# Patient Record
Sex: FEMALE | Race: WHITE | NOT HISPANIC OR LATINO | Employment: OTHER | URBAN - METROPOLITAN AREA
[De-identification: names, ages, dates, MRNs, and addresses within clinical notes are randomized per-mention and may not be internally consistent; named-entity substitution may affect disease eponyms.]

---

## 2019-03-13 ENCOUNTER — OFFICE VISIT (OUTPATIENT)
Dept: GASTROENTEROLOGY | Facility: CLINIC | Age: 69
End: 2019-03-13
Payer: MEDICARE

## 2019-03-13 VITALS
DIASTOLIC BLOOD PRESSURE: 88 MMHG | WEIGHT: 186 LBS | HEART RATE: 74 BPM | HEIGHT: 64 IN | SYSTOLIC BLOOD PRESSURE: 124 MMHG | BODY MASS INDEX: 31.76 KG/M2

## 2019-03-13 DIAGNOSIS — K64.9 HEMORRHOIDS, UNSPECIFIED HEMORRHOID TYPE: ICD-10-CM

## 2019-03-13 DIAGNOSIS — K59.00 CONSTIPATION, UNSPECIFIED CONSTIPATION TYPE: ICD-10-CM

## 2019-03-13 DIAGNOSIS — Z83.71 FAMILY HISTORY OF COLONIC POLYPS: ICD-10-CM

## 2019-03-13 DIAGNOSIS — K21.9 GASTROESOPHAGEAL REFLUX DISEASE, ESOPHAGITIS PRESENCE NOT SPECIFIED: Primary | ICD-10-CM

## 2019-03-13 PROBLEM — Z83.719 FAMILY HISTORY OF COLONIC POLYPS: Status: ACTIVE | Noted: 2019-03-13

## 2019-03-13 PROCEDURE — 99214 OFFICE O/P EST MOD 30 MIN: CPT | Performed by: INTERNAL MEDICINE

## 2019-03-13 RX ORDER — PRAVASTATIN SODIUM 20 MG
TABLET ORAL
COMMUNITY
Start: 2019-01-31 | End: 2021-11-19

## 2019-03-13 RX ORDER — FEXOFENADINE HCL 180 MG/1
180 TABLET ORAL DAILY
COMMUNITY

## 2019-03-13 RX ORDER — LISINOPRIL 5 MG/1
5 TABLET ORAL DAILY
COMMUNITY
Start: 2019-02-12

## 2019-03-13 RX ORDER — DIPHENOXYLATE HYDROCHLORIDE AND ATROPINE SULFATE 2.5; .025 MG/1; MG/1
1 TABLET ORAL DAILY
COMMUNITY

## 2019-03-13 RX ORDER — MELATONIN
1000 DAILY
COMMUNITY

## 2019-03-13 RX ORDER — OMEPRAZOLE 20 MG/1
20 CAPSULE, DELAYED RELEASE ORAL DAILY
Qty: 30 CAPSULE | Refills: 3 | Status: SHIPPED | OUTPATIENT
Start: 2019-03-13 | End: 2019-06-03 | Stop reason: SDUPTHER

## 2019-03-13 RX ORDER — BUDESONIDE AND FORMOTEROL FUMARATE DIHYDRATE 160; 4.5 UG/1; UG/1
2 AEROSOL RESPIRATORY (INHALATION) DAILY
COMMUNITY
Start: 2019-01-31

## 2019-03-13 RX ORDER — MONTELUKAST SODIUM 10 MG/1
10 TABLET ORAL DAILY
COMMUNITY
Start: 2019-02-06

## 2019-03-13 RX ORDER — BETAMETHASONE DIPROPIONATE 0.5 MG/ML
LOTION, AUGMENTED TOPICAL AS NEEDED
COMMUNITY

## 2019-03-13 RX ORDER — RANITIDINE 300 MG/1
TABLET ORAL
COMMUNITY
Start: 2019-02-10 | End: 2019-05-06 | Stop reason: SDUPTHER

## 2019-03-13 RX ORDER — CETIRIZINE HYDROCHLORIDE 10 MG/1
10 TABLET, CHEWABLE ORAL DAILY
COMMUNITY
End: 2021-11-19

## 2019-03-13 RX ORDER — ALBUTEROL SULFATE 90 UG/1
2 AEROSOL, METERED RESPIRATORY (INHALATION) EVERY 4 HOURS PRN
COMMUNITY

## 2019-03-13 NOTE — LETTER
March 13, 2019     Nilo Nicholson MD  [address]    Patient: Mendel Grew   YOB: 1950   Date of Visit: 3/13/2019       Dear Dr Nakia Veras:    Thank you for referring Anam Neil to me for evaluation  Below are my notes for this consultation  If you have questions, please do not hesitate to call me  I look forward to following your patient along with you  Sincerely,        Karen Martinez MD        CC: No Recipients  Karen Martinez MD  3/13/2019  3:35 PM  Sign at close encounter  [email or web address] - Outpatient Follow-up Note  Mendel Grew 76 y o  female MRN: [id number]  Encounter: [id number]    ASSESSMENT AND PLAN:      1  Gastroesophageal reflux disease, esophagitis presence not specified  Patient with chronic reflux that was controlled with PPI and ranitidine  Able to take her off of the PPI however with her move dietary changes and some allergy symptoms reflux symptoms do seem to be worsening  No alarm symptoms of dysphagia weight loss or bleeding  Advise restart low-dose omeprazole  Continue with reflux diet  - omeprazole (PriLOSEC) 20 mg delayed release capsule; Take 1 capsule (20 mg total) by mouth daily  Dispense: 30 capsule; Refill: 3    2  Constipation, unspecified constipation type  Clinically stable using Benefiber  Would consider increasing dose this patient states sometimes her bowel movements are very small and she has occasional sensation of incomplete evacuation  3  Hemorrhoids, unspecified hemorrhoid type  Clinically stable continue with fiber  Hydrocortisone cream as needed  - hydrocortisone (PROCTOSOL HC) 2 5 % rectal cream; Insert into the rectum 2 (two) times a day  Dispense: 30 g; Refill: 5    4  Family history of colonic polyps  Up-to-date with colorectal cancer screening  Next examination due October 2021       Followup Appointment[de-identified]  About 6 weeks  ______________________________________________________________________    Chief Complaint   Patient presents with    yearly check up     gerd/hernia     HPI:  Moved to [facility] community  Active in choral and water exercises  Foods there with a lot of fried and spicy stuff, having difficulty adjusting  Watching weight, stable  Often feels like lump in lower throat, congestion Has lots of allergies, air filters have helped  Still with persistent productive cough  Rare heartburn and water brash  Taking ranitidine at bedtime, rarely uses OTC Omeprazole  Has head of bed elevated  Historical Information   Past Medical History:   Diagnosis Date    Allergic sinusitis     Asthma     Degenerative joint disease     Hyperlipidemia     Hypertension      History reviewed  No pertinent surgical history    Social History     Substance and Sexual Activity   Alcohol Use Yes    Frequency: 2-4 times a month     Social History     Substance and Sexual Activity   Drug Use Never     Social History     Tobacco Use   Smoking Status Never Smoker   Smokeless Tobacco Never Used     Family History   Problem Relation Age of Onset    Colon polyps Sister     Colon cancer Neg Hx          Current Outpatient Medications:     Acetaminophen (TYLENOL ARTHRITIS PAIN PO)    albuterol (PROVENTIL HFA,VENTOLIN HFA) 90 mcg/act inhaler    B Complex-C (B COMPLEX-VITAMIN C PO)    betamethasone, augmented, (DIPROLENE) 0 05 % lotion    Calcium Carb-Cholecalciferol (CALCIUM 500+D3 PO)    cetirizine (ZyrTEC) 10 MG chewable tablet    cholecalciferol (VITAMIN D3) 1,000 units tablet    Docusate Calcium (STOOL SOFTENER PO)    Ferrous Sulfate (IRON) 325 (65 Fe) MG TABS    fexofenadine (ALLEGRA) 180 MG tablet    GLUCOSAMINE-CHONDROITIN PO    hydrocortisone (PROCTOSOL HC) 2 5 % rectal cream    lisinopril (ZESTRIL) 5 mg tablet    montelukast (SINGULAIR) 10 mg tablet    multivitamin (THERAGRAN) TABS    Polyethylene Glycol 3350 (MIRALAX PO)    pravastatin (PRAVACHOL) 20 mg tablet    Probiotic Product (PROBIOTIC DAILY PO)    ranitidine (ZANTAC) 300 MG tablet    SYMBICORT 160-4 5 MCG/ACT inhaler    Wheat Dextrin (BENEFIBER PO)    omeprazole (PriLOSEC) 20 mg delayed release capsule  No Known Allergies    Positive for hoarseness, weight gain productive cough wheezing and dyspnea on exertion, GI as per HPI  Leg cramps and muscle stiffness  Otherwise  10 Point REVIEW OF SYSTEMS IS OTHERWISE NEGATIVE  PHYSICAL EXAM:    Blood pressure 124/88, pulse 74, height 5' 4" (1 626 m), weight 84 4 kg (186 lb)  Body mass index is 31 93 kg/m²  General Appearance:  Alert, cooperative, no distress  HEENT:  Normocephalic, atraumatic, anicteric  Neck: Supple, symmetrical, trachea midline  Lungs: Clear to auscultation bilaterally; no rales, rhonchi or wheezing; respirations unlabored   Heart: Regular rate and rhythm; no murmur, rub, or gallop  Abdomen:   Soft, non-tender, non-distended; normal bowel sounds; no masses, no organomegaly   Rectal:  Deferred   Extremities:  No cyanosis, clubbing or edema   Skin:  No jaundice, rashes, or lesions   Lymph nodes: No palpable cervical lymphadenopathy     Lab Results:   No results found for: WBC, HGB, HCT, MCV, PLT  No results found for: NA, K, CL, CO2, ANIONGAP, BUN, CREATININE, GLUCOSE, GLUF, CALCIUM, CORRECTEDCA, AST, ALT, ALKPHOS, PROT, BILITOT, EGFR  No results found for: IRON, TIBC, FERRITIN  No results found for: LIPASE    Radiology Results:   No results found

## 2019-03-13 NOTE — PROGRESS NOTES
[facility] - Outpatient Follow-up Note  Ren Canales 76 y o  female MRN: [id number]  Encounter: [id number]    ASSESSMENT AND PLAN:      1  Gastroesophageal reflux disease, esophagitis presence not specified  Patient with chronic reflux that was controlled with PPI and ranitidine  Able to take her off of the PPI however with her move dietary changes and some allergy symptoms reflux symptoms do seem to be worsening  No alarm symptoms of dysphagia weight loss or bleeding  Advise restart low-dose omeprazole  Continue with reflux diet  - omeprazole (PriLOSEC) 20 mg delayed release capsule; Take 1 capsule (20 mg total) by mouth daily  Dispense: 30 capsule; Refill: 3    2  Constipation, unspecified constipation type  Clinically stable using Benefiber  Would consider increasing dose this patient states sometimes her bowel movements are very small and she has occasional sensation of incomplete evacuation  3  Hemorrhoids, unspecified hemorrhoid type  Clinically stable continue with fiber  Hydrocortisone cream as needed  - hydrocortisone (PROCTOSOL HC) 2 5 % rectal cream; Insert into the rectum 2 (two) times a day  Dispense: 30 g; Refill: 5    4  Family history of colonic polyps  Up-to-date with colorectal cancer screening  Next examination due October 2021  Followup Appointment[de-identified]  About 6 weeks  ______________________________________________________________________    Chief Complaint   Patient presents with    yearly check up     gerd/hernia     HPI:  Moved to group home community  Active in choral and water exercises  Foods there with a lot of fried and spicy stuff, having difficulty adjusting  Watching weight, stable  Often feels like lump in lower throat, congestion Has lots of allergies, air filters have helped  Still with persistent productive cough  Rare heartburn and water brash  Taking ranitidine at bedtime, rarely uses OTC Omeprazole    Has head of bed elevated  Historical Information   Past Medical History:   Diagnosis Date    Allergic sinusitis     Asthma     Degenerative joint disease     Hyperlipidemia     Hypertension      History reviewed  No pertinent surgical history  Social History     Substance and Sexual Activity   Alcohol Use Yes    Frequency: 2-4 times a month     Social History     Substance and Sexual Activity   Drug Use Never     Social History     Tobacco Use   Smoking Status Never Smoker   Smokeless Tobacco Never Used     Family History   Problem Relation Age of Onset    Colon polyps Sister     Colon cancer Neg Hx          Current Outpatient Medications:     Acetaminophen (TYLENOL ARTHRITIS PAIN PO)    albuterol (PROVENTIL HFA,VENTOLIN HFA) 90 mcg/act inhaler    B Complex-C (B COMPLEX-VITAMIN C PO)    betamethasone, augmented, (DIPROLENE) 0 05 % lotion    Calcium Carb-Cholecalciferol (CALCIUM 500+D3 PO)    cetirizine (ZyrTEC) 10 MG chewable tablet    cholecalciferol (VITAMIN D3) 1,000 units tablet    Docusate Calcium (STOOL SOFTENER PO)    Ferrous Sulfate (IRON) 325 (65 Fe) MG TABS    fexofenadine (ALLEGRA) 180 MG tablet    GLUCOSAMINE-CHONDROITIN PO    hydrocortisone (PROCTOSOL HC) 2 5 % rectal cream    lisinopril (ZESTRIL) 5 mg tablet    montelukast (SINGULAIR) 10 mg tablet    multivitamin (THERAGRAN) TABS    Polyethylene Glycol 3350 (MIRALAX PO)    pravastatin (PRAVACHOL) 20 mg tablet    Probiotic Product (PROBIOTIC DAILY PO)    ranitidine (ZANTAC) 300 MG tablet    SYMBICORT 160-4 5 MCG/ACT inhaler    Wheat Dextrin (BENEFIBER PO)    omeprazole (PriLOSEC) 20 mg delayed release capsule  No Known Allergies    Positive for hoarseness, weight gain productive cough wheezing and dyspnea on exertion, GI as per HPI  Leg cramps and muscle stiffness  Otherwise  10 Point REVIEW OF SYSTEMS IS OTHERWISE NEGATIVE  PHYSICAL EXAM:    Blood pressure 124/88, pulse 74, height 5' 4" (1 626 m), weight 84 4 kg (186 lb)  Body mass index is 31 93 kg/m²  General Appearance:  Alert, cooperative, no distress  HEENT:  Normocephalic, atraumatic, anicteric  Neck: Supple, symmetrical, trachea midline  Lungs: Clear to auscultation bilaterally; no rales, rhonchi or wheezing; respirations unlabored   Heart: Regular rate and rhythm; no murmur, rub, or gallop  Abdomen:   Soft, non-tender, non-distended; normal bowel sounds; no masses, no organomegaly   Rectal:  Deferred   Extremities:  No cyanosis, clubbing or edema   Skin:  No jaundice, rashes, or lesions   Lymph nodes: No palpable cervical lymphadenopathy     Lab Results:   No results found for: WBC, HGB, HCT, MCV, PLT  No results found for: NA, K, CL, CO2, ANIONGAP, BUN, CREATININE, GLUCOSE, GLUF, CALCIUM, CORRECTEDCA, AST, ALT, ALKPHOS, PROT, BILITOT, EGFR  No results found for: IRON, TIBC, FERRITIN  No results found for: LIPASE    Radiology Results:   No results found

## 2019-04-24 ENCOUNTER — OFFICE VISIT (OUTPATIENT)
Dept: GASTROENTEROLOGY | Facility: CLINIC | Age: 69
End: 2019-04-24
Payer: MEDICARE

## 2019-04-24 ENCOUNTER — TELEPHONE (OUTPATIENT)
Dept: GASTROENTEROLOGY | Facility: CLINIC | Age: 69
End: 2019-04-24

## 2019-04-24 VITALS
HEART RATE: 66 BPM | WEIGHT: 184 LBS | HEIGHT: 64 IN | BODY MASS INDEX: 31.41 KG/M2 | DIASTOLIC BLOOD PRESSURE: 88 MMHG | SYSTOLIC BLOOD PRESSURE: 124 MMHG

## 2019-04-24 DIAGNOSIS — Z83.71 FAMILY HISTORY OF COLONIC POLYPS: ICD-10-CM

## 2019-04-24 DIAGNOSIS — K59.00 CONSTIPATION, UNSPECIFIED CONSTIPATION TYPE: ICD-10-CM

## 2019-04-24 DIAGNOSIS — K21.9 GASTROESOPHAGEAL REFLUX DISEASE, ESOPHAGITIS PRESENCE NOT SPECIFIED: Primary | ICD-10-CM

## 2019-04-24 PROCEDURE — 99213 OFFICE O/P EST LOW 20 MIN: CPT | Performed by: INTERNAL MEDICINE

## 2019-04-24 RX ORDER — SIMVASTATIN 10 MG
10 TABLET ORAL
COMMUNITY
Start: 2019-05-25

## 2019-05-06 DIAGNOSIS — K21.9 GASTROESOPHAGEAL REFLUX DISEASE, ESOPHAGITIS PRESENCE NOT SPECIFIED: Primary | ICD-10-CM

## 2019-05-06 RX ORDER — RANITIDINE 300 MG/1
300 TABLET ORAL
Qty: 90 TABLET | Refills: 3 | Status: SHIPPED | OUTPATIENT
Start: 2019-05-06 | End: 2021-11-19

## 2019-05-28 LAB — HBA1C MFR BLD HPLC: 5.5 %

## 2019-06-03 DIAGNOSIS — K21.9 GASTROESOPHAGEAL REFLUX DISEASE, ESOPHAGITIS PRESENCE NOT SPECIFIED: ICD-10-CM

## 2019-06-04 RX ORDER — OMEPRAZOLE 20 MG/1
CAPSULE, DELAYED RELEASE ORAL
Qty: 30 CAPSULE | Refills: 2 | Status: SHIPPED | OUTPATIENT
Start: 2019-06-04 | End: 2019-08-30 | Stop reason: SDUPTHER

## 2019-08-30 DIAGNOSIS — K21.9 GASTROESOPHAGEAL REFLUX DISEASE, ESOPHAGITIS PRESENCE NOT SPECIFIED: ICD-10-CM

## 2019-08-30 RX ORDER — OMEPRAZOLE 20 MG/1
CAPSULE, DELAYED RELEASE ORAL
Qty: 90 CAPSULE | Refills: 0 | Status: SHIPPED | OUTPATIENT
Start: 2019-08-30 | End: 2019-12-02 | Stop reason: SDUPTHER

## 2019-10-28 ENCOUNTER — OFFICE VISIT (OUTPATIENT)
Dept: GASTROENTEROLOGY | Facility: CLINIC | Age: 69
End: 2019-10-28
Payer: MEDICARE

## 2019-10-28 VITALS
SYSTOLIC BLOOD PRESSURE: 140 MMHG | BODY MASS INDEX: 31.58 KG/M2 | HEIGHT: 64 IN | DIASTOLIC BLOOD PRESSURE: 98 MMHG | HEART RATE: 70 BPM | WEIGHT: 185 LBS

## 2019-10-28 DIAGNOSIS — K59.00 CONSTIPATION, UNSPECIFIED CONSTIPATION TYPE: ICD-10-CM

## 2019-10-28 DIAGNOSIS — Z83.71 FAMILY HISTORY OF COLONIC POLYPS: ICD-10-CM

## 2019-10-28 DIAGNOSIS — K21.9 GASTROESOPHAGEAL REFLUX DISEASE, ESOPHAGITIS PRESENCE NOT SPECIFIED: Primary | ICD-10-CM

## 2019-10-28 DIAGNOSIS — K64.9 HEMORRHOIDS, UNSPECIFIED HEMORRHOID TYPE: ICD-10-CM

## 2019-10-28 PROCEDURE — 99213 OFFICE O/P EST LOW 20 MIN: CPT | Performed by: INTERNAL MEDICINE

## 2019-10-28 RX ORDER — FAMOTIDINE 40 MG/1
40 TABLET, FILM COATED ORAL
Refills: 1 | COMMUNITY
Start: 2019-08-26 | End: 2020-02-24 | Stop reason: SDUPTHER

## 2019-10-28 NOTE — LETTER
October 28, 2019     Irwin Moeller MD  [address] [facility]  [address]  [facility] [address]    Patient: Shamika Rice   YOB: 1950   Date of Visit: 10/28/2019       Dear Dr SORIA [facility] [place]:    Thank you for referring Vidal Valdez to me for evaluation  Below are my notes for this consultation  If you have questions, please do not hesitate to call me  I look forward to following your patient along with you  Sincerely,        Cody Iraheta MD        CC: No Recipients  Cody Iraheta MD  10/28/2019  5:33 PM  Sign at close encounter  [facility] - Outpatient Follow-up Note  Shamika Rice 71 y o  female MRN: [id number]  Encounter: [id number]    ASSESSMENT AND PLAN:      1  Gastroesophageal reflux disease, esophagitis presence not specified  Reflux symptoms reasonably well controlled with PPI every other day, Pepcid in the evening, and dietary management  No dysphagia  Appetite good  Discussed roll of hiatal hernia  She did have a small hiatal hernia on previous endoscopic evaluation  Reasonable to reassess and will order an upper GI series  If the hiatal hernia has markedly increased in size orifice significant amount of the stomach is in the chest cavity then repair would definitely be indicated  If it is remaining small it is not clear that the benefit of repair would be adequate  Continue with same medical regimen and dietary control   - [place] upper GI UGI; Future    2  Constipation, unspecified constipation type  Continue with daily fiber therapy  Titrate dose as needed  Reinforced that it is okay to use MiraLax as needed  Hold on adding additional medications such as Amitiza or Linzess  3  Hemorrhoids, unspecified hemorrhoid type  Encourage fiber and fluids  Would transition from benefiber to the psyllium gummies  Proctocream as needed  4  Family history of colonic polyps  Recommend screening colonoscopy every 5 years    Next examination due October 2021  Followup Appointment:  4 months  ______________________________________________________________________    Chief Complaint   Patient presents with    Follow-up     gerd/constipation     HPI:  Still with intermittent constipation  Stool every day to every three days  Still taking fiber, discussed using more fiber daily  Likes gummies better than Benefiber  Changed from Pepcid to ranitidine  Working on reflux diet  Still with sinus issues and post nasal drip  No dysphagia  Taking PPI qod  Dong better with diet  Feels bloating  Hemorrhoids back  Proctocream helping  Encourage fib    Historical Information   Past Medical History:   Diagnosis Date    Allergic sinusitis     Asthma     Degenerative joint disease     Hyperlipidemia     Hypertension      Past Surgical History:   Procedure Laterality Date    COLONOSCOPY      October 2016 diverticulosis and internal hemorrhoids, September 2011 diverticulosis and internal hemorrhoids   UPPER GASTROINTESTINAL ENDOSCOPY  11/10/2014    November 2014 irregular Z-line and Schatzki ring, hiatal hernia, gastritis  Biopsies negative for celiac, H pylori, or Phillip's or eosinophilic esophagitis       Social History     Substance and Sexual Activity   Alcohol Use Yes    Frequency: 2-4 times a month    Drinks per session: 1 or 2     Social History     Substance and Sexual Activity   Drug Use Never     Social History     Tobacco Use   Smoking Status Never Smoker   Smokeless Tobacco Never Used     Family History   Problem Relation Age of Onset    Colon polyps Sister     Heart disease Sister     Diabetes Sister     MARLEY disease Sister     Colon polyps Mother     Colon cancer Neg Hx          Current Outpatient Medications:     Acetaminophen (TYLENOL ARTHRITIS PAIN PO)    albuterol (PROVENTIL HFA,VENTOLIN HFA) 90 mcg/act inhaler    B Complex-C (B COMPLEX-VITAMIN C PO)    betamethasone, augmented, (DIPROLENE) 0 05 % lotion    Calcium Carb-Cholecalciferol (CALCIUM 500+D3 PO)    cholecalciferol (VITAMIN D3) 1,000 units tablet    Docusate Calcium (STOOL SOFTENER PO)    famotidine (PEPCID) 40 MG tablet    Ferrous Sulfate (IRON) 142 (45 Fe) MG TBCR    fexofenadine (ALLEGRA) 180 MG tablet    GLUCOSAMINE-CHONDROITIN PO    hydrocortisone (PROCTOSOL HC) 2 5 % rectal cream    lisinopril (ZESTRIL) 5 mg tablet    montelukast (SINGULAIR) 10 mg tablet    multivitamin (THERAGRAN) TABS    omeprazole (PriLOSEC) 20 mg delayed release capsule    Polyethylene Glycol 3350 (MIRALAX PO)    Probiotic Product (PROBIOTIC DAILY PO)    simvastatin (ZOCOR) 10 mg tablet    SYMBICORT 160-4 5 MCG/ACT inhaler    Wheat Dextrin (BENEFIBER PO)    cetirizine (ZyrTEC) 10 MG chewable tablet    pravastatin (PRAVACHOL) 20 mg tablet    ranitidine (ZANTAC) 300 MG tablet  No Known Allergies  Reviewed medications and allergies and updated as indicated    PHYSICAL EXAM:    Blood pressure 140/98, pulse 70, height 5' 4" (1 626 m), weight 83 9 kg (185 lb)  Body mass index is 31 76 kg/m²  General Appearance: NAD, cooperative, alert  Eyes: Anicteric, PERRLA, EOMI  ENT:  Normocephalic, atraumatic, normal mucosa  Neck:  Supple, symmetrical, trachea midline  Resp:  Clear to auscultation bilaterally; no rales, rhonchi or wheezing; respirations unlabored   CV:  S1 S2, Regular rate and rhythm; no murmur, rub, or gallop  GI:  Soft, non-tender, non-distended; normal bowel sounds; no masses, no organomegaly   Rectal: Deferred  Musculoskeletal: No cyanosis, clubbing or edema  Normal ROM    Skin:  No jaundice, rashes, or lesions   Heme/Lymph: No palpable cervical lymphadenopathy  Psych: Normal affect, good eye contact  Neuro: No gross deficits, AAOx3    Lab Results:   No results found for: WBC, HGB, HCT, MCV, PLT  No results found for: NA, K, CL, CO2, ANIONGAP, BUN, CREATININE, GLUCOSE, GLUF, CALCIUM, CORRECTEDCA, AST, ALT, ALKPHOS, PROT, BILITOT, EGFR  No results found for: IRON, TIBC, FERRITIN  No results found for: LIPASE    Radiology Results:   No results found

## 2019-10-28 NOTE — PROGRESS NOTES
[facility] - Outpatient Follow-up Note  Chase Valdez 71 y o  female MRN: [id number]  Encounter: [id number]    ASSESSMENT AND PLAN:      1  Gastroesophageal reflux disease, esophagitis presence not specified  Reflux symptoms reasonably well controlled with PPI every other day, Pepcid in the evening, and dietary management  No dysphagia  Appetite good  Discussed roll of hiatal hernia  She did have a small hiatal hernia on previous endoscopic evaluation  Reasonable to reassess and will order an upper GI series  If the hiatal hernia has markedly increased in size orifice significant amount of the stomach is in the chest cavity then repair would definitely be indicated  If it is remaining small it is not clear that the benefit of repair would be adequate  Continue with same medical regimen and dietary control   - [place] upper GI UGI; Future    2  Constipation, unspecified constipation type  Continue with daily fiber therapy  Titrate dose as needed  Reinforced that it is okay to use MiraLax as needed  Hold on adding additional medications such as Amitiza or Linzess  3  Hemorrhoids, unspecified hemorrhoid type  Encourage fiber and fluids  Would transition from benefiber to the psyllium gummies  Proctocream as needed  4  Family history of colonic polyps  Recommend screening colonoscopy every 5 years  Next examination due October 2021  Followup Appointment:  4 months  ______________________________________________________________________    Chief Complaint   Patient presents with    Follow-up     gerd/constipation     HPI:  Still with intermittent constipation  Stool every day to every three days  Still taking fiber, discussed using more fiber daily  Likes gummies better than Benefiber  Changed from Pepcid to ranitidine  Working on reflux diet  Still with sinus issues and post nasal drip  No dysphagia  Taking PPI qod  Dong better with diet    Feels bloating  Hemorrhoids back  Proctocream helping  Encourage fib    Historical Information   Past Medical History:   Diagnosis Date    Allergic sinusitis     Asthma     Degenerative joint disease     Hyperlipidemia     Hypertension      Past Surgical History:   Procedure Laterality Date    COLONOSCOPY      October 2016 diverticulosis and internal hemorrhoids, September 2011 diverticulosis and internal hemorrhoids   UPPER GASTROINTESTINAL ENDOSCOPY  11/10/2014    November 2014 irregular Z-line and Schatzki ring, hiatal hernia, gastritis  Biopsies negative for celiac, H pylori, or Phillip's or eosinophilic esophagitis       Social History     Substance and Sexual Activity   Alcohol Use Yes    Frequency: 2-4 times a month    Drinks per session: 1 or 2     Social History     Substance and Sexual Activity   Drug Use Never     Social History     Tobacco Use   Smoking Status Never Smoker   Smokeless Tobacco Never Used     Family History   Problem Relation Age of Onset    Colon polyps Sister     Heart disease Sister     Diabetes Sister     MARLEY disease Sister     Colon polyps Mother     Colon cancer Neg Hx          Current Outpatient Medications:     Acetaminophen (TYLENOL ARTHRITIS PAIN PO)    albuterol (PROVENTIL HFA,VENTOLIN HFA) 90 mcg/act inhaler    B Complex-C (B COMPLEX-VITAMIN C PO)    betamethasone, augmented, (DIPROLENE) 0 05 % lotion    Calcium Carb-Cholecalciferol (CALCIUM 500+D3 PO)    cholecalciferol (VITAMIN D3) 1,000 units tablet    Docusate Calcium (STOOL SOFTENER PO)    famotidine (PEPCID) 40 MG tablet    Ferrous Sulfate (IRON) 142 (45 Fe) MG TBCR    fexofenadine (ALLEGRA) 180 MG tablet    GLUCOSAMINE-CHONDROITIN PO    hydrocortisone (PROCTOSOL HC) 2 5 % rectal cream    lisinopril (ZESTRIL) 5 mg tablet    montelukast (SINGULAIR) 10 mg tablet    multivitamin (THERAGRAN) TABS    omeprazole (PriLOSEC) 20 mg delayed release capsule    Polyethylene Glycol 3350 (MIRALAX PO)   Probiotic Product (PROBIOTIC DAILY PO)    simvastatin (ZOCOR) 10 mg tablet    SYMBICORT 160-4 5 MCG/ACT inhaler    Wheat Dextrin (BENEFIBER PO)    cetirizine (ZyrTEC) 10 MG chewable tablet    pravastatin (PRAVACHOL) 20 mg tablet    ranitidine (ZANTAC) 300 MG tablet  No Known Allergies  Reviewed medications and allergies and updated as indicated    PHYSICAL EXAM:    Blood pressure 140/98, pulse 70, height 5' 4" (1 626 m), weight 83 9 kg (185 lb)  Body mass index is 31 76 kg/m²  General Appearance: NAD, cooperative, alert  Eyes: Anicteric, PERRLA, EOMI  ENT:  Normocephalic, atraumatic, normal mucosa  Neck:  Supple, symmetrical, trachea midline  Resp:  Clear to auscultation bilaterally; no rales, rhonchi or wheezing; respirations unlabored   CV:  S1 S2, Regular rate and rhythm; no murmur, rub, or gallop  GI:  Soft, non-tender, non-distended; normal bowel sounds; no masses, no organomegaly   Rectal: Deferred  Musculoskeletal: No cyanosis, clubbing or edema  Normal ROM  Skin:  No jaundice, rashes, or lesions   Heme/Lymph: No palpable cervical lymphadenopathy  Psych: Normal affect, good eye contact  Neuro: No gross deficits, AAOx3    Lab Results:   No results found for: WBC, HGB, HCT, MCV, PLT  No results found for: NA, K, CL, CO2, ANIONGAP, BUN, CREATININE, GLUCOSE, GLUF, CALCIUM, CORRECTEDCA, AST, ALT, ALKPHOS, PROT, BILITOT, EGFR  No results found for: IRON, TIBC, FERRITIN  No results found for: LIPASE    Radiology Results:   No results found

## 2019-10-28 NOTE — PATIENT INSTRUCTIONS
Continue with reflux diet and precautions  Observe for trigger foods  Continue with Pepcid daily and omeprazole every other day  Fiber supplement daily, experiment with different doses and fiber supplements  Upper GI series

## 2019-12-02 DIAGNOSIS — K21.9 GASTROESOPHAGEAL REFLUX DISEASE, ESOPHAGITIS PRESENCE NOT SPECIFIED: ICD-10-CM

## 2019-12-02 RX ORDER — OMEPRAZOLE 20 MG/1
CAPSULE, DELAYED RELEASE ORAL
Qty: 90 CAPSULE | Refills: 0 | Status: SHIPPED | OUTPATIENT
Start: 2019-12-02 | End: 2021-11-19

## 2020-02-24 ENCOUNTER — OFFICE VISIT (OUTPATIENT)
Dept: GASTROENTEROLOGY | Facility: CLINIC | Age: 70
End: 2020-02-24
Payer: MEDICARE

## 2020-02-24 VITALS
BODY MASS INDEX: 30.73 KG/M2 | DIASTOLIC BLOOD PRESSURE: 82 MMHG | HEIGHT: 64 IN | SYSTOLIC BLOOD PRESSURE: 120 MMHG | WEIGHT: 180 LBS | HEART RATE: 69 BPM

## 2020-02-24 DIAGNOSIS — K59.00 CONSTIPATION, UNSPECIFIED CONSTIPATION TYPE: ICD-10-CM

## 2020-02-24 DIAGNOSIS — K64.9 HEMORRHOIDS, UNSPECIFIED HEMORRHOID TYPE: ICD-10-CM

## 2020-02-24 DIAGNOSIS — K21.9 GASTROESOPHAGEAL REFLUX DISEASE, ESOPHAGITIS PRESENCE NOT SPECIFIED: Primary | ICD-10-CM

## 2020-02-24 DIAGNOSIS — Z83.71 FAMILY HISTORY OF COLONIC POLYPS: ICD-10-CM

## 2020-02-24 PROCEDURE — 99213 OFFICE O/P EST LOW 20 MIN: CPT | Performed by: INTERNAL MEDICINE

## 2020-02-24 RX ORDER — FAMOTIDINE 40 MG/1
40 TABLET, FILM COATED ORAL
Qty: 90 TABLET | Refills: 3 | Status: SHIPPED | OUTPATIENT
Start: 2020-02-24 | End: 2021-02-04 | Stop reason: SDUPTHER

## 2020-02-24 NOTE — PATIENT INSTRUCTIONS
Reflux diet and precautions  No change in acid suppression medications  Check ultrasound to rule out gallstones  Continue with fiber supplementation  Continue with plenty of dietary fiber and fluids

## 2020-02-24 NOTE — LETTER
February 24, 2020     Mohini Cruz MD  [address] [address]    Patient: Sabrina Anthony   YOB: 1950   Date of Visit: 2/24/2020       Dear Dr Keane Crigler:    Thank you for referring Ashley Morrow to me for evaluation  Below are my notes for this consultation  If you have questions, please do not hesitate to call me  I look forward to following your patient along with you  Sincerely,        Adrien Blanco MD        CC: No Recipients  Adrien Blanco MD  2/24/2020  3:11 PM  Sign at close encounter  [facility] - Outpatient Follow-up Note  Sabrina Anthony 71 y o  female MRN: [id number]  Encounter: [id number]    ASSESSMENT AND PLAN:      1  Gastroesophageal reflux disease, esophagitis presence not specified  GERD symptoms stable without alarm symptoms of dysphagia weight loss or bleeding  Still does get intermittent symptoms  Discussed reflux diet and precautions  Discussed use of acid suppressing medications  Discussed possibility of symptomatic cholelithiasis, doubtful without right upper quadrant pain, but reasonable to obtain ultrasound to exclude  · Reflux diet and precautions  · Continue famotidine and omeprazole every other day  · Right upper quadrant ultrasound  - famotidine (PEPCID) 40 MG tablet; Take 1 tablet (40 mg total) by mouth daily at bedtime  Dispense: 90 tablet; Refill: 3  -  abdomen limited; Future    2  Hemorrhoids, unspecified hemorrhoid type  Asymptomatic without bleeding or pain  · Continue fiber therapy    3  Constipation, unspecified constipation type  Symptoms controlled with fiber therapy and plenty of fluids  Would not taking as much dietary fiber will increase the number of gummies  Only uses MiraLax occasionally when she feels backed up  · Continue high-fiber diet and fiber supplementation  · MiraLax as needed    4  Family history of colonic polyps  Recommend screening colonoscopy every 5 years   Next examination due October 2021  Followup Appointment:  1 year or sooner if needed  ______________________________________________________________________    Chief Complaint   Patient presents with    Follow-up     GERD    Medication Refill     Famotidine 40mg daily HS     HPI:  Reflux stable  Avoids tomatoes and sauces  Tolerates moderate amounts  No dysphagia  Appetite and weight stable  Trying to loose weight  Stools still irregular  Taking fiber gummies, will take more if doesn't eat much fiber  If feels backed up, takes Miralax  No bleeding  Uses hemorrhoid cream as needed  Historical Information   Past Medical History:   Diagnosis Date    Allergic sinusitis     Asthma     Degenerative joint disease     Hyperlipidemia     Hypertension      Past Surgical History:   Procedure Laterality Date    COLONOSCOPY      October 2016 diverticulosis and internal hemorrhoids, September 2011 diverticulosis and internal hemorrhoids   UPPER GASTROINTESTINAL ENDOSCOPY  11/10/2014    November 2014 irregular Z-line and Schatzki ring, hiatal hernia, gastritis  Biopsies negative for celiac, H pylori, or Phillip's or eosinophilic esophagitis       Social History     Substance and Sexual Activity   Alcohol Use Yes    Frequency: 2-4 times a month    Drinks per session: 1 or 2     Social History     Substance and Sexual Activity   Drug Use Never     Social History     Tobacco Use   Smoking Status Never Smoker   Smokeless Tobacco Never Used     Family History   Problem Relation Age of Onset    Colon polyps Sister     Heart disease Sister     Diabetes Sister     MARLEY disease Sister     Colon polyps Mother     Colon cancer Neg Hx          Current Outpatient Medications:     Acetaminophen (TYLENOL ARTHRITIS PAIN PO)    albuterol (PROVENTIL HFA,VENTOLIN HFA) 90 mcg/act inhaler    B Complex-C (B COMPLEX-VITAMIN C PO)    betamethasone, augmented, (DIPROLENE) 0 05 % lotion    Calcium Carb-Cholecalciferol (CALCIUM 500+D3 PO)    cholecalciferol (VITAMIN D3) 1,000 units tablet    Docusate Calcium (STOOL SOFTENER PO)    famotidine (PEPCID) 40 MG tablet    Ferrous Sulfate (IRON) 142 (45 Fe) MG TBCR    fexofenadine (ALLEGRA) 180 MG tablet    FIBER ADULT GUMMIES PO    GLUCOSAMINE-CHONDROITIN PO    hydrocortisone (PROCTOSOL HC) 2 5 % rectal cream    lisinopril (ZESTRIL) 5 mg tablet    multivitamin (THERAGRAN) TABS    omeprazole (PriLOSEC) 20 mg delayed release capsule    Polyethylene Glycol 3350 (MIRALAX PO)    Probiotic Product (PROBIOTIC DAILY PO)    simvastatin (ZOCOR) 10 mg tablet    SYMBICORT 160-4 5 MCG/ACT inhaler    cetirizine (ZyrTEC) 10 MG chewable tablet    montelukast (SINGULAIR) 10 mg tablet    pravastatin (PRAVACHOL) 20 mg tablet    ranitidine (ZANTAC) 300 MG tablet    Wheat Dextrin (BENEFIBER PO)  No Known Allergies  Reviewed medications and allergies and updated as indicated    PHYSICAL EXAM:    Blood pressure 120/82, pulse 69, height 5' 4" (1 626 m), weight 81 6 kg (180 lb)  Body mass index is 30 9 kg/m²  General Appearance: NAD, cooperative, alert  Eyes: Anicteric, PERRLA, EOMI  ENT:  Normocephalic, atraumatic, normal mucosa  Neck:  Supple, symmetrical, trachea midline  Resp:  Clear to auscultation bilaterally; no rales, rhonchi or wheezing; respirations unlabored   CV:  S1 S2, Regular rate and rhythm; no murmur, rub, or gallop  GI:  Soft, non-tender, non-distended; normal bowel sounds; no masses, no organomegaly   Rectal: Deferred  Musculoskeletal: No cyanosis, clubbing or edema  Normal ROM    Skin:  No jaundice, rashes, or lesions   Heme/Lymph: No palpable cervical lymphadenopathy  Psych: Normal affect, good eye contact  Neuro: No gross deficits, AAOx3    Lab Results:   No results found for: WBC, HGB, HCT, MCV, PLT  No results found for: NA, K, CL, CO2, ANIONGAP, BUN, CREATININE, GLUCOSE, GLUF, CALCIUM, CORRECTEDCA, AST, ALT, ALKPHOS, PROT, BILITOT, EGFR  No results found for: IRON, TIBC, FERRITIN  No results found for: LIPASE    Radiology Results:   No results found

## 2020-02-24 NOTE — PROGRESS NOTES
[id number] - Outpatient Follow-up Note  Vi Maloney 71 y o  female MRN: [id number]  Encounter: [id number]    ASSESSMENT AND PLAN:      1  Gastroesophageal reflux disease, esophagitis presence not specified  GERD symptoms stable without alarm symptoms of dysphagia weight loss or bleeding  Still does get intermittent symptoms  Discussed reflux diet and precautions  Discussed use of acid suppressing medications  Discussed possibility of symptomatic cholelithiasis, doubtful without right upper quadrant pain, but reasonable to obtain ultrasound to exclude  · Reflux diet and precautions  · Continue famotidine and omeprazole every other day  · Right upper quadrant ultrasound  - famotidine (PEPCID) 40 MG tablet; Take 1 tablet (40 mg total) by mouth daily at bedtime  Dispense: 90 tablet; Refill: 3  - US abdomen limited; Future    2  Hemorrhoids, unspecified hemorrhoid type  Asymptomatic without bleeding or pain  · Continue fiber therapy    3  Constipation, unspecified constipation type  Symptoms controlled with fiber therapy and plenty of fluids  Would not taking as much dietary fiber will increase the number of gummies  Only uses MiraLax occasionally when she feels backed up  · Continue high-fiber diet and fiber supplementation  · MiraLax as needed    4  Family history of colonic polyps  Recommend screening colonoscopy every 5 years   Next examination due October 2021  Followup Appointment:  1 year or sooner if needed  ______________________________________________________________________    Chief Complaint   Patient presents with    Follow-up     GERD    Medication Refill     Famotidine 40mg daily HS     HPI:  Reflux stable  Avoids tomatoes and sauces  Tolerates moderate amounts  No dysphagia  Appetite and weight stable  Trying to loose weight  Stools still irregular  Taking fiber gummies, will take more if doesn't eat much fiber    If feels backed up, takes Miralax  No bleeding  Uses hemorrhoid cream as needed  Historical Information   Past Medical History:   Diagnosis Date    Allergic sinusitis     Asthma     Degenerative joint disease     Hyperlipidemia     Hypertension      Past Surgical History:   Procedure Laterality Date    COLONOSCOPY      October 2016 diverticulosis and internal hemorrhoids, September 2011 diverticulosis and internal hemorrhoids   UPPER GASTROINTESTINAL ENDOSCOPY  11/10/2014    November 2014 irregular Z-line and Schatzki ring, hiatal hernia, gastritis  Biopsies negative for celiac, H pylori, or Phillip's or eosinophilic esophagitis       Social History     Substance and Sexual Activity   Alcohol Use Yes    Frequency: 2-4 times a month    Drinks per session: 1 or 2     Social History     Substance and Sexual Activity   Drug Use Never     Social History     Tobacco Use   Smoking Status Never Smoker   Smokeless Tobacco Never Used     Family History   Problem Relation Age of Onset    Colon polyps Sister     Heart disease Sister     Diabetes Sister     MARLEY disease Sister     Colon polyps Mother     Colon cancer Neg Hx          Current Outpatient Medications:     Acetaminophen (TYLENOL ARTHRITIS PAIN PO)    albuterol (PROVENTIL HFA,VENTOLIN HFA) 90 mcg/act inhaler    B Complex-C (B COMPLEX-VITAMIN C PO)    betamethasone, augmented, (DIPROLENE) 0 05 % lotion    Calcium Carb-Cholecalciferol (CALCIUM 500+D3 PO)    cholecalciferol (VITAMIN D3) 1,000 units tablet    Docusate Calcium (STOOL SOFTENER PO)    famotidine (PEPCID) 40 MG tablet    Ferrous Sulfate (IRON) 142 (45 Fe) MG TBCR    fexofenadine (ALLEGRA) 180 MG tablet    FIBER ADULT GUMMIES PO    GLUCOSAMINE-CHONDROITIN PO    hydrocortisone (PROCTOSOL HC) 2 5 % rectal cream    lisinopril (ZESTRIL) 5 mg tablet    multivitamin (THERAGRAN) TABS    omeprazole (PriLOSEC) 20 mg delayed release capsule    Polyethylene Glycol 3350 (MIRALAX PO)    Probiotic Product (PROBIOTIC DAILY PO)    simvastatin (ZOCOR) 10 mg tablet    SYMBICORT 160-4 5 MCG/ACT inhaler    cetirizine (ZyrTEC) 10 MG chewable tablet    montelukast (SINGULAIR) 10 mg tablet    pravastatin (PRAVACHOL) 20 mg tablet    ranitidine (ZANTAC) 300 MG tablet    Wheat Dextrin (BENEFIBER PO)  No Known Allergies  Reviewed medications and allergies and updated as indicated    PHYSICAL EXAM:    Blood pressure 120/82, pulse 69, height 5' 4" (1 626 m), weight 81 6 kg (180 lb)  Body mass index is 30 9 kg/m²  General Appearance: NAD, cooperative, alert  Eyes: Anicteric, PERRLA, EOMI  ENT:  Normocephalic, atraumatic, normal mucosa  Neck:  Supple, symmetrical, trachea midline  Resp:  Clear to auscultation bilaterally; no rales, rhonchi or wheezing; respirations unlabored   CV:  S1 S2, Regular rate and rhythm; no murmur, rub, or gallop  GI:  Soft, non-tender, non-distended; normal bowel sounds; no masses, no organomegaly   Rectal: Deferred  Musculoskeletal: No cyanosis, clubbing or edema  Normal ROM  Skin:  No jaundice, rashes, or lesions   Heme/Lymph: No palpable cervical lymphadenopathy  Psych: Normal affect, good eye contact  Neuro: No gross deficits, AAOx3    Lab Results:   No results found for: WBC, HGB, HCT, MCV, PLT  No results found for: NA, K, CL, CO2, ANIONGAP, BUN, CREATININE, GLUCOSE, GLUF, CALCIUM, CORRECTEDCA, AST, ALT, ALKPHOS, PROT, BILITOT, EGFR  No results found for: IRON, TIBC, FERRITIN  No results found for: LIPASE    Radiology Results:   No results found

## 2020-09-28 DIAGNOSIS — K64.9 HEMORRHOIDS, UNSPECIFIED HEMORRHOID TYPE: ICD-10-CM

## 2020-09-29 RX ORDER — HYDROCORTISONE 25 MG/G
CREAM TOPICAL
Qty: 28.35 G | Refills: 6 | Status: SHIPPED | OUTPATIENT
Start: 2020-09-29 | End: 2021-11-26 | Stop reason: SDUPTHER

## 2021-01-31 DIAGNOSIS — K21.9 GASTROESOPHAGEAL REFLUX DISEASE: ICD-10-CM

## 2021-02-04 DIAGNOSIS — K21.9 GASTROESOPHAGEAL REFLUX DISEASE: ICD-10-CM

## 2021-02-05 RX ORDER — FAMOTIDINE 40 MG/1
40 TABLET, FILM COATED ORAL
Qty: 90 TABLET | Refills: 1 | Status: SHIPPED | OUTPATIENT
Start: 2021-02-05 | End: 2021-08-03 | Stop reason: SDUPTHER

## 2021-02-08 RX ORDER — FAMOTIDINE 40 MG/1
TABLET, FILM COATED ORAL
Qty: 90 TABLET | Refills: 3 | OUTPATIENT
Start: 2021-02-08

## 2021-02-23 ENCOUNTER — OFFICE VISIT (OUTPATIENT)
Dept: GASTROENTEROLOGY | Facility: CLINIC | Age: 71
End: 2021-02-23
Payer: MEDICARE

## 2021-02-23 VITALS
DIASTOLIC BLOOD PRESSURE: 88 MMHG | SYSTOLIC BLOOD PRESSURE: 128 MMHG | HEIGHT: 64 IN | HEART RATE: 74 BPM | WEIGHT: 178 LBS | BODY MASS INDEX: 30.39 KG/M2

## 2021-02-23 DIAGNOSIS — K59.00 CONSTIPATION, UNSPECIFIED CONSTIPATION TYPE: ICD-10-CM

## 2021-02-23 DIAGNOSIS — Z83.71 FAMILY HISTORY OF COLONIC POLYPS: ICD-10-CM

## 2021-02-23 DIAGNOSIS — K21.9 GASTROESOPHAGEAL REFLUX DISEASE, UNSPECIFIED WHETHER ESOPHAGITIS PRESENT: Primary | ICD-10-CM

## 2021-02-23 PROCEDURE — 99213 OFFICE O/P EST LOW 20 MIN: CPT | Performed by: INTERNAL MEDICINE

## 2021-02-23 NOTE — LETTER
February 23, 2021     Lauren Lewis, [address] [address]    Patient: Danny Sutherland   YOB: 1950   Date of Visit: 2/23/2021       Dear Dr Stone Limb:    Thank you for referring Yaima Eugene to me for evaluation  Below are my notes for this consultation  If you have questions, please do not hesitate to call me  I look forward to following your patient along with you  Sincerely,        Erika Butler MD        CC: No Recipients  Erika Butler MD  2/23/2021  5:51 PM  Sign when Signing Visit  [facility] - Outpatient Follow-up Note  Danny Sutherland 79 y o  female MRN: [id number]  Encounter: [id number]    ASSESSMENT AND PLAN:      1  Gastroesophageal reflux disease, unspecified whether esophagitis present   GERD clinically stable without alarm symptoms of dysphagia weight loss or bleeding  Generally managing with famotidine nightly  Still some breakthrough symptoms but discussed the importance of dietary compliance  ·  reflux diet and precautions  ·  continue famotidine at night    2  Constipation, unspecified constipation type  Clinically improved with dietary management  Only needing to use laxative intermittently  ·   Continue plenty of dietary fiber and fluids  ·  Okay to use MiraLax as needed    3  Family history of colonic polyps  Up-to-date with colonoscopic surveillance  Recommend exam every 5 years  ·   Next colonoscopy due later this year, October 2021      Followup Appointment:   One year or sooner if needed  ______________________________________________________________________    Chief Complaint   Patient presents with    Annual Exam     GERD     HPI:  GERD stable  Some days with breakthrough symptoms usually due to dietary indiscretions  Taking famotidine nightly  No dysphagia  Stools OK  Some days more constipated  If that happens, takes Miralax  Takes Miralax just a few times a month        Historical Information   Past Medical History:   Diagnosis Date    Allergic sinusitis     Asthma     Cholelithiasis     Degenerative joint disease     Hyperlipidemia     Hypertension      Past Surgical History:   Procedure Laterality Date    COLONOSCOPY      October 2016 diverticulosis and internal hemorrhoids, September 2011 diverticulosis and internal hemorrhoids   UPPER GASTROINTESTINAL ENDOSCOPY  11/10/2014    November 2014 irregular Z-line and Schatzki ring, hiatal hernia, gastritis  Biopsies negative for celiac, H pylori, or Phillip's or eosinophilic esophagitis       Social History     Substance and Sexual Activity   Alcohol Use Yes    Frequency: 2-4 times a month    Drinks per session: 1 or 2     Social History     Substance and Sexual Activity   Drug Use Never     Social History     Tobacco Use   Smoking Status Never Smoker   Smokeless Tobacco Never Used     Family History   Problem Relation Age of Onset    Colon polyps Sister     Heart disease Sister     Diabetes Sister     MARLEY disease Sister     Colon polyps Mother     Colon cancer Neg Hx          Current Outpatient Medications:     Acetaminophen (TYLENOL ARTHRITIS PAIN PO)    albuterol (PROVENTIL HFA,VENTOLIN HFA) 90 mcg/act inhaler    B Complex-C (B COMPLEX-VITAMIN C PO)    betamethasone, augmented, (DIPROLENE) 0 05 % lotion    Calcium Carb-Cholecalciferol (CALCIUM 500+D3 PO)    cholecalciferol (VITAMIN D3) 1,000 units tablet    Docusate Calcium (STOOL SOFTENER PO)    famotidine (PEPCID) 40 MG tablet    fexofenadine (ALLEGRA) 180 MG tablet    FIBER ADULT GUMMIES PO    GLUCOSAMINE-CHONDROITIN PO    lisinopril (ZESTRIL) 5 mg tablet    montelukast (SINGULAIR) 10 mg tablet    multivitamin (THERAGRAN) TABS    Polyethylene Glycol 3350 (MIRALAX PO)    Probiotic Product (PROBIOTIC DAILY PO)    Proctosol HC 2 5 % rectal cream    simvastatin (ZOCOR) 10 mg tablet    SYMBICORT 160-4 5 MCG/ACT inhaler    cetirizine (ZyrTEC) 10 MG chewable tablet    Ferrous Sulfate (IRON) 142 (45 Fe) MG TBCR    omeprazole (PriLOSEC) 20 mg delayed release capsule    pravastatin (PRAVACHOL) 20 mg tablet    ranitidine (ZANTAC) 300 MG tablet    Wheat Dextrin (BENEFIBER PO)  No Known Allergies  Reviewed medications and allergies and updated as indicated    PHYSICAL EXAM:    Blood pressure 128/88, pulse 74, height 5' 4" (1 626 m), weight 80 7 kg (178 lb)  Body mass index is 30 55 kg/m²  General Appearance: NAD, cooperative, alert  Eyes: Anicteric, PERRLA, EOMI  ENT:  Normocephalic, atraumatic, normal mucosa  Neck:  Supple, symmetrical, trachea midline  Resp:  Clear to auscultation bilaterally; no rales, rhonchi or wheezing; respirations unlabored   CV:  S1 S2, Regular rate and rhythm; no murmur, rub, or gallop  GI:  Soft, non-tender, non-distended; normal bowel sounds; no masses, no organomegaly   Rectal: Deferred  Musculoskeletal: No cyanosis, clubbing or edema  Normal ROM  Skin:  No jaundice, rashes, or lesions   Heme/Lymph: No palpable cervical lymphadenopathy  Psych: Normal affect, good eye contact  Neuro: No gross deficits, AAOx3    Lab Results:   No results found for: WBC, HGB, HCT, MCV, PLT  No results found for: NA, K, CL, CO2, ANIONGAP, BUN, CREATININE, GLUCOSE, GLUF, CALCIUM, CORRECTEDCA, AST, ALT, ALKPHOS, PROT, BILITOT, EGFR  No results found for: IRON, TIBC, FERRITIN  No results found for: LIPASE    Radiology Results:   No results found

## 2021-02-23 NOTE — PATIENT INSTRUCTIONS
Continue with reflux diet and precautions  Continue famotidine at nighttime  Continue with fiber and fluids  Okay to use MiraLax as needed  If symptoms of heartburn, bloating, upper abdominal pain worsened call for further evaluation will readdress gallbladder  Plan colonoscopy in October

## 2021-02-23 NOTE — PROGRESS NOTES
[facility] - Outpatient Follow-up Note  Tammi Mccurdy 79 y o  female MRN: [id number]  Encounter: [id number]    ASSESSMENT AND PLAN:      1  Gastroesophageal reflux disease, unspecified whether esophagitis present   GERD clinically stable without alarm symptoms of dysphagia weight loss or bleeding  Generally managing with famotidine nightly  Still some breakthrough symptoms but discussed the importance of dietary compliance  ·  reflux diet and precautions  ·  continue famotidine at night    2  Constipation, unspecified constipation type  Clinically improved with dietary management  Only needing to use laxative intermittently  ·   Continue plenty of dietary fiber and fluids  ·  Okay to use MiraLax as needed    3  Family history of colonic polyps  Up-to-date with colonoscopic surveillance  Recommend exam every 5 years  ·   Next colonoscopy due later this year, October 2021      Followup Appointment:   One year or sooner if needed  ______________________________________________________________________    Chief Complaint   Patient presents with    Annual Exam     GERD     HPI:  GERD stable  Some days with breakthrough symptoms usually due to dietary indiscretions  Taking famotidine nightly  No dysphagia  Stools OK  Some days more constipated  If that happens, takes Miralax  Takes Miralax just a few times a month  Historical Information   Past Medical History:   Diagnosis Date    Allergic sinusitis     Asthma     Cholelithiasis     Degenerative joint disease     Hyperlipidemia     Hypertension      Past Surgical History:   Procedure Laterality Date    COLONOSCOPY      October 2016 diverticulosis and internal hemorrhoids, September 2011 diverticulosis and internal hemorrhoids   UPPER GASTROINTESTINAL ENDOSCOPY  11/10/2014    November 2014 irregular Z-line and Schatzki ring, hiatal hernia, gastritis    Biopsies negative for celiac, H pylori, or Phillip's or eosinophilic esophagitis  Social History     Substance and Sexual Activity   Alcohol Use Yes    Frequency: 2-4 times a month    Drinks per session: 1 or 2     Social History     Substance and Sexual Activity   Drug Use Never     Social History     Tobacco Use   Smoking Status Never Smoker   Smokeless Tobacco Never Used     Family History   Problem Relation Age of Onset    Colon polyps Sister     Heart disease Sister     Diabetes Sister     MARLEY disease Sister     Colon polyps Mother     Colon cancer Neg Hx          Current Outpatient Medications:     Acetaminophen (TYLENOL ARTHRITIS PAIN PO)    albuterol (PROVENTIL HFA,VENTOLIN HFA) 90 mcg/act inhaler    B Complex-C (B COMPLEX-VITAMIN C PO)    betamethasone, augmented, (DIPROLENE) 0 05 % lotion    Calcium Carb-Cholecalciferol (CALCIUM 500+D3 PO)    cholecalciferol (VITAMIN D3) 1,000 units tablet    Docusate Calcium (STOOL SOFTENER PO)    famotidine (PEPCID) 40 MG tablet    fexofenadine (ALLEGRA) 180 MG tablet    FIBER ADULT GUMMIES PO    GLUCOSAMINE-CHONDROITIN PO    lisinopril (ZESTRIL) 5 mg tablet    montelukast (SINGULAIR) 10 mg tablet    multivitamin (THERAGRAN) TABS    Polyethylene Glycol 3350 (MIRALAX PO)    Probiotic Product (PROBIOTIC DAILY PO)    Proctosol HC 2 5 % rectal cream    simvastatin (ZOCOR) 10 mg tablet    SYMBICORT 160-4 5 MCG/ACT inhaler    cetirizine (ZyrTEC) 10 MG chewable tablet    Ferrous Sulfate (IRON) 142 (45 Fe) MG TBCR    omeprazole (PriLOSEC) 20 mg delayed release capsule    pravastatin (PRAVACHOL) 20 mg tablet    ranitidine (ZANTAC) 300 MG tablet    Wheat Dextrin (BENEFIBER PO)  No Known Allergies  Reviewed medications and allergies and updated as indicated    PHYSICAL EXAM:    Blood pressure 128/88, pulse 74, height 5' 4" (1 626 m), weight 80 7 kg (178 lb)  Body mass index is 30 55 kg/m²    General Appearance: NAD, cooperative, alert  Eyes: Anicteric, PERRLA, EOMI  ENT:  Normocephalic, atraumatic, normal mucosa  Neck:  Supple, symmetrical, trachea midline  Resp:  Clear to auscultation bilaterally; no rales, rhonchi or wheezing; respirations unlabored   CV:  S1 S2, Regular rate and rhythm; no murmur, rub, or gallop  GI:  Soft, non-tender, non-distended; normal bowel sounds; no masses, no organomegaly   Rectal: Deferred  Musculoskeletal: No cyanosis, clubbing or edema  Normal ROM  Skin:  No jaundice, rashes, or lesions   Heme/Lymph: No palpable cervical lymphadenopathy  Psych: Normal affect, good eye contact  Neuro: No gross deficits, AAOx3    Lab Results:   No results found for: WBC, HGB, HCT, MCV, PLT  No results found for: NA, K, CL, CO2, ANIONGAP, BUN, CREATININE, GLUCOSE, GLUF, CALCIUM, CORRECTEDCA, AST, ALT, ALKPHOS, PROT, BILITOT, EGFR  No results found for: IRON, TIBC, FERRITIN  No results found for: LIPASE    Radiology Results:   No results found

## 2021-08-03 DIAGNOSIS — K21.9 GASTROESOPHAGEAL REFLUX DISEASE: ICD-10-CM

## 2021-08-03 RX ORDER — FAMOTIDINE 40 MG/1
40 TABLET, FILM COATED ORAL
Qty: 90 TABLET | Refills: 1 | Status: SHIPPED | OUTPATIENT
Start: 2021-08-03 | End: 2022-01-26 | Stop reason: SDUPTHER

## 2021-08-03 NOTE — TELEPHONE ENCOUNTER
----- Message from [place]  Shari De Leon sent at 8/3/2021  3:40 PM EDT -----  Regarding: Prescription Question  Contact: [phone]  Dear Na Smiley,    I am leaving on vacation on Wednesday, August 18th,   My Famotidine (pecid) needs a new prescription in order to be refilled  This prescription cannot be refilled until the 15th of August     Is it possible for you to send in the new prescription now so when the 15th arrives [facility] can fill it so I will have enough for my trip? ??       Thank you very much  I appreciate your prompt attention to this matter    [email or web address] or [phone]

## 2021-10-18 ENCOUNTER — TELEPHONE (OUTPATIENT)
Dept: GASTROENTEROLOGY | Facility: CLINIC | Age: 71
End: 2021-10-18

## 2021-11-11 ENCOUNTER — TELEPHONE (OUTPATIENT)
Dept: GASTROENTEROLOGY | Facility: CLINIC | Age: 71
End: 2021-11-11

## 2021-11-19 ENCOUNTER — ANESTHESIA EVENT (OUTPATIENT)
Dept: GASTROENTEROLOGY | Facility: AMBULATORY SURGERY CENTER | Age: 71
End: 2021-11-19

## 2021-11-19 ENCOUNTER — HOSPITAL ENCOUNTER (OUTPATIENT)
Dept: GASTROENTEROLOGY | Facility: AMBULATORY SURGERY CENTER | Age: 71
Discharge: HOME | End: 2021-11-19
Payer: MEDICARE

## 2021-11-19 ENCOUNTER — ANESTHESIA (OUTPATIENT)
Dept: GASTROENTEROLOGY | Facility: AMBULATORY SURGERY CENTER | Age: 71
End: 2021-11-19

## 2021-11-19 VITALS
SYSTOLIC BLOOD PRESSURE: 127 MMHG | OXYGEN SATURATION: 100 % | WEIGHT: 188 LBS | TEMPERATURE: 97.7 F | BODY MASS INDEX: 32.27 KG/M2 | DIASTOLIC BLOOD PRESSURE: 65 MMHG | RESPIRATION RATE: 23 BRPM | HEART RATE: 79 BPM

## 2021-11-19 DIAGNOSIS — Z83.71 FAMILY HISTORY OF COLONIC POLYPS: ICD-10-CM

## 2021-11-19 DIAGNOSIS — Z80.0 FAMILY HISTORY OF COLON CANCER: ICD-10-CM

## 2021-11-19 DIAGNOSIS — Z12.11 SCREENING FOR COLON CANCER: ICD-10-CM

## 2021-11-19 DIAGNOSIS — K64.9 HEMORRHOIDS, UNSPECIFIED HEMORRHOID TYPE: ICD-10-CM

## 2021-11-19 PROCEDURE — 45380 COLONOSCOPY AND BIOPSY: CPT | Performed by: INTERNAL MEDICINE

## 2021-11-19 PROCEDURE — 88305 TISSUE EXAM BY PATHOLOGIST: CPT | Performed by: PATHOLOGY

## 2021-11-19 PROCEDURE — 45385 COLONOSCOPY W/LESION REMOVAL: CPT | Performed by: INTERNAL MEDICINE

## 2021-11-19 RX ORDER — HYDROCORTISONE 25 MG/G
CREAM TOPICAL 2 TIMES DAILY PRN
Status: DISCONTINUED | OUTPATIENT
Start: 2021-11-19 | End: 2021-11-23 | Stop reason: HOSPADM

## 2021-11-19 RX ORDER — SODIUM CHLORIDE, SODIUM LACTATE, POTASSIUM CHLORIDE, CALCIUM CHLORIDE 600; 310; 30; 20 MG/100ML; MG/100ML; MG/100ML; MG/100ML
50 INJECTION, SOLUTION INTRAVENOUS CONTINUOUS
Status: DISCONTINUED | OUTPATIENT
Start: 2021-11-19 | End: 2021-11-23 | Stop reason: HOSPADM

## 2021-11-19 RX ORDER — ASPIRIN 81 MG/1
81 TABLET, CHEWABLE ORAL DAILY
COMMUNITY

## 2021-11-19 RX ORDER — PROPOFOL 10 MG/ML
INJECTION, EMULSION INTRAVENOUS AS NEEDED
Status: DISCONTINUED | OUTPATIENT
Start: 2021-11-19 | End: 2021-11-19

## 2021-11-19 RX ADMIN — SODIUM CHLORIDE, SODIUM LACTATE, POTASSIUM CHLORIDE, CALCIUM CHLORIDE 50 ML/HR: 600; 310; 30; 20 INJECTION, SOLUTION INTRAVENOUS at 09:08

## 2021-11-19 RX ADMIN — PROPOFOL 50 MG: 10 INJECTION, EMULSION INTRAVENOUS at 10:27

## 2021-11-19 RX ADMIN — PROPOFOL 50 MG: 10 INJECTION, EMULSION INTRAVENOUS at 10:12

## 2021-11-19 RX ADMIN — PROPOFOL 50 MG: 10 INJECTION, EMULSION INTRAVENOUS at 10:18

## 2021-11-19 RX ADMIN — PROPOFOL 50 MG: 10 INJECTION, EMULSION INTRAVENOUS at 10:24

## 2021-11-26 DIAGNOSIS — K64.9 HEMORRHOIDS, UNSPECIFIED HEMORRHOID TYPE: ICD-10-CM

## 2021-11-26 RX ORDER — HYDROCORTISONE 25 MG/G
CREAM TOPICAL
Qty: 28.35 G | Refills: 3 | Status: SHIPPED | OUTPATIENT
Start: 2021-11-26

## 2022-01-26 DIAGNOSIS — K21.9 GASTROESOPHAGEAL REFLUX DISEASE: ICD-10-CM

## 2022-01-26 RX ORDER — FAMOTIDINE 40 MG/1
40 TABLET, FILM COATED ORAL
Qty: 90 TABLET | Refills: 0 | Status: SHIPPED | OUTPATIENT
Start: 2022-01-26 | End: 2022-02-03 | Stop reason: SDUPTHER

## 2022-02-03 ENCOUNTER — CONSULT (OUTPATIENT)
Dept: GASTROENTEROLOGY | Facility: CLINIC | Age: 72
End: 2022-02-03
Payer: MEDICARE

## 2022-02-03 VITALS
DIASTOLIC BLOOD PRESSURE: 76 MMHG | HEIGHT: 64 IN | SYSTOLIC BLOOD PRESSURE: 122 MMHG | BODY MASS INDEX: 32.85 KG/M2 | WEIGHT: 192.4 LBS

## 2022-02-03 DIAGNOSIS — K59.00 CONSTIPATION, UNSPECIFIED CONSTIPATION TYPE: ICD-10-CM

## 2022-02-03 DIAGNOSIS — Z86.010 HISTORY OF COLON POLYPS: ICD-10-CM

## 2022-02-03 DIAGNOSIS — K21.9 GASTROESOPHAGEAL REFLUX DISEASE, UNSPECIFIED WHETHER ESOPHAGITIS PRESENT: Primary | ICD-10-CM

## 2022-02-03 DIAGNOSIS — K21.9 GASTROESOPHAGEAL REFLUX DISEASE: ICD-10-CM

## 2022-02-03 DIAGNOSIS — Z83.71 FAMILY HISTORY OF COLONIC POLYPS: ICD-10-CM

## 2022-02-03 PROCEDURE — 99214 OFFICE O/P EST MOD 30 MIN: CPT | Performed by: INTERNAL MEDICINE

## 2022-02-03 PROCEDURE — 1123F ACP DISCUSS/DSCN MKR DOCD: CPT | Performed by: INTERNAL MEDICINE

## 2022-02-03 RX ORDER — FAMOTIDINE 40 MG/1
40 TABLET, FILM COATED ORAL
Qty: 90 TABLET | Refills: 3 | Status: SHIPPED | OUTPATIENT
Start: 2022-02-03

## 2022-02-03 NOTE — LETTER
February 3, 2022     Natali Henry, [address] [address]    Patient: Lazarus Lindsey   YOB: 1950   Date of Visit: 2/3/2022       Dear Dr Andrew Cade:    Thank you for referring Davina Tirado to me for evaluation  Below are my notes for this consultation  If you have questions, please do not hesitate to call me  I look forward to following your patient along with you  Sincerely,        Anastacio Harding MD        CC: No Recipients  Anastacio Harding MD  2/3/2022 12:11 PM  Sign when Signing Visit  [id number] - Outpatient Follow-up Note  Lazarus Lindsey 70 y o  female MRN: [id number]  Encounter: [id number]    ASSESSMENT AND PLAN:      1  Gastroesophageal reflux disease, unspecified whether esophagitis present  GERD and dyspepsia along with episodes of abdominal pain flaring over the past months with initiation of therapy for her giant cell arteritis which has included aspirin, prednisone and all tender day  All of these certainly could be causing increased reflux, esophagitis, peptic ulcer or gastritis  Agree with initiation of PPI therapy which appears to have helped  No alarm symptoms of dysphagia weight loss or bleeding  · Continue with reflux diet and precautions, avoid any trigger foods  · Continue famotidine at night  · Continue omeprazole daily at least for as long as prednisone therapy is needed and then consider weaning off  · If symptoms worsen consider EGD    2  Constipation, unspecified constipation type  Irregular bowel movements clinically stable  Mostly controlled by diet  Colonoscopy unremarkable for any pathology  3  History of colon polyps  4  Family history of colonic polyps  Up-to-date with colonoscopic surveillance, colonoscopy October 2021 did reveal adenomatous polyps for the first time  Recommend surveillance exam every 5 years  · Next colonoscopy due October 2026    5   Gastroesophageal reflux disease  - famotidine (PEPCID) 40 MG tablet; Take 1 tablet (40 mg total) by mouth daily at bedtime  Dispense: 90 tablet; Refill: 3      Followup Appointment:  1 year or sooner if needed  ______________________________________________________________________    No chief complaint on file  HPI:  Had colonoscopy, polyps removed  Diagnosed with giant cell arteritis and polymyalgia rheumatica  ON prednisone  Also on fosamax and ASA  Two months ago started having abdominal pain  Started on omeprazole 20 daily  Continued Pepcid in the evening  Few episodes of awakening in the night with severe epigastric pain  Possibly triggered by some fried foods and veggies with garlic butter  Pain lasted for a few hours each time  Otherwise has been careful about spicy and fatty foods  Better on omeprazole  OK if careful with diet  No dysphagia  Stools irregular, sometimes more solid than other times  No melena or heme  Appetite good  Avoids eating in the evening  Historical Information   Past Medical History:   Diagnosis Date    Allergic sinusitis     Asthma     Cholelithiasis     Colon polyp     Degenerative joint disease     GERD (gastroesophageal reflux disease)     Hyperlipidemia     Hypertension     PMR (polymyalgia rheumatica) (HCC)      Past Surgical History:   Procedure Laterality Date    COLONOSCOPY      October 2021: Adenomatous polyps and rectum in transverse colon, sigmoid diverticulosis, internal hemorrhoids  October 2016 diverticulosis and internal hemorrhoids, September 2011 diverticulosis and internal hemorrhoids   TEMPORAL ARTERY BIOPSY / LIGATION      UPPER GASTROINTESTINAL ENDOSCOPY  11/10/2014    November 2014 irregular Z-line and Schatzki ring, hiatal hernia, gastritis  Biopsies negative for celiac, H pylori, or Phillip's or eosinophilic esophagitis       Social History     Substance and Sexual Activity   Alcohol Use Not Currently     Social History     Substance and Sexual Activity Drug Use Never     Social History     Tobacco Use   Smoking Status Never Smoker   Smokeless Tobacco Never Used     Family History   Problem Relation Age of Onset    Colon polyps Sister     Heart disease Sister     Diabetes Sister     MARLEY disease Sister     Colon polyps Mother     Colon cancer Neg Hx          Current Outpatient Medications:     Acetaminophen (TYLENOL ARTHRITIS PAIN PO)    albuterol (PROVENTIL HFA,VENTOLIN HFA) 90 mcg/act inhaler    aspirin 81 mg chewable tablet    B Complex-C (B COMPLEX-VITAMIN C PO)    betamethasone, augmented, (DIPROLENE) 0 05 % lotion    Calcium Carb-Cholecalciferol (CALCIUM 500+D3 PO)    cholecalciferol (VITAMIN D3) 1,000 units tablet    Docusate Calcium (STOOL SOFTENER PO)    famotidine (PEPCID) 40 MG tablet    fexofenadine (ALLEGRA) 180 MG tablet    FIBER ADULT GUMMIES PO    GLUCOSAMINE-CHONDROITIN PO    hydrocortisone (Proctosol HC) 2 5 % rectal cream    lisinopril (ZESTRIL) 5 mg tablet    metFORMIN (GLUCOPHAGE) 500 mg tablet    montelukast (SINGULAIR) 10 mg tablet    multivitamin (THERAGRAN) TABS    Polyethylene Glycol 3350 (MIRALAX PO)    PREDNISONE PO    Probiotic Product (PROBIOTIC DAILY PO)    simvastatin (ZOCOR) 10 mg tablet    SYMBICORT 160-4 5 MCG/ACT inhaler  No Known Allergies  Reviewed medications and allergies and updated as indicated    PHYSICAL EXAM:    Blood pressure 122/76, height 5' 4" (1 626 m), weight 87 3 kg (192 lb 6 4 oz)  Body mass index is 33 03 kg/m²  General Appearance: NAD, cooperative, alert  Eyes: Anicteric, PERRLA, EOMI  ENT:  Normocephalic, atraumatic, normal mucosa  Neck:  Supple, symmetrical, trachea midline  Resp:  Clear to auscultation bilaterally; no rales, rhonchi or wheezing; respirations unlabored   CV:  S1 S2, Regular rate and rhythm; no murmur, rub, or gallop    GI:  Soft, non-tender, non-distended; normal bowel sounds; no masses, no organomegaly   Rectal: Deferred  Musculoskeletal: No cyanosis, clubbing or edema  Normal ROM  Skin:  No jaundice, rashes, or lesions   Heme/Lymph: No palpable cervical lymphadenopathy  Psych: Normal affect, good eye contact  Neuro: No gross deficits, AAOx3    Lab Results:   No results found for: WBC, HGB, HCT, MCV, PLT  No results found for: NA, K, CL, CO2, ANIONGAP, BUN, CREATININE, GLUCOSE, GLUF, CALCIUM, CORRECTEDCA, AST, ALT, ALKPHOS, PROT, BILITOT, EGFR  No results found for: IRON, TIBC, FERRITIN  No results found for: LIPASE    Radiology Results:   No results found

## 2022-02-03 NOTE — PATIENT INSTRUCTIONS
Continue with reflux diet and precautions  Avoid any trigger foods  Continue famotidine at night  Would continue omeprazole daily at least for as long as prednisone is needed  If symptoms return or worsen consider EGD

## 2022-02-03 NOTE — PROGRESS NOTES
[email or web address] - Outpatient Follow-up Note  Alissa Snyder 70 y o  female MRN: [id number]  Encounter: [id number]    ASSESSMENT AND PLAN:      1  Gastroesophageal reflux disease, unspecified whether esophagitis present  GERD and dyspepsia along with episodes of abdominal pain flaring over the past months with initiation of therapy for her giant cell arteritis which has included aspirin, prednisone and all tender day  All of these certainly could be causing increased reflux, esophagitis, peptic ulcer or gastritis  Agree with initiation of PPI therapy which appears to have helped  No alarm symptoms of dysphagia weight loss or bleeding  · Continue with reflux diet and precautions, avoid any trigger foods  · Continue famotidine at night  · Continue omeprazole daily at least for as long as prednisone therapy is needed and then consider weaning off  · If symptoms worsen consider EGD    2  Constipation, unspecified constipation type  Irregular bowel movements clinically stable  Mostly controlled by diet  Colonoscopy unremarkable for any pathology  3  History of colon polyps  4  Family history of colonic polyps  Up-to-date with colonoscopic surveillance, colonoscopy October 2021 did reveal adenomatous polyps for the first time  Recommend surveillance exam every 5 years  · Next colonoscopy due October 2026    5  Gastroesophageal reflux disease  - famotidine (PEPCID) 40 MG tablet; Take 1 tablet (40 mg total) by mouth daily at bedtime  Dispense: 90 tablet; Refill: 3      Followup Appointment:  1 year or sooner if needed  ______________________________________________________________________    No chief complaint on file  HPI:  Had colonoscopy, polyps removed  Diagnosed with giant cell arteritis and polymyalgia rheumatica  ON prednisone  Also on fosamax and ASA  Two months ago started having abdominal pain  Started on omeprazole 20 daily  Continued Pepcid in the evening  Few episodes of awakening in the night with severe epigastric pain  Possibly triggered by some fried foods and veggies with garlic butter  Pain lasted for a few hours each time  Otherwise has been careful about spicy and fatty foods  Better on omeprazole  OK if careful with diet  No dysphagia  Stools irregular, sometimes more solid than other times  No melena or heme  Appetite good  Avoids eating in the evening  Historical Information   Past Medical History:   Diagnosis Date    Allergic sinusitis     Asthma     Cholelithiasis     Colon polyp     Degenerative joint disease     GERD (gastroesophageal reflux disease)     Hyperlipidemia     Hypertension     PMR (polymyalgia rheumatica) (HCC)      Past Surgical History:   Procedure Laterality Date    COLONOSCOPY      October 2021: Adenomatous polyps and rectum in transverse colon, sigmoid diverticulosis, internal hemorrhoids  October 2016 diverticulosis and internal hemorrhoids, September 2011 diverticulosis and internal hemorrhoids   TEMPORAL ARTERY BIOPSY / LIGATION      UPPER GASTROINTESTINAL ENDOSCOPY  11/10/2014    November 2014 irregular Z-line and Schatzki ring, hiatal hernia, gastritis  Biopsies negative for celiac, H pylori, or Phillip's or eosinophilic esophagitis       Social History     Substance and Sexual Activity   Alcohol Use Not Currently     Social History     Substance and Sexual Activity   Drug Use Never     Social History     Tobacco Use   Smoking Status Never Smoker   Smokeless Tobacco Never Used     Family History   Problem Relation Age of Onset    Colon polyps Sister     Heart disease Sister     Diabetes Sister     MARLEY disease Sister     Colon polyps Mother     Colon cancer Neg Hx          Current Outpatient Medications:     Acetaminophen (TYLENOL ARTHRITIS PAIN PO)    albuterol (PROVENTIL HFA,VENTOLIN HFA) 90 mcg/act inhaler    aspirin 81 mg chewable tablet    B Complex-C (B COMPLEX-VITAMIN C PO)   betamethasone, augmented, (DIPROLENE) 0 05 % lotion    Calcium Carb-Cholecalciferol (CALCIUM 500+D3 PO)    cholecalciferol (VITAMIN D3) 1,000 units tablet    Docusate Calcium (STOOL SOFTENER PO)    famotidine (PEPCID) 40 MG tablet    fexofenadine (ALLEGRA) 180 MG tablet    FIBER ADULT GUMMIES PO    GLUCOSAMINE-CHONDROITIN PO    hydrocortisone (Proctosol HC) 2 5 % rectal cream    lisinopril (ZESTRIL) 5 mg tablet    metFORMIN (GLUCOPHAGE) 500 mg tablet    montelukast (SINGULAIR) 10 mg tablet    multivitamin (THERAGRAN) TABS    Polyethylene Glycol 3350 (MIRALAX PO)    PREDNISONE PO    Probiotic Product (PROBIOTIC DAILY PO)    simvastatin (ZOCOR) 10 mg tablet    SYMBICORT 160-4 5 MCG/ACT inhaler  No Known Allergies  Reviewed medications and allergies and updated as indicated    PHYSICAL EXAM:    Blood pressure 122/76, height 5' 4" (1 626 m), weight 87 3 kg (192 lb 6 4 oz)  Body mass index is 33 03 kg/m²  General Appearance: NAD, cooperative, alert  Eyes: Anicteric, PERRLA, EOMI  ENT:  Normocephalic, atraumatic, normal mucosa  Neck:  Supple, symmetrical, trachea midline  Resp:  Clear to auscultation bilaterally; no rales, rhonchi or wheezing; respirations unlabored   CV:  S1 S2, Regular rate and rhythm; no murmur, rub, or gallop  GI:  Soft, non-tender, non-distended; normal bowel sounds; no masses, no organomegaly   Rectal: Deferred  Musculoskeletal: No cyanosis, clubbing or edema  Normal ROM  Skin:  No jaundice, rashes, or lesions   Heme/Lymph: No palpable cervical lymphadenopathy  Psych: Normal affect, good eye contact  Neuro: No gross deficits, AAOx3    Lab Results:   No results found for: WBC, HGB, HCT, MCV, PLT  No results found for: NA, K, CL, CO2, ANIONGAP, BUN, CREATININE, GLUCOSE, GLUF, CALCIUM, CORRECTEDCA, AST, ALT, ALKPHOS, PROT, BILITOT, EGFR  No results found for: IRON, TIBC, FERRITIN  No results found for: LIPASE    Radiology Results:   No results found

## 2022-06-13 ENCOUNTER — TELEPHONE (OUTPATIENT)
Dept: GASTROENTEROLOGY | Facility: CLINIC | Age: 72
End: 2022-06-13

## 2022-06-13 NOTE — TELEPHONE ENCOUNTER
Spoke to patient  Having stomach pain and pressure in pelvic region  Is willing to see any provider    Will add to wait list   Does not want to see NP

## 2022-06-13 NOTE — TELEPHONE ENCOUNTER
Pt left  mssg stating she needs an appt w/ [facility] sooner than 8/18; is experiencing severe stomach pain & pressure in the pelvic area; pain is not constant/worse in morning; questions if it might be her gallbladder  # [phone]

## 2022-06-15 ENCOUNTER — OFFICE VISIT (OUTPATIENT)
Dept: GASTROENTEROLOGY | Facility: CLINIC | Age: 72
End: 2022-06-15
Payer: MEDICARE

## 2022-06-15 VITALS
BODY MASS INDEX: 33.67 KG/M2 | SYSTOLIC BLOOD PRESSURE: 148 MMHG | HEIGHT: 64 IN | DIASTOLIC BLOOD PRESSURE: 88 MMHG | WEIGHT: 197.2 LBS | HEART RATE: 80 BPM

## 2022-06-15 DIAGNOSIS — R19.4 CHANGE IN BOWEL HABIT: ICD-10-CM

## 2022-06-15 DIAGNOSIS — Z86.010 PERSONAL HISTORY OF COLONIC POLYPS: ICD-10-CM

## 2022-06-15 DIAGNOSIS — R10.30 LOWER ABDOMINAL PAIN: Primary | ICD-10-CM

## 2022-06-15 DIAGNOSIS — Z79.52 LONG TERM (CURRENT) USE OF SYSTEMIC STEROIDS: ICD-10-CM

## 2022-06-15 DIAGNOSIS — K80.20 GALLSTONES: ICD-10-CM

## 2022-06-15 PROCEDURE — 99214 OFFICE O/P EST MOD 30 MIN: CPT | Performed by: INTERNAL MEDICINE

## 2022-06-15 RX ORDER — DICYCLOMINE HYDROCHLORIDE 10 MG/1
10 CAPSULE ORAL 3 TIMES DAILY PRN
Qty: 90 CAPSULE | Refills: 2 | Status: SHIPPED | OUTPATIENT
Start: 2022-06-15 | End: 2022-07-07

## 2022-06-15 NOTE — PROGRESS NOTES
[id number] - Outpatient Follow-up Note  Rogelio Salas 67 y o  female MRN: [id number]  Encounter: [id number]    ASSESSMENT AND PLAN:      1  Lower abdominal pain  -- patient with lower abdominal pain over the last 6-8 weeks  Seems to to be present most days  Does not last all day but is intermittent- patient does have sweats but is on long-term steroids and has a history of temporal arteritis  Had colonoscopy back in the fall and had mild diverticulosis along with colon polyps  Differential diagnosis could include diverticular disease although patient does not seem that ill  May be an effect of long-term steroids dulling an inflammatory response  Consider gyn pathology  Patient does have some urinary difficulties as well so this should be reviewed  - patient does have some symptoms that are compatible with irritable bowel  She has a sense of incomplete evacuation  Would be unusual however to begin having problems with irritable bowel in early 70s    - Urinalysis with microscopic  - CBC and differential; Future  - Comprehensive metabolic panel; Future  - CT abdomen pelvis wo contrast; Future  - dicyclomine (BENTYL) 10 mg capsule; Take 1 capsule (10 mg total) by mouth 3 (three) times a day as needed (abdominal pain)  Dispense: 90 capsule; Refill: 2    2  Change in bowel habit  -- patient with recent sense of incomplete evacuation-- can continue MiraLax as needed   can continue MiraLax as needed    3  Personal history of colonic polyps  -- up-to-date with colonoscopy  Last examination fall 2021  She did have a 14 mm polyp removed at that time    4  Long term (current) use of systemic steroids  -- patient on prednisone 7 5 mg daily-- has a history of  Giant cell arteritis  Was diagnosed about a year ago    11  Gallstones  -- no pain in the upper abdomen  Does not seem to have postprandial discomfort    Gallstones at this time likely incidental      Followup Appointment: 2 mo   ______________________________________________________________________    Chief Complaint   Patient presents with    Abdominal Pain     Gallstones & hiatial hernia; abdominal/pelvic pain and pressure      HPI:    Patient comes to the office with concerns about recent onset of abdominal pain  Patient reports primarily lower abdominal pain nearly on a daily basis over the last 6-8 weeks  This does not wake her up at night  Does not really interfere with her activities  She does feels a little bit pressure in gaseousness in the lower abdomen  She has not had any previous pelvic surgeries  There is no weight loss  She does have somewhat irregular bowel movements and a sense of incomplete evacuation  She has never been diagnosed with irritable bowel syndrome  Patient is up-to-date with colorectal cancer screening  She did have a colonoscopy performed last fall  Findings were remarkable for mild diverticulosis and colorectal polyps  She had a fairly large rectal polyp 1 4 cm which was removed  Patient denies any blood per rectum  Patient has some alteration in her bowel habit with perhaps mild contact patient but bowels are soft  She might not go for couple days and then twice in a day  She does take supplemental fiber and a stool softener daily and MiraLax as needed   She does have a little bit a urinary hesitancy and perhaps dysuria  She denies any nausea vomiting or upper abdominal pain  Patient has a history of gallstones and was concerned that she may be having trouble with the gallstones  Pain isn't really related to eating  Should be noted the patient has a history of temporal arteritis  She has been on high-dose steroids for about a year  She has been able to taper down is now down to 7 5 mg daily but still in the care of rheumatology      Historical Information   Past Medical History:   Diagnosis Date    Allergic sinusitis     Asthma     Cholelithiasis     Colon polyp     Degenerative joint disease     GERD (gastroesophageal reflux disease)     Giant cell aortic arteritis (HCC)     Hyperlipidemia     Hypertension     PMR (polymyalgia rheumatica) (HCC)      Past Surgical History:   Procedure Laterality Date    COLONOSCOPY      October 2021: Adenomatous polyps and rectum in transverse colon, sigmoid diverticulosis, internal hemorrhoids  October 2016 diverticulosis and internal hemorrhoids, September 2011 diverticulosis and internal hemorrhoids   TEMPORAL ARTERY BIOPSY / LIGATION      UPPER GASTROINTESTINAL ENDOSCOPY  11/10/2014    November 2014 irregular Z-line and Schatzki ring, hiatal hernia, gastritis  Biopsies negative for celiac, H pylori, or Phillip's or eosinophilic esophagitis       Social History     Substance and Sexual Activity   Alcohol Use Yes    Comment: rarely (special occassions)     Social History     Substance and Sexual Activity   Drug Use Never     Social History     Tobacco Use   Smoking Status Never Smoker   Smokeless Tobacco Never Used     Family History   Problem Relation Age of Onset    Colon polyps Mother     Alzheimer's disease Mother     Heart disease Father     Brain cancer Father     Colon polyps Sister     Heart disease Sister     Diabetes Sister     MARLEY disease Sister     Colon cancer Neg Hx          Current Outpatient Medications:     Acetaminophen (TYLENOL ARTHRITIS PAIN PO)    albuterol (PROVENTIL HFA,VENTOLIN HFA) 90 mcg/act inhaler    aspirin 81 mg chewable tablet    B Complex-C (B COMPLEX-VITAMIN C PO)    betamethasone, augmented, (DIPROLENE) 0 05 % lotion    Calcium Carb-Cholecalciferol (CALCIUM 500+D3 PO)    cholecalciferol (VITAMIN D3) 1,000 units tablet    dicyclomine (BENTYL) 10 mg capsule    Docusate Calcium (STOOL SOFTENER PO)    famotidine (PEPCID) 40 MG tablet    fexofenadine (ALLEGRA) 180 MG tablet    FIBER ADULT GUMMIES PO    GLUCOSAMINE-CHONDROITIN PO    hydrocortisone (Proctosol HC) 2 5 % rectal cream    lisinopril (ZESTRIL) 5 mg tablet    metFORMIN (GLUCOPHAGE) 500 mg tablet    montelukast (SINGULAIR) 10 mg tablet    multivitamin (THERAGRAN) TABS    Polyethylene Glycol 3350 (MIRALAX PO)    PREDNISONE PO    Probiotic Product (PROBIOTIC DAILY PO)    simvastatin (ZOCOR) 10 mg tablet    SYMBICORT 160-4 5 MCG/ACT inhaler  No Known Allergies  Reviewed medications and allergies and updated as indicated    PHYSICAL EXAM:    Blood pressure 148/88, pulse 80, height 5' 4" (1 626 m), weight 89 4 kg (197 lb 3 2 oz)  Body mass index is 33 85 kg/m²  General Appearance: NAD, cooperative, alert  Eyes: Anicteric, conjunctiva pink  ENT:  Normocephalic, atraumatic, normal mucosa  Neck:  Supple, symmetrical, trachea midline  Resp:  Clear to auscultation bilaterally; no rales, rhonchi or wheezing; respirations unlabored   CV:  S1 S2, Regular rate and rhythm; no murmur, rub, or gallop  GI:  Soft, non-tender, non-distended; normal bowel sounds; no masses, no organomegaly   Rectal: Deferred  Musculoskeletal: No cyanosis, clubbing or edema  Normal ROM    Skin:  No jaundice, rashes, or lesions   Heme/Lymph: No palpable cervical lymphadenopathy  Psych: Normal affect, good eye contact  Neuro: No gross deficits, AAOx3

## 2022-06-15 NOTE — PATIENT INSTRUCTIONS
[id number] - Outpatient Follow-up Note  Iliana Peacock 67 y o  female MRN: [id number]  Encounter: [id number]    ASSESSMENT AND PLAN:      1  Lower abdominal pain  -- patient with lower abdominal pain over the last 6-8 weeks  Seems to to be present most days  Does not last all day but is intermittent- patient does have sweats but is on long-term steroids and has a history of temporal arteritis  Had colonoscopy back in the fall and had mild diverticulosis along with colon polyps  Differential diagnosis could include diverticular disease although patient does not seem that ill  May be an effect of long-term steroids dulling an inflammatory response  Consider gyn pathology  Patient does have some urinary difficulties as well so this should be reviewed  - patient does have some symptoms that are compatible with irritable bowel  She has a sense of incomplete evacuation  Would be unusual however to begin having problems with irritable bowel in early 70s    - Urinalysis with microscopic  - CBC and differential; Future  - Comprehensive metabolic panel; Future  - CT abdomen pelvis wo contrast; Future  - dicyclomine (BENTYL) 10 mg capsule; Take 1 capsule (10 mg total) by mouth 3 (three) times a day as needed (abdominal pain)  Dispense: 90 capsule; Refill: 2    2  Change in bowel habit  -- patient with recent sense of incomplete evacuation-- can continue MiraLax as needed   can continue MiraLax as needed    3  Personal history of colonic polyps  -- up-to-date with colonoscopy  Last examination fall 2021  She did have a 14 mm polyp removed at that time    4  Long term (current) use of systemic steroids  -- patient on prednisone 7 5 mg daily-- has a history of  Giant cell arteritis  Was diagnosed about a year ago    11  Gallstones  -- no pain in the upper abdomen  Does not seem to have postprandial discomfort    Gallstones at this time likely incidental      Followup Appointment: 2 mo

## 2022-06-16 ENCOUNTER — TELEPHONE (OUTPATIENT)
Dept: GASTROENTEROLOGY | Facility: CLINIC | Age: 72
End: 2022-06-16

## 2022-06-24 DIAGNOSIS — K57.92 DIVERTICULITIS: Primary | ICD-10-CM

## 2022-06-24 RX ORDER — CEFUROXIME AXETIL 500 MG/1
500 TABLET ORAL EVERY 12 HOURS SCHEDULED
Qty: 14 TABLET | Refills: 0 | Status: SHIPPED | OUTPATIENT
Start: 2022-06-24 | End: 2022-07-01

## 2022-06-24 RX ORDER — METRONIDAZOLE 500 MG/1
500 TABLET ORAL 3 TIMES DAILY
Qty: 21 TABLET | Refills: 0 | Status: SHIPPED | OUTPATIENT
Start: 2022-06-24 | End: 2022-07-01

## 2022-06-24 NOTE — TELEPHONE ENCOUNTER
Left message on answering machine  Unclear what is being seen on the CT scan  The patient had a recent colonoscopy which showed polyps but no sigmoid colon mass  She did have diverticulosis  Reasonable to treat for diverticulitis  Will prescribe Ceftin/Flagyl  Patient will call next week to give us an update  Perhaps repeat CT scan once diverticulitis is treated  Repeat colonoscopy? Will defer to Dr Emily Moody    If the patient gets worse she was told to go to the emergency room

## 2022-06-24 NOTE — TELEPHONE ENCOUNTER
Ne Weston called from [facility] radiology with significant finding on CT (sent to [facility] for scanning) Ignacia Wilkins ordered (he is not available) please review  Impression:  1  Asymmetric bowel wall thickening of the sigmoid colon with surrounding fat stranding and trace pelvic free fluid  Findings are concerning for underlying malignancy versus diverticulitis  Direct visualization is recommended for further evaluation  2  Intrs-abdominal fat stranding in the left mid abdomen, concerning for findings of peritoneal carcinomatosis    3  Cholelithiasis

## 2022-06-26 ENCOUNTER — TELEPHONE (OUTPATIENT)
Dept: GASTROENTEROLOGY | Facility: CLINIC | Age: 72
End: 2022-06-26

## 2022-06-28 ENCOUNTER — OFFICE VISIT (OUTPATIENT)
Dept: GASTROENTEROLOGY | Facility: CLINIC | Age: 72
End: 2022-06-28
Payer: MEDICARE

## 2022-06-28 ENCOUNTER — TELEPHONE (OUTPATIENT)
Dept: GASTROENTEROLOGY | Facility: CLINIC | Age: 72
End: 2022-06-28

## 2022-06-28 VITALS
HEIGHT: 64 IN | BODY MASS INDEX: 32.95 KG/M2 | SYSTOLIC BLOOD PRESSURE: 138 MMHG | HEART RATE: 80 BPM | WEIGHT: 193 LBS | DIASTOLIC BLOOD PRESSURE: 92 MMHG

## 2022-06-28 DIAGNOSIS — R93.5 ABNORMAL ABDOMINAL CT SCAN: Primary | ICD-10-CM

## 2022-06-28 DIAGNOSIS — Z86.010 PERSONAL HISTORY OF COLONIC POLYPS: ICD-10-CM

## 2022-06-28 DIAGNOSIS — Z86.010 PERSONAL HISTORY OF COLONIC POLYPS: Primary | ICD-10-CM

## 2022-06-28 DIAGNOSIS — R93.5 ABNORMAL ABDOMINAL CT SCAN: ICD-10-CM

## 2022-06-28 DIAGNOSIS — R07.1 CHEST PAIN ON BREATHING: ICD-10-CM

## 2022-06-28 DIAGNOSIS — M54.9 RIGHT-SIDED BACK PAIN, UNSPECIFIED BACK LOCATION, UNSPECIFIED CHRONICITY: ICD-10-CM

## 2022-06-28 PROCEDURE — 99214 OFFICE O/P EST MOD 30 MIN: CPT | Performed by: INTERNAL MEDICINE

## 2022-06-28 NOTE — TELEPHONE ENCOUNTER
Pt asking to review results of testing/is having problems w/ the portal and knows Tanya Fonseca sent mss - IT is aware of problem  Pt requesting CB to review results [phone]

## 2022-06-28 NOTE — TELEPHONE ENCOUNTER
Scheduled date of colonoscopy (as of today):8/30/22  Physician performing colonoscopy:Dr Sanju Mendoza  Location of colonoscopy:[facility]  Bowel prep reviewed with patient:[place]  Instructions reviewed with patient by: Garrett Valles  Clearances: No

## 2022-06-28 NOTE — LETTER
June 28, 2022     Trista Duarte, [address] [facility] [address]    Patient: Iliana Peacock   YOB: 1950   Date of Visit: 6/28/2022       Dear Dr Chou Miss:    Thank you for referring Doni Fung to me for evaluation  Below are my notes for this consultation  If you have questions, please do not hesitate to call me  I look forward to following your patient along with you  Sincerely,        Adrian Pappas MD        CC: MD Adrian Martinez MD  6/28/2022  5:41 PM  Incomplete  [address] [facility] - Outpatient Follow-up Note  Iliana Peacock 67 y o  female MRN: [id number]  Encounter: [id number]    ASSESSMENT AND PLAN:      1  Abnormal abdominal CT scan  Patient with abnormality on CT scan with stranding and bowel wall thickening sigmoid colon  We have treated for diverticulitis over the last couple days and she seems to be improving no longer has pelvic pain  Did confer with GYN over the phone  They were concerned about omental caking possibly from an ovarian source of the ultrasound of the ovaries were negative  They advised the patient be referred to gyn Oncology  Will ask patient to see Dr Hector Reyna    - Ambulatory Referral to Gynecologic Oncology; Future  -Have patient see Dr Hector Reyna ASAP    2  Personal history of colonic polyps  --- patient did have a fairly large rectal polyp on examination last year  Given findings on CAT scan will proceed with colonoscopy in about 1 month or so  - Colonoscopy; Future    3  Right-sided back pain, unspecified back location, unspecified chronicity  -- please see under 4  Patient complained of back pain not so much with movement  Has been there for 24 hours  4  Chest pain on breathing  --  Patient with new onset of pleuritic chest pain  She does report that she gets a little short of breath with   Exertion  She has never really had this before  No history of DVT or PE    There were no recent history of prolonged immobilization -  No long  car rides or plane ride  - really somewhat concerned about this new onset of symptoms  She did coli office today thinking this may be connected with her other problems  Discussed the situation  We did have some concerns that there might be a remote possibility this could be a pulmonary emboli  Will refer patient to the [facility] Emergency room and will contact  Patient may benefit from a CT scan of the chest with contrast or a V/Q scan  She should have a chest x-ray and check pulse ox at least any weight  Patient is in agreement  -  Discussed with Dr Darcy Hu at [facility]  ER who will arrange for assessment of the patient      Followup Appointment: keep  September  Appointment   ______________________________________________________________________    Chief Complaint   Patient presents with    Follow up to ct scan     HPI:   Patient returns to the office for follow-up visit with respect to her problems with lower abdominal pain and recent CT scan examinations  Patient had complained of several weeks of lower abdominal pain and somewhat of a change in her bowel habits  This prompted us to follow-up with pelvic ultrasonography an abdominal CT  CT scan examination revealed some thickening of the abdominal wall and some stranding in the sigmoid colon  Differential include diverticulitis versus neoplastic disease  As it turns out the patient had a colonoscopy in 2021 and a large rectal polyp was removed at that time  She also has some diverticulosis  Over the weekend she was called with the results and placed on broad-spectrum antibiotics  She reports that her pain now is better in the lower abdomen    I did confer with GYN about the findings on ultrasound and CT scan which were obtained at [facility]  My gyn colleague   suggested that the stranding finding could be early presentation of ovarian neoplasm even without the presence of an ovarian mass  A GYN oncology consultation was suggested  patient happens to call the office today and then complained about some pleuritic-type chest and back pain  This occurred yesterday while she was watching television  Does report that she injured herself for strained herself  She is not really short of breath but if she takes a deep breath she reports that is very uncomfortable  She did tell [employer] but had some discomfort with belching  She has never had a DVT  There is no history of prolonged immobilization  She has never had a history of pulmonary emboli  Historical Information   Past Medical History:   Diagnosis Date    Allergic sinusitis     Asthma     Cholelithiasis     Colon polyp     Degenerative joint disease     GERD (gastroesophageal reflux disease)     Giant cell aortic arteritis (HCC)     Hyperlipidemia     Hypertension     PMR (polymyalgia rheumatica) (HCC)      Past Surgical History:   Procedure Laterality Date    COLONOSCOPY      October 2021: Adenomatous polyps and rectum in transverse colon, sigmoid diverticulosis, internal hemorrhoids  October 2016 diverticulosis and internal hemorrhoids, September 2011 diverticulosis and internal hemorrhoids   TEMPORAL ARTERY BIOPSY / LIGATION      UPPER GASTROINTESTINAL ENDOSCOPY  11/10/2014    November 2014 irregular Z-line and Schatzki ring, hiatal hernia, gastritis  Biopsies negative for celiac, H pylori, or Phillip's or eosinophilic esophagitis       Social History     Substance and Sexual Activity   Alcohol Use Yes    Comment: a glass of wine maybe 2 times a month     Social History     Substance and Sexual Activity   Drug Use Never     Social History     Tobacco Use   Smoking Status Never Smoker   Smokeless Tobacco Never Used     Family History   Problem Relation Age of Onset    Colon polyps Mother     Alzheimer's disease Mother     Heart disease Father     Brain cancer Father     Colon polyps Sister     Heart disease Sister     Diabetes Sister     MARLEY disease Sister     Colon cancer Neg Hx          Current Outpatient Medications:     Acetaminophen (TYLENOL ARTHRITIS PAIN PO)    albuterol (PROVENTIL HFA,VENTOLIN HFA) 90 mcg/act inhaler    aspirin 81 mg chewable tablet    B Complex-C (B COMPLEX-VITAMIN C PO)    betamethasone, augmented, (DIPROLENE) 0 05 % lotion    Calcium Carb-Cholecalciferol (CALCIUM 500+D3 PO)    cefuroxime (CEFTIN) 500 mg tablet    cholecalciferol (VITAMIN D3) 1,000 units tablet    dicyclomine (BENTYL) 10 mg capsule    Docusate Calcium (STOOL SOFTENER PO)    famotidine (PEPCID) 40 MG tablet    fexofenadine (ALLEGRA) 180 MG tablet    FIBER ADULT GUMMIES PO    GLUCOSAMINE-CHONDROITIN PO    hydrocortisone (Proctosol HC) 2 5 % rectal cream    lisinopril (ZESTRIL) 5 mg tablet    metFORMIN (GLUCOPHAGE) 500 mg tablet    metroNIDAZOLE (FLAGYL) 500 mg tablet    montelukast (SINGULAIR) 10 mg tablet    multivitamin (THERAGRAN) TABS    Polyethylene Glycol 3350 (MIRALAX PO)    PREDNISONE PO    Probiotic Product (PROBIOTIC DAILY PO)    simvastatin (ZOCOR) 10 mg tablet    SYMBICORT 160-4 5 MCG/ACT inhaler  No Known Allergies  Reviewed medications and allergies and updated as indicated    PHYSICAL EXAM:    Blood pressure 138/92, pulse 80, height 5' 4" (1 626 m), weight 87 5 kg (193 lb)  Body mass index is 33 13 kg/m²  General Appearance: NAD, cooperative, alert  Eyes: Anicteric,   Conjunctiva pale  ENT:  Normocephalic, atraumatic, normal mucosa  Neck:  Supple, symmetrical, trachea midline  Resp:  Clear to auscultation bilaterally; no rales, rhonchi or wheezing; respirations unlabored- some splinting on deep inspiration  CV:  S1 S2, Regular rate and rhythm; no murmur, rub, or gallop  GI:  Soft, non-tender, non-distended; normal bowel sounds; no masses, no organomegaly   Rectal: Deferred  Musculoskeletal: No cyanosis, clubbing or edema  Normal ROM    Skin:  No jaundice, rashes, or lesions Heme/Lymph: No palpable cervical lymphadenopathy  Psych: Normal affect, good eye contact  Neuro: No gross deficits, AAOx3    Lab Results:    CBC and CMP June 22, 2022 [facility] labs w    Radiology Results:    CT scan bowel wall thickening of sigmoid colon consistent with diverticulitis versus occult malignancy  Some peritoneal thickening left mid abdomen concerning for carcinomatosis    Juan C Hernandez MD  6/28/2022  5:39 PM  Sign when Signing Visit  [address] [facility] - Outpatient Follow-up Note  Maya Means 67 y o  female MRN: [id number]  Encounter: [id number]    ASSESSMENT AND PLAN:      1  Abnormal abdominal CT scan  Patient with abnormality on CT scan with stranding and bowel wall thickening sigmoid colon  We have treated for diverticulitis over the last couple days and she seems to be improving no longer has pelvic pain  Did confer with GYN over the phone  They were concerned about omental caking possibly from an ovarian source of the ultrasound of the ovaries were negative  They advised the patient be referred to gyn Oncology  Will ask patient to see Dr Cosme Davis    - Ambulatory Referral to Gynecologic Oncology; Future    2  Personal history of colonic polyps  --- patient did have a fairly large rectal polyp on examination last year  Given findings on CAT scan will proceed with colonoscopy in about 1 month or so  - Colonoscopy; Future    3  Right-sided back pain, unspecified back location, unspecified chronicity  -- please see under 4  Patient complained of back pain not so much with movement  Has been there for 24 hours  4  Chest pain on breathing  --  Patient with new onset of pleuritic chest pain  She does report that she gets a little short of breath with   Exertion  She has never really had this before  No history of DVT or PE    There were no recent history of prolonged immobilization -  No long  car rides or plane ride  - really somewhat concerned about this new onset of symptoms  She did coli office today thinking this may be connected with her other problems  Discussed the situation  We did have some concerns that there might be a remote possibility this could be a pulmonary emboli  Will refer patient to the [facility] Emergency room and will contact  Patient may benefit from a CT scan of the chest with contrast or a V/Q scan  She should have a chest x-ray and check pulse ox at least any weight  Patient is in agreement  -  Discussed with Dr Pérez July at [facility]  ER who will arrange for assessment of the patient      Followup Appointment: keep  September  Appointment   ______________________________________________________________________    Chief Complaint   Patient presents with    Follow up to ct scan     HPI:   Patient returns to the office for follow-up visit with respect to her problems with lower abdominal pain and recent CT scan examinations  Patient had complained of several weeks of lower abdominal pain and somewhat of a change in her bowel habits  This prompted us to follow-up with pelvic ultrasonography an abdominal CT  CT scan examination revealed some thickening of the abdominal wall and some stranding in the sigmoid colon  Differential include diverticulitis versus neoplastic disease  As it turns out the patient had a colonoscopy in 2021 and a large rectal polyp was removed at that time  She also has some diverticulosis  Over the weekend she was called with the results and placed on broad-spectrum antibiotics  She reports that her pain now is better in the lower abdomen  I did confer with GYN about the findings on ultrasound and CT scan which were obtained at [facility]  My gyn colleague   suggested that the stranding finding could be early presentation of ovarian neoplasm even without the presence of an ovarian mass  A GYN oncology consultation was suggested     patient happens to call the office today and then complained about some pleuritic-type chest and back pain  This occurred yesterday while she was watching television  Does report that she injured herself for strained herself  She is not really short of breath but if she takes a deep breath she reports that is very uncomfortable  She did tell [employer] but had some discomfort with belching  She has never had a DVT  There is no history of prolonged immobilization  She has never had a history of pulmonary emboli  Historical Information   Past Medical History:   Diagnosis Date    Allergic sinusitis     Asthma     Cholelithiasis     Colon polyp     Degenerative joint disease     GERD (gastroesophageal reflux disease)     Giant cell aortic arteritis (HCC)     Hyperlipidemia     Hypertension     PMR (polymyalgia rheumatica) (HCC)      Past Surgical History:   Procedure Laterality Date    COLONOSCOPY      October 2021: Adenomatous polyps and rectum in transverse colon, sigmoid diverticulosis, internal hemorrhoids  October 2016 diverticulosis and internal hemorrhoids, September 2011 diverticulosis and internal hemorrhoids   TEMPORAL ARTERY BIOPSY / LIGATION      UPPER GASTROINTESTINAL ENDOSCOPY  11/10/2014    November 2014 irregular Z-line and Schatzki ring, hiatal hernia, gastritis  Biopsies negative for celiac, H pylori, or Phillip's or eosinophilic esophagitis       Social History     Substance and Sexual Activity   Alcohol Use Yes    Comment: a glass of wine maybe 2 times a month     Social History     Substance and Sexual Activity   Drug Use Never     Social History     Tobacco Use   Smoking Status Never Smoker   Smokeless Tobacco Never Used     Family History   Problem Relation Age of Onset    Colon polyps Mother    Santiago Judson Alzheimer's disease Mother     Heart disease Father     Brain cancer Father     Colon polyps Sister     Heart disease Sister     Diabetes Sister     MARLEY disease Sister     Colon cancer Neg Hx          Current Outpatient Medications:     Acetaminophen (TYLENOL ARTHRITIS PAIN PO)    albuterol (PROVENTIL HFA,VENTOLIN HFA) 90 mcg/act inhaler    aspirin 81 mg chewable tablet    B Complex-C (B COMPLEX-VITAMIN C PO)    betamethasone, augmented, (DIPROLENE) 0 05 % lotion    Calcium Carb-Cholecalciferol (CALCIUM 500+D3 PO)    cefuroxime (CEFTIN) 500 mg tablet    cholecalciferol (VITAMIN D3) 1,000 units tablet    dicyclomine (BENTYL) 10 mg capsule    Docusate Calcium (STOOL SOFTENER PO)    famotidine (PEPCID) 40 MG tablet    fexofenadine (ALLEGRA) 180 MG tablet    FIBER ADULT GUMMIES PO    GLUCOSAMINE-CHONDROITIN PO    hydrocortisone (Proctosol HC) 2 5 % rectal cream    lisinopril (ZESTRIL) 5 mg tablet    metFORMIN (GLUCOPHAGE) 500 mg tablet    metroNIDAZOLE (FLAGYL) 500 mg tablet    montelukast (SINGULAIR) 10 mg tablet    multivitamin (THERAGRAN) TABS    Polyethylene Glycol 3350 (MIRALAX PO)    PREDNISONE PO    Probiotic Product (PROBIOTIC DAILY PO)    simvastatin (ZOCOR) 10 mg tablet    SYMBICORT 160-4 5 MCG/ACT inhaler  No Known Allergies  Reviewed medications and allergies and updated as indicated    PHYSICAL EXAM:    Blood pressure 138/92, pulse 80, height 5' 4" (1 626 m), weight 87 5 kg (193 lb)  Body mass index is 33 13 kg/m²  General Appearance: NAD, cooperative, alert  Eyes: Anicteric,   Conjunctiva pale  ENT:  Normocephalic, atraumatic, normal mucosa  Neck:  Supple, symmetrical, trachea midline  Resp:  Clear to auscultation bilaterally; no rales, rhonchi or wheezing; respirations unlabored- some splinting on deep inspiration  CV:  S1 S2, Regular rate and rhythm; no murmur, rub, or gallop  GI:  Soft, non-tender, non-distended; normal bowel sounds; no masses, no organomegaly   Rectal: Deferred  Musculoskeletal: No cyanosis, clubbing or edema  Normal ROM  Skin:  No jaundice, rashes, or lesions   Heme/Lymph: No palpable cervical lymphadenopathy  Psych: Normal affect, good eye contact  Neuro:  No gross deficits, AAOx3    Lab Results:    CBC and CMP June 22, 2022 [facility] labs w    Radiology Results:   No results found

## 2022-06-28 NOTE — TELEPHONE ENCOUNTER
I called the patient  No need to have the staff call  Patient reports some pain along her back and side  Had discomfort with burping  Did have some discomfort down her arm  Not really short of breath  No urinary symptoms  Abdominal pain was on the left lower side previously  Not exactly sure what to make of this discomfort  If things get worse she should go to the ED or call her PCP Dr James Phipps  Will try to get her in for 15 minute visit with me in the next few weeks  -no need to have the staff call back

## 2022-06-28 NOTE — PATIENT INSTRUCTIONS
[address] [employer] [facility] - Outpatient Follow-up Note  Rosamaria Mckeon 67 y o  female MRN: [id number]  Encounter: [id number]    ASSESSMENT AND PLAN:      1  Abnormal abdominal CT scan  Patient with abnormality on CT scan with stranding and bowel wall thickening sigmoid colon  We have treated for diverticulitis over the last couple days and she seems to be improving no longer has pelvic pain  Did confer with GYN over the phone  They were concerned about omental caking possibly from an ovarian source of the ultrasound of the ovaries were negative  They advised the patient be referred to gyn Oncology  Will ask patient to see Dr David Crenshaw    - Ambulatory Referral to Gynecologic Oncology; Future  -Have patient see Dr David Crenshaw ASAP    2  Personal history of colonic polyps  --- patient did have a fairly large rectal polyp on examination last year  Given findings on CAT scan will proceed with colonoscopy in about 1 month or so  - Colonoscopy; Future    3  Right-sided back pain, unspecified back location, unspecified chronicity  -- please see under 4  Patient complained of back pain not so much with movement  Has been there for 24 hours  4  Chest pain on breathing  --  Patient with new onset of pleuritic chest pain  She does report that she gets a little short of breath with   Exertion  She has never really had this before  No history of DVT or PE  There were no recent history of prolonged immobilization -  No long  car rides or plane ride  - really somewhat concerned about this new onset of symptoms  She did coli office today thinking this may be connected with her other problems  Discussed the situation  We did have some concerns that there might be a remote possibility this could be a pulmonary emboli  Will refer patient to the [facility] Emergency room and will contact  Patient may benefit from a CT scan of the chest with contrast or a V/Q scan    She should have a chest x-ray and check pulse ox at least any weight  Patient is in agreement        Followup Appointment: keep  September  Appointment

## 2022-06-28 NOTE — TELEPHONE ENCOUNTER
Spoke to patient  Reviewed recommendations as per Nessa Mota  Patient wanted you to know there are no appts in 3 weeks and she is scheduled in September  Started yesterday with a new symptom - when she takes a deep breath she gets a pain on her right side into her right arm - not really in abdomen or lungs  Eventually goes away  (Pt uses Symbicort)

## 2022-06-29 ENCOUNTER — TELEPHONE (OUTPATIENT)
Dept: GYNECOLOGIC ONCOLOGY | Facility: CLINIC | Age: 72
End: 2022-06-29

## 2022-06-29 NOTE — TELEPHONE ENCOUNTER
Intake Form   Patient Details   Li Aguirre     1950     Reason For Appointment   Who is Calling? If not patient, Name? Who is the Referring Doctor? Alexander Logan MD   What is the diagnosis? R93 5 (ICD-10-CM) - Abnormal abdominal CT scan   Has this diagnosis been confirmed by a biopsy/surgery? If yes, what is the date it was done? no   Biopsy done at [place] [address]? If not, where was it done? no   Was imaging done, and was it done at [address] [place] [address]? If not, where was it done? Yes, [facility] (records in [place])   For 2nd Opinions Only: Are you currently undergoing treatment, or are you scheduled to start treatment? If yes, name of facility, city and state     For "History Of" only: Have you completed treatment? Have you had Genetic Testing done in the past?  If so, advise to bring test results to their visit no   Record Gathering Information   Did you advise to have records faxed to [phone]? no   Did you advise to bring discs to office visit? no   Scheduling Information   What is the best call back number?   [phone]   What Insurance does patient have & is an insurance referral required Medicare & [address]   If patient is NEW to [facility] a new patient packet (Titled Breast/Gyn) Not new   Miscellaneous Information

## 2022-06-30 ENCOUNTER — TELEPHONE (OUTPATIENT)
Dept: GASTROENTEROLOGY | Facility: CLINIC | Age: 72
End: 2022-06-30

## 2022-06-30 DIAGNOSIS — K66.9 PERITONEAL LESION: Primary | ICD-10-CM

## 2022-06-30 DIAGNOSIS — D49.59 OVARY NEOPLASM: ICD-10-CM

## 2022-06-30 DIAGNOSIS — D39.9 NEOPLASM OF UNCERTAIN BEHAVIOR OF FEMALE GENITAL ORGANS: ICD-10-CM

## 2022-06-30 NOTE — TELEPHONE ENCOUNTER
Biopsy order placed and faxed to [facility] IR dept along with IR reservation sheet and lab order  Patient will be contacted by IR dept to schedule

## 2022-06-30 NOTE — TELEPHONE ENCOUNTER
Call placed to patient to schedule appt  Offered 7/7/22 at 8am  Patient declined  Scheduled for 7/21/22

## 2022-06-30 NOTE — TELEPHONE ENCOUNTER
I spoke with patient about studies from - 6/28- no PE - normal VQ scan -was in touch with GYN ONC offered patient an appointment next week --  Schedule 7/21 -   Advised biopsy of Omental lesion - - texted Dr Sandra Reynolds IR - he states lesion is amenable to biopsy - will arrange in near future before ONC visit - check CA-125 - try place proper code -  Told patient that Jihan Post at my office will help navigate    Copy Dr Hilary Oconnell

## 2022-07-01 LAB — HBA1C MFR BLD HPLC: 6.2 %

## 2022-07-07 DIAGNOSIS — R10.30 LOWER ABDOMINAL PAIN: ICD-10-CM

## 2022-07-07 RX ORDER — DICYCLOMINE HYDROCHLORIDE 10 MG/1
10 CAPSULE ORAL 3 TIMES DAILY PRN
Qty: 270 CAPSULE | Refills: 1 | Status: SHIPPED | OUTPATIENT
Start: 2022-07-07 | End: 2022-09-29

## 2022-07-19 ENCOUNTER — TELEPHONE (OUTPATIENT)
Dept: GASTROENTEROLOGY | Facility: AMBULARY SURGERY CENTER | Age: 72
End: 2022-07-19

## 2022-07-19 ENCOUNTER — TELEPHONE (OUTPATIENT)
Dept: GYNECOLOGIC ONCOLOGY | Facility: CLINIC | Age: 72
End: 2022-07-19

## 2022-07-19 NOTE — TELEPHONE ENCOUNTER
Please call patient, path results are available  I placed a copy on desk at Endo and result is scanned into system

## 2022-07-19 NOTE — TELEPHONE ENCOUNTER
Dr Georgette Matta received copy of report from [facility] (listed on report)  I did sent Yoandy Pritchard note via routing in [facility] [address]

## 2022-07-19 NOTE — TELEPHONE ENCOUNTER
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

## 2022-07-19 NOTE — TELEPHONE ENCOUNTER
Patients GI provider:  Dr Guillermo Baugh     Number to return call: [phone]     Reason for call: Pt calling requesting to speak with someone for biopsy results     Scheduled procedure/appointment date if applicable: Apt/procedure 8/30/22

## 2022-07-19 NOTE — TELEPHONE ENCOUNTER
Spoke with patient regarding her consultation appointment with Dr Lockwood on Thursday, Appt is confimed

## 2022-07-21 ENCOUNTER — CONSULT (OUTPATIENT)
Dept: GYNECOLOGIC ONCOLOGY | Facility: HOSPITAL | Age: 72
End: 2022-07-21
Payer: MEDICARE

## 2022-07-21 VITALS
DIASTOLIC BLOOD PRESSURE: 74 MMHG | HEIGHT: 64 IN | SYSTOLIC BLOOD PRESSURE: 116 MMHG | WEIGHT: 190.6 LBS | TEMPERATURE: 98 F | BODY MASS INDEX: 32.54 KG/M2

## 2022-07-21 DIAGNOSIS — C48.2 PERITONEAL CARCINOMA (HCC): Primary | ICD-10-CM

## 2022-07-21 DIAGNOSIS — R93.5 ABNORMAL ABDOMINAL CT SCAN: ICD-10-CM

## 2022-07-21 PROCEDURE — 99205 OFFICE O/P NEW HI 60 MIN: CPT | Performed by: OBSTETRICS & GYNECOLOGY

## 2022-07-21 RX ORDER — HEPARIN SODIUM 5000 [USP'U]/ML
5000 INJECTION, SOLUTION INTRAVENOUS; SUBCUTANEOUS
Status: CANCELLED | OUTPATIENT
Start: 2022-07-22 | End: 2022-07-23

## 2022-07-21 RX ORDER — OMEPRAZOLE 20 MG/1
CAPSULE, DELAYED RELEASE ORAL
Status: ON HOLD | COMMUNITY
Start: 2022-06-10

## 2022-07-21 RX ORDER — NEOMYCIN SULFATE 500 MG/1
1000 TABLET ORAL 3 TIMES DAILY
Qty: 6 TABLET | Refills: 0 | Status: SHIPPED | OUTPATIENT
Start: 2022-07-21 | End: 2022-07-22

## 2022-07-21 RX ORDER — CEFAZOLIN SODIUM 2 G/50ML
2000 SOLUTION INTRAVENOUS ONCE
Status: CANCELLED | OUTPATIENT
Start: 2022-07-21 | End: 2022-07-21

## 2022-07-21 RX ORDER — POLYETHYLENE GLYCOL 3350 17 G/17G
POWDER, FOR SOLUTION ORAL
Qty: 238 G | Refills: 0 | Status: SHIPPED | OUTPATIENT
Start: 2022-07-21 | End: 2022-08-04

## 2022-07-21 RX ORDER — METRONIDAZOLE 500 MG/1
500 TABLET ORAL ONCE
Qty: 1 TABLET | Refills: 0 | Status: SHIPPED | OUTPATIENT
Start: 2022-07-21 | End: 2022-07-21

## 2022-07-21 RX ORDER — METRONIDAZOLE 500 MG/100ML
500 INJECTION, SOLUTION INTRAVENOUS EVERY 8 HOURS
Status: CANCELLED | OUTPATIENT
Start: 2022-07-21

## 2022-07-21 RX ORDER — ALENDRONATE SODIUM 70 MG/1
TABLET ORAL
COMMUNITY
Start: 2022-06-07 | End: 2022-09-29

## 2022-07-21 RX ORDER — ACETAMINOPHEN 325 MG/1
975 TABLET ORAL ONCE
Status: CANCELLED | OUTPATIENT
Start: 2022-07-21 | End: 2022-07-21

## 2022-07-21 RX ORDER — GABAPENTIN 100 MG/1
200 CAPSULE ORAL ONCE
Status: CANCELLED | OUTPATIENT
Start: 2022-07-21 | End: 2022-07-21

## 2022-07-21 RX ORDER — SODIUM CHLORIDE, SODIUM LACTATE, POTASSIUM CHLORIDE, CALCIUM CHLORIDE 600; 310; 30; 20 MG/100ML; MG/100ML; MG/100ML; MG/100ML
125 INJECTION, SOLUTION INTRAVENOUS CONTINUOUS
Status: CANCELLED | OUTPATIENT
Start: 2022-07-21

## 2022-07-21 NOTE — ASSESSMENT & PLAN NOTE
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, CT imaging with omental caking and peritoneal thickening in the pelvis   is 54 7 units/ml  She is on chronic prednisone therapy for temporal arteritis  I reviewed her CBC CMP, CT reports, ultrasound report, pathology reports  Her performance status is 0   1  I reviewed the CT report as well as the biopsy report with her and her   2   We discussed standard of care therapy for biopsy-proven ovarian/peritoneal/tubal carcinoma which includes at least 6 cycles of chemotherapy and debulking surgery  We discussed the difference between neoadjuvant chemotherapy and initial cytoreductive surgery followed by adjuvant chemotherapy  She understands that based on randomized trials, there was no difference in overall survival between neoadjuvant chemotherapy and initial cytoreductive surgery  3  CT of chest to complete staging  4  Given that she does not have large volume ascites, extensive retroperitoneal lymphadenopathy, parenchymal liver metastases, portal lymphadenopathy, she is a candidate for upfront cytoreductive surgery  5  I discussed the risks and benefits of possible robotic assisted total laparoscopic hysterectomy, bilateral salpingo-oophorectomy with possible conversion to exploratory laparotomy, cytoreductive surgery, possible bowel resection, possible colostomy, all other indicated procedures  She understands that if on laparoscopy, she does not have disease that can be managed initially with surgery, the procedure will be stopped and she will start with neoadjuvant chemotherapy  She understands the risks and benefits of surgery including the risks of ICU stay, blood transfusion, infection, thrombosis and she agrees to proceed as outlined  Consent for surgery was obtained by me in the office  6  Provided instruction for antibiotic and mechanical bowel prep    7  We reviewed perioperative medication management  8  Will plan for stress dose steroids   9  She understands that it is preferable to start chemotherapy if indicated within 6 weeks of surgery  Thank you for the courtesy of this consultation  All questions were answered by the end of the visit

## 2022-07-21 NOTE — PATIENT INSTRUCTIONS
1  Nothing to eat or drink after midnight prior to the operation  You are allowed clear liquids until 3 hours prior to the scheduled start time of surgery  No milk products or solid food for 8 hours prior to surgery  2   Please avoid ibuprofen, aspirin, fish oil for 7 days prior to surgery  3  Please take your Symbicort, omeprazole the morning of surgery  Do not take any of your other medications the morning of surgery

## 2022-07-21 NOTE — PROGRESS NOTES
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, CT imaging with omental caking and peritoneal thickening in the pelvis   is 54 7 units/ml  She is on chronic prednisone therapy for temporal arteritis  I reviewed her CBC CMP, CT reports, ultrasound report, pathology reports  Her performance status is 0   1  I reviewed the CT report as well as the biopsy report with her and her   2   We discussed standard of care therapy for biopsy-proven ovarian/peritoneal/tubal carcinoma which includes at least 6 cycles of chemotherapy and debulking surgery  We discussed the difference between neoadjuvant chemotherapy and initial cytoreductive surgery followed by adjuvant chemotherapy  She understands that based on randomized trials, there was no difference in overall survival between neoadjuvant chemotherapy and initial cytoreductive surgery  3  CT of chest to complete staging  4  Given that she does not have large volume ascites, extensive retroperitoneal lymphadenopathy, parenchymal liver metastases, portal lymphadenopathy, she is a candidate for upfront cytoreductive surgery  5  I discussed the risks and benefits of possible robotic assisted total laparoscopic hysterectomy, bilateral salpingo-oophorectomy with possible conversion to exploratory laparotomy, cytoreductive surgery, possible bowel resection, possible colostomy, all other indicated procedures  She understands that if on laparoscopy, she does not have disease that can be managed initially with surgery, the procedure will be stopped and she will start with neoadjuvant chemotherapy  She understands the risks and benefits of surgery including the risks of ICU stay, blood transfusion, infection, thrombosis and she agrees to proceed as outlined    Consent for surgery was obtained by me in the office  6  Provided instruction for antibiotic and mechanical bowel prep  7  We reviewed perioperative medication management  8  Will plan for stress dose steroids   9  She understands that it is preferable to start chemotherapy if indicated within 6 weeks of surgery  Thank you for the courtesy of this consultation  All questions were answered by the end of the visit  Relevant Medications    neomycin (MYCIFRADIN) 500 mg tablet    metroNIDAZOLE (FLAGYL) 500 mg tablet    polyethylene glycol (MiraLax) 17 GM/SCOOP powder    Other Relevant Orders    Case request operating room: HYSTERECTOMY LAPAROSCOPIC TOTAL ([address]) W/ ROBOTICS (Completed)    Type and screen    CBC and Platelet    Comprehensive metabolic panel    EKG 12 lead    Protime-INR    CT chest wo contrast      Other Visit Diagnoses     Abnormal abdominal CT scan                  CHIEF COMPLAINT:  Biopsy-proven adenocarcinoma, gynecologic origin  Patient ID: Ann Stephens is a 67 y o  female  66-year-old referred by Dr Ene Hale as a consultation for abnormal imaging of the abdomen and pelvis, elevated , biopsy-proven metastatic adenocarcinoma consistent with ovarian primary  She initially had abdominal symptoms that were consistent with possible diverticular disease  She had a CT of the abdomen pelvis on 6/24/2022 that revealed asymmetric bowel thickening of the sigmoid with some fat stranding, omental caking, trace free fluid, no evidence of retroperitoneal lymphadenopathy, parenchymal liver disease or splenic disease  She had a chest x-ray which showed trace bilateral pleural effusions, V/Q scan which was negative for pulmonary embolus  Pelvic ultrasound on 6/22/2022 revealed the uterus to measure 7 6 x 4 3 cm with a 5 mm endometrial thickness and 2 small fibroids  She is had colonoscopy in October of 2021 for benign polyps  She is up-to-date with mammogram   She is able to tolerate a regular diet    She is having bowel movements  She has no postmenopausal bleeding  She is able to perform her activities of daily living without difficulty  Review of labs from 7/1/2022 reveal  of 54 7 units/ml, a normal CMP with a serum creatinine of 0 62, albumin 4 1 grams/deciliter, hemoglobin 13 1 grams/deciliter, platelet count 188 K  Hemoglobin A1c 6 2%  Of note, she takes prednisone daily for temporal arteritis  Review of Systems   Constitutional: Negative for activity change and unexpected weight change  HENT: Negative  Eyes: Negative  Respiratory: Negative  Cardiovascular: Negative  Gastrointestinal: Positive for abdominal pain  Negative for abdominal distention, nausea and vomiting  Endocrine: Negative  Genitourinary: Negative for pelvic pain and vaginal bleeding  Musculoskeletal: Negative  Skin: Negative  Allergic/Immunologic: Negative  Neurological: Negative  Hematological: Negative  Psychiatric/Behavioral: Negative  Current Outpatient Medications   Medication Sig Dispense Refill    albuterol (PROVENTIL HFA,VENTOLIN HFA) 90 mcg/act inhaler Inhale 2 puffs every 4 (four) hours as needed       metroNIDAZOLE (FLAGYL) 500 mg tablet Take 1 tablet (500 mg total) by mouth once for 1 dose Take at 9 PM the night before the procedure 1 tablet 0    neomycin (MYCIFRADIN) 500 mg tablet Take 2 tablets (1,000 mg total) by mouth 3 (three) times a day for 3 doses Take at 1 PM, 4 PM, and 9 PM the day before procedure  6 tablet 0    polyethylene glycol (MiraLax) 17 GM/SCOOP powder Mix with 64 oz Gatorade, begin 4 PM day before surgery per bowel prep instructions   238 g 0    PREDNISONE PO Take 15 mg by mouth Tapering off      Acetaminophen (TYLENOL ARTHRITIS PAIN PO) Take by mouth as needed      alendronate (FOSAMAX) 70 mg tablet PLEASE SEE ATTACHED FOR DETAILED DIRECTIONS      aspirin 81 mg chewable tablet Chew 81 mg daily      B Complex-C (B COMPLEX-VITAMIN C PO) Take by mouth daily      betamethasone, augmented, (DIPROLENE) 0 05 % lotion Apply topically as needed      Calcium Carb-Cholecalciferol (CALCIUM 500+D3 PO) Take 1 tablet by mouth 2 (two) times a day       cholecalciferol (VITAMIN D3) 1,000 units tablet Take 1,000 Units by mouth daily      dicyclomine (BENTYL) 10 mg capsule TAKE 1 CAPSULE (10 MG TOTAL) BY MOUTH 3 (THREE) TIMES A DAY AS NEEDED (ABDOMINAL PAIN) 270 capsule 1    Docusate Calcium (STOOL SOFTENER PO) Take 1 tablet by mouth daily       famotidine (PEPCID) 40 MG tablet Take 1 tablet (40 mg total) by mouth daily at bedtime 90 tablet 3    fexofenadine (ALLEGRA) 180 MG tablet Take 180 mg by mouth daily      FIBER ADULT GUMMIES PO Take by mouth      GLUCOSAMINE-CHONDROITIN PO Take 1 tablet by mouth 2 (two) times a day       hydrocortisone (Proctosol HC) 2 5 % rectal cream Insert into rectum two (2) times a day 28 35 g 3    lisinopril (ZESTRIL) 5 mg tablet Take 5 mg by mouth daily       metFORMIN (GLUCOPHAGE) 500 mg tablet Take 500 mg by mouth daily      montelukast (SINGULAIR) 10 mg tablet Take 10 mg by mouth daily       multivitamin (THERAGRAN) TABS Take 1 tablet by mouth daily      omeprazole (PriLOSEC) 20 mg delayed release capsule TAKE 1 CAPSULE BY MOUTH EVERY DAY 30 MINUTES BEFORE MORNING MEAL FOR 90 DAYS      polyethylene glycol (GOLYTELY) 4000 mL solution Take 4,000 mL by mouth once for 1 dose 4000 mL 0    Polyethylene Glycol 3350 (MIRALAX PO) Take by mouth as needed      Probiotic Product (PROBIOTIC DAILY PO) Take by mouth daily      simvastatin (ZOCOR) 10 mg tablet Take 10 mg by mouth daily at bedtime      SYMBICORT 160-4 5 MCG/ACT inhaler 2 puffs daily        No current facility-administered medications for this visit         No Known Allergies    Past Medical History:   Diagnosis Date    Allergic sinusitis     Asthma     Cholelithiasis     Colon polyp     Degenerative joint disease     GERD (gastroesophageal reflux disease)     Giant cell aortic arteritis (HCC)     Hyperlipidemia     Hypertension     PMR (polymyalgia rheumatica) [facility])        Past Surgical History:   Procedure Laterality Date    COLONOSCOPY      October 2021: Adenomatous polyps and rectum in transverse colon, sigmoid diverticulosis, internal hemorrhoids  October 2016 diverticulosis and internal hemorrhoids, September 2011 diverticulosis and internal hemorrhoids   TEMPORAL ARTERY BIOPSY / LIGATION      UPPER GASTROINTESTINAL ENDOSCOPY  11/10/2014    November 2014 irregular Z-line and Schatzki ring, hiatal hernia, gastritis  Biopsies negative for celiac, H pylori, or Phillip's or eosinophilic esophagitis  OB History    No obstetric history on file  Family History   Problem Relation Age of Onset    Colon polyps Mother    Kristofer Fraser Alzheimer's disease Mother     Heart disease Father     Brain cancer Father     Colon polyps Sister     Heart disease Sister     Diabetes Sister     MARLEY disease Sister     Colon cancer Neg Hx        The following portions of the patient's history were reviewed and updated as appropriate: allergies, current medications, past family history, past medical history, past social history, past surgical history and problem list       Objective:    Blood pressure 116/74, temperature 98 °F (36 7 °C), temperature source Tympanic, height 5' 4" (1 626 m), weight 86 5 kg (190 lb 9 6 oz)  Body mass index is 32 72 kg/m²  Physical Exam  Vitals reviewed  Exam conducted with a chaperone present  Constitutional:       General: She is not in acute distress  Appearance: Normal appearance  She is well-developed  She is obese  She is not ill-appearing, toxic-appearing or diaphoretic  HENT:      Head: Normocephalic and atraumatic  Mouth/Throat:      Mouth: Mucous membranes are moist    Eyes:      General: No scleral icterus  Right eye: No discharge  Left eye: No discharge        Extraocular Movements: Extraocular movements intact  Conjunctiva/sclera: Conjunctivae normal    Neck:      Thyroid: No thyromegaly  Cardiovascular:      Rate and Rhythm: Normal rate and regular rhythm  Heart sounds: Normal heart sounds  No murmur heard  No friction rub  No gallop  Pulmonary:      Effort: Pulmonary effort is normal  No respiratory distress  Breath sounds: Normal breath sounds  No stridor  No wheezing or rhonchi  Abdominal:      General: There is no distension  Palpations: Abdomen is soft  There is no mass  Tenderness: There is no abdominal tenderness  There is no guarding or rebound  Hernia: No hernia is present  Genitourinary:     Comments: The external female genitalia is normal  The bartholin's, uretheral and skenes glands are normal  The urethral meatus is normal (midline with no lesions)  Anus without fissure or lesion  Speculum exam reveals vagina without lesion or discharge  Cervix is normal appearing without visible lesions  No bleeding  No significant cystocele or rectocele noted  Bimanual exam notes a relatively fixed pelvis with measurable disease present in the cul-de-sac  Bladder is without fullness or tenderness  Musculoskeletal:         General: No swelling, tenderness, deformity or signs of injury  Cervical back: Normal range of motion and neck supple  Right lower leg: No edema  Left lower leg: No edema  Lymphadenopathy:      Cervical: No cervical adenopathy  Skin:     General: Skin is warm and dry  Coloration: Skin is not jaundiced or pale  Findings: No bruising or erythema  Neurological:      General: No focal deficit present  Mental Status: She is alert and oriented to person, place, and time  Cranial Nerves: No cranial nerve deficit  Motor: No weakness  Gait: Gait normal    Psychiatric:         Mood and Affect: Mood normal          Behavior: Behavior normal          Thought Content:  Thought content normal          Judgment: Judgment normal

## 2022-07-21 NOTE — H&P (VIEW-ONLY)
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, CT imaging with omental caking and peritoneal thickening in the pelvis   is 54 7 units/ml  She is on chronic prednisone therapy for temporal arteritis  I reviewed her CBC CMP, CT reports, ultrasound report, pathology reports  Her performance status is 0   1  I reviewed the CT report as well as the biopsy report with her and her   2   We discussed standard of care therapy for biopsy-proven ovarian/peritoneal/tubal carcinoma which includes at least 6 cycles of chemotherapy and debulking surgery  We discussed the difference between neoadjuvant chemotherapy and initial cytoreductive surgery followed by adjuvant chemotherapy  She understands that based on randomized trials, there was no difference in overall survival between neoadjuvant chemotherapy and initial cytoreductive surgery  3  CT of chest to complete staging  4  Given that she does not have large volume ascites, extensive retroperitoneal lymphadenopathy, parenchymal liver metastases, portal lymphadenopathy, she is a candidate for upfront cytoreductive surgery  5  I discussed the risks and benefits of possible robotic assisted total laparoscopic hysterectomy, bilateral salpingo-oophorectomy with possible conversion to exploratory laparotomy, cytoreductive surgery, possible bowel resection, possible colostomy, all other indicated procedures  She understands that if on laparoscopy, she does not have disease that can be managed initially with surgery, the procedure will be stopped and she will start with neoadjuvant chemotherapy  She understands the risks and benefits of surgery including the risks of ICU stay, blood transfusion, infection, thrombosis and she agrees to proceed as outlined    Consent for surgery was obtained by me in the office  6  Provided instruction for antibiotic and mechanical bowel prep  7  We reviewed perioperative medication management  8  Will plan for stress dose steroids   9  She understands that it is preferable to start chemotherapy if indicated within 6 weeks of surgery  Thank you for the courtesy of this consultation  All questions were answered by the end of the visit  Relevant Medications    neomycin (MYCIFRADIN) 500 mg tablet    metroNIDAZOLE (FLAGYL) 500 mg tablet    polyethylene glycol (MiraLax) 17 GM/SCOOP powder    Other Relevant Orders    Case request operating room: HYSTERECTOMY LAPAROSCOPIC TOTAL ([address]) W/ ROBOTICS (Completed)    Type and screen    CBC and Platelet    Comprehensive metabolic panel    EKG 12 lead    Protime-INR    CT chest wo contrast      Other Visit Diagnoses     Abnormal abdominal CT scan                  CHIEF COMPLAINT:  Biopsy-proven adenocarcinoma, gynecologic origin  Patient ID: Alissa Snyder is a 67 y o  female  72-year-old referred by Dr Kristofer Burgos as a consultation for abnormal imaging of the abdomen and pelvis, elevated , biopsy-proven metastatic adenocarcinoma consistent with ovarian primary  She initially had abdominal symptoms that were consistent with possible diverticular disease  She had a CT of the abdomen pelvis on 6/24/2022 that revealed asymmetric bowel thickening of the sigmoid with some fat stranding, omental caking, trace free fluid, no evidence of retroperitoneal lymphadenopathy, parenchymal liver disease or splenic disease  She had a chest x-ray which showed trace bilateral pleural effusions, V/Q scan which was negative for pulmonary embolus  Pelvic ultrasound on 6/22/2022 revealed the uterus to measure 7 6 x 4 3 cm with a 5 mm endometrial thickness and 2 small fibroids  She is had colonoscopy in October of 2021 for benign polyps  She is up-to-date with mammogram   She is able to tolerate a regular diet    She is having bowel movements  She has no postmenopausal bleeding  She is able to perform her activities of daily living without difficulty  Review of labs from 7/1/2022 reveal  of 54 7 units/ml, a normal CMP with a serum creatinine of 0 62, albumin 4 1 grams/deciliter, hemoglobin 13 1 grams/deciliter, platelet count 399 K  Hemoglobin A1c 6 2%  Of note, she takes prednisone daily for temporal arteritis  Review of Systems   Constitutional: Negative for activity change and unexpected weight change  HENT: Negative  Eyes: Negative  Respiratory: Negative  Cardiovascular: Negative  Gastrointestinal: Positive for abdominal pain  Negative for abdominal distention, nausea and vomiting  Endocrine: Negative  Genitourinary: Negative for pelvic pain and vaginal bleeding  Musculoskeletal: Negative  Skin: Negative  Allergic/Immunologic: Negative  Neurological: Negative  Hematological: Negative  Psychiatric/Behavioral: Negative  Current Outpatient Medications   Medication Sig Dispense Refill    albuterol (PROVENTIL HFA,VENTOLIN HFA) 90 mcg/act inhaler Inhale 2 puffs every 4 (four) hours as needed       metroNIDAZOLE (FLAGYL) 500 mg tablet Take 1 tablet (500 mg total) by mouth once for 1 dose Take at 9 PM the night before the procedure 1 tablet 0    neomycin (MYCIFRADIN) 500 mg tablet Take 2 tablets (1,000 mg total) by mouth 3 (three) times a day for 3 doses Take at 1 PM, 4 PM, and 9 PM the day before procedure  6 tablet 0    polyethylene glycol (MiraLax) 17 GM/SCOOP powder Mix with 64 oz Gatorade, begin 4 PM day before surgery per bowel prep instructions   238 g 0    PREDNISONE PO Take 15 mg by mouth Tapering off      Acetaminophen (TYLENOL ARTHRITIS PAIN PO) Take by mouth as needed      alendronate (FOSAMAX) 70 mg tablet PLEASE SEE ATTACHED FOR DETAILED DIRECTIONS      aspirin 81 mg chewable tablet Chew 81 mg daily      B Complex-C (B COMPLEX-VITAMIN C PO) Take by mouth daily      betamethasone, augmented, (DIPROLENE) 0 05 % lotion Apply topically as needed      Calcium Carb-Cholecalciferol (CALCIUM 500+D3 PO) Take 1 tablet by mouth 2 (two) times a day       cholecalciferol (VITAMIN D3) 1,000 units tablet Take 1,000 Units by mouth daily      dicyclomine (BENTYL) 10 mg capsule TAKE 1 CAPSULE (10 MG TOTAL) BY MOUTH 3 (THREE) TIMES A DAY AS NEEDED (ABDOMINAL PAIN) 270 capsule 1    Docusate Calcium (STOOL SOFTENER PO) Take 1 tablet by mouth daily       famotidine (PEPCID) 40 MG tablet Take 1 tablet (40 mg total) by mouth daily at bedtime 90 tablet 3    fexofenadine (ALLEGRA) 180 MG tablet Take 180 mg by mouth daily      FIBER ADULT GUMMIES PO Take by mouth      GLUCOSAMINE-CHONDROITIN PO Take 1 tablet by mouth 2 (two) times a day       hydrocortisone (Proctosol HC) 2 5 % rectal cream Insert into rectum two (2) times a day 28 35 g 3    lisinopril (ZESTRIL) 5 mg tablet Take 5 mg by mouth daily       metFORMIN (GLUCOPHAGE) 500 mg tablet Take 500 mg by mouth daily      montelukast (SINGULAIR) 10 mg tablet Take 10 mg by mouth daily       multivitamin (THERAGRAN) TABS Take 1 tablet by mouth daily      omeprazole (PriLOSEC) 20 mg delayed release capsule TAKE 1 CAPSULE BY MOUTH EVERY DAY 30 MINUTES BEFORE MORNING MEAL FOR 90 DAYS      polyethylene glycol (GOLYTELY) 4000 mL solution Take 4,000 mL by mouth once for 1 dose 4000 mL 0    Polyethylene Glycol 3350 (MIRALAX PO) Take by mouth as needed      Probiotic Product (PROBIOTIC DAILY PO) Take by mouth daily      simvastatin (ZOCOR) 10 mg tablet Take 10 mg by mouth daily at bedtime      SYMBICORT 160-4 5 MCG/ACT inhaler 2 puffs daily        No current facility-administered medications for this visit         No Known Allergies    Past Medical History:   Diagnosis Date    Allergic sinusitis     Asthma     Cholelithiasis     Colon polyp     Degenerative joint disease     GERD (gastroesophageal reflux disease)     Giant cell aortic arteritis (HCC)     Hyperlipidemia     Hypertension     PMR [facility]polym[facility]algia rheumatica) [facility])        Past Surgical History:   Procedure Laterality Date    COLONOSCOPY      October 2021: Adenomatous polyps and rectum in transverse colon, sigmoid diverticulosis, internal hemorrhoids  October 2016 diverticulosis and internal hemorrhoids, September 2011 diverticulosis and internal hemorrhoids   TEMPORAL ARTERY BIOPSY / LIGATION      UPPER GASTROINTESTINAL ENDOSCOPY  11/10/2014    November 2014 irregular Z-line and Schatzki ring, hiatal hernia, gastritis  Biopsies negative for celiac, H pylori, or Phillip's or eosinophilic esophagitis  OB History    No obstetric history on file  Family History   Problem Relation Age of Onset    Colon polyps Mother    Artem Kumar Alzheimer's disease Mother     Heart disease Father     Brain cancer Father     Colon polyps Sister     Heart disease Sister     Diabetes Sister     MARLEY disease Sister     Colon cancer Neg Hx        The following portions of the patient's history were reviewed and updated as appropriate: allergies, current medications, past family history, past medical history, past social history, past surgical history and problem list       Objective:    Blood pressure 116/74, temperature 98 °F (36 7 °C), temperature source Tympanic, height 5' 4" (1 626 m), weight 86 5 kg (190 lb 9 6 oz)  Body mass index is 32 72 kg/m²  Physical Exam  Vitals reviewed  Exam conducted with a chaperone present  Constitutional:       General: She is not in acute distress  Appearance: Normal appearance  She is well-developed  She is obese  She is not ill-appearing, toxic-appearing or diaphoretic  HENT:      Head: Normocephalic and atraumatic  Mouth/Throat:      Mouth: Mucous membranes are moist    Eyes:      General: No scleral icterus  Right eye: No discharge  Left eye: No discharge        Extraocular Movements: Extraocular movements intact  Conjunctiva/sclera: Conjunctivae normal    Neck:      Thyroid: No thyromegaly  Cardiovascular:      Rate and Rhythm: Normal rate and regular rhythm  Heart sounds: Normal heart sounds  No murmur heard  No friction rub  No gallop  Pulmonary:      Effort: Pulmonary effort is normal  No respiratory distress  Breath sounds: Normal breath sounds  No stridor  No wheezing or rhonchi  Abdominal:      General: There is no distension  Palpations: Abdomen is soft  There is no mass  Tenderness: There is no abdominal tenderness  There is no guarding or rebound  Hernia: No hernia is present  Genitourinary:     Comments: The external female genitalia is normal  The bartholin's, uretheral and skenes glands are normal  The urethral meatus is normal (midline with no lesions)  Anus without fissure or lesion  Speculum exam reveals vagina without lesion or discharge  Cervix is normal appearing without visible lesions  No bleeding  No significant cystocele or rectocele noted  Bimanual exam notes a relatively fixed pelvis with measurable disease present in the cul-de-sac  Bladder is without fullness or tenderness  Musculoskeletal:         General: No swelling, tenderness, deformity or signs of injury  Cervical back: Normal range of motion and neck supple  Right lower leg: No edema  Left lower leg: No edema  Lymphadenopathy:      Cervical: No cervical adenopathy  Skin:     General: Skin is warm and dry  Coloration: Skin is not jaundiced or pale  Findings: No bruising or erythema  Neurological:      General: No focal deficit present  Mental Status: She is alert and oriented to person, place, and time  Cranial Nerves: No cranial nerve deficit  Motor: No weakness  Gait: Gait normal    Psychiatric:         Mood and Affect: Mood normal          Behavior: Behavior normal          Thought Content:  Thought content normal          Judgment: Judgment normal

## 2022-07-21 NOTE — H&P
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, CT imaging with omental caking and peritoneal thickening in the pelvis   is 54 7 units/ml  She is on chronic prednisone therapy for temporal arteritis  I reviewed her CBC CMP, CT reports, ultrasound report, pathology reports  Her performance status is 0   1  I reviewed the CT report as well as the biopsy report with her and her   2   We discussed standard of care therapy for biopsy-proven ovarian/peritoneal/tubal carcinoma which includes at least 6 cycles of chemotherapy and debulking surgery  We discussed the difference between neoadjuvant chemotherapy and initial cytoreductive surgery followed by adjuvant chemotherapy  She understands that based on randomized trials, there was no difference in overall survival between neoadjuvant chemotherapy and initial cytoreductive surgery  3  CT of chest to complete staging  4  Given that she does not have large volume ascites, extensive retroperitoneal lymphadenopathy, parenchymal liver metastases, portal lymphadenopathy, she is a candidate for upfront cytoreductive surgery  5  I discussed the risks and benefits of possible robotic assisted total laparoscopic hysterectomy, bilateral salpingo-oophorectomy with possible conversion to exploratory laparotomy, cytoreductive surgery, possible bowel resection, possible colostomy, all other indicated procedures  She understands that if on laparoscopy, she does not have disease that can be managed initially with surgery, the procedure will be stopped and she will start with neoadjuvant chemotherapy  She understands the risks and benefits of surgery including the risks of ICU stay, blood transfusion, infection, thrombosis and she agrees to proceed as outlined    Consent for surgery was obtained by me in the office  6  Provided instruction for antibiotic and mechanical bowel prep  7  We reviewed perioperative medication management  8  Will plan for stress dose steroids   9  She understands that it is preferable to start chemotherapy if indicated within 6 weeks of surgery  Thank you for the courtesy of this consultation  All questions were answered by the end of the visit  Relevant Medications    neomycin (MYCIFRADIN) 500 mg tablet    metroNIDAZOLE (FLAGYL) 500 mg tablet    polyethylene glycol (MiraLax) 17 GM/SCOOP powder    Other Relevant Orders    Case request operating room: HYSTERECTOMY LAPAROSCOPIC TOTAL ([address]) W/ ROBOTICS (Completed)    Type and screen    CBC and Platelet    Comprehensive metabolic panel    EKG 12 lead    Protime-INR    CT chest wo contrast      Other Visit Diagnoses     Abnormal abdominal CT scan                  CHIEF COMPLAINT:  Biopsy-proven adenocarcinoma, gynecologic origin  Patient ID: Yolanda Kearney is a 67 y o  female  40-year-old referred by Dr Aicha Trevino as a consultation for abnormal imaging of the abdomen and pelvis, elevated , biopsy-proven metastatic adenocarcinoma consistent with ovarian primary  She initially had abdominal symptoms that were consistent with possible diverticular disease  She had a CT of the abdomen pelvis on 6/24/2022 that revealed asymmetric bowel thickening of the sigmoid with some fat stranding, omental caking, trace free fluid, no evidence of retroperitoneal lymphadenopathy, parenchymal liver disease or splenic disease  She had a chest x-ray which showed trace bilateral pleural effusions, V/Q scan which was negative for pulmonary embolus  Pelvic ultrasound on 6/22/2022 revealed the uterus to measure 7 6 x 4 3 cm with a 5 mm endometrial thickness and 2 small fibroids  She is had colonoscopy in October of 2021 for benign polyps  She is up-to-date with mammogram   She is able to tolerate a regular diet    She is having bowel movements  She has no postmenopausal bleeding  She is able to perform her activities of daily living without difficulty  Review of labs from 7/1/2022 reveal  of 54 7 units/ml, a normal CMP with a serum creatinine of 0 62, albumin 4 1 grams/deciliter, hemoglobin 13 1 grams/deciliter, platelet count 721 K  Hemoglobin A1c 6 2%  Of note, she takes prednisone daily for temporal arteritis  Review of Systems   Constitutional: Negative for activity change and unexpected weight change  HENT: Negative  Eyes: Negative  Respiratory: Negative  Cardiovascular: Negative  Gastrointestinal: Positive for abdominal pain  Negative for abdominal distention, nausea and vomiting  Endocrine: Negative  Genitourinary: Negative for pelvic pain and vaginal bleeding  Musculoskeletal: Negative  Skin: Negative  Allergic/Immunologic: Negative  Neurological: Negative  Hematological: Negative  Psychiatric/Behavioral: Negative  Current Outpatient Medications   Medication Sig Dispense Refill    albuterol (PROVENTIL HFA,VENTOLIN HFA) 90 mcg/act inhaler Inhale 2 puffs every 4 (four) hours as needed       metroNIDAZOLE (FLAGYL) 500 mg tablet Take 1 tablet (500 mg total) by mouth once for 1 dose Take at 9 PM the night before the procedure 1 tablet 0    neomycin (MYCIFRADIN) 500 mg tablet Take 2 tablets (1,000 mg total) by mouth 3 (three) times a day for 3 doses Take at 1 PM, 4 PM, and 9 PM the day before procedure  6 tablet 0    polyethylene glycol (MiraLax) 17 GM/SCOOP powder Mix with 64 oz Gatorade, begin 4 PM day before surgery per bowel prep instructions   238 g 0    PREDNISONE PO Take 15 mg by mouth Tapering off      Acetaminophen (TYLENOL ARTHRITIS PAIN PO) Take by mouth as needed      alendronate (FOSAMAX) 70 mg tablet PLEASE SEE ATTACHED FOR DETAILED DIRECTIONS      aspirin 81 mg chewable tablet Chew 81 mg daily      B Complex-C (B COMPLEX-VITAMIN C PO) Take by mouth daily      betamethasone, augmented, (DIPROLENE) 0 05 % lotion Apply topically as needed      Calcium Carb-Cholecalciferol (CALCIUM 500+D3 PO) Take 1 tablet by mouth 2 (two) times a day       cholecalciferol (VITAMIN D3) 1,000 units tablet Take 1,000 Units by mouth daily      dicyclomine (BENTYL) 10 mg capsule TAKE 1 CAPSULE (10 MG TOTAL) BY MOUTH 3 (THREE) TIMES A DAY AS NEEDED (ABDOMINAL PAIN) 270 capsule 1    Docusate Calcium (STOOL SOFTENER PO) Take 1 tablet by mouth daily       famotidine (PEPCID) 40 MG tablet Take 1 tablet (40 mg total) by mouth daily at bedtime 90 tablet 3    fexofenadine (ALLEGRA) 180 MG tablet Take 180 mg by mouth daily      FIBER ADULT GUMMIES PO Take by mouth      GLUCOSAMINE-CHONDROITIN PO Take 1 tablet by mouth 2 (two) times a day       hydrocortisone (Proctosol HC) 2 5 % rectal cream Insert into rectum two (2) times a day 28 35 g 3    lisinopril (ZESTRIL) 5 mg tablet Take 5 mg by mouth daily       metFORMIN (GLUCOPHAGE) 500 mg tablet Take 500 mg by mouth daily      montelukast (SINGULAIR) 10 mg tablet Take 10 mg by mouth daily       multivitamin (THERAGRAN) TABS Take 1 tablet by mouth daily      omeprazole (PriLOSEC) 20 mg delayed release capsule TAKE 1 CAPSULE BY MOUTH EVERY DAY 30 MINUTES BEFORE MORNING MEAL FOR 90 DAYS      polyethylene glycol (GOLYTELY) 4000 mL solution Take 4,000 mL by mouth once for 1 dose 4000 mL 0    Polyethylene Glycol 3350 (MIRALAX PO) Take by mouth as needed      Probiotic Product (PROBIOTIC DAILY PO) Take by mouth daily      simvastatin (ZOCOR) 10 mg tablet Take 10 mg by mouth daily at bedtime      SYMBICORT 160-4 5 MCG/ACT inhaler 2 puffs daily        No current facility-administered medications for this visit         No Known Allergies    Past Medical History:   Diagnosis Date    Allergic sinusitis     Asthma     Cholelithiasis     Colon polyp     Degenerative joint disease     GERD (gastroesophageal reflux disease)     Giant cell aortic arteritis (HCC)     Hyperlipidemia     Hypertension     PMR [facility] rheumatica[facility])        Past Surgical History:   Procedure Laterality Date    COLONOSCOPY      October 2021: Adenomatous polyps and rectum in transverse colon, sigmoid diverticulosis, internal hemorrhoids  October 2016 diverticulosis and internal hemorrhoids, September 2011 diverticulosis and internal hemorrhoids   TEMPORAL ARTERY BIOPSY / LIGATION      UPPER GASTROINTESTINAL ENDOSCOPY  11/10/2014    November 2014 irregular Z-line and Schatzki ring, hiatal hernia, gastritis  Biopsies negative for celiac, H pylori, or Phillip's or eosinophilic esophagitis  OB History    No obstetric history on file  Family History   Problem Relation Age of Onset    Colon polyps Mother    Artem Kumar Alzheimer's disease Mother     Heart disease Father     Brain cancer Father     Colon polyps Sister     Heart disease Sister     Diabetes Sister     MARLEY disease Sister     Colon cancer Neg Hx        The following portions of the patient's history were reviewed and updated as appropriate: allergies, current medications, past family history, past medical history, past social history, past surgical history and problem list       Objective:    Blood pressure 116/74, temperature 98 °F (36 7 °C), temperature source Tympanic, height 5' 4" (1 626 m), weight 86 5 kg (190 lb 9 6 oz)  Body mass index is 32 72 kg/m²  Physical Exam  Vitals reviewed  Exam conducted with a chaperone present  Constitutional:       General: She is not in acute distress  Appearance: Normal appearance  She is well-developed  She is obese  She is not ill-appearing, toxic-appearing or diaphoretic  HENT:      Head: Normocephalic and atraumatic  Mouth/Throat:      Mouth: Mucous membranes are moist    Eyes:      General: No scleral icterus  Right eye: No discharge  Left eye: No discharge        Extraocular Movements: Extraocular movements intact  Conjunctiva/sclera: Conjunctivae normal    Neck:      Thyroid: No thyromegaly  Cardiovascular:      Rate and Rhythm: Normal rate and regular rhythm  Heart sounds: Normal heart sounds  No murmur heard  No friction rub  No gallop  Pulmonary:      Effort: Pulmonary effort is normal  No respiratory distress  Breath sounds: Normal breath sounds  No stridor  No wheezing or rhonchi  Abdominal:      General: There is no distension  Palpations: Abdomen is soft  There is no mass  Tenderness: There is no abdominal tenderness  There is no guarding or rebound  Hernia: No hernia is present  Genitourinary:     Comments: The external female genitalia is normal  The bartholin's, uretheral and skenes glands are normal  The urethral meatus is normal (midline with no lesions)  Anus without fissure or lesion  Speculum exam reveals vagina without lesion or discharge  Cervix is normal appearing without visible lesions  No bleeding  No significant cystocele or rectocele noted  Bimanual exam notes a relatively fixed pelvis with measurable disease present in the cul-de-sac  Bladder is without fullness or tenderness  Musculoskeletal:         General: No swelling, tenderness, deformity or signs of injury  Cervical back: Normal range of motion and neck supple  Right lower leg: No edema  Left lower leg: No edema  Lymphadenopathy:      Cervical: No cervical adenopathy  Skin:     General: Skin is warm and dry  Coloration: Skin is not jaundiced or pale  Findings: No bruising or erythema  Neurological:      General: No focal deficit present  Mental Status: She is alert and oriented to person, place, and time  Cranial Nerves: No cranial nerve deficit  Motor: No weakness  Gait: Gait normal    Psychiatric:         Mood and Affect: Mood normal          Behavior: Behavior normal          Thought Content:  Thought content normal          Judgment: Judgment normal

## 2022-07-22 ENCOUNTER — TELEPHONE (OUTPATIENT)
Dept: SURGICAL ONCOLOGY | Facility: CLINIC | Age: 72
End: 2022-07-22

## 2022-07-22 ENCOUNTER — TELEPHONE (OUTPATIENT)
Dept: GYNECOLOGIC ONCOLOGY | Facility: CLINIC | Age: 72
End: 2022-07-22

## 2022-07-22 DIAGNOSIS — C48.2 PERITONEAL CARCINOMA (HCC): Primary | ICD-10-CM

## 2022-07-22 NOTE — TELEPHONE ENCOUNTER
Patient was made aware that postoperative Eliquis was sent to her pharmacy  She is aware to not start the medication until she is discharged home after surgery  She also reports that she is on a steroid taper for temporal arteritis   She is currently taking 6mg, and will decrease to 5mg in August

## 2022-07-22 NOTE — TELEPHONE ENCOUNTER
Patient calling in about her eliquis prescription  She says that she would have to pay $200 out of pocket and would like to know if there is something else she could take

## 2022-07-23 ENCOUNTER — APPOINTMENT (OUTPATIENT)
Dept: LAB | Facility: CLINIC | Age: 72
End: 2022-07-23
Payer: MEDICARE

## 2022-07-23 ENCOUNTER — LAB REQUISITION (OUTPATIENT)
Dept: LAB | Facility: HOSPITAL | Age: 72
End: 2022-07-23
Payer: MEDICARE

## 2022-07-23 DIAGNOSIS — C48.2 MALIGNANT NEOPLASM OF PERITONEUM, UNSPECIFIED (HCC): ICD-10-CM

## 2022-07-23 DIAGNOSIS — C48.2 PERITONEAL CARCINOMA (HCC): Primary | ICD-10-CM

## 2022-07-23 LAB
ABO GROUP BLD: NORMAL
ALBUMIN SERPL BCP-MCNC: 3.6 G/DL (ref 3.5–5)
ALP SERPL-CCNC: 73 U/L (ref 46–116)
ALT SERPL W P-5'-P-CCNC: 45 U/L (ref 12–78)
ANION GAP SERPL CALCULATED.3IONS-SCNC: 4 MMOL/L (ref 4–13)
AST SERPL W P-5'-P-CCNC: 30 U/L (ref 5–45)
BILIRUB SERPL-MCNC: 0.33 MG/DL (ref 0.2–1)
BLD GP AB SCN SERPL QL: NEGATIVE
BUN SERPL-MCNC: 17 MG/DL (ref 5–25)
CALCIUM SERPL-MCNC: 9.4 MG/DL (ref 8.3–10.1)
CHLORIDE SERPL-SCNC: 104 MMOL/L (ref 96–108)
CO2 SERPL-SCNC: 28 MMOL/L (ref 21–32)
CREAT SERPL-MCNC: 0.69 MG/DL (ref 0.6–1.3)
ERYTHROCYTE [DISTWIDTH] IN BLOOD BY AUTOMATED COUNT: 13.2 % (ref 11.6–15.1)
GFR SERPL CREATININE-BSD FRML MDRD: 87 ML/MIN/1.73SQ M
GLUCOSE SERPL-MCNC: 137 MG/DL (ref 65–140)
HCT VFR BLD AUTO: 40.3 % (ref 34.8–46.1)
HGB BLD-MCNC: 12.7 G/DL (ref 11.5–15.4)
INR PPP: 0.97 (ref 0.84–1.19)
MCH RBC QN AUTO: 30.3 PG (ref 26.8–34.3)
MCHC RBC AUTO-ENTMCNC: 31.5 G/DL (ref 31.4–37.4)
MCV RBC AUTO: 96 FL (ref 82–98)
PLATELET # BLD AUTO: 327 THOUSANDS/UL (ref 149–390)
PMV BLD AUTO: 9.2 FL (ref 8.9–12.7)
POTASSIUM SERPL-SCNC: 4.5 MMOL/L (ref 3.5–5.3)
PROT SERPL-MCNC: 7.2 G/DL (ref 6.4–8.4)
PROTHROMBIN TIME: 13.1 SECONDS (ref 11.6–14.5)
RBC # BLD AUTO: 4.19 MILLION/UL (ref 3.81–5.12)
RH BLD: POSITIVE
SODIUM SERPL-SCNC: 136 MMOL/L (ref 135–147)
SPECIMEN EXPIRATION DATE: NORMAL
WBC # BLD AUTO: 12.23 THOUSAND/UL (ref 4.31–10.16)

## 2022-07-23 PROCEDURE — 86850 RBC ANTIBODY SCREEN: CPT | Performed by: OBSTETRICS & GYNECOLOGY

## 2022-07-23 PROCEDURE — 86900 BLOOD TYPING SEROLOGIC ABO: CPT | Performed by: OBSTETRICS & GYNECOLOGY

## 2022-07-23 PROCEDURE — 86901 BLOOD TYPING SEROLOGIC RH(D): CPT | Performed by: OBSTETRICS & GYNECOLOGY

## 2022-07-23 PROCEDURE — 80053 COMPREHEN METABOLIC PANEL: CPT

## 2022-07-23 PROCEDURE — 85610 PROTHROMBIN TIME: CPT

## 2022-07-23 PROCEDURE — 36415 COLL VENOUS BLD VENIPUNCTURE: CPT

## 2022-07-23 PROCEDURE — 85027 COMPLETE CBC AUTOMATED: CPT

## 2022-07-24 ENCOUNTER — HOSPITAL ENCOUNTER (OUTPATIENT)
Dept: CT IMAGING | Facility: HOSPITAL | Age: 72
Discharge: HOME | End: 2022-07-24
Attending: OBSTETRICS & GYNECOLOGY
Payer: MEDICARE

## 2022-07-24 DIAGNOSIS — C48.2 PERITONEAL CARCINOMA (HCC): ICD-10-CM

## 2022-07-24 PROCEDURE — G1004 CDSM NDSC: HCPCS

## 2022-07-24 PROCEDURE — 71250 CT THORAX DX C-: CPT

## 2022-07-25 ENCOUNTER — TELEPHONE (OUTPATIENT)
Dept: GASTROENTEROLOGY | Facility: CLINIC | Age: 72
End: 2022-07-25

## 2022-07-25 NOTE — TELEPHONE ENCOUNTER
Please arrange for colonoscopy asap - patient having ovarian surgery in 2 weeks -   With Dr Jaime Fitzgerald - had polyps and was scheduled for 8/30 need to move up

## 2022-07-26 ENCOUNTER — TELEPHONE (OUTPATIENT)
Dept: GASTROENTEROLOGY | Facility: AMBULATORY SURGERY CENTER | Age: 72
End: 2022-07-26

## 2022-07-26 ENCOUNTER — DOCUMENTATION (OUTPATIENT)
Dept: HEMATOLOGY ONCOLOGY | Facility: CLINIC | Age: 72
End: 2022-07-26

## 2022-07-26 NOTE — PROGRESS NOTES
7-22-22 [facility] request to contact patient regarding assistance for post op eliquis due to surgery on 8-4 7-26-22  Call placed to patient  Per conversation patient states since she would only be on this medication for 28 days they decided to pay just pay for it  Should anything change regarding this she will contact me for assistance  She has already picked up the medication however she has additional questions that I can not answer   Email sent to providers office to contact patient regarding additional questions

## 2022-07-28 ENCOUNTER — ANESTHESIA EVENT (OUTPATIENT)
Dept: GASTROENTEROLOGY | Facility: AMBULATORY SURGERY CENTER | Age: 72
End: 2022-07-28

## 2022-07-28 ENCOUNTER — HOSPITAL ENCOUNTER (OUTPATIENT)
Dept: GASTROENTEROLOGY | Facility: AMBULATORY SURGERY CENTER | Age: 72
Discharge: HOME | End: 2022-07-28
Payer: MEDICARE

## 2022-07-28 ENCOUNTER — ANESTHESIA (OUTPATIENT)
Dept: GASTROENTEROLOGY | Facility: AMBULATORY SURGERY CENTER | Age: 72
End: 2022-07-28

## 2022-07-28 VITALS
OXYGEN SATURATION: 100 % | HEART RATE: 78 BPM | HEIGHT: 64 IN | BODY MASS INDEX: 32.44 KG/M2 | WEIGHT: 190 LBS | TEMPERATURE: 97.8 F | RESPIRATION RATE: 16 BRPM | DIASTOLIC BLOOD PRESSURE: 86 MMHG | SYSTOLIC BLOOD PRESSURE: 156 MMHG

## 2022-07-28 DIAGNOSIS — Z86.010 PERSONAL HISTORY OF COLONIC POLYPS: ICD-10-CM

## 2022-07-28 PROCEDURE — 88305 TISSUE EXAM BY PATHOLOGIST: CPT | Performed by: PATHOLOGY

## 2022-07-28 PROCEDURE — 45380 COLONOSCOPY AND BIOPSY: CPT | Performed by: INTERNAL MEDICINE

## 2022-07-28 RX ORDER — PROPOFOL 10 MG/ML
INJECTION, EMULSION INTRAVENOUS AS NEEDED
Status: DISCONTINUED | OUTPATIENT
Start: 2022-07-28 | End: 2022-07-28

## 2022-07-28 RX ORDER — SODIUM CHLORIDE, SODIUM LACTATE, POTASSIUM CHLORIDE, CALCIUM CHLORIDE 600; 310; 30; 20 MG/100ML; MG/100ML; MG/100ML; MG/100ML
50 INJECTION, SOLUTION INTRAVENOUS CONTINUOUS
Status: DISCONTINUED | OUTPATIENT
Start: 2022-07-28 | End: 2022-08-01 | Stop reason: HOSPADM

## 2022-07-28 RX ADMIN — PROPOFOL 50 MG: 10 INJECTION, EMULSION INTRAVENOUS at 09:25

## 2022-07-28 RX ADMIN — PROPOFOL 50 MG: 10 INJECTION, EMULSION INTRAVENOUS at 09:34

## 2022-07-28 RX ADMIN — PROPOFOL 50 MG: 10 INJECTION, EMULSION INTRAVENOUS at 09:40

## 2022-07-28 RX ADMIN — SODIUM CHLORIDE, SODIUM LACTATE, POTASSIUM CHLORIDE, CALCIUM CHLORIDE 50 ML/HR: 600; 310; 30; 20 INJECTION, SOLUTION INTRAVENOUS at 08:55

## 2022-07-28 RX ADMIN — PROPOFOL 50 MG: 10 INJECTION, EMULSION INTRAVENOUS at 09:20

## 2022-07-28 RX ADMIN — PROPOFOL 50 MG: 10 INJECTION, EMULSION INTRAVENOUS at 09:29

## 2022-07-28 NOTE — ANESTHESIA PREPROCEDURE EVALUATION
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, for upcoming cytoreductive surgery)   (+) Prediabetes    BMI 32    Physical Exam    Airway    Mallampati score: II  TM Distance: <3 FB  Neck ROM: full     Dental   No notable dental hx     Cardiovascular      Pulmonary      Other Findings       Lab Results   Component Value Date    WBC 12 23 (H) 07/23/2022    HGB 12 7 07/23/2022     07/23/2022     Lab Results   Component Value Date    SODIUM 136 07/23/2022    K 4 5 07/23/2022    BUN 17 07/23/2022    CREATININE 0 69 07/23/2022    EGFR 87 07/23/2022     Lab Results   Component Value Date    HGBA1C 6 2 07/01/2022     Anesthesia Plan  ASA Score- 3     Anesthesia Type- IV sedation with anesthesia with ASA Monitors  Additional Monitors:   Airway Plan:           Plan Factors-Exercise tolerance (METS): >4 METS  Chart reviewed  Existing labs reviewed  Patient summary reviewed  Patient is not a current smoker  Induction- intravenous  Postoperative Plan-     Informed Consent- Anesthetic plan and risks discussed with patient  I personally reviewed this patient with the CRNA  Discussed and agreed on the Anesthesia Plan with the CRNA  Mónica Craig

## 2022-07-28 NOTE — ANESTHESIA POSTPROCEDURE EVALUATION
Post-Op Assessment Note    CV Status:  Stable       Mental Status:  Lethargic and sleepy   Hydration Status:  Stable   PONV Controlled:  None   Airway Patency:  Patent      Post Op Vitals Reviewed: Yes      Staff: Anesthesiologist, CRNA         No complications documented      BP   128/71   Temp      Pulse  81   Resp   20   SpO2   98

## 2022-07-29 NOTE — PRE-PROCEDURE INSTRUCTIONS
Pre-Surgery Instructions:   Medication Instructions    Acetaminophen (TYLENOL ARTHRITIS PAIN PO) Uses PRN- OK to take day of surgery    albuterol (PROVENTIL HFA,VENTOLIN HFA) 90 mcg/act inhaler Uses PRN- OK to take day of surgery    alendronate (FOSAMAX) 70 mg tablet Takes every Sunday-no hold    aspirin 81 mg chewable tablet Instructions provided by MD  Last dose 07/23/22 as instructed by MD Artem Kumar B Complex-C (B COMPLEX-VITAMIN C PO) Stop taking 7 days prior to surgery   betamethasone, augmented, (DIPROLENE) 0 05 % lotion Stop taking 1 day prior to surgery   Calcium Carb-Cholecalciferol (CALCIUM 500+D3 PO) Stop taking 7 days prior to surgery   cholecalciferol (VITAMIN D3) 1,000 units tablet Stop taking 7 days prior to surgery   Docusate Calcium (STOOL SOFTENER PO) Uses PRN- DO NOT take day of surgery    famotidine (PEPCID) 40 MG tablet Take night before surgery    fexofenadine (ALLEGRA) 180 MG tablet Take night before surgery    FIBER ADULT GUMMIES PO Stop taking 7 days prior to surgery   GLUCOSAMINE-CHONDROITIN PO Stop taking 7 days prior to surgery   hydrocortisone (Proctosol HC) 2 5 % rectal cream Stop taking 1 day prior to surgery   lisinopril (ZESTRIL) 5 mg tablet Hold day of surgery   metFORMIN (GLUCOPHAGE) 500 mg tablet Hold day of surgery   montelukast (SINGULAIR) 10 mg tablet Take night before surgery    multivitamin (THERAGRAN) TABS Stop taking 7 days prior to surgery   omeprazole (PriLOSEC) 20 mg delayed release capsule Take day of surgery   polyethylene glycol (MiraLax) 17 GM/SCOOP powder Uses PRN- DO NOT take day of surgery    Polyethylene Glycol 3350 (MIRALAX PO) Instructions provided by MD Artem Kumar PREDNISONE PO Instructions provided by MD Xander Ac to hold DOS   Probiotic Product (PROBIOTIC DAILY PO) Stop taking 7 days prior to surgery   simvastatin (ZOCOR) 10 mg tablet Take night before surgery    SYMBICORT 160-4 5 MCG/ACT inhaler Take day of surgery  Covid screening negative as per patient  Reviewed with patient via phone all medication instructions  Advised not to take any NSAID's, Vitamins or Herbal products prior to the DOS  Acetaminophen products are ok to take  Reviewed showering, Bowel prep, Antibiotics and all pre-op instructions as given by surgical office  Instructed to call office with any questions or concerns  Instructed about NPO after midnight the night before DOS, except sips of water with allowed medications in AM on DOS  Informed about call from Juan Davis with the time to arrive for the scheduled surgery  Patient verbalized understanding

## 2022-07-30 NOTE — RESULT ENCOUNTER NOTE
Discussed with patient, 5 year colonoscopy recall if clinically indicated  Proceed with gyn surgery next week as planned

## 2022-08-03 ENCOUNTER — ANESTHESIA EVENT (OUTPATIENT)
Dept: PERIOP | Facility: HOSPITAL | Age: 72
End: 2022-08-03
Payer: MEDICARE

## 2022-08-04 ENCOUNTER — HOSPITAL ENCOUNTER (OUTPATIENT)
Facility: HOSPITAL | Age: 72
Setting detail: OUTPATIENT SURGERY
Discharge: HOME | End: 2022-08-04
Attending: OBSTETRICS & GYNECOLOGY | Admitting: OBSTETRICS & GYNECOLOGY
Payer: MEDICARE

## 2022-08-04 ENCOUNTER — ANESTHESIA (OUTPATIENT)
Dept: PERIOP | Facility: HOSPITAL | Age: 72
End: 2022-08-04
Payer: MEDICARE

## 2022-08-04 VITALS
DIASTOLIC BLOOD PRESSURE: 89 MMHG | TEMPERATURE: 99.3 F | HEIGHT: 64 IN | HEART RATE: 89 BPM | WEIGHT: 184 LBS | OXYGEN SATURATION: 96 % | RESPIRATION RATE: 16 BRPM | BODY MASS INDEX: 31.41 KG/M2 | SYSTOLIC BLOOD PRESSURE: 148 MMHG

## 2022-08-04 DIAGNOSIS — C48.2 PERITONEAL CARCINOMA (HCC): Primary | ICD-10-CM

## 2022-08-04 LAB
ABO GROUP BLD: NORMAL
GLUCOSE SERPL-MCNC: 117 MG/DL (ref 65–140)
RH BLD: POSITIVE

## 2022-08-04 PROCEDURE — 88341 IMHCHEM/IMCYTCHM EA ADD ANTB: CPT | Performed by: PATHOLOGY

## 2022-08-04 PROCEDURE — 88305 TISSUE EXAM BY PATHOLOGIST: CPT | Performed by: PATHOLOGY

## 2022-08-04 PROCEDURE — 88342 IMHCHEM/IMCYTCHM 1ST ANTB: CPT | Performed by: PATHOLOGY

## 2022-08-04 PROCEDURE — 82948 REAGENT STRIP/BLOOD GLUCOSE: CPT

## 2022-08-04 PROCEDURE — 88363 XM ARCHIVE TISSUE MOLEC ANAL: CPT | Performed by: PATHOLOGY

## 2022-08-04 PROCEDURE — 88344 IMHCHEM/IMCYTCHM EA MLT ANTB: CPT | Performed by: PATHOLOGY

## 2022-08-04 PROCEDURE — 88313 SPECIAL STAINS GROUP 2: CPT | Performed by: PATHOLOGY

## 2022-08-04 PROCEDURE — S2900 ROBOTIC SURGICAL SYSTEM: HCPCS | Performed by: OBSTETRICS & GYNECOLOGY

## 2022-08-04 PROCEDURE — 88112 CYTOPATH CELL ENHANCE TECH: CPT | Performed by: PATHOLOGY

## 2022-08-04 PROCEDURE — 49321 LAPAROSCOPY BIOPSY: CPT | Performed by: OBSTETRICS & GYNECOLOGY

## 2022-08-04 RX ORDER — ONDANSETRON 2 MG/ML
INJECTION INTRAMUSCULAR; INTRAVENOUS AS NEEDED
Status: DISCONTINUED | OUTPATIENT
Start: 2022-08-04 | End: 2022-08-04

## 2022-08-04 RX ORDER — ONDANSETRON 2 MG/ML
4 INJECTION INTRAMUSCULAR; INTRAVENOUS ONCE AS NEEDED
Status: DISCONTINUED | OUTPATIENT
Start: 2022-08-04 | End: 2022-08-04 | Stop reason: HOSPADM

## 2022-08-04 RX ORDER — SODIUM CHLORIDE, SODIUM LACTATE, POTASSIUM CHLORIDE, CALCIUM CHLORIDE 600; 310; 30; 20 MG/100ML; MG/100ML; MG/100ML; MG/100ML
125 INJECTION, SOLUTION INTRAVENOUS CONTINUOUS
Status: DISCONTINUED | OUTPATIENT
Start: 2022-08-04 | End: 2022-08-04 | Stop reason: HOSPADM

## 2022-08-04 RX ORDER — METRONIDAZOLE 500 MG/100ML
500 INJECTION, SOLUTION INTRAVENOUS EVERY 8 HOURS
Status: DISCONTINUED | OUTPATIENT
Start: 2022-08-04 | End: 2022-08-04 | Stop reason: HOSPADM

## 2022-08-04 RX ORDER — LABETALOL HYDROCHLORIDE 5 MG/ML
INJECTION, SOLUTION INTRAVENOUS AS NEEDED
Status: DISCONTINUED | OUTPATIENT
Start: 2022-08-04 | End: 2022-08-04

## 2022-08-04 RX ORDER — BUPIVACAINE HYDROCHLORIDE 2.5 MG/ML
INJECTION, SOLUTION EPIDURAL; INFILTRATION; INTRACAUDAL AS NEEDED
Status: DISCONTINUED | OUTPATIENT
Start: 2022-08-04 | End: 2022-08-04 | Stop reason: HOSPADM

## 2022-08-04 RX ORDER — DEXAMETHASONE SODIUM PHOSPHATE 10 MG/ML
INJECTION, SOLUTION INTRAMUSCULAR; INTRAVENOUS AS NEEDED
Status: DISCONTINUED | OUTPATIENT
Start: 2022-08-04 | End: 2022-08-04

## 2022-08-04 RX ORDER — HEPARIN SODIUM 5000 [USP'U]/ML
5000 INJECTION, SOLUTION INTRAVENOUS; SUBCUTANEOUS
Status: DISCONTINUED | OUTPATIENT
Start: 2022-08-04 | End: 2022-08-04 | Stop reason: HOSPADM

## 2022-08-04 RX ORDER — MIDAZOLAM HYDROCHLORIDE 2 MG/2ML
INJECTION, SOLUTION INTRAMUSCULAR; INTRAVENOUS
Status: COMPLETED | OUTPATIENT
Start: 2022-08-04 | End: 2022-08-04

## 2022-08-04 RX ORDER — HYDROMORPHONE HCL/PF 1 MG/ML
0.5 SYRINGE (ML) INJECTION EVERY 2 HOUR PRN
Status: DISCONTINUED | OUTPATIENT
Start: 2022-08-04 | End: 2022-08-04 | Stop reason: HOSPADM

## 2022-08-04 RX ORDER — ACETAMINOPHEN 325 MG/1
975 TABLET ORAL ONCE
Status: COMPLETED | OUTPATIENT
Start: 2022-08-04 | End: 2022-08-04

## 2022-08-04 RX ORDER — NALOXONE HYDROCHLORIDE 0.4 MG/ML
0.1 INJECTION, SOLUTION INTRAMUSCULAR; INTRAVENOUS; SUBCUTANEOUS
Status: DISCONTINUED | OUTPATIENT
Start: 2022-08-04 | End: 2022-08-04 | Stop reason: HOSPADM

## 2022-08-04 RX ORDER — FENTANYL CITRATE 50 UG/ML
INJECTION, SOLUTION INTRAMUSCULAR; INTRAVENOUS
Status: COMPLETED | OUTPATIENT
Start: 2022-08-04 | End: 2022-08-04

## 2022-08-04 RX ORDER — MAGNESIUM HYDROXIDE 1200 MG/15ML
LIQUID ORAL AS NEEDED
Status: DISCONTINUED | OUTPATIENT
Start: 2022-08-04 | End: 2022-08-04 | Stop reason: HOSPADM

## 2022-08-04 RX ORDER — LIDOCAINE HYDROCHLORIDE 10 MG/ML
INJECTION, SOLUTION EPIDURAL; INFILTRATION; INTRACAUDAL; PERINEURAL AS NEEDED
Status: DISCONTINUED | OUTPATIENT
Start: 2022-08-04 | End: 2022-08-04

## 2022-08-04 RX ORDER — LIDOCAINE HYDROCHLORIDE 10 MG/ML
0.5 INJECTION, SOLUTION EPIDURAL; INFILTRATION; INTRACAUDAL; PERINEURAL ONCE AS NEEDED
Status: DISCONTINUED | OUTPATIENT
Start: 2022-08-04 | End: 2022-08-04 | Stop reason: HOSPADM

## 2022-08-04 RX ORDER — SODIUM CHLORIDE, SODIUM LACTATE, POTASSIUM CHLORIDE, CALCIUM CHLORIDE 600; 310; 30; 20 MG/100ML; MG/100ML; MG/100ML; MG/100ML
INJECTION, SOLUTION INTRAVENOUS CONTINUOUS PRN
Status: DISCONTINUED | OUTPATIENT
Start: 2022-08-04 | End: 2022-08-04

## 2022-08-04 RX ORDER — ROCURONIUM BROMIDE 10 MG/ML
INJECTION, SOLUTION INTRAVENOUS AS NEEDED
Status: DISCONTINUED | OUTPATIENT
Start: 2022-08-04 | End: 2022-08-04

## 2022-08-04 RX ORDER — LIDOCAINE HYDROCHLORIDE AND EPINEPHRINE 15; 5 MG/ML; UG/ML
INJECTION, SOLUTION EPIDURAL AS NEEDED
Status: DISCONTINUED | OUTPATIENT
Start: 2022-08-04 | End: 2022-08-04

## 2022-08-04 RX ORDER — GABAPENTIN 100 MG/1
200 CAPSULE ORAL ONCE
Status: COMPLETED | OUTPATIENT
Start: 2022-08-04 | End: 2022-08-04

## 2022-08-04 RX ORDER — FENTANYL CITRATE/PF 50 MCG/ML
25 SYRINGE (ML) INJECTION
Status: DISCONTINUED | OUTPATIENT
Start: 2022-08-04 | End: 2022-08-04 | Stop reason: HOSPADM

## 2022-08-04 RX ORDER — PROPOFOL 10 MG/ML
INJECTION, EMULSION INTRAVENOUS AS NEEDED
Status: DISCONTINUED | OUTPATIENT
Start: 2022-08-04 | End: 2022-08-04

## 2022-08-04 RX ORDER — GLYCOPYRROLATE 0.2 MG/ML
INJECTION INTRAMUSCULAR; INTRAVENOUS AS NEEDED
Status: DISCONTINUED | OUTPATIENT
Start: 2022-08-04 | End: 2022-08-04

## 2022-08-04 RX ORDER — CEFAZOLIN SODIUM 2 G/50ML
2000 SOLUTION INTRAVENOUS ONCE
Status: COMPLETED | OUTPATIENT
Start: 2022-08-04 | End: 2022-08-04

## 2022-08-04 RX ADMIN — SUGAMMADEX 200 MG: 100 INJECTION, SOLUTION INTRAVENOUS at 16:24

## 2022-08-04 RX ADMIN — GLYCOPYRROLATE 0.1 MG: 0.2 INJECTION, SOLUTION INTRAMUSCULAR; INTRAVENOUS at 15:33

## 2022-08-04 RX ADMIN — MIDAZOLAM 1 MG: 1 INJECTION INTRAMUSCULAR; INTRAVENOUS at 15:33

## 2022-08-04 RX ADMIN — CEFAZOLIN SODIUM 2000 MG: 2 SOLUTION INTRAVENOUS at 15:53

## 2022-08-04 RX ADMIN — Medication 25 MCG: at 17:11

## 2022-08-04 RX ADMIN — LIDOCAINE HYDROCHLORIDE AND EPINEPHRINE 3 ML: 15; 5 INJECTION, SOLUTION EPIDURAL at 14:22

## 2022-08-04 RX ADMIN — PROPOFOL 150 MG: 10 INJECTION, EMULSION INTRAVENOUS at 15:44

## 2022-08-04 RX ADMIN — METRONIDAZOLE: 500 SOLUTION INTRAVENOUS at 15:52

## 2022-08-04 RX ADMIN — SODIUM CHLORIDE, SODIUM LACTATE, POTASSIUM CHLORIDE, AND CALCIUM CHLORIDE: .6; .31; .03; .02 INJECTION, SOLUTION INTRAVENOUS at 14:11

## 2022-08-04 RX ADMIN — FENTANYL CITRATE 50 MCG: 50 INJECTION INTRAMUSCULAR; INTRAVENOUS at 15:33

## 2022-08-04 RX ADMIN — MIDAZOLAM 1 MG: 1 INJECTION INTRAMUSCULAR; INTRAVENOUS at 14:20

## 2022-08-04 RX ADMIN — FENTANYL CITRATE 50 MCG: 50 INJECTION INTRAMUSCULAR; INTRAVENOUS at 14:20

## 2022-08-04 RX ADMIN — ACETAMINOPHEN 975 MG: 325 TABLET ORAL at 11:58

## 2022-08-04 RX ADMIN — Medication 25 MCG: at 17:19

## 2022-08-04 RX ADMIN — DEXAMETHASONE SODIUM PHOSPHATE 10 MG: 10 INJECTION, SOLUTION INTRAMUSCULAR; INTRAVENOUS at 16:11

## 2022-08-04 RX ADMIN — HYDROMORPHONE HYDROCHLORIDE 0.5 MG: 1 INJECTION, SOLUTION INTRAMUSCULAR; INTRAVENOUS; SUBCUTANEOUS at 17:59

## 2022-08-04 RX ADMIN — ROCURONIUM BROMIDE 40 MG: 50 INJECTION INTRAVENOUS at 15:44

## 2022-08-04 RX ADMIN — GABAPENTIN 200 MG: 100 CAPSULE ORAL at 11:59

## 2022-08-04 RX ADMIN — LIDOCAINE HYDROCHLORIDE 50 MG: 10 INJECTION, SOLUTION EPIDURAL; INFILTRATION; INTRACAUDAL; PERINEURAL at 15:44

## 2022-08-04 RX ADMIN — Medication 25 MCG: at 16:56

## 2022-08-04 RX ADMIN — LABETALOL HYDROCHLORIDE 7.5 MG: 5 INJECTION, SOLUTION INTRAVENOUS at 16:22

## 2022-08-04 RX ADMIN — SODIUM CHLORIDE, SODIUM LACTATE, POTASSIUM CHLORIDE, AND CALCIUM CHLORIDE 125 ML/HR: .6; .31; .03; .02 INJECTION, SOLUTION INTRAVENOUS at 12:10

## 2022-08-04 RX ADMIN — ONDANSETRON 4 MG: 2 INJECTION INTRAMUSCULAR; INTRAVENOUS at 15:33

## 2022-08-04 RX ADMIN — Medication 25 MCG: at 17:05

## 2022-08-04 NOTE — INTERVAL H&P NOTE
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 with omental thickening consistent with at least stage III disease  Recent colonoscopy results noted  No intraluminal lesions but evidence of extrinsic compression of the sigmoid colon  Patient has consented and agrees to proceed with diagnostic laparoscopy with possible exploratory laparotomy, total hysterectomy, bilateral salpingo-oophorectomy, staging, tumor debulking with possible colonic/bowel resection, colostomy and all other indicated procedures  I discussed with patient indication, risks, benefits and alternatives of surgical exploration  We discussed possibility of bleeding requiring blood transfusion, life-threatening infections requiring additional procedures, injuries to surrounding organs such as bladder, ureters, gastrointestinal tract and or neurovascular structures  Additionally, we discussed general risks associated to stress of surgery such as venous thromboembolism, acute myocardial events and stroke  All questions answered to patient's satisfaction  She agrees and wants to proceed  New informed consent form signed  We have reviewed preoperative test results  All questions answered  Patient agrees to proceed to the operating room as planned      Amy Curry MD, Nathal[address] Mcfar[address]  8/4/2022  2:56 PM

## 2022-08-04 NOTE — ANESTHESIA POSTPROCEDURE EVALUATION
Post-Op Assessment Note    CV Status:  Stable  Pain Score: 0    Pain management: adequate     Mental Status:  Awake   Hydration Status:  Stable   PONV Controlled:  None   Airway Patency:  Patent      Post Op Vitals Reviewed: Yes      Staff: CRNA, Anesthesiologist         No complications documented      /59 (08/04/22 1645)    Temp 98 4 °F (36 9 °C) (08/04/22 1645)    Pulse 79 (08/04/22 1645)   Resp 22 (08/04/22 1645)    SpO2 95 % (08/04/22 1645)

## 2022-08-04 NOTE — ANESTHESIA PROCEDURE NOTES
Epidural Block    Patient location during procedure: holding area  Start time: 8/4/2022 2:21 PM  Reason for block: at surgeon's request and post-op pain management  Staffing  Performed: Anesthesiologist   Anesthesiologist: Vimal Lindquist MD  Preanesthetic Checklist  Completed: patient identified, IV checked, site marked, risks and benefits discussed, surgical consent, monitors and equipment checked, pre-op evaluation and timeout performed  Epidural  Patient position: sitting  Prep: ChloraPrep and site prepped and draped  Patient monitoring: heart rate, continuous pulse ox and frequent blood pressure checks  Approach: midline  Location: thoracic  Injection technique: FREDERICK saline  Needle  Needle type: Tuohy   Needle gauge: 18 G  Catheter type: side hole  Catheter size: 20 G  Catheter at skin depth: 8 cm  Catheter securement method: stabilization device  Test dose: negativemidazolam (VERSED) 2 mg/2 mL - Intravenous   1 mg - 8/4/2022 2:20:00 PM  fentanyl 50 mcg/mL - Intravenous   50 mcg - 8/4/2022 2:20:00 PM  Assessment  Number of attempts: 1negative aspiration for CSF, negative aspiration for heme and no paresthesia on injection  patient tolerated the procedure well with no immediate complications  Additional Notes  Easy placement x 1 pass, negative test dose

## 2022-08-04 NOTE — DISCHARGE INSTRUCTIONS
Diagnostic laparoscopy, peritoneal biopsy and sampling of peritoneal fluid   Discharge Instructions    WHAT YOU NEED TO KNOW:   Today we placed a camera inside the abdomen and found diffuse carcinomatosis (multiple small peritoneal tumor implants throughout the abdominal cavity)  Based on those findings, the disease could not be completely removed  As planned, we did not pursue a major surgery  Instead we plan to proceed with neoadjuvant chemotherapy as discussed  Our office will contact you in the next 1-2 days to plan for initiation of chemotherapy in the very near future  Definitive surgery is typically performed approximately 3-4 months and at that time in most cases chemotherapy has achieved significant regression of tumor to allow for optimal surgery  Given that major surgery was not performed you do not need to take Eliquis/blood thinner  Delay re-initiation of lisinopril until Saturday August 6, 2022  DISCHARGE INSTRUCTIONS:   Contact your doctor at the number above if:   You have a fever over 101o  You have nausea or are vomiting that does not improve after a light meal    Your pain is getting worse, even after you take medicine  You feel pain or burning when you urinate, or you have trouble urinating  You have pus or a foul-smelling odor coming from your vagina and/or wound  Your wound is red, swollen, or draining pus  You see new or an increased amount of bright red blood coming from your vagina or your incisions  You have questions or concerns about your condition or care  Seek care immediately:   Your arm or leg feels warm, tender, and painful  It may look swollen and red  You have increasing abdominal or pelvic pain  You have heavy vaginal bleeding that fills 1 or more sanitary pads in 1 hour  Call 911 for any of the following: You feel lightheaded, short of breath, and have chest pain  You cough up blood  Medicines:  You may need any of the following:  Prescription medicine will not be given for this procedure  Resume your previous medications as directed  Extra-strength Tylenol: This medications over-the-counter  Take 2 tablets every 6 hours as needed for mild-to-moderate pain  Ibuprofen/Advil/Motrin 200 mg tablets: This medication is over-the-counter  Take 3 tablets every 8 hours as needed for moderate to severe pain  Take this medication with food  The drink plenty of fluids while using this medication to prevent kidney injury  Metamucil or MiraLax: This product is over-the-counter  Distal 1 large tbsp in a large glass of water  Use once or twice a day for 1-2 weeks prevent and or treat constipation  Take your medicines as directed  Contact your healthcare provider if you think your medicine is not helping or if you have side effects  Tell him or her if you are allergic to any medicine  Keep a list of the medicines, vitamins, and herbs you take  Include the amounts, and when and why you take them  Bring the list or the pill bottles to follow-up visits  Carry your medicine list with you in case of an emergency  Activity:   Rest as needed  Get up and move around as directed to help prevent blood clots  Start with short walks and slowly increase the distance every day  Limit the number of times you climb stairs to 2 times each day for the first week  Plan most of your daily activities on one level of your home  Do not lift objects heavier than 10 pounds until seen in the office for your follow-up appointment  Do not strain during bowel movements  High-fiber foods and extra liquids can help you prevent constipation  Examples of high-fiber foods are fruit and bran  Prune juice and water are good liquids to drink  Do not have sex, use tampons, or douche and do not do tub baths/swimming until cleared by your physician at your postoperative appointment  You may shower as soon as the day after surgery    Do not go into pools or hot tubs until cleared by your doctor  Ask when it is safe for you to drive  It is generally safe to drive as soon as your legs are strong, you are off pain medicines and you feel like you will have the appropriate reflexes to step on the breaks in case of an emergency  Ask when you may return to work and to other regular activities  Wound care: Care for your abdominal incisions as directed  Carefully wash around the wound with soap and water  If you have Hibiclens or medicated soap that you were instructed to use before surgery, you may use that to wash with for up to 2 days after surgery  If not, any mild non-scented, non-abrasive soap is safe  It is okay to let the soap and water run over your incision  Do not scrub your incision  Dry the area and put on new, clean bandages as directed  Change your bandages when they get wet or dirty  If you have strips of medical tape, let them fall off on their own  It may take 7 to 14 days for them to fall off  Check your incision every day for redness, swelling, or pus  Deep breathing: Take deep breaths and cough 10 times each hour  This will decrease your risk for a lung infection  Take a deep breath and hold it for as long as you can  Let the air out and then cough strongly  Deep breaths help open your airway  You may be given an incentive spirometer to help you take deep breaths  Put the plastic piece in your mouth and take a slow, deep breath, then let the air out and cough  Repeat these steps 10 times every hour  Get support: This surgery may be life-changing for you and your family  You will no longer be able to get pregnant  Sudden changes in the levels of your hormones may occur and cause mood swings and depression  You may feel angry, sad, or frightened, or cry frequently and unexpectedly  These feelings are normal  Talk to your healthcare provider about where you can get support   You can also ask if hormone replacement medicine is right for you    Follow up with your healthcare provider or gynecologist as directed: You may need to return to have stitches removed, and for other tests  Write down your questions so you remember to ask them during your visits  © 2017 [address] [employer] Information is for End User's use only and may not be sold, redistributed or otherwise used for commercial purposes  All illustrations and images included in CareNotes® are the copyrighted property of A D [employer] M [employer] Inc  or [employer]allow[employer]  The above information is an  only  It is not intended as medical advice for individual conditions or treatments  Talk to your doctor, nurse or pharmacist before following any medical regimen to see if it is safe and effective for you

## 2022-08-04 NOTE — OP NOTE
OPERATIVE REPORT  PATIENT NAME: Mendel Grew    :  1950  MRN: [id number]  Pt Location: [facility] OR ROOM 14    SURGERY DATE: 2022    Surgeon(s) and Role:     * Dorina Burk MD - Primary     * Thi Odom MD - Assisting     * Nelson Suero MD - Assisting    Preop Diagnosis:  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]    Post-Op Diagnosis Codes:     * 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]    Procedure(s) (LRB):  DIAGNOSTIC LAPAROSCOPY, PERITONEAL BIOPSY, SAMPLING OF BLOODY ASCITES  (N/A)  LAPAROTOMY EXPLORATORY W/ ROBOTICS (N/A)    Specimen(s):  ID Type Source Tests Collected by Time Destination   1 : Peritoneal Biopsy Tissue Peritoneum TISSUE EXAM Dorina Burk MD 2022 1617    2 : Pelvic Washings with Cytolyt Washing Pelvic Washing NON-GYNECOLOGIC CYTOLOGY Dorina Burk MD 2022 1618        Estimated Blood Loss:   Minimal    Drains:  * No LDAs found *    Anesthesia Type:   General w/ Epidural    Operative Indications:  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]      Operative Findings:  1  Diffuse peritoneal carcinomatosis/miliary implants noted throughout peritoneal cavities, anterior surface of proximal GI tract  Findings making patient unable to undergo optimal cytoreduction to no gross residual disease  Peritoneal biopsy obtained  2  Modest amount of bloody ascites  Representative sample sent for cytology  Complications:   None    Procedure and Technique:  The patient was taken to the operating room her general trach anesthesia was induced without complications  Of note, she had received catheter placement and preoperative holding area for perioperative analgesia anticipating possible laparotomy  Sequential compression devices were applied to lower extremities and activated prior to induction of anesthesia  The patient was placed in dorsal lithotomy position in [address] [place] [address] and her abdomen, perineum and vagina were prepped and draped in the usual sterile fashion    She received antibiotics per hospital protocol  Corbin catheter was placed  Attention was turned to the abdomen  A 5 mm vertical incision was made approximately 5 cm above the umbilicus  Under direct visualization a 5 minutes laparoscopic was introduced in the peritoneal cavity without difficulty  Pneumoperitoneum was allowed to maximal pressure of 15 mmHg  The above-mentioned findings were noted  An additional 5 mm trocar was placed in left lower quadrant  Using this trocar biopsy of anterior abdominal peritoneum was obtained sharply and hemostasis obtained with electrocautery  The biopsy was removed and sent for permanent  Sample of ascitic fluid was obtained using and shot instrument and sent for cytology  At the completion hemostasis was confirmed a low intraperitoneal pressure  Pneumoperitoneum was completely evacuated  Incision sites were infiltrated with 0 25% bupivacaine for postoperative analgesia  All trocars were removed and the skin incisions were closed using subcuticular stitch of 4-0 Monocryl  Exofin was applied  The patient tolerated the procedure well  Sponge, needle and instrument counts reports correct x2  The patient was successfully extubated in the operating room and transferred to the postanesthetic care unit in stable condition       I was present for the entire procedure    Patient Disposition:  PACU       SIGNATURE: Shakira Fitzpatrick MD, Yevette Hamper  DATE: August 4, 2022  TIME: 4:33 PM

## 2022-08-04 NOTE — ANESTHESIA PREPROCEDURE EVALUATION
Procedure:  POSSIBLE ROBOTIC HYSTERECTOMY LAPAROSCOPIC TOTAL, BILATERAL SALPINGO-OOPHORECTOMY, POSSIBLE BOWEL RESECTION, POSSIBLE COLOSTOMY (N/A Abdomen)  LAPAROTOMY EXPLORATORY W/ ROBOTICS (N/A Abdomen)    Pt known to me from colonoscopy last week; no health changes since that time  Relevant Problems   ANESTHESIA (within normal limits)      CARDIO   (+) Giant cell aortic arteritis (HCC)   (+) Hyperlipidemia   (+) Hypertension      GI/HEPATIC   (+) Gastroesophageal reflux disease      PULMONARY  able to climb stairs, lives on 4th floor   (+) Asthma   (-) Sleep apnea   (-) Smoking   (-) URI (upper respiratory infection)      Other   (+) Long term (current) use of systemic steroids   (+) 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HCC)   (+) Prediabetes    BMI 31    Physical Exam    Airway    Mallampati score: II  TM Distance: <3 FB  Neck ROM: full     Dental   No notable dental hx     Cardiovascular      Pulmonary      Other Findings       Lab Results   Component Value Date    WBC 12 23 (H) 07/23/2022    HGB 12 7 07/23/2022     07/23/2022     Lab Results   Component Value Date    SODIUM 136 07/23/2022    K 4 5 07/23/2022    BUN 17 07/23/2022    CREATININE 0 69 07/23/2022    EGFR 87 07/23/2022     Lab Results   Component Value Date    INR 0 97 07/23/2022     Blood type A+/antibody neg   - T&S active    Lab Results   Component Value Date    HGBA1C 6 2 07/01/2022     Anesthesia Plan  ASA Score- 3     Anesthesia Type- general with ASA Monitors  Additional Monitors: arterial line  Airway Plan: ETT  Comment: Discussed epidural for post op pain control in preparation for extensive surgical resection; pt aware if case is not as extensive this may be unnecessary and removed postop  A line if extensive resection proceeds  APAP, preop bowel prep completed  Heparin NOT given yet due to plan for epidural        Plan Factors-Exercise tolerance (METS): >4 METS  Chart reviewed  Existing labs reviewed   Patient summary reviewed  Patient is not a current smoker  Induction- intravenous  Postoperative Plan-     Informed Consent- Anesthetic plan and risks discussed with patient and spouse  I personally reviewed this patient with the CRNA  Discussed and agreed on the Anesthesia Plan with the CRNA  Peace Menjivar

## 2022-08-07 ENCOUNTER — DOCUMENTATION (OUTPATIENT)
Dept: GYNECOLOGIC ONCOLOGY | Facility: CLINIC | Age: 72
End: 2022-08-07

## 2022-08-07 ENCOUNTER — DOCUMENTATION (OUTPATIENT)
Dept: OTHER | Facility: HOSPITAL | Age: 72
End: 2022-08-07

## 2022-08-07 NOTE — PROGRESS NOTES
I called her to review surgery findings and to discuss neoadjuvant chemotherapy  We discussed the risks and benefits of starting curative intent carboplatin, AUC 6, paclitaxel at 175mg/m2, avastin at 15mg/kg every 3 weeks for three to 4 cycles cycles (2-3 of avastin) followed by debulking surgery followed by 3 additional cycles of chemotherapy  She understands the risks and benefits of treatment including the risks of pancytopenia, alopecia, neuropathy, nausea, vomiting, allergic reaction, hepatic and renal damage, hypertension, bleeding, blood clots, bowel perforation and she agrees to proceed as outlined  Verbal consent for treatment was obtained by me

## 2022-08-09 DIAGNOSIS — C57.9 GYNECOLOGIC MALIGNANCY (HCC): Primary | ICD-10-CM

## 2022-08-09 DIAGNOSIS — C57.7 MALIGNANT NEOPLASM OF OTHER SPECIFIED FEMALE GENITAL ORGANS (HCC): ICD-10-CM

## 2022-08-09 DIAGNOSIS — Z80.41 FAMILY HISTORY OF MALIGNANT NEOPLASM OF OVARY: ICD-10-CM

## 2022-08-09 RX ORDER — ONDANSETRON HYDROCHLORIDE 8 MG/1
8 TABLET, FILM COATED ORAL EVERY 8 HOURS PRN
Qty: 20 TABLET | Refills: 1 | Status: ON HOLD | OUTPATIENT
Start: 2022-08-09

## 2022-08-09 RX ORDER — LORAZEPAM 1 MG/1
1 TABLET ORAL EVERY 8 HOURS PRN
Qty: 30 TABLET | Refills: 0 | Status: SHIPPED | OUTPATIENT
Start: 2022-08-09 | End: 2022-09-08 | Stop reason: SDUPTHER

## 2022-08-10 ENCOUNTER — TELEPHONE (OUTPATIENT)
Dept: GENETICS | Facility: CLINIC | Age: 72
End: 2022-08-10

## 2022-08-10 NOTE — TELEPHONE ENCOUNTER
I called Loretta Salinas to schedule a new patient appointment with the [facility]       Outcome:   Spoke with patient, genetics appointment schedule for Aug 18 at 1:00 pm  Pt stated surgery is still pending, recently dx with gyn cancer   Pt also has a fhx of cancer but will collect more information prior to appointment

## 2022-08-12 ENCOUNTER — TELEPHONE (OUTPATIENT)
Dept: HEMATOLOGY ONCOLOGY | Facility: CLINIC | Age: 72
End: 2022-08-12

## 2022-08-15 ENCOUNTER — TELEPHONE (OUTPATIENT)
Dept: GYNECOLOGIC ONCOLOGY | Facility: CLINIC | Age: 72
End: 2022-08-15

## 2022-08-15 ENCOUNTER — DOCUMENTATION (OUTPATIENT)
Dept: GYNECOLOGIC ONCOLOGY | Facility: CLINIC | Age: 72
End: 2022-08-15

## 2022-08-15 NOTE — TELEPHONE ENCOUNTER
Pt called in regarding the baby aspirin that she has been taking since last year  According to the chemo videos that she watched, aspirin is not allowed and wants to know if she should stop taking the baby aspirin that was prescribed by her rheumatologist  Please advise   Thank you     [phone]

## 2022-08-15 NOTE — PROGRESS NOTES
Multidisciplinary Gynecologic Oncology Tumor Case Review       Physician Recommended Plan     Yolanda Kearney is a 67 y o  female     Diagnosis: High grade serous primary peritoneal cancer     Patient was discussed at the Multidisciplinary Gynecologic Oncology Case review on 8/15/2022  The group recommended to consider treatment with systemic chemotherapy, referral to genetics and Caris testing  Follow-up appointment with Dr Parvin Currie on 9/8/2022  NCCN guidelines were available for review  The final treatment plan will be left to the discretion of the patient and the treating physician  DISCLAIMERS:    TO THE TREATING PHYSICIAN:  This conference is a meeting of clinicians from various specialty areas who evaluate and discuss patients for whom a multidisciplinary treatment approach is being considered  Please note that the above opinion was a consensus of the conference attendees and is intended only to assist in quality care of the discussed patient  The responsibility for follow up on the input given during the conference, along with any final decisions regarding plan of care, is that of the patient and the patient's provider  Accordingly, appointments have only been recommended based on this information and have NOT been scheduled unless otherwise noted  TO THE PATIENT:  This summary is a brief record of major aspects of your cancer treatment  You may choose to share a copy with any of your doctors or nurses  However, this is not a detailed or comprehensive record of your care

## 2022-08-18 ENCOUNTER — CLINICAL SUPPORT (OUTPATIENT)
Dept: GENETICS | Facility: CLINIC | Age: 72
End: 2022-08-18
Payer: MEDICARE

## 2022-08-18 ENCOUNTER — DOCUMENTATION (OUTPATIENT)
Dept: GENETICS | Facility: CLINIC | Age: 72
End: 2022-08-18

## 2022-08-18 DIAGNOSIS — Z80.41 FAMILY HISTORY OF MALIGNANT NEOPLASM OF OVARY: ICD-10-CM

## 2022-08-18 DIAGNOSIS — C57.9 GYNECOLOGIC MALIGNANCY (HCC): ICD-10-CM

## 2022-08-18 PROCEDURE — 36415 COLL VENOUS BLD VENIPUNCTURE: CPT

## 2022-08-18 NOTE — PROGRESS NOTES
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  she presents today to discuss the possibility of a hereditary cancer syndrome, options for genetic testing, and implications for her and her family  Her two daughters and  accompanied her to the appointment  Cancer History and Treatment:   Personal History: 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  Tumor cells are positive for MOC31, SURY-EP4, P16, P53, WT-1, Ki-67 and negative for mucin, calretinin Controls reacted appropriately   This staining pattern supports the above diagnosis  History of serous carcinoma is noted  CA-125: elevated at 54 7 (2022)  She will start her chemotherapy treatment next week and then proceed with LIN and BSO  Screening Hx:   Breast:  Breast Imaging: 2022, no evidence of malignancy per patient   Breast Biopsy: none  Breast Density: Unable to assess  Colon:  Colonoscopy: 2022  Colon polyp, splenic flexure, polypectomy:    -Serrated polyp with features of sessile serrated polyp/adenoma  Sigmoid colon, stricture, biopsy:   -Superficial colonic mucosa with reactive changes     -No epithelial dysplasia and no evidence of malignancy  F/u recommended in 5 years       Gynecologic:  Ovaries and Uterus intact     Skin:  Sees derm every 6 months    Other screening: none     Reproductive History  Age at menarche: 15  Age at first live birth: 24  Menopause: Post Menopausal (47)  Hormone replacement: no    Medical and Surgical History  Pertinent surgical history:   Past Surgical History:   Procedure Laterality Date    COLONOSCOPY      October 2021: Adenomatous polyps and rectum in transverse colon, sigmoid diverticulosis, internal hemorrhoids  October 2016 diverticulosis and internal hemorrhoids, September 2011 diverticulosis and internal hemorrhoids   DILATION AND CURETTAGE OF UTERUS      IA LAP,DIAGNOSTIC ABDOMEN N/A 8/4/2022    Procedure: DIAGNOSTIC LAPAROSCOPY, PERITONEAL BIOPSY, SAMPLING OF BLOODY ASCITES ;  Surgeon: Sindy Liu MD;  [facility];  Service: Gynecology Oncology    TEMPORAL ARTERY BIOPSY / LIGATION      UPPER GASTROINTESTINAL ENDOSCOPY  11/10/2014    November 2014 irregular Z-line and Schatzki ring, hiatal hernia, gastritis  Biopsies negative for celiac, H pylori, or Phillip's or eosinophilic esophagitis  Pertinent medical history:  Past Medical History:   Diagnosis Date    Allergic sinusitis     Asthma     Cholelithiasis     Colon polyp     Degenerative joint disease     GERD (gastroesophageal reflux disease)     Giant cell aortic arteritis (HCC)     Hyperlipidemia     Hypertension     PMR (polymyalgia rheumatica) (HCC)     Prediabetes          Other History:  Height:   Ht Readings from Last 1 Encounters:   08/04/22 5' 4" (1 626 m)     Weight:   Wt Readings from Last 1 Encounters:   08/04/22 83 5 kg (184 lb)       Relevant Family History   Patient reports non-Ashkenazi Bahai ancestry  Maternal Family History:   Mother: 1 colon polyp (d 94)  Uncle: prostate cancer diagnosed in late 62s (d 78)   Grandmother: breast or gynecologic cancer diagnosed at unknown age, no further information (d  unk)    Paternal Family History:  Father: brain cancer diagnosed at 68 (d  68)   First cousin: colon cancer diagnosed at 59 (currently 76)    Please refer to the scanned pedigree in the Media Tab for a complete family history     *All history is reported as provided by the patient; records are not available for review, except where indicated  Assessment:  We discussed sporadic, familial and hereditary cancer  We also discussed the many factors that influence our risk for cancer such as age, environmental exposures, lifestyle choices and family history  We reviewed the indications suggestive of a hereditary predisposition to cancer  Genetic testing is indicated for Stephie Pereyra based on the following criteria: Meets NCCN V2 2022 Testing Criteria for High-Penetrance Breast Cancer Susceptibility Genes: patient is >47 years old and diagnosed with a peritoneal adenocarcinoma with strong suspicion of ovarian primary    The risks, benefits, and limitations of genetic testing were reviewed with the patient, as well as genetic discrimination laws, and possible test results (positive, negative, variants of uncertain significance) and their clinical implications  If positive for a mutation, options for managing cancer risk including increased surveillance, chemoprevention, and in some cases prophylactic surgery were discussed  Stephie Pereyra was informed that if a hereditary cancer syndrome was identified in her, first degree relatives (parents, siblings, and children) have a chance of also inheriting the condition  Genetic testing would allow for predictive genetic testing in other relatives, who may also be at risk depending on their degree of relation  The patient asked about the aggressiveness of her cancer  I explained that staging tumors helps determine the severity and prognosis but she should follow up with her oncologist for a more detailed explanation  Plan: Patient decided to proceed with testing and provided consent      Summary:     Sample Collection:  The patient's blood sample was drawn in the office on 8/18/22 by medical assistant Shell Marcum    Genetic Testing Preformed: [employer] Common Hereditary Cancers Panel + RNA (47 genes): APC, CEZAR, AXIN2, BARD1, BMPR1A, BRCA1, BRCA2, BRIP1, CDH1, CDK4, CDKN2A, CHEK2, CTNNA1, DICER1, EPCAM, GREM1, HOXB13, KIT, MEN1, MLH1, MSH2, MSH3, MSH6, MUTYH, NBN, NF1, NTHL1, PALB2, PDGFRA, PMS2, POLD1, POLE, PTEN, RAD50, RAD51C, RAD51D, SDHA, SDHB, SDHC, SDHD, SMAD4, SMARCA4, STK11, TP53, TSC1, TSC2, VHL    Results take approximately 2-3 weeks to complete once test is started  We will contact Kj Jimenez once results are available  Additional recommendations for surveillance/medical management will be made pending genetic test results

## 2022-08-18 NOTE — LETTER
2022     Hansel Ac PA-C  [address]    Patient: Lefty Pabon  YOB: 1950  Date of Visit: 2022      Dear Dr Tam Mcmillan: Thank you for referring Genevieve Wilson to me for evaluation  Below are my notes for this consultation  If you have questions, please do not hesitate to call me  I look forward to following your patient along with you  Sincerely,        Shamika Florence        CC: No Recipients        Pre-Test Genetic Counseling Consult Note    Patient Name: Lefty Pabon   /Age: 1950/72 y o  Referring Provider: Michelle King PA-C    Date of Service: 2022  Genetic Counselor: Franchesca Mills Genetic Counselor  Interpretation Services: None  [facility]      Fuad Cheek was referred to the [address] [place] [address] and Genetic Assessment Program due to her personal history of a 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  she presents today to discuss the possibility of a hereditary cancer syndrome, options for genetic testing, and implications for her and her family  Her two daughters and  accompanied her to the appointment  Cancer History and Treatment:   Personal History: 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  Tumor cells are positive for MOC31, SURY-EP4, P16, P53, WT-1, Ki-67 and negative for mucin, calretinin Controls reacted appropriately   This staining pattern supports the above diagnosis  History of serous carcinoma is noted  She will start her chemotherapy treatment next week and then proceed with LIN and BSO  Patient also 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  Screening Hx:   Breast:  Breast Imaging: 2022, no evidence of malignancy per patient   Breast Biopsy: none  Breast Density: Unable to assess       Colon:  Colonoscopy: 2022  Colon polyp, splenic flexure, polypectomy:    -Serrated polyp with features of sessile serrated polyp/adenoma  Sigmoid colon, stricture, biopsy:   -Superficial colonic mucosa with reactive changes     -No epithelial dysplasia and no evidence of malignancy  F/u recommended in 5 years  Gynecologic:  Ovaries and Uterus intact     Skin:  Sees derm every 6 months    Other screening: none     Reproductive History  Age at menarche: 15  Age at first live birth: 24  Menopause: Post Menopausal (47)  Hormone replacement: no    Medical and Surgical History  Pertinent surgical history:   Past Surgical History:   Procedure Laterality Date    COLONOSCOPY      October 2021: Adenomatous polyps and rectum in transverse colon, sigmoid diverticulosis, internal hemorrhoids  October 2016 diverticulosis and internal hemorrhoids, September 2011 diverticulosis and internal hemorrhoids   DILATION AND CURETTAGE OF UTERUS      [place] LAP,DIAGNOSTIC ABDOMEN N/A 8/4/2022    Procedure: DIAGNOSTIC LAPAROSCOPY, PERITONEAL BIOPSY, SAMPLING OF BLOODY ASCITES ;  Surgeon: Nia Barrios MD;  [facility];  Service: Gynecology Oncology    TEMPORAL ARTERY BIOPSY / LIGATION      UPPER GASTROINTESTINAL ENDOSCOPY  11/10/2014    November 2014 irregular Z-line and Schatzki ring, hiatal hernia, gastritis  Biopsies negative for celiac, H pylori, or Phillip's or eosinophilic esophagitis        Pertinent medical history:  Past Medical History:   Diagnosis Date    Allergic sinusitis     Asthma     Cholelithiasis     Colon polyp     Degenerative joint disease     GERD (gastroesophageal reflux disease)     Giant cell aortic arteritis (HCC)     Hyperlipidemia     Hypertension     PMR (polymyalgia rheumatica) (HCC)     Prediabetes          Other History:  Height:   Ht Readings from Last 1 Encounters:   08/04/22 5' 4" (1 626 m)     Weight:   Wt Readings from Last 1 Encounters:   08/04/22 83 5 kg (184 lb)       Relevant Family History   Patient reports non-Ashkenazi Taoism ancestry  Maternal Family History: Mother: Giant cell tumor diagnosed at unknown age; 1 colon polyp (d 94)  Uncle: prostate cancer diagnosed in late 62s (d 78)   Grandmother: breast or gynecologic cancer diagnosed at unknown age, no further information (d  unk)    Paternal Family History:  Father: brain cancer diagnosed at 68 (d  68)   First cousin: colon cancer diagnosed at 59 (currently 76)    Please refer to the scanned pedigree in the Media Tab for a complete family history     *All history is reported as provided by the patient; records are not available for review, except where indicated  Assessment:  We discussed sporadic, familial and hereditary cancer  We also discussed the many factors that influence our risk for cancer such as age, environmental exposures, lifestyle choices and family history  We reviewed the indications suggestive of a hereditary predisposition to cancer  Genetic testing is indicated for Suzi Lacey based on the following criteria: Meets NCCN V2 2022 Testing Criteria for High-Penetrance Breast Cancer Susceptibility Genes: patient is >47 years old and diagnosed with a peritoneal adenocarcinoma with strong suspicion of ovarian primary    The risks, benefits, and limitations of genetic testing were reviewed with the patient, as well as genetic discrimination laws, and possible test results (positive, negative, variants of uncertain significance) and their clinical implications  If positive for a mutation, options for managing cancer risk including increased surveillance, chemoprevention, and in some cases prophylactic surgery were discussed  Suzi Lacey was informed that if a hereditary cancer syndrome was identified in her, first degree relatives (parents, siblings, and children) have a chance of also inheriting the condition   Genetic testing would allow for predictive genetic testing in other relatives, who may also be at risk depending on their degree of relation  The patient asked about the aggressiveness of her cancer  I explained that staging tumors helps determine the severity and prognosis but she should follow up with her oncologist for a more detailed explanation  Plan: Patient decided to proceed with testing and provided consent  Summary:     Sample Collection:  The patient's blood sample was drawn in the office on 8/18/22 by medical assistant Slade Gallegos    Genetic Testing Preformed: [employer] Common Hereditary Cancers Panel + RNA (52 genes): APC, CEZAR, AXIN2, BARD1, BMPR1A, BRCA1, BRCA2, BRIP1, CDH1, CDK4, CDKN2A, CHEK2, CTNNA1, DICER1, EPCAM, GREM1, HOXB13, KIT, MEN1, MLH1, MSH2, MSH3, MSH6, MUTYH, NBN, NF1, NTHL1, PALB2, PDGFRA, PMS2, POLD1, POLE, PTEN, RAD50, RAD51C, RAD51D, SDHA, SDHB, SDHC, SDHD, SMAD4, SMARCA4, STK11, TP53, TSC1, TSC2, VHL    Results take approximately 2-3 weeks to complete once test is started  We will contact Suzi Lacey once results are available  Additional recommendations for surveillance/medical management will be made pending genetic test results

## 2022-08-19 ENCOUNTER — APPOINTMENT (OUTPATIENT)
Dept: LAB | Facility: HOSPITAL | Age: 72
End: 2022-08-19
Payer: MEDICARE

## 2022-08-19 LAB
ALBUMIN SERPL BCP-MCNC: 3.2 G/DL (ref 3.5–5)
ALP SERPL-CCNC: 82 U/L (ref 46–116)
ALT SERPL W P-5'-P-CCNC: 41 U/L (ref 12–78)
ANION GAP SERPL CALCULATED.3IONS-SCNC: 6 MMOL/L (ref 4–13)
AST SERPL W P-5'-P-CCNC: 30 U/L (ref 5–45)
BILIRUB SERPL-MCNC: 0.4 MG/DL (ref 0.2–1)
BUN SERPL-MCNC: 7 MG/DL (ref 5–25)
CALCIUM ALBUM COR SERPL-MCNC: 9.3 MG/DL (ref 8.3–10.1)
CALCIUM SERPL-MCNC: 8.7 MG/DL (ref 8.3–10.1)
CANCER AG125 SERPL-ACNC: 77.3 U/ML (ref 0–30)
CHLORIDE SERPL-SCNC: 105 MMOL/L (ref 96–108)
CO2 SERPL-SCNC: 27 MMOL/L (ref 21–32)
CREAT SERPL-MCNC: 0.64 MG/DL (ref 0.6–1.3)
CREAT UR-MCNC: 17.9 MG/DL
GFR SERPL CREATININE-BSD FRML MDRD: 89 ML/MIN/1.73SQ M
GLUCOSE P FAST SERPL-MCNC: 125 MG/DL (ref 65–99)
MAGNESIUM SERPL-MCNC: 2.1 MG/DL (ref 1.6–2.6)
POTASSIUM SERPL-SCNC: 4.1 MMOL/L (ref 3.5–5.3)
PROT SERPL-MCNC: 6.8 G/DL (ref 6.4–8.4)
PROT UR-MCNC: <6 MG/DL
PROT/CREAT UR: <0.34 MG/G{CREAT} (ref 0–0.1)
SODIUM SERPL-SCNC: 138 MMOL/L (ref 135–147)

## 2022-08-19 PROCEDURE — 86304 IMMUNOASSAY TUMOR CA 125: CPT | Performed by: PHYSICIAN ASSISTANT

## 2022-08-19 PROCEDURE — 83735 ASSAY OF MAGNESIUM: CPT | Performed by: PHYSICIAN ASSISTANT

## 2022-08-19 PROCEDURE — 36415 COLL VENOUS BLD VENIPUNCTURE: CPT | Performed by: PHYSICIAN ASSISTANT

## 2022-08-19 PROCEDURE — 80053 COMPREHEN METABOLIC PANEL: CPT | Performed by: PHYSICIAN ASSISTANT

## 2022-08-19 PROCEDURE — 82570 ASSAY OF URINE CREATININE: CPT | Performed by: PHYSICIAN ASSISTANT

## 2022-08-19 PROCEDURE — 84156 ASSAY OF PROTEIN URINE: CPT | Performed by: PHYSICIAN ASSISTANT

## 2022-08-19 NOTE — RESULT ENCOUNTER NOTE
Called patient and reviewed labs  She is feeling well and planning for chemo next week to begin    Labs have been ordered by gyn and care directed by Dr Rory Elena office -  White count slightly elevated at 14,000 thousand - was 77 3 up from previous exam

## 2022-08-22 RX ORDER — PALONOSETRON 0.05 MG/ML
0.25 INJECTION, SOLUTION INTRAVENOUS ONCE
Status: CANCELLED | OUTPATIENT
Start: 2022-08-23

## 2022-08-22 RX ORDER — SODIUM CHLORIDE 9 MG/ML
20 INJECTION, SOLUTION INTRAVENOUS ONCE
Status: CANCELLED | OUTPATIENT
Start: 2022-08-23

## 2022-08-23 ENCOUNTER — HOSPITAL ENCOUNTER (OUTPATIENT)
Dept: INFUSION CENTER | Facility: HOSPITAL | Age: 72
Discharge: HOME | End: 2022-08-23
Attending: OBSTETRICS & GYNECOLOGY
Payer: MEDICARE

## 2022-08-23 VITALS
RESPIRATION RATE: 16 BRPM | WEIGHT: 189.38 LBS | SYSTOLIC BLOOD PRESSURE: 127 MMHG | HEIGHT: 65 IN | DIASTOLIC BLOOD PRESSURE: 60 MMHG | BODY MASS INDEX: 31.55 KG/M2 | OXYGEN SATURATION: 99 % | HEART RATE: 87 BPM | TEMPERATURE: 96.9 F

## 2022-08-23 DIAGNOSIS — C57.9 GYNECOLOGIC MALIGNANCY (HCC): Primary | ICD-10-CM

## 2022-08-23 PROCEDURE — 96413 CHEMO IV INFUSION 1 HR: CPT

## 2022-08-23 PROCEDURE — 96375 TX/PRO/DX INJ NEW DRUG ADDON: CPT

## 2022-08-23 PROCEDURE — 96367 TX/PROPH/DG ADDL SEQ IV INF: CPT

## 2022-08-23 PROCEDURE — 96417 CHEMO IV INFUS EACH ADDL SEQ: CPT

## 2022-08-23 PROCEDURE — 96415 CHEMO IV INFUSION ADDL HR: CPT

## 2022-08-23 RX ORDER — SODIUM CHLORIDE 9 MG/ML
20 INJECTION, SOLUTION INTRAVENOUS ONCE
Status: COMPLETED | OUTPATIENT
Start: 2022-08-23 | End: 2022-08-23

## 2022-08-23 RX ORDER — PALONOSETRON 0.05 MG/ML
0.25 INJECTION, SOLUTION INTRAVENOUS ONCE
Status: COMPLETED | OUTPATIENT
Start: 2022-08-23 | End: 2022-08-23

## 2022-08-23 RX ADMIN — PACLITAXEL 330.6 MG: 6 INJECTION, SOLUTION INTRAVENOUS at 11:02

## 2022-08-23 RX ADMIN — BEVACIZUMAB 1200 MG: 400 INJECTION, SOLUTION INTRAVENOUS at 15:10

## 2022-08-23 RX ADMIN — FOSAPREPITANT 150 MG: 150 INJECTION, POWDER, LYOPHILIZED, FOR SOLUTION INTRAVENOUS at 10:21

## 2022-08-23 RX ADMIN — FAMOTIDINE 20 MG: 10 INJECTION INTRAVENOUS at 09:58

## 2022-08-23 RX ADMIN — CARBOPLATIN 600 MG: 10 INJECTION, SOLUTION INTRAVENOUS at 14:10

## 2022-08-23 RX ADMIN — SODIUM CHLORIDE 20 ML/HR: 0.9 INJECTION, SOLUTION INTRAVENOUS at 09:11

## 2022-08-23 RX ADMIN — PALONOSETRON 0.25 MG: 0.25 INJECTION, SOLUTION INTRAVENOUS at 09:14

## 2022-08-23 RX ADMIN — DEXAMETHASONE SODIUM PHOSPHATE 20 MG: 10 INJECTION, SOLUTION INTRAMUSCULAR; INTRAVENOUS at 09:11

## 2022-08-23 RX ADMIN — DIPHENHYDRAMINE HYDROCHLORIDE 25 MG: 50 INJECTION, SOLUTION INTRAMUSCULAR; INTRAVENOUS at 09:36

## 2022-08-23 NOTE — PROGRESS NOTES
Pt tolerated treatment with no adv reactions; AVS given; s/s to report to physician reviewed with pt and  who verb understanding;   pt left unit ambulatory with steady gait

## 2022-08-24 ENCOUNTER — PATIENT MESSAGE (OUTPATIENT)
Dept: GYNECOLOGIC ONCOLOGY | Facility: HOSPITAL | Age: 72
End: 2022-08-24

## 2022-08-24 ENCOUNTER — TELEPHONE (OUTPATIENT)
Dept: GYNECOLOGIC ONCOLOGY | Facility: CLINIC | Age: 72
End: 2022-08-24

## 2022-08-24 NOTE — TELEPHONE ENCOUNTER
Call placed to patient to further discuss  Recommended colace BID to TID, and miralax QD to BID  Feet swelling is symmetrical, and without pain  Will monitor closely  Regarding: FW: shakes/slight headache    ----- Message -----  From: Yolanda Kearney  Sent: 8/24/2022  12:38 PM EDT  To: [facility]  Subject: shakes/slight headache                           ok  thanks  My  reminded me of the shakes I had when I was put on 40 mg of prednisone for the giant cell arthritis  make complete sense to me  Also, I just notice that me feet seem slightly swollen  I think I am retaining fluid  It that normal????    I use the bathroom regularly  My bowels haven't moved since Sunday  Is there a number where I can reach you directly? ??? I think you for all your help and suggestions       Yessica Clement

## 2022-08-26 ENCOUNTER — APPOINTMENT (OUTPATIENT)
Dept: LAB | Facility: HOSPITAL | Age: 72
End: 2022-08-26
Payer: MEDICARE

## 2022-09-01 ENCOUNTER — TELEPHONE (OUTPATIENT)
Dept: GYNECOLOGIC ONCOLOGY | Facility: CLINIC | Age: 72
End: 2022-09-01

## 2022-09-01 NOTE — TELEPHONE ENCOUNTER
Patient called in regarding some of the opposite issues that she is was experiencing,pt states that now she is having diarrhea along with having stomach pain  Pt states that when she eats something that she is back in the bathroom, but continues to feel hungry   Patient can be reached back @[phone]

## 2022-09-01 NOTE — TELEPHONE ENCOUNTER
Return call placed to patient  To continue to hold colace/miralax, and continue BRAT diet  Patient will call tomorrow if symptoms don't continue to improve

## 2022-09-02 ENCOUNTER — APPOINTMENT (OUTPATIENT)
Dept: LAB | Facility: HOSPITAL | Age: 72
End: 2022-09-02
Payer: MEDICARE

## 2022-09-02 DIAGNOSIS — C57.9 GYNECOLOGIC MALIGNANCY (HCC): ICD-10-CM

## 2022-09-02 DIAGNOSIS — C57.7 MALIGNANT NEOPLASM OF OTHER SPECIFIED FEMALE GENITAL ORGANS (HCC): ICD-10-CM

## 2022-09-02 LAB — CANCER AG125 SERPL-ACNC: 52.4 U/ML (ref 0–30)

## 2022-09-02 PROCEDURE — 86304 IMMUNOASSAY TUMOR CA 125: CPT

## 2022-09-08 ENCOUNTER — APPOINTMENT (OUTPATIENT)
Dept: LAB | Facility: HOSPITAL | Age: 72
End: 2022-09-08
Payer: MEDICARE

## 2022-09-08 ENCOUNTER — OFFICE VISIT (OUTPATIENT)
Dept: GYNECOLOGIC ONCOLOGY | Facility: HOSPITAL | Age: 72
End: 2022-09-08
Payer: MEDICARE

## 2022-09-08 VITALS
HEIGHT: 65 IN | SYSTOLIC BLOOD PRESSURE: 122 MMHG | DIASTOLIC BLOOD PRESSURE: 70 MMHG | BODY MASS INDEX: 30.49 KG/M2 | TEMPERATURE: 97.9 F | WEIGHT: 183 LBS

## 2022-09-08 DIAGNOSIS — C57.9 GYNECOLOGIC MALIGNANCY (HCC): ICD-10-CM

## 2022-09-08 DIAGNOSIS — C57.7 MALIGNANT NEOPLASM OF OTHER SPECIFIED FEMALE GENITAL ORGANS (HCC): ICD-10-CM

## 2022-09-08 DIAGNOSIS — C48.2 PERITONEAL CARCINOMA (HCC): Primary | ICD-10-CM

## 2022-09-08 LAB
ALBUMIN SERPL BCP-MCNC: 3.5 G/DL (ref 3.5–5)
ALP SERPL-CCNC: 76 U/L (ref 46–116)
ALT SERPL W P-5'-P-CCNC: 60 U/L (ref 12–78)
ANION GAP SERPL CALCULATED.3IONS-SCNC: 8 MMOL/L (ref 4–13)
ANISOCYTOSIS BLD QL SMEAR: PRESENT
AST SERPL W P-5'-P-CCNC: 39 U/L (ref 5–45)
BASOPHILS # BLD MANUAL: 0 THOUSAND/UL (ref 0–0.1)
BASOPHILS NFR MAR MANUAL: 0 % (ref 0–1)
BILIRUB SERPL-MCNC: 0.2 MG/DL (ref 0.2–1)
BUN SERPL-MCNC: 8 MG/DL (ref 5–25)
CALCIUM SERPL-MCNC: 8.6 MG/DL (ref 8.3–10.1)
CANCER AG125 SERPL-ACNC: 32 U/ML (ref 0–30)
CHLORIDE SERPL-SCNC: 104 MMOL/L (ref 96–108)
CO2 SERPL-SCNC: 27 MMOL/L (ref 21–32)
CREAT SERPL-MCNC: 0.77 MG/DL (ref 0.6–1.3)
EOSINOPHIL # BLD MANUAL: 0.08 THOUSAND/UL (ref 0–0.4)
EOSINOPHIL NFR BLD MANUAL: 1 % (ref 0–6)
ERYTHROCYTE [DISTWIDTH] IN BLOOD BY AUTOMATED COUNT: 13.5 % (ref 11.6–15.1)
GFR SERPL CREATININE-BSD FRML MDRD: 77 ML/MIN/1.73SQ M
GLUCOSE SERPL-MCNC: 116 MG/DL (ref 65–140)
HCT VFR BLD AUTO: 37.9 % (ref 34.8–46.1)
HGB BLD-MCNC: 12.4 G/DL (ref 11.5–15.4)
LYMPHOCYTES # BLD AUTO: 1.59 THOUSAND/UL (ref 0.6–4.47)
LYMPHOCYTES # BLD AUTO: 21 % (ref 14–44)
MAGNESIUM SERPL-MCNC: 1.8 MG/DL (ref 1.6–2.6)
MCH RBC QN AUTO: 30.9 PG (ref 26.8–34.3)
MCHC RBC AUTO-ENTMCNC: 32.7 G/DL (ref 31.4–37.4)
MCV RBC AUTO: 95 FL (ref 82–98)
MONOCYTES # BLD AUTO: 0.76 THOUSAND/UL (ref 0–1.22)
MONOCYTES NFR BLD: 10 % (ref 4–12)
NEUTROPHILS # BLD MANUAL: 5.15 THOUSAND/UL (ref 1.85–7.62)
NEUTS BAND NFR BLD MANUAL: 4 % (ref 0–8)
NEUTS SEG NFR BLD AUTO: 64 % (ref 43–75)
PLATELET # BLD AUTO: 204 THOUSANDS/UL (ref 149–390)
PLATELET BLD QL SMEAR: ADEQUATE
PMV BLD AUTO: 8.7 FL (ref 8.9–12.7)
POTASSIUM SERPL-SCNC: 3.9 MMOL/L (ref 3.5–5.3)
PROT SERPL-MCNC: 6.9 G/DL (ref 6.4–8.4)
RBC # BLD AUTO: 4.01 MILLION/UL (ref 3.81–5.12)
RBC MORPH BLD: PRESENT
SODIUM SERPL-SCNC: 139 MMOL/L (ref 135–147)
WBC # BLD AUTO: 7.57 THOUSAND/UL (ref 4.31–10.16)

## 2022-09-08 PROCEDURE — 85007 BL SMEAR W/DIFF WBC COUNT: CPT

## 2022-09-08 PROCEDURE — 86304 IMMUNOASSAY TUMOR CA 125: CPT

## 2022-09-08 PROCEDURE — 85027 COMPLETE CBC AUTOMATED: CPT

## 2022-09-08 PROCEDURE — 99215 OFFICE O/P EST HI 40 MIN: CPT | Performed by: OBSTETRICS & GYNECOLOGY

## 2022-09-08 PROCEDURE — 36415 COLL VENOUS BLD VENIPUNCTURE: CPT

## 2022-09-08 PROCEDURE — 83735 ASSAY OF MAGNESIUM: CPT

## 2022-09-08 PROCEDURE — 80053 COMPREHEN METABOLIC PANEL: CPT

## 2022-09-08 RX ORDER — LORAZEPAM 1 MG/1
1 TABLET ORAL EVERY 8 HOURS PRN
Qty: 30 TABLET | Refills: 0 | Status: SHIPPED | OUTPATIENT
Start: 2022-09-08 | End: 2022-09-29 | Stop reason: SDUPTHER

## 2022-09-08 NOTE — ASSESSMENT & PLAN NOTE
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  She is currently receiving neoadjuvant chemotherapy with carboplatin AUC 6, Taxol 175 milligrams/meter squared and bevacizumab at 15 milligrams/kilogram   I reviewed her CBC, CMP,   Overall, she is tolerating chemotherapy well  She has tremors which may be related to steroid use  Her performance status is 0    1  Plan to continue neoadjuvant chemotherapy with carboplatin, Taxol, and Avastin without dose reduction  Her hematologic and metabolic parameters are adequate for treatment   is declining  2  Will adjust dose of steroids for cycle 2  To assess whether tremors are related to ongoing steroid use  She takes chronic prednisone for giant cell arteritis  3  Continue nasal spray for Avastin related epistaxis  4  I refilled her Ativan for nausea and anxiety

## 2022-09-08 NOTE — PROGRESS NOTES
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  She is currently receiving neoadjuvant chemotherapy with carboplatin AUC 6, Taxol 175 milligrams/meter squared and bevacizumab at 15 milligrams/kilogram   I reviewed her CBC, CMP,   Overall, she is tolerating chemotherapy well  She has tremors which may be related to steroid use  Her performance status is 0    1  Plan to continue neoadjuvant chemotherapy with carboplatin, Taxol, and Avastin without dose reduction  Her hematologic and metabolic parameters are adequate for treatment   is declining  2  Will adjust dose of steroids for cycle 2  To assess whether tremors are related to ongoing steroid use  She takes chronic prednisone for giant cell arteritis  3  Continue nasal spray for Avastin related epistaxis  4  I refilled her Ativan for nausea and anxiety                     CHIEF COMPLAINT:  Pre chemotherapy evaluation        Previous therapy:  Oncology History   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[facility] [address] )   7/21/2022 Initial Diagnosis    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     8/4/2022 Surgery    Diagnostic laparoscopy, peritoneal biopsy     [facility] )   8/4/2022 Surgery    Diagnostic laparoscopy, peritoneal biopsy     8/9/2022 Initial Diagnosis    Gynecologic malignancy [facility] )     8/23/2022 -  Chemotherapy    palonosetron (ALOXI), 0 25 mg, Intravenous, Once, 1 of 4 cycles  Administration: 0 25 mg (8/23/2022)  fosaprepitant (EMEND) IVPB, 150 mg, Intravenous, Once, 1 of 4 cycles  Administration: 150 mg (8/23/2022)  CARBOplatin (PARAPLATIN) IVPB (GOG AUC DOSING), 605 4 mg, Intravenous, Once, 1 of 4 cycles  Administration: 600 mg (8/23/2022)  bevacizumab (AVASTIN) IVPB, 1,252 5 mg, Intravenous, Once, 1 of 2 cycles  Administration: 1,200 mg (8/23/2022)  PACLItaxel (TAXOL) chemo IVPB, 175 mg/m2 = 330 6 mg, Intravenous, Once, 1 of 4 cycles  Administration: 330 6 mg (8/23/2022)           Patient ID: Cornell Interiano is a 67 y o  female  Presents prior to cycle 2  Of neoadjuvant carboplatin, Taxol, Avastin therapy  She notes increasing tremors  She had these previously with higher dose steroids  She is on chronic steroid therapy for giant cell arteritis and wonders whether additional steroid use at the time of chemotherapy is contributing to her tremors  She has been using lorazepam as an anxiolytic which has been effective  She had epistaxis and was prescribed saline nasal spray which has reduce the epistaxis  Ailyn treatment constipation was treated with MiraLax  She has a plan to use prune juice prophylactically prior to the next cycle  Insomnia related to treatment and diagnosis has been affectively palliated with the lorazepam as well  Labs from 9/2/2022 reveal a CA 1/25/2050 2 4 units/ml which is a decrease from prior of 77 3  CBC and CMP are within normal limits  Her ANC is 3 12  She is able to perform her activities of daily living without difficulty  No other interval change in her medications or medical history since her last visit to the office  The following portions of the patient's history were reviewed and updated as appropriate: allergies, current medications, past family history, past medical history, past social history, past surgical history and problem list     Review of Systems   Constitutional: Negative for activity change and unexpected weight change  HENT: Positive for nosebleeds  Eyes: Negative  Respiratory: Negative  Cardiovascular: Negative  Gastrointestinal: Positive for constipation   Negative for abdominal distention and abdominal pain  Endocrine: Negative  Genitourinary: Negative for pelvic pain and vaginal bleeding  Musculoskeletal: Negative  Skin: Negative  Allergic/Immunologic: Negative  Neurological: Positive for tremors  Hematological: Negative  Psychiatric/Behavioral: Positive for sleep disturbance  The patient is nervous/anxious          Current Outpatient Medications   Medication Sig Dispense Refill    LORazepam (ATIVAN) 1 mg tablet Take 1 tablet (1 mg total) by mouth every 8 (eight) hours as needed for anxiety (nausea or anxiety) 30 tablet 0    Acetaminophen (TYLENOL ARTHRITIS PAIN PO) Take by mouth as needed      albuterol (PROVENTIL HFA,VENTOLIN HFA) 90 mcg/act inhaler Inhale 2 puffs every 4 (four) hours as needed       alendronate (FOSAMAX) 70 mg tablet PLEASE SEE ATTACHED FOR DETAILED DIRECTIONS      aspirin 81 mg chewable tablet Chew 81 mg daily      B Complex-C (B COMPLEX-VITAMIN C PO) Take by mouth daily      betamethasone, augmented, (DIPROLENE) 0 05 % lotion Apply topically as needed      Calcium Carb-Cholecalciferol (CALCIUM 500+D3 PO) Take 1 tablet by mouth 2 (two) times a day       cholecalciferol (VITAMIN D3) 1,000 units tablet Take 1,000 Units by mouth daily      dicyclomine (BENTYL) 10 mg capsule TAKE 1 CAPSULE (10 MG TOTAL) BY MOUTH 3 (THREE) TIMES A DAY AS NEEDED (ABDOMINAL PAIN) (Patient not taking: Reported on 7/29/2022) 270 capsule 1    Docusate Calcium (STOOL SOFTENER PO) Take 1 tablet by mouth daily       famotidine (PEPCID) 40 MG tablet Take 1 tablet (40 mg total) by mouth daily at bedtime 90 tablet 3    fexofenadine (ALLEGRA) 180 MG tablet Take 180 mg by mouth daily      FIBER ADULT GUMMIES PO Take by mouth      GLUCOSAMINE-CHONDROITIN PO Take 1 tablet by mouth 2 (two) times a day       hydrocortisone (Proctosol HC) 2 5 % rectal cream Insert into rectum two (2) times a day 28 35 g 3    lisinopril (ZESTRIL) 5 mg tablet Take 5 mg by mouth daily       metFORMIN (GLUCOPHAGE) 500 mg tablet Take 500 mg by mouth daily      montelukast (SINGULAIR) 10 mg tablet Take 10 mg by mouth daily       multivitamin (THERAGRAN) TABS Take 1 tablet by mouth daily      omeprazole (PriLOSEC) 20 mg delayed release capsule TAKE 1 CAPSULE BY MOUTH EVERY DAY 30 MINUTES BEFORE MORNING MEAL FOR 90 DAYS      ondansetron (ZOFRAN) 8 mg tablet Take 1 tablet (8 mg total) by mouth every 8 (eight) hours as needed for nausea or vomiting 20 tablet 1    Polyethylene Glycol 3350 (MIRALAX PO) Take by mouth as needed      PREDNISONE PO Take 15 mg by mouth Tapering off      Probiotic Product (PROBIOTIC DAILY PO) Take by mouth daily      simvastatin (ZOCOR) 10 mg tablet Take 10 mg by mouth daily at bedtime      SYMBICORT 160-4 5 MCG/ACT inhaler 2 puffs daily        No current facility-administered medications for this visit  Objective:    Blood pressure 122/70, temperature 97 9 °F (36 6 °C), temperature source Tympanic, height 5' 5 39" (1 661 m), weight 83 kg (183 lb)  Body mass index is 30 09 kg/m²  Body surface area is 1 91 meters squared  Physical Exam  Vitals reviewed  Constitutional:       General: She is not in acute distress  Appearance: Normal appearance  She is normal weight  She is not ill-appearing, toxic-appearing or diaphoretic  HENT:      Head: Normocephalic and atraumatic  Mouth/Throat:      Mouth: Mucous membranes are moist    Eyes:      General: No scleral icterus  Right eye: No discharge  Left eye: No discharge  Conjunctiva/sclera: Conjunctivae normal    Pulmonary:      Effort: Pulmonary effort is normal    Musculoskeletal:      Right lower leg: No edema  Left lower leg: No edema  Skin:     General: Skin is warm and dry  Coloration: Skin is not jaundiced  Findings: No rash  Neurological:      General: No focal deficit present  Mental Status: She is alert and oriented to person, place, and time  Mental status is at baseline  Cranial Nerves: No cranial nerve deficit  Motor: No weakness  Gait: Gait normal       Comments: Occasional slight tremors  Psychiatric:         Mood and Affect: Mood normal          Behavior: Behavior normal          Thought Content:  Thought content normal          Judgment: Judgment normal          Lab Results   Component Value Date     32 0 (H) 09/08/2022     Lab Results   Component Value Date    K 3 9 09/08/2022     09/08/2022    CO2 27 09/08/2022    BUN 8 09/08/2022    CREATININE 0 77 09/08/2022    GLUF 130 (H) 09/02/2022    CALCIUM 8 6 09/08/2022    CORRECTEDCA 9 7 09/02/2022    AST 39 09/08/2022    ALT 60 09/08/2022    ALKPHOS 76 09/08/2022    EGFR 77 09/08/2022     Lab Results   Component Value Date    WBC 7 57 09/08/2022    HGB 12 4 09/08/2022    HCT 37 9 09/08/2022    MCV 95 09/08/2022     09/08/2022     Lab Results   Component Value Date    NEUTROABS 3 12 09/02/2022        Trend:  Lab Results   Component Value Date     32 0 (H) 09/08/2022     52 4 (H) 09/02/2022     85 5 (H) 08/26/2022     77 3 (H) 08/19/2022

## 2022-09-10 ENCOUNTER — PATIENT MESSAGE (OUTPATIENT)
Dept: GYNECOLOGIC ONCOLOGY | Facility: HOSPITAL | Age: 72
End: 2022-09-10

## 2022-09-12 RX ORDER — SODIUM CHLORIDE 9 MG/ML
20 INJECTION, SOLUTION INTRAVENOUS ONCE
Status: CANCELLED | OUTPATIENT
Start: 2022-09-13

## 2022-09-12 RX ORDER — PALONOSETRON 0.05 MG/ML
0.25 INJECTION, SOLUTION INTRAVENOUS ONCE
Status: CANCELLED | OUTPATIENT
Start: 2022-09-13

## 2022-09-13 ENCOUNTER — HOSPITAL ENCOUNTER (OUTPATIENT)
Dept: INFUSION CENTER | Facility: HOSPITAL | Age: 72
Discharge: HOME | End: 2022-09-13
Attending: OBSTETRICS & GYNECOLOGY
Payer: MEDICARE

## 2022-09-13 VITALS
HEART RATE: 84 BPM | BODY MASS INDEX: 31.39 KG/M2 | TEMPERATURE: 96.5 F | HEIGHT: 64 IN | WEIGHT: 183.86 LBS | DIASTOLIC BLOOD PRESSURE: 84 MMHG | OXYGEN SATURATION: 97 % | RESPIRATION RATE: 16 BRPM | SYSTOLIC BLOOD PRESSURE: 138 MMHG

## 2022-09-13 DIAGNOSIS — C57.9 GYNECOLOGIC MALIGNANCY (HCC): Primary | ICD-10-CM

## 2022-09-13 PROCEDURE — 96417 CHEMO IV INFUS EACH ADDL SEQ: CPT

## 2022-09-13 PROCEDURE — 96367 TX/PROPH/DG ADDL SEQ IV INF: CPT

## 2022-09-13 PROCEDURE — 96375 TX/PRO/DX INJ NEW DRUG ADDON: CPT

## 2022-09-13 PROCEDURE — 96413 CHEMO IV INFUSION 1 HR: CPT

## 2022-09-13 PROCEDURE — 96415 CHEMO IV INFUSION ADDL HR: CPT

## 2022-09-13 RX ORDER — PALONOSETRON 0.05 MG/ML
0.25 INJECTION, SOLUTION INTRAVENOUS ONCE
Status: COMPLETED | OUTPATIENT
Start: 2022-09-13 | End: 2022-09-13

## 2022-09-13 RX ORDER — SODIUM CHLORIDE 9 MG/ML
20 INJECTION, SOLUTION INTRAVENOUS ONCE
Status: COMPLETED | OUTPATIENT
Start: 2022-09-13 | End: 2022-09-13

## 2022-09-13 RX ADMIN — PALONOSETRON 0.25 MG: 0.05 INJECTION, SOLUTION INTRAVENOUS at 09:26

## 2022-09-13 RX ADMIN — BEVACIZUMAB 1200 MG: 400 INJECTION, SOLUTION INTRAVENOUS at 15:25

## 2022-09-13 RX ADMIN — CARBOPLATIN 564 MG: 10 INJECTION, SOLUTION INTRAVENOUS at 14:13

## 2022-09-13 RX ADMIN — DIPHENHYDRAMINE HYDROCHLORIDE 25 MG: 50 INJECTION, SOLUTION INTRAMUSCULAR; INTRAVENOUS at 09:43

## 2022-09-13 RX ADMIN — SODIUM CHLORIDE 20 ML/HR: 0.9 INJECTION, SOLUTION INTRAVENOUS at 09:00

## 2022-09-13 RX ADMIN — DEXAMETHASONE SODIUM PHOSPHATE 10 MG: 10 INJECTION, SOLUTION INTRAMUSCULAR; INTRAVENOUS at 09:17

## 2022-09-13 RX ADMIN — FAMOTIDINE 20 MG: 10 INJECTION INTRAVENOUS at 10:09

## 2022-09-13 RX ADMIN — FOSAPREPITANT 150 MG: 150 INJECTION, POWDER, LYOPHILIZED, FOR SOLUTION INTRAVENOUS at 10:34

## 2022-09-13 RX ADMIN — PACLITAXEL 330.6 MG: 6 INJECTION, SOLUTION INTRAVENOUS at 11:13

## 2022-09-14 ENCOUNTER — TELEPHONE (OUTPATIENT)
Dept: GENETICS | Facility: CLINIC | Age: 72
End: 2022-09-14

## 2022-09-14 PROCEDURE — 88363 XM ARCHIVE TISSUE MOLEC ANAL: CPT | Performed by: PATHOLOGY

## 2022-09-14 NOTE — TELEPHONE ENCOUNTER
Post-Test Genetic Counseling Consult Note  Today I spoke with Kj Jimenez over the phone to review the results of her genetic test for hereditary cancer  We met previously on 8/18/22 for pre-test counseling  A copy of this consult note and genetic test result will be shared with the patient  SUMMARY:    Test(s): [employer] Common Hereditary Cancers Panel + RNA (47 genes): APC, CEZAR, AXIN2, BARD1, BMPR1A, BRCA1, BRCA2, BRIP1, CDH1, CDK4, CDKN2A, CHEK2, CTNNA1, DICER1, EPCAM, GREM1, HOXB13, KIT, MEN1, MLH1, MSH2, MSH3, MSH6, MUTYH, NBN, NF1, NTHL1, PALB2, PDGFRA, PMS2, POLD1, POLE, PTEN, RAD50, RAD51C, RAD51D, SDHA, SDHB, SDHC, SDHD, SMAD4, SMARCA4, STK11, TP53, TSC1, TSC2, VHL    Result: Negative - No Clinically Significant Variants Detected      Assessment:   A negative result significantly reduces the likelihood that Kj Jimenez has a hereditary cancer syndrome  However, this testing is unable to completely rule out the presence of hereditary cancer  It remains possible that:  - There is a variant in an area of a gene which was not tested or there is a variant not detectable due to technical limitations of this test      - There is a variant in another gene that was not included in this test or in a gene not known to be linked to cancer or tumors  - A family member has a genetic variant that the patient did not inherit  - The cancer in the family is sporadic and is related to non-hereditary factors  Risks and Testing for Family Members:  Despite a negative result, Murtazas first-degree relatives may be at increased risk for the cancers based on the family history  We recommend they discuss screening and management recommendations with their healthcare providers  At this time we do not recommend testing for Kj Iniguezs children based on her negative test result  Kj Iniguezs children still need to consider the history of cancer on the other side of their family when determining their risks       If Jacskon Guadarrama has any affected family members with a cancer diagnosis, especially at a young age, they may still consider genetic testing  Relatives who wish to pursue genetic testing can reach out to the [phone]) to schedule an appointment or visit www [facility] to identify a local genetic counselor  Plan:   There are no additional recommendations based on Penny's negative result  she should continue cancer screening and medical management as clinically indicated and as determined appropriate by her healthcare providers  Negative Result: Jackson Guadarrama was strongly encouraged to contact us regarding any changes in her personal or family history of cancer as these changes could alter our recommendation regarding genetic testing and/or cancer screening

## 2022-09-15 ENCOUNTER — TELEPHONE (OUTPATIENT)
Dept: GENETICS | Facility: CLINIC | Age: 72
End: 2022-09-15

## 2022-09-15 NOTE — TELEPHONE ENCOUNTER
----- Message from [facility] sent at 9/14/2022  2:15 PM EDT -----  Regarding: Chart Complete  Hello!     GC Completed Chart     Result Type: Negative    Result Delivery: Patient Requested Both    Monthly Review: Does not need monthly review- COMPLETE

## 2022-09-16 ENCOUNTER — APPOINTMENT (OUTPATIENT)
Dept: LAB | Facility: HOSPITAL | Age: 72
End: 2022-09-16
Payer: MEDICARE

## 2022-09-16 DIAGNOSIS — M31.5 POLYMYALGIA ARTERITICA (HCC): ICD-10-CM

## 2022-09-16 LAB
BASOPHILS # BLD AUTO: 0.03 THOUSANDS/ΜL (ref 0–0.1)
BASOPHILS NFR BLD AUTO: 0 % (ref 0–1)
EOSINOPHIL # BLD AUTO: 0.03 THOUSAND/ΜL (ref 0–0.61)
EOSINOPHIL NFR BLD AUTO: 0 % (ref 0–6)
ERYTHROCYTE [DISTWIDTH] IN BLOOD BY AUTOMATED COUNT: 13.7 % (ref 11.6–15.1)
ERYTHROCYTE [SEDIMENTATION RATE] IN BLOOD: 34 MM/HOUR (ref 0–29)
HCT VFR BLD AUTO: 38.3 % (ref 34.8–46.1)
HGB BLD-MCNC: 12.6 G/DL (ref 11.5–15.4)
IMM GRANULOCYTES # BLD AUTO: 0.03 THOUSAND/UL (ref 0–0.2)
IMM GRANULOCYTES NFR BLD AUTO: 0 % (ref 0–2)
LYMPHOCYTES # BLD AUTO: 0.53 THOUSANDS/ΜL (ref 0.6–4.47)
LYMPHOCYTES NFR BLD AUTO: 7 % (ref 14–44)
MCH RBC QN AUTO: 30.7 PG (ref 26.8–34.3)
MCHC RBC AUTO-ENTMCNC: 32.9 G/DL (ref 31.4–37.4)
MCV RBC AUTO: 93 FL (ref 82–98)
MONOCYTES # BLD AUTO: 0.25 THOUSAND/ΜL (ref 0.17–1.22)
MONOCYTES NFR BLD AUTO: 3 % (ref 4–12)
NEUTROPHILS # BLD AUTO: 7.09 THOUSANDS/ΜL (ref 1.85–7.62)
NEUTS SEG NFR BLD AUTO: 90 % (ref 43–75)
NRBC BLD AUTO-RTO: 0 /100 WBCS
PLATELET # BLD AUTO: 178 THOUSANDS/UL (ref 149–390)
PMV BLD AUTO: 9.3 FL (ref 8.9–12.7)
RBC # BLD AUTO: 4.11 MILLION/UL (ref 3.81–5.12)
WBC # BLD AUTO: 7.96 THOUSAND/UL (ref 4.31–10.16)

## 2022-09-16 PROCEDURE — 85652 RBC SED RATE AUTOMATED: CPT

## 2022-09-16 PROCEDURE — 85025 COMPLETE CBC W/AUTO DIFF WBC: CPT | Performed by: PHYSICIAN ASSISTANT

## 2022-09-16 PROCEDURE — 36415 COLL VENOUS BLD VENIPUNCTURE: CPT | Performed by: PHYSICIAN ASSISTANT

## 2022-09-23 ENCOUNTER — APPOINTMENT (OUTPATIENT)
Dept: LAB | Facility: HOSPITAL | Age: 72
End: 2022-09-23
Payer: MEDICARE

## 2022-09-29 ENCOUNTER — OFFICE VISIT (OUTPATIENT)
Dept: GYNECOLOGIC ONCOLOGY | Facility: HOSPITAL | Age: 72
End: 2022-09-29
Payer: MEDICARE

## 2022-09-29 ENCOUNTER — APPOINTMENT (OUTPATIENT)
Dept: LAB | Facility: HOSPITAL | Age: 72
End: 2022-09-29
Payer: MEDICARE

## 2022-09-29 VITALS
TEMPERATURE: 97.8 F | DIASTOLIC BLOOD PRESSURE: 82 MMHG | SYSTOLIC BLOOD PRESSURE: 130 MMHG | WEIGHT: 182.6 LBS | HEIGHT: 64 IN | BODY MASS INDEX: 31.18 KG/M2

## 2022-09-29 DIAGNOSIS — C57.9 GYNECOLOGIC MALIGNANCY (HCC): ICD-10-CM

## 2022-09-29 DIAGNOSIS — C57.7 MALIGNANT NEOPLASM OF OTHER SPECIFIED FEMALE GENITAL ORGANS (HCC): ICD-10-CM

## 2022-09-29 DIAGNOSIS — F06.4 ANXIETY DISORDER DUE TO MEDICAL CONDITION: ICD-10-CM

## 2022-09-29 DIAGNOSIS — C48.2 PERITONEAL CARCINOMA (HCC): Primary | ICD-10-CM

## 2022-09-29 LAB
ALBUMIN SERPL BCP-MCNC: 3.6 G/DL (ref 3.5–5)
ALP SERPL-CCNC: 78 U/L (ref 46–116)
ALT SERPL W P-5'-P-CCNC: 38 U/L (ref 12–78)
ANION GAP SERPL CALCULATED.3IONS-SCNC: 6 MMOL/L (ref 4–13)
AST SERPL W P-5'-P-CCNC: 21 U/L (ref 5–45)
BASOPHILS # BLD MANUAL: 0 THOUSAND/UL (ref 0–0.1)
BASOPHILS NFR MAR MANUAL: 0 % (ref 0–1)
BILIRUB SERPL-MCNC: 0.2 MG/DL (ref 0.2–1)
BUN SERPL-MCNC: 12 MG/DL (ref 5–25)
CALCIUM SERPL-MCNC: 9.4 MG/DL (ref 8.3–10.1)
CANCER AG125 SERPL-ACNC: 12.5 U/ML (ref 0–30)
CHLORIDE SERPL-SCNC: 104 MMOL/L (ref 96–108)
CO2 SERPL-SCNC: 29 MMOL/L (ref 21–32)
CREAT SERPL-MCNC: 0.71 MG/DL (ref 0.6–1.3)
EOSINOPHIL # BLD MANUAL: 0.05 THOUSAND/UL (ref 0–0.4)
EOSINOPHIL NFR BLD MANUAL: 1 % (ref 0–6)
ERYTHROCYTE [DISTWIDTH] IN BLOOD BY AUTOMATED COUNT: 14.8 % (ref 11.6–15.1)
GFR SERPL CREATININE-BSD FRML MDRD: 85 ML/MIN/1.73SQ M
GLUCOSE SERPL-MCNC: 102 MG/DL (ref 65–140)
HCT VFR BLD AUTO: 37.1 % (ref 34.8–46.1)
HGB BLD-MCNC: 12.2 G/DL (ref 11.5–15.4)
LYMPHOCYTES # BLD AUTO: 0.93 THOUSAND/UL (ref 0.6–4.47)
LYMPHOCYTES # BLD AUTO: 18 % (ref 14–44)
MAGNESIUM SERPL-MCNC: 2 MG/DL (ref 1.6–2.6)
MCH RBC QN AUTO: 31.1 PG (ref 26.8–34.3)
MCHC RBC AUTO-ENTMCNC: 32.9 G/DL (ref 31.4–37.4)
MCV RBC AUTO: 95 FL (ref 82–98)
METAMYELOCYTES NFR BLD MANUAL: 2 % (ref 0–1)
MONOCYTES # BLD AUTO: 1.23 THOUSAND/UL (ref 0–1.22)
MONOCYTES NFR BLD: 24 % (ref 4–12)
NEUTROPHILS # BLD MANUAL: 2.83 THOUSAND/UL (ref 1.85–7.62)
NEUTS BAND NFR BLD MANUAL: 4 % (ref 0–8)
NEUTS SEG NFR BLD AUTO: 51 % (ref 43–75)
PLATELET # BLD AUTO: 175 THOUSANDS/UL (ref 149–390)
PLATELET BLD QL SMEAR: ADEQUATE
PMV BLD AUTO: 8.7 FL (ref 8.9–12.7)
POTASSIUM SERPL-SCNC: 3.9 MMOL/L (ref 3.5–5.3)
PROT SERPL-MCNC: 7.2 G/DL (ref 6.4–8.4)
RBC # BLD AUTO: 3.92 MILLION/UL (ref 3.81–5.12)
RBC MORPH BLD: NORMAL
SODIUM SERPL-SCNC: 139 MMOL/L (ref 135–147)
WBC # BLD AUTO: 5.14 THOUSAND/UL (ref 4.31–10.16)

## 2022-09-29 PROCEDURE — 36415 COLL VENOUS BLD VENIPUNCTURE: CPT

## 2022-09-29 PROCEDURE — 83735 ASSAY OF MAGNESIUM: CPT

## 2022-09-29 PROCEDURE — 86304 IMMUNOASSAY TUMOR CA 125: CPT

## 2022-09-29 PROCEDURE — 85027 COMPLETE CBC AUTOMATED: CPT

## 2022-09-29 PROCEDURE — 80053 COMPREHEN METABOLIC PANEL: CPT

## 2022-09-29 PROCEDURE — 99215 OFFICE O/P EST HI 40 MIN: CPT | Performed by: OBSTETRICS & GYNECOLOGY

## 2022-09-29 PROCEDURE — 85007 BL SMEAR W/DIFF WBC COUNT: CPT

## 2022-09-29 RX ORDER — PREDNISONE 1 MG/1
3 TABLET ORAL DAILY
Status: ON HOLD | COMMUNITY
Start: 2022-09-05

## 2022-09-29 RX ORDER — POLYETHYLENE GLYCOL 3350 17 G/17G
POWDER, FOR SOLUTION ORAL
Qty: 238 G | Refills: 0 | Status: ON HOLD | OUTPATIENT
Start: 2022-09-29

## 2022-09-29 RX ORDER — METOPROLOL SUCCINATE 25 MG/1
25 TABLET, EXTENDED RELEASE ORAL DAILY
Status: ON HOLD | COMMUNITY
Start: 2022-09-26

## 2022-09-29 RX ORDER — CEFAZOLIN SODIUM 2 G/50ML
2000 SOLUTION INTRAVENOUS ONCE
Status: CANCELLED | OUTPATIENT
Start: 2022-09-29 | End: 2022-09-29

## 2022-09-29 RX ORDER — METRONIDAZOLE 500 MG/1
500 TABLET ORAL ONCE
Qty: 1 TABLET | Refills: 0 | Status: SHIPPED | OUTPATIENT
Start: 2022-09-29 | End: 2022-09-29

## 2022-09-29 RX ORDER — ESCITALOPRAM OXALATE 10 MG/1
10 TABLET ORAL DAILY
Qty: 30 TABLET | Refills: 3 | Status: SHIPPED | OUTPATIENT
Start: 2022-09-29 | End: 2022-10-22

## 2022-09-29 RX ORDER — NEOMYCIN SULFATE 500 MG/1
1000 TABLET ORAL 3 TIMES DAILY
Qty: 6 TABLET | Refills: 0 | Status: SHIPPED | OUTPATIENT
Start: 2022-09-29 | End: 2022-09-30

## 2022-09-29 RX ORDER — LORAZEPAM 1 MG/1
1 TABLET ORAL EVERY 8 HOURS PRN
Qty: 30 TABLET | Refills: 0 | Status: ON HOLD | OUTPATIENT
Start: 2022-09-29

## 2022-09-29 RX ORDER — ACETAMINOPHEN 325 MG/1
975 TABLET ORAL ONCE
Status: CANCELLED | OUTPATIENT
Start: 2022-09-29 | End: 2022-09-29

## 2022-09-29 RX ORDER — METRONIDAZOLE 500 MG/100ML
500 INJECTION, SOLUTION INTRAVENOUS EVERY 8 HOURS
Status: CANCELLED | OUTPATIENT
Start: 2022-09-29

## 2022-09-29 RX ORDER — SODIUM CHLORIDE, SODIUM LACTATE, POTASSIUM CHLORIDE, CALCIUM CHLORIDE 600; 310; 30; 20 MG/100ML; MG/100ML; MG/100ML; MG/100ML
125 INJECTION, SOLUTION INTRAVENOUS CONTINUOUS
Status: CANCELLED | OUTPATIENT
Start: 2022-09-29

## 2022-09-29 RX ORDER — HEPARIN SODIUM 5000 [USP'U]/ML
5000 INJECTION, SOLUTION INTRAVENOUS; SUBCUTANEOUS
Status: CANCELLED | OUTPATIENT
Start: 2022-09-29 | End: 2022-09-30

## 2022-09-29 RX ORDER — GABAPENTIN 100 MG/1
100 CAPSULE ORAL ONCE
Status: CANCELLED | OUTPATIENT
Start: 2022-09-29 | End: 2022-09-29

## 2022-09-29 NOTE — ASSESSMENT & PLAN NOTE
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 with diagnostic laparoscopy prior to attempted surgical debulking revealing anti mesenteric biliary disease  She is receiving neoadjuvant chemotherapy with carboplatin AUC 6 and Taxol 175 milligrams/meter squared, Avastin at 15 milligrams/kilogram   She has temporal arteritis and is on chronic prednisone therapy  I reviewed her CBC, CMP, genetic testing results  Her performance status is 0     1  Will plan to continue chemotherapy with carboplatin at AUC 6 and Taxol 175 milligrams/meter squared for cycle 3  Bevacizumab will be held for possible debulking surgery  2  CT of chest abdomen pelvis after cycle 3,  prior to considering debulking surgery  3  I discussed the risks and benefits of diagnostic laparoscopy, possible conversion to exploratory laparotomy, total abdominal hysterectomy, bilateral salpingo-oophorectomy, all other indicated procedures including possible bowel resection, possible colostomy for tumor debulking  She understands the risks and benefits of the operation agrees to proceed as outlined  Consent for surgery was obtained by me in the office  4  Plan for preoperative stress dose steroids  We discussed perioperative medication management and bowel preparation  5  I prescribed Lexapro 10 mg daily for anxiety  If it is effective, will reduce lorazepam dose

## 2022-09-29 NOTE — PROGRESS NOTES
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 with diagnostic laparoscopy prior to attempted surgical debulking revealing anti mesenteric biliary disease  She is receiving neoadjuvant chemotherapy with carboplatin AUC 6 and Taxol 175 milligrams/meter squared, Avastin at 15 milligrams/kilogram   She has temporal arteritis and is on chronic prednisone therapy  I reviewed her CBC, CMP, genetic testing results  Her performance status is 0     1  Will plan to continue chemotherapy with carboplatin at AUC 6 and Taxol 175 milligrams/meter squared for cycle 3  Bevacizumab will be held for possible debulking surgery  2  CT of chest abdomen pelvis after cycle 3,  prior to considering debulking surgery  3  I discussed the risks and benefits of diagnostic laparoscopy, possible conversion to exploratory laparotomy, total abdominal hysterectomy, bilateral salpingo-oophorectomy, all other indicated procedures including possible bowel resection, possible colostomy for tumor debulking  She understands the risks and benefits of the operation agrees to proceed as outlined  Consent for surgery was obtained by me in the office  4  Plan for preoperative stress dose steroids  We discussed perioperative medication management and bowel preparation  5  I prescribed Lexapro 10 mg daily for anxiety  If it is effective, will reduce lorazepam dose            Relevant Medications    neomycin (MYCIFRADIN) 500 mg tablet    metroNIDAZOLE (FLAGYL) 500 mg tablet    polyethylene glycol (MiraLax) 17 GM/SCOOP powder    Other Relevant Orders    CT chest abdomen pelvis w contrast    Case request operating room: LAPAROSCOPY DIAGNOSTIC, HYSTERECTOMY TOTAL ABDOMINAL (LIN), TUMOR DEBULKING (Completed)    Type and screen    Protime-INR    HEMOGLOBIN A1C W/ EAG ESTIMATION    Provide patient education for GYN procedure    Provide patient education on NPO Guidelines    Anxiety disorder due to medical condition    Relevant Medications    escitalopram (Lexapro) 10 mg tablet    LORazepam (ATIVAN) 1 mg tablet              CHIEF COMPLAINT:  Treatment discussion        Previous therapy:  Oncology History   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[facility] )   7/21/2022 Initial Diagnosis    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[facility] )     8/4/2022 Surgery    Diagnostic laparoscopy, peritoneal biopsy     8/18/2022 Genetic Testing    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, serous  Results revealed patient has the following mutation(s):  None     Gynecologic malignancy ([facility] )   8/4/2022 Surgery    Diagnostic laparoscopy, peritoneal biopsy     8/9/2022 Initial Diagnosis    Gynecologic malignancy ([place] [address] )     8/23/2022 -  Chemotherapy    palonosetron (ALOXI), 0 25 mg, Intravenous, Once, 2 of 4 cycles  Administration: 0 25 mg (8/23/2022), 0 25 mg (9/13/2022)  fosaprepitant (EMEND) IVPB, 150 mg, Intravenous, Once, 2 of 4 cycles  Administration: 150 mg (8/23/2022), 150 mg (9/13/2022)  CARBOplatin (PARAPLATIN) IVPB (GOG AUC DOSING), 605 4 mg, Intravenous, Once, 2 of 4 cycles  Administration: 600 mg (8/23/2022), 564 mg (9/13/2022)  bevacizumab (AVASTIN) IVPB, 1,252 5 mg, Intravenous, Once, 2 of 2 cycles  Administration: 1,200 mg (8/23/2022), 1,200 mg (9/13/2022)  PACLItaxel (TAXOL) chemo IVPB, 175 mg/m2 = 330 6 mg, Intravenous, Once, 2 of 4 cycles  Administration: 330 6 mg (8/23/2022), 330 6 mg (9/13/2022)           Patient ID: Ann Stephens is a 67 y o  female  Who returns for treatment discussion  She is received 2 cycles of neoadjuvant carboplatin at AUC 5, Taxol 175 milligrams/meter squared, bevacizumab at 15 milligrams/kilogram   Her pre treatment steroid dose was reduced due to tremors  This has helped    She does note occasional tremors in the afternoon but overall much improved  She had genetic testing on 8/18/2022 which resulted as no inherited genetic susceptibility  Labs from 9/23/2022 revealed a normal CMP with a serum creatinine of 0 68 mg/dL, albumin 3 4 grams/deciliter  Mild leukopenia with total white blood cell count 3 87, hemoglobin 11 4 grams/deciliter  Her last  on 9/8/2022 was 32 units/ml  She continues to experience anxiety with insomnia  She is otherwise performing her activities of daily living without difficulty  No other interval change in her medications or medical history since her last visit  Review of Systems   Constitutional: Negative for activity change and unexpected weight change  HENT: Negative  Eyes: Negative  Respiratory: Negative  Cardiovascular: Negative  Gastrointestinal: Negative for abdominal distention and abdominal pain  Endocrine: Negative  Genitourinary: Negative for pelvic pain and vaginal bleeding  Musculoskeletal: Negative  Skin: Negative  Allergic/Immunologic: Negative  Neurological: Negative  Hematological: Negative  Psychiatric/Behavioral: Positive for sleep disturbance  The patient is nervous/anxious  Current Outpatient Medications   Medication Sig Dispense Refill    escitalopram (Lexapro) 10 mg tablet Take 1 tablet (10 mg total) by mouth daily 30 tablet 3    LORazepam (ATIVAN) 1 mg tablet Take 1 tablet (1 mg total) by mouth every 8 (eight) hours as needed for anxiety (nausea or anxiety) 30 tablet 0    metroNIDAZOLE (FLAGYL) 500 mg tablet Take 1 tablet (500 mg total) by mouth once for 1 dose Take at 9 PM the night before the procedure 1 tablet 0    neomycin (MYCIFRADIN) 500 mg tablet Take 2 tablets (1,000 mg total) by mouth 3 (three) times a day for 3 doses Take at 1 PM, 4 PM, and 9 PM the day before procedure   6 tablet 0    polyethylene glycol (MiraLax) 17 GM/SCOOP powder Mix with 64 oz Gatorade, begin 4 PM day before surgery per bowel prep instructions   238 g 0    Acetaminophen (TYLENOL ARTHRITIS PAIN PO) Take by mouth as needed      albuterol (PROVENTIL HFA,VENTOLIN HFA) 90 mcg/act inhaler Inhale 2 puffs every 4 (four) hours as needed       aspirin 81 mg chewable tablet Chew 81 mg daily      B Complex-C (B COMPLEX-VITAMIN C PO) Take by mouth daily      betamethasone, augmented, (DIPROLENE) 0 05 % lotion Apply topically as needed      Calcium Carb-Cholecalciferol (CALCIUM 500+D3 PO) Take 1 tablet by mouth 2 (two) times a day       cholecalciferol (VITAMIN D3) 1,000 units tablet Take 1,000 Units by mouth daily      Docusate Calcium (STOOL SOFTENER PO) Take 1 tablet by mouth daily       famotidine (PEPCID) 40 MG tablet Take 1 tablet (40 mg total) by mouth daily at bedtime 90 tablet 3    fexofenadine (ALLEGRA) 180 MG tablet Take 180 mg by mouth daily      FIBER ADULT GUMMIES PO Take by mouth      GLUCOSAMINE-CHONDROITIN PO Take 1 tablet by mouth 2 (two) times a day       hydrocortisone (Proctosol HC) 2 5 % rectal cream Insert into rectum two (2) times a day 28 35 g 3    lisinopril (ZESTRIL) 5 mg tablet Take 5 mg by mouth daily       metFORMIN (GLUCOPHAGE) 500 mg tablet Take 500 mg by mouth daily      metoprolol succinate (TOPROL-XL) 25 mg 24 hr tablet Take 25 mg by mouth daily      montelukast (SINGULAIR) 10 mg tablet Take 10 mg by mouth daily       multivitamin (THERAGRAN) TABS Take 1 tablet by mouth daily      omeprazole (PriLOSEC) 20 mg delayed release capsule TAKE 1 CAPSULE BY MOUTH EVERY DAY 30 MINUTES BEFORE MORNING MEAL FOR 90 DAYS      ondansetron (ZOFRAN) 8 mg tablet Take 1 tablet (8 mg total) by mouth every 8 (eight) hours as needed for nausea or vomiting 20 tablet 1    Polyethylene Glycol 3350 (MIRALAX PO) Take by mouth as needed      predniSONE 1 mg tablet Take 4 mg by mouth daily      Probiotic Product (PROBIOTIC DAILY PO) Take by mouth daily      simvastatin (ZOCOR) 10 mg tablet Take 10 mg by mouth daily at bedtime      SYMBICORT 160-4 5 MCG/ACT inhaler 2 puffs daily        No current facility-administered medications for this visit  No Known Allergies    Past Medical History:   Diagnosis Date    Allergic sinusitis     Asthma     Cholelithiasis     Colon polyp     Degenerative joint disease     GERD (gastroesophageal reflux disease)     Giant cell aortic arteritis (HCC)     Hyperlipidemia     Hypertension     PMR (polymyalgia rheumatica) (HCC)     Prediabetes        Past Surgical History:   Procedure Laterality Date    COLONOSCOPY      October 2021: Adenomatous polyps and rectum in transverse colon, sigmoid diverticulosis, internal hemorrhoids  October 2016 diverticulosis and internal hemorrhoids, September 2011 diverticulosis and internal hemorrhoids   DILATION AND CURETTAGE OF UTERUS      MS LAP,DIAGNOSTIC ABDOMEN N/A 8/4/2022    Procedure: DIAGNOSTIC LAPAROSCOPY, PERITONEAL BIOPSY, SAMPLING OF BLOODY ASCITES ;  Surgeon: Carloz Olivarez MD;  Location: [facility];  Service: Gynecology Oncology    TEMPORAL ARTERY BIOPSY / LIGATION      UPPER GASTROINTESTINAL ENDOSCOPY  11/10/2014    November 2014 irregular Z-line and Schatzki ring, hiatal hernia, gastritis  Biopsies negative for celiac, H pylori, or Phillip's or eosinophilic esophagitis  OB History    No obstetric history on file           Family History   Problem Relation Age of Onset    Colon polyps Mother    Asha Nine Alzheimer's disease Mother     Heart disease Father     Brain cancer Father     Colon polyps Sister     Heart disease Sister     Diabetes Sister     MARLEY disease Sister     Colon cancer Neg Hx        The following portions of the patient's history were reviewed and updated as appropriate: allergies, current medications, past family history, past medical history, past social history, past surgical history and problem list       Objective:    Blood pressure 130/82, temperature 97 8 °F (36 6 °C), temperature source Tympanic, height 5' 4 49" (1 638 m), weight 82 8 kg (182 lb 9 6 oz)  Body mass index is 30 87 kg/m²  Physical Exam  Vitals reviewed  Constitutional:       General: She is not in acute distress  Appearance: Normal appearance  She is normal weight  She is not ill-appearing  HENT:      Head: Normocephalic and atraumatic  Mouth/Throat:      Mouth: Mucous membranes are moist    Eyes:      General: No scleral icterus  Right eye: No discharge  Left eye: No discharge  Conjunctiva/sclera: Conjunctivae normal    Cardiovascular:      Rate and Rhythm: Normal rate and regular rhythm  Heart sounds: Normal heart sounds  Pulmonary:      Effort: Pulmonary effort is normal  No respiratory distress  Breath sounds: Normal breath sounds  No stridor  No wheezing or rhonchi  Abdominal:      Palpations: Abdomen is soft  Musculoskeletal:      Right lower leg: No edema  Left lower leg: No edema  Skin:     General: Skin is warm and dry  Coloration: Skin is not jaundiced  Findings: No rash  Neurological:      General: No focal deficit present  Mental Status: She is alert and oriented to person, place, and time  Cranial Nerves: No cranial nerve deficit  Motor: No weakness  Gait: Gait normal    Psychiatric:         Mood and Affect: Mood normal          Behavior: Behavior normal          Thought Content:  Thought content normal          Judgment: Judgment normal            Lab Results   Component Value Date     32 0 (H) 09/08/2022     Lab Results   Component Value Date    WBC 3 87 (L) 09/23/2022    HGB 11 4 (L) 09/23/2022    HCT 34 5 (L) 09/23/2022    MCV 95 09/23/2022     09/23/2022     Lab Results   Component Value Date    K 4 1 09/23/2022     09/23/2022    CO2 27 09/23/2022    BUN 11 09/23/2022    CREATININE 0 68 09/23/2022    GLUF 130 (H) 09/02/2022    CALCIUM 8 8 09/23/2022    CORRECTEDCA 9 3 09/23/2022    AST 31 09/23/2022    ALT 51 09/23/2022    ALKPHOS 80 09/23/2022    EGFR 87 09/23/2022        Trend:  Lab Results   Component Value Date     32 0 (H) 09/08/2022     52 4 (H) 09/02/2022     85 5 (H) 08/26/2022     77 3 (H) 08/19/2022

## 2022-09-30 ENCOUNTER — DOCUMENTATION (OUTPATIENT)
Dept: HEMATOLOGY ONCOLOGY | Facility: CLINIC | Age: 72
End: 2022-09-30

## 2022-10-03 ENCOUNTER — PATIENT MESSAGE (OUTPATIENT)
Dept: GYNECOLOGIC ONCOLOGY | Facility: HOSPITAL | Age: 72
End: 2022-10-03

## 2022-10-03 RX ORDER — SODIUM CHLORIDE 9 MG/ML
20 INJECTION, SOLUTION INTRAVENOUS ONCE
Status: CANCELLED | OUTPATIENT
Start: 2022-10-04

## 2022-10-03 RX ORDER — PALONOSETRON 0.05 MG/ML
0.25 INJECTION, SOLUTION INTRAVENOUS ONCE
Status: CANCELLED | OUTPATIENT
Start: 2022-10-04

## 2022-10-03 NOTE — PROGRESS NOTES
9-30-22  Rcvd POST OP Eliquis request for assitance  Post Op Surg date 10-28    Free trial card obtained for patient      ID# [id number]  BIN# [id number]  [facility]# [id number]  [facility]# [id number]

## 2022-10-04 ENCOUNTER — ANESTHESIA EVENT (OUTPATIENT)
Dept: PERIOP | Facility: HOSPITAL | Age: 72
End: 2022-10-04

## 2022-10-04 ENCOUNTER — HOSPITAL ENCOUNTER (OUTPATIENT)
Dept: INFUSION CENTER | Facility: HOSPITAL | Age: 72
Discharge: HOME | End: 2022-10-04
Attending: OBSTETRICS & GYNECOLOGY
Payer: MEDICARE

## 2022-10-04 VITALS
HEIGHT: 64 IN | RESPIRATION RATE: 16 BRPM | TEMPERATURE: 96.8 F | OXYGEN SATURATION: 98 % | BODY MASS INDEX: 31.28 KG/M2 | HEART RATE: 78 BPM | WEIGHT: 183.2 LBS | DIASTOLIC BLOOD PRESSURE: 75 MMHG | SYSTOLIC BLOOD PRESSURE: 126 MMHG

## 2022-10-04 DIAGNOSIS — C57.9 GYNECOLOGIC MALIGNANCY (HCC): Primary | ICD-10-CM

## 2022-10-04 PROCEDURE — 96413 CHEMO IV INFUSION 1 HR: CPT

## 2022-10-04 PROCEDURE — 96415 CHEMO IV INFUSION ADDL HR: CPT

## 2022-10-04 PROCEDURE — 96417 CHEMO IV INFUS EACH ADDL SEQ: CPT

## 2022-10-04 PROCEDURE — 96375 TX/PRO/DX INJ NEW DRUG ADDON: CPT

## 2022-10-04 PROCEDURE — 96367 TX/PROPH/DG ADDL SEQ IV INF: CPT

## 2022-10-04 RX ORDER — PALONOSETRON 0.05 MG/ML
0.25 INJECTION, SOLUTION INTRAVENOUS ONCE
Status: COMPLETED | OUTPATIENT
Start: 2022-10-04 | End: 2022-10-04

## 2022-10-04 RX ORDER — SODIUM CHLORIDE 9 MG/ML
20 INJECTION, SOLUTION INTRAVENOUS ONCE
Status: COMPLETED | OUTPATIENT
Start: 2022-10-04 | End: 2022-10-04

## 2022-10-04 RX ADMIN — CARBOPLATIN 601.8 MG: 10 INJECTION, SOLUTION INTRAVENOUS at 13:49

## 2022-10-04 RX ADMIN — SODIUM CHLORIDE 20 ML/HR: 0.9 INJECTION, SOLUTION INTRAVENOUS at 08:56

## 2022-10-04 RX ADMIN — DIPHENHYDRAMINE HYDROCHLORIDE 25 MG: 50 INJECTION, SOLUTION INTRAMUSCULAR; INTRAVENOUS at 09:22

## 2022-10-04 RX ADMIN — PACLITAXEL 330.6 MG: 6 INJECTION, SOLUTION INTRAVENOUS at 10:51

## 2022-10-04 RX ADMIN — FAMOTIDINE 20 MG: 10 INJECTION INTRAVENOUS at 09:45

## 2022-10-04 RX ADMIN — FOSAPREPITANT 150 MG: 150 INJECTION, POWDER, LYOPHILIZED, FOR SOLUTION INTRAVENOUS at 10:08

## 2022-10-04 RX ADMIN — DEXAMETHASONE SODIUM PHOSPHATE 10 MG: 10 INJECTION, SOLUTION INTRAMUSCULAR; INTRAVENOUS at 08:57

## 2022-10-04 RX ADMIN — PALONOSETRON 0.25 MG: 0.25 INJECTION, SOLUTION INTRAVENOUS at 08:59

## 2022-10-07 ENCOUNTER — APPOINTMENT (OUTPATIENT)
Dept: LAB | Facility: HOSPITAL | Age: 72
End: 2022-10-07
Payer: MEDICARE

## 2022-10-07 DIAGNOSIS — C57.9 GYNECOLOGIC MALIGNANCY (HCC): ICD-10-CM

## 2022-10-07 DIAGNOSIS — C57.7 MALIGNANT NEOPLASM OF OTHER SPECIFIED FEMALE GENITAL ORGANS (HCC): ICD-10-CM

## 2022-10-07 LAB — CANCER AG125 SERPL-ACNC: 8.4 U/ML (ref 0–30)

## 2022-10-07 PROCEDURE — 86304 IMMUNOASSAY TUMOR CA 125: CPT

## 2022-10-10 ENCOUNTER — PATIENT MESSAGE (OUTPATIENT)
Dept: GYNECOLOGIC ONCOLOGY | Facility: HOSPITAL | Age: 72
End: 2022-10-10

## 2022-10-13 ENCOUNTER — TELEPHONE (OUTPATIENT)
Dept: GYNECOLOGIC ONCOLOGY | Facility: CLINIC | Age: 72
End: 2022-10-13

## 2022-10-13 DIAGNOSIS — C48.2 PERITONEAL CARCINOMA (HCC): Primary | ICD-10-CM

## 2022-10-13 RX ORDER — METRONIDAZOLE 500 MG/1
TABLET ORAL
COMMUNITY
Start: 2022-09-29 | End: 2022-11-08

## 2022-10-13 NOTE — TELEPHONE ENCOUNTER
Patient was supposed to have surgery in August, and therefore already has Eliquis rx  #56  She is aware not to start these until after her surgery

## 2022-10-13 NOTE — PRE-PROCEDURE INSTRUCTIONS
Pre-Surgery Instructions:   Medication Instructions   • Acetaminophen (TYLENOL ARTHRITIS PAIN PO) Uses PRN- OK to take day of surgery   • albuterol (PROVENTIL HFA,VENTOLIN HFA) 90 mcg/act inhaler Uses PRN- OK to take day of surgery   • Ascorbic Acid (VITAMIN C GUMMIE PO) Stop taking 7 days prior to surgery  • aspirin 81 mg chewable tablet Stop taking 7 days prior to surgery  per pt LD 10/15/22   • B Complex-C (B COMPLEX-VITAMIN C PO) Stop taking 7 days prior to surgery  • Calcium Carb-Cholecalciferol (CALCIUM 500+D3 PO) Stop taking 7 days prior to surgery  • cholecalciferol (VITAMIN D3) 1,000 units tablet Stop taking 7 days prior to surgery  • Docusate Calcium (STOOL SOFTENER PO) Hold day of surgery  • famotidine (PEPCID) 40 MG tablet Take night before surgery   • fexofenadine (ALLEGRA) 180 MG tablet Hold day of surgery  • FIBER ADULT GUMMIES PO Hold day of surgery  • GLUCOSAMINE-CHONDROITIN PO Stop taking 7 days prior to surgery  • lisinopril (ZESTRIL) 5 mg tablet Hold day of surgery  • LORazepam (ATIVAN) 1 mg tablet Uses PRN- OK to take day of surgery   • metFORMIN (GLUCOPHAGE) 500 mg tablet Hold day of surgery  • metoprolol succinate (TOPROL-XL) 25 mg 24 hr tablet Take night before surgery   • montelukast (SINGULAIR) 10 mg tablet Take night before surgery   • multivitamin (THERAGRAN) TABS Stop taking 7 days prior to surgery  • omeprazole (PriLOSEC) 20 mg delayed release capsule Take day of surgery  • ondansetron (ZOFRAN) 8 mg tablet Uses PRN- OK to take day of surgery   • predniSONE 1 mg tablet Instructions provided by MD-per pt can only take with food (not taking DOS)and surg informed that she will receive IV med DOS  • Probiotic Product (PROBIOTIC DAILY PO) Stop taking 7 days prior to surgery  • simvastatin (ZOCOR) 10 mg tablet Take night before surgery   • SYMBICORT 160-4 5 MCG/ACT inhaler Take day of surgery        Patient instructed on use of chlorhexidine soap per hospital protocol    Patient instructed to stop all ASA, NSAIDS, vitamins and herbal supplements one week prior to surgery or per Dr Matheus Brown below       • Cognitive Assessment:   • CAM:   • TUG <15 sec:  • Falls (last 6 months): 0  • Hand  score:  -Attempt 1:  -Attempt 2:  -Attempt 3:  • Jimbo Total Score: 21  • PHQ- 9 Depression Scale:6  • Nutrition Assessment Score:10  • METS:5 07  • Health goals:  -What are your overall health goals? (quit smoking, wt  loss, rest, decrease stress)  "To get into remission from cancer and complete healing"-and "longer life span"  -What brings you strength? (family, friends, Quaker, health)  "Katie and [employer] --children-grandkids- family-friends- "Being on prayer   list-receiving Mass cards"  -What activities are important to you? (exercise, reading, travel, work)             "spending time with family-travel-playing cards-walking

## 2022-10-14 ENCOUNTER — LAB REQUISITION (OUTPATIENT)
Dept: LAB | Facility: HOSPITAL | Age: 72
End: 2022-10-14
Payer: MEDICARE

## 2022-10-14 ENCOUNTER — APPOINTMENT (OUTPATIENT)
Dept: LAB | Facility: HOSPITAL | Age: 72
End: 2022-10-14
Attending: OBSTETRICS & GYNECOLOGY
Payer: MEDICARE

## 2022-10-14 ENCOUNTER — HOSPITAL ENCOUNTER (OUTPATIENT)
Dept: CT IMAGING | Facility: HOSPITAL | Age: 72
Discharge: HOME | End: 2022-10-14
Attending: OBSTETRICS & GYNECOLOGY
Payer: MEDICARE

## 2022-10-14 DIAGNOSIS — C48.2 PERITONEAL CARCINOMA (HCC): ICD-10-CM

## 2022-10-14 DIAGNOSIS — C48.2 MALIGNANT NEOPLASM OF PERITONEUM, UNSPECIFIED (HCC): ICD-10-CM

## 2022-10-14 LAB
ABO GROUP BLD: NORMAL
BLD GP AB SCN SERPL QL: NEGATIVE
EST. AVERAGE GLUCOSE BLD GHB EST-MCNC: 134 MG/DL
HBA1C MFR BLD: 6.3 %
INR PPP: 0.82 (ref 0.84–1.19)
PROTHROMBIN TIME: 11.9 SECONDS (ref 11.6–14.5)
RH BLD: POSITIVE
SPECIMEN EXPIRATION DATE: NORMAL

## 2022-10-14 PROCEDURE — 71260 CT THORAX DX C+: CPT

## 2022-10-14 PROCEDURE — 86901 BLOOD TYPING SEROLOGIC RH(D): CPT | Performed by: OBSTETRICS & GYNECOLOGY

## 2022-10-14 PROCEDURE — 86850 RBC ANTIBODY SCREEN: CPT | Performed by: OBSTETRICS & GYNECOLOGY

## 2022-10-14 PROCEDURE — 83036 HEMOGLOBIN GLYCOSYLATED A1C: CPT

## 2022-10-14 PROCEDURE — G1004 CDSM NDSC: HCPCS

## 2022-10-14 PROCEDURE — 74177 CT ABD & PELVIS W/CONTRAST: CPT

## 2022-10-14 PROCEDURE — 85610 PROTHROMBIN TIME: CPT

## 2022-10-14 PROCEDURE — 86900 BLOOD TYPING SEROLOGIC ABO: CPT | Performed by: OBSTETRICS & GYNECOLOGY

## 2022-10-14 RX ADMIN — IOHEXOL 100 ML: 350 INJECTION, SOLUTION INTRAVENOUS at 14:38

## 2022-10-19 ENCOUNTER — TELEPHONE (OUTPATIENT)
Dept: SURGICAL ONCOLOGY | Facility: CLINIC | Age: 72
End: 2022-10-19

## 2022-10-19 NOTE — TELEPHONE ENCOUNTER
Luther Del Angel from the [facility] called with significant findings on patient's CT c/a/p done on 10/14/22  This was ordered by Dr Shyanne Camarena

## 2022-10-20 ENCOUNTER — APPOINTMENT (OUTPATIENT)
Dept: LAB | Facility: HOSPITAL | Age: 72
End: 2022-10-20
Payer: MEDICARE

## 2022-10-20 ENCOUNTER — OFFICE VISIT (OUTPATIENT)
Dept: GYNECOLOGIC ONCOLOGY | Facility: HOSPITAL | Age: 72
End: 2022-10-20
Payer: MEDICARE

## 2022-10-20 VITALS
SYSTOLIC BLOOD PRESSURE: 130 MMHG | DIASTOLIC BLOOD PRESSURE: 70 MMHG | HEIGHT: 65 IN | TEMPERATURE: 98.9 F | BODY MASS INDEX: 29.82 KG/M2 | WEIGHT: 179 LBS

## 2022-10-20 DIAGNOSIS — C57.7 MALIGNANT NEOPLASM OF OTHER SPECIFIED FEMALE GENITAL ORGANS (HCC): ICD-10-CM

## 2022-10-20 DIAGNOSIS — C48.2 PERITONEAL CARCINOMA (HCC): ICD-10-CM

## 2022-10-20 DIAGNOSIS — C57.9 GYNECOLOGIC MALIGNANCY (HCC): Primary | ICD-10-CM

## 2022-10-20 DIAGNOSIS — C57.9 GYNECOLOGIC MALIGNANCY (HCC): ICD-10-CM

## 2022-10-20 LAB
ALBUMIN SERPL BCP-MCNC: 3.8 G/DL (ref 3.5–5)
ALP SERPL-CCNC: 72 U/L (ref 46–116)
ALT SERPL W P-5'-P-CCNC: 30 U/L (ref 12–78)
ANION GAP SERPL CALCULATED.3IONS-SCNC: 11 MMOL/L (ref 4–13)
AST SERPL W P-5'-P-CCNC: 19 U/L (ref 5–45)
BASOPHILS # BLD MANUAL: 0 THOUSAND/UL (ref 0–0.1)
BASOPHILS NFR MAR MANUAL: 0 % (ref 0–1)
BILIRUB SERPL-MCNC: 0.2 MG/DL (ref 0.2–1)
BUN SERPL-MCNC: 14 MG/DL (ref 5–25)
CALCIUM SERPL-MCNC: 9.3 MG/DL (ref 8.3–10.1)
CANCER AG125 SERPL-ACNC: 8.6 U/ML (ref 0–30)
CHLORIDE SERPL-SCNC: 99 MMOL/L (ref 96–108)
CO2 SERPL-SCNC: 26 MMOL/L (ref 21–32)
CREAT SERPL-MCNC: 0.78 MG/DL (ref 0.6–1.3)
EOSINOPHIL # BLD MANUAL: 0 THOUSAND/UL (ref 0–0.4)
EOSINOPHIL NFR BLD MANUAL: 0 % (ref 0–6)
ERYTHROCYTE [DISTWIDTH] IN BLOOD BY AUTOMATED COUNT: 16.3 % (ref 11.6–15.1)
GFR SERPL CREATININE-BSD FRML MDRD: 76 ML/MIN/1.73SQ M
GLUCOSE SERPL-MCNC: 103 MG/DL (ref 65–140)
HCT VFR BLD AUTO: 36.2 % (ref 34.8–46.1)
HGB BLD-MCNC: 11.8 G/DL (ref 11.5–15.4)
LYMPHOCYTES # BLD AUTO: 0.58 THOUSAND/UL (ref 0.6–4.47)
LYMPHOCYTES # BLD AUTO: 12 % (ref 14–44)
MAGNESIUM SERPL-MCNC: 1.9 MG/DL (ref 1.6–2.6)
MCH RBC QN AUTO: 31.8 PG (ref 26.8–34.3)
MCHC RBC AUTO-ENTMCNC: 32.6 G/DL (ref 31.4–37.4)
MCV RBC AUTO: 98 FL (ref 82–98)
MONOCYTES # BLD AUTO: 0.54 THOUSAND/UL (ref 0–1.22)
MONOCYTES NFR BLD: 11 % (ref 4–12)
NEUTROPHILS # BLD MANUAL: 3.75 THOUSAND/UL (ref 1.85–7.62)
NEUTS BAND NFR BLD MANUAL: 2 % (ref 0–8)
NEUTS SEG NFR BLD AUTO: 75 % (ref 43–75)
PLATELET # BLD AUTO: 133 THOUSANDS/UL (ref 149–390)
PLATELET BLD QL SMEAR: ABNORMAL
PMV BLD AUTO: 9.1 FL (ref 8.9–12.7)
POTASSIUM SERPL-SCNC: 4 MMOL/L (ref 3.5–5.3)
PROT SERPL-MCNC: 7.6 G/DL (ref 6.4–8.4)
RBC # BLD AUTO: 3.71 MILLION/UL (ref 3.81–5.12)
RBC MORPH BLD: NORMAL
SODIUM SERPL-SCNC: 136 MMOL/L (ref 135–147)
WBC # BLD AUTO: 4.87 THOUSAND/UL (ref 4.31–10.16)

## 2022-10-20 PROCEDURE — 99215 OFFICE O/P EST HI 40 MIN: CPT | Performed by: OBSTETRICS & GYNECOLOGY

## 2022-10-20 PROCEDURE — 80053 COMPREHEN METABOLIC PANEL: CPT

## 2022-10-20 PROCEDURE — 85007 BL SMEAR W/DIFF WBC COUNT: CPT

## 2022-10-20 PROCEDURE — 85027 COMPLETE CBC AUTOMATED: CPT

## 2022-10-20 PROCEDURE — 86304 IMMUNOASSAY TUMOR CA 125: CPT

## 2022-10-20 PROCEDURE — 36415 COLL VENOUS BLD VENIPUNCTURE: CPT

## 2022-10-20 PROCEDURE — 83735 ASSAY OF MAGNESIUM: CPT

## 2022-10-20 NOTE — PROGRESS NOTES
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 who initially underwent attempted cytoreductive surgery which was aborted due to peritoneal carcinomatosis  She has received 3 cycles of chemotherapy with carboplatin, Taxol  Two cycles of bevacizumab  I reviewed her CBC, CMP, hemoglobin A1c, , CT of chest abdomen pelvis  She is had a measurable response to treatment  Her performance status is 0     1  Plan for diagnostic laparoscopy, possible conversion to exploratory laparotomy, total abdominal hysterectomy, bilateral salpingo-oophorectomy, all other indicated procedures including possible bowel resection, possible colostomy, tumor debulking surgery  Consent had been previously obtained at the last visit  2  We discussed bowel preparation preoperatively and perioperative medication management  3  She will likely require stress dose steroids at the time of surgery  Given chronic use for temporal arteritis  CHIEF COMPLAINT:       Problem:  Cancer Staging  No matching staging information was found for the patient  Previous therapy:  Oncology History   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[facility] [address] )   7/21/2022 Initial Diagnosis    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     8/4/2022 Surgery    Diagnostic laparoscopy, peritoneal biopsy     8/18/2022 Genetic Testing    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, serous    Results revealed patient has the following mutation(s):  None     Gynecologic malignancy ([facility] [address] )   8/4/2022 Surgery    Diagnostic laparoscopy, peritoneal biopsy     8/9/2022 Initial Diagnosis    [facility] )     8/23/2022 -  Chemotherapy    palonosetron (ALOXI), 0 25 mg, Intravenous, Once, 3 of 4 cycles  Administration: 0 25 mg (8/23/2022), 0 25 mg (9/13/2022), 0 25 mg (10/4/2022)  fosaprepitant (EMEND) IVPB, 150 mg, Intravenous, Once, 3 of 4 cycles  Administration: 150 mg (8/23/2022), 150 mg (9/13/2022), 150 mg (10/4/2022)  CARBOplatin (PARAPLATIN) IVPB (GOG AUC DOSING), 605 4 mg, Intravenous, Once, 3 of 4 cycles  Administration: 600 mg (8/23/2022), 564 mg (9/13/2022), 601 8 mg (10/4/2022)  bevacizumab (AVASTIN) IVPB, 1,252 5 mg, Intravenous, Once, 2 of 2 cycles  Administration: 1,200 mg (8/23/2022), 1,200 mg (9/13/2022)  PACLItaxel (TAXOL) chemo IVPB, 175 mg/m2 = 330 6 mg, Intravenous, Once, 3 of 4 cycles  Administration: 330 6 mg (8/23/2022), 330 6 mg (9/13/2022), 330 6 mg (10/4/2022)           Patient ID: Esteban Aparicio is a 67 y o  female  Who returns after 3 cycles of neoadjuvant chemotherapy to discuss treatment options  She received 2 cycles of carboplatin, Taxol, bevacizumab  Bevacizumab was held for cycle 3  Current dose is carboplatin AUC 6 and Taxol 175 milligrams/meter squared with bevacizumab at 15 milligrams/kilogram   Labs from 10/14/2022 reveal hemoglobin A1c of 6 3%, normal CBC with mild leukopenia, mild anemia with hemoglobin 10 5 grams/deciliter   as of 10/7/2022 has normalized at 8 4 units/ml  She would a CT scan on 10/19/2022 of the chest abdomen pelvis that I personally reviewed  This revealed persistent omental caking  There was no evidence of ascites or lymphadenopathy  There is thickening of the sigmoid colon adjacent to the left ovarian mass  She had colonoscopy in July of 2022 which was negative  No other interval change in medications or medical history since her last visit to the office  She notes occasional numbness in her left arm without other neurologic symptoms          The following portions of the patient's history were reviewed and updated as appropriate: allergies, current medications, past family history, past medical history, past social history, past surgical history and problem list     Review of Systems   Constitutional: Negative for activity change and unexpected weight change  HENT: Negative  Eyes: Negative  Respiratory: Negative  Cardiovascular: Negative  Gastrointestinal: Negative for abdominal distention and abdominal pain  Endocrine: Negative  Genitourinary: Negative for pelvic pain and vaginal bleeding  Musculoskeletal: Negative  Skin: Negative  Allergic/Immunologic: Negative  Neurological: Negative  Numbness left arm   Hematological: Negative  Psychiatric/Behavioral: Negative          Current Outpatient Medications   Medication Sig Dispense Refill   • Acetaminophen (TYLENOL ARTHRITIS PAIN PO) Take by mouth as needed     • albuterol (PROVENTIL HFA,VENTOLIN HFA) 90 mcg/act inhaler Inhale 2 puffs every 4 (four) hours as needed      • Ascorbic Acid (VITAMIN C GUMMIE PO) Take by mouth     • aspirin 81 mg chewable tablet Chew 81 mg daily     • B Complex-C (B COMPLEX-VITAMIN C PO) Take by mouth daily     • betamethasone, augmented, (DIPROLENE) 0 05 % lotion Apply topically as needed     • Calcium Carb-Cholecalciferol (CALCIUM 500+D3 PO) Take 1 tablet by mouth 2 (two) times a day      • cholecalciferol (VITAMIN D3) 1,000 units tablet Take 1,000 Units by mouth daily     • Docusate Calcium (STOOL SOFTENER PO) Take 1 tablet by mouth 2 (two) times a day     • escitalopram (Lexapro) 10 mg tablet Take 1 tablet (10 mg total) by mouth daily 30 tablet 3   • famotidine (PEPCID) 40 MG tablet Take 1 tablet (40 mg total) by mouth daily at bedtime 90 tablet 3   • fexofenadine (ALLEGRA) 180 MG tablet Take 180 mg by mouth daily     • FIBER ADULT GUMMIES PO Take by mouth if needed     • GLUCOSAMINE-CHONDROITIN PO Take 1 tablet by mouth 2 (two) times a day      • hydrocortisone (Proctosol HC) 2 5 % rectal cream Insert into rectum two (2) times a day 28 35 g 3   • lisinopril (ZESTRIL) 5 mg tablet Take 5 mg by mouth daily dinner     • LORazepam (ATIVAN) 1 mg tablet Take 1 tablet (1 mg total) by mouth every 8 (eight) hours as needed for anxiety (nausea or anxiety) (Patient taking differently: Take 1 mg by mouth every 8 (eight) hours as needed for anxiety (nausea or anxiety) Per pt usually takes q night) 30 tablet 0   • metFORMIN (GLUCOPHAGE) 500 mg tablet Take 500 mg by mouth daily Daily at lunch     • metoprolol succinate (TOPROL-XL) 25 mg 24 hr tablet Take 25 mg by mouth daily dinner     • metroNIDAZOLE (FLAGYL) 500 mg tablet      • montelukast (SINGULAIR) 10 mg tablet Take 10 mg by mouth daily dinner     • multivitamin (THERAGRAN) TABS Take 1 tablet by mouth daily     • omeprazole (PriLOSEC) 20 mg delayed release capsule TAKE 1 CAPSULE BY MOUTH EVERY DAY 30 MINUTES BEFORE MORNING MEAL FOR 90 DAYS     • ondansetron (ZOFRAN) 8 mg tablet Take 1 tablet (8 mg total) by mouth every 8 (eight) hours as needed for nausea or vomiting 20 tablet 1   • polyethylene glycol (MiraLax) 17 GM/SCOOP powder Mix with 64 oz Gatorade, begin 4 PM day before surgery per bowel prep instructions  238 g 0   • Polyethylene Glycol 3350 (MIRALAX PO) Take by mouth as needed     • predniSONE 1 mg tablet Take 3 mg by mouth daily     • Probiotic Product (PROBIOTIC DAILY PO) Take by mouth daily     • simvastatin (ZOCOR) 10 mg tablet Take 10 mg by mouth daily at bedtime     • SYMBICORT 160-4 5 MCG/ACT inhaler 2 puffs daily        No current facility-administered medications for this visit  Objective:    Blood pressure 130/70, temperature 98 9 °F (37 2 °C), temperature source Tympanic, height 5' 4 5" (1 638 m), weight 81 2 kg (179 lb)  Body mass index is 30 25 kg/m²  Body surface area is 1 88 meters squared  Physical Exam  Vitals reviewed  Constitutional:       General: She is not in acute distress  Appearance: Normal appearance  She is not ill-appearing  HENT:      Head: Normocephalic and atraumatic  Mouth/Throat:      Mouth: Mucous membranes are moist    Eyes:      General: No scleral icterus  Right eye: No discharge  Left eye: No discharge  Conjunctiva/sclera: Conjunctivae normal    Pulmonary:      Effort: Pulmonary effort is normal    Musculoskeletal:      Right lower leg: No edema  Left lower leg: No edema  Skin:     General: Skin is warm and dry  Coloration: Skin is not jaundiced  Findings: No rash  Neurological:      General: No focal deficit present  Mental Status: She is alert and oriented to person, place, and time  Cranial Nerves: No cranial nerve deficit  Motor: No weakness  Gait: Gait normal    Psychiatric:         Mood and Affect: Mood normal          Behavior: Behavior normal          Thought Content:  Thought content normal          Judgment: Judgment normal          Lab Results   Component Value Date     8 4 10/07/2022     Lab Results   Component Value Date    K 4 0 10/14/2022     10/14/2022    CO2 26 10/14/2022    BUN 14 10/14/2022    CREATININE 0 64 10/14/2022    GLUF 111 (H) 10/14/2022    CALCIUM 9 2 10/14/2022    CORRECTEDCA 9 4 10/07/2022    AST 19 10/14/2022    ALT 34 10/14/2022    ALKPHOS 76 10/14/2022    EGFR 89 10/14/2022     Lab Results   Component Value Date    WBC 3 68 (L) 10/14/2022    HGB 10 8 (L) 10/14/2022    HCT 32 3 (L) 10/14/2022    MCV 95 10/14/2022     10/14/2022     Lab Results   Component Value Date    NEUTROABS 2 36 10/14/2022        Trend:  Lab Results   Component Value Date     8 4 10/07/2022     12 5 09/29/2022     32 0 (H) 09/08/2022     52 4 (H) 09/02/2022     85 5 (H) 08/26/2022     77 3 (H) 08/19/2022

## 2022-10-20 NOTE — ASSESSMENT & PLAN NOTE
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 who initially underwent attempted cytoreductive surgery which was aborted due to peritoneal carcinomatosis  She has received 3 cycles of chemotherapy with carboplatin, Taxol  Two cycles of bevacizumab  I reviewed her CBC, CMP, hemoglobin A1c, , CT of chest abdomen pelvis  She is had a measurable response to treatment  Her performance status is 0     1  Plan for diagnostic laparoscopy, possible conversion to exploratory laparotomy, total abdominal hysterectomy, bilateral salpingo-oophorectomy, all other indicated procedures including possible bowel resection, possible colostomy, tumor debulking surgery  Consent had been previously obtained at the last visit  2  We discussed bowel preparation preoperatively and perioperative medication management  3  She will likely require stress dose steroids at the time of surgery  Given chronic use for temporal arteritis

## 2022-10-20 NOTE — H&P (VIEW-ONLY)
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 who initially underwent attempted cytoreductive surgery which was aborted due to peritoneal carcinomatosis  She has received 3 cycles of chemotherapy with carboplatin, Taxol  Two cycles of bevacizumab  I reviewed her CBC, CMP, hemoglobin A1c, , CT of chest abdomen pelvis  She is had a measurable response to treatment  Her performance status is 0     1  Plan for diagnostic laparoscopy, possible conversion to exploratory laparotomy, total abdominal hysterectomy, bilateral salpingo-oophorectomy, all other indicated procedures including possible bowel resection, possible colostomy, tumor debulking surgery  Consent had been previously obtained at the last visit  2  We discussed bowel preparation preoperatively and perioperative medication management  3  She will likely require stress dose steroids at the time of surgery  Given chronic use for temporal arteritis  CHIEF COMPLAINT:       Problem:  Cancer Staging  No matching staging information was found for the patient  Previous therapy:  Oncology History   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[facility] )   7/21/2022 Initial Diagnosis    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     8/4/2022 Surgery    Diagnostic laparoscopy, peritoneal biopsy     8/18/2022 Genetic Testing    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, serous    Results revealed patient has the following mutation(s):  None     Gynecologic malignancy ([facility] )   8/4/2022 Surgery    Diagnostic laparoscopy, peritoneal biopsy     8/9/2022 Initial Diagnosis    [facility]     8/23/2022 -  Chemotherapy    palonosetron (ALOXI), 0 25 mg, Intravenous, Once, 3 of 4 cycles  Administration: 0 25 mg (8/23/2022), 0 25 mg (9/13/2022), 0 25 mg (10/4/2022)  fosaprepitant (EMEND) IVPB, 150 mg, Intravenous, Once, 3 of 4 cycles  Administration: 150 mg (8/23/2022), 150 mg (9/13/2022), 150 mg (10/4/2022)  CARBOplatin (PARAPLATIN) IVPB (GOG AUC DOSING), 605 4 mg, Intravenous, Once, 3 of 4 cycles  Administration: 600 mg (8/23/2022), 564 mg (9/13/2022), 601 8 mg (10/4/2022)  bevacizumab (AVASTIN) IVPB, 1,252 5 mg, Intravenous, Once, 2 of 2 cycles  Administration: 1,200 mg (8/23/2022), 1,200 mg (9/13/2022)  PACLItaxel (TAXOL) chemo IVPB, 175 mg/m2 = 330 6 mg, Intravenous, Once, 3 of 4 cycles  Administration: 330 6 mg (8/23/2022), 330 6 mg (9/13/2022), 330 6 mg (10/4/2022)           Patient ID: Laura Blake is a 67 y o  female  Who returns after 3 cycles of neoadjuvant chemotherapy to discuss treatment options  She received 2 cycles of carboplatin, Taxol, bevacizumab  Bevacizumab was held for cycle 3  Current dose is carboplatin AUC 6 and Taxol 175 milligrams/meter squared with bevacizumab at 15 milligrams/kilogram   Labs from 10/14/2022 reveal hemoglobin A1c of 6 3%, normal CBC with mild leukopenia, mild anemia with hemoglobin 10 5 grams/deciliter   as of 10/7/2022 has normalized at 8 4 units/ml  She would a CT scan on 10/19/2022 of the chest abdomen pelvis that I personally reviewed  This revealed persistent omental caking  There was no evidence of ascites or lymphadenopathy  There is thickening of the sigmoid colon adjacent to the left ovarian mass  She had colonoscopy in July of 2022 which was negative  No other interval change in medications or medical history since her last visit to the office  She notes occasional numbness in her left arm without other neurologic symptoms          The following portions of the patient's history were reviewed and updated as appropriate: allergies, current medications, past family history, past medical history, past social history, past surgical history and problem list     Review of Systems   Constitutional: Negative for activity change and unexpected weight change  HENT: Negative  Eyes: Negative  Respiratory: Negative  Cardiovascular: Negative  Gastrointestinal: Negative for abdominal distention and abdominal pain  Endocrine: Negative  Genitourinary: Negative for pelvic pain and vaginal bleeding  Musculoskeletal: Negative  Skin: Negative  Allergic/Immunologic: Negative  Neurological: Negative  Numbness left arm   Hematological: Negative  Psychiatric/Behavioral: Negative          Current Outpatient Medications   Medication Sig Dispense Refill   • Acetaminophen (TYLENOL ARTHRITIS PAIN PO) Take by mouth as needed     • albuterol (PROVENTIL HFA,VENTOLIN HFA) 90 mcg/act inhaler Inhale 2 puffs every 4 (four) hours as needed      • Ascorbic Acid (VITAMIN C GUMMIE PO) Take by mouth     • aspirin 81 mg chewable tablet Chew 81 mg daily     • B Complex-C (B COMPLEX-VITAMIN C PO) Take by mouth daily     • betamethasone, augmented, (DIPROLENE) 0 05 % lotion Apply topically as needed     • Calcium Carb-Cholecalciferol (CALCIUM 500+D3 PO) Take 1 tablet by mouth 2 (two) times a day      • cholecalciferol (VITAMIN D3) 1,000 units tablet Take 1,000 Units by mouth daily     • Docusate Calcium (STOOL SOFTENER PO) Take 1 tablet by mouth 2 (two) times a day     • escitalopram (Lexapro) 10 mg tablet Take 1 tablet (10 mg total) by mouth daily 30 tablet 3   • famotidine (PEPCID) 40 MG tablet Take 1 tablet (40 mg total) by mouth daily at bedtime 90 tablet 3   • fexofenadine (ALLEGRA) 180 MG tablet Take 180 mg by mouth daily     • FIBER ADULT GUMMIES PO Take by mouth if needed     • GLUCOSAMINE-CHONDROITIN PO Take 1 tablet by mouth 2 (two) times a day      • hydrocortisone (Proctosol HC) 2 5 % rectal cream Insert into rectum two (2) times a day 28 35 g 3   • lisinopril (ZESTRIL) 5 mg tablet Take 5 mg by mouth daily dinner     • LORazepam (ATIVAN) 1 mg tablet Take 1 tablet (1 mg total) by mouth every 8 (eight) hours as needed for anxiety (nausea or anxiety) (Patient taking differently: Take 1 mg by mouth every 8 (eight) hours as needed for anxiety (nausea or anxiety) Per pt usually takes q night) 30 tablet 0   • metFORMIN (GLUCOPHAGE) 500 mg tablet Take 500 mg by mouth daily Daily at lunch     • metoprolol succinate (TOPROL-XL) 25 mg 24 hr tablet Take 25 mg by mouth daily dinner     • metroNIDAZOLE (FLAGYL) 500 mg tablet      • montelukast (SINGULAIR) 10 mg tablet Take 10 mg by mouth daily dinner     • multivitamin (THERAGRAN) TABS Take 1 tablet by mouth daily     • omeprazole (PriLOSEC) 20 mg delayed release capsule TAKE 1 CAPSULE BY MOUTH EVERY DAY 30 MINUTES BEFORE MORNING MEAL FOR 90 DAYS     • ondansetron (ZOFRAN) 8 mg tablet Take 1 tablet (8 mg total) by mouth every 8 (eight) hours as needed for nausea or vomiting 20 tablet 1   • polyethylene glycol (MiraLax) 17 GM/SCOOP powder Mix with 64 oz Gatorade, begin 4 PM day before surgery per bowel prep instructions  238 g 0   • Polyethylene Glycol 3350 (MIRALAX PO) Take by mouth as needed     • predniSONE 1 mg tablet Take 3 mg by mouth daily     • Probiotic Product (PROBIOTIC DAILY PO) Take by mouth daily     • simvastatin (ZOCOR) 10 mg tablet Take 10 mg by mouth daily at bedtime     • SYMBICORT 160-4 5 MCG/ACT inhaler 2 puffs daily        No current facility-administered medications for this visit  Objective:    Blood pressure 130/70, temperature 98 9 °F (37 2 °C), temperature source Tympanic, height 5' 4 5" (1 638 m), weight 81 2 kg (179 lb)  Body mass index is 30 25 kg/m²  Body surface area is 1 88 meters squared  Physical Exam  Vitals reviewed  Constitutional:       General: She is not in acute distress  Appearance: Normal appearance  She is not ill-appearing  HENT:      Head: Normocephalic and atraumatic  Mouth/Throat:      Mouth: Mucous membranes are moist    Eyes:      General: No scleral icterus  Right eye: No discharge  Left eye: No discharge  Conjunctiva/sclera: Conjunctivae normal    Pulmonary:      Effort: Pulmonary effort is normal    Musculoskeletal:      Right lower leg: No edema  Left lower leg: No edema  Skin:     General: Skin is warm and dry  Coloration: Skin is not jaundiced  Findings: No rash  Neurological:      General: No focal deficit present  Mental Status: She is alert and oriented to person, place, and time  Cranial Nerves: No cranial nerve deficit  Motor: No weakness  Gait: Gait normal    Psychiatric:         Mood and Affect: Mood normal          Behavior: Behavior normal          Thought Content:  Thought content normal          Judgment: Judgment normal          Lab Results   Component Value Date     8 4 10/07/2022     Lab Results   Component Value Date    K 4 0 10/14/2022     10/14/2022    CO2 26 10/14/2022    BUN 14 10/14/2022    CREATININE 0 64 10/14/2022    GLUF 111 (H) 10/14/2022    CALCIUM 9 2 10/14/2022    CORRECTEDCA 9 4 10/07/2022    AST 19 10/14/2022    ALT 34 10/14/2022    ALKPHOS 76 10/14/2022    EGFR 89 10/14/2022     Lab Results   Component Value Date    WBC 3 68 (L) 10/14/2022    HGB 10 8 (L) 10/14/2022    HCT 32 3 (L) 10/14/2022    MCV 95 10/14/2022     10/14/2022     Lab Results   Component Value Date    NEUTROABS 2 36 10/14/2022        Trend:  Lab Results   Component Value Date     8 4 10/07/2022     12 5 09/29/2022     32 0 (H) 09/08/2022     52 4 (H) 09/02/2022     85 5 (H) 08/26/2022     77 3 (H) 08/19/2022

## 2022-10-21 DIAGNOSIS — F06.4 ANXIETY DISORDER DUE TO MEDICAL CONDITION: ICD-10-CM

## 2022-10-22 RX ORDER — ESCITALOPRAM OXALATE 10 MG/1
TABLET ORAL
Qty: 90 TABLET | Refills: 2 | Status: ON HOLD | OUTPATIENT
Start: 2022-10-22

## 2022-10-24 ENCOUNTER — TELEPHONE (OUTPATIENT)
Dept: GYNECOLOGIC ONCOLOGY | Facility: CLINIC | Age: 72
End: 2022-10-24

## 2022-10-24 NOTE — TELEPHONE ENCOUNTER
I spoke with Tyler Riggs letting her know that chemo is cancelled tomorrow d/t impending surgery this week

## 2022-10-25 ENCOUNTER — HOSPITAL ENCOUNTER (OUTPATIENT)
Dept: INFUSION CENTER | Facility: HOSPITAL | Age: 72
End: 2022-10-25
Attending: OBSTETRICS & GYNECOLOGY

## 2022-10-28 ENCOUNTER — ANESTHESIA (OUTPATIENT)
Dept: PERIOP | Facility: HOSPITAL | Age: 72
End: 2022-10-28

## 2022-10-28 ENCOUNTER — HOSPITAL ENCOUNTER (INPATIENT)
Facility: HOSPITAL | Age: 72
LOS: 11 days | Discharge: HOME HEALTH | End: 2022-11-08
Attending: OBSTETRICS & GYNECOLOGY | Admitting: OBSTETRICS & GYNECOLOGY

## 2022-10-28 DIAGNOSIS — C57.9 GYNECOLOGIC MALIGNANCY (HCC): ICD-10-CM

## 2022-10-28 DIAGNOSIS — Z90.710 S/P TOTAL ABDOMINAL HYSTERECTOMY: ICD-10-CM

## 2022-10-28 DIAGNOSIS — C48.2 PERITONEAL CARCINOMA (HCC): ICD-10-CM

## 2022-10-28 LAB
ANION GAP SERPL CALCULATED.3IONS-SCNC: 8 MMOL/L (ref 4–13)
BUN SERPL-MCNC: 14 MG/DL (ref 5–25)
CALCIUM SERPL-MCNC: 8.4 MG/DL (ref 8.3–10.1)
CHLORIDE SERPL-SCNC: 114 MMOL/L (ref 96–108)
CO2 SERPL-SCNC: 17 MMOL/L (ref 21–32)
CREAT SERPL-MCNC: 0.69 MG/DL (ref 0.6–1.3)
ERYTHROCYTE [DISTWIDTH] IN BLOOD BY AUTOMATED COUNT: 14.8 % (ref 11.6–15.1)
GFR SERPL CREATININE-BSD FRML MDRD: 87 ML/MIN/1.73SQ M
GLUCOSE SERPL-MCNC: 106 MG/DL (ref 65–140)
GLUCOSE SERPL-MCNC: 186 MG/DL (ref 65–140)
GLUCOSE SERPL-MCNC: 214 MG/DL (ref 65–140)
HCT VFR BLD AUTO: 40.1 % (ref 34.8–46.1)
HGB BLD-MCNC: 13.5 G/DL (ref 11.5–15.4)
MCH RBC QN AUTO: 30.8 PG (ref 26.8–34.3)
MCHC RBC AUTO-ENTMCNC: 33.7 G/DL (ref 31.4–37.4)
MCV RBC AUTO: 91 FL (ref 82–98)
PLATELET # BLD AUTO: 102 THOUSANDS/UL (ref 149–390)
PMV BLD AUTO: 9 FL (ref 8.9–12.7)
POTASSIUM SERPL-SCNC: 4.1 MMOL/L (ref 3.5–5.3)
RBC # BLD AUTO: 4.39 MILLION/UL (ref 3.81–5.12)
SODIUM SERPL-SCNC: 139 MMOL/L (ref 135–147)
WBC # BLD AUTO: 5.96 THOUSAND/UL (ref 4.31–10.16)

## 2022-10-28 PROCEDURE — 0UT20ZZ RESECTION OF BILATERAL OVARIES, OPEN APPROACH: ICD-10-PCS | Performed by: OBSTETRICS & GYNECOLOGY

## 2022-10-28 PROCEDURE — 0BTT0ZZ RESECTION OF DIAPHRAGM, OPEN APPROACH: ICD-10-PCS | Performed by: OBSTETRICS & GYNECOLOGY

## 2022-10-28 PROCEDURE — 30233N1 TRANSFUSION OF NONAUTOLOGOUS RED BLOOD CELLS INTO PERIPHERAL VEIN, PERCUTANEOUS APPROACH: ICD-10-PCS | Performed by: OBSTETRICS & GYNECOLOGY

## 2022-10-28 PROCEDURE — 30233K1 TRANSFUSION OF NONAUTOLOGOUS FROZEN PLASMA INTO PERIPHERAL VEIN, PERCUTANEOUS APPROACH: ICD-10-PCS | Performed by: OBSTETRICS & GYNECOLOGY

## 2022-10-28 PROCEDURE — 0UT70ZZ RESECTION OF BILATERAL FALLOPIAN TUBES, OPEN APPROACH: ICD-10-PCS | Performed by: OBSTETRICS & GYNECOLOGY

## 2022-10-28 PROCEDURE — 0FQ00ZZ REPAIR LIVER, OPEN APPROACH: ICD-10-PCS | Performed by: OBSTETRICS & GYNECOLOGY

## 2022-10-28 PROCEDURE — 0UTC0ZZ RESECTION OF CERVIX, OPEN APPROACH: ICD-10-PCS | Performed by: OBSTETRICS & GYNECOLOGY

## 2022-10-28 PROCEDURE — 07TP0ZZ RESECTION OF SPLEEN, OPEN APPROACH: ICD-10-PCS | Performed by: OBSTETRICS & GYNECOLOGY

## 2022-10-28 PROCEDURE — 0DBU0ZZ EXCISION OF OMENTUM, OPEN APPROACH: ICD-10-PCS | Performed by: OBSTETRICS & GYNECOLOGY

## 2022-10-28 PROCEDURE — 0DTP0ZZ RESECTION OF RECTUM, OPEN APPROACH: ICD-10-PCS | Performed by: OBSTETRICS & GYNECOLOGY

## 2022-10-28 PROCEDURE — 0DTG0ZZ RESECTION OF LEFT LARGE INTESTINE, OPEN APPROACH: ICD-10-PCS | Performed by: OBSTETRICS & GYNECOLOGY

## 2022-10-28 PROCEDURE — 30233L1 TRANSFUSION OF NONAUTOLOGOUS FRESH PLASMA INTO PERIPHERAL VEIN, PERCUTANEOUS APPROACH: ICD-10-PCS | Performed by: OBSTETRICS & GYNECOLOGY

## 2022-10-28 PROCEDURE — 0UT90ZZ RESECTION OF UTERUS, OPEN APPROACH: ICD-10-PCS | Performed by: OBSTETRICS & GYNECOLOGY

## 2022-10-28 PROCEDURE — 0DJD8ZZ INSPECTION OF LOWER INTESTINAL TRACT, VIA NATURAL OR ARTIFICIAL OPENING ENDOSCOPIC: ICD-10-PCS | Performed by: OBSTETRICS & GYNECOLOGY

## 2022-10-28 PROCEDURE — 0D1B0Z4 BYPASS ILEUM TO CUTANEOUS, OPEN APPROACH: ICD-10-PCS | Performed by: OBSTETRICS & GYNECOLOGY

## 2022-10-28 RX ORDER — ALBUMIN, HUMAN INJ 5% 5 %
SOLUTION INTRAVENOUS CONTINUOUS PRN
Status: DISCONTINUED | OUTPATIENT
Start: 2022-10-28 | End: 2022-10-28

## 2022-10-28 RX ORDER — METRONIDAZOLE 500 MG/100ML
500 INJECTION, SOLUTION INTRAVENOUS ONCE
Status: COMPLETED | OUTPATIENT
Start: 2022-10-28 | End: 2022-10-28

## 2022-10-28 RX ORDER — CEFAZOLIN SODIUM 2 G/50ML
2000 SOLUTION INTRAVENOUS ONCE
Status: COMPLETED | OUTPATIENT
Start: 2022-10-28 | End: 2022-10-29

## 2022-10-28 RX ORDER — CEFAZOLIN SODIUM 2 G/50ML
2000 SOLUTION INTRAVENOUS ONCE
Status: COMPLETED | OUTPATIENT
Start: 2022-10-28 | End: 2022-10-28

## 2022-10-28 RX ORDER — FENTANYL CITRATE/PF 50 MCG/ML
25 SYRINGE (ML) INJECTION
Status: DISCONTINUED | OUTPATIENT
Start: 2022-10-28 | End: 2022-10-28 | Stop reason: HOSPADM

## 2022-10-28 RX ORDER — MAGNESIUM HYDROXIDE 1200 MG/15ML
LIQUID ORAL AS NEEDED
Status: DISCONTINUED | OUTPATIENT
Start: 2022-10-28 | End: 2022-10-28 | Stop reason: HOSPADM

## 2022-10-28 RX ORDER — LIDOCAINE HYDROCHLORIDE 10 MG/ML
INJECTION, SOLUTION EPIDURAL; INFILTRATION; INTRACAUDAL; PERINEURAL AS NEEDED
Status: DISCONTINUED | OUTPATIENT
Start: 2022-10-28 | End: 2022-10-28

## 2022-10-28 RX ORDER — PROPOFOL 10 MG/ML
INJECTION, EMULSION INTRAVENOUS AS NEEDED
Status: DISCONTINUED | OUTPATIENT
Start: 2022-10-28 | End: 2022-10-28

## 2022-10-28 RX ORDER — SODIUM CHLORIDE, SODIUM LACTATE, POTASSIUM CHLORIDE, CALCIUM CHLORIDE 600; 310; 30; 20 MG/100ML; MG/100ML; MG/100ML; MG/100ML
125 INJECTION, SOLUTION INTRAVENOUS CONTINUOUS
Status: DISCONTINUED | OUTPATIENT
Start: 2022-10-28 | End: 2022-10-28

## 2022-10-28 RX ORDER — METRONIDAZOLE 500 MG/100ML
500 INJECTION, SOLUTION INTRAVENOUS ONCE
Status: COMPLETED | OUTPATIENT
Start: 2022-10-29 | End: 2022-10-29

## 2022-10-28 RX ORDER — ONDANSETRON 2 MG/ML
4 INJECTION INTRAMUSCULAR; INTRAVENOUS ONCE AS NEEDED
Status: DISCONTINUED | OUTPATIENT
Start: 2022-10-28 | End: 2022-10-28 | Stop reason: HOSPADM

## 2022-10-28 RX ORDER — DEXAMETHASONE SODIUM PHOSPHATE 10 MG/ML
INJECTION, SOLUTION INTRAMUSCULAR; INTRAVENOUS AS NEEDED
Status: DISCONTINUED | OUTPATIENT
Start: 2022-10-28 | End: 2022-10-28

## 2022-10-28 RX ORDER — GABAPENTIN 100 MG/1
100 CAPSULE ORAL ONCE
Status: COMPLETED | OUTPATIENT
Start: 2022-10-28 | End: 2022-10-28

## 2022-10-28 RX ORDER — ROCURONIUM BROMIDE 10 MG/ML
INJECTION, SOLUTION INTRAVENOUS AS NEEDED
Status: DISCONTINUED | OUTPATIENT
Start: 2022-10-28 | End: 2022-10-28

## 2022-10-28 RX ORDER — POLYETHYLENE GLYCOL 3350 17 G/17G
17 POWDER, FOR SOLUTION ORAL DAILY
Status: DISCONTINUED | OUTPATIENT
Start: 2022-10-29 | End: 2022-10-29

## 2022-10-28 RX ORDER — SODIUM CHLORIDE 9 MG/ML
INJECTION, SOLUTION INTRAVENOUS CONTINUOUS PRN
Status: DISCONTINUED | OUTPATIENT
Start: 2022-10-28 | End: 2022-10-28

## 2022-10-28 RX ORDER — CALCIUM CHLORIDE 100 MG/ML
INJECTION INTRAVENOUS; INTRAVENTRICULAR AS NEEDED
Status: DISCONTINUED | OUTPATIENT
Start: 2022-10-28 | End: 2022-10-28

## 2022-10-28 RX ORDER — MIDAZOLAM HYDROCHLORIDE 2 MG/2ML
INJECTION, SOLUTION INTRAMUSCULAR; INTRAVENOUS AS NEEDED
Status: DISCONTINUED | OUTPATIENT
Start: 2022-10-28 | End: 2022-10-28

## 2022-10-28 RX ORDER — HYDROMORPHONE HCL/PF 1 MG/ML
0.5 SYRINGE (ML) INJECTION EVERY 2 HOUR PRN
Status: DISCONTINUED | OUTPATIENT
Start: 2022-10-28 | End: 2022-11-01

## 2022-10-28 RX ORDER — FENTANYL CITRATE 50 UG/ML
INJECTION, SOLUTION INTRAMUSCULAR; INTRAVENOUS AS NEEDED
Status: DISCONTINUED | OUTPATIENT
Start: 2022-10-28 | End: 2022-10-28

## 2022-10-28 RX ORDER — SODIUM CHLORIDE, SODIUM LACTATE, POTASSIUM CHLORIDE, CALCIUM CHLORIDE 600; 310; 30; 20 MG/100ML; MG/100ML; MG/100ML; MG/100ML
INJECTION, SOLUTION INTRAVENOUS CONTINUOUS PRN
Status: DISCONTINUED | OUTPATIENT
Start: 2022-10-28 | End: 2022-10-28

## 2022-10-28 RX ORDER — ACETAMINOPHEN 325 MG/1
650 TABLET ORAL EVERY 6 HOURS
Status: DISCONTINUED | OUTPATIENT
Start: 2022-10-28 | End: 2022-10-29

## 2022-10-28 RX ORDER — ROPIVACAINE HYDROCHLORIDE 2 MG/ML
INJECTION, SOLUTION EPIDURAL; INFILTRATION; PERINEURAL AS NEEDED
Status: DISCONTINUED | OUTPATIENT
Start: 2022-10-28 | End: 2022-10-28

## 2022-10-28 RX ORDER — HYDROMORPHONE HCL IN WATER/PF 6 MG/30 ML
0.2 PATIENT CONTROLLED ANALGESIA SYRINGE INTRAVENOUS
Status: DISCONTINUED | OUTPATIENT
Start: 2022-10-28 | End: 2022-10-28 | Stop reason: HOSPADM

## 2022-10-28 RX ORDER — HEPARIN SODIUM 5000 [USP'U]/ML
5000 INJECTION, SOLUTION INTRAVENOUS; SUBCUTANEOUS
Status: DISCONTINUED | OUTPATIENT
Start: 2022-10-28 | End: 2022-10-28 | Stop reason: HOSPADM

## 2022-10-28 RX ORDER — ONDANSETRON 2 MG/ML
INJECTION INTRAMUSCULAR; INTRAVENOUS AS NEEDED
Status: DISCONTINUED | OUTPATIENT
Start: 2022-10-28 | End: 2022-10-28

## 2022-10-28 RX ORDER — ACETAMINOPHEN 325 MG/1
975 TABLET ORAL ONCE
Status: COMPLETED | OUTPATIENT
Start: 2022-10-28 | End: 2022-10-28

## 2022-10-28 RX ORDER — SODIUM CHLORIDE, SODIUM LACTATE, POTASSIUM CHLORIDE, CALCIUM CHLORIDE 600; 310; 30; 20 MG/100ML; MG/100ML; MG/100ML; MG/100ML
125 INJECTION, SOLUTION INTRAVENOUS CONTINUOUS
Status: DISCONTINUED | OUTPATIENT
Start: 2022-10-28 | End: 2022-10-29

## 2022-10-28 RX ADMIN — SODIUM CHLORIDE: 0.9 INJECTION, SOLUTION INTRAVENOUS at 17:21

## 2022-10-28 RX ADMIN — PHENYLEPHRINE HYDROCHLORIDE 30 MCG/MIN: 10 INJECTION INTRAVENOUS at 13:46

## 2022-10-28 RX ADMIN — CALCIUM CHLORIDE 0.25 G: 100 INJECTION INTRAVENOUS; INTRAVENTRICULAR at 18:20

## 2022-10-28 RX ADMIN — ROPIVACAINE HYDROCHLORIDE 2 ML: 2 INJECTION, SOLUTION EPIDURAL; INFILTRATION; PERINEURAL at 19:38

## 2022-10-28 RX ADMIN — FENTANYL CITRATE 100 MCG: 50 INJECTION INTRAMUSCULAR; INTRAVENOUS at 12:19

## 2022-10-28 RX ADMIN — ONDANSETRON 4 MG: 2 INJECTION INTRAMUSCULAR; INTRAVENOUS at 18:59

## 2022-10-28 RX ADMIN — ROCURONIUM BROMIDE 20 MG: 10 SOLUTION INTRAVENOUS at 16:20

## 2022-10-28 RX ADMIN — ALBUMIN (HUMAN): 12.5 SOLUTION INTRAVENOUS at 16:44

## 2022-10-28 RX ADMIN — CEFAZOLIN SODIUM 2000 MG: 2 SOLUTION INTRAVENOUS at 16:21

## 2022-10-28 RX ADMIN — METRONIDAZOLE: 500 INJECTION, SOLUTION INTRAVENOUS at 12:21

## 2022-10-28 RX ADMIN — LIDOCAINE HYDROCHLORIDE 50 MG: 10 INJECTION, SOLUTION EPIDURAL; INFILTRATION; INTRACAUDAL; PERINEURAL at 12:08

## 2022-10-28 RX ADMIN — SODIUM CHLORIDE: 0.9 INJECTION, SOLUTION INTRAVENOUS at 13:42

## 2022-10-28 RX ADMIN — CEFAZOLIN SODIUM 2000 MG: 2 SOLUTION INTRAVENOUS at 12:21

## 2022-10-28 RX ADMIN — GABAPENTIN 100 MG: 100 CAPSULE ORAL at 10:53

## 2022-10-28 RX ADMIN — FENTANYL CITRATE 100 MCG: 50 INJECTION, SOLUTION INTRAMUSCULAR; INTRAVENOUS at 12:08

## 2022-10-28 RX ADMIN — MIDAZOLAM 2 MG: 1 INJECTION INTRAMUSCULAR; INTRAVENOUS at 11:58

## 2022-10-28 RX ADMIN — ROPIVACAINE HYDROCHLORIDE 2 ML: 2 INJECTION, SOLUTION EPIDURAL; INFILTRATION; PERINEURAL at 13:48

## 2022-10-28 RX ADMIN — ROCURONIUM BROMIDE 20 MG: 10 SOLUTION INTRAVENOUS at 14:58

## 2022-10-28 RX ADMIN — ROPIVACAINE HYDROCHLORIDE 1 ML: 2 INJECTION, SOLUTION EPIDURAL; INFILTRATION; PERINEURAL at 15:47

## 2022-10-28 RX ADMIN — SODIUM CHLORIDE: 0.9 INJECTION, SOLUTION INTRAVENOUS at 15:24

## 2022-10-28 RX ADMIN — SODIUM CHLORIDE, SODIUM LACTATE, POTASSIUM CHLORIDE, AND CALCIUM CHLORIDE 125 ML/HR: .6; .31; .03; .02 INJECTION, SOLUTION INTRAVENOUS at 23:01

## 2022-10-28 RX ADMIN — ROPIVACAINE HYDROCHLORIDE 3 ML: 2 INJECTION, SOLUTION EPIDURAL; INFILTRATION; PERINEURAL at 14:46

## 2022-10-28 RX ADMIN — INSULIN HUMAN 5 UNITS: 100 INJECTION, SOLUTION PARENTERAL at 19:09

## 2022-10-28 RX ADMIN — SODIUM CHLORIDE: 0.9 INJECTION, SOLUTION INTRAVENOUS at 12:03

## 2022-10-28 RX ADMIN — ROCURONIUM BROMIDE 10 MG: 10 SOLUTION INTRAVENOUS at 13:56

## 2022-10-28 RX ADMIN — ACETAMINOPHEN 975 MG: 325 TABLET ORAL at 10:53

## 2022-10-28 RX ADMIN — ALBUMIN (HUMAN): 12.5 SOLUTION INTRAVENOUS at 16:27

## 2022-10-28 RX ADMIN — CEFAZOLIN SODIUM 2000 MG: 2 SOLUTION INTRAVENOUS at 23:13

## 2022-10-28 RX ADMIN — PROPOFOL 200 MG: 10 INJECTION, EMULSION INTRAVENOUS at 12:08

## 2022-10-28 RX ADMIN — ROCURONIUM BROMIDE 50 MG: 10 SOLUTION INTRAVENOUS at 12:08

## 2022-10-28 RX ADMIN — SODIUM CHLORIDE, SODIUM LACTATE, POTASSIUM CHLORIDE, AND CALCIUM CHLORIDE: .6; .31; .03; .02 INJECTION, SOLUTION INTRAVENOUS at 12:03

## 2022-10-28 RX ADMIN — ALBUMIN (HUMAN): 12.5 SOLUTION INTRAVENOUS at 17:18

## 2022-10-28 RX ADMIN — METRONIDAZOLE: 500 INJECTION, SOLUTION INTRAVENOUS at 18:19

## 2022-10-28 RX ADMIN — ROCURONIUM BROMIDE 20 MG: 10 SOLUTION INTRAVENOUS at 13:11

## 2022-10-28 RX ADMIN — DEXAMETHASONE SODIUM PHOSPHATE 10 MG: 10 INJECTION, SOLUTION INTRAMUSCULAR; INTRAVENOUS at 13:06

## 2022-10-28 RX ADMIN — SODIUM CHLORIDE, SODIUM LACTATE, POTASSIUM CHLORIDE, AND CALCIUM CHLORIDE 125 ML/HR: .6; .31; .03; .02 INJECTION, SOLUTION INTRAVENOUS at 10:57

## 2022-10-28 RX ADMIN — ROPIVACAINE HYDROCHLORIDE 5 ML: 2 INJECTION, SOLUTION EPIDURAL; INFILTRATION; PERINEURAL at 12:38

## 2022-10-28 RX ADMIN — CALCIUM CHLORIDE 0.5 G: 100 INJECTION INTRAVENOUS; INTRAVENTRICULAR at 19:10

## 2022-10-28 RX ADMIN — ROPIVACAINE HYDROCHLORIDE 4 ML: 2 INJECTION, SOLUTION EPIDURAL; INFILTRATION; PERINEURAL at 19:17

## 2022-10-28 NOTE — OP NOTE
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]    Procedure(s) (LRB):  LAPAROSCOPY DIAGNOSTIC (N/A)  LAPAROTOMY EXPLORATORY (N/A)  HYSTERECTOMY MODIFIED RADICAL, BILATERAL SALPINGO-OOPHORECTOMY, GASTROCOLIC OMENTECTOMY, DIAPHRAGM RESECTION, EXCISION AND ABLATION OF PERITONEAL TUMORS (N/A)  RESECTION SMALL BOWEL (N/A)  LIVER CONTROL OF BLEED   ILEOSTOMY LOOP (N/A)  PROCTECTOMY (N/A)  RESECTION COLON LEFT (N/A)  SPLENECTOMY (N/A)    Specimen(s):  ID Type Source Tests Collected by Time Destination   1 : right diaphragm Tissue Diaphragm TISSUE EXAM Mehreen Quiñonez MD 10/28/2022 12:57 PM    2 : falciform ligament Tissue Other TISSUE EXAM Mehreen Quiñonez MD 10/28/2022  1:06 PM    3 : gastro colic omentectomy and spleen Tissue Omentum TISSUE EXAM Mehreen Quiñonez MD 10/28/2022  2:18 PM    4 : lesser omentum Tissue Omentum TISSUE EXAM Mehreen Quiñonez MD 10/28/2022  2:31 PM    5 : left gutter Tissue Other TISSUE EXAM Mehreen Quiñonez MD 10/28/2022  2:37 PM    6 : right gutter Tissue Other TISSUE EXAM Mehreen Quiñonez MD 10/28/2022  2:40 PM    7 : RADICAL HYSTERECTOMY WITH BILATERAL SALPINGO-OOPHORECTOMY EN BLOC RECTOSIGMOID RESECTION Tissue Soft Tissue, Other TISSUE EXAM Mehreen Quiñonez MD 10/28/2022  4:02 PM    8 : PORTION OF ILEUM Tissue Small Bowel, NOS TISSUE EXAM Mehreen Quiñonez MD 10/28/2022  4:18 PM    9 :  Tissue Large Intestine, Sigmoid Colon TISSUE EXAM Mehreen Quiñonez MD 10/28/2022  4:42 PM    10 : anastamotic donuts  Tissue Other TISSUE EXAM Myron Batista MD 10/28/2022  6:19 PM        Estimated Blood Loss:   700 mL    Drains:  Closed/Suction Drain Left LUQ Bulb 19 Fr  (Active)   Number of days: 0       Closed/Suction Drain Left LLQ 19 Fr  (Active)   Number of days:        Ileostomy Standard Maribell Richard, end) RUQ (Active)   Number of days: 0       Urethral Catheter Non-latex 16 Fr  (Active)   Number of days: 0       Anesthesia Type:   General w/ Epidural    Operative Indications:  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]  I was called emergently into the operating room because there was liver bleeding that was not being controlled with packing  There was also concern of a nonviable bowel  Operative Findings: At the time I entered the operating room, there was at least a L of blood loss and hemoglobin of 5 7  The patient was now being actively transfused and resuscitated by Anesthesia  She was on 100 mics phenylephrine and after she was transfused and resuscitated, this was weaned off  The bowel at the conclusion of the procedure was still viable, consequently it was elected to make the coloproctostomy, but it was elected to create a diverting ileostomy  Complications:   None    Procedure and Technique:  When I entered the operating room, the abdomen was already opened and the liver was packed  Slowly withdrew the packing and there was bleeding seen on the surface of the liver  There was stripping of the diaphragm that had been performed, and it appeared as if there was a large fracture in the posterior aspect of the liver at segment 7  At this point, we were able to mobilize the liver slightly and using cautery we were able to cauterize the more superficial sites of bleeding  Direct pressure was held and there was still some bleeding deeper in the fracture  At this point we used several rounds of FloSeal and serial packing until this was ultimately controlled    Once it appeared as if there was adequate hemostasis, we left the packs in the right upper quadrant and I assisted with the coloproctostomy  Dr Ana Mejia placed the anvil in the distal bowel that had already been mobilized  This appeared to be able to reach into the pelvis without difficulty  The anvil was secured using a running 2-0 Prolene suture  I did excise excess fat and tissue around the shaft of the anvil so it would lay better  Dr Ana Mejia went below and placed a 29 EEA stapler through the rectum  The spear was brought just anterior to the staple line and the anvil was attached to the stapler  The bowel reached pelvis without any tension and was not twisted  There was no evidence of any adjacent organs attached to the anastomosis  The stapler was closed keeping all adjacent organs away  Once the stapler was closed, it was fired  The stapler was removed and both donuts were intact  A rigid proctoscope was placed in the anastomosis  The abdomen was filled with saline and air was filled into the colon  There was no evidence of bleeding were air bubbles  The proctoscope was removed and the saline was removed  We now turned our attention once again to the right upper quadrant, the packs were removed  There was excellent hemostasis  The staff had actually opened a sheet of Evarrest, consequently since this was already opened this was placed over the area of the liver fracture and this portion of the liver was now adjacent to the diaphragm  Surgicel was placed over a small capsular tear in segment 6  There was excellent hemostasis  The wound was copiously irrigated  There was excellent hemostasis  Drains were placed by Gynecologic Oncology in the pelvis as well as in the left upper quadrant because of a possible pancreas injury at the time of splenectomy  Dr Ana Mejia wished to protect the anastomosis with an ileostomy which I thought was completely reasonable and safe given her blood loss and previous pressor requirements  We marked the ileostomy with sutures    The site of the ileostomy was identified and the skin was removed  The excess fat was removed down to the fascia  A cruciate incision was made in the fascia and the abdomen was entered  The ileum was then brought through the defect so it was not twisted  Gynecologic Oncology now closed the fascia with 1  Looped PDS suture and staples  The wound was now protected  The ileum was opened and a loop ileostomy was created  We opened the ileostomy and everted the proximal end and secured this in as a Loulou ileostomy  The more distal portion was created as a mucous fistula  This was matured using 3-0 Vicryl suture  Once this was performed, a finger was placed in both limbs and these were widely patent  An ostomy appliance was placed  At this point the patient was then extubated and taken to the recovery room in stable condition having tolerated the procedure well  I was present and participated in the above aspects of this procedure  I performed the control of the liver bleeding and creation of the loop ileostomy    I assisted Dr Trinity Bergman in the absence of a qualified resident in the creation of the coloproctostomy       I was present for all critical portions of the procedure and A qualified resident physician was not available    Patient Disposition:  PACU           SIGNATURE: Radha Beth MD  DATE: October 28, 2022  TIME: 7:26 PM

## 2022-10-28 NOTE — ANESTHESIA POSTPROCEDURE EVALUATION
Post-Op Assessment Note    CV Status:  Stable  Pain Score: 0    Pain management: adequate     Mental Status:  Alert and sleepy   Hydration Status:  Euvolemic   PONV Controlled:  Controlled   Airway Patency:  Patent      Post Op Vitals Reviewed: Yes      Staff: CRNA         No complications documented      /86 (10/28/22 1945)    Temp (!) 97 1 °F (36 2 °C) (10/28/22 1945)    Pulse 82 (10/28/22 1945)   Resp 18 (10/28/22 1945)    SpO2 100 % (10/28/22 1945)

## 2022-10-28 NOTE — OP NOTE
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]    Procedure(s) (LRB):  LAPAROSCOPY DIAGNOSTIC (N/A)  LAPAROTOMY EXPLORATORY (N/A)  HYSTERECTOMY MODIFIED RADICAL, BILATERAL SALPINGO-OOPHORECTOMY, GASTROCOLIC OMENTECTOMY, DIAPHRAGM RESECTION, EXCISION AND ABLATION OF PERITONEAL TUMORS (N/A)  RESECTION SMALL BOWEL (N/A)  LIVER CONTROL OF BLEED   ILEOSTOMY LOOP (N/A)  PROCTECTOMY (N/A)  RESECTION COLON LEFT (N/A)  SPLENECTOMY (N/A)    Specimen(s):  ID Type Source Tests Collected by Time Destination   1 : right diaphragm Tissue Diaphragm TISSUE EXAM Kamryn Lora MD 10/28/2022 1257    2 : falciform ligament Tissue Other TISSUE EXAM Kamryn Lora MD 10/28/2022 1306    3 : gastro colic omentectomy and spleen Tissue Omentum TISSUE EXAM Kamryn Lora MD 10/28/2022 1418    4 : lesser omentum Tissue Omentum TISSUE EXAM Kamryn Lora MD 10/28/2022 1431    5 : left gutter Tissue Other TISSUE EXAM Kamryn Lora MD 10/28/2022 1437    6 : right gutter Tissue Other TISSUE EXAM Kamryn Lora MD 10/28/2022 1440    7 : RADICAL HYSTERECTOMY WITH BILATERAL SALPINGO-OOPHORECTOMY EN BLOC RECTOSIGMOID RESECTION Tissue Soft Tissue, Other TISSUE EXAM Kamryn Lora MD 10/28/2022 1602    8 : PORTION OF ILEUM Tissue Small Bowel, NOS TISSUE EXAM Kamryn Lora MD 10/28/2022 1618    9 :  Tissue Large Intestine, Sigmoid Colon TISSUE EXAM Kamryn Lora MD 10/28/2022 1642    10 : anastamotic donuts  Tissue Other TISSUE EXAM Kamryn Lora MD 10/28/2022 1819        Estimated Blood Loss:   700 mL    Drains:  Closed/Suction Drain Left LUQ Bulb 19 Fr  (Active)   Number of days: 0       Closed/Suction Drain Left LLQ 19 Fr  (Active)   Number of days:        Ileostomy Standard Clarisa Merlinzo osei) RUQ (Active)   Number of days: 0       Urethral Catheter Non-latex 16 Fr  (Active)   Number of days: 0       Anesthesia Type:   General w/ Epidural    Operative Indications:  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[address] ) [C502]  66-year-old status post attempted initial cytoreductive surgery followed by 3 cycles of neoadjuvant chemotherapy with measurable clinical response  She presented for cytoreductive surgery  Operative Findings:  1  Exam under anesthesia revealed a mobile uterus  There was a mass present in the cul-de-sac  The cervix was normal   2  On laparoscopy, there was small less than 1 mm disease present on the anterior abdominal wall  There was no disease present on the anti mesenteric border of the small bowel  The decision was then made to perform exploratory laparotomy  3  On laparotomy, the liver was noted to be densely adherent to the right hemidiaphragm consistent with treated disease  There was a thick plaque on the right hemidiaphragm  The gastrocolic omentum was extensively involved with tumor extending to the splenic hilum  Therefore, splenectomy was indicated  There was a small amount of tumor in the left gutter  There was no evidence of tumor in the small bowel mesentery  In the pelvis, there was extensive tumor present between the rectosigmoid colon and the left ovary  The cul-de-sac was completely obliterated  The mass involved the perirectal fat  There was tumor present on the vesicouterine fold on the surface of the bladder  4  There were dense adhesions present between the proximal ileum and the right anterior abdominal wall  There was a defect in the peritoneum and the ileum was adherent to the oblique musculature    It almost appeared as if she had a prior ileostomy at that site  During takedown of these adhesions, there was an inherent small-bowel enterotomy  This necessitated the small bowel resection and reanastomosis  5  During dissection of the right hemidiaphragm, there was inherent injury to the liver capsule and liver  Dr Dennys Galvin was consulted intraoperatively to evaluate  6  Given blood loss, need for pressors during the surgery, extensive operation, the decision was made to perform an ileostomy to protect the and and colonic reanastomosis  7  At the conclusion the procedure, she was optimally cytoreduced to less than 1 mm  Dr Dennys Galvin performed the ileostomy, obtained liver hemostasis and was a 1st assist for the end-to-end colonic reanastomosis  I performed the remainder of the procedures  Complications:   None    Procedure and Technique:  After informed consent was obtained, an epidural was placed followed by general endotracheal anesthesia  She was then prepped and draped in normal sterile fashion in the low dorsal lithotomy position  Examination under anesthesia was then performed with findings noted as above  Corbin catheter was inserted  Attention was then turned the abdomen  A 5 mm skin incision was made in the midline approximately 6 cm superior to the umbilicus  Through this was passed a 5 mm trocar under direct visualization into the abdominal cavity  The abdomen is then insufflated to 15 mmHg using CO2 gas  Findings on laparoscopy are noted as above  A midline vertical incision was then made using a 15 blade scalpel  This was then taken down to the underlying layer of fascia using cautery  The fascia was then opened in the midline the fascial incision extended superiorly and inferiorly  The peritoneum was then identified and entered  The gas was expelled from the abdominal cavity  The peritoneal incision extended superiorly and inferiorly  A Bookwalter retractor was placed    Attention was 1st turned the upper abdomen  Dense adhesions from the liver capsule to the right hemidiaphragm were lysed  The falciform ligament was cauterized and transected with the EnSeal Super jaw and removed  The liver was mobilized medially  There was some bleeding noted from the liver capsule  This was packed  A portion of right hemidiaphragm peritoneum was then removed measuring approximately 15 x 15 cm  This was sent for permanent section  Additional diaphragmatic disease more medially was cauterized  The liver was then packed  Attention was then turned to the gastrocolic omentectomy  The omentum was noted to be densely adherent to the transverse colon  These adhesions were lysed using Metzenbaum scissors  Ultimately, the lesser sac was entered  There was disease noted all the way to the splenic hilum  Therefore, the omentum was taken down from the greater curvature of the stomach using the EnSeal Super jaw  The splenic flexure was taken down  The gastrosplenic ligament was cauterized and transected using the EnSeal Super jaw  The stomach was then completely freed  The spleen was then mobilized inferiorly and medially  The spinal renal ligaments were then dissected and cauterized  This allowed further medial mobilization of the spleen  The hilum was then able to be skeletonized  It was clamped with a zeppelin clamp, transected and secured using 2 free ties of 0 silk suture  A small portion of the tip of the pancreas was also removed with the specimen  The distal portion pancreas was then cauterized using the EnSeal bipolar cautery device  There was found to be hemostatic  The entire specimen including spleen and gastrocolic omentum were then removed en bloc  The left upper quadrant was then packed with laparotomy sponges  Attention was then turned to the abdomen  The bowel was run  There is no evidence of mesenteric disease    There was a 2 cm lesion present in the left gutter peritoneum which was resected  The line of Toldt was then anterior on the left side in the colon was mobilized medially  The portion of small bowel with the enterotomy measuring approximately 10 cm was then isolated  The proximal and distal portions of small bowel were transected using a JACK 75 stapler  The intervening mesentery was cauterized and transected with the EnSeal bipolar cautery device  The anti mesenteric border was transected using Metzenbaum scissors and the JACK 75 stapler was then used to perform the anastomosis  A 3-0 Vicryl suture was then used to secure the butt of the anastomosis  The anastomosis was then completed using a JACK 75 stapler  The mesentery was oversewn using 3-0 Vicryl suture  Hemostasis was excellent  The anterior abdominal wall where the bowel was attached was then removed and sent off as right gutter as well  This measured approximately 5 x 5 cm  The peritoneum was then oversewed in this area to limit subsequent adhesions  Attention was then turned to the pelvis  The left retroperitoneal space was opened and connected to the gutter incision  Sigmoid colon and uterus were then moved medially  The pelvis was essentially fixed  The retroperitoneal space was was developed on the left side  The ureter was identified coursing normally within the medial leaf of the broad ligament  The ureter was released from its medial peritoneal attachments and dissected down to the level of the uterine vessels  It was removed from the mass  The infundibulopelvic ligament on left side was skeletonized it was cauterized and transected with the EnSeal Super jaw  A portion of sigmoid colon that was free of tumor was then identified  A window was made in the mesentery and the sigmoid colon was transected using a contour stapler with a blue load  The distal portion of the sigmoid colon was then elevated  The sigmoid mesentery was cauterized and transected  The retrorectal space was opened    The inferior mesenteric artery and vein were cauterized and transected with the EnSeal   The retroperitoneum on the right side was entered  The ureter was identified coursing normally within the medial leaf of the broad ligament  This ureter was also released from its medial peritoneal attachments  The infundibulopelvic ligament on the right side was skeletonized, cauterized and transected with the EnSeal Super jaw  The pelvic peritoneum was then reflected superiorly including all tumor  There was a large mass present in the rectal mesentery  This was dissected free of the left pelvic sidewall using a combination of monopolar cautery and the EnSeal Super jaw  The vesicovaginal space was then opened  This was opened by making an incision on the bladder serosa which was caked with tumor  The bladder was then able to be identified and reflected inferiorly the vesicovaginal space was developed leaving the bladder serosal tumor with the uterus  Once the vesicovaginal space was developed, an anterior colpotomy was performed and a modified radical hysterectomy was completed in a retrograde fashion using slightly curved zeppelin clamps  All vaginal and cardinal ligament bites were sutured with 0 Vicryl  The posterior vagina was then opened  The rectovaginal space was developed  The rectum was skeletonized below the peritoneal reflection and below the mass  The rectal mesentery was cauterized and transected using EnSeal and monopolar cautery  Once the rectum was adequately skeletonized, the contour stapler was then used to come across the proximal rectum approximately 11 cm from the verge  The modified radical hysterectomy, BSO, sigmoid colon, pelvic peritoneum were then removed en bloc  The vagina was then closed using interrupted figure-of-eight 0 Vicryl suture  Hemostasis was found to be excellent  The left colon need to be mobilized  It was tethered  The splenic flexure are to be taken down    The mesentery was then cauterized and transected to level of the left colic which was then sacrificed to obtain additional length  An additional 15 cm of the sigmoid colon we then resected using the JACK 75 stapler  The colon did appear viable  Dr Adalberto Gleason was then called in to evaluate the bleeding at the liver  Please see his operative note  As the entire sigmoid had been resected and there is only a small portion of the descending colon remaining, this colon was brought down and found to have adequate length  The proximal portion of the anastomosis was then opened using monopolar cautery  A 2-0 Prolene suture was then used to perform a pursestring  The 29 EEA anvil was then inserted and secured using the pursestring suture  The 29 EEA stapler was then advanced transrectally and the anastomosis was performed  There were 2 complete donuts identified  The Pollyann Mole was then utilized to inflate air into the distal rectum while there was inclusion of the descending colon  There was no evidence of air leak identified  The abdomen pelvis were then irrigated  Nineteen Bob drains were then placed in the pelvis as well as the left upper quadrant  Both drains exiting on the left side of the abdomen and secured using 2-0 nylon suture  Hemostasis was found to be adequate  The ostomy site was then prepped  The terminal ileum was brought through the ostomy site  Gowns and gloves were exchanged  The incision was closed using 1  Looped PDS in a running fashion to the fascia  The subcutaneous space was then irrigated  The skin was closed using staples  The ostomy was then matured  Please see Dr Keagan Green note  Ostomy appliance was then placed  A sterile dressing was applied  The patient was then awakened and transferred to the recovery room in stable condition  All instrument counts correct x2 for procedure  No complications  Estimated blood loss is 1 7 L   She received 4 units of packed red blood cells, 2 units of FFP      I was present for the entire procedure and A co-surgeon was required because of skills and techniques relevant to speciality    Patient Disposition:  PACU  and hemodynamically stable     This procedure was not performed to treat colon cancer through resection      SIGNATURE: Joshua Garcia MD  DATE: October 28, 2022  TIME: 7:34 PM

## 2022-10-28 NOTE — ANESTHESIA PREPROCEDURE EVALUATION
Procedure:  LAPAROSCOPY DIAGNOSTIC (N/A Abdomen)  LAPAROTOMY EXPLORATORY (N/A Abdomen)  HYSTERECTOMY TOTAL ABDOMINAL (LIN), BILATERAL SALPINGO-OOPHORECTOMY AND ALL OTHER INDICATED PROCEDURES, POSSIBLE BOWEL RESECTION (N/A Abdomen)  RESECTION SMALL BOWEL (N/A Abdomen)    Relevant Problems   CARDIO   (+) Giant cell aortic arteritis (HCC)   (+) Hyperlipidemia   (+) Hypertension      GI/HEPATIC   (+) Gastroesophageal reflux disease      NEURO/PSYCH   (+) Anxiety disorder due to medical condition   (+) Personal history of colonic polyps      PULMONARY   (+) Asthma      Other   (+) Long term (current) use of systemic steroids   (+) Prediabetes        Physical Exam    Airway    Mallampati score: II  TM Distance: >3 FB  Neck ROM: limited     Dental       Cardiovascular      Pulmonary      Other Findings        Anesthesia Plan  ASA Score- 3     Anesthesia Type- general with ASA Monitors  Additional Monitors:   Airway Plan: ETT  Comment: GETA and Epidural         Plan Factors-    Chart reviewed  EKG reviewed  Imaging results reviewed  Existing labs reviewed  Patient summary reviewed  Patient is not a current smoker  Patient did not smoke on day of surgery  Induction- intravenous  Postoperative Plan- Plan for postoperative opioid use  Planned trial extubation    Informed Consent- Anesthetic plan and risks discussed with patient  I personally reviewed this patient with the CRNA  Discussed and agreed on the Anesthesia Plan with the CRNA  Abiola Norwood

## 2022-10-28 NOTE — INTERVAL H&P NOTE
H&P reviewed  After examining the patient I find no changes in the patients condition since the H&P had been written      Vitals:    10/28/22 1013   BP: 98/74   Pulse: 94   Resp: 18   Temp: (!) 97 °F (36 1 °C)   SpO2: 98%

## 2022-10-29 LAB
ABO GROUP BLD BPU: NORMAL
ANION GAP SERPL CALCULATED.3IONS-SCNC: 7 MMOL/L (ref 4–13)
BPU ID: NORMAL
BUN SERPL-MCNC: 12 MG/DL (ref 5–25)
CA-I BLD-SCNC: 1.06 MMOL/L (ref 1.12–1.32)
CALCIUM SERPL-MCNC: 8.1 MG/DL (ref 8.3–10.1)
CFFMA (FUNCTIONAL FIBRINOGEN MAX AMPLITUDE): 14.4 MM (ref 15–32)
CHLORIDE SERPL-SCNC: 114 MMOL/L (ref 96–108)
CKLY30: 0 % (ref 0–2.6)
CKR(REACTION TIME): 5.7 MIN (ref 4.6–9.1)
CO2 SERPL-SCNC: 20 MMOL/L (ref 21–32)
CREAT SERPL-MCNC: 0.49 MG/DL (ref 0.6–1.3)
CROSSMATCH: NORMAL
CROSSMATCH: NORMAL
CRTMA(RAPIDTEG MAX AMPLITUDE): 45.2 MM (ref 52–70)
ERYTHROCYTE [DISTWIDTH] IN BLOOD BY AUTOMATED COUNT: 15.5 % (ref 11.6–15.1)
GFR SERPL CREATININE-BSD FRML MDRD: 97 ML/MIN/1.73SQ M
GLUCOSE SERPL-MCNC: 194 MG/DL (ref 65–140)
HCT VFR BLD AUTO: 36.5 % (ref 34.8–46.1)
HGB BLD-MCNC: 12.5 G/DL (ref 11.5–15.4)
MAGNESIUM SERPL-MCNC: 1.6 MG/DL (ref 1.6–2.6)
MCH RBC QN AUTO: 30.2 PG (ref 26.8–34.3)
MCHC RBC AUTO-ENTMCNC: 34.2 G/DL (ref 31.4–37.4)
MCV RBC AUTO: 88 FL (ref 82–98)
PHOSPHATE SERPL-MCNC: 2.2 MG/DL (ref 2.3–4.1)
PLATELET # BLD AUTO: 107 THOUSANDS/UL (ref 149–390)
PMV BLD AUTO: 9.5 FL (ref 8.9–12.7)
POTASSIUM SERPL-SCNC: 3.7 MMOL/L (ref 3.5–5.3)
RBC # BLD AUTO: 4.14 MILLION/UL (ref 3.81–5.12)
SODIUM SERPL-SCNC: 141 MMOL/L (ref 135–147)
UNIT DISPENSE STATUS: NORMAL
UNIT PRODUCT CODE: NORMAL
UNIT PRODUCT VOLUME: 250 ML
UNIT PRODUCT VOLUME: 250 ML
UNIT PRODUCT VOLUME: 280 ML
UNIT PRODUCT VOLUME: 280 ML
UNIT PRODUCT VOLUME: 350 ML
UNIT PRODUCT VOLUME: 350 ML
UNIT RH: NORMAL
WBC # BLD AUTO: 8.12 THOUSAND/UL (ref 4.31–10.16)

## 2022-10-29 RX ORDER — ONDANSETRON 2 MG/ML
4 INJECTION INTRAMUSCULAR; INTRAVENOUS ONCE
Status: COMPLETED | OUTPATIENT
Start: 2022-10-29 | End: 2022-10-29

## 2022-10-29 RX ORDER — HEPARIN SODIUM 5000 [USP'U]/ML
5000 INJECTION, SOLUTION INTRAVENOUS; SUBCUTANEOUS EVERY 8 HOURS SCHEDULED
Status: DISPENSED | OUTPATIENT
Start: 2022-10-29 | End: 2022-11-06

## 2022-10-29 RX ORDER — SODIUM CHLORIDE, SODIUM GLUCONATE, SODIUM ACETATE, POTASSIUM CHLORIDE, MAGNESIUM CHLORIDE, SODIUM PHOSPHATE, DIBASIC, AND POTASSIUM PHOSPHATE .53; .5; .37; .037; .03; .012; .00082 G/100ML; G/100ML; G/100ML; G/100ML; G/100ML; G/100ML; G/100ML
125 INJECTION, SOLUTION INTRAVENOUS CONTINUOUS
Status: DISCONTINUED | OUTPATIENT
Start: 2022-10-29 | End: 2022-10-31

## 2022-10-29 RX ORDER — SODIUM CHLORIDE, SODIUM GLUCONATE, SODIUM ACETATE, POTASSIUM CHLORIDE, MAGNESIUM CHLORIDE, SODIUM PHOSPHATE, DIBASIC, AND POTASSIUM PHOSPHATE .53; .5; .37; .037; .03; .012; .00082 G/100ML; G/100ML; G/100ML; G/100ML; G/100ML; G/100ML; G/100ML
1000 INJECTION, SOLUTION INTRAVENOUS ONCE
Status: COMPLETED | OUTPATIENT
Start: 2022-10-29 | End: 2022-10-29

## 2022-10-29 RX ORDER — FAMOTIDINE 20 MG/1
40 TABLET, FILM COATED ORAL
Status: DISCONTINUED | OUTPATIENT
Start: 2022-10-29 | End: 2022-11-01

## 2022-10-29 RX ORDER — POTASSIUM CHLORIDE 20 MEQ/1
40 TABLET, EXTENDED RELEASE ORAL ONCE
Status: DISCONTINUED | OUTPATIENT
Start: 2022-10-29 | End: 2022-10-29

## 2022-10-29 RX ORDER — POTASSIUM CHLORIDE 20 MEQ/1
20 TABLET, EXTENDED RELEASE ORAL ONCE
Status: DISCONTINUED | OUTPATIENT
Start: 2022-10-29 | End: 2022-10-29

## 2022-10-29 RX ORDER — MAGNESIUM SULFATE HEPTAHYDRATE 40 MG/ML
2 INJECTION, SOLUTION INTRAVENOUS ONCE
Status: COMPLETED | OUTPATIENT
Start: 2022-10-29 | End: 2022-10-29

## 2022-10-29 RX ORDER — MAGNESIUM SULFATE 1 G/100ML
1 INJECTION INTRAVENOUS ONCE
Status: DISCONTINUED | OUTPATIENT
Start: 2022-10-29 | End: 2022-10-29

## 2022-10-29 RX ORDER — PRAVASTATIN SODIUM 20 MG
20 TABLET ORAL
Status: DISCONTINUED | OUTPATIENT
Start: 2022-10-29 | End: 2022-11-08 | Stop reason: HOSPADM

## 2022-10-29 RX ORDER — HYDRALAZINE HYDROCHLORIDE 20 MG/ML
5 INJECTION INTRAMUSCULAR; INTRAVENOUS EVERY 6 HOURS PRN
Status: DISCONTINUED | OUTPATIENT
Start: 2022-10-29 | End: 2022-11-08 | Stop reason: HOSPADM

## 2022-10-29 RX ORDER — CALCIUM GLUCONATE 20 MG/ML
1 INJECTION, SOLUTION INTRAVENOUS ONCE
Status: COMPLETED | OUTPATIENT
Start: 2022-10-29 | End: 2022-10-29

## 2022-10-29 RX ADMIN — ACETAMINOPHEN 650 MG: 325 TABLET ORAL at 01:12

## 2022-10-29 RX ADMIN — POTASSIUM PHOSPHATE, MONOBASIC AND POTASSIUM PHOSPHATE, DIBASIC 30 MMOL: 224; 236 INJECTION, SOLUTION, CONCENTRATE INTRAVENOUS at 09:56

## 2022-10-29 RX ADMIN — SODIUM CHLORIDE, SODIUM GLUCONATE, SODIUM ACETATE, POTASSIUM CHLORIDE AND MAGNESIUM CHLORIDE 1000 ML: 526; 502; 368; 37; 30 INJECTION, SOLUTION INTRAVENOUS at 08:50

## 2022-10-29 RX ADMIN — HYDROMORPHONE HYDROCHLORIDE 0.5 MG: 1 INJECTION, SOLUTION INTRAMUSCULAR; INTRAVENOUS; SUBCUTANEOUS at 23:11

## 2022-10-29 RX ADMIN — SODIUM CHLORIDE, SODIUM GLUCONATE, SODIUM ACETATE, POTASSIUM CHLORIDE AND MAGNESIUM CHLORIDE 125 ML/HR: 526; 502; 368; 37; 30 INJECTION, SOLUTION INTRAVENOUS at 09:00

## 2022-10-29 RX ADMIN — ROPIVACAINE HYDROCHLORIDE: 5 INJECTION EPIDURAL; INFILTRATION; PERINEURAL at 12:47

## 2022-10-29 RX ADMIN — HEPARIN SODIUM 5000 UNITS: 5000 INJECTION INTRAVENOUS; SUBCUTANEOUS at 21:14

## 2022-10-29 RX ADMIN — ONDANSETRON 4 MG: 2 INJECTION INTRAMUSCULAR; INTRAVENOUS at 00:21

## 2022-10-29 RX ADMIN — HYDRALAZINE HYDROCHLORIDE 5 MG: 20 INJECTION, SOLUTION INTRAMUSCULAR; INTRAVENOUS at 02:20

## 2022-10-29 RX ADMIN — MAGNESIUM SULFATE HEPTAHYDRATE 2 G: 40 INJECTION, SOLUTION INTRAVENOUS at 09:38

## 2022-10-29 RX ADMIN — CALCIUM GLUCONATE 1 G: 20 INJECTION, SOLUTION INTRAVENOUS at 08:42

## 2022-10-29 RX ADMIN — Medication 1 SPRAY: at 21:09

## 2022-10-29 RX ADMIN — METRONIDAZOLE 500 MG: 500 INJECTION, SOLUTION INTRAVENOUS at 01:21

## 2022-10-29 NOTE — PROGRESS NOTES
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 who presents to the ICU following diagnostic laparoscopy, modified radical hysterectomy, bilateral salpingoophorectomy, gastrocolic omentectomy, diaphragm resection, excision and ablation of peritoneal tumors, small bowel resection, ileostomy loop, left colon resection, and splenectomy  She had an estimated 1 7L blood loss and received 4 U pRBC and 2U FFP intraoperatively  On evaluation post operatively in the ICU, she denies any complaints at this time  24hr events: No acute events overnight  Patient hemoglobins have been stable     ---------------------------------------------------------------------------------------  SUBJECTIVE  No complaints    Review of Systems   Constitutional: Negative for chills and fever  HENT: Negative for sore throat  Eyes: Negative for visual disturbance  Respiratory: Negative for cough and shortness of breath  Cardiovascular: Negative for chest pain  Gastrointestinal: Negative for abdominal pain, constipation, diarrhea, nausea and vomiting  Genitourinary: Negative for dysuria  Musculoskeletal: Negative for back pain and neck pain  Skin: Negative for rash  Neurological: Negative for dizziness, syncope, light-headedness and headaches  Psychiatric/Behavioral: Negative for agitation  All other systems reviewed and are negative      Review of systems was reviewed and negative unless stated above in HPI/24-hour events   ---------------------------------------------------------------------------------------  Assessment and Plan:    Neuro:   • Diagnosis: Analgesia  o Plan:   - Scheduled tylenol, prn dilaudid      CV:   • Diagnosis: History of hypertension  o Plan:   - Holding home metoprolol and lisinopril  - Prn hydralazine  - Continuous cardiac monitoring  • Diagnosis: History of HLD  o Plan:   - Home simvastatin ordered      Pulm:  • Diagnosis: History of asthma  o Plan:   - Holding home albuterol  - Continuous pulse oximetry      GI:   • Diagnosis: S/p small bowel resection, ileostomy loop, left colon resection  o Plan:   - Monitor ostomy bag output  - Serial abdominal exams  • Diagnosis: History of GERD  o Plan:   - Home pepcid ordered      :   · Diagnosis: Gynecological malignancy s/p modified radical hysterectomy bilateral salpingoophorectomy and ablation of peritoneal tumors  o Plan:   - Serial abdominal exams  - Surgical oncology following, appreciate recs      F/E/N:   • Plan:   o F- LR @ 75/hr  o E- monitor and replete as needed, monitor and replete as needed, goal Mag >2, Phos >3, K >4  o N- NPO      Heme/Onc:   • Diagnosis: Acute blood loss anemia  o Hemoglobin low in OR, EBL 1 7 L, given 4 U pRBC and 2U FFP  o Plan:   - Hemoglobin has since stabilized  - Will check on am labs today  - Continue to monitor for s/s of anemia  • Diagnosis: S/p splenectomy  o Plan:   - Will need post splenectomy labs  - Holding AP/AC, consider initiation of DVT ppx, discuss with surgical oncology      Endo:   • Diagnosis: No acute issues  o Plan:   - Monitor BG on routine labs      ID:   • Diagnosis: No acute issues  o Plan:   - Monitor WBC and fever curve      MSK/Skin:   • Diagnosis: Surgical sites  o Plan:   - Monitor dressings, ostomy, and JOSÉ ANTONIO drains  - Routine skin care      Code Status: Level 1 - Full Code  ---------------------------------------------------------------------------------------  ICU CORE MEASURES    Prophylaxis   VTE Pharmacologic Prophylaxis: Pharmacologic VTE Prophylaxis contraindicated due to bleeding  VTE Mechanical Prophylaxis: sequential compression device  Stress Ulcer Prophylaxis: Famotidine IV     ABCDE Protocol (if indicated)  Plan to perform spontaneous awakening trial today? Not applicable  Plan to perform spontaneous breathing trial today? Not applicable  Obvious barriers to extubation? Not applicable    Invasive Devices Review  Invasive Devices  Report    Peripheral Intravenous Line  Duration           Peripheral IV 10/28/22 Left Arm <1 day    Peripheral IV 10/28/22 Right Antecubital <1 day          Epidural Line  Duration           Epidural Catheter 10/28/22 1 day          Arterial Line  Duration           Arterial Line 10/28/22 <1 day          Drain  Duration           Closed/Suction Drain Left LLQ 19 Fr  -- days    Closed/Suction Drain Left LUQ Bulb 19 Fr  <1 day    Ileostomy Standard (Loulou, zo) RUQ <1 day    NG/OG/Enteral Tube Left nare <1 day    Urethral Catheter Non-latex 16 Fr  <1 day              Can any invasive devices be discontinued today? Not applicable  ---------------------------------------------------------------------------------------  OBJECTIVE    Vitals   Vitals:    10/29/22 0400 10/29/22 0500 10/29/22 0530 10/29/22 0600   BP: 124/56 123/57  134/61   Pulse: 102 101  (!) 109   Resp: (!) 11 (!) 10  (!) 23   Temp:    (!) 97 4 °F (36 3 °C)   TempSrc:    Oral   SpO2: 99% 99%  97%   Weight:   84 3 kg (185 lb 13 6 oz)    Height:         Temp (24hrs), Av 2 °F (36 2 °C), Min:97 °F (36 1 °C), Max:97 4 °F (36 3 °C)  Current: Temperature: (!) 97 4 °F (36 3 °C)    Respiratory:  SpO2: SpO2: 97 %  Nasal Cannula O2 Flow Rate (L/min): 2 L/min    Invasive/non-invasive ventilation settings   Respiratory  Report   Lab Data (Last 4 hours)    None         O2/Vent Data (Last 4 hours)    None                Physical Exam  Vitals and nursing note reviewed  Constitutional:       General: She is not in acute distress  Appearance: Normal appearance  She is not ill-appearing or toxic-appearing     HENT:      Head: Normocephalic and atraumatic  Right Ear: External ear normal       Left Ear: External ear normal       Nose: Nose normal    Eyes:      General: No scleral icterus  Right eye: No discharge  Left eye: No discharge  Extraocular Movements: Extraocular movements intact  Conjunctiva/sclera: Conjunctivae normal    Cardiovascular:      Rate and Rhythm: Normal rate and regular rhythm  Heart sounds: Normal heart sounds  No murmur heard  No friction rub  No gallop  Pulmonary:      Effort: Pulmonary effort is normal  No respiratory distress  Breath sounds: Normal breath sounds  Abdominal:      General: Abdomen is flat  There is no distension  Palpations: Abdomen is soft  There is no mass  Tenderness: There is no abdominal tenderness  Comments: Two JOSÉ ANTONIO drains in place, minimal serosanginuous output  Ostomy bag in place, brown output, clean, dry, intact  Genitourinary:     Comments: Deferred  Musculoskeletal:         General: Normal range of motion  Cervical back: Normal range of motion  Skin:     General: Skin is warm and dry  Neurological:      General: No focal deficit present  Mental Status: She is alert  Psychiatric:         Mood and Affect: Mood normal              Laboratory and Diagnostics:  Results from last 7 days   Lab Units 10/28/22  2001   WBC Thousand/uL 5 96   HEMOGLOBIN g/dL 13 5   HEMATOCRIT % 40 1   PLATELETS Thousands/uL 102*     Results from last 7 days   Lab Units 10/28/22  2001   SODIUM mmol/L 139   POTASSIUM mmol/L 4 1   CHLORIDE mmol/L 114*   CO2 mmol/L 17*   ANION GAP mmol/L 8   BUN mg/dL 14   CREATININE mg/dL 0 69   CALCIUM mg/dL 8 4   GLUCOSE RANDOM mg/dL 214*                       ABG:    VBG:          Micro        Imaging:  I have personally reviewed pertinent reports        Intake and Output  I/O       10/27 0701  10/28 0700 10/28 0701  10/29 0700    I V  (mL/kg)  7268 2 (86 2)    Blood  1900    NG/GT  50    IV Piggyback 1400    Total Intake(mL/kg)  49213 2 (126)    Urine (mL/kg/hr)  3700    Emesis/NG output  360    Drains  385    Stool  0    Blood  1750    Total Output  6195    Net  +4423 2                Height and Weights   Height: 5' 4 5" (163 8 cm)     Body mass index is 31 41 kg/m²  Weight (last 2 days)     Date/Time Weight    10/29/22 0530 84 3 (185 85)    10/28/22 1033 79 6 (175 5)            Nutrition       Diet Orders   (From admission, onward)             Start     Ordered    10/28/22 2255  Diet NPO  Diet effective now        References:    Nutrtion Support Algorithm Enteral vs  Parenteral   Question Answer Comment   Diet Type NPO    RD to adjust diet per protocol?  Yes        10/28/22 2257                  Active Medications  Scheduled Meds:  Current Facility-Administered Medications   Medication Dose Route Frequency Provider Last Rate   • famotidine  40 mg Oral HS Arun Dey DO     • hydrALAZINE  5 mg Intravenous Q6H PRN Arun Dey DO     • HYDROmorphone  0 5 mg Intravenous Q2H PRN Raz Valle MD     • lactated ringers  125 mL/hr Intravenous Continuous Raz Valle  mL/hr (10/28/22 2301)   • pravastatin  20 mg Oral Daily With [address] [facility], DO     • ropivacaine 0 1% and fentaNYL 2 mcg/mL   Epidural Continuous Gris Wilson MD       Continuous Infusions:  lactated ringers, 125 mL/hr, Last Rate: 125 mL/hr (10/28/22 2301)  ropivacaine 0 1% and fentaNYL 2 mcg/mL,       PRN Meds:   hydrALAZINE, 5 mg, Q6H PRN  HYDROmorphone, 0 5 mg, Q2H PRN        Allergies   Allergies   Allergen Reactions   • Nsaids Other (See Comments)      pt is avoiding per family doctor instructions-  Able to take Tylenol   • Pedi-Pre Tape Spray [Wound Dressing Adhesive] Other (See Comments)     Please use sensitive skin tape, pt gets bad bruising from strong medical adhesive tape       ---------------------------------------------------------------------------------------  Advance Directive and Living Will:      Power of :    POLST:    ---------------------------------------------------------------------------------------  Care Time Delivered:   No Critical Care time spent     Yamilet,       Portions of the record may have been created with voice recognition software  Occasional wrong word or "sound a like" substitutions may have occurred due to the inherent limitations of voice recognition software    Read the chart carefully and recognize, using context, where substitutions have occurred

## 2022-10-29 NOTE — H&P
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 who presents to the ICU following diagnostic laparoscopy, modified radical hysterectomy, bilateral salpingoophorectomy, gastrocolic omentectomy, diaphragm resection, excision and ablation of peritoneal tumors, small bowel resection, ileostomy loop, left colon resection, and splenectomy  She had an estimated 1 7L blood loss and received 4 U pRBC and 2U FFP intraoperatively  On evaluation post operatively in the ICU, she denies any complaints at this time      History obtained from chart review and the patient   -------------------------------------------------------------------------------------------------------------  Assessment and Plan:    Neuro:   • Diagnosis: Analgesia  o Plan:   - Scheduled tylenol, prn dilaudid      CV:   • Diagnosis: History of hypertension  o Plan:   - On metoprolol, lisinopril at home, will hold for now  - Prn hydralizine for elevated BPs  - Continuous cardiac monitoring  • Diagnosis: History of HLD  o Plan:  - Home simvastatin ordered      Pulm:  • Diagnosis: History of asthma  o Plan:   - Holding home albuterol  - Continuous pulse oximetry      GI:   • Diagnosis: S/p small bowel resection, ileostomy loop, left colon resection  - Plan:   • Monitor ostomy bag output  • Diagnosis: History of GERD  o Plan:   - Home pepcid ordered      :   • Diagnosis: Gynecological malignancy s/p  modified radical hysterectomy, bilateral salpingoophorectomy and ablation of peritoneal tumors  o Plan:   - Serial abdominal exams  - Surgical oncology following, appreciate recs      F/E/N:   • Plan:   o F- LR @ 125/hr  o E- monitor and replete as needed, monitor and replete as needed, goal Mag >2, Phos >3, K >4  o N- NPO      Heme/Onc:   • Diagnosis: Acute blood loss anemia  o EBL 1 7L, s/p 4U pRBC and 2U FFP  - Plan:   • Hemoglobin has since normalized  • Recheck on morning labs  • Diagnosis: S/p splenectomy  o Plan:   - Will need post splenectomy labs  - Holding AP/AC for now      Endo:   • Diagnosis: No acute issues  o Plan:   - Monitor BG on routine labs      ID:   • Diagnosis: No acute issues  o Plan:   - Monitor WBC and fever curve      MSK/Skin:   • Diagnosis: No acute issues  o Plan:   - Routine skin care      Disposition: transfer, pending level of care  Code Status: Level 1 - Full Code  --------------------------------------------------------------------------------------------------------------  Review of Systems   Constitutional: Negative for chills and fever  HENT: Negative for sore throat  Eyes: Negative for visual disturbance  Respiratory: Negative for cough and shortness of breath  Cardiovascular: Negative for chest pain  Gastrointestinal: Negative for abdominal pain, constipation, diarrhea, nausea and vomiting  Genitourinary: Negative for dysuria  Musculoskeletal: Negative for back pain and neck pain  Skin: Negative for rash  Neurological: Positive for light-headedness  Negative for dizziness, syncope and headaches  Psychiatric/Behavioral: Negative for agitation  All other systems reviewed and are negative  A 12-point, complete review of systems was reviewed and negative except as stated above     Physical Exam  Vitals and nursing note reviewed  Constitutional:       General: She is not in acute distress  Appearance: Normal appearance  She is not ill-appearing  HENT:      Head: Normocephalic and atraumatic        Right Ear: External ear normal       Left Ear: External ear normal       Nose: Nose normal       Mouth/Throat:      Mouth: Mucous membranes are moist    Eyes:      General: No scleral icterus  Right eye: No discharge  Left eye: No discharge  Extraocular Movements: Extraocular movements intact  Conjunctiva/sclera: Conjunctivae normal       Pupils: Pupils are equal, round, and reactive to light  Cardiovascular:      Rate and Rhythm: Normal rate and regular rhythm  Pulses: Normal pulses  Heart sounds: Normal heart sounds  No murmur heard  No friction rub  No gallop  Pulmonary:      Effort: Pulmonary effort is normal  No respiratory distress  Breath sounds: Normal breath sounds  No wheezing, rhonchi or rales  Abdominal:      General: Abdomen is flat  There is no distension  Palpations: Abdomen is soft  There is no mass  Tenderness: There is no abdominal tenderness  Comments: Two abdominal JOSÉ ANTONIO drains in place with minimal serosanginous fluid  Ostomy bag in place with minimal brown output  Genitourinary:     Comments: Deferred  Musculoskeletal:         General: Normal range of motion  Cervical back: Normal range of motion  No rigidity or tenderness  Right lower leg: No edema  Left lower leg: No edema  Skin:     General: Skin is warm and dry  Coloration: Skin is pale  Neurological:      General: No focal deficit present  Mental Status: She is alert and oriented to person, place, and time  GCS: GCS eye subscore is 4  GCS verbal subscore is 5  GCS motor subscore is 6     Psychiatric:         Mood and Affect: Mood normal        --------------------------------------------------------------------------------------------------------------  Vitals:   Vitals:    10/28/22 2130 10/28/22 2143 10/28/22 2200 10/28/22 2247   BP: 149/73  163/77    BP Location:   Right arm    Pulse: 80 80 87    Resp: 18 (!) 11 14    Temp:       TempSrc:       SpO2: 100% 100% 100% 99%   Weight:       Height:         Temp  Min: 97 °F (36 1 °C)  Max: 97 8 °F (36 6 °C)     Height: 5' 4 5" (163 8 cm)  Body mass index is 29 66 kg/m²  N/A    Laboratory and Diagnostics:  Results from last 7 days   Lab Units 10/28/22  2001   WBC Thousand/uL 5 96   HEMOGLOBIN g/dL 13 5   HEMATOCRIT % 40 1   PLATELETS Thousands/uL 102*     Results from last 7 days   Lab Units 10/28/22  2001   SODIUM mmol/L 139   POTASSIUM mmol/L 4 1   CHLORIDE mmol/L 114*   CO2 mmol/L 17*   ANION GAP mmol/L 8   BUN mg/dL 14   CREATININE mg/dL 0 69   CALCIUM mg/dL 8 4   GLUCOSE RANDOM mg/dL 214*                       ABG:    VBG:          Micro:        Imaging: I have personally reviewed pertinent reports  Historical Information   Past Medical History:   Diagnosis Date   • Abdominal pain     off and on   • Allergic sinusitis     "seasonal allergies"-has received allergy shots "   • Anxiety     with current diagnosis   • Arthritis    • Asthma    • Cholelithiasis    • Colon polyp    • Degenerative joint disease    • Dental bridge present     permanent lower left   • Exercise involving walking     1-2x/week   • GERD (gastroesophageal reflux disease)    • Giant cell aortic arteritis ([facility] [address] )     "hereditary Giant Cell Temporal Arteritis"per pt   • Hiatal hernia    • History of cancer chemotherapy    • Hyperlipidemia    • Hypertension    • Nausea    • 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[address] )     "gynecologic malignacy"   • PMR (polymyalgia rheumatica) ([facility])    • Prediabetes    • Shortness of breath     per pt "asthma related --with climbing mult  flights of stairs--or exertion -not new"   • Tinnitus    • Wears glasses      Past Surgical History:   Procedure Laterality Date   • COLONOSCOPY      October 2021: Adenomatous polyps and rectum in transverse colon, sigmoid diverticulosis, internal hemorrhoids  October 2016 diverticulosis and internal hemorrhoids, September 2011 diverticulosis and internal hemorrhoids     • DILATION AND CURETTAGE OF UTERUS     • MT LAP,DIAGNOSTIC ABDOMEN N/A 08/04/2022    Procedure: DIAGNOSTIC LAPAROSCOPY, PERITONEAL BIOPSY, SAMPLING OF BLOODY ASCITES ;  Surgeon: Kathie Rand MD;  Location: [facility];  Service: Gynecology Oncology   • TEMPORAL ARTERY BIOPSY / LIGATION     • UPPER GASTROINTESTINAL ENDOSCOPY  11/10/2014    November 2014 irregular Z-line and Schatzki ring, hiatal hernia, gastritis  Biopsies negative for celiac, H pylori, or Phillip's or eosinophilic esophagitis  • WISDOM TOOTH EXTRACTION       Social History   Social History     Substance and Sexual Activity   Alcohol Use Yes    Comment: a glass of wine maybe 2 times a month     Social History     Substance and Sexual Activity   Drug Use Never     Social History     Tobacco Use   Smoking Status Never Smoker   Smokeless Tobacco Never Used     Family History:   Family History   Problem Relation Age of Onset   • Colon polyps Mother    • Alzheimer's disease Mother    • Heart disease Father    • Brain cancer Father    • Colon polyps Sister    • Heart disease Sister    • Diabetes Sister    • MARLEY disease Sister    • Colon cancer Neg Hx      I have reviewed this patient's family history and commented on sigificant items within the HPI      Medications:  Current Facility-Administered Medications   Medication Dose Route Frequency   • acetaminophen (TYLENOL) tablet 650 mg  650 mg Oral Q6H   • ceFAZolin (ANCEF) IVPB (premix in dextrose) 2,000 mg 50 mL  2,000 mg Intravenous Once   • HYDROmorphone (DILAUDID) injection 0 5 mg  0 5 mg Intravenous Q2H PRN   • lactated ringers infusion  125 mL/hr Intravenous Continuous   • [START ON 10/29/2022] metroNIDAZOLE (FLAGYL) IVPB (premix) 500 mg 100 mL  500 mg Intravenous Once   • ropivacaine 0 1% and fentaNYL 2 mcg/mL PCEA   Epidural Continuous     Home medications:  Prior to Admission Medications   Prescriptions Last Dose Informant Patient Reported? Taking?    Acetaminophen (TYLENOL ARTHRITIS PAIN PO) Past Week at Unknown time Self Yes Yes   Sig: Take by mouth as needed   Ascorbic Acid (VITAMIN C GUMMIE PO) 10/13/2022  Yes Yes   Sig: Take by mouth   B Complex-C (B COMPLEX-VITAMIN C PO) 10/20/2022 Self Yes Yes   Sig: Take by mouth daily   Calcium Carb-Cholecalciferol (CALCIUM 500+D3 PO) 10/20/2022 Self Yes Yes   Sig: Take 1 tablet by mouth 2 (two) times a day    Docusate Calcium (STOOL SOFTENER PO) 10/27/2022 at Unknown time Self Yes Yes   Sig: Take 1 tablet by mouth 2 (two) times a day   [address] 10/20/2022 Self Yes Yes   Sig: Take by mouth if needed   GLUCOSAMINE-CHONDROITIN PO 10/13/2022 Self Yes Yes   Sig: Take 1 tablet by mouth 2 (two) times a day    LORazepam (ATIVAN) 1 mg tablet 10/28/2022 at 0720  No Yes   Sig: Take 1 tablet (1 mg total) by mouth every 8 (eight) hours as needed for anxiety (nausea or anxiety)   Patient taking differently: Take 1 mg by mouth every 8 (eight) hours as needed for anxiety (nausea or anxiety) Per pt usually takes q night   Polyethylene Glycol 3350 (MIRALAX PO)  Self Yes No   Sig: Take by mouth as needed   Probiotic Product (PROBIOTIC DAILY PO) 10/20/2022 Self Yes Yes   Sig: Take by mouth daily   SYMBICORT 160-4 5 MCG/ACT inhaler 10/28/2022 at 0720 Self Yes Yes   Si puffs daily    albuterol (PROVENTIL HFA,VENTOLIN HFA) 90 mcg/act inhaler  Self Yes Yes   Sig: Inhale 2 puffs every 4 (four) hours as needed    aspirin 81 mg chewable tablet 10/15/2022 Self Yes Yes   Sig: Chew 81 mg daily   betamethasone, augmented, (DIPROLENE) 0 05 % lotion  Self Yes No   Sig: Apply topically as needed   cholecalciferol (VITAMIN D3) 1,000 units tablet 10/20/2022 Self Yes Yes   Sig: Take 1,000 Units by mouth daily   escitalopram (LEXAPRO) 10 mg tablet   No No   Sig: TAKE 1 TABLET BY MOUTH EVERY DAY   famotidine (PEPCID) 40 MG tablet 10/27/2022 at Unknown time Self No Yes   Sig: Take 1 tablet (40 mg total) by mouth daily at bedtime   fexofenadine (ALLEGRA) 180 MG tablet 10/27/2022 at Unknown time Self Yes Yes   Sig: Take 180 mg by mouth daily   hydrocortisone (Proctosol HC) 2 5 % rectal cream  Self No No   Sig: Insert into rectum two (2) times a day   lisinopril (ZESTRIL) 5 mg tablet 10/27/2022 at Unknown time Self Yes Yes   Sig: Take 5 mg by mouth daily dinner   metFORMIN (GLUCOPHAGE) 500 mg tablet 10/26/2022 Self Yes Yes   Sig: Take 500 mg by mouth daily Daily at lunch   metoprolol succinate (TOPROL-XL) 25 mg 24 hr tablet 10/27/2022 at Unknown time  Yes Yes   Sig: Take 25 mg by mouth daily dinner   metroNIDAZOLE (FLAGYL) 500 mg tablet 10/27/2022 at 2100  Yes Yes   montelukast (SINGULAIR) 10 mg tablet 10/12/2022 Self Yes Yes   Sig: Take 10 mg by mouth daily dinner   multivitamin (THERAGRAN) TABS 10/20/2022 Self Yes Yes   Sig: Take 1 tablet by mouth daily   omeprazole (PriLOSEC) 20 mg delayed release capsule 10/28/2022 at 0720  Yes Yes   Sig: TAKE 1 CAPSULE BY MOUTH EVERY DAY 30 MINUTES BEFORE MORNING MEAL FOR 90 DAYS   ondansetron (ZOFRAN) 8 mg tablet 10/27/2022 at 1800  No Yes   Sig: Take 1 tablet (8 mg total) by mouth every 8 (eight) hours as needed for nausea or vomiting   polyethylene glycol (MiraLax) 17 GM/SCOOP powder 10/27/2022 at Unknown time  No Yes   Sig: Mix with 64 oz Gatorade, begin 4 PM day before surgery per bowel prep instructions  predniSONE 1 mg tablet 10/27/2022  Yes Yes   Sig: Take 3 mg by mouth daily   simvastatin (ZOCOR) 10 mg tablet 10/27/2022 at Unknown time Self Yes Yes   Sig: Take 10 mg by mouth daily at bedtime      Facility-Administered Medications: None     Allergies:   Allergies   Allergen Reactions   • Nsaids Other (See Comments)      pt is avoiding per family doctor instructions-  Able to take Tylenol   • Pedi-Pre Tape Spray [Wound Dressing Adhesive] Other (See Comments)     Please use sensitive skin tape, pt gets bad bruising from strong medical adhesive tape       ------------------------------------------------------------------------------------------------------------  Advance Directive and Living Will:      Power of :    POLST:    ------------------------------------------------------------------------------------------------------------  Anticipated Length of Stay is > 2 midnights    Care Time Delivered:   No Critical Care time spent       Ramonenstein, DO        Portions of the record may have been created with voice recognition software  Occasional wrong word or "sound a like" substitutions may have occurred due to the inherent limitations of voice recognition software    Read the chart carefully and recognize, using context, where substitutions have occurred

## 2022-10-29 NOTE — PROGRESS NOTES
Progress Note - Surgical Oncology   Leigh Morgan 67 y o  female MRN: [id number]  Unit/Bed#: [facility] 832-62 Encounter: [id number]    Assessment:  75-yr old F w/ peritoneal (vs ovarian) adenocarcinoma (s/p neoadjuvant chemotherapy); now s/p laparoscopy, modified radical hysterectomy, bilateral salpingoophorectomy, gastrocolic omentectomy, diaphragm resection, excision and ablation of peritoneal tumors, small bowel resection, ileostomy loop, left colon resection, and splenectomy 10/28  AVSS on RA    JOSÉ ANTONIO x2: LLQ-105 cc/ss, LUQ- 70 cc/ss  Urine 500 x 1 unrecorded    Ileostomy: pink; small amount of liquid stool in bag, no gas  NGT: 150 from 300 cc: bilious  Plan:  Epidural  NPO  LR  Nausea control      Subjective/Objective     Subjective:   No acute events overnight  -N  -V  OOB  Objective:     Blood pressure 135/65, pulse 102, temperature (!) 97 1 °F (36 2 °C), resp  rate 13, height 5' 4 5" (1 638 m), weight 79 6 kg (175 lb 8 oz), SpO2 99 %  ,Body mass index is 29 66 kg/m²        Intake/Output Summary (Last 24 hours) at 10/29/2022 0322  Last data filed at 10/29/2022 0300  Gross per 24 hour   Intake 96201 47 ml   Output 5660 ml   Net 4364 47 ml       Invasive Devices  Report    Peripheral Intravenous Line  Duration           Peripheral IV 10/28/22 Left Arm <1 day    Peripheral IV 10/28/22 Right Antecubital <1 day          Epidural Line  Duration           Epidural Catheter 10/28/22 1 day          Arterial Line  Duration           Arterial Line 10/28/22 <1 day          Drain  Duration           Closed/Suction Drain Left LLQ 19 Fr  -- days    Closed/Suction Drain Left LUQ Bulb 19 Fr  <1 day    Ileostomy Standard (Loulou, end) RUQ <1 day    NG/OG/Enteral Tube Left nare <1 day    Urethral Catheter Non-latex 16 Fr  <1 day                Physical Exam:   Gen: NAD, Comfortable  Neuro: A&O, No focal deficits  Head: Normal Cephalic, Atraumatic  Eye: EOMI, PERRLA, No scleral icterus  Neck: Supple, No JVD, Midline trachea  CV: RRR, Cap refill <2 sec  Pulm: Normal work of breathing, no respiratory distress  Abd: Soft, Non-Distended, LLQ tender  Ext: No edema, Non-tender  Skin: warm, dry, intact

## 2022-10-29 NOTE — PROGRESS NOTES
Progress Note - OB/GYN   Jan Alonzo 67 y o  female MRN: [id number]  Unit/Bed#: [facility] 513-01 Encounter: [id number]    Assessment:  Patient stable postoperative day 1  From diagnostic laparoscopy, modified radical hysterectomy bilateral salpingo-oophorectomy, gastrocolic omentectomy, diaphragmatic resection  Ablation of peritoneal tumors, small-bowel resection, ileostomy, left colon resection and splenectomy  Patient is stable postoperative day 1  Blood counts are appropriate  Vital Signs appropriate  Urine output appropriate  Blood work stable  Pain well controlled with epidural       Plan:  Patient is stable postoperatively  Continue present IV fluids  Continue NG tube  Continue NPO Continue epidural   Out of bed to chair if tolerates  Subjective/Objective   Chief Complaint:  Patient without complaint    Subjective:  Denies pain nausea but is having arthralgias consistent with her polymyositis rheumatica P she has nausea  Vitals: Blood pressure 134/61, pulse (!) 109, temperature (!) 97 4 °F (36 3 °C), temperature source Oral, resp  rate (!) 23, height 5' 4 5" (1 638 m), weight 84 3 kg (185 lb 13 6 oz), SpO2 98 %  ,Body mass index is 31 41 kg/m²        Intake/Output Summary (Last 24 hours) at 10/29/2022 1116  Last data filed at 10/29/2022 0600  Gross per 24 hour   Intake 82390 17 ml   Output 6195 ml   Net 4423 17 ml       Invasive Devices  Report    Peripheral Intravenous Line  Duration           Peripheral IV 10/28/22 Right Antecubital 1 day    Peripheral IV 10/28/22 Left Arm <1 day          Epidural Line  Duration           Epidural Catheter 10/28/22 1 day          Arterial Line  Duration           Arterial Line 10/28/22 <1 day          Drain  Duration           Closed/Suction Drain Left LLQ 19 Fr  -- days    Closed/Suction Drain Left LUQ Bulb 19 Fr  <1 day    Ileostomy Standard (Loulou, end) RUQ <1 day    NG/OG/Enteral Tube Left nare <1 day    Urethral Catheter Non-latex 16 Fr  <1 day Physical Exam:     HEENT normocephalic atraumatic  Neck is supple without thyromegaly lymphadenopathy  Abdomen soft nontender rare bowel sounds  Stoma pink with dark drainage  Abdomen nondistended extremities 1+ edema bilaterally    Lab, Imaging and other studies: I have personally reviewed pertinent reports

## 2022-10-29 NOTE — PROGRESS NOTES
Progress Note    Chase Valdez 67 y o  female MRN: [id number]  Unit/Bed#: [facility] 251-46 Encounter: [id number]    Assessment:  75-yr old F w/ peritoneal (vs ovarian) adenocarcinoma (s/p neoadjuvant chemotherapy); now s/p laparoscopy, modified radical hysterectomy, bilateral salpingoophorectomy, gastrocolic omentectomy, diaphragm resection, excision and ablation of peritoneal tumors, small bowel resection, ileostomy loop, left colon resection, and splenectomy 10/28  Tachy to low 100s; normotensive  Afebrile  JOSÉ ANTONIO x2: LLQ-230 cc/ss, LUQ- 95 cc/ss  Hb: 12 5  Wbc- 8  Ileostomy: pink; small amount of liquid stool in bag  NGT: 300 cc: bilious  PLAN:  - consider keeping NGT for another 24 hrs; can dc if low output  - ice chips for comfort   - dietary advancement per primary team   - monitor & trend drain output/character  - surgical oncology team will follow as needed; pls call or TT for questions & or concerns  Subjective:   - no new complaints this morning  Objective:     Vitals: Temp:  [97 °F (36 1 °C)-97 4 °F (36 3 °C)] 97 4 °F (36 3 °C)  HR:  [] 109  Resp:  [8-23] 23  BP: ()/(56-86) 134/61  Arterial Line BP: (158-186)/(68-92) 164/70  Body mass index is 31 41 kg/m²  I/O       10/27 0701  10/28 0700 10/28 0701  10/29 0700    I V  (mL/kg)  7268 2 (86 2)    Blood  1900    NG/GT  50    IV Piggyback  1400    Total Intake(mL/kg)  43371 2 (126)    Urine (mL/kg/hr)  3700    Emesis/NG output  360    Drains  385    Stool  0    Blood  1750    Total Output  6195    Net  +4423 2                Physical Exam:  GEN: NAD  HEENT: atraumatic  CV: RRR  Lung: Normal effort  Ab: Soft, ND, appro tender  Ileostomy pink/functional   Extrem: No CCE  Neuro: A+Ox3    Lab, Imaging and other studies:   I have personally reviewed pertinent reports    , CBC with diff:   Lab Results   Component Value Date    WBC 8 12 10/29/2022    HGB 12 5 10/29/2022    HCT 36 5 10/29/2022    MCV 88 10/29/2022     (L) 10/29/2022    MCH 30 2 10/29/2022    MCHC 34 2 10/29/2022    RDW 15 5 (H) 10/29/2022    MPV 9 5 10/29/2022   , BMP/CMP:   Lab Results   Component Value Date    SODIUM 141 10/29/2022    K 3 7 10/29/2022     (H) 10/29/2022    CO2 20 (L) 10/29/2022    BUN 12 10/29/2022    CREATININE 0 49 (L) 10/29/2022    CALCIUM 8 1 (L) 10/29/2022    EGFR 97 10/29/2022   , Magnesium: No components found for: MAG  VTE Pharmacologic Prophylaxis: Sequential compression device (Venodyne)   VTE Mechanical Prophylaxis: sequential compression device

## 2022-10-29 NOTE — PROGRESS NOTES
Gyn Onc post op check:     Patient resting comfortably in bed when I arrived at bedside  She is drowsy but awakens easily denying any abdominal pain, SOB, chest discomfort, or N/V  Patient with NGT in place and nasal Oxygen via NC with 98% O2sat  She states, " I'm a little shakey" and "my mouth is dry"  Exam-/70   Pulse 89   Temp (!) 97 1 °F (36 2 °C)   Resp 14   Ht 5' 4 5" (1 638 m)   Wt 79 6 kg (175 lb 8 oz)   SpO2 100%   BMI 29 66 kg/m²      Lungs- clear bilaterally anteriorly without wheezes or rhonchi  Heart- regular rate and rhythm, S2 and S2 without murmurs  Abdomen- soft, nontender, with few scattered BS at best, Ostomy RLQ abdomen and draining scant maroon liquid into ostomy bag, nondistended, midline abdominal dressings are clean, dry, and intact  There are 2 JOSÉ ANTONIO drains left side abdomen with a moderate amount of serosanguinous output in collection bulbs  - coffey catheter intact and draining a moderate amount of clear to straw colored urine  Extremities- good and equal strength bilaterally with sensation intact  SCD's on calves and functioning  IMP/ PLAN- S/P LAPAROSCOPY DIAGNOSTIC, LAPAROTOMY EXPLORATORY, HYSTERECTOMY MODIFIED RADICAL, BILATERAL SALPINGO-OOPHORECTOMY, GASTROCOLIC OMENTECTOMY, DIAPHRAGM RESECTION, EXCISION AND ABLATION OF PERITONEAL TUMORS, RESECTION SMALL BOWEL, LIVER CONTROL OF BLEED, ILEOSTOMY LOOP, PROCTECTOMY, RESECTION COLON LEFT, SPLENECTOMY  Stable Post-op, pain control with epidural catheter, U/O adequate, NGT draining about 50 ml of bilious material in wall collection container, Ostomy draining scant amount RLQ, JOSÉ ANTONIO drains with moderate serosanguinous output, SCD's for DVT prophylaxis  Hgb- 13 5,  Plts- 102K      Carlie [facility]  10/28/22  Patient encounter [id number]

## 2022-10-30 LAB
ABO GROUP BLD BPU: NORMAL
ANION GAP SERPL CALCULATED.3IONS-SCNC: 6 MMOL/L (ref 4–13)
ANISOCYTOSIS BLD QL SMEAR: PRESENT
BASOPHILS # BLD MANUAL: 0 THOUSAND/UL (ref 0–0.1)
BASOPHILS NFR MAR MANUAL: 0 % (ref 0–1)
BPU ID: NORMAL
BUN SERPL-MCNC: 8 MG/DL (ref 5–25)
CA-I BLD-SCNC: 1.04 MMOL/L (ref 1.12–1.32)
CALCIUM SERPL-MCNC: 7.5 MG/DL (ref 8.3–10.1)
CHLORIDE SERPL-SCNC: 114 MMOL/L (ref 96–108)
CO2 SERPL-SCNC: 24 MMOL/L (ref 21–32)
CREAT SERPL-MCNC: 0.43 MG/DL (ref 0.6–1.3)
CROSSMATCH: NORMAL
EOSINOPHIL # BLD MANUAL: 0 THOUSAND/UL (ref 0–0.4)
EOSINOPHIL NFR BLD MANUAL: 0 % (ref 0–6)
ERYTHROCYTE [DISTWIDTH] IN BLOOD BY AUTOMATED COUNT: 17.2 % (ref 11.6–15.1)
GFR SERPL CREATININE-BSD FRML MDRD: 101 ML/MIN/1.73SQ M
GLUCOSE SERPL-MCNC: 100 MG/DL (ref 65–140)
HCT VFR BLD AUTO: 32.2 % (ref 34.8–46.1)
HGB BLD-MCNC: 10.9 G/DL (ref 11.5–15.4)
LYMPHOCYTES # BLD AUTO: 0.3 THOUSAND/UL (ref 0.6–4.47)
LYMPHOCYTES # BLD AUTO: 2 % (ref 14–44)
MAGNESIUM SERPL-MCNC: 2.4 MG/DL (ref 1.6–2.6)
MCH RBC QN AUTO: 30.4 PG (ref 26.8–34.3)
MCHC RBC AUTO-ENTMCNC: 33.9 G/DL (ref 31.4–37.4)
MCV RBC AUTO: 90 FL (ref 82–98)
MONOCYTES # BLD AUTO: 0.74 THOUSAND/UL (ref 0–1.22)
MONOCYTES NFR BLD: 5 % (ref 4–12)
NEUTROPHILS # BLD MANUAL: 13.54 THOUSAND/UL (ref 1.85–7.62)
NEUTS BAND NFR BLD MANUAL: 40 % (ref 0–8)
NEUTS SEG NFR BLD AUTO: 51 % (ref 43–75)
PHOSPHATE SERPL-MCNC: 1.2 MG/DL (ref 2.3–4.1)
PLATELET # BLD AUTO: 97 THOUSANDS/UL (ref 149–390)
PLATELET BLD QL SMEAR: ABNORMAL
PMV BLD AUTO: 9.7 FL (ref 8.9–12.7)
POIKILOCYTOSIS BLD QL SMEAR: PRESENT
POTASSIUM SERPL-SCNC: 3.6 MMOL/L (ref 3.5–5.3)
RBC # BLD AUTO: 3.59 MILLION/UL (ref 3.81–5.12)
RBC MORPH BLD: PRESENT
SODIUM SERPL-SCNC: 144 MMOL/L (ref 135–147)
UNIT DISPENSE STATUS: NORMAL
UNIT PRODUCT CODE: NORMAL
UNIT PRODUCT VOLUME: 350 ML
UNIT RH: NORMAL
VARIANT LYMPHS # BLD AUTO: 2 %
WBC # BLD AUTO: 14.88 THOUSAND/UL (ref 4.31–10.16)

## 2022-10-30 RX ORDER — CALCIUM GLUCONATE 20 MG/ML
2 INJECTION, SOLUTION INTRAVENOUS ONCE
Status: COMPLETED | OUTPATIENT
Start: 2022-10-30 | End: 2022-10-30

## 2022-10-30 RX ORDER — PREDNISOLONE SODIUM PHOSPHATE 15 MG/5ML
3 SOLUTION ORAL DAILY
Status: DISCONTINUED | OUTPATIENT
Start: 2022-10-30 | End: 2022-10-31

## 2022-10-30 RX ADMIN — PREDNISOLONE SODIUM PHOSPHATE 3 MG: 15 SOLUTION ORAL at 12:18

## 2022-10-30 RX ADMIN — HEPARIN SODIUM 5000 UNITS: 5000 INJECTION INTRAVENOUS; SUBCUTANEOUS at 21:35

## 2022-10-30 RX ADMIN — SODIUM CHLORIDE, SODIUM GLUCONATE, SODIUM ACETATE, POTASSIUM CHLORIDE AND MAGNESIUM CHLORIDE 125 ML/HR: 526; 502; 368; 37; 30 INJECTION, SOLUTION INTRAVENOUS at 00:48

## 2022-10-30 RX ADMIN — CALCIUM GLUCONATE 2 G: 20 INJECTION, SOLUTION INTRAVENOUS at 11:10

## 2022-10-30 RX ADMIN — HEPARIN SODIUM 5000 UNITS: 5000 INJECTION INTRAVENOUS; SUBCUTANEOUS at 06:01

## 2022-10-30 RX ADMIN — ROPIVACAINE HYDROCHLORIDE: 5 INJECTION EPIDURAL; INFILTRATION; PERINEURAL at 14:58

## 2022-10-30 RX ADMIN — ROPIVACAINE HYDROCHLORIDE: 5 INJECTION EPIDURAL; INFILTRATION; PERINEURAL at 02:09

## 2022-10-30 RX ADMIN — POTASSIUM PHOSPHATE, MONOBASIC AND POTASSIUM PHOSPHATE, DIBASIC 30 MMOL: 224; 236 INJECTION, SOLUTION, CONCENTRATE INTRAVENOUS at 12:36

## 2022-10-30 RX ADMIN — SODIUM CHLORIDE, SODIUM GLUCONATE, SODIUM ACETATE, POTASSIUM CHLORIDE AND MAGNESIUM CHLORIDE 125 ML/HR: 526; 502; 368; 37; 30 INJECTION, SOLUTION INTRAVENOUS at 17:29

## 2022-10-30 RX ADMIN — ROPIVACAINE HYDROCHLORIDE: 5 INJECTION EPIDURAL; INFILTRATION; PERINEURAL at 20:15

## 2022-10-30 RX ADMIN — HEPARIN SODIUM 5000 UNITS: 5000 INJECTION INTRAVENOUS; SUBCUTANEOUS at 14:59

## 2022-10-30 NOTE — PHYSICAL THERAPY NOTE
PHYSICAL THERAPY Evaluation NOTE    Patient Name: Wilberto Lugo  DTWKC'B Date: 10/30/2022    AGE:   67 y o  Mrn:   [id number]  ADMIT DX:  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[facility] ) [C48 2]    Past Medical History:   Diagnosis Date    Abdominal pain     off and on    Allergic sinusitis     "seasonal allergies"-has received allergy shots "    Anxiety     with current diagnosis    Arthritis     Asthma     Cholelithiasis     Colon polyp     Degenerative joint disease     Dental bridge present     permanent lower left    Exercise involving walking     1-2x/week    GERD (gastroesophageal reflux disease)     Giant cell aortic arteritis ([place] [address] )     "hereditary Giant Cell Temporal Arteritis"per pt    Hiatal hernia     History of cancer chemotherapy     Hyperlipidemia     Hypertension     Nausea     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[facility] )     "gynecologic malignacy"    PMR (polymyalgia rheumatica) ([facility])     Prediabetes     Shortness of breath     per pt "asthma related --with climbing mult  flights of stairs--or exertion -not new"    Tinnitus     Wears glasses      Length Of Stay: 2  PHYSICAL THERAPY EVALUATION :    10/30/22 1101   PT Last Visit   PT Visit Date 10/30/22   Note Type   Note type Evaluation   Pain Assessment   Pain Assessment Tool 0-10   Pain Score 7   Pain Location/Orientation Location: Abdomen   Restrictions/Precautions   Other Precautions Multiple lines;Telemetry;O2;Fall Risk;Pain   Home Living   Type of Home   (ILF at a [facility])   Home Layout One level   Bathroom Shower/Tub Walk-in shower   Bathroom Toilet Standard   Bathroom Equipment Grab bars in shower;Grab bars around toilet   Bathroom Accessibility Accessible   Prior Function   Level of [place] Independent with ADLs; Independent with functional mobility   Lives With Spouse   Receives Help From Family   IADLs Independent with driving; Independent with meal prep; Independent with medication management Falls in the last 6 months 0   Vocational Retired   General   Additional Pertinent History Pt  is a 66 yo F who presents s/p diagnostic laparoscopy modified radical hysterectomy, BSO gastrocolic omentectomy, resection of diaphragm, excision and ablation of peritoneal tumors, small bowel resection, ileostomy, left colon resection and splenectomy  Cognition   Overall Cognitive Status WFL   Arousal/Participation Cooperative   Attention Within functional limits   Orientation Level Oriented X4   Memory Within functional limits   Following Commands Follows one step commands without difficulty   Comments Pt  was identified with full name and birthdate   RLE Assessment   RLE Assessment   (grossly 4-/5)   LLE Assessment   LLE Assessment   (grossly 4-/5)   Coordination   Movements are Fluid and Coordinated 0   Coordination and Movement Description slow and antalgic movements  (reports lightheadedness with change in position improved with rest)   Bed Mobility   Supine to Sit 3  Moderate assistance   Additional items Assist x 2;HOB elevated;Trapeze bar; Increased time required;LE management   Sit to Supine Unable to assess   Additional Comments Pt  seated OOB in recliner following PT session   Transfers   Sit to Stand 3  Moderate assistance   Additional items Assist x 1; Increased time required   Stand to Sit 3  Moderate assistance   Additional items Assist x 1; Increased time required   Additional Comments Ax1 with HHA, SBA of 2nd for line management and safety   Ambulation/Elevation   Gait pattern Narrow RHONDA; Forward Flexion;Decreased foot clearance;Shuffling; Short stride   Gait Assistance 3  Moderate assist   Additional items Assist x 1   Assistive Device   (HHA)   Distance 5'x1 from bed to chair   Balance   Static Sitting Fair -   Dynamic Sitting Poor +   Static Standing Poor   Dynamic Standing Poor   Ambulatory Poor -   Endurance Deficit   Endurance Deficit Yes   Endurance Deficit Description Postural and gait degradation noted with fatigue   Activity Tolerance   Activity Tolerance Patient limited by fatigue   Medical Staff Made [address], OT   Nurse Made Aware Spoke to RN, cleared for PT session, and present to assist   Assessment   Prognosis Good   Problem List Decreased strength;Decreased range of motion;Decreased endurance; Impaired balance;Decreased mobility; Decreased coordination;Decreased safety awareness;Pain   Assessment Pt  is a 68 yo F who presents s/p diagnostic laparoscopy modified radical hysterectomy, BSO gastrocolic omentectomy, resection of diaphragm, excision and ablation of peritoneal tumors, small bowel resection, ileostomy, left colon resection and splenectomy  order placed for PT eval and tx, w/ activity order of up w/ assist  pt presents w/ comorbidities of GERD, HLD, Htn, Giant Cell Aortic Arteritis, Prediabetes, Gynecologic malignancy, LBP and personal factors of inability to ambulate household distances, inability to navigate community distances, inability to navigate level surfaces w/o external assistance, unable to perform dynamic tasks in community, anxiety and inability to perform IADLs  pt presents w/ pain, weakness, decreased ROM, decreased endurance, impaired balance, gait deviations, impaired coordination, decreased safety awareness, impaired judgment and fall risk  these impairments are evident in findings from physical examination (weakness, decreased ROM and impaired coordination), mobility assessment (need for Mod assist w/ all phases of mobility when usually mobilizing independently, tolerance to only 5 feet of ambulation and need for cueing for mobility technique), and AM-PAC 6-Clicks: 51/74  pt needed input for task focus and mobility technique  pt is at risk for falls due to physical and safety awareness deficits   pt's clinical presentation is unstable/unpredictable (evident in need for assist w/ all phases of mobility when usually mobilizing independently, tolerance to only 5 feet of ambulation, need for input for mobility technique, need for input for task focus and mobility technique and need for input for mobility technique/safety)  pt needs inpatient PT tx to improve mobility deficits and progress mobility training as appropriate  discharge recommendation is for home PT and home w/ family support pending progression to reduce fall risk and maximize level of functional independence  Goals   Patient Goals to have a cup of water   STG Expiration Date 11/09/22   Short Term Goal #1 pt will: Increase bilateral LE strength 1/2 grade to facilitate independent mobility, Perform bed mobility w/ supervision to increase level of independence, Perform all transfers w/ supervision to improve independence, Ambulate 300 ft  with least restrictive assistive device w/ supervision w/o LOB to improve functional independence, Navigate 2 stair(s) w/ supervision with unilateral handrail to facilitate return to previous living environment and Increase all balance 1/2 grade to decrease risk for falls   PT Treatment Day 0   Plan   Treatment/Interventions Functional transfer training;LE strengthening/ROM; Therapeutic exercise; Endurance training;Patient/family training;Equipment eval/education; Bed mobility;Gait training;Spoke to nursing   PT Frequency 3-5x/wk   Recommendation   PT Discharge Recommendation Home with home health rehabilitation   [address]   Turning in Bed Without Bedrails 2   Lying on Back to Sitting on Edge of Flat Bed 2   Moving Bed to Chair 2   Standing Up From Chair 2   Walk in Room 2   Climb 3-5 Stairs 1   Basic Mobility Inpatient Raw Score 11   Basic Mobility Standardized Score 30 25   Highest Level Of Mobility   JH-HLM Goal 4: Move to chair/commode   JH-HLM Achieved 4: Move to chair/commode     Skilled PT recommended while in hospital and upon DC to progress pt toward treatment goals       The patient's AM-PAC Basic Mobility Inpatient Short Form Raw Score is 11  A Raw score of less than or equal to 16 suggests the patient may benefit from discharge to post-acute rehabilitation services  Please also refer to the recommendation of the Physical Therapist for safe discharge planning           Geni Mitchell, PT, DPT 10/30/2022

## 2022-10-30 NOTE — CASE MANAGEMENT
Case Management Assessment & Discharge Planning Note    Patient name Rogelio Salas  Location [address] [place] [address] [id number] MRN [id number]  : 1950 Date 10/30/2022       Current Admission Date: 10/28/2022  Current Admission Diagnosis:Gynecologic malignancy [facility])   Patient Active Problem List    Diagnosis Date Noted   • Anxiety disorder due to medical condition 2022   • Gynecologic malignancy ([facility] ) 2022   • Giant cell aortic arteritis ([facility] )    • Prediabetes    • 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[facility] ) 2022   • Lower abdominal pain 06/15/2022   • Change in bowel habit 06/15/2022   • Personal history of colonic polyps 06/15/2022   • Long term (current) use of systemic steroids 06/15/2022   • Asthma    • Hyperlipidemia    • Hypertension    • Gastroesophageal reflux disease 2019   • Constipation 2019   • Hemorrhoids 2019   • Family history of colonic polyps 2019      LOS (days): 2  Geometric Mean LOS (GMLOS) (days):   Days to GMLOS:     OBJECTIVE:    Risk of Unplanned Readmission Score: 19 76     Current admission status: Inpatient    Preferred Pharmacy:   [facility] [address] [place] [address] [address] [place] [address]  Phone: [phone] Fax: [address], [address] [place] [address] [facility] 94 [facility]  Phone: [phone] Fax: [phone]    Primary Care Provider: Nelson Durán MD    Primary Insurance: MEDICARE  Secondary Insurance: COMMERCIAL MISCELLANEOUS    ASSESSMENT:  Gonzalo Avila, [address] Representative - Spouse   Primary Phone: [phone] [facility]  Home Phone: [phone]               Advance Directives  Does patient have a [address] [place] [address] Avenue?: Yes  Does patient have Advance Directives?: Yes  Advance Directives: Power of  for health care, Power of  for finance, Living will  Primary Contact: Amber Benedict (Spouse)    Patient Information  Admitted from[de-identified] Home (PT resides at [facility]  Mental Status: Alert  During Assessment patient was accompanied by: Daughter  Assessment information provided by[de-identified] Patient, Daughter  Primary Caregiver: Self  Support Systems: Spouse/significant other, Daughter, Son, Family members, Self  What city do you live in?: [place] entry access options  Select all that apply : No steps to enter home  Type of Current Residence: Apartment  Floor Level: 1  Upon entering residence, is there a bedroom on the main floor (no further steps)?: Yes  Upon entering residence, is there a bathroom on the main floor (no further steps)?: Yes  Living Arrangements: Lives w/ Spouse/significant other    Activities of Daily Living Prior to Admission  Functional Status: Independent (PTA)  Completes ADLs independently?: Yes  Ambulates independently?: Yes  Does patient use assisted devices?: No  Does patient currently own DME?: No  Does patient have a history of Outpatient Therapy (PT/OT)?: No  Does the patient have a history of Short-Term Rehab?: No  Does patient have a history of HHC?: No  Does patient currently have [facility]?: No    Patient Information Continued  Does patient have prescription coverage?: Yes  Does patient have a history of substance abuse?: No  Does patient have a history of Mental Health Diagnosis?: No    Means of Transportation  Means of Transport to Appts[de-identified] Family transport (Spouse provides transportation to and from all appointments)        DISCHARGE DETAILS:    Discharge planning discussed with[de-identified] Patient and patients daughter's at bedside  [place] of Choice: Yes  Comments - Freedom of Choice: Therapy and nursing recommendations discussed with patient and daughter's   Pt requesting [facility] to [facility] contracted [facility] agency for SN/PT/OT  CM contacted family/caregiver?: Yes  Were Treatment Team discharge recommendations reviewed with patient/caregiver?: Yes  Did patient/caregiver verbalize understanding of patient care needs?: Yes  Were patient/caregiver advised of the risks associated with not following Treatment Team discharge recommendations?: Yes    Requested [address]         Is the patient [facility] in [facility] at [facility]?: Yes  [address] requested[de-identified] Nursing, Occupational Therapy, Physical [address] Name[de-identified] Other ([facility])  [address] Provider[de-identified] PCP  [address] Needed[de-identified] Wound/Ostomy Care, Gait/ADL Training, Evaluate Functional Status and Safety, Strengthening/Theraputic Exercises to Improve Function, Post-Op Care and Assessment  Homebound Criteria Met[de-identified] Requires the Assistance of Another Person for Safe Ambulation or to Leave the Home, Uses an Assist Device (i e  cane, walker, etc)  Supporting Clincal Findings[de-identified] Limited Endurance, Fatigues Easliy in [employer]    DME Referral Provided  Referral made for DME?: No (Pt requesting RW order prior to d/c )  DME referral completed for the following items[de-identified] William Paget  DME Supplier Name[de-identified] [employer]    Other Referral/Resources/Interventions Provided:  Interventions: [place] [address]  Referral Comments: CM spoke with [address] [place] [address] wellness office P: [phone], CM informed [facility] is contracted with [facility] and requested referral made prior to d/c, pt in agreement with same as pt prefers to use [facility] contracted [place] [address] agency  Referral placed to [place] [id number] for SN/PT/OT services via [address]  CM team will follow for final determination   [facility] wellness center requested to be updated on DCP and contacted when d/c is known, providing AVS  P: [phone] F: [phone]    Treatment Team Recommendation: Home with [facility]  Discharge Destination Plan[de-identified] Home with [facility] (Referral to [place] [id number])  Transport at Discharge : Automobile, Family

## 2022-10-30 NOTE — OCCUPATIONAL THERAPY NOTE
Occupational Therapy Evaluation     Patient Name: Maya Brooks  Today's Date: 10/30/2022  Problem List  Principal Problem:    Gynecologic malignancy [facility])  Active Problems:    Gastroesophageal reflux disease    Asthma    Hyperlipidemia    Hypertension    Long term (current) use of systemic steroids    Giant cell aortic arteritis ([facility] [address] )    Prediabetes    Past Medical History  Past Medical History:   Diagnosis Date    Abdominal pain     off and on    Allergic sinusitis     "seasonal allergies"-has received allergy shots "    Anxiety     with current diagnosis    Arthritis     Asthma     Cholelithiasis     Colon polyp     Degenerative joint disease     Dental bridge present     permanent lower left    Exercise involving walking     1-2x/week    GERD (gastroesophageal reflux disease)     Giant cell aortic arteritis ([facility] [address] )     "hereditary Giant Cell Temporal Arteritis"per pt    Hiatal hernia     History of cancer chemotherapy     Hyperlipidemia     Hypertension     Nausea     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 )     "gynecologic malignacy"    PMR (polymyalgia rheumatica) ([facility])     Prediabetes     Shortness of breath     per pt "asthma related --with climbing mult  flights of stairs--or exertion -not new"    Tinnitus     Wears glasses      Past Surgical History  Past Surgical History:   Procedure Laterality Date    COLONOSCOPY      October 2021: Adenomatous polyps and rectum in transverse colon, sigmoid diverticulosis, internal hemorrhoids  October 2016 diverticulosis and internal hemorrhoids, September 2011 diverticulosis and internal hemorrhoids      DILATION AND CURETTAGE OF UTERUS      TN LAP,DIAGNOSTIC ABDOMEN N/A 08/04/2022    Procedure: DIAGNOSTIC LAPAROSCOPY, PERITONEAL BIOPSY, SAMPLING OF BLOODY ASCITES ;  Surgeon: Jj Godinez MD;  Location: [facility];  Service: Gynecology Oncology    TEMPORAL ARTERY BIOPSY / LIGATION      UPPER GASTROINTESTINAL ENDOSCOPY  11/10/2014    November 2014 irregular Z-line and Schatzki ring, hiatal hernia, gastritis  Biopsies negative for celiac, H pylori, or Phillip's or eosinophilic esophagitis  WISDOM TOOTH EXTRACTION           10/30/22 1128   OT Last Visit   OT Visit Date 10/30/22   Note Type   Note type Evaluation   Pain Assessment   Pain Assessment Tool 0-10   Pain Score 7   Pain Location/Orientation Location: Abdomen   Hospital Pain Intervention(s) Repositioned; Ambulation/increased activity   Restrictions/Precautions   Other Precautions Multiple lines;Telemetry;O2;Fall Risk;Pain  (x2 JOSÉ ANTONIO, capnographer, PCA)   Home Living   Type of [address] [facility] One level   Bathroom Shower/Tub Walk-in shower   Bathroom Toilet Raised   Bathroom Equipment Grab bars in shower;Grab bars around toilet   [address]   (denies)   Additional Comments Pt and spouse reside at [address]   Level of [place] Independent with ADLs   Lives With Alaniz-Fabian Help From Family   IADLs   (I IADLs)   Falls in the last 6 months 0   Vocational Retired   Lifestyle   Autonomy PTA, pt reports being I with ADLs, IADLs, fnxl mobility, (+)    Reciprocal Relationships Spouse, family   Service to Others Retired   Intrinsic Gratification Computer games, puzzles, going to lunch with friends, "[employer]"   ADL   Where Assessed Edge of bed   Eating Assistance 7  Independent   Grooming Assistance 5  Supervision/Setup   UB [address] 4  Minimal Assistance   LB [address] 2  Maximal [address] 4  [address] 2  Maximal [address]  2  Maximal Assistance   Bed Mobility   Supine to Sit 3  Moderate assistance   Additional items Assist x 2;HOB elevated;Trapeze bar; Increased time required;LE management   Sit to Supine Unable to assess   Transfers   Sit to Stand 3  Moderate assistance   Additional items Assist x 1; Increased time required   Stand to Sit 3  Moderate assistance   Additional items Assist x 1; Increased time required   Additional Comments Ax1 with HHA, additional person for line mgmt   Functional Mobility   Functional Mobility 3  Moderate assistance   Additional Comments Ax1 + another for line mgmt   Additional items Hand hold assistance   Balance   Static Sitting Fair -   Dynamic Sitting Poor +   Static Standing Poor   Dynamic Standing Poor   Ambulatory Poor -   Activity Tolerance   Activity Tolerance Patient limited by fatigue;Patient limited by pain   Medical Staff Made Aware PT Francis Peng   Nurse Made Aware RN clearance + present during session   RUE Assessment   RUE Assessment WFL   LUE Assessment   LUE Assessment WFL   Vision - Complex Assessment   Ocular Range of Motion Intact   Tracking Intact   Psychosocial   Psychosocial (WDL) WDL   Cognition   Overall Cognitive Status WFL   Arousal/Participation Alert; Responsive; Cooperative   Attention Within functional limits   Orientation Level Oriented X4   Memory Within functional limits   Following Commands Follows one step commands without difficulty   Comments Pt pleasant and cooperative t/o session   Assessment   Limitation Decreased ADL status; Decreased UE strength;Decreased endurance;Decreased self-care trans;Decreased high-level ADLs   Prognosis Good   Assessment Pt is a 67 y o  female admitted [facility] on 10/28/2022 w/ Gynecologic malignancy ([facility] [address] )  Pt now s/p "laparoscopy modified radical hysterectomy, BSO, gastrocolic omentectomy, resection of diaphragm, excision and ablation of peritoneal tumors, small bowel resection, ileostomy, L colon resection, and splenectomy "  has a past medical history of Abdominal pain, Allergic sinusitis, Anxiety, Arthritis, Asthma, Cholelithiasis, Colon polyp, DJD, Dental bridge present, GERD, Giant cell aortic arteritis, Hiatal hernia, Hyperlipidemia, Hypertension, Nausea, Peritoneal carcinoma, PMR (polymyalgia rheumatica), Prediabetes, Shortness of breath, Tinnitus   Pt with active OT orders and up with assistance  orders  Pt seen as a co-evaluation with PT due to the patient's co-morbidities, clinically unstable presentation/clinical complexity, and present impairments  As per pt report, pta, pt and her spouse are residents of [address] [place] [address]  Pt was I with ADLs and IADLs, (+)   Upon evaluation, pt currently requires MOD A x 1 for transfers and mobility  Pt currently requires I eating, S grooming, MIN A UB ADLs, MAX A LB ADLs, and MAX A toileting  Pt is limited at this time 2*: pain, endurance, activity tolerance, functional mobility, balance, functional standing tolerance, decreased I w/ ADLS/IADLS and strength  The following Occupational Performance Areas to address include: grooming, bathing/shower, toilet hygiene, dressing, health maintenance, functional mobility, community mobility and clothing management  Pt to benefit from immediate acute skilled OT to address above deficits, improve overall functional independence, maximize fnxl mobility and reduce caregiver burden  From OT standpoint, recommendation at time of d/c would be home with skilled therapy - pending progress  Pt was left after session with all current needs met  The patient's raw score on the AM-PAC Daily Activity inpatient short form is 16, standardized score is 35 96, less than 39 4  Patients at this level are likely to benefit from discharge to post-acute rehabilitation services  Please refer to the recommendation of the Occupational Therapist for safe discharge planning  Goals   Patient Goals to get her NGT out   LTG Time Frame 10-14   Plan   Treatment Interventions ADL retraining;Functional transfer training; Endurance training;UE strengthening/ROM; Patient/family training;Equipment evaluation/education; Compensatory technique education;Continued evaluation; Energy conservation; Activityengagement   Goal Expiration Date 11/13/22   OT Frequency 2-3x/wk   Recommendation   OT Discharge Recommendation Home with home health rehabilitation  (pending progress)   AM-PAC Daily Activity Inpatient   Lower Body Dressing 2   Bathing 2   Toileting 2   Upper Body Dressing 3   Grooming 3   Eating 4   Daily Activity Raw Score 16   Daily Activity Standardized Score (Calc for Raw Score >=11) 35 96   AM-PAC Applied Cognition Inpatient   Following a Speech/Presentation 4   Understanding Ordinary Conversation 4   Taking Medications 4   Remembering Where Things Are Placed or Put Away 4   Remembering List of 4-5 Errands 3   Taking Care of Complicated Tasks 3   Applied Cognition Raw Score 22   Applied Cognition Standardized Score 47 83     GOALS    - Pt will complete UB dressing/self care/bathing w/ mod I using adaptive device and DME as needed    - Pt will complete LB dressing/self care/bathing w/ mod I using adaptive device and DME as needed    - Pt will complete toileting w/ mod I w/ G hygiene/thoroughness using DME as needed    - Pt will improve functional transfers to Mod I on/off all surfaces using DME as needed w/ G balance/safety     - Pt will improve functional mobility during ADL/IADL/leisure tasks to Mod I using DME as needed w/ G balance/safety     - Pt will demonstrate G carryover of pt/caregiver education and training as appropriate      - Pt will demonstrate 100% carryover of energy conservation techniques t/o functional I/ADL/leisure tasks w/o cues s/p skilled education    - Pt will increase static and dynamic standing/sitting balance to F+  using AD/DME as needed to increase safety and I during fnxl transfers and ADLs    - Pt will maintain upright sitting position for at least 20 min during dynamic fnxl activity with F+  balance and endurance to improve ADL performance and independence, as well as reduce risk of falls     - Pt will participate in simulated IADL management task with DME as needed to increase independence to  w/ G safety and endurance      Isabel Aranda MS, OTR/L

## 2022-10-30 NOTE — PLAN OF CARE
Problem: OCCUPATIONAL THERAPY ADULT  Goal: Performs self-care activities at highest level of function for planned discharge setting  See evaluation for individualized goals  Description: Treatment Interventions: ADL retraining, Functional transfer training, Endurance training, UE strengthening/ROM, Patient/family training, Equipment evaluation/education, Compensatory technique education, Continued evaluation, Energy conservation, Activityengagement          See flowsheet documentation for full assessment, interventions and recommendations  10/30/2022 1239 by Kun Poe OT  Note: Limitation: Decreased ADL status, Decreased UE strength, Decreased endurance, Decreased self-care trans, Decreased high-level ADLs  Prognosis: Good  Assessment: Pt is a 67 y o  female admitted to [facility] on 10/28/2022 w/ Gynecologic malignancy ([facility] [address] )  Pt now s/p "laparoscopy modified radical hysterectomy, BSO, gastrocolic omentectomy, resection of diaphragm, excision and ablation of peritoneal tumors, small bowel resection, ileostomy, L colon resection, and splenectomy "  has a past medical history of Abdominal pain, Allergic sinusitis, Anxiety, Arthritis, Asthma, Cholelithiasis, Colon polyp, DJD, Dental bridge present, GERD, Giant cell aortic arteritis, Hiatal hernia, Hyperlipidemia, Hypertension, Nausea, Peritoneal carcinoma, PMR (polymyalgia rheumatica), Prediabetes, Shortness of breath, Tinnitus  Pt with active OT orders and up with assistance  orders  Pt seen as a co-evaluation with PT due to the patient's co-morbidities, clinically unstable presentation/clinical complexity, and present impairments  As per pt report, pta, pt and her spouse are residents of [address] [place] [address]  Pt was I with ADLs and IADLs, (+)   Upon evaluation, pt currently requires MOD A x 1 for transfers and mobility  Pt currently requires I eating, S grooming, MIN A UB ADLs, MAX A LB ADLs, and MAX A toileting   Pt is limited at this time 2*: pain, endurance, activity tolerance, functional mobility, balance, functional standing tolerance, decreased I w/ ADLS/IADLS and strength  The following Occupational Performance Areas to address include: grooming, bathing/shower, toilet hygiene, dressing, health maintenance, functional mobility, community mobility and clothing management  Pt to benefit from immediate acute skilled OT to address above deficits, improve overall functional independence, maximize fnxl mobility and reduce caregiver burden  From OT standpoint, recommendation at time of d/c would be home with skilled therapy - pending progress  Pt was left after session with all current needs met  The patient's raw score on the AM-PAC Daily Activity inpatient short form is 16, standardized score is 35 96, less than 39 4  Patients at this level are likely to benefit from discharge to post-acute rehabilitation services  Please refer to the recommendation of the Occupational Therapist for safe discharge planning  OT Discharge Recommendation: Home with home health rehabilitation (pending progress)       10/30/2022 1238 by Malina Hendrickson OT  Note: Limitation: Decreased ADL status, Decreased UE strength, Decreased endurance, Decreased self-care trans, Decreased high-level ADLs  Prognosis: Good  Assessment: Pt is a 67 y o  female [facility] w/ Gynecologic malignancy ([facility] [address] )  Pt now s/p "laparoscopy modified radical hysterectomy, BSO, gastrocolic omentectomy, resection of diaphragm, excision and ablation of peritoneal tumors, small bowel resection, ileostomy, L colon resection, and splenectomy "  has a past medical history of Abdominal pain, Allergic sinusitis, Anxiety, Arthritis, Asthma, Cholelithiasis, Colon polyp, DJD, Dental bridge present, GERD, Giant cell aortic arteritis, Hiatal hernia, Hyperlipidemia, Hypertension, Nausea, Peritoneal carcinoma, PMR (polymyalgia rheumatica), Prediabetes, Shortness of breath, Tinnitus   Pt with active OT orders and up with assistance orders  Pt seen as a co-evaluation with PT due to the patient's co-morbidities, clinically unstable presentation/clinical complexity, and present impairments  As per pt report, pta, pt and her spouse are residents of [address] [place] [address]  Pt was I with ADLs and IADLs, (+)   Upon evaluation, pt currently requires MOD A x 1 for transfers and mobility  Pt currently requires I eating, S grooming, MIN A UB ADLs, MAX A LB ADLs, and MAX A toileting  Pt is limited at this time 2*: pain, endurance, activity tolerance, functional mobility, balance, functional standing tolerance, decreased I w/ ADLS/IADLS and strength  The following Occupational Performance Areas to address include: grooming, bathing/shower, toilet hygiene, dressing, health maintenance, functional mobility, community mobility and clothing management  Pt to benefit from immediate acute skilled OT to address above deficits, improve overall functional independence, maximize fnxl mobility and reduce caregiver burden  From OT standpoint, recommendation at time of d/c would be home with skilled therapy - pending progress  Pt was left after session with all current needs met  The patient's raw score on the AM-PAC Daily Activity inpatient short form is 16, standardized score is 35 96, less than 39 4  Patients at this level are likely to benefit from discharge to post-acute rehabilitation services  Please refer to the recommendation of the Occupational Therapist for safe discharge planning       OT Discharge Recommendation: Home with home health rehabilitation (pending progress)

## 2022-10-30 NOTE — PROGRESS NOTES
Progress Note - General Surgery   Pravin Max 67 y o  female MRN: [id number]  Unit/Bed#: [facility] 513-01 Encounter: [id number]    Assessment:  Postoperative day 2  Status post diagnostic laparoscopy modified radical hysterectomy BSO gastrocolic omentectomy resection of diaphragm excision and ablation of peritoneal tumors small bowel resection ileostomy left colon resection and splenectomy  Patient remains well today without complaint vital signs urine output and blood work for all stable  Transfer out of ICU     Plan:  Postop day 2  Continue IV fluids NPO NG tube  Continue Corbin catheter  Continue epidural    History of hypertension  Holding antihypertensive stable presently    History of asthma  Continue incentive spirometry    GI    Stable postoperatively continue NPO consider TPN if prolonged ileus  Status post splenectomy  Plan post splenectomy vaccinations      Subjective/Objective   Chief Complaint:  I feel fine    Subjective:  Postop day 1 patient without complaint  Pain well controlled  Feeling thirsty  No flatus from stoma  No nausea    Blood pressure 120/57, pulse 104, temperature 99 °F (37 2 °C), temperature source Oral, resp  rate 16, height 5' 4 5" (1 638 m), weight 87 5 kg (192 lb 14 4 oz), SpO2 96 %  ,Body mass index is 32 6 kg/m²        Intake/Output Summary (Last 24 hours) at 10/30/2022 1035  Last data filed at 10/30/2022 0603  Gross per 24 hour   Intake 2965 57 ml   Output 1653 ml   Net 1312 57 ml       Invasive Devices  Report    Peripheral Intravenous Line  Duration           Peripheral IV 10/28/22 Left Arm 1 day    Peripheral IV 10/28/22 Right Antecubital 1 day          Epidural Line  Duration           Epidural Catheter 10/28/22 2 days          Drain  Duration           Closed/Suction Drain Left LLQ 19 Fr  -- days    Closed/Suction Drain Left LUQ Bulb 19 Fr  1 day    Ileostomy Standard (Loulou, zo) RUQ 1 day    NG/OG/Enteral Tube Left nare 1 day    Urethral Catheter Non-latex 16 Fr  1 day                Physical Exam: /57 (BP Location: Right arm)   Pulse 104   Temp 99 °F (37 2 °C) (Oral)   Resp 16   Ht 5' 4 5" (1 638 m)   Wt 87 5 kg (192 lb 14 4 oz)   SpO2 96%   BMI 32 60 kg/m²     General Appearance:    Alert, cooperative, no distress, appears stated age   Head:    Normocephalic, without obvious abnormality, atraumatic   Eyes:    PERRL, conjunctiva/corneas clear, EOM's intact, fundi     benign, both eyes   Ears:    Normal TM's and external ear canals, both ears   Nose:   Nares normal, septum midline, mucosa normal, no drainage    or sinus tenderness   Throat:   Lips, mucosa, and tongue normal; teeth and gums normal   Neck:   Supple, symmetrical, trachea midline, no adenopathy;     thyroid:  no enlargement/tenderness/nodules; no carotid    bruit or JVD   Back:     Symmetric, no curvature, ROM normal, no CVA tenderness   Lungs:     Clear to auscultation bilaterally, respirations unlabored   Chest Wall:    No tenderness or deformity    Heart:    Regular rate and rhythm, S1 and S2 normal, no murmur, rub   or gallop       Abdomen:     Soft, non-tender, bowel sounds active all four quadrants,     no masses, no organomegaly stoma pink with bilious effluent  No gas in bag    Incision clean dry intact           Extremities:   Extremities normal, atraumatic, no cyanosis or edema   Pulses:   2+ and symmetric all extremities   Skin:   Skin color, texture, turgor normal, no rashes or lesions   Lymph nodes:   Cervical, supraclavicular, and axillary nodes normal   Neurologic:   CNII-XII intact, normal strength, sensation and reflexes     throughout       Lab, Imaging and other studies:  CBC:   Lab Results   Component Value Date    WBC 14 88 (H) 10/30/2022    HGB 10 9 (L) 10/30/2022    HCT 32 2 (L) 10/30/2022    MCV 90 10/30/2022    PLT 97 (L) 10/30/2022    MCH 30 4 10/30/2022    MCHC 33 9 10/30/2022    RDW 17 2 (H) 10/30/2022    MPV 9 7 10/30/2022   , CMP:   Lab Results   Component Value Date    SODIUM 144 10/30/2022    K 3 6 10/30/2022     (H) 10/30/2022    CO2 24 10/30/2022    BUN 8 10/30/2022    CREATININE 0 43 (L) 10/30/2022    CALCIUM 7 5 (L) 10/30/2022    EGFR 101 10/30/2022     VTE Pharmacologic Prophylaxis: Enoxaparin (Lovenox)  VTE Mechanical Prophylaxis: sequential compression device

## 2022-10-30 NOTE — PLAN OF CARE
Problem: PHYSICAL THERAPY ADULT  Goal: Performs mobility at highest level of function for planned discharge setting  See evaluation for individualized goals  Description: Treatment/Interventions: Functional transfer training, LE strengthening/ROM, Therapeutic exercise, Endurance training, Patient/family training, Equipment eval/education, Bed mobility, Gait training, Spoke to nursing  Equipment Recommended: Daily Ty       See flowsheet documentation for full assessment, interventions and recommendations  10/30/2022 1311 by Hima Chaudhari PT  Outcome: Progressing  Note: Prognosis: Good  Problem List: Decreased strength, Decreased range of motion, Decreased endurance, Impaired balance, Decreased mobility, Decreased coordination, Decreased safety awareness, Pain  Assessment: Pt  is a 66 yo F who presents s/p diagnostic laparoscopy modified radical hysterectomy, BSO gastrocolic omentectomy, resection of diaphragm, excision and ablation of peritoneal tumors, small bowel resection, ileostomy, left colon resection and splenectomy  order placed for PT eval and tx, w/ activity order of up w/ assist  pt presents w/ comorbidities of GERD, HLD, Htn, Giant Cell Aortic Arteritis, Prediabetes, Gynecologic malignancy, LBP and personal factors of inability to ambulate household distances, inability to navigate community distances, inability to navigate level surfaces w/o external assistance, unable to perform dynamic tasks in community, anxiety and inability to perform IADLs  pt presents w/ pain, weakness, decreased ROM, decreased endurance, impaired balance, gait deviations, impaired coordination, decreased safety awareness, impaired judgment and fall risk   these impairments are evident in findings from physical examination (weakness, decreased ROM and impaired coordination), mobility assessment (need for Mod assist w/ all phases of mobility when usually mobilizing independently, tolerance to only 5 feet of ambulation and need for cueing for mobility technique), and AM-PAC 6-Clicks: 96/10  pt needed input for task focus and mobility technique  pt is at risk for falls due to physical and safety awareness deficits  pt's clinical presentation is unstable/unpredictable (evident in need for assist w/ all phases of mobility when usually mobilizing independently, tolerance to only 5 feet of ambulation, need for input for mobility technique, need for input for task focus and mobility technique and need for input for mobility technique/safety)  pt needs inpatient PT tx to improve mobility deficits and progress mobility training as appropriate  discharge recommendation is for home PT and home w/ family support pending progression to reduce fall risk and maximize level of functional independence  PT Discharge Recommendation: Home with home health rehabilitation    See flowsheet documentation for full assessment  10/30/2022 1311 by Sarah Alvarado PT  Note: Prognosis: Good  Problem List: Decreased strength, Decreased range of motion, Decreased endurance, Impaired balance, Decreased mobility, Decreased coordination, Decreased safety awareness, Pain  Assessment: Pt  is a 68 yo F who presents s/p diagnostic laparoscopy modified radical hysterectomy, BSO gastrocolic omentectomy, resection of diaphragm, excision and ablation of peritoneal tumors, small bowel resection, ileostomy, left colon resection and splenectomy  order placed for PT eval and tx, w/ activity order of up w/ assist  pt presents w/ comorbidities of GERD, HLD, Htn, Giant Cell Aortic Arteritis, Prediabetes, Gynecologic malignancy, LBP and personal factors of inability to ambulate household distances, inability to navigate community distances, inability to navigate level surfaces w/o external assistance, unable to perform dynamic tasks in community, anxiety and inability to perform IADLs   pt presents w/ pain, weakness, decreased ROM, decreased endurance, impaired balance, gait deviations, impaired coordination, decreased safety awareness, impaired judgment and fall risk  these impairments are evident in findings from physical examination (weakness, decreased ROM and impaired coordination), mobility assessment (need for Mod assist w/ all phases of mobility when usually mobilizing independently, tolerance to only 5 feet of ambulation and need for cueing for mobility technique), and AM-PAC 6-Clicks: 95/18  pt needed input for task focus and mobility technique  pt is at risk for falls due to physical and safety awareness deficits  pt's clinical presentation is unstable/unpredictable (evident in need for assist w/ all phases of mobility when usually mobilizing independently, tolerance to only 5 feet of ambulation, need for input for mobility technique, need for input for task focus and mobility technique and need for input for mobility technique/safety)  pt needs inpatient PT tx to improve mobility deficits and progress mobility training as appropriate  discharge recommendation is for home PT and home w/ family support pending progression to reduce fall risk and maximize level of functional independence  PT Discharge Recommendation: Home with home health rehabilitation    See flowsheet documentation for full assessment

## 2022-10-30 NOTE — PROGRESS NOTES
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); now s/p laparoscopy, modified radical hysterectomy, bilateral salpingoophorectomy, gastrocolic omentectomy, diaphragm resection, excision and ablation of peritoneal tumors, small bowel resection, ileostomy loop, left colon resection, and splenectomy 10/28      AVSS 1 5 L NC  Afebrile  JOSÉ ANTONIO x2: LLQ-200 cc/ss, LUQ- 158 cc/ss  Coffey: 1 4 L  AM labs pending  Ileostomy: pink; small amount of liquid stool in bag  NGT: 300 cc: bilious  PLAN:   - ok to dc NGT & resume clears: will defer to primary service   - OOB to chair, PT/OT  - dc coffey  - DVT/PE chemoppx  - other care per primary team   - pls call or TT for questions &/or concerns      Subjective:   - no new complaints this morning  Objective:     Vitals: Temp:  [97 7 °F (36 5 °C)-99 °F (37 2 °C)] 99 °F (37 2 °C)  HR:  [] 105  Resp:  [7-28] 16  BP: ()/(51-63) 137/63  Body mass index is 32 6 kg/m²  I/O       10/28 0701  10/29 0700 10/29 0701  10/30 0700    I V  (mL/kg) 7268 2 (86 2) 2290 6 (26 2)    Blood 1900     NG/GT 50 0    IV Piggyback 1400 300    Total Intake(mL/kg) 26116 2 (126) 2590 6 (29 6)    Urine (mL/kg/hr) 3700 1390 (0 7)    Emesis/NG output 360 300    Drains 385 358    Stool 0 0    Blood 1750     Total Output 6195 2048    Net +4423 2 +542 6                Physical Exam:  GEN: supine in bed; not in any painful distress  HEENT: atraumatic  CV: RRR  Lung: Normal effort  Ab: Soft, ND  appro tender  JOSÉ ANTONIO x2 in place: serosanguinous effluent  Coffey cath in place  Extrem: No CCE  Neuro: A+Ox3    Lab, Imaging and other studies: I have personally reviewed pertinent reports    , CBC with diff: No results found for: WBC, HGB, HCT, MCV, PLT, ADJUSTEDWBC, MCH, MCHC, RDW, MPV, NRBC, BMP/CMP: No results found for: SODIUM, K, CL, CO2, ANIONGAP, BUN, CREATININE, GLUCOSE, CALCIUM, AST, ALT, ALKPHOS, PROT, BILITOT, EGFR, Magnesium: No components found for: MAG  VTE Pharmacologic Prophylaxis: Heparin  VTE Mechanical Prophylaxis: sequential compression device

## 2022-10-30 NOTE — PROGRESS NOTES
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 who presents to the ICU following diagnostic laparoscopy, modified radical hysterectomy, bilateral salpingoophorectomy, gastrocolic omentectomy, diaphragm resection, excision and ablation of peritoneal tumors, small bowel resection, ileostomy loop, left colon resection, and splenectomy  She had an estimated 1 7L blood loss and received 4 U pRBC and 2U FFP intraoperatively  On evaluation post operatively in the ICU, she denies any complaints at this time "    No acute overnight events, per RN unable to do blood draw for labs this AM  Will attempt with oncoming RN     ---------------------------------------------------------------------------------------  SUBJECTIVE  Patient reports abdominal pain that is well controlled with epidural PDA  Requesting water to drink  Otherwise no complaints      Review of Systems  Review of systems was reviewed and negative unless stated above in HPI/24-hour events   ---------------------------------------------------------------------------------------  Assessment and Plan:    Neuro:   • Diagnosis:  Pain control  o Plan:  Scheduled Tylenol, Dilaudid p r n   o Epidural catheter per APS      CV:   • Diagnosis: Hx of HTN  o Plan:  Holding home medications  o Hydralazine p r n   o Continues cardiac monitoring  • Diagnosis: Hx of HLD  o Plan:  Home statin ordered    Pulm:  • Diagnosis:  History of asthma  o Plan:  Continuous pulse ox  o Holding home albuterol  o Encourage deep breathing, coughing, IS      GI:   · Diagnosis: S/p small bowel resection, ileostomy loop, left colon resection  o Plan:  Monitor ostomy bag output  o Serial abdominal exams  • Diagnosis: Hx of GERD  o Plan:  Home Pepcid ordered      :   · Diagnosis: Gynecological malignancy s/p modified radical hysterectomy bilateral salpingoophorectomy and ablation of peritoneal tumors  o Plan:  Serial abdominal exams  o Surgical oncology following      F/E/N:   • F: LR at 76  • E:  Replace p r n   • N: NPO      Heme/Onc:   • Diagnosis:  Acute blood loss anemia  o Hemoglobin noted to be low in OR, estimated blood loss 1 7 L, given 4 units PRBC, 2 units FFP  o Plan:  Trend hemoglobin  o Continue to monitor for symptomatic anemia  • Diagnosis:  Status post splenectomy  o Plan:  Post splenectomy vaccinations  o DVT prophylaxis per Surgical Oncology  • Diagnosis:  DVT prophylaxis  o Plan: SQH, SCD    Endo:   • Diagnosis:  No acute issues  o Plan:  Monitor blood glucose on routine labs      ID:   • Diagnosis:  No acute issues  o Plan:  Monitor WBC and fever curve      MSK/Skin:   • Diagnosis:  Surgical sites  o Plan:  Monitor dressings, ostomy, JOSÉ ANTONIO drains    Invasive Medical Instruments: Abd JOSÉ ANTONIO x 2, Epidural Catheter, A-Line, Corbin, NGT    Patient appropriate for transfer out of the ICU today?: No  Disposition: Continue Critical Care   Code Status: Level 1 - Full Code  ---------------------------------------------------------------------------------------  ICU CORE MEASURES    Prophylaxis   VTE Pharmacologic Prophylaxis: Heparin  VTE Mechanical Prophylaxis: sequential compression device  Stress Ulcer Prophylaxis: Famotidine IV     ABCDE Protocol (if indicated)  Plan to perform spontaneous awakening trial today? Not applicable  Plan to perform spontaneous breathing trial today? Not applicable  Obvious barriers to extubation?  Not applicable  CAM-ICU: Negative    Invasive Devices Review  Invasive Devices  Report    Peripheral Intravenous Line  Duration           Peripheral IV 10/28/22 Left Arm 1 day    Peripheral IV 10/28/22 Right Antecubital 1 day          Epidural Line  Duration           Epidural Catheter 10/28/22 2 days          Drain  Duration           Closed/Suction Drain Left LLQ 19 Fr  -- days    Closed/Suction Drain Left LUQ Bulb 19 Fr  1 day    Ileostomy Standard (zo Jamil) RUQ 1 day    NG/OG/Enteral Tube Left nare 1 day    Urethral Catheter Non-latex 16 Fr  1 day              Can any invasive devices be discontinued today? Yes  ---------------------------------------------------------------------------------------  OBJECTIVE    Vitals   Vitals:    10/29/22 2200 10/30/22 0000 10/30/22 0200 10/30/22 0600   BP: 135/60 123/57 136/63    BP Location:       Pulse: 102 102 102    Resp: 16 18 (!) 8    Temp:       TempSrc:       SpO2: 98% 98% 98%    Weight:    87 5 kg (192 lb 14 4 oz)   Height:         Temp (24hrs), Av 1 °F (36 7 °C), Min:97 7 °F (36 5 °C), Max:99 °F (37 2 °C)  Current: Temperature: 99 °F (37 2 °C)  HR: 110  BP: 128/62  RR: 16  SpO2: 98    Respiratory:  SpO2: SpO2: 96 %  Nasal Cannula O2 Flow Rate (L/min): 1 5 L/min    Invasive/non-invasive ventilation settings   Respiratory  Report   Lab Data (Last 4 hours)    None         O2/Vent Data (Last 4 hours)    None                Physical Exam  Vitals and nursing note reviewed  Constitutional:       General: She is not in acute distress  Appearance: She is well-developed  HENT:      Head: Normocephalic and atraumatic  Nose:      Comments: NG tube in place  Eyes:      Conjunctiva/sclera: Conjunctivae normal    Cardiovascular:      Rate and Rhythm: Normal rate and regular rhythm  Pulses: Normal pulses  Heart sounds: No murmur heard  Pulmonary:      Effort: Pulmonary effort is normal  No respiratory distress  Breath sounds: Normal breath sounds  Abdominal:      General: There is no distension  Palpations: Abdomen is soft  Tenderness: There is no abdominal tenderness   There is no guarding or rebound  Comments: 2 JOSÉ ANTONIO drains in place, ostomy bag in place   Musculoskeletal:         General: Normal range of motion  Cervical back: Normal range of motion and neck supple  Right lower leg: No edema  Left lower leg: No edema  Skin:     General: Skin is warm and dry  Capillary Refill: Capillary refill takes less than 2 seconds  Neurological:      Mental Status: She is alert  Mental status is at baseline  Laboratory and Diagnostics:  Results from last 7 days   Lab Units 10/29/22  0512 10/28/22  2001   WBC Thousand/uL 8 12 5 96   HEMOGLOBIN g/dL 12 5 13 5   HEMATOCRIT % 36 5 40 1   PLATELETS Thousands/uL 107* 102*     Results from last 7 days   Lab Units 10/29/22  0512 10/28/22  2001   SODIUM mmol/L 141 139   POTASSIUM mmol/L 3 7 4 1   CHLORIDE mmol/L 114* 114*   CO2 mmol/L 20* 17*   ANION GAP mmol/L 7 8   BUN mg/dL 12 14   CREATININE mg/dL 0 49* 0 69   CALCIUM mg/dL 8 1* 8 4   GLUCOSE RANDOM mg/dL 194* 214*     Results from last 7 days   Lab Units 10/29/22  0512   MAGNESIUM mg/dL 1 6   PHOSPHORUS mg/dL 2 2*        Imaging: I have personally reviewed pertinent reports  Intake and Output  I/O       10/28 0701  10/29 0700 10/29 0701  10/30 0700    I V  (mL/kg) 7268 2 (86 2) 2290 6 (26 2)    Blood 1900     NG/GT 50 0    IV Piggyback 1400 300    Total Intake(mL/kg) 02765 2 (126) 2590 6 (29 6)    Urine (mL/kg/hr) 3700 1390 (0 7)    Emesis/NG output 360 300    Drains 385 358    Stool 0 0    Blood 1750     Total Output 6195 2048    Net +4423 2 +542 6              UOP: 1390 mL 0 7 ml/kg/hr     Height and Weights   Height: 5' 4 5" (163 8 cm)     Body mass index is 32 6 kg/m²    Weight (last 2 days)     Date/Time Weight    10/30/22 0600 87 5 (192 9)    10/29/22 0530 84 3 (185 85)    10/28/22 1033 79 6 (175 5)            Nutrition       Diet Orders   (From admission, onward)             Start     Ordered    10/28/22 2255  Diet NPO  Diet effective now        References: Nutrtion Support Algorithm Enteral vs  Parenteral   Question Answer Comment   Diet Type NPO    RD to adjust diet per protocol? Yes        10/28/22 9277                  Active Medications  Scheduled Meds:  Current Facility-Administered Medications   Medication Dose Route Frequency Provider Last Rate   • famotidine  40 mg Oral HS Figueroa Rodas DO     • heparin (porcine)  5,000 Units Subcutaneous Counts include [facility] beds at the [facility] Onelia Maxwell PA-C     • hydrALAZINE  5 mg Intravenous Q6H PRN Figueroa Rodas DO     • HYDROmorphone  0 5 mg Intravenous Q2H PRN Sly Hussein MD     • multi-electrolyte  125 mL/hr Intravenous Continuous Onelia Maxwell PA-C 125 mL/hr (10/30/22 0048)   • phenol  1 spray Mouth/Throat Q2H PRN Onelia Maxwell PA-C     • pravastatin  20 mg Oral Daily With Rory Buitrago DO     • ropivacaine 0 1% and fentaNYL 2 mcg/mL   Epidural Continuous Laverne Wilson MD       Continuous Infusions:  multi-electrolyte, 125 mL/hr, Last Rate: 125 mL/hr (10/30/22 0048)  ropivacaine 0 1% and fentaNYL 2 mcg/mL,       PRN Meds:   hydrALAZINE, 5 mg, Q6H PRN  HYDROmorphone, 0 5 mg, Q2H PRN  phenol, 1 spray, Q2H PRN        Allergies   Allergies   Allergen Reactions   • Nsaids Other (See Comments)      pt is avoiding per family doctor instructions-  Able to take Tylenol   • Pedi-Pre Tape Spray [Wound Dressing Adhesive] Other (See Comments)     Please use sensitive skin tape, pt gets bad bruising from strong medical adhesive tape       ---------------------------------------------------------------------------------------  Advance Directive and Living Will:      Power of :    POLST:    ---------------------------------------------------------------------------------------  Care Time Delivered:   No Critical Care time spent     Nataly Barkley, DO      Portions of the record may have been created with voice recognition software    Occasional wrong word or "sound a like" substitutions may have occurred due to the inherent limitations of voice recognition software    Read the chart carefully and recognize, using context, where substitutions have occurred

## 2022-10-30 NOTE — RESPIRATORY THERAPY NOTE
Resp Care   10/30/22 1626   Respiratory Assessment   Resp Comments DC'ing end tidal CO2 monitor as the order was for only 48 hours

## 2022-10-31 PROBLEM — Z90.710 S/P TOTAL ABDOMINAL HYSTERECTOMY: Status: ACTIVE | Noted: 2022-10-31

## 2022-10-31 PROBLEM — Z90.81 S/P SPLENECTOMY: Status: ACTIVE | Noted: 2022-10-31

## 2022-10-31 PROBLEM — D62 ACUTE BLOOD LOSS ANEMIA (ABLA): Status: ACTIVE | Noted: 2022-10-31

## 2022-10-31 PROBLEM — Z93.2 ILEOSTOMY, HAS CURRENTLY (HCC): Status: ACTIVE | Noted: 2022-10-31

## 2022-10-31 LAB
ALBUMIN SERPL BCP-MCNC: 1.9 G/DL (ref 3.5–5)
ALP SERPL-CCNC: 77 U/L (ref 46–116)
ALT SERPL W P-5'-P-CCNC: 151 U/L (ref 12–78)
ANION GAP SERPL CALCULATED.3IONS-SCNC: 6 MMOL/L (ref 4–13)
ANISOCYTOSIS BLD QL SMEAR: PRESENT
ANISOCYTOSIS BLD QL SMEAR: PRESENT
AST SERPL W P-5'-P-CCNC: 65 U/L (ref 5–45)
BASE EXCESS BLDA CALC-SCNC: -10 MMOL/L (ref -2–3)
BASE EXCESS BLDA CALC-SCNC: -4 MMOL/L (ref -2–3)
BASE EXCESS BLDA CALC-SCNC: -6 MMOL/L (ref -2–3)
BASE EXCESS BLDA CALC-SCNC: -7 MMOL/L (ref -2–3)
BASE EXCESS BLDA CALC-SCNC: -9 MMOL/L (ref -2–3)
BASOPHILS # BLD MANUAL: 0 THOUSAND/UL (ref 0–0.1)
BASOPHILS # BLD MANUAL: 0 THOUSAND/UL (ref 0–0.1)
BASOPHILS NFR MAR MANUAL: 0 % (ref 0–1)
BASOPHILS NFR MAR MANUAL: 0 % (ref 0–1)
BILIRUB SERPL-MCNC: 0.58 MG/DL (ref 0.2–1)
BUN SERPL-MCNC: 10 MG/DL (ref 5–25)
CA-I BLD-SCNC: 0.85 MMOL/L (ref 1.12–1.32)
CA-I BLD-SCNC: 0.95 MMOL/L (ref 1.12–1.32)
CA-I BLD-SCNC: 1.01 MMOL/L (ref 1.12–1.32)
CA-I BLD-SCNC: 1.04 MMOL/L (ref 1.12–1.32)
CA-I BLD-SCNC: 1.12 MMOL/L (ref 1.12–1.32)
CA-I BLD-SCNC: 1.12 MMOL/L (ref 1.12–1.32)
CALCIUM ALBUM COR SERPL-MCNC: 9.6 MG/DL (ref 8.3–10.1)
CALCIUM SERPL-MCNC: 7.9 MG/DL (ref 8.3–10.1)
CHLORIDE SERPL-SCNC: 113 MMOL/L (ref 96–108)
CO2 SERPL-SCNC: 25 MMOL/L (ref 21–32)
CREAT SERPL-MCNC: 0.39 MG/DL (ref 0.6–1.3)
EOSINOPHIL # BLD MANUAL: 0 THOUSAND/UL (ref 0–0.4)
EOSINOPHIL # BLD MANUAL: 0 THOUSAND/UL (ref 0–0.4)
EOSINOPHIL NFR BLD MANUAL: 0 % (ref 0–6)
EOSINOPHIL NFR BLD MANUAL: 0 % (ref 0–6)
ERYTHROCYTE [DISTWIDTH] IN BLOOD BY AUTOMATED COUNT: 15.5 % (ref 11.6–15.1)
ERYTHROCYTE [DISTWIDTH] IN BLOOD BY AUTOMATED COUNT: 17.7 % (ref 11.6–15.1)
GFR SERPL CREATININE-BSD FRML MDRD: 105 ML/MIN/1.73SQ M
GLUCOSE SERPL-MCNC: 158 MG/DL (ref 65–140)
GLUCOSE SERPL-MCNC: 181 MG/DL (ref 65–140)
GLUCOSE SERPL-MCNC: 182 MG/DL (ref 65–140)
GLUCOSE SERPL-MCNC: 207 MG/DL (ref 65–140)
GLUCOSE SERPL-MCNC: 214 MG/DL (ref 65–140)
GLUCOSE SERPL-MCNC: 85 MG/DL (ref 65–140)
HCO3 BLDA-SCNC: 16.9 MMOL/L (ref 22–28)
HCO3 BLDA-SCNC: 18.5 MMOL/L (ref 22–28)
HCO3 BLDA-SCNC: 19.3 MMOL/L (ref 22–28)
HCO3 BLDA-SCNC: 22.2 MMOL/L (ref 24–30)
HCO3 BLDA-SCNC: 22.4 MMOL/L (ref 24–30)
HCT VFR BLD AUTO: 32.3 % (ref 34.8–46.1)
HCT VFR BLD AUTO: 36.5 % (ref 34.8–46.1)
HCT VFR BLD CALC: 17 % (ref 34.8–46.1)
HCT VFR BLD CALC: 21 % (ref 34.8–46.1)
HCT VFR BLD CALC: 26 % (ref 34.8–46.1)
HCT VFR BLD CALC: 29 % (ref 34.8–46.1)
HCT VFR BLD CALC: 30 % (ref 34.8–46.1)
HGB BLD-MCNC: 10.5 G/DL (ref 11.5–15.4)
HGB BLD-MCNC: 12.5 G/DL (ref 11.5–15.4)
HGB BLDA-MCNC: 10.2 G/DL (ref 11.5–15.4)
HGB BLDA-MCNC: 5.8 G/DL (ref 11.5–15.4)
HGB BLDA-MCNC: 7.1 G/DL (ref 11.5–15.4)
HGB BLDA-MCNC: 8.8 G/DL (ref 11.5–15.4)
HGB BLDA-MCNC: 9.9 G/DL (ref 11.5–15.4)
LYMPHOCYTES # BLD AUTO: 0.45 THOUSAND/UL (ref 0.6–4.47)
LYMPHOCYTES # BLD AUTO: 0.49 THOUSAND/UL (ref 0.6–4.47)
LYMPHOCYTES # BLD AUTO: 3 % (ref 14–44)
LYMPHOCYTES # BLD AUTO: 6 % (ref 14–44)
MCH RBC QN AUTO: 30.2 PG (ref 26.8–34.3)
MCH RBC QN AUTO: 30.2 PG (ref 26.8–34.3)
MCHC RBC AUTO-ENTMCNC: 32.5 G/DL (ref 31.4–37.4)
MCHC RBC AUTO-ENTMCNC: 34.2 G/DL (ref 31.4–37.4)
MCV RBC AUTO: 88 FL (ref 82–98)
MCV RBC AUTO: 93 FL (ref 82–98)
MONOCYTES # BLD AUTO: 0.57 THOUSAND/UL (ref 0–1.22)
MONOCYTES # BLD AUTO: 1.8 THOUSAND/UL (ref 0–1.22)
MONOCYTES NFR BLD: 12 % (ref 4–12)
MONOCYTES NFR BLD: 7 % (ref 4–12)
NEUTROPHILS # BLD MANUAL: 12.75 THOUSAND/UL (ref 1.85–7.62)
NEUTROPHILS # BLD MANUAL: 7.06 THOUSAND/UL (ref 1.85–7.62)
NEUTS BAND NFR BLD MANUAL: 38 % (ref 0–8)
NEUTS BAND NFR BLD MANUAL: 44 % (ref 0–8)
NEUTS SEG NFR BLD AUTO: 43 % (ref 43–75)
NEUTS SEG NFR BLD AUTO: 47 % (ref 43–75)
NRBC BLD AUTO-RTO: 1 /100 WBC (ref 0–2)
PATHOLOGY REVIEW: YES
PCO2 BLD: 18 MMOL/L (ref 21–32)
PCO2 BLD: 20 MMOL/L (ref 21–32)
PCO2 BLD: 21 MMOL/L (ref 21–32)
PCO2 BLD: 24 MMOL/L (ref 21–32)
PCO2 BLD: 24 MMOL/L (ref 21–32)
PCO2 BLD: 42 MM HG (ref 36–44)
PCO2 BLD: 44 MM HG (ref 36–44)
PCO2 BLD: 46.1 MM HG (ref 36–44)
PCO2 BLD: 46.3 MM HG (ref 42–50)
PCO2 BLD: 59.4 MM HG (ref 42–50)
PH BLD: 7.18 [PH] (ref 7.3–7.4)
PH BLD: 7.21 [PH] (ref 7.35–7.45)
PH BLD: 7.21 [PH] (ref 7.35–7.45)
PH BLD: 7.25 [PH] (ref 7.35–7.45)
PH BLD: 7.29 [PH] (ref 7.3–7.4)
PLATELET # BLD AUTO: 107 THOUSANDS/UL (ref 149–390)
PLATELET # BLD AUTO: 114 THOUSANDS/UL (ref 149–390)
PLATELET BLD QL SMEAR: ABNORMAL
PLATELET BLD QL SMEAR: ABNORMAL
PMV BLD AUTO: 10.6 FL (ref 8.9–12.7)
PMV BLD AUTO: 9.5 FL (ref 8.9–12.7)
PO2 BLD: 182 MM HG (ref 75–129)
PO2 BLD: 30 MM HG (ref 35–45)
PO2 BLD: 63 MM HG (ref 35–45)
PO2 BLD: >400 MM HG (ref 75–129)
PO2 BLD: >400 MM HG (ref 75–129)
POIKILOCYTOSIS BLD QL SMEAR: PRESENT
POLYCHROMASIA BLD QL SMEAR: PRESENT
POTASSIUM BLD-SCNC: 4 MMOL/L (ref 3.5–5.3)
POTASSIUM BLD-SCNC: 4.1 MMOL/L (ref 3.5–5.3)
POTASSIUM BLD-SCNC: 4.2 MMOL/L (ref 3.5–5.3)
POTASSIUM BLD-SCNC: 4.4 MMOL/L (ref 3.5–5.3)
POTASSIUM BLD-SCNC: 4.5 MMOL/L (ref 3.5–5.3)
POTASSIUM SERPL-SCNC: 3.6 MMOL/L (ref 3.5–5.3)
PROT SERPL-MCNC: 5.4 G/DL (ref 6.4–8.4)
RBC # BLD AUTO: 3.48 MILLION/UL (ref 3.81–5.12)
RBC # BLD AUTO: 4.14 MILLION/UL (ref 3.81–5.12)
RBC MORPH BLD: PRESENT
RBC MORPH BLD: PRESENT
SAO2 % BLD FROM PO2: 43 % (ref 60–85)
SAO2 % BLD FROM PO2: 89 % (ref 60–85)
SAO2 % BLD FROM PO2: 99 % (ref 60–85)
SODIUM BLD-SCNC: 140 MMOL/L (ref 136–145)
SODIUM BLD-SCNC: 141 MMOL/L (ref 136–145)
SODIUM BLD-SCNC: 142 MMOL/L (ref 136–145)
SODIUM SERPL-SCNC: 144 MMOL/L (ref 135–147)
SPECIMEN SOURCE: ABNORMAL
TOTAL CELLS COUNTED SPEC: 100
WBC # BLD AUTO: 15 THOUSAND/UL (ref 4.31–10.16)
WBC # BLD AUTO: 8.12 THOUSAND/UL (ref 4.31–10.16)

## 2022-10-31 RX ORDER — PREDNISOLONE SODIUM PHOSPHATE 15 MG/5ML
3 SOLUTION ORAL DAILY
Status: DISCONTINUED | OUTPATIENT
Start: 2022-10-31 | End: 2022-11-01

## 2022-10-31 RX ORDER — PANTOPRAZOLE SODIUM 40 MG/10ML
40 INJECTION, POWDER, LYOPHILIZED, FOR SOLUTION INTRAVENOUS EVERY 12 HOURS SCHEDULED
Status: DISCONTINUED | OUTPATIENT
Start: 2022-10-31 | End: 2022-11-01

## 2022-10-31 RX ORDER — DEXTROSE AND SODIUM CHLORIDE 5; .45 G/100ML; G/100ML
75 INJECTION, SOLUTION INTRAVENOUS CONTINUOUS
Status: DISCONTINUED | OUTPATIENT
Start: 2022-10-31 | End: 2022-11-02

## 2022-10-31 RX ORDER — BUDESONIDE AND FORMOTEROL FUMARATE DIHYDRATE 160; 4.5 UG/1; UG/1
2 AEROSOL RESPIRATORY (INHALATION) DAILY
Status: DISCONTINUED | OUTPATIENT
Start: 2022-11-01 | End: 2022-11-08 | Stop reason: HOSPADM

## 2022-10-31 RX ADMIN — HEPARIN SODIUM 5000 UNITS: 5000 INJECTION INTRAVENOUS; SUBCUTANEOUS at 21:32

## 2022-10-31 RX ADMIN — PRAVASTATIN SODIUM 20 MG: 20 TABLET ORAL at 15:52

## 2022-10-31 RX ADMIN — HEPARIN SODIUM 5000 UNITS: 5000 INJECTION INTRAVENOUS; SUBCUTANEOUS at 06:35

## 2022-10-31 RX ADMIN — PANTOPRAZOLE SODIUM 40 MG: 40 INJECTION, POWDER, FOR SOLUTION INTRAVENOUS at 21:32

## 2022-10-31 RX ADMIN — PANTOPRAZOLE SODIUM 40 MG: 40 INJECTION, POWDER, FOR SOLUTION INTRAVENOUS at 09:39

## 2022-10-31 RX ADMIN — SODIUM CHLORIDE, SODIUM GLUCONATE, SODIUM ACETATE, POTASSIUM CHLORIDE AND MAGNESIUM CHLORIDE 125 ML/HR: 526; 502; 368; 37; 30 INJECTION, SOLUTION INTRAVENOUS at 00:48

## 2022-10-31 RX ADMIN — HEPARIN SODIUM 5000 UNITS: 5000 INJECTION INTRAVENOUS; SUBCUTANEOUS at 15:52

## 2022-10-31 RX ADMIN — ROPIVACAINE HYDROCHLORIDE: 5 INJECTION EPIDURAL; INFILTRATION; PERINEURAL at 07:31

## 2022-10-31 RX ADMIN — DEXTROSE AND SODIUM CHLORIDE 75 ML/HR: 5; .45 INJECTION, SOLUTION INTRAVENOUS at 20:36

## 2022-10-31 RX ADMIN — HYDRALAZINE HYDROCHLORIDE 5 MG: 20 INJECTION, SOLUTION INTRAMUSCULAR; INTRAVENOUS at 15:51

## 2022-10-31 RX ADMIN — PREDNISOLONE SODIUM PHOSPHATE 3 MG: 15 SOLUTION ORAL at 12:51

## 2022-10-31 RX ADMIN — FAMOTIDINE 40 MG: 20 TABLET, FILM COATED ORAL at 21:32

## 2022-10-31 RX ADMIN — DEXTROSE AND SODIUM CHLORIDE 100 ML/HR: 5; .45 INJECTION, SOLUTION INTRAVENOUS at 07:33

## 2022-10-31 NOTE — DISCHARGE INSTR - OTHER ORDERS
Stoma measures 3 5 cms top to bottom and 4 cms side to side on 11/7- template left at beside  Ostomy Care:  Appliance Used: Can use wither Convatec One Piece Pouch or Soft Convexity Pouch   Accessories: 3M No Sting Skin Protectant Barrier Film    Bag Change Steps:   1  Peel back pouch using push-pull method, may use non-alcohol adhesive remover   2  Use warm water only to cleanse skin around the stoma (ling-stomal skin)  3  Make sure all adhesive residue is removed and skin is dry and not oily  4  Measure stoma size using measuring guide and trace correct measurements onto back of pouch (Off set opening away from abdominal incision)  5  Then cut or mold backing of pouch out to correct shape/size  6  Use 3M no sting barrier film to prep the skin around the stoma  7  Place pouch over stoma and onto skin  8  Use warmth of hand to apply gentle pressure to help backing of pouch to adhere well to skin  9  Supplies left the bedside  10  Encourage patient to engage in self care of their ostomy - emptying etc   11  Encourage patient to read over the ostomy educational materials  12  Empty the pouch when no more than 1/3 full to prevent leaking or lifting of the barrier  Change pouch every 3-4 days and as needed for soilage/dislodgement  13  Will order sample kits when closer to d/c    Please call Inpatient Wound @ [phone] with any concerns, questions, or help needed

## 2022-10-31 NOTE — ASSESSMENT & PLAN NOTE
EBL 1700mL , Hgb 13 5 --> intraop iSTAT 5 8 s/p 4U PRBC, 2U FFP, 750mL albumin--> 10--> 12-->10 9   Abdomen soft, no guarding or rebound   Continue to trend Hgb, vitals and I&O       Lab Results   Component Value Date/Time    HGB 9 1 (L) 11/08/2022 05:42 AM    HGB 9 5 (L) 11/07/2022 04:41 AM    HGB 8 8 (L) 11/06/2022 04:57 AM

## 2022-10-31 NOTE — PROGRESS NOTES
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       S/P total abdominal hysterectomy  Assessment & Plan  ILN, BSO due to 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   Pain: epidural in place, Acute pain services consulted- appreciate recommendations   FEN: NPO with NGT in place, plasmalyte 125cc/hr, consider TPN if continues to be NPO   DVT ppx: SCDs and heparin 5000U TID   Not passing flatus   Incision clean, dry, intact- staples in place   Corbin removed yesterday- voiding spontaneously           S/P splenectomy  Assessment & Plan  Will need splenectomy vaccines prior to discharge     Acute blood loss anemia (ABLA)  Assessment & Plan  EBL 1700mL , Hgb 13 5 --> intraop iSTAT 5 8 s/p 4U PRBC, 2U FFP, 750mL albumin--> 10--> 12-->10 9   Abdomen soft, no guarding or rebound   Continue to trend Hgb, vitals and I&O     Ileostomy, has currently [facility])  Assessment & Plan  Ostomy in place   Dark green output   Wound care consulted for ostomy care     Giant cell aortic arteritis ([facility] [address] )  Assessment & Plan  Prednisolone 3mg daily     Hypertension  Assessment & Plan  Home meds held   Hydralazine PRN for severe HTN     Hyperlipidemia  Assessment & Plan  Home meds held     * Gynecologic malignancy [facility])  Assessment & Plan  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  S/p 3 cycles of carbo/taxol, 2 cycles of bevicizimab   Follow up pathology from surgery Subjective:    Wilberto Lugo reports feeling "not great" this morning  She is uncomfortable with the NGT in place and complains that "my throat hurts"  Has been using chloraseptic without much relief  She denies fever, chills, nausea, chest pain, shortness of breath, and abdominal pain  She has not passed flatus  Objective:  /77 (BP Location: Left arm)   Pulse 98   Temp 98 8 °F (37 1 °C) (Oral)   Resp 16   Ht 5' 4 5" (1 638 m)   Wt 87 5 kg (192 lb 14 4 oz)   SpO2 98%   BMI 32 60 kg/m²     I/O last 3 completed shifts: In: 5232 9 [I V :4982 9; IV Piggyback:250]  Out: 2308 [BGNDW:7593; Emesis/NG output:350; Drains:483]  I/O this shift:  In: 193 8 [I V :193 8]  Out: -     Lab Results   Component Value Date    WBC 15 00 (H) 10/31/2022    HGB 10 5 (L) 10/31/2022    HCT 32 3 (L) 10/31/2022    MCV 93 10/31/2022     (L) 10/31/2022       Lab Results   Component Value Date    CALCIUM 7 5 (L) 10/30/2022    K 3 6 10/30/2022    CO2 24 10/30/2022     (H) 10/30/2022    BUN 8 10/30/2022    CREATININE 0 43 (L) 10/30/2022           Physical Exam  HENT:      Head: Normocephalic and atraumatic  Cardiovascular:      Rate and Rhythm: Normal rate  Pulmonary:      Effort: Pulmonary effort is normal    Abdominal:      General: There is no distension  Palpations: Abdomen is soft  Tenderness: There is abdominal tenderness  There is no guarding or rebound  Comments: Ileostomy- with dark green output   LUQ JOSÉ ANTONIO- 90cc serosanguineous output over 24h   LLQ JOSÉ ANTONIO- 147cc  serosanguineous output over 24h   NGT- 250cc dark brown output   Vertical midline incision with staples- clean, dry, intact   Minimal TTP    Musculoskeletal:      Cervical back: Normal range of motion  Right lower leg: No edema  Left lower leg: No edema  Neurological:      General: No focal deficit present  Mental Status: She is alert  Mental status is at baseline             Ysabel Ayala Katie  10/31/2022  7:51 AM

## 2022-10-31 NOTE — ASSESSMENT & PLAN NOTE
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

## 2022-10-31 NOTE — WOUND OSTOMY CARE
Progress Note - Wound   Sneha Gamez 67 y o  female MRN: [id number]  Unit/Bed#: [facility] 523-01 Encounter: [id number]      Assessment:  Patient is POD # 3  Seen for ostomy education and teaching  Introduced self and role to patient  Patient reports fatigue today and is not interested in learning about her ostomy today  Daughter at bedside at the end of pouch change, reports that patients  or another family member will be present for another teaching session later this week on Thursday at 11am      Topics reviewed briefly: normal stoma appearance, how and when to empty (1/3 full) to prevent pulling/lifting of the barrier, importance & how to clean the end of the pouch to prevent odor, frequency of changing of the pouch (every 3-4 days on a schedule), bathing, ling-stomal skin care, and how to obtain supplies  Pouch noted to be lifting and leaking at time of assessment, therefore a pouch change was indicated  Step-by-step pouch change done using 1 piece convatec pouch  Reviewed with patient how and when to measure the stoma (weekly)  Demonstrated how to  trace & cut one piece barrier, and how to apply it to the skin using gentle pressure / warmth of the hands  Skin prep applied to ling-stomal skin, rationale explained to patient  Good seal obtained  1  R Sided ileostomy  Stoma measures 1 1/4 inches, os is noted centrally, slight oval shape, mucocutaneous junction is intact  Ling-stomal skin is intact with no redness or wounds  Effluent = kirsten green brown  Patient is hopeful she will feel better over the next few days and will be able to participate in ostomy care and teaching  Patient did not look at her stoma today  All questions and concerns answered  Encouraged patient to continue to read the educational materials provided - "Life after ileostomy Surgery" by [facility]  Plan:   1  Peel back pouch using push-pull method, may use non-alcohol adhesive remover   2   Use warm water only to cleanse skin around the stoma (ling-stomal skin)  3  Make sure all adhesive residue is removed and skin is dry and not oily  4  Measure stoma size using measuring guide and trace correct measurements onto back of pouch (Off set opening away from abdominal incision)  5  Then cut or mold backing of pouch out to correct shape/size  6  Use 3M no sting barrier film to prep the skin around the stoma  7  Place pouch over stoma and onto skin  8  Use warmth of hand to apply gentle pressure to help backing of pouch to adhere well to skin  9  Supplies left the bedside  10  Encourage patient to engage in self care of their ostomy - emptying etc   11  Encourage patient to read over the ostomy educational materials  12  Empty the pouch when no more than 1/3 full to prevent leaking or lifting of the barrier  Change pouch every 3-4 days and as needed for soilage/dislodgement  13  Will order sample kits when closer to d/c    Objective:    Vitals: Blood pressure (!) 172/77, pulse 100, temperature 98 9 °F (37 2 °C), temperature source Oral, resp  rate 17, height 5' 4 5" (1 638 m), weight 87 5 kg (192 lb 14 4 oz), SpO2 97 %  ,Body mass index is 32 6 kg/m²      Wound care team will continue to follow along during inpatient hospital stay,  Siddharth Ya RN BSN CWON

## 2022-10-31 NOTE — QUICK NOTE
Kirk Mariano just finished her ostomy teaching with wound care  Patient states that she almost filled up the bag and was taught how to change it  She overall feels well, especially since having the NGT removed  Denies emesis  Endorses one episode of nausea following taking her liquid cherry flavored prednisone, but the nausea resolved with having sips of water  She denies belching, but also has not passed gas  Kirk Mariano states that her pain is well controlled  She states that she was seen by APS earlier today and was told the epidural would stay in place until she is able to eat more and is transitioned to PO pain meds   Plan to reevaluate diet in AM     [address]  [facility]  KORINA PGY-2  10/31/22 3:11 PM

## 2022-10-31 NOTE — DISCHARGE SUMMARY
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   Pain: s/p epidural Pain controlled with tylenol and prn huber  FEN: Regular diet as tolerated, Dietary supplemental shakes added   DVT ppx: SCDs and Lovenox 40mg qD  Passing flatus into ostomy bag  Incision opened 68/4 from umbilicus to mons- packed with Kerlix  Plan for wound vac today   Voiding spontaneously       Sepsis ([facility] [place] [address] )  Assessment & Plan  S/p cefepime and flagyl--> transitioned to PO augmentin for 7 days   Urine culture- no growth   Blood cultures - no growth   Continue to trend WBC   Continue to trend vitals   Afebrile >24h   CT CAP 11/4- "Low-attenuation subcapsular right hepatic fluid collection as described above adjacent to right segment 7 probable laceration  Some mottled low attenuation present in the collection likely reflecting Surgicel packing although gas cannot be excluded  Superimposed infection in this collection cannot be entirely excluded  No active bleeding noted  Fluid collection in the splenectomy bed adjacent to the posterior wall of the stomach which demonstrates some rugal thickening, and adjacent to the tail the pancreas  Possibility of a developing pseudocysts and/or infected collection cannot be excluded  Mottled gas density identified in the presacral region with small complex fluid collection  This is near but not direct continuity with the colorectal anastomosis    While this could be postoperative in nature, an anastomotic leak and/or infectious process cannot be entirely excluded "     Lab Results   Component Value Date/Time    WBC 16 50 (H) 11/08/2022 05:42 AM    WBC 17 99 (H) 11/07/2022 04:41 AM    WBC 17 41 (H) 11/06/2022 04:57 AM         Acute blood loss anemia (ABLA)  Assessment & Plan  EBL 1700mL , Hgb 13 5 --> intraop iSTAT 5 8 s/p 4U PRBC, 2U FFP, 750mL albumin--> 10--> 12-->10 9   Abdomen soft, no guarding or rebound   Continue to trend Hgb, vitals and I&O       Lab Results   Component Value Date/Time    HGB 9 1 (L) 11/08/2022 05:42 AM    HGB 9 5 (L) 11/07/2022 04:41 AM    HGB 8 8 (L) 11/06/2022 04:57 AM       Hypokalemia  Assessment & Plan  Repletion as needed     Lab Results   Component Value Date/Time    K 3 7 11/07/2022 04:41 AM    K 3 5 11/06/2022 04:57 AM       S/P splenectomy  Assessment & Plan  Will need splenectomy vaccines prior to discharge     Ileostomy, has currently [facility])  Assessment & Plan  Ostomy in place   Dark green output   200mL output overnight   Wound care following     Gynecologic malignancy [facility])  Assessment & Plan  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  S/p 3 cycles of carbo/taxol, 2 cycles of bevicizimab   Follow up pathology from surgery    Giant cell aortic arteritis ([facility] [address] )  Assessment & Plan  Prednisolone 3mg daily       Hypertension  Assessment & Plan  Home medications     Hyperlipidemia  Assessment & Plan  Home medications     Asthma  Assessment & Plan  Controlled on home Symbicort    Gastroesophageal reflux disease  Assessment & Plan  Pepcid ordered   Tums ordered       Attending: Kevin Brower,*    Principal Diagnosis: 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[facility] [address] ) [C48 2]    Procedures:   diagnostic laparoscopy, modified radical hysterectomy, bilateral salpingo-oophorectomy, gastrocolic omentectomy, splenectomy, left hemicolectomy with end-to-end reanastomosis, small bowel resection with reanastomosis, tumor debulking, ileostomy    Hospital course: Raffy Bowen is a 68 yo with biopsy proven 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 who presented for cytoreductive surgery after 3 cycles of carbo/taxol and 2 cycles of avastin  She underwent a post diagnostic laparoscopy, modified radical hysterectomy, bilateral salpingo-oophorectomy, gastrocolic omentectomy, splenectomy, left hemicolectomy with end-to-end reanastomosis, small bowel resection with reanastomosis, tumor debulking, ileostomy on 10/28  Surgery was complicated by intraoperative blood loss of 1 7L requiring transfusion of 4U PRBC and 2U FFP  She was transferred to the ICU on POD0  POD2 she was transferred out of the ICU onto stepdown unit  Her coffey catheter was removed and she was voiding spontaneously  On POD 3, NG was removed and diet was slowly advanced  She was tolerating diet and electrolytes were replenished as needed  On POD 7, patient did develop a fever and met criteria for sepsis  She was started on cefepime and flagyl  Blood and urine cultures were collected and resulted by POD 10 as negative  Her sepsis resolved and she was started on Augmentin  POD 10, wound was opened at the bedside and debridement was performed  Patient to have wet to dry dressings daily and wound vacuum request sent  She was set up for wound care at [facility] where the wound vac will be placed  She received post-splenectomy vaccines prior to discharge       Lab Results:   Lab Results   Component Value Date    WBC 15 00 (H) 10/31/2022    HGB 10 5 (L) 10/31/2022    HCT 32 3 (L) 10/31/2022    MCV 93 10/31/2022     (L) 10/31/2022     Lab Results   Component Value Date    CALCIUM 7 9 (L) 10/31/2022    K 3 6 10/31/2022    CO2 25 10/31/2022     (H) 10/31/2022    BUN 10 10/31/2022    CREATININE 0 39 (L) 10/31/2022     Lab Results   Component Value Date/Time    POCGLU 186 (H) 10/28/2022 07:54 PM    POCGLU 106 10/28/2022 10:20 AM    POCGLU 117 08/04/2022 11:45 AM     No results found for: PTT  Lab Results   Component Value Date    INR 0 82 (L) 10/14/2022    INR 0 97 07/23/2022    PROTIME 11 9 10/14/2022    PROTIME 13 1 07/23/2022 Complications: none apparent    Condition at discharge: stable     Discharge instructions/Information to patient and family:   See After Visit Summary for information provided to patient and family  Provisions for Follow-Up Care:  See After Visit Summary for information related to follow-up care and any pertinent home health orders  Disposition: See After Visit Summary for discharge disposition information  Planned Readmission: Yes, pending clinical status    Discharge Medications: For a complete list of the patient's medications, please refer to her med rec      Severa Meline, MD  OBGYN PGY-3  12:38 PM  11/8/2022

## 2022-10-31 NOTE — ASSESSMENT & PLAN NOTE
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

## 2022-10-31 NOTE — CONSULTS
Inpatient consult to Acute Pain Service  Consult performed by: Dakotah Bravo MD  Consult ordered by: Beryl Elias PA-C        Epidural Follow-up Note - Acute Pain Service    Aryan Roman 67 y o  female MRN: [id number]  Unit/Bed#: [facility] 523-01 Encounter: [id number]      Assessment:   Principal Problem:    Gynecologic malignancy [facility])  Active Problems:    Gastroesophageal reflux disease    Asthma    Hyperlipidemia    Hypertension    Long term (current) use of systemic steroids    Giant cell aortic arteritis ([facility] )    Prediabetes    S/P total abdominal hysterectomy    Ileostomy, has currently ([facility] )    Acute blood loss anemia (ABLA)    S/P splenectomy      Aryan Roman is a 67y o  year old female postoperative day 3 status post diagnostic laparoscopy, modified radical hysterectomy, bilateral salpingo-oophorectomy, gastrocolic omentectomy, splenectomy, left hemicolectomy with end-to-end reanastomosis, small bowel resection with reanastomosis, tumor debulking, ileostomy  Pt with a thoracic epidural placed preoperatively for pain control  Pt seen and examined today, doing very well and sitting in bed  Uses demand button with good relief  Pain controlled with epidural   Denies n/v/pruritus/LE weakness  No other complaints at this time, happy to advance diet now that NGT has been removed  On water currently, planned advancement to Clears tomorrow pending tolerance  Plan:  Analgesia:  - Continue Thoracic epidural infusion of Ropivacaine 0 1% with fentanyl 2 mcg/mL at 10/5/10/3  - Continue Dilaudid 0 5 mg IV q2hr PRN for breakthrough pain  - Anticipate epidural removal in 1-3 days    Bowel Regimen:  - Bowel regimen when appropriate from surgical perspective    APS will continue to follow  Please contact Acute Pain Service - [facility] via TextHub from [phone] with additional questions or concerns  See Mason or Neto for additional contacts and after hours information      Pain History  Current pain location(s): incision  Pain Scale:   1  Quality: sore  24 hour history: as above    PCEA use: moderate  Opioid requirement previous 24 hours: 0    Meds/Allergies   all current active meds have been reviewed    Allergies   Allergen Reactions   • Nsaids Other (See Comments)      pt is avoiding per family doctor instructions-  Able to take Tylenol   • Pedi-Pre Tape Spray [Wound Dressing Adhesive] Other (See Comments)     Please use sensitive skin tape, pt gets bad bruising from strong medical adhesive tape  Objective     Temp:  [98 °F (36 7 °C)-99 3 °F (37 4 °C)] 98 9 °F (37 2 °C)  HR:  [] 102  Resp:  [16-18] 17  BP: (146-174)/(71-83) 168/73    Physical Exam  Vitals and nursing note reviewed  Constitutional:       General: She is not in acute distress  Appearance: Normal appearance  HENT:      Head: Normocephalic and atraumatic  Mouth/Throat:      Mouth: Mucous membranes are moist    Eyes:      Extraocular Movements: Extraocular movements intact  Cardiovascular:      Rate and Rhythm: Normal rate  Pulmonary:      Effort: Pulmonary effort is normal    Abdominal:      General: There is no distension  Palpations: Abdomen is soft  Tenderness: There is abdominal tenderness (very mild, left greater than right)  Musculoskeletal:         General: No swelling  Skin:     General: Skin is warm and dry  Neurological:      Mental Status: She is alert and oriented to person, place, and time     Psychiatric:         Mood and Affect: Mood normal          Behavior: Behavior normal        Epidural: Site clean/dry/intact, no surrounding erythema/edema/induration, infusion functioning appropriately    Lab Results:   Results from last 7 days   Lab Units 10/31/22  0649   WBC Thousand/uL 15 00*   HEMOGLOBIN g/dL 10 5*   HEMATOCRIT % 32 3*   PLATELETS Thousands/uL 114*      Results from last 7 days   Lab Units 10/31/22  0806   POTASSIUM mmol/L 3 6   CHLORIDE mmol/L 113*   CO2 mmol/L 25   BUN mg/dL 10   CREATININE mg/dL 0 39*   CALCIUM mg/dL 7 9*   ALK PHOS U/L 77   ALT U/L 151*   AST U/L 65*          Imaging Studies: I have personally reviewed pertinent reports  EKG, Pathology, and Other Studies: I have personally reviewed pertinent reports  Counseling / Coordination of Care  Total floor / unit time spent today 20 minutes  Greater than 50% of total time was spent with the patient and / or family counseling and / or coordination of care  A description of the counseling / coordination of care: chart review, post op pain and regional/neuraxial pain management, discussion/planning with nursing/medical/surgical teams    Please note that the APS provides consultative services regarding pain management only  With the exception of ketamine, peripheral nerve catheters, and epidural infusions (and except when indicated), final decisions regarding starting or changing doses of analgesic medications are at the discretion of the consulting service  Off hours consultation and/or medication management is generally not available      [facility]  Acute Pain Service

## 2022-11-01 LAB
AMYLASE FLD QL: 32 U/L
AMYLASE SERPL-CCNC: 59 IU/L (ref 25–115)
ANION GAP SERPL CALCULATED.3IONS-SCNC: 8 MMOL/L (ref 4–13)
BUN SERPL-MCNC: 8 MG/DL (ref 5–25)
CALCIUM SERPL-MCNC: 7.6 MG/DL (ref 8.3–10.1)
CHLORIDE SERPL-SCNC: 108 MMOL/L (ref 96–108)
CO2 SERPL-SCNC: 24 MMOL/L (ref 21–32)
CREAT SERPL-MCNC: 0.38 MG/DL (ref 0.6–1.3)
ERYTHROCYTE [DISTWIDTH] IN BLOOD BY AUTOMATED COUNT: 17.2 % (ref 11.6–15.1)
GFR SERPL CREATININE-BSD FRML MDRD: 106 ML/MIN/1.73SQ M
GLUCOSE SERPL-MCNC: 136 MG/DL (ref 65–140)
HCT VFR BLD AUTO: 29.7 % (ref 34.8–46.1)
HGB BLD-MCNC: 9.7 G/DL (ref 11.5–15.4)
MAGNESIUM SERPL-MCNC: 2.2 MG/DL (ref 1.6–2.6)
MCH RBC QN AUTO: 30.3 PG (ref 26.8–34.3)
MCHC RBC AUTO-ENTMCNC: 32.7 G/DL (ref 31.4–37.4)
MCV RBC AUTO: 93 FL (ref 82–98)
PHOSPHATE SERPL-MCNC: 1.2 MG/DL (ref 2.3–4.1)
PLATELET # BLD AUTO: 150 THOUSANDS/UL (ref 149–390)
PMV BLD AUTO: 10.5 FL (ref 8.9–12.7)
POTASSIUM SERPL-SCNC: 3.1 MMOL/L (ref 3.5–5.3)
RBC # BLD AUTO: 3.2 MILLION/UL (ref 3.81–5.12)
SODIUM SERPL-SCNC: 140 MMOL/L (ref 135–147)
WBC # BLD AUTO: 13.09 THOUSAND/UL (ref 4.31–10.16)

## 2022-11-01 PROCEDURE — 02HV33Z INSERTION OF INFUSION DEVICE INTO SUPERIOR VENA CAVA, PERCUTANEOUS APPROACH: ICD-10-PCS | Performed by: OBSTETRICS & GYNECOLOGY

## 2022-11-01 RX ORDER — ACETAMINOPHEN 325 MG/1
975 TABLET ORAL EVERY 8 HOURS SCHEDULED
Status: DISCONTINUED | OUTPATIENT
Start: 2022-11-01 | End: 2022-11-08 | Stop reason: HOSPADM

## 2022-11-01 RX ORDER — ECHINACEA PURPUREA EXTRACT 125 MG
1 TABLET ORAL
Status: DISCONTINUED | OUTPATIENT
Start: 2022-11-01 | End: 2022-11-08 | Stop reason: HOSPADM

## 2022-11-01 RX ORDER — CALCIUM GLUCONATE 20 MG/ML
1 INJECTION, SOLUTION INTRAVENOUS ONCE
Status: COMPLETED | OUTPATIENT
Start: 2022-11-01 | End: 2022-11-01

## 2022-11-01 RX ORDER — FAMOTIDINE 20 MG/1
20 TABLET, FILM COATED ORAL 2 TIMES DAILY
Status: DISCONTINUED | OUTPATIENT
Start: 2022-11-01 | End: 2022-11-08 | Stop reason: HOSPADM

## 2022-11-01 RX ORDER — HYDROMORPHONE HCL/PF 1 MG/ML
0.5 SYRINGE (ML) INJECTION EVERY 2 HOUR PRN
Status: DISCONTINUED | OUTPATIENT
Start: 2022-11-01 | End: 2022-11-02

## 2022-11-01 RX ORDER — OXYCODONE HYDROCHLORIDE 5 MG/1
5 TABLET ORAL EVERY 4 HOURS PRN
Status: DISCONTINUED | OUTPATIENT
Start: 2022-11-01 | End: 2022-11-08 | Stop reason: HOSPADM

## 2022-11-01 RX ORDER — ONDANSETRON 2 MG/ML
4 INJECTION INTRAMUSCULAR; INTRAVENOUS EVERY 6 HOURS PRN
Status: DISCONTINUED | OUTPATIENT
Start: 2022-11-01 | End: 2022-11-08 | Stop reason: HOSPADM

## 2022-11-01 RX ORDER — OXYCODONE HYDROCHLORIDE 10 MG/1
10 TABLET ORAL EVERY 4 HOURS PRN
Status: DISCONTINUED | OUTPATIENT
Start: 2022-11-01 | End: 2022-11-02

## 2022-11-01 RX ORDER — OXYCODONE HYDROCHLORIDE 5 MG/1
2.5 TABLET ORAL EVERY 4 HOURS PRN
Status: DISCONTINUED | OUTPATIENT
Start: 2022-11-01 | End: 2022-11-08 | Stop reason: HOSPADM

## 2022-11-01 RX ORDER — POTASSIUM CHLORIDE 29.8 MG/ML
40 INJECTION INTRAVENOUS ONCE
Status: COMPLETED | OUTPATIENT
Start: 2022-11-01 | End: 2022-11-01

## 2022-11-01 RX ORDER — PREDNISONE 1 MG/1
3 TABLET ORAL DAILY
Status: DISCONTINUED | OUTPATIENT
Start: 2022-11-01 | End: 2022-11-08 | Stop reason: HOSPADM

## 2022-11-01 RX ADMIN — HEPARIN SODIUM 5000 UNITS: 5000 INJECTION INTRAVENOUS; SUBCUTANEOUS at 15:57

## 2022-11-01 RX ADMIN — HEPARIN SODIUM 5000 UNITS: 5000 INJECTION INTRAVENOUS; SUBCUTANEOUS at 21:43

## 2022-11-01 RX ADMIN — CALCIUM GLUCONATE 1 G: 20 INJECTION, SOLUTION INTRAVENOUS at 12:12

## 2022-11-01 RX ADMIN — BUDESONIDE AND FORMOTEROL FUMARATE DIHYDRATE 2 PUFF: 160; 4.5 AEROSOL RESPIRATORY (INHALATION) at 08:41

## 2022-11-01 RX ADMIN — PREDNISONE 3 MG: 1 TABLET ORAL at 10:25

## 2022-11-01 RX ADMIN — FAMOTIDINE 20 MG: 20 TABLET, FILM COATED ORAL at 08:41

## 2022-11-01 RX ADMIN — POTASSIUM CHLORIDE 40 MEQ: 29.8 INJECTION, SOLUTION INTRAVENOUS at 12:12

## 2022-11-01 RX ADMIN — FAMOTIDINE 20 MG: 20 TABLET, FILM COATED ORAL at 17:41

## 2022-11-01 RX ADMIN — PRAVASTATIN SODIUM 20 MG: 20 TABLET ORAL at 15:57

## 2022-11-01 RX ADMIN — ONDANSETRON 4 MG: 2 INJECTION INTRAMUSCULAR; INTRAVENOUS at 12:30

## 2022-11-01 RX ADMIN — HEPARIN SODIUM 5000 UNITS: 5000 INJECTION INTRAVENOUS; SUBCUTANEOUS at 06:10

## 2022-11-01 RX ADMIN — ACETAMINOPHEN 975 MG: 325 TABLET, FILM COATED ORAL at 15:57

## 2022-11-01 RX ADMIN — ACETAMINOPHEN 975 MG: 325 TABLET, FILM COATED ORAL at 21:43

## 2022-11-01 NOTE — PROGRESS NOTES
Pastoral Care Progress Note    2022  Patient: Johnie Norman : 1950  Admission Date & Time: 10/28/2022 0946  MRN: [id number] [facility]: [id number]                     Chaplaincy Interventions Utilized:   Empowerment:    22 1500   Clinical Encounter Type   Visited With Patient   Continue Visiting Yes   Referral From Patient   Gnosticist Encounters   Gnosticist Needs Prayer   Sacramental Encounters   Sacrament of Sick-Anointing Anointed     Akira García visited the patient per the patient's request  He administered prayers, blessings, and also anointed the patient  Akira Gacría will follow up  No further needs were expressed at this time  Chaplains still remain available

## 2022-11-01 NOTE — PROGRESS NOTES
Progress Note - Surg Onc  Hannah White 67 y o  female MRN: [id number]  Unit/Bed#: [facility] 523-01 Encounter: [id number]    Assessment:  75-yr old F w/ peritoneal (vs ovarian) adenocarcinoma (s/p neoadjuvant chemotherapy); now s/p laparoscopy, modified radical hysterectomy, bilateral salpingoophorectomy, gastrocolic omentectomy, diaphragm resection, excision and ablation of peritoneal tumors, small bowel resection, ileostomy loop, left colon resection, and splenectomy 10/28      AVSS on RA     JOSÉ ANTONIO x2: ss       Ileostomy: pink; having output           Plan:  clears  Nausea control  Rest of care per primary team      Subjective/Objective     Subjective:   No acute events overnight  -N  -V      Objective:     Blood pressure 159/74, pulse 95, temperature 98 °F (36 7 °C), temperature source Oral, resp  rate 18, height 5' 4 5" (1 638 m), weight 87 9 kg (193 lb 12 6 oz), SpO2 97 %  ,Body mass index is 32 75 kg/m²        Intake/Output Summary (Last 24 hours) at 11/1/2022 0610  Last data filed at 11/1/2022 0100  Gross per 24 hour   Intake 1748 52 ml   Output 1075 ml   Net 673 52 ml       Invasive Devices  Report    Epidural Line  Duration           Epidural Catheter 10/28/22 4 days          Drain  Duration           Closed/Suction Drain Left LLQ 19 Fr  -- days    Closed/Suction Drain Left LUQ Bulb 19 Fr  3 days    Ileostomy Standard (Loulou, zo) RUQ 3 days                Physical Exam:   Gen: NAD, Comfortable  Neuro: A&O, No focal deficits  Head: Normal Cephalic, Atraumatic  Eye: EOMI, PERRLA, No scleral icterus  Neck: Supple, No JVD, Midline trachea  CV: RRR, Cap refill <2 sec  Pulm: Normal work of breathing, no respiratory distress  Abd: Soft, Non-Distended, Non-Tender  Ext: No edema, Non-tender  Skin: warm, dry, intact

## 2022-11-01 NOTE — PLAN OF CARE
Problem: Prexisting or High Potential for Compromised Skin Integrity  Goal: Skin integrity is maintained or improved  Description: INTERVENTIONS:  - Identify patients at risk for skin breakdown  - Assess and monitor skin integrity  - Assess and monitor nutrition and hydration status  - Monitor labs   - Assess for incontinence   - Turn and reposition patient  - Assist with mobility/ambulation  - Relieve pressure over bony prominences  - Avoid friction and shearing  - Provide appropriate hygiene as needed including keeping skin clean and dry  - Evaluate need for skin moisturizer/barrier cream  - Collaborate with interdisciplinary team   - Patient/family teaching  - Consider wound care consult   Outcome: Progressing     Problem: PAIN - ADULT  Goal: Verbalizes/displays adequate comfort level or baseline comfort level  Description: Interventions:  - Encourage patient to monitor pain and request assistance  - Assess pain using appropriate pain scale  - Administer analgesics based on type and severity of pain and evaluate response  - Implement non-pharmacological measures as appropriate and evaluate response  - Consider cultural and social influences on pain and pain management  - Notify physician/advanced practitioner if interventions unsuccessful or patient reports new pain  Outcome: Progressing     Problem: INFECTION - ADULT  Goal: Absence or prevention of progression during hospitalization  Description: INTERVENTIONS:  - Assess and monitor for signs and symptoms of infection  - Monitor lab/diagnostic results  - Monitor all insertion sites, i e  indwelling lines, tubes, and drains  - Monitor endotracheal if appropriate and nasal secretions for changes in amount and color  - [place] appropriate cooling/warming therapies per order  - Administer medications as ordered  - Instruct and encourage patient and family to use good hand hygiene technique  - Identify and instruct in appropriate isolation precautions for identified infection/condition  Outcome: Progressing  Goal: Absence of fever/infection during neutropenic period  Description: INTERVENTIONS:  - Monitor WBC    Outcome: Progressing     Problem: Nutrition/Hydration-ADULT  Goal: Nutrient/Hydration intake appropriate for improving, restoring or maintaining nutritional needs  Description: Monitor and assess patient's nutrition/hydration status for malnutrition  Collaborate with interdisciplinary team and initiate plan and interventions as ordered  Monitor patient's weight and dietary intake as ordered or per policy  Utilize nutrition screening tool and intervene as necessary  Determine patient's food preferences and provide high-protein, high-caloric foods as appropriate       INTERVENTIONS:  - Monitor oral intake, urinary output, labs, and treatment plans  - Assess nutrition and hydration status and recommend course of action  - Evaluate amount of meals eaten  - Assist patient with eating if necessary   - Allow adequate time for meals  - Recommend/ encourage appropriate diets, oral nutritional supplements, and vitamin/mineral supplements  - Order, calculate, and assess calorie counts as needed  - Recommend, monitor, and adjust tube feedings and TPN/PPN based on assessed needs  - Assess need for intravenous fluids  - Provide specific nutrition/hydration education as appropriate  - Include patient/family/caregiver in decisions related to nutrition  Outcome: Progressing

## 2022-11-01 NOTE — QUICK NOTE
The left UPPER JOSÉ ANTONIO drain fluid to be sent for amylase level this am   Will leave splenic vaccines to primary team  Will gladly order them prior to discharge  if they want  JOSÉ ANTONIO amylase today from left upper drain is 32 with a serum amylase of 59  Left upper JOSÉ ANTONIO drain can be removed whenever primary team would like

## 2022-11-01 NOTE — PROCEDURES
Insert PICC line    Date/Time: 11/1/2022 9:33 AM  Performed by: Jaquelin Cheung RN  Authorized by: Chandana Church MD     Patient location:  Bedside  Other Assisting Provider: Yes (comment) JACQUI Yun)    Consent:     Consent obtained:  Written (Obtained by provider)    Consent given by:  Patient    Risks discussed:  Arterial puncture, bleeding, infection, incorrect placement, nerve damage and pneumothorax (Discussed by provider)    Alternatives discussed: Discussed by provider  Universal protocol:     Procedure explained and questions answered to patient or proxy's satisfaction: yes      Relevant documents present and verified: yes      Test results available and properly labeled: yes      Radiology Images displayed and confirmed  If images not available, report reviewed: yes      Required blood products, implants, devices, and special equipment available: yes      Site/side marked: yes      Immediately prior to procedure, a time out was called: yes      Patient identity confirmed:  Verbally with patient, arm band, provided demographic data and hospital-assigned identification number  Pre-procedure details:     Hand hygiene: Hand hygiene performed prior to insertion      Sterile barrier technique: All elements of maximal sterile technique followed      Skin preparation:  ChloraPrep    Skin preparation agent: Skin preparation agent completely dried prior to procedure    Indications:     PICC line indications: no peripheral vascular access    Sedation:     Sedation type: None  Anesthesia (see MAR for exact dosages):      Anesthesia method:  Local infiltration    Local anesthetic:  Lidocaine 1% w/o epi (4 mL administered)  Procedure details:     Location:  Basilic    Vessel type: vein      Laterality:  Left    Site selection rationale:  Largest, most patent vessel    Approach: percutaneous technique used      Patient position:  Flat    Procedural supplies:  Double lumen    Catheter size:  5 Fr    Landmarks identified: yes      Ultrasound guidance: yes      Ultrasound image availability:  Not saved (31% vessel occupancy  Unable to save image to Epic )    Sterile ultrasound techniques: Sterile gel and sterile probe covers were used      Number of attempts:  1    Successful placement: yes      Vessel of catheter tip end:  Sherlock 3CG confirmed (OK to use PICC  Sherlock 3CG results saved to chart )    Total catheter length (cm):  43    Catheter out on skin (cm):  0    Max flow rate:  999 ml/hr    Arm circumference:  31  Post-procedure details:     Post-procedure:  Dressing applied and securement device placed    Assessment:  Blood return through all ports and free fluid flow    Post-procedure complications: none      Patient tolerance of procedure:   Tolerated well, no immediate complications

## 2022-11-01 NOTE — WOUND OSTOMY CARE
Consult Note - Ostomy  Sabrina Sportsman 67 y o  female MRN: [id number]  Unit/Bed#: [facility] 523-01 Encounter: [id number]    [address]  Team re-consulted for patient for s/p ileostomy  Wound and ostomy team following patient- ostomy pouch change and first teaching session were performed 10/31  See note from yesterday for ostomy bag change instructions  [id number]  team to continue to follow- plan to see patient later in week for next bag change session  Tiger text with questions or concerns       Lei Benitez RN, BSN

## 2022-11-01 NOTE — OCCUPATIONAL THERAPY NOTE
Occupational Therapy Progress Note     Patient Name: Carson Bartholomew  RKGQB'B Date: 11/1/2022  Problem List  Principal Problem:    Gynecologic malignancy [facility])  Active Problems:    Gastroesophageal reflux disease    Asthma    Hyperlipidemia    Hypertension    Long term (current) use of systemic steroids    Giant cell aortic arteritis ([address] )    Prediabetes    S/P total abdominal hysterectomy    [facility], has [facility] )    Acute blood loss anemia (ABLA)    S/P splenectomy        11/01/22 1022   OT Last Visit   OT Visit Date 11/01/22   Note Type   Note Type Treatment   Pain Assessment   Pain Assessment Tool 0-10   Pain Score 4   Pain Location/Orientation Orientation: Lower; Location: Abdomen   Hospital Pain Intervention(s) Ambulation/increased activity;Repositioned   Restrictions/Precautions   Weight Bearing Precautions Per Order No   Other Precautions Limb alert;Multiple lines;Telemetry; Fall Risk;Pain  (R limb alert, 2 JOSÉ ANTONIO drain, SCD)   ADL   Where Assessed Edge of bed   Grooming Assistance 5  Supervision/Setup   Grooming Deficit Setup;Verbal cueing;Supervision/safety; Increased time to complete;Wash/dry face; Teeth care   Grooming Comments Pt required set-up of grooming items on tray in front of her  Pt required no assistance w/EOB sitting balance during task  UB Bathing Assistance 4  Minimal Assistance   UB Bathing Deficit Setup;Verbal cueing;Supervision/safety; Increased time to complete; Chest;Right arm;Left arm; Abdomen   UB Bathing Comments Pt required min a to wipe back, pt able to wipe down B UE + chest + abdomen area independently  No assistance required for EOB sitting balance  LB Bathing Assistance 3  Moderate Assistance   LB Bathing Deficit Supervision/safety;Verbal cueing; Increased time to complete;Perineal area;Right upper leg;Left upper leg;Right lower leg including foot; Left lower leg including foot   LB Bathing Comments Pt required mod a for wiping B LE lower legs + feet   Pt able to wipe down upper B LE reporting she began to feel tired of sitting EOB  No assistance required for EOB sitting balance  UB Dressing Assistance 4  Minimal Assistance   UB Dressing Deficit Supervision/safety; Increased time to complete; Thread RUE; Thread LUE;Pull around back   UB Dressing Comments Pt required min a to thread B UE into gown + tie gown in the back  LB Dressing Assistance 1  Total Assistance   LB Dressing Deficit Don/doff R sock; Don/doff L sock   LB Dressing Comments Pt required total A to don B socks on while seated EOB  Bed Mobility   Rolling L 3  Moderate assistance   Additional items Assist x 1; Increased time required;Verbal cues   Supine to Sit 3  Moderate assistance   Additional items Assist x 1; Increased time required;Verbal cues   Additional Comments Pt was found supine in bed and left supine in bed w/call bell in reach  Transfers   Additional Comments Due to pt feeling fatigued and tired, STS not able to be assessed  O2 remained above 95 t/o  Pt demonstrates F safety awareness and insight into condition  Functional Mobility   Additional Comments Unable to assess   Subjective   Subjective "I think I exerted myself too much"   Cognition   Overall Cognitive Status [facility]   Arousal/Participation Alert; Responsive; Cooperative   Attention Within functional limits   Orientation Level Oriented X4   Memory Within functional limits   Following Commands Follows one step commands with increased time or repetition   Comments Pt is able to follow conversation and attend to min vc during ADLs while seated EOB  Pt unable to complete STS and fnxl mobility due to fatigue  Pt demonstrates F safety awareness and insight into condition     Activity Tolerance   Activity Tolerance Patient limited by fatigue;Patient limited by pain   Medical Staff Made Aware OT Gonzalo Dunlap, RN aware   Assessment   Assessment OT trx session focused on ADL retraining, endurance training, patient/family training, continued evaluation, energy conservation, and activity engagement  Pt was found supine in bed and left supine in bed w/call bell within reach  Presently, pt is S for grooming (w/increased time, vc), MIN A for UB bathing/dressing (w/increased time, vc), MOD A for LB bathing (w/increased time + vc), Total A for LB dressing (w/increased time, vc), MOD A x1 for bed mobility (w/increased time, min vc)  Pt required no assistance for EOB sitting balance during ADLs  ADL tasks of grooming + UB bathing/dressing + LB bathing/dressing were completed seated EOB  Minimal improvements are noted with endurance training and ADL retraining  Remaining limitations observed w/functional transfer training, UE strengthening/ROM, activity engagement, functional mobility, ADL retraining, and endurance training  Pt reported feeling fatigued following EOB ADLs requesting to return to bed - O2 remained above 95 t/o - RN aware and notified  BP attempted however, could not be completed  RN made aware and stated she would complete  The patient's raw score on the AM-PAC Daily Activity inpatient short form is 16, standardized score is 35 96, less than 39 4  Patients at this level are likely to benefit from discharge to post-acute rehabilitation services  Please refer to the recommendation of the Occupational Therapist for safe discharge planning  D/c recommendation is for home health rehabilitation services pending pt progress  Presently, recommendation is for pt to continue w/rehab 2-3x a week for 10-14 days to meet goals  Plan   Treatment Interventions ADL retraining; Endurance training;Patient/family training;Continued evaluation; Energy conservation; Activityengagement   Goal Expiration Date 11/13/22   OT Treatment Day 1   OT Frequency 2-3x/wk   Recommendation   OT Discharge Recommendation Home with home health rehabilitation  (pending progress)   AM-PAC Daily Activity Inpatient   Lower Body Dressing 2   Bathing 2   Toileting 2   Upper Body Dressing 3   Grooming 3 Eating 4   Daily Activity Raw Score 16   Daily Activity Standardized Score (Calc for Raw Score >=11) 35 96   AM-PAC Applied Cognition Inpatient   Following a Speech/Presentation 4   Understanding Ordinary Conversation 4   Taking Medications 4   Remembering Where Things Are Placed or Put Away 4   Remembering List of 4-5 Errands 4   Taking Care of Complicated Tasks 4   Applied Cognition Raw Score 24   Applied Cognition Standardized Score 62 21   Modified Hansen Scale   Modified Hansen Scale 4   End of Consult   Education Provided Yes   Patient Position at End of Consult Supine; All needs within reach   Nurse Communication Nurse aware of consult     BOGDAN Kc

## 2022-11-01 NOTE — PROGRESS NOTES
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); now s/p laparoscopy, modified radical hysterectomy, bilateral salpingoophorectomy, gastrocolic omentectomy, diaphragm resection, excision and ablation of peritoneal tumors, small bowel resection, ileostomy loop, left colon resection, and splenectomy 10/28      AVSS on RA     JOSÉ ANTONIO x2: ss     Ileostomy: pink; having output 400 from 300      Plan:  Clears  Consider keflex for erythema around surgical incision  Nausea control  Rest of care per primary team  Both Shyanne Juárez may be removed per our team  Will need splenic vaccines ordered before DC    Subjective/Objective     Subjective:   No acute events overnight  -N  -V  Pain ok  Objective:     Blood pressure 158/74, pulse (!) 106, temperature 98 2 °F (36 8 °C), temperature source Oral, resp  rate 18, height 5' 4 5" (1 638 m), weight 87 9 kg (193 lb 12 6 oz), SpO2 97 %  ,Body mass index is 32 75 kg/m²        Intake/Output Summary (Last 24 hours) at 11/1/2022 1952  Last data filed at 11/1/2022 1801  Gross per 24 hour   Intake 1073 25 ml   Output 1275 ml   Net -201 75 ml       Invasive Devices  Report    Peripherally Inserted Central Catheter Line  Duration           PICC Line 11/33/21 Left Basilic <1 day          Epidural Line  Duration           Epidural Catheter 10/28/22 4 days          Drain  Duration           Closed/Suction Drain Left LLQ 19 Fr  -- days    Closed/Suction Drain Left LUQ Bulb 19 Fr  4 days    Ileostomy Standard ([place], end) RUQ 4 days                Physical Exam:   Gen: NAD, Comfortable  Neuro: A&O, No focal deficits  Head: Normal Cephalic, Atraumatic  Eye: EOMI, PERRLA, No scleral icterus  Neck: Supple, No JVD, Midline trachea  CV: RRR, Cap refill <2 sec  Pulm: Normal work of breathing, no respiratory distress  Abd: Soft, Non-Distended, Non-Tender, ostomy with output  Ext: No edema, Non-tender  Skin: warm, dry, intact

## 2022-11-01 NOTE — PLAN OF CARE
Problem: OCCUPATIONAL THERAPY ADULT  Goal: Performs self-care activities at highest level of function for planned discharge setting  See evaluation for individualized goals  Description: Treatment Interventions: ADL retraining, Functional transfer training, Endurance training, UE strengthening/ROM, Patient/family training, Equipment evaluation/education, Compensatory technique education, Continued evaluation, Energy conservation, Activityengagement     See flowsheet documentation for full assessment, interventions and recommendations  Outcome: Progressing  Note: Limitation: Decreased ADL status, Decreased UE strength, Decreased endurance, Decreased self-care trans, Decreased high-level ADLs  Prognosis: Good  Assessment: OT trx session focused on ADL retraining, endurance training, patient/family training, continued evaluation, energy conservation, and activity engagement  Pt was found supine in bed and left supine in bed w/call bell within reach  Presently, pt is S for grooming (w/increased time, vc), MIN A for UB bathing/dressing (w/increased time, vc), MOD A for LB bathing (w/increased time + vc), Total A for LB dressing (w/increased time, vc), MOD A x1 for bed mobility (w/increased time, min vc)  Pt required no assistance for EOB sitting balance during ADLs  ADL tasks of grooming + UB bathing/dressing + LB bathing/dressing were completed seated EOB  Minimal improvements are noted with endurance training and ADL retraining  Remaining limitations observed w/functional transfer training, UE strengthening/ROM, activity engagement, functional mobility, ADL retraining, and endurance training  Pt reported feeling fatigued following EOB ADLs requesting to return to bed - O2 remained above 95 t/o - RN aware and notified  BP attempted however, could not be completed  RN made aware and stated she would complete   The patient's raw score on the AM-PAC Daily Activity inpatient short form is 16, standardized score is 35 96, less than 39 4  Patients at this level are likely to benefit from discharge to post-acute rehabilitation services  Please refer to the recommendation of the Occupational Therapist for safe discharge planning  D/c recommendation is for home health rehabilitation services pending pt progress  Presently, recommendation is for pt to continue w/rehab 2-3x a week for 10-14 days to meet goals       OT Discharge Recommendation: Home with home health rehabilitation (pending progress)

## 2022-11-01 NOTE — PROGRESS NOTES
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      S/P splenectomy  Assessment & Plan  Will need splenectomy vaccines prior to discharge     Acute blood loss anemia (ABLA)  Assessment & Plan  EBL 1700mL , Hgb 13 5 --> intraop iSTAT 5 8 s/p 4U PRBC, 2U FFP, 750mL albumin--> 10--> 12-->10 9   Abdomen soft, no guarding or rebound   Continue to trend Hgb, vitals and I&O   Patient declining labs    Ileostomy, has currently [facility])  Assessment & Plan  Ostomy in place   Dark green output   Wound care consulted for ostomy care     S/P total abdominal hysterectomy  Assessment & Plan  LIN, BSO due to 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   Pain: epidural in place, Acute pain services consulted- appreciate recommendations   FEN: NPO with sips of water only, NGT removed, plasmalyte 125cc/hr, consider advancing diet  DVT ppx: SCDs and heparin 5000U TID   Passing flatus into ostomy bag  Incision clean, dry, intact- staples in place   Corbin removed POD#3- voiding spontaneously     Giant cell aortic arteritis (HCC)  Assessment & Plan  Prednisolone 3mg daily   Consider transition to pill instead of liquid formulation    Hypertension  Assessment & Plan  Home meds held   Hydralazine PRN for severe HTN     Hyperlipidemia  Assessment & Plan  Home meds held     Asthma  Assessment & Plan  Controlled on home Symbicort    * Gynecologic malignancy [facility])  Assessment & Plan  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  S/p 3 cycles of carbo/taxol, 2 cycles of bevicizimab   Follow up pathology from surgery        Subjective:    Laura Blake has no current complaints  She would really like to have her diet advanced but understands that she needs to move slowly  She endorses a good night sleep Pain is well controlled with epidural, she states that she has "barely pressed her button"  Patient is currently voiding  She is ambulating  Patient is currently passing flatus and has had bowel movement  She is tolerating PO sips, and denies nausea or vomitting  Patient denies fever, chills, chest pain, shortness of breath, or calf tenderness  Objective:  /74 (BP Location: Left arm)   Pulse 95   Temp 98 °F (36 7 °C) (Oral)   Resp 18   Ht 5' 4 5" (1 638 m)   Wt 87 5 kg (192 lb 14 4 oz)   SpO2 97%   BMI 32 60 kg/m²     I/O last 3 completed shifts: In: 4642 9 [I V :4392 9; IV Piggyback:250]  Out: 2080 [Urine:1225; Emesis/NG output:450; Drains:305; Stool:100]  I/O this shift: In: 547 2 [I V :547 2]  Out: 270 [Urine:200; Drains:70]    Lab Results   Component Value Date    WBC 15 00 (H) 10/31/2022    HGB 10 5 (L) 10/31/2022    HCT 32 3 (L) 10/31/2022    MCV 93 10/31/2022     (L) 10/31/2022       Lab Results   Component Value Date    GLUCOSE 214 (H) 10/28/2022    CALCIUM 7 9 (L) 10/31/2022    K 3 6 10/31/2022    CO2 25 10/31/2022     (H) 10/31/2022    BUN 10 10/31/2022    CREATININE 0 39 (L) 10/31/2022       Physical Exam  Vitals reviewed  Constitutional:       Appearance: Normal appearance  Pulmonary:      Effort: Pulmonary effort is normal  No respiratory distress  Abdominal:      Palpations: Abdomen is soft  Tenderness: There is no abdominal tenderness  Comments: Midline incision with staples, c/d/i  Two left sided JOSÉ ANTONIO drains with serosanguinous fluid   Diverting ostomy present on right side of abdomen, with liquid stool in bag  No evidence of skin breakdown surrounding ostomy site  Musculoskeletal:      Comments: Edema in b/l hands and forearms  Skin:     General: Skin is warm and dry  Neurological:      Mental Status: She is alert     Psychiatric:         Mood and Affect: Mood normal          Behavior: Behavior normal            [facility] [email or web address].[facility]  11/1/2022  6:08 AM

## 2022-11-01 NOTE — QUICK NOTE
Raj Victor is complaining of a brief episode of nausea after having PT/OT  She thinks that it is because she is "doing a lot"  Her wave of nausea was not brought on by food  Abdominal exam is unchanged from this morning  She is no longer nauseous  Zofran ordered for her       Stockr  OBGYN PGY-2  11/01/22 12:53 PM

## 2022-11-02 LAB
AMYLASE FLD QL: 21 U/L
AMYLASE SERPL-CCNC: 34 IU/L (ref 25–115)
ANION GAP SERPL CALCULATED.3IONS-SCNC: 5 MMOL/L (ref 4–13)
BUN SERPL-MCNC: 7 MG/DL (ref 5–25)
CALCIUM SERPL-MCNC: 7.8 MG/DL (ref 8.3–10.1)
CHLORIDE SERPL-SCNC: 104 MMOL/L (ref 96–108)
CO2 SERPL-SCNC: 26 MMOL/L (ref 21–32)
CREAT SERPL-MCNC: 0.41 MG/DL (ref 0.6–1.3)
ERYTHROCYTE [DISTWIDTH] IN BLOOD BY AUTOMATED COUNT: 17 % (ref 11.6–15.1)
GFR SERPL CREATININE-BSD FRML MDRD: 103 ML/MIN/1.73SQ M
GLUCOSE SERPL-MCNC: 181 MG/DL (ref 65–140)
HCT VFR BLD AUTO: 29.6 % (ref 34.8–46.1)
HGB BLD-MCNC: 10 G/DL (ref 11.5–15.4)
MCH RBC QN AUTO: 30.9 PG (ref 26.8–34.3)
MCHC RBC AUTO-ENTMCNC: 33.8 G/DL (ref 31.4–37.4)
MCV RBC AUTO: 91 FL (ref 82–98)
PLATELET # BLD AUTO: 172 THOUSANDS/UL (ref 149–390)
PMV BLD AUTO: 10.6 FL (ref 8.9–12.7)
POTASSIUM SERPL-SCNC: 3 MMOL/L (ref 3.5–5.3)
RBC # BLD AUTO: 3.24 MILLION/UL (ref 3.81–5.12)
SODIUM SERPL-SCNC: 135 MMOL/L (ref 135–147)
WBC # BLD AUTO: 9.78 THOUSAND/UL (ref 4.31–10.16)

## 2022-11-02 RX ORDER — LISINOPRIL 5 MG/1
5 TABLET ORAL DAILY
Status: DISCONTINUED | OUTPATIENT
Start: 2022-11-02 | End: 2022-11-08 | Stop reason: HOSPADM

## 2022-11-02 RX ORDER — CALCIUM GLUCONATE 20 MG/ML
1 INJECTION, SOLUTION INTRAVENOUS ONCE
Status: COMPLETED | OUTPATIENT
Start: 2022-11-02 | End: 2022-11-02

## 2022-11-02 RX ORDER — METOPROLOL SUCCINATE 25 MG/1
25 TABLET, EXTENDED RELEASE ORAL DAILY
Status: DISCONTINUED | OUTPATIENT
Start: 2022-11-02 | End: 2022-11-08 | Stop reason: HOSPADM

## 2022-11-02 RX ORDER — POTASSIUM CHLORIDE 29.8 MG/ML
40 INJECTION INTRAVENOUS ONCE
Status: COMPLETED | OUTPATIENT
Start: 2022-11-02 | End: 2022-11-02

## 2022-11-02 RX ADMIN — HEPARIN SODIUM 5000 UNITS: 5000 INJECTION INTRAVENOUS; SUBCUTANEOUS at 13:05

## 2022-11-02 RX ADMIN — HEPARIN SODIUM 5000 UNITS: 5000 INJECTION INTRAVENOUS; SUBCUTANEOUS at 22:08

## 2022-11-02 RX ADMIN — PRAVASTATIN SODIUM 20 MG: 20 TABLET ORAL at 17:11

## 2022-11-02 RX ADMIN — CALCIUM GLUCONATE 1 G: 20 INJECTION, SOLUTION INTRAVENOUS at 10:32

## 2022-11-02 RX ADMIN — ONDANSETRON 4 MG: 2 INJECTION INTRAMUSCULAR; INTRAVENOUS at 16:30

## 2022-11-02 RX ADMIN — FAMOTIDINE 20 MG: 20 TABLET, FILM COATED ORAL at 09:17

## 2022-11-02 RX ADMIN — HYDRALAZINE HYDROCHLORIDE 5 MG: 20 INJECTION, SOLUTION INTRAMUSCULAR; INTRAVENOUS at 15:59

## 2022-11-02 RX ADMIN — BUDESONIDE AND FORMOTEROL FUMARATE DIHYDRATE 2 PUFF: 160; 4.5 AEROSOL RESPIRATORY (INHALATION) at 09:21

## 2022-11-02 RX ADMIN — DEXTROSE AND SODIUM CHLORIDE 75 ML/HR: 5; .45 INJECTION, SOLUTION INTRAVENOUS at 09:28

## 2022-11-02 RX ADMIN — ONDANSETRON 4 MG: 2 INJECTION INTRAMUSCULAR; INTRAVENOUS at 23:01

## 2022-11-02 RX ADMIN — LISINOPRIL 5 MG: 5 TABLET ORAL at 17:12

## 2022-11-02 RX ADMIN — ACETAMINOPHEN 975 MG: 325 TABLET, FILM COATED ORAL at 13:04

## 2022-11-02 RX ADMIN — ONDANSETRON 4 MG: 2 INJECTION INTRAMUSCULAR; INTRAVENOUS at 10:30

## 2022-11-02 RX ADMIN — FAMOTIDINE 20 MG: 20 TABLET, FILM COATED ORAL at 17:12

## 2022-11-02 RX ADMIN — PREDNISONE 3 MG: 1 TABLET ORAL at 13:04

## 2022-11-02 RX ADMIN — METOPROLOL SUCCINATE 25 MG: 25 TABLET, EXTENDED RELEASE ORAL at 17:12

## 2022-11-02 RX ADMIN — ACETAMINOPHEN 975 MG: 325 TABLET, FILM COATED ORAL at 22:08

## 2022-11-02 RX ADMIN — ACETAMINOPHEN 975 MG: 325 TABLET, FILM COATED ORAL at 06:02

## 2022-11-02 RX ADMIN — POTASSIUM CHLORIDE 40 MEQ: 29.8 INJECTION, SOLUTION INTRAVENOUS at 11:15

## 2022-11-02 RX ADMIN — HEPARIN SODIUM 5000 UNITS: 5000 INJECTION INTRAVENOUS; SUBCUTANEOUS at 06:02

## 2022-11-02 NOTE — PROGRESS NOTES
22 1800   Clinical Encounter Type   Visited With Patient   Continue Visiting Yes   Caodaism Encounters   Caodaism Needs Prayer    Pastoral Care Progress Note    2022  Patient: Vi Maloney : 1950  Admission Date & Time: 10/28/2022 0946  MRN: [id number] CSN: [id number]        Patient requested visit with Fr Courtney Gupta  Prayers and blessing offered, patient not up to communion today

## 2022-11-02 NOTE — QUICK NOTE
RN informed team that patient was feeling nauseous with eating  I went to the bedside to see Kaseyprema Mariano  She reports "all the food tastes the same and it is gross"  She is hungry and wants to eat but does not like the taste of the food  She was able to eat 1/4 of an egg sandwich and some carrot cake for lunch  She has not had any episodes of emesis  She usually drinks supplemental shakes at home- like Ensure  Plan to check serum and JOSÉ ANTONIO amylase now that she has more PO intake  If levels are normal will pull the drains       Ashish Ramos MD  OBGYN PGY-3  2:54 PM  11/2/2022

## 2022-11-02 NOTE — PLAN OF CARE
Problem: Prexisting or High Potential for Compromised Skin Integrity  Goal: Skin integrity is maintained or improved  Description: INTERVENTIONS:  - Identify patients at risk for skin breakdown  - Assess and monitor skin integrity  - Assess and monitor nutrition and hydration status  - Monitor labs   - Assess for incontinence   - Turn and reposition patient  - Assist with mobility/ambulation  - Relieve pressure over bony prominences  - Avoid friction and shearing  - Provide appropriate hygiene as needed including keeping skin clean and dry  - Evaluate need for skin moisturizer/barrier cream  - Collaborate with interdisciplinary team   - Patient/family teaching  - Consider wound care consult   Outcome: Progressing     Problem: MOBILITY - ADULT  Goal: Maintain or return to baseline ADL function  Description: INTERVENTIONS:  -  Assess patient's ability to carry out ADLs; assess patient's baseline for ADL function and identify physical deficits which impact ability to perform ADLs (bathing, care of mouth/teeth, toileting, grooming, dressing, etc )  - Assess/evaluate cause of self-care deficits   - Assess range of motion  - Assess patient's mobility; develop plan if impaired  - Assess patient's need for assistive devices and provide as appropriate  - Encourage maximum independence but intervene and supervise when necessary  - Involve family in performance of ADLs  - Assess for home care needs following discharge   - Consider OT consult to assist with ADL evaluation and planning for discharge  - Provide patient education as appropriate  Outcome: Progressing  Goal: Maintains/Returns to pre admission functional level  Description: INTERVENTIONS:  - Perform BMAT or MOVE assessment daily    - Set and communicate daily mobility goal to care team and patient/family/caregiver  - Collaborate with rehabilitation services on mobility goals if consulted  - Perform Range of Motion 3 times a day    - Reposition patient every 2 hours   - Dangle patient 3 times a day  - Stand patient 3 times a day  - Ambulate patient 3 times a day  - Out of bed to chair 3 times a day   - Out of bed for meals 3 times a day  - Out of bed for toileting  - Record patient progress and toleration of activity level   Outcome: Progressing     Problem: PAIN - ADULT  Goal: Verbalizes/displays adequate comfort level or baseline comfort level  Description: Interventions:  - Encourage patient to monitor pain and request assistance  - Assess pain using appropriate pain scale  - Administer analgesics based on type and severity of pain and evaluate response  - Implement non-pharmacological measures as appropriate and evaluate response  - Consider cultural and social influences on pain and pain management  - Notify physician/advanced practitioner if interventions unsuccessful or patient reports new pain  Outcome: Progressing     Problem: INFECTION - ADULT  Goal: Absence or prevention of progression during hospitalization  Description: INTERVENTIONS:  - Assess and monitor for signs and symptoms of infection  - Monitor lab/diagnostic results  - Monitor all insertion sites, i e  indwelling lines, tubes, and drains  - Monitor endotracheal if appropriate and nasal secretions for changes in amount and color  - Jack appropriate cooling/warming therapies per order  - Administer medications as ordered  - Instruct and encourage patient and family to use good hand hygiene technique  - Identify and instruct in appropriate isolation precautions for identified infection/condition  Outcome: Progressing     Problem: SAFETY ADULT  Goal: Maintain or return to baseline ADL function  Description: INTERVENTIONS:  -  Assess patient's ability to carry out ADLs; assess patient's baseline for ADL function and identify physical deficits which impact ability to perform ADLs (bathing, care of mouth/teeth, toileting, grooming, dressing, etc )  - Assess/evaluate cause of self-care deficits   - Assess range of motion  - Assess patient's mobility; develop plan if impaired  - Assess patient's need for assistive devices and provide as appropriate  - Encourage maximum independence but intervene and supervise when necessary  - Involve family in performance of ADLs  - Assess for home care needs following discharge   - Consider OT consult to assist with ADL evaluation and planning for discharge  - Provide patient education as appropriate  Outcome: Progressing  Goal: Maintains/Returns to pre admission functional level  Description: INTERVENTIONS:  - Perform BMAT or MOVE assessment daily    - Set and communicate daily mobility goal to care team and patient/family/caregiver  - Collaborate with rehabilitation services on mobility goals if consulted  - Perform Range of Motion 3 times a day  - Reposition patient every 2 hours    - Dangle patient 3 times a day  - Stand patient 3 times a day  - Ambulate patient 3 times a day  - Out of bed to chair 3 times a day   - Out of bed for meals 3 times a day  - Out of bed for toileting  - Record patient progress and toleration of activity level   Outcome: Progressing  Goal: Patient will remain free of falls  Description: INTERVENTIONS:  - Educate patient/family on patient safety including physical limitations  - Instruct patient to call for assistance with activity   - Consult OT/PT to assist with strengthening/mobility   - Keep Call bell within reach  - Keep bed low and locked with side rails adjusted as appropriate  - Keep care items and personal belongings within reach  - Initiate and maintain comfort rounds  - Make Fall Risk Sign visible to staff  - Offer Toileting every 2 Hours, in advance of need  - Initiate/Maintain bed alarm  - Obtain necessary fall risk management equipment  - Apply yellow socks and bracelet for high fall risk patients  - Consider moving patient to room near nurses station  Outcome: Progressing     Problem: DISCHARGE PLANNING  Goal: Discharge to home or other facility with appropriate resources  Description: INTERVENTIONS:  - Identify barriers to discharge w/patient and caregiver  - Arrange for needed discharge resources and transportation as appropriate  - Identify discharge learning needs (meds, wound care, etc )  - Arrange for interpretive services to assist at discharge as needed  - Refer to Case Management Department for coordinating discharge planning if the patient needs post-hospital services based on physician/advanced practitioner order or complex needs related to functional status, cognitive ability, or social support system  Outcome: Progressing     Problem: Knowledge Deficit  Goal: Patient/family/caregiver demonstrates understanding of disease process, treatment plan, medications, and discharge instructions  Description: Complete learning assessment and assess knowledge base  Interventions:  - Provide teaching at level of understanding  - Provide teaching via preferred learning methods  Outcome: Progressing     Problem: Nutrition/Hydration-ADULT  Goal: Nutrient/Hydration intake appropriate for improving, restoring or maintaining nutritional needs  Description: Monitor and assess patient's nutrition/hydration status for malnutrition  Collaborate with interdisciplinary team and initiate plan and interventions as ordered  Monitor patient's weight and dietary intake as ordered or per policy  Utilize nutrition screening tool and intervene as necessary  Determine patient's food preferences and provide high-protein, high-caloric foods as appropriate       INTERVENTIONS:  - Monitor oral intake, urinary output, labs, and treatment plans  - Assess nutrition and hydration status and recommend course of action  - Evaluate amount of meals eaten  - Assist patient with eating if necessary   - Allow adequate time for meals  - Recommend/ encourage appropriate diets, oral nutritional supplements, and vitamin/mineral supplements  - Order, calculate, and assess calorie counts as needed  - Recommend, monitor, and adjust tube feedings and TPN/PPN based on assessed needs  - Assess need for intravenous fluids  - Provide specific nutrition/hydration education as appropriate  - Include patient/family/caregiver in decisions related to nutrition  Outcome: Progressing     Problem: Potential for Falls  Goal: Patient will remain free of falls  Description: INTERVENTIONS:  - Educate patient/family on patient safety including physical limitations  - Instruct patient to call for assistance with activity   - Consult OT/PT to assist with strengthening/mobility   - Keep Call bell within reach  - Keep bed low and locked with side rails adjusted as appropriate  - Keep care items and personal belongings within reach  - Initiate and maintain comfort rounds  - Make Fall Risk Sign visible to staff  - Offer Toileting every 2 Hours, in advance of need  - Initiate/Maintain bed alarm  - Obtain necessary fall risk management equipment  - Apply yellow socks and bracelet for high fall risk patients  - Consider moving patient to room near nurses station  Outcome: Progressing     Problem: INFECTION - ADULT  Goal: Absence of fever/infection during neutropenic period  Description: INTERVENTIONS:  - Monitor WBC    Outcome: Completed

## 2022-11-02 NOTE — PHYSICAL THERAPY NOTE
PHYSICAL THERAPY NOTE          Patient Name: Lorrain Cushing  SGOZC'R Date: 11/2/2022 11/02/22 1200   PT Last Visit   PT Visit Date 11/02/22   Note Type   Note Type Treatment   Pain Assessment   Pain Assessment Tool 0-10   Pain Score No Pain   Restrictions/Precautions   Weight Bearing Precautions Per Order No   Other Precautions Multiple lines;Telemetry; Fall Risk  (JOSÉ ANTONIO drain x2)   General   Chart Reviewed Yes   Response to Previous Treatment Patient with no complaints from previous session  Family/Caregiver Present Yes   Cognition   Overall Cognitive Status WFL   Arousal/Participation Alert   Attention Within functional limits   Memory Within functional limits   Following Commands Follows all commands and directions without difficulty   Subjective   Subjective Pleasant and agreeable to participate in therapy session  Bed Mobility   Additional Comments OOB in chair upon PT arrival   Pt left upright in bedside chair with all needs in reach  Transfers   Sit to Stand 3  Moderate assistance   Additional items Assist x 1; Increased time required;Verbal cues   Stand to Sit 4  Minimal assistance   Additional items Assist x 1; Increased time required;Verbal cues   Additional Comments with RW   VC for hand placement and safety  Ambulation/Elevation   Gait pattern Excessively slow; Short stride; Foward flexed;Decreased foot clearance; Improper Weight shift   Gait Assistance 4  Minimal assist   Additional items Assist x 1; Tactile cues; Verbal cues   Assistive Device Rolling walker   Distance 80 ft x 2   Balance   Static Sitting Fair +   Dynamic Sitting Fair   Static Standing Fair -   Dynamic Standing Poor +   Ambulatory Poor +   Endurance Deficit   Endurance Deficit Yes   Endurance Deficit Description fatigue, weakness   Activity Tolerance   Activity Tolerance Patient limited by fatigue   Medical Staff Made Aware restorative [facility] Mathews Nurse Made Aware RN cleared pt to be seen by PT   Assessment   Prognosis Good   Problem List Decreased strength;Decreased endurance; Impaired balance;Decreased mobility   Assessment Pt seen for PT treatment session with focus on functional transfers, functional mobility, pt/family education  Pt making slow but steady progress toward goals this session  Pt demonstrating strength and balance gains evident by ability to perform ambulation with decreased assist compared to last session  Despite these improvements, pt continues to present with same deficits and is unable to transfer and ambulate without physical assist at this time  Pt left upright in bedside chair with all needs in reach  Pt will benefit from skilled therapy in order to address current impairments and functional limitations  PT to continue to follow pt and recommending HHPT vs rehab pending progress  The patient's AM-PAC Basic Mobility Inpatient Short Form Raw Score is 14  A Raw score of less than or equal to 16 suggests the patient may benefit from discharge to post-acute rehabilitation services  Please also refer to the recommendation of the Physical Therapist for safe discharge planning  Barriers to Discharge Inaccessible home environment;Decreased caregiver support   Goals   Patient Goals to get stronger   STG Expiration Date 11/09/22   Plan   Treatment/Interventions Functional transfer training;LE strengthening/ROM; Therapeutic exercise; Endurance training;Patient/family training;Equipment eval/education; Bed mobility;Gait training;Spoke to nursing   Progress Progressing toward goals   PT Frequency 3-5x/wk   Recommendation   PT Discharge Recommendation Home with home health rehabilitation  (vs rehab pending progress)   Equipment Recommended [place] walker   Change/add to [email or web address]?  No   AM-PAC Basic Mobility Inpatient   Turning in Bed Without Bedrails 3   Lying on Back to Sitting on Edge of Flat Bed 2 Moving Bed to Chair 2   Standing Up From Chair 2   Walk in Room 3   Climb 3-5 Stairs 2   Basic Mobility Inpatient Raw Score 14   Basic Mobility Standardized Score 35 55   Highest Level Of Mobility   -HLM Goal 4: Move to chair/commode   -HLM Achieved 7: Walk 25 feet or more     Sherrie Gonsalves, PT, DPT

## 2022-11-02 NOTE — PROGRESS NOTES
Epidural Follow-up Note - Acute Pain Service    Leigh Morgan 67 y o  female MRN: [id number]  Unit/Bed#: [facility] 523-01 Encounter: [id number]      Assessment:   Principal Problem:    Gynecologic malignancy [facility])  Active Problems:    Gastroesophageal reflux disease    Asthma    Hyperlipidemia    Hypertension    Long term (current) use of systemic steroids    Giant cell aortic arteritis (HCC)    Prediabetes    S/P total abdominal hysterectomy    Ileostomy, has currently ([facility] 75 )    Acute blood loss anemia (ABLA)    S/P splenectomy      Leigh Morgan is a 67y o  year old female status post extensive abdominal surgery  Plan:  Analgesia:  - Discontinued Thoracic epidural infusion on 11/1/22  -continue oxycodone 2 5 mg p o  q 4 hours PRN moderate pain   -continue oxycodone 5 mg p o  q 4 hours PRN severe pain   -suggest discontinue oxycodone 10 mg PRN breakthrough pain  Suggest discontinue Dilaudid 0 5 mg IV  Multimodal analgesia:  - Tylenol 975 mg PO q8hrs standing    Bowel Regimen:  - Bowel regimen when appropriate from surgical perspective    APS will sign off at this time  Thank you for the consult  All opioids and other analgesics to be written at discretion of primary team  Please contact Acute Pain Service - [facility] via [employer] from [phone] with additional questions or concerns  See [employer] or [employer] for additional contacts and after hours information  Plan discussed with primary team    Pain History  Current pain location(s):  No pain  Pain Scale:   No pain  Quality:  No pain  24 hour history:  Patient remains relatively pain-free following removal of epidural catheter yesterday  Has been active and tolerating a diet      PCEA use:  Opioid requirement previous 24 hours:  None    Meds/Allergies   all current active meds have been reviewed, current meds:   Current Facility-Administered Medications   Medication Dose Route Frequency   • acetaminophen (TYLENOL) tablet 975 mg  975 mg Oral Q8H [address]   • alteplase (CATHFLO) injection 2 mg  2 mg Intracatheter Once   • alteplase (CATHFLO) injection 2 mg  2 mg Intracatheter Once   • budesonide-formoterol (SYMBICORT) 160-4 5 mcg/act inhaler 2 puff  2 puff Inhalation Daily   • famotidine (PEPCID) tablet 20 mg  20 mg Oral BID   • heparin (porcine) subcutaneous injection 5,000 Units  5,000 Units Subcutaneous Q8H [address]   • hydrALAZINE (APRESOLINE) injection 5 mg  5 mg Intravenous Q6H PRN   • HYDROmorphone (DILAUDID) injection 0 5 mg  0 5 mg Intravenous Q2H PRN   • lisinopril (ZESTRIL) tablet 5 mg  5 mg Oral Daily   • metoprolol succinate (TOPROL-XL) 24 hr tablet 25 mg  25 mg Oral Daily   • naloxone (NARCAN) 0 04 mg/mL syringe 0 04 mg  0 04 mg Intravenous Q1MIN PRN   • ondansetron (ZOFRAN) injection 4 mg  4 mg Intravenous Q6H PRN   • oxyCODONE (ROXICODONE) immediate release tablet 10 mg  10 mg Oral Q4H PRN   • oxyCODONE (ROXICODONE) IR tablet 2 5 mg  2 5 mg Oral Q4H PRN   • oxyCODONE (ROXICODONE) IR tablet 5 mg  5 mg Oral Q4H PRN   • phenol (CHLORASEPTIC) 1 4 % mucosal liquid 1 spray  1 spray Mouth/Throat Q2H PRN   • potassium chloride 40 mEq IVPB (premix)  40 mEq Intravenous Once   • pravastatin (PRAVACHOL) tablet 20 mg  20 mg Oral Daily With Dinner   • predniSONE tablet 3 mg  3 mg Oral Daily   • sodium chloride (OCEAN) 0 65 % nasal spray 1 spray  1 spray Each Nare Q1H PRN    and PTA meds:   Prior to Admission Medications   Prescriptions Last Dose Informant Patient Reported? Taking?    Acetaminophen (TYLENOL ARTHRITIS PAIN PO) Past Week at Unknown time Self Yes Yes   Sig: Take by mouth as needed   Ascorbic Acid (VITAMIN C GUMMIE PO) 10/13/2022  Yes Yes   Sig: Take by mouth   B Complex-C (B COMPLEX-VITAMIN C PO) 10/20/2022 Self Yes Yes   Sig: Take by mouth daily   Calcium Carb-Cholecalciferol (CALCIUM 500+D3 PO) 10/20/2022 Self Yes Yes   Sig: Take 1 tablet by mouth 2 (two) times a day    Docusate Calcium (STOOL SOFTENER PO) 10/27/2022 at Unknown time Self Yes Yes   Sig: Take 1 tablet by mouth 2 (two) times a day   [address] 10/20/2022 Self Yes Yes   Sig: Take by mouth if needed   GLUCOSAMINE-CHONDROITIN PO 10/13/2022 Self Yes Yes   Sig: Take 1 tablet by mouth 2 (two) times a day    LORazepam (ATIVAN) 1 mg tablet 10/28/2022 at 0720  No Yes   Sig: Take 1 tablet (1 mg total) by mouth every 8 (eight) hours as needed for anxiety (nausea or anxiety)   Patient taking differently: Take 1 mg by mouth every 8 (eight) hours as needed for anxiety (nausea or anxiety) Per pt usually takes q night   Polyethylene Glycol 3350 (MIRALAX PO)  Self Yes No   Sig: Take by mouth as needed   Probiotic Product (PROBIOTIC DAILY PO) 10/20/2022 Self Yes Yes   Sig: Take by mouth daily   SYMBICORT 160-4 5 MCG/ACT inhaler 10/28/2022 at [address] Yes Yes   Si puffs daily    albuterol (PROVENTIL HFA,VENTOLIN HFA) 90 mcg/act inhaler  Self Yes Yes   Sig: Inhale 2 puffs every 4 (four) hours as needed    aspirin 81 mg chewable tablet 10/15/2022 Self Yes Yes   Sig: Chew 81 mg daily   betamethasone, augmented, (DIPROLENE) 0 05 % lotion  Self Yes No   Sig: Apply topically as needed   cholecalciferol (VITAMIN D3) 1,000 units tablet 10/20/2022 Self Yes Yes   Sig: Take 1,000 Units by mouth daily   escitalopram (LEXAPRO) 10 mg tablet   No No   Sig: TAKE 1 TABLET BY MOUTH EVERY DAY   famotidine (PEPCID) 40 MG tablet 10/27/2022 at Unknown time Self No Yes   Sig: Take 1 tablet (40 mg total) by mouth daily at bedtime   fexofenadine (ALLEGRA) 180 MG tablet 10/27/2022 at Unknown time Self Yes Yes   Sig: Take 180 mg by mouth daily   hydrocortisone (Proctosol HC) 2 5 % rectal cream  Self No No   Sig: Insert into rectum two (2) times a day   lisinopril (ZESTRIL) 5 mg tablet 10/27/2022 at Unknown time Self Yes Yes   Sig: Take 5 mg by mouth daily dinner   metFORMIN (GLUCOPHAGE) 500 mg tablet 10/26/2022 Self Yes Yes   Sig: Take 500 mg by mouth daily Daily at lunch   metoprolol succinate (TOPROL-XL) 25 mg 24 hr tablet 10/27/2022 at Unknown time  Yes Yes   Sig: Take 25 mg by mouth daily dinner   metroNIDAZOLE (FLAGYL) 500 mg tablet 10/27/2022 at 2100  Yes Yes   montelukast (SINGULAIR) 10 mg tablet 10/12/2022 Self Yes Yes   Sig: Take 10 mg by mouth daily dinner   multivitamin (THERAGRAN) TABS 10/20/2022 Self Yes Yes   Sig: Take 1 tablet by mouth daily   omeprazole (PriLOSEC) 20 mg delayed release capsule 10/28/2022 at 0720  Yes Yes   Sig: TAKE 1 CAPSULE BY MOUTH EVERY DAY 30 MINUTES BEFORE MORNING MEAL FOR 90 DAYS   ondansetron (ZOFRAN) 8 mg tablet 10/27/2022 at 1800  No Yes   Sig: Take 1 tablet (8 mg total) by mouth every 8 (eight) hours as needed for nausea or vomiting   polyethylene glycol (MiraLax) 17 GM/SCOOP powder 10/27/2022 at Unknown time  No Yes   Sig: Mix with 64 oz Gatorade, begin 4 PM day before surgery per bowel prep instructions  predniSONE 1 mg tablet 10/27/2022  Yes Yes   Sig: Take 3 mg by mouth daily   simvastatin (ZOCOR) 10 mg tablet 10/27/2022 at Unknown time Self Yes Yes   Sig: Take 10 mg by mouth daily at bedtime      Facility-Administered Medications: None       Allergies   Allergen Reactions   • Nsaids Other (See Comments)      pt is avoiding per family doctor instructions-  Able to take Tylenol   • Pedi-Pre Tape Spray [Wound Dressing Adhesive] Other (See Comments)     Please use sensitive skin tape, pt gets bad bruising from strong medical adhesive tape  Objective     Temp:  [98 °F (36 7 °C)-98 4 °F (36 9 °C)] 98 °F (36 7 °C)  HR:  [] 94  Resp:  [15-19] 15  BP: (133-158)/(65-74) 135/71    Physical Exam  Vitals and nursing note reviewed  Constitutional:       General: She is awake  She is not in acute distress  Appearance: She is not ill-appearing, toxic-appearing or diaphoretic  Eyes:      Conjunctiva/sclera: Conjunctivae normal       Pupils: Pupils are equal, round, and reactive to light  Cardiovascular:      Rate and Rhythm: Normal rate and regular rhythm     Pulmonary:      Effort: Pulmonary effort is normal  No respiratory distress  Skin:     General: Skin is warm and dry  Neurological:      Mental Status: She is alert and oriented to person, place, and time  GCS: GCS eye subscore is 4  GCS verbal subscore is 5  GCS motor subscore is 6  Psychiatric:         Attention and Perception: Attention normal          Speech: Speech normal          Behavior: Behavior normal  Behavior is cooperative  Epidural: Site clean/dry/intact, no surrounding erythema/edema/induration, infusion functioning appropriately    Lab Results:   Results from last 7 days   Lab Units 11/02/22  0934   WBC Thousand/uL 9 78   HEMOGLOBIN g/dL 10 0*   HEMATOCRIT % 29 6*   PLATELETS Thousands/uL 172      Results from last 7 days   Lab Units 11/02/22  0934 11/01/22  1031 10/31/22  0806 10/28/22  2001 10/28/22  1900   POTASSIUM mmol/L 3 0*   < > 3 6   < >  --    CHLORIDE mmol/L 104   < > 113*   < >  --    CO2 mmol/L 26   < > 25   < >  --    CO2, I-STAT mmol/L  --   --   --   --  18*   BUN mg/dL 7   < > 10   < >  --    CREATININE mg/dL 0 41*   < > 0 39*   < >  --    CALCIUM mg/dL 7 8*   < > 7 9*   < >  --    ALK PHOS U/L  --   --  77  --   --    ALT U/L  --   --  151*  --   --    AST U/L  --   --  65*  --   --    GLUCOSE, ISTAT mg/dl  --   --   --   --  214*    < > = values in this interval not displayed  Imaging Studies: I have personally reviewed pertinent reports  EKG, Pathology, and Other Studies: I have personally reviewed pertinent reports  Counseling / Coordination of Care  Total floor / unit time spent today 30 minutes  Greater than 50% of total time was spent with the patient and / or family counseling and / or coordination of care   A description of the counseling / coordination of care:  Patient interview, physical examination, review of medical record, review of imaging and laboratory data, development of pain management plan, discussion of pain management plan with patient and primary service  Please note that the APS provides consultative services regarding pain management only  With the exception of ketamine, peripheral nerve catheters, and epidural infusions (and except when indicated), final decisions regarding starting or changing doses of analgesic medications are at the discretion of the consulting service  Off hours consultation and/or medication management is generally not available      [facility]  Acute Pain Service

## 2022-11-02 NOTE — PLAN OF CARE
Problem: PHYSICAL THERAPY ADULT  Goal: Performs mobility at highest level of function for planned discharge setting  See evaluation for individualized goals  Description: Treatment/Interventions: Functional transfer training, LE strengthening/ROM, Therapeutic exercise, Endurance training, Patient/family training, Equipment eval/education, Bed mobility, Gait training, Spoke to nursing  Equipment Recommended: Daily Ty       See flowsheet documentation for full assessment, interventions and recommendations  Outcome: Progressing  Note: Prognosis: Good  Problem List: Decreased strength, Decreased endurance, Impaired balance, Decreased mobility  Assessment: Pt seen for PT treatment session with focus on functional transfers, functional mobility, pt/family education  Pt making slow but steady progress toward goals this session  Pt demonstrating strength and balance gains evident by ability to perform ambulation with decreased assist compared to last session  Despite these improvements, pt continues to present with same deficits and is unable to transfer and ambulate without physical assist at this time  Pt left upright in bedside chair with all needs in reach  Pt will benefit from skilled therapy in order to address current impairments and functional limitations  PT to continue to follow pt and recommending HHPT vs rehab pending progress  The patient's AM-PAC Basic Mobility Inpatient Short Form Raw Score is 14  A Raw score of less than or equal to 16 suggests the patient may benefit from discharge to post-acute rehabilitation services  Please also refer to the recommendation of the Physical Therapist for safe discharge planning  Barriers to Discharge: Inaccessible home environment, Decreased caregiver support     PT Discharge Recommendation: Home with home health rehabilitation (vs rehab pending progress)    See flowsheet documentation for full assessment

## 2022-11-02 NOTE — QUICK NOTE
LUQ/LLQ JOSÉ ANTONIO drains removed without difficulty     Steri strips placed on the incisions   Patient tolerated removal     Severa Meline, MD  OBGYN PGY-3  4:15 PM  11/2/2022

## 2022-11-02 NOTE — PROGRESS NOTES
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  S/P splenectomy  Assessment & Plan  Will need splenectomy vaccines prior to discharge     Acute blood loss anemia (ABLA)  Assessment & Plan  EBL 1700mL , Hgb 13 5 --> intraop iSTAT 5 8 s/p 4U PRBC, 2U FFP, 750mL albumin--> 10--> 12-->10 9   Abdomen soft, no guarding or rebound   Continue to trend Hgb, vitals and I&O   Patient now has a PICC line for blood draws  Per nursing, PICC is not allowing for blood draws  Will contact PICC team about reassessing PICC  Ileostomy, has currently [facility])  Assessment & Plan  Ostomy in place   Dark green output   Wound care consulted for ostomy care     S/P total abdominal hysterectomy  Assessment & Plan  LIN, BSO due to 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   Pain: s/p epidural, Acute pain services consulted- appreciate recommendations  Pain controlled with tylenol and prn huber  FEN: NPO with sips of water only, NGT removed, plasmalyte 125cc/hr, consider advancing diet  DVT ppx: SCDs and heparin 5000U TID   Passing flatus into ostomy bag  x2 JOSÉ ANTONIO drains with serosanguinous output     Incision clean, dry, intact- staples in place   Corbin removed POD#3- voiding spontaneously     Giant cell aortic arteritis (HCC)  Assessment & Plan  Prednisolone 3mg daily   Consider transition to pill instead of liquid formulation    Hypertension  Assessment & Plan  Home meds held   Hydralazine PRN for severe HTN     Hyperlipidemia  Assessment & Plan  Home meds held     Asthma  Assessment & Plan  Controlled on home Symbicort    * Gynecologic malignancy [facility])  Assessment & Plan  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  S/p 3 cycles of carbo/taxol, 2 cycles of bevicizimab   Follow up pathology from surgery       Subjective:    Juli Soliz has no current complaints, mostly concerned with the flavors of jello she has received  Pain is well controlled with tylenol  Kirk Mariano has prn huber ordered which she has not needed  Patient is currently voiding  She is ambulating  Per nursing, she is more independent with transferring from the bed to the chair and vice versa  Patient is currently passing flatus and has had bowel movement  She is tolerating PO, and denies nausea or vomitting  Patient denies fever, chills, chest pain, shortness of breath, or calf tenderness  Objective:  /72 (BP Location: Right arm)   Pulse 95   Temp 98 4 °F (36 9 °C) (Oral)   Resp 19   Ht 5' 4 5" (1 638 m)   Wt 87 9 kg (193 lb 12 6 oz)   SpO2 97%   BMI 32 75 kg/m²     I/O last 3 completed shifts: In: 2148 9 [P O :480; I V :1668 9]  Out: 2010 [Urine:1225; Emesis/NG output:100; Drains:235; Stool:450]  I/O this shift:  In: 26 [P O :180; IV Piggyback:100]  Out: 1090 [Urine:800; Drains:40; Stool:250]    Lab Results   Component Value Date    WBC 13 09 (H) 11/01/2022    HGB 9 7 (L) 11/01/2022    HCT 29 7 (L) 11/01/2022    MCV 93 11/01/2022     11/01/2022       Lab Results   Component Value Date    GLUCOSE 214 (H) 10/28/2022    CALCIUM 7 6 (L) 11/01/2022    K 3 1 (L) 11/01/2022    CO2 24 11/01/2022     11/01/2022    BUN 8 11/01/2022    CREATININE 0 38 (L) 11/01/2022           Physical Exam  Vitals reviewed  Constitutional:       Appearance: Normal appearance  Cardiovascular:      Rate and Rhythm: Normal rate  Pulses: Normal pulses     Pulmonary:      Effort: Pulmonary effort is normal  No respiratory distress  Abdominal:      Palpations: Abdomen is soft  Tenderness: There is no abdominal tenderness  Comments: Midline incision w/staples that is c/d/i  2 JOSÉ ANTONIO drains with serosanguinous output  Ostomy site is clean and dry without wound breakdown  Musculoskeletal:      Cervical back: Normal range of motion  Skin:     General: Skin is warm and dry  Neurological:      Mental Status: She is alert     Psychiatric:         Mood and Affect: Mood normal          Behavior: Behavior normal          [facility] [employer]  11/2/2022  6:06 AM

## 2022-11-03 ENCOUNTER — DOCUMENTATION (OUTPATIENT)
Dept: HEMATOLOGY ONCOLOGY | Facility: CLINIC | Age: 72
End: 2022-11-03

## 2022-11-03 LAB
ANION GAP SERPL CALCULATED.3IONS-SCNC: 6 MMOL/L (ref 4–13)
ANISOCYTOSIS BLD QL SMEAR: PRESENT
BASOPHILS # BLD MANUAL: 0 THOUSAND/UL (ref 0–0.1)
BASOPHILS NFR MAR MANUAL: 0 % (ref 0–1)
BUN SERPL-MCNC: 7 MG/DL (ref 5–25)
CA-I BLD-SCNC: 1.07 MMOL/L (ref 1.12–1.32)
CALCIUM SERPL-MCNC: 7.8 MG/DL (ref 8.3–10.1)
CHLORIDE SERPL-SCNC: 104 MMOL/L (ref 96–108)
CO2 SERPL-SCNC: 26 MMOL/L (ref 21–32)
CREAT SERPL-MCNC: 0.43 MG/DL (ref 0.6–1.3)
DME PARACHUTE DELIVERY DATE REQUESTED: NORMAL
DME PARACHUTE ITEM DESCRIPTION: NORMAL
DME PARACHUTE ORDER STATUS: NORMAL
DME PARACHUTE SUPPLIER NAME: NORMAL
DME PARACHUTE SUPPLIER PHONE: NORMAL
EOSINOPHIL # BLD MANUAL: 0 THOUSAND/UL (ref 0–0.4)
EOSINOPHIL NFR BLD MANUAL: 0 % (ref 0–6)
ERYTHROCYTE [DISTWIDTH] IN BLOOD BY AUTOMATED COUNT: 17.2 % (ref 11.6–15.1)
GFR SERPL CREATININE-BSD FRML MDRD: 101 ML/MIN/1.73SQ M
GIANT PLATELETS BLD QL SMEAR: PRESENT
GLUCOSE SERPL-MCNC: 181 MG/DL (ref 65–140)
HCT VFR BLD AUTO: 30.2 % (ref 34.8–46.1)
HGB BLD-MCNC: 10.1 G/DL (ref 11.5–15.4)
LYMPHOCYTES # BLD AUTO: 0.37 THOUSAND/UL (ref 0.6–4.47)
LYMPHOCYTES # BLD AUTO: 3 % (ref 14–44)
MACROCYTES BLD QL AUTO: PRESENT
MAGNESIUM SERPL-MCNC: 1.8 MG/DL (ref 1.6–2.6)
MCH RBC QN AUTO: 30.8 PG (ref 26.8–34.3)
MCHC RBC AUTO-ENTMCNC: 33.4 G/DL (ref 31.4–37.4)
MCV RBC AUTO: 92 FL (ref 82–98)
METAMYELOCYTES NFR BLD MANUAL: 3 % (ref 0–1)
MONOCYTES # BLD AUTO: 1.36 THOUSAND/UL (ref 0–1.22)
MONOCYTES NFR BLD: 11 % (ref 4–12)
MYELOCYTES NFR BLD MANUAL: 1 % (ref 0–1)
NEUTROPHILS # BLD MANUAL: 9.99 THOUSAND/UL (ref 1.85–7.62)
NEUTS BAND NFR BLD MANUAL: 12 % (ref 0–8)
NEUTS SEG NFR BLD AUTO: 69 % (ref 43–75)
NRBC BLD AUTO-RTO: 2 /100 WBC (ref 0–2)
PHOSPHATE SERPL-MCNC: 1.9 MG/DL (ref 2.3–4.1)
PLATELET # BLD AUTO: 223 THOUSANDS/UL (ref 149–390)
PLATELET BLD QL SMEAR: ADEQUATE
PMV BLD AUTO: 10.7 FL (ref 8.9–12.7)
POLYCHROMASIA BLD QL SMEAR: PRESENT
POTASSIUM SERPL-SCNC: 3.2 MMOL/L (ref 3.5–5.3)
RBC # BLD AUTO: 3.28 MILLION/UL (ref 3.81–5.12)
RBC MORPH BLD: PRESENT
SODIUM SERPL-SCNC: 136 MMOL/L (ref 135–147)
VARIANT LYMPHS # BLD AUTO: 1 %
WBC # BLD AUTO: 12.33 THOUSAND/UL (ref 4.31–10.16)

## 2022-11-03 RX ORDER — CALCIUM CARBONATE 200(500)MG
1000 TABLET,CHEWABLE ORAL DAILY
Status: DISCONTINUED | OUTPATIENT
Start: 2022-11-03 | End: 2022-11-08 | Stop reason: HOSPADM

## 2022-11-03 RX ORDER — LIDOCAINE HYDROCHLORIDE 10 MG/ML
5 INJECTION, SOLUTION EPIDURAL; INFILTRATION; INTRACAUDAL; PERINEURAL ONCE
Status: COMPLETED | OUTPATIENT
Start: 2022-11-03 | End: 2022-11-03

## 2022-11-03 RX ORDER — LIDOCAINE HYDROCHLORIDE 10 MG/ML
10 INJECTION, SOLUTION EPIDURAL; INFILTRATION; INTRACAUDAL; PERINEURAL ONCE
Status: COMPLETED | OUTPATIENT
Start: 2022-11-03 | End: 2022-11-03

## 2022-11-03 RX ADMIN — CALCIUM CARBONATE (ANTACID) CHEW TAB 500 MG 1000 MG: 500 CHEW TAB at 12:31

## 2022-11-03 RX ADMIN — HAEMOPHILUS B POLYSACCHARIDE CONJUGATE VACCINE FOR INJ 0.5 ML: RECON SOLN at 20:50

## 2022-11-03 RX ADMIN — ACETAMINOPHEN 975 MG: 325 TABLET, FILM COATED ORAL at 20:46

## 2022-11-03 RX ADMIN — ACETAMINOPHEN 975 MG: 325 TABLET, FILM COATED ORAL at 13:04

## 2022-11-03 RX ADMIN — ACETAMINOPHEN 975 MG: 325 TABLET, FILM COATED ORAL at 06:44

## 2022-11-03 RX ADMIN — POTASSIUM & SODIUM PHOSPHATES POWDER PACK 280-160-250 MG 1 PACKET: 280-160-250 PACK at 12:33

## 2022-11-03 RX ADMIN — PNEUMOCOCCAL VACCINE POLYVALENT 0.5 ML
25; 25; 25; 25; 25; 25; 25; 25; 25; 25; 25; 25; 25; 25; 25; 25; 25; 25; 25; 25; 25; 25; 25 INJECTION, SOLUTION INTRAMUSCULAR; SUBCUTANEOUS at 16:36

## 2022-11-03 RX ADMIN — LISINOPRIL 5 MG: 5 TABLET ORAL at 08:38

## 2022-11-03 RX ADMIN — POTASSIUM PHOSPHATE, MONOBASIC AND POTASSIUM PHOSPHATE, DIBASIC 12 MMOL: 224; 236 INJECTION, SOLUTION, CONCENTRATE INTRAVENOUS at 12:33

## 2022-11-03 RX ADMIN — BUDESONIDE AND FORMOTEROL FUMARATE DIHYDRATE 2 PUFF: 160; 4.5 AEROSOL RESPIRATORY (INHALATION) at 08:38

## 2022-11-03 RX ADMIN — PRAVASTATIN SODIUM 20 MG: 20 TABLET ORAL at 16:37

## 2022-11-03 RX ADMIN — Medication 20 MG: at 06:44

## 2022-11-03 RX ADMIN — LIDOCAINE HYDROCHLORIDE 5 ML: 10 INJECTION, SOLUTION EPIDURAL; INFILTRATION; INTRACAUDAL at 07:32

## 2022-11-03 RX ADMIN — PREDNISONE 3 MG: 1 TABLET ORAL at 08:38

## 2022-11-03 RX ADMIN — FAMOTIDINE 20 MG: 20 TABLET, FILM COATED ORAL at 08:38

## 2022-11-03 RX ADMIN — HEPARIN SODIUM 5000 UNITS: 5000 INJECTION INTRAVENOUS; SUBCUTANEOUS at 13:26

## 2022-11-03 RX ADMIN — POTASSIUM & SODIUM PHOSPHATES POWDER PACK 280-160-250 MG 1 PACKET: 280-160-250 PACK at 16:37

## 2022-11-03 RX ADMIN — HEPARIN SODIUM 5000 UNITS: 5000 INJECTION INTRAVENOUS; SUBCUTANEOUS at 06:44

## 2022-11-03 RX ADMIN — METOPROLOL SUCCINATE 25 MG: 25 TABLET, EXTENDED RELEASE ORAL at 08:38

## 2022-11-03 RX ADMIN — POTASSIUM & SODIUM PHOSPHATES POWDER PACK 280-160-250 MG 1 PACKET: 280-160-250 PACK at 20:47

## 2022-11-03 RX ADMIN — FAMOTIDINE 20 MG: 20 TABLET, FILM COATED ORAL at 16:38

## 2022-11-03 RX ADMIN — HEPARIN SODIUM 5000 UNITS: 5000 INJECTION INTRAVENOUS; SUBCUTANEOUS at 20:50

## 2022-11-03 RX ADMIN — LIDOCAINE HYDROCHLORIDE 10 ML: 10 INJECTION, SOLUTION EPIDURAL; INFILTRATION; INTRACAUDAL at 13:15

## 2022-11-03 NOTE — PLAN OF CARE
Problem: PHYSICAL THERAPY ADULT  Goal: Performs mobility at highest level of function for planned discharge setting  See evaluation for individualized goals  Description: Treatment/Interventions: Functional transfer training, LE strengthening/ROM, Therapeutic exercise, Endurance training, Patient/family training, Equipment eval/education, Bed mobility, Gait training, Spoke to nursing  Equipment Recommended: Carina Narvaez       See flowsheet documentation for full assessment, interventions and recommendations  Outcome: Progressing  Note: Prognosis: Good  Problem List: Decreased strength, Decreased endurance, Impaired balance, Decreased mobility  Assessment: Pt seen for PT treatment session with focus on functional transfers, functional mobility, HEP  Pt making steady progress toward goals this session  Pt is overall requiring decreased assist with all mobility as a result of improving LE strength and balance  Pt trialed ambulation with SPC this session, however, balance impairments too significant and unable to safely complete increased distance with SPC  Recommend continued use of RW for ambulation  Pt educated on HEP and provided with handout outlining same  Demonstrates good understanding of exercises  Pt left upright in bedside chair with all needs in reach  Pt will benefit from skilled therapy in order to address current impairments and functional limitations  PT to follow pt and recommending HHPT  The patient's AM-PAC Basic Mobility Inpatient Short Form Raw Score is 18  A Raw score of greater than 16 suggests the patient may benefit from discharge to home  Please also refer to the recommendation of the Physical Therapist for safe discharge planning  Barriers to Discharge: Inaccessible home environment, Decreased caregiver support     PT Discharge Recommendation: Home with home health rehabilitation    See flowsheet documentation for full assessment

## 2022-11-03 NOTE — WOUND OSTOMY CARE
Progress Note- Ostomy  Leigh Morgan 67 y o  female  [id number]  [facility]01        Assessment:  Patient seen today for ostomy teaching  Patient is POD#6  Patient is awake and alert   present in room  Both patient and  listened to 100% of teaching  Patient stated that she was too tired to participate during previous session and is more awak and willing to learn today  Stated that she would likel to be more hands on during next session  Topics reviewed: normal stoma appearance, how and when to empty (1/3 full) to prevent pulling/lifting of the barrier, importance & how to clean the end of the pouch to prevent odor, gas/odor producing foods, frequency of changing of the pouch (every 3-4 days on a schedule), burping, one piece/two piece pouching systems differences, open and closed end pouches, gas valve functionality of one piece, Clothing- including avoiding placing belt-line/seat belts over the stoma, bathing, ling-stomal skin care , and how to obtain supplies  Step-by-step pouch change done using a [employer]c One Piece Pouch  Reviewed with patient how and when to measure the stoma (weekly)  Demonstrated how to mold two piece barrier/ trace & cut one piece barrier, and how to apply it to the skin using gentle pressure / warmth of the hands  Skin prep applied to ling-stomal skin, rationale explained to patient  Good seal obtained  Patient and  verbalized understanding of education and teaching  Patient is active learner  All questions and concerns answered  Patient gave verbal permission to order sample kits from [place], [place], and [facility]     Stoma appearance Right Sided Ileostomy:       Stoma: Red, budded and slightly oval in shape, stoma with center os, peristomal skin is intact  Stoma attached to skin junction, no separation noted  Large amount of brown liquid effluent in bag  Stoma measures 3 5 cm X 4 cm        Ostomy Care:  Appliance Used: Convatec One Piece Pouch  Accessories: 3M No Sting Skin Protectant Barrier Film    Bag Change Step:   1  Peel back pouch using push-pull method, may use non-alcohol adhesive remover   2  Use warm water only to cleanse skin around the stoma (ling-stomal skin)  3  Make sure all adhesive residue is removed and skin is dry and not oily  4  Measure stoma size using measuring guide and trace correct measurements onto back of pouch (Off set opening away from abdominal incision)  5  Then cut or mold backing of pouch out to correct shape/size  6  Use 3M no sting barrier film to prep the skin around the stoma  7  Place pouch over stoma and onto skin  8  Use warmth of hand to apply gentle pressure to help backing of pouch to adhere well to skin  9  Supplies left the bedside  10  Encourage patient to engage in self care of their ostomy - emptying etc   11  Encourage patient to read over the ostomy educational materials  12  Empty the pouch when no more than 1/3 full to prevent leaking or lifting of the barrier  Change pouch every 3-4 days and as needed for soilage/dislodgement  13  Will order sample kits when closer to d/c    Crusting as needed for moist, red, and fragile skin:   Apply stoma powder to excoriation, move excess powder away with hand  Use no sting barrier to pat area to form "crust"  Repeat X2 before placing new ostomy pouch     Orders listed below and wound care will continue to follow, call or tiger text with questions  Bedside nurse updated of findings and orders  Flowsheets below with pictures and measurements  Plan to see patient Monday for next ostomy pouch teaching session      Lei Benitez RN, BSN

## 2022-11-03 NOTE — QUICK NOTE
Called to bedside as patient's drain site was oozing  Figure of eight with 0 Vicryl was placed in each of the sites after administration of lidocaine beneath the drain site  Dressing was placed over the drain sites       [employer]  OBGYN PGY-2  11/03/22 1:51 PM

## 2022-11-03 NOTE — PHYSICAL THERAPY NOTE
PHYSICAL THERAPY NOTE          Patient Name: Li Aguirre  JZSWA'F Date: 11/3/2022       11/03/22 1001   PT Last Visit   PT Visit Date 11/03/22   Note Type   Note Type Treatment   Pain Assessment   Pain Assessment Tool 0-10   Pain Score No Pain   Restrictions/Precautions   Weight Bearing Precautions Per Order No   Other Precautions Multiple lines;Telemetry; Fall Risk   General   Chart Reviewed Yes   Response to Previous Treatment Patient with no complaints from previous session  Family/Caregiver Present Yes   Cognition   Overall Cognitive Status WFL   Arousal/Participation Alert   Attention Within functional limits   Memory Within functional limits   Following Commands Follows all commands and directions without difficulty   Subjective   Subjective Pleasant and agreeable to participate in therapy session  Bed Mobility   Additional Comments OOB in chair upon PT arrival   Pt left upright in bedside chair with   Transfers   Sit to Stand 5  Supervision   Additional items Increased time required;Verbal cues   Stand to Sit 5  Supervision   Additional items Increased time required;Verbal cues   Additional Comments with RW   VC for hand placement and safety  Ambulation/Elevation   Gait pattern Excessively slow; Short stride; Foward flexed;Decreased foot clearance   Gait Assistance   (CGA-supervision with RW; Mod A with SPC)   Additional items Assist x 1;Verbal cues; Tactile cues  (assist of second for chair and lines)   Assistive Device Rolling walker;Straight cane   Distance 120 ft x 1 with RW, 10 ft x 1 with SPC   Balance   Static Sitting Fair +   Dynamic Sitting Fair   Static Standing Fair -   Dynamic Standing Fair -   Ambulatory Fair -   Endurance Deficit   Endurance Deficit Yes   Endurance Deficit Description fatigue, weakness, mild dizziness   Activity Tolerance   Activity Tolerance Patient limited by fatigue   Medical Staff Made Aware restorative [facility] Gilma Carpio   Nurse Made Aware RN cleared pt to be seen by PT   Exercises   Neuro re-ed Educated on HEP  Provided pt with handout  Assessment   Prognosis Good   Problem List Decreased strength;Decreased endurance; Impaired balance;Decreased mobility   Assessment Pt seen for PT treatment session with focus on functional transfers, functional mobility, HEP  Pt making steady progress toward goals this session  Pt is overall requiring decreased assist with all mobility as a result of improving LE strength and balance  Pt trialed ambulation with SPC this session, however, balance impairments too significant and unable to safely complete increased distance with SPC  Recommend continued use of RW for ambulation  Pt educated on HEP and provided with handout outlining same  Demonstrates good understanding of exercises  Pt left upright in bedside chair with all needs in reach  Pt will benefit from skilled therapy in order to address current impairments and functional limitations  PT to follow pt and recommending HHPT  The patient's AM-PAC Basic Mobility Inpatient Short Form Raw Score is 18  A Raw score of greater than 16 suggests the patient may benefit from discharge to home  Please also refer to the recommendation of the Physical Therapist for safe discharge planning  Barriers to Discharge Inaccessible home environment;Decreased caregiver support   Goals   Patient Goals to get better to get home   STG Expiration Date 11/09/22   Plan   Treatment/Interventions Functional transfer training;LE strengthening/ROM; Therapeutic exercise; Endurance training;Patient/family training;Bed mobility; Equipment eval/education;Gait training;Spoke to nursing   Progress Progressing toward goals   PT Frequency 3-5x/wk   Recommendation   PT Discharge Recommendation Home with home health rehabilitation   Ranjit nunez   Change/add to Catapulter?  No   AM-PAC Basic Mobility Inpatient   Turning in Bed Without Bedrails 3   Lying on Back to Sitting on Edge of Flat Bed 3   Moving Bed to Chair 3   Standing Up From Chair 3   Walk in Room 3   Climb 3-5 Stairs 3   Basic Mobility Inpatient Raw Score 18   Basic Mobility Standardized Score 41 05   Highest Level Of Mobility   JH-HLM Goal 6: Walk 10 steps or more   JH-HLM Achieved 7: Walk 25 feet or more     Sherrie Gonsalves, PT, DPT

## 2022-11-03 NOTE — PROGRESS NOTES
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  S/P splenectomy  Assessment & Plan  Will need splenectomy vaccines prior to discharge     Acute blood loss anemia (ABLA)  Assessment & Plan  EBL 1700mL , Hgb 13 5 --> intraop iSTAT 5 8 s/p 4U PRBC, 2U FFP, 750mL albumin--> 10--> 12-->10 9   Abdomen soft, no guarding or rebound   Continue to trend Hgb, vitals and I&O   Patient now has a PICC line for blood draws  Ileostomy, has currently [facility])  Assessment & Plan  Ostomy in place   Dark green output   Wound care consulted for ostomy care     S/P total abdominal hysterectomy  Assessment & Plan  LIN, BSO due to 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   Pain: s/p epidural, Acute pain services consulted- appreciate recommendations  Pain controlled with tylenol and prn huber  FEN: NPO with sips of water only, NGT removed, plasmalyte 125cc/hr, consider advancing diet  DVT ppx: SCDs and heparin 5000U TID   Passing flatus into ostomy bag  S/p x2 JOSÉ ANTONIO drains  Drain sites continue to drain serosanguinous  x2 Silk stitches placed at drain sites     Incision clean, dry, intact- staples in place   Corbin removed POD#3- voiding spontaneously     Giant cell aortic arteritis (HCC)  Assessment & Plan  Prednisolone 3mg daily   Consider transition to pill instead of liquid formulation    Hypertension  Assessment & Plan  Home meds held   Hydralazine PRN for severe HTN Hyperlipidemia  Assessment & Plan  Home meds held     Asthma  Assessment & Plan  Controlled on home Symbicort    * Gynecologic malignancy [facility])  Assessment & Plan  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  S/p 3 cycles of carbo/taxol, 2 cycles of bevicizimab   Follow up pathology from surgery     Subjective:    Marleny Aguilera has no current complaints  Pain is well controlled with tylenol and prn huber  Patient is currently voiding  She is ambulating  Patient is currently passing flatus and has had bowel movement  She is tolerating PO, and denies nausea or vomitting  Patient denies fever, chills, chest pain, shortness of breath, or calf tenderness  Objective:  /70 (BP Location: Right arm)   Pulse 97   Temp 98 6 °F (37 °C) (Oral)   Resp 20   Ht 5' 4 5" (1 638 m)   Wt 87 9 kg (193 lb 12 6 oz)   SpO2 97%   BMI 32 75 kg/m²     I/O last 3 completed shifts: In: 1568 8 [P O :1180; I V :138 8; IV Piggyback:250]  Out: 3024 [Urine:2000; Drains:143; Stool:625]  I/O this shift: In: 480 [P O :480]  Out: 625 [Urine:625]    Lab Results   Component Value Date    WBC 9 78 11/02/2022    HGB 10 0 (L) 11/02/2022    HCT 29 6 (L) 11/02/2022    MCV 91 11/02/2022     11/02/2022       Lab Results   Component Value Date    GLUCOSE 214 (H) 10/28/2022    CALCIUM 7 8 (L) 11/02/2022    K 3 0 (L) 11/02/2022    CO2 26 11/02/2022     11/02/2022    BUN 7 11/02/2022    CREATININE 0 41 (L) 11/02/2022           Physical Exam  Vitals reviewed  Constitutional:       Appearance: Normal appearance  Cardiovascular:      Pulses: Normal pulses  Pulmonary:      Effort: Pulmonary effort is normal  No respiratory distress  Abdominal:      Palpations: Abdomen is soft  Tenderness: There is no abdominal tenderness  Comments: Midline incision with staples in place  C/d/i  Marked area of erythema periumbilical which has remained unchanged   There is a small area of erythema approximately 3-4 cm below which is now marked  Drain sites are oozing serosanguinous fluid  Musculoskeletal:      Cervical back: Normal range of motion  Skin:     General: Skin is warm and dry  Neurological:      Mental Status: She is alert     Psychiatric:         Mood and Affect: Mood normal          Behavior: Behavior normal            [facility]  11/3/2022  6:08 AM

## 2022-11-03 NOTE — PROGRESS NOTES
In-basket message received from Dr Shantell Boo to add pt to 11/7/2022 tumor board  Chart Reviewed and tumor board prep completed

## 2022-11-04 ENCOUNTER — APPOINTMENT (INPATIENT)
Dept: RADIOLOGY | Facility: HOSPITAL | Age: 72
End: 2022-11-04

## 2022-11-04 LAB
ANION GAP SERPL CALCULATED.3IONS-SCNC: 7 MMOL/L (ref 4–13)
ATRIAL RATE: 117 BPM
BASOPHILS # BLD AUTO: 0.09 THOUSANDS/ÂΜL (ref 0–0.1)
BUN SERPL-MCNC: 7 MG/DL (ref 5–25)
CALCIUM SERPL-MCNC: 8 MG/DL (ref 8.3–10.1)
CHLORIDE SERPL-SCNC: 104 MMOL/L (ref 96–108)
CO2 SERPL-SCNC: 26 MMOL/L (ref 21–32)
CREAT SERPL-MCNC: 0.42 MG/DL (ref 0.6–1.3)
EOSINOPHIL # BLD AUTO: 0.07 THOUSAND/ÂΜL (ref 0–0.61)
EOSINOPHIL NFR BLD AUTO: 1 % (ref 0–6)
ERYTHROCYTE [DISTWIDTH] IN BLOOD BY AUTOMATED COUNT: 17.6 % (ref 11.6–15.1)
GFR SERPL CREATININE-BSD FRML MDRD: 102 ML/MIN/1.73SQ M
GLUCOSE SERPL-MCNC: 147 MG/DL (ref 65–140)
HCT VFR BLD AUTO: 29.3 % (ref 34.8–46.1)
HGB BLD-MCNC: 9.6 G/DL (ref 11.5–15.4)
IMM GRANULOCYTES # BLD AUTO: >0.5 THOUSAND/UL (ref 0–0.2)
IMM GRANULOCYTES NFR BLD AUTO: 12 % (ref 0–2)
LYMPHOCYTES # BLD AUTO: 1.15 THOUSANDS/ÂΜL (ref 0.6–4.47)
LYMPHOCYTES NFR BLD AUTO: 3 % (ref 14–44)
MAGNESIUM SERPL-MCNC: 1.8 MG/DL (ref 1.6–2.6)
MCH RBC QN AUTO: 30.3 PG (ref 26.8–34.3)
MCHC RBC AUTO-ENTMCNC: 32.8 G/DL (ref 31.4–37.4)
MCV RBC AUTO: 92 FL (ref 82–98)
MONOCYTES # BLD AUTO: 1.79 THOUSAND/ÂΜL (ref 0.17–1.22)
MONOCYTES NFR BLD AUTO: 11 % (ref 4–12)
NEUTROPHILS # BLD AUTO: 10.34 THOUSANDS/ÂΜL (ref 1.85–7.62)
NEUTS SEG NFR BLD AUTO: 70 % (ref 43–75)
P AXIS: 47 DEGREES
PHOSPHATE SERPL-MCNC: 2.5 MG/DL (ref 2.3–4.1)
PLATELET # BLD AUTO: 268 THOUSANDS/UL (ref 149–390)
PMV BLD AUTO: 10.5 FL (ref 8.9–12.7)
POTASSIUM SERPL-SCNC: 3.4 MMOL/L (ref 3.5–5.3)
PR INTERVAL: 140 MS
QRS AXIS: 29 DEGREES
QRSD INTERVAL: 88 MS
QT INTERVAL: 340 MS
QTC INTERVAL: 474 MS
RBC # BLD AUTO: 3.17 MILLION/UL (ref 3.81–5.12)
SODIUM SERPL-SCNC: 137 MMOL/L (ref 135–147)
T WAVE AXIS: 23 DEGREES
VENTRICULAR RATE: 117 BPM
WBC # BLD AUTO: 14.53 THOUSAND/UL (ref 4.31–10.16)

## 2022-11-04 RX ORDER — METRONIDAZOLE 500 MG/100ML
500 INJECTION, SOLUTION INTRAVENOUS EVERY 8 HOURS
Status: DISCONTINUED | OUTPATIENT
Start: 2022-11-04 | End: 2022-11-06

## 2022-11-04 RX ORDER — POTASSIUM CHLORIDE 29.8 MG/ML
40 INJECTION INTRAVENOUS ONCE
Status: DISCONTINUED | OUTPATIENT
Start: 2022-11-04 | End: 2022-11-04

## 2022-11-04 RX ORDER — DEXTROSE, SODIUM CHLORIDE, AND POTASSIUM CHLORIDE 5; .45; .15 G/100ML; G/100ML; G/100ML
100 INJECTION INTRAVENOUS CONTINUOUS
Status: DISCONTINUED | OUTPATIENT
Start: 2022-11-04 | End: 2022-11-06

## 2022-11-04 RX ADMIN — ONDANSETRON 4 MG: 2 INJECTION INTRAMUSCULAR; INTRAVENOUS at 13:57

## 2022-11-04 RX ADMIN — ACETAMINOPHEN 975 MG: 325 TABLET, FILM COATED ORAL at 05:03

## 2022-11-04 RX ADMIN — POTASSIUM & SODIUM PHOSPHATES POWDER PACK 280-160-250 MG 1 PACKET: 280-160-250 PACK at 10:20

## 2022-11-04 RX ADMIN — PRAVASTATIN SODIUM 20 MG: 20 TABLET ORAL at 18:52

## 2022-11-04 RX ADMIN — Medication 20 MG: at 05:03

## 2022-11-04 RX ADMIN — HEPARIN SODIUM 5000 UNITS: 5000 INJECTION INTRAVENOUS; SUBCUTANEOUS at 05:02

## 2022-11-04 RX ADMIN — SODIUM CHLORIDE 1000 ML: 0.9 INJECTION, SOLUTION INTRAVENOUS at 17:55

## 2022-11-04 RX ADMIN — CEFEPIME 2000 MG: 2 INJECTION, POWDER, FOR SOLUTION INTRAVENOUS at 14:13

## 2022-11-04 RX ADMIN — CALCIUM CARBONATE (ANTACID) CHEW TAB 500 MG 1000 MG: 500 CHEW TAB at 10:21

## 2022-11-04 RX ADMIN — METRONIDAZOLE 500 MG: 500 INJECTION, SOLUTION INTRAVENOUS at 20:28

## 2022-11-04 RX ADMIN — LISINOPRIL 5 MG: 5 TABLET ORAL at 10:25

## 2022-11-04 RX ADMIN — HEPARIN SODIUM 5000 UNITS: 5000 INJECTION INTRAVENOUS; SUBCUTANEOUS at 14:12

## 2022-11-04 RX ADMIN — FAMOTIDINE 20 MG: 20 TABLET, FILM COATED ORAL at 18:51

## 2022-11-04 RX ADMIN — PREDNISONE 3 MG: 1 TABLET ORAL at 10:24

## 2022-11-04 RX ADMIN — ACETAMINOPHEN 975 MG: 325 TABLET, FILM COATED ORAL at 18:53

## 2022-11-04 RX ADMIN — FAMOTIDINE 20 MG: 20 TABLET, FILM COATED ORAL at 10:26

## 2022-11-04 RX ADMIN — ACETAMINOPHEN 975 MG: 325 TABLET, FILM COATED ORAL at 22:28

## 2022-11-04 RX ADMIN — IOHEXOL 50 ML: 240 INJECTION, SOLUTION INTRATHECAL; INTRAVASCULAR; INTRAVENOUS; ORAL at 14:55

## 2022-11-04 RX ADMIN — HEPARIN SODIUM 5000 UNITS: 5000 INJECTION INTRAVENOUS; SUBCUTANEOUS at 22:28

## 2022-11-04 RX ADMIN — IOHEXOL 100 ML: 350 INJECTION, SOLUTION INTRAVENOUS at 16:56

## 2022-11-04 RX ADMIN — METOPROLOL SUCCINATE 25 MG: 25 TABLET, EXTENDED RELEASE ORAL at 10:26

## 2022-11-04 RX ADMIN — ALTEPLASE 2 MG: 2.2 INJECTION, POWDER, LYOPHILIZED, FOR SOLUTION INTRAVENOUS at 06:48

## 2022-11-04 RX ADMIN — BUDESONIDE AND FORMOTEROL FUMARATE DIHYDRATE 2 PUFF: 160; 4.5 AEROSOL RESPIRATORY (INHALATION) at 10:27

## 2022-11-04 RX ADMIN — DEXTROSE, SODIUM CHLORIDE, AND POTASSIUM CHLORIDE 75 ML/HR: 5; .45; .15 INJECTION INTRAVENOUS at 14:53

## 2022-11-04 NOTE — PLAN OF CARE
Problem: OCCUPATIONAL THERAPY ADULT  Goal: Performs self-care activities at highest level of function for planned discharge setting  See evaluation for individualized goals  Description: Treatment Interventions: ADL retraining, Functional transfer training, Endurance training, UE strengthening/ROM, Patient/family training, Equipment evaluation/education, Compensatory technique education, Continued evaluation, Energy conservation, Activityengagement     See flowsheet documentation for full assessment, interventions and recommendations  Outcome: Progressing  Note: Limitation: Decreased ADL status, Decreased UE strength, Decreased endurance, Decreased self-care trans, Decreased high-level ADLs  Prognosis: Good  Assessment: OT trx session focused on ADL retraining, functional transfer training, endurance training, patient/family training, equipment evaluation/education, continued evaluation, energy conservation, and activity engagement  Pt was found supine in bed and left supine in bed w/call bell within reach  Presently, pt is MIN A for UB bathing/dressing (w/increased time, vc), MAX A for LB bathing (w/increased time + vc), MOD A for LB dressing(w/increased time, vc), MIN A for bed mobility, S for sit>stand + stand>sit  (w/increased time, vc), and CGA w/RW for fnxl mobility  Pt able to ambulate household distance+ during fnxl mobility requiring 1 seated rest break w/O2 remaining 95 above t/o  ADL tasks of UB bathing/dressing + partial LB bathing were completed seated in bedside chair  ADL task of LB dressing completed w/pt maintaining STS w/RW standing in front of bedside chair  Pt required CGA when maintaining STS for overall pt safety and well-being  Moderate improvements are noted with functional transfer training, endurance training, and energy conservation  Remaining limitations observed w/UE strengthening/ROM, activity engagement, and ADL retraining   The patient's raw score on the AM-PAC Daily Activity inpatient short form is 20, standardized score is 42 03, greater than 39 4  Patients at this level are likely to benefit from discharge to home  Please refer to the recommendation of the Occupational Therapist for safe discharge planning  It recommended pt be d/c w/home health rehabilitation  Presently, recommendation is for pt to continue w/rehab 2-3x a week for 10-14 days to meet goals       OT Discharge Recommendation: Home with home health rehabilitation

## 2022-11-04 NOTE — CASE MANAGEMENT
Case Management Discharge Note    Patient name Marifer Easton  Location [address] 523/PPHP 244-55 MRN [id number]  : 1950 Date 2022       Current Admission Date: 10/28/2022  Current Admission Diagnosis:Gynecologic malignancy ([address] )      LOS (days): 7  Geometric Mean LOS (GMLOS) (days): 5 30  Days to GMLOS:-1 4     OBJECTIVE:  Risk of Unplanned Readmission Score: 23 7   Current admission status: Inpatient   Primary Insurance: MEDICARE  Secondary Insurance: COMMERCIAL MISCELLANEOUS    DISCHARGE DETAILS:    Discharge planning discussed with[de-identified] Patient  Freedom of Choice: Yes  CM contacted family/caregiver?: Yes  Were Treatment Team discharge recommendations reviewed with patient/caregiver?: Yes  Did patient/caregiver verbalize understanding of patient care needs?: Yes  Were patient/caregiver advised of the risks associated with not following Treatment Team discharge recommendations?: Yes    Contacts  Patient Contacts: Kalyn Bey (pt's )  Relationship to Patient[de-identified] Family  Contact Method: Phone  Phone Number: [phone]  Reason/Outcome: Emergency [address]         Is the patient interested in [facility] at discharge?: Yes  [address] requested[de-identified] Nursing, Physical Therapy, Occupational [address] Name[de-identified] Other (Acts VNA)  [address] [place] [address] Provider[de-identified] PCP  [address] Needed[de-identified] Post-Op Care and Assessment, Evaluate Functional Status and Safety, Gait/ADL Training, Strengthening/Theraputic Exercises to Improve Function  Homebound Criteria Met[de-identified] Requires the Assistance of Another Person for Safe Ambulation or to Leave the Home, Uses an Assist Device (i e  cane, walker, etc)  Supporting Clincal Findings[de-identified] Limited Endurance    DME Referral Provided  Referral made for DME?: Yes  DME referral completed for the following items[de-identified] Pooja Fernandez  DME Supplier Name[de-identified] [employer]    Treatment Team Recommendation: Home with  [facility]  Discharge Destination Plan[de-identified] Home with [place] at Discharge : Family    Additional Comments: Pt is cleared for d/c this day by GYN Oncology resident Dr Osman Galo  RN Angela Marana was notified of pt's d/c order  Pt is accepted for services by Chantell [facility] of [facility] for her aftercare plan  Pt also received a Rolling Walker from [address] DME prior to d/c  Pt will return home to her independent living apt within the [facility] senior complex  The pt and her  Ady Cheek were both informed of d/c   will transport pt home later this day, pickup time is TBD  IMM signed by pt on 11/3/22  No chart copy from RN is required  CM to follow

## 2022-11-04 NOTE — QUICK NOTE
Went to the bedside to evaluate Annabel Jo after RN reported patient was not feeling well  Patient reports that she is very tired and does not have an appetite  She states she generally "doesn't feel well"  She denies chest pain, shortness of breath, vomiting, or abdominal pain  RN reported bloody mucous urine  "I have a hard time taking a deep breath"  RN also reported drainage from the incision right above the mons  There is no expression on my exam  A pad is in place  She was febrile down in CT with temp 100 5F  Vitals:    11/04/22 1735   BP: 167/100   Pulse: (!) 121   Resp: 17   Temp: 99 8 °F (37 7 °C)   SpO2: 94%       General Appearance: Awake, alert, appears ill  Flushed cheeks  Warm to the touch  CV: tachycardic rate, regular rhythm   Pulm: Lungs CTA, bilaterally   GI: Soft, non-tender to palpation in all four quadrants  Abdomen is warm to the touch  No changes to the inciison from previous exams   Pad placed above the mons- stained with serosanguineous fluid   Extremities: no swelling or erythema     Plan   F/u CT CAP - pending report   Blood cultures   1L Bolus NS followed by D5 1/2 NS   Re-insert coffey cathter   Urine culture   Vitals q30 minutes   Strict monitoring I&O   Transfer to stepdown for closer monitoring  Follow up labs in the AM   Continue to monitor closely     D/w Dr Vic Ward MD  OBGYN PGY-3  6:12 PM  11/4/2022

## 2022-11-04 NOTE — OCCUPATIONAL THERAPY NOTE
Occupational Therapy Progress Note     Patient Name: Marifer Easton  XGCHN'Y Date: 11/4/2022  Problem List  Principal Problem:    S/P total abdominal hysterectomy  Active Problems:    Gastroesophageal reflux disease    Asthma    Hyperlipidemia    Hypertension    Long term (current) use of systemic steroids    Giant cell aortic arteritis (HCC)    Prediabetes    Gynecologic malignancy ([facility] )    Ileostomy, has currently ([facility] )    Acute blood loss anemia (ABLA)    S/P splenectomy        11/04/22 1145   OT Last Visit   OT Visit Date 11/04/22   Note Type   Note Type Treatment   Pain Assessment   Pain Assessment Tool 0-10   Pain Score No Pain   Restrictions/Precautions   Weight Bearing Precautions Per Order No   Other Precautions Multiple lines;Telemetry; Fall Risk  (SCD)   ADL   Where Assessed Chair   UB Bathing Assistance 4  Minimal Assistance   UB Bathing Deficit Setup;Verbal cueing;Supervision/safety; Increased time to complete; Chest;Right arm;Left arm; Abdomen   UB Bathing Comments Pt required min a to wipe down back  Pt able to wipe down B UE + chest + abdomen area  LB Bathing Assistance 2  Maximal Assistance   LB Bathing Deficit Supervision/safety; Increased time to complete;Perineal area; Buttocks;Right upper leg;Left upper leg;Right lower leg including foot; Left lower leg including foot   LB Bathing Comments Pt required max a for wiping down buttocks + perineal hygiene + B feet  Pt able to wipe down B LE upper areas  UB Dressing Assistance 4  Minimal Assistance   UB Dressing Deficit Setup;Verbal cueing;Supervision/safety; Increased time to complete; Thread RUE; Thread LUE;Pull around back   UB Dressing Comments Pt required min a to thread B UE into gown + tie gown in the back  LB Dressing Assistance 3  Moderate Assistance   LB Dressing Deficit Setup;Steadying;Supervision/safety; Increased time to complete; Don/doff R sock; Don/doff L sock; Thread RLE into pants; Thread LLE into pants; Thread RLE into underwear; Thread LLE into underwear;Don/doff R shoe;Don/doff L shoe;Pull up over hips   LB Dressing Comments Pt required mod a for threading B UE into underwear + pants + donning B socks + shoes  Pt was able to maintain STS w/RW when pulling items over hips  Pt reported feeling slightly fatigued but was able to tolerate LB dressing task  Functional Standing Tolerance   Time 15 minutes   Activity Pt was able to tolerate fnxl standing activity during fnxl mobility in the hallway + during LB dressing task maintaining STS w/RW  Pt required one rest break during fnxl mobility due to feeling fatigued, however was able to continue w/ADL retraining in chair  Comments Required use of RW for fnxl standing task + fnxl mobility  Able to follow min vc for safety and hand placement w/RW  Bed Mobility   Rolling R 4  Minimal assistance   Additional items Assist x 1; Increased time required;Verbal cues   Supine to Sit 4  Minimal assistance   Additional items Assist x 1; Increased time required;Verbal cues   Additional Comments Pt was found supine in bed and left supine in bed w/call bell in reach  Transfers   Sit to Stand 5  Supervision   Additional items Increased time required;Verbal cues   Stand to Sit 5  Supervision   Additional items Increased time required;Verbal cues   Additional Comments Pt required use of the RW during transitions for support  Pt demonstrates F safety awareness and insight into condition  Functional Mobility   Functional Mobility 5  Supervision   Additional Comments CGA w/RW during fnxl mobility in hallway  Pt able to ambulate household distance+  Pt required min vc for safety and hand placement w/RW, able to follow  Pt reported feeling slightly fatigued,requiring seated rest break, O2 remained above 95 t/o  Additional items Rolling walker   Subjective   Subjective "Can you help me? I feel too fatigued and need a nap"   Cognition   Overall Cognitive Status WFL   Arousal/Participation Alert; Responsive; Cooperative Attention Within functional limits   Orientation Level Oriented X4   Memory Within functional limits   Following Commands Follows one step commands with increased time or repetition   Comments Pt is able to follow conversation and attend to min vc for safety and hand placement w/RW during transitions and fnxl mobility  Pt demonstrates F safety awareness and insight into condition  Activity Tolerance   Activity Tolerance Patient tolerated treatment well; Other (Comment)  (Although pt reported fatigue during fnxl mobility, pt was able to tolerate ADL retraining intervention during OT trx session while seated in chair )   Medical Staff Made Aware OT Rodrigo Downing RN aware   Assessment   Assessment OT trx session focused on ADL retraining, functional transfer training, endurance training, patient/family training, equipment evaluation/education, continued evaluation, energy conservation, and activity engagement  Pt was found supine in bed and left supine in bed w/call bell within reach  Presently, pt is MIN A for UB bathing/dressing (w/increased time, vc), MAX A for LB bathing (w/increased time + vc), MOD A for LB dressing(w/increased time, vc), MIN A for bed mobility, S for sit>stand + stand>sit  (w/increased time, vc), and CGA w/RW for fnxl mobility  Pt able to ambulate household distance+ during fnxl mobility requiring 1 seated rest break w/O2 remaining 95 above t/o  ADL tasks of UB bathing/dressing + partial LB bathing were completed seated in bedside chair  ADL task of LB dressing completed w/pt maintaining STS w/RW standing in front of bedside chair  Pt required CGA when maintaining STS for overall pt safety and well-being  Moderate improvements are noted with functional transfer training, endurance training, and energy conservation  Remaining limitations observed w/UE strengthening/ROM, activity engagement, and ADL retraining   The patient's raw score on the AM-PAC Daily Activity inpatient short form is 20, standardized score is 42 03, greater than 39 4  Patients at this level are likely to benefit from discharge to home  Please refer to the recommendation of the Occupational Therapist for safe discharge planning  It recommended pt be d/c w/home health rehabilitation  Presently, recommendation is for pt to continue w/rehab 2-3x a week for 10-14 days to meet goals  Plan   Treatment Interventions ADL retraining;Functional transfer training; Endurance training;Patient/family training;Equipment evaluation/education;Continued evaluation; Energy conservation; Activityengagement   Goal Expiration Date 11/13/22   OT Treatment Day 2   OT Frequency 2-3x/wk   Recommendation   OT Discharge Recommendation Home with home health rehabilitation   AM-PAC Daily Activity Inpatient   Lower Body Dressing 3   Bathing 3   Toileting 3   Upper Body Dressing 3   Grooming 4   Eating 4   Daily Activity Raw Score 20   Daily Activity Standardized Score (Calc for Raw Score >=11) 42 03   AM-PAC Applied Cognition Inpatient   Following a Speech/Presentation 4   Understanding Ordinary Conversation 4   Taking Medications 4   Remembering Where Things Are Placed or Put Away 4   Remembering List of 4-5 Errands 4   Taking Care of Complicated Tasks 4   Applied Cognition Raw Score 24   Applied Cognition Standardized Score 62 21   Modified Luna Scale   Modified Timothy Scale 4   End of Consult   Education Provided Yes   Patient Position at End of Consult Supine; All needs within reach   Nurse Communication Nurse aware of consult     BOGDAN Osborn

## 2022-11-04 NOTE — DISCHARGE INSTRUCTIONS
[facility]  Drs Cathrine Blizzard, Graul, and Obed  [phone]    Hysterectomy Discharge Instructions    WHAT YOU NEED TO KNOW:   A hysterectomy is surgery to remove your uterus  Your ovaries, fallopian tubes, cervix, or part of your vagina may also need to be removed  The organs and tissue that will be removed depends on your medical condition  After a hysterectomy, you will not be able to become pregnant  If your ovaries are removed, you will go through menopause if you have not already  DISCHARGE INSTRUCTIONS:   Contact your doctor at the number above if:   You have a fever over 101o  You have nausea or are vomiting that does not improve after a light meal    Your pain is getting worse, even after you take medicine  You feel pain or burning when you urinate, or you have trouble urinating  You have pus or a foul-smelling odor coming from your vagina  Your wound is red, swollen, or draining pus  You see new or an increased amount of bright red blood coming from your vagina or your incisions  You have questions or concerns about your condition or care  Seek care immediately:   Your arm or leg feels warm, tender, and painful  It may look swollen and red  You have increasing abdominal or pelvic pain  You have heavy vaginal bleeding that fills 1 or more sanitary pads in 1 hour  Call 911 for any of the following: You feel lightheaded, short of breath, and have chest pain  You cough up blood  Medicines: You may need any of the following:  Prescription medicine may be given  You may receive a prescription for pain medication or be advised to use over the counter (OTC) pain medication such as acetaminophen (Tylenol) or ibuprofen (Advil, Motrin)  Ask your healthcare provider how to take this medicine safely  NSAIDs , such as ibuprofen, help decrease swelling, pain, and fever   NSAIDs can cause stomach bleeding or kidney problems in certain people  If you take blood thinner medicine, always ask your healthcare provider if NSAIDs are safe for you  Always read the medicine label and follow directions  Stool softeners help treat or prevent constipation  Take your medicine as directed  Contact your healthcare provider if you think your medicine is not helping or if you have side effects  Tell him or her if you are allergic to any medicine  Keep a list of the medicines, vitamins, and herbs you take  Include the amounts, and when and why you take them  Bring the list or the pill bottles to follow-up visits  Carry your medicine list with you in case of an emergency  Activity:   Rest as needed  Get up and move around as directed to help prevent blood clots  Start with short walks and slowly increase the distance every day  Limit the number of times you climb stairs to 2 times each day for the first week  Plan most of your daily activities on one level of your home  Do not lift objects heavier than 10 pounds for 6 weeks  Avoid strenuous activity for 2 weeks  Do not strain during bowel movements  High-fiber foods and extra liquids can help you prevent constipation  Examples of high-fiber foods are fruit and bran  Prune juice and water are good liquids to drink  Do not have sex, use tampons, or douche for up to 8 weeks  You may shower as soon as the day after surgery  Tub baths can be taken starting 2 weeks after surgery  Do not go into pools or hot tubs until cleared by your doctor  Ask when it is safe for you to drive  It is generally safe to drive after 2 weeks and when no longer taking prescription pain medication  Ask when you may return to work and to other regular activities  Wound care: Care for your abdominal incisions as directed  Carefully wash around the wound with soap and water   If you have Hibiclens or medicated soap that you were instructed to use before surgery, you may use that to wash with for up to 2 days after surgery  If not, any mild non-scented, non-abrasive soap is safe  It is okay to let the soap and water run over your incision  Do not scrub your incision  Dry the area and put on new, clean bandages as directed  Change your bandages when they get wet or dirty  If you have strips of medical tape, let them fall off on their own  It may take 7 to 14 days for them to fall off  Check your incision every day for redness, swelling, or pus  Deep breathing: Take deep breaths and cough 10 times each hour  This will decrease your risk for a lung infection  Take a deep breath and hold it for as long as you can  Let the air out and then cough strongly  Deep breaths help open your airway  You may be given an incentive spirometer to help you take deep breaths  Put the plastic piece in your mouth and take a slow, deep breath, then let the air out and cough  Repeat these steps 10 times every hour  Get support: This surgery may be life-changing for you and your family  You will no longer be able to get pregnant  Sudden changes in the levels of your hormones may occur and cause mood swings and depression  You may feel angry, sad, or frightened, or cry frequently and unexpectedly  These feelings are normal  Talk to your healthcare provider about where you can get support  You can also ask if hormone replacement medicine is right for you  Follow up with your healthcare provider or gynecologist as directed: You may need to return to have stitches removed, and for other tests  Write down your questions so you remember to ask them during your visits  © 2017 [facility] Information is for End User's use only and may not be sold, redistributed or otherwise used for commercial purposes  All illustrations and images included in CareNotes® are the copyrighted property of [employer]  or Yasir Calloway  The above information is an  only   It is not intended as medical advice for individual conditions or treatments  Talk to your doctor, nurse or pharmacist before following any medical regimen to see if it is safe and effective for you

## 2022-11-04 NOTE — PROGRESS NOTES
Pastoral Care Progress Note    2022  Patient: Ligia Montalvo : 1950  Admission Date & Time: 10/28/2022 0946  MRN: [id number] [facility]: [id number]                     Chaplaincy Interventions Utilized:   Empowerment:    22 1800   Clinical Encounter Type   Visited With Patient   Continue Visiting Yes   Buddhist Encounters   Buddhist Needs Prayer     Fr Madelyn Fay visited the patient and administered Holy Communion, as well as blessings and prayers No further needs were expressed left to "robbin guerra ca,ll me"" Minna Velasquez

## 2022-11-04 NOTE — PROGRESS NOTES
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  S/P splenectomy  Assessment & Plan  Will need splenectomy vaccines prior to discharge     Acute blood loss anemia (ABLA)  Assessment & Plan  EBL 1700mL , Hgb 13 5 --> intraop iSTAT 5 8 s/p 4U PRBC, 2U FFP, 750mL albumin--> 10--> 12-->10 9   Abdomen soft, no guarding or rebound   Continue to trend Hgb, vitals and I&O   Patient now has a PICC line for blood draws  Ileostomy, has currently [facility])  Assessment & Plan  Ostomy in place   Dark green output   Wound care consulted for ostomy care     S/P total abdominal hysterectomy  Assessment & Plan  LIN, BSO due to 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   Pain: s/p epidural, Acute pain services consulted- appreciate recommendations  Pain controlled with tylenol and prn huber  FEN: NPO with sips of water only, NGT removed, plasmalyte 125cc/hr, consider advancing diet  DVT ppx: SCDs and heparin 5000U TID   Passing flatus into ostomy bag  S/p x2 JOSÉ ANTONIO drains  Drain sites continue to drain serosanguinous  x2 Vicryl stitches placed at drain sites  Incision clean, dry, intact- staples in place   Corbin removed POD#3- voiding spontaneously  Discharge today       Giant cell aortic arteritis (HCC)  Assessment & Plan  Prednisolone 3mg daily   Consider transition to pill instead of liquid formulation    Hypertension  Assessment & Plan  Home meds held   Hydralazine PRN for severe HTN Hyperlipidemia  Assessment & Plan  Home meds held     Asthma  Assessment & Plan  Controlled on home Symbicort    * Gynecologic malignancy [facility])  Assessment & Plan  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  S/p 3 cycles of carbo/taxol, 2 cycles of bevicizimab   Follow up pathology from surgery     Subjective:    Maya Means has no current complaints  Pain is well controlled with PO pain meds  Patient is currently voiding  She is ambulating  Patient is currently passing flatus and has had bowel movement  She is tolerating PO, and denies nausea or vomitting  Patient denies fever, chills, chest pain, shortness of breath, or calf tenderness  Objective:  /65   Pulse 94   Temp 98 1 °F (36 7 °C) (Oral)   Resp 17   Ht 5' 4 5" (1 638 m)   Wt 86 kg (189 lb 11 2 oz)   SpO2 98%   BMI 32 06 kg/m²     I/O last 3 completed shifts: In: 1438 8 [P O :1150; I V :138 8; IV Piggyback:150]  Out: 4833 [Urine:2600; Drains:18; Stool:225]  I/O this shift:  In: 250 [IV Piggyback:250]  Out: 400 [Urine:200; Stool:200]    Lab Results   Component Value Date    WBC 12 33 (H) 11/03/2022    HGB 10 1 (L) 11/03/2022    HCT 30 2 (L) 11/03/2022    MCV 92 11/03/2022     11/03/2022       Lab Results   Component Value Date    GLUCOSE 214 (H) 10/28/2022    CALCIUM 7 8 (L) 11/03/2022    K 3 2 (L) 11/03/2022    CO2 26 11/03/2022     11/03/2022    BUN 7 11/03/2022    CREATININE 0 43 (L) 11/03/2022     Physical Exam  Vitals reviewed  Constitutional:       Appearance: Normal appearance  HENT:      Head: Normocephalic and atraumatic  Eyes:      Extraocular Movements: Extraocular movements intact  Cardiovascular:      Pulses: Normal pulses  Pulmonary:      Effort: Pulmonary effort is normal  No respiratory distress  Abdominal:      Palpations: Abdomen is soft  Tenderness: There is no abdominal tenderness  Musculoskeletal:         General: Normal range of motion  Cervical back: Normal range of motion  Skin:     General: Skin is warm and dry  Neurological:      Mental Status: She is alert     Psychiatric:         Mood and Affect: Mood normal          Behavior: Behavior normal        [facility] [place]  11/4/2022  6:50 AM

## 2022-11-05 PROBLEM — A41.9 SEPSIS (HCC): Status: ACTIVE | Noted: 2022-11-05

## 2022-11-05 LAB
ANION GAP SERPL CALCULATED.3IONS-SCNC: 4 MMOL/L (ref 4–13)
ANISOCYTOSIS BLD QL SMEAR: PRESENT
BACTERIA UR CULT: NORMAL
BASOPHILS # BLD MANUAL: 0 THOUSAND/UL (ref 0–0.1)
BASOPHILS NFR MAR MANUAL: 0 % (ref 0–1)
BUN SERPL-MCNC: 4 MG/DL (ref 5–25)
CALCIUM SERPL-MCNC: 7.6 MG/DL (ref 8.3–10.1)
CHLORIDE SERPL-SCNC: 106 MMOL/L (ref 96–108)
CO2 SERPL-SCNC: 27 MMOL/L (ref 21–32)
CREAT SERPL-MCNC: 0.42 MG/DL (ref 0.6–1.3)
EOSINOPHIL # BLD MANUAL: 0 THOUSAND/UL (ref 0–0.4)
EOSINOPHIL NFR BLD MANUAL: 0 % (ref 0–6)
ERYTHROCYTE [DISTWIDTH] IN BLOOD BY AUTOMATED COUNT: 17.8 % (ref 11.6–15.1)
GFR SERPL CREATININE-BSD FRML MDRD: 102 ML/MIN/1.73SQ M
GIANT PLATELETS BLD QL SMEAR: PRESENT
GLUCOSE SERPL-MCNC: 185 MG/DL (ref 65–140)
HCT VFR BLD AUTO: 26.2 % (ref 34.8–46.1)
HGB BLD-MCNC: 8.6 G/DL (ref 11.5–15.4)
LYMPHOCYTES # BLD AUTO: 0.71 THOUSAND/UL (ref 0.6–4.47)
LYMPHOCYTES # BLD AUTO: 5 % (ref 14–44)
MACROCYTES BLD QL AUTO: PRESENT
MCH RBC QN AUTO: 30.2 PG (ref 26.8–34.3)
MCHC RBC AUTO-ENTMCNC: 32.8 G/DL (ref 31.4–37.4)
MCV RBC AUTO: 92 FL (ref 82–98)
MONOCYTES # BLD AUTO: 1 THOUSAND/UL (ref 0–1.22)
MONOCYTES NFR BLD: 7 % (ref 4–12)
MYELOCYTES NFR BLD MANUAL: 3 % (ref 0–1)
NEUTROPHILS # BLD MANUAL: 12 THOUSAND/UL (ref 1.85–7.62)
NEUTS BAND NFR BLD MANUAL: 12 % (ref 0–8)
NEUTS SEG NFR BLD AUTO: 72 % (ref 43–75)
NRBC BLD AUTO-RTO: 2 /100 WBC (ref 0–2)
PLATELET # BLD AUTO: 286 THOUSANDS/UL (ref 149–390)
PLATELET BLD QL SMEAR: ADEQUATE
PMV BLD AUTO: 10.6 FL (ref 8.9–12.7)
POLYCHROMASIA BLD QL SMEAR: PRESENT
POTASSIUM SERPL-SCNC: 3.3 MMOL/L (ref 3.5–5.3)
RBC # BLD AUTO: 2.85 MILLION/UL (ref 3.81–5.12)
RBC MORPH BLD: PRESENT
SODIUM SERPL-SCNC: 137 MMOL/L (ref 135–147)
VARIANT LYMPHS # BLD AUTO: 1 %
WBC # BLD AUTO: 14.29 THOUSAND/UL (ref 4.31–10.16)

## 2022-11-05 RX ADMIN — CEFEPIME 2000 MG: 2 INJECTION, POWDER, FOR SOLUTION INTRAVENOUS at 01:39

## 2022-11-05 RX ADMIN — LISINOPRIL 5 MG: 5 TABLET ORAL at 09:38

## 2022-11-05 RX ADMIN — DEXTROSE, SODIUM CHLORIDE, AND POTASSIUM CHLORIDE 100 ML/HR: 5; .45; .15 INJECTION INTRAVENOUS at 21:20

## 2022-11-05 RX ADMIN — DEXTROSE, SODIUM CHLORIDE, AND POTASSIUM CHLORIDE 100 ML/HR: 5; .45; .15 INJECTION INTRAVENOUS at 02:16

## 2022-11-05 RX ADMIN — HEPARIN SODIUM 5000 UNITS: 5000 INJECTION INTRAVENOUS; SUBCUTANEOUS at 05:31

## 2022-11-05 RX ADMIN — POTASSIUM & SODIUM PHOSPHATES POWDER PACK 280-160-250 MG 1 PACKET: 280-160-250 PACK at 15:42

## 2022-11-05 RX ADMIN — ONDANSETRON 4 MG: 2 INJECTION INTRAMUSCULAR; INTRAVENOUS at 15:42

## 2022-11-05 RX ADMIN — POTASSIUM PHOSPHATE, MONOBASIC AND POTASSIUM PHOSPHATE, DIBASIC 12 MMOL: 224; 236 INJECTION, SOLUTION, CONCENTRATE INTRAVENOUS at 13:28

## 2022-11-05 RX ADMIN — ACETAMINOPHEN 975 MG: 325 TABLET, FILM COATED ORAL at 21:14

## 2022-11-05 RX ADMIN — ACETAMINOPHEN 975 MG: 325 TABLET, FILM COATED ORAL at 13:13

## 2022-11-05 RX ADMIN — FAMOTIDINE 20 MG: 20 TABLET, FILM COATED ORAL at 09:38

## 2022-11-05 RX ADMIN — POTASSIUM & SODIUM PHOSPHATES POWDER PACK 280-160-250 MG 1 PACKET: 280-160-250 PACK at 12:10

## 2022-11-05 RX ADMIN — METRONIDAZOLE 500 MG: 500 INJECTION, SOLUTION INTRAVENOUS at 17:45

## 2022-11-05 RX ADMIN — BUDESONIDE AND FORMOTEROL FUMARATE DIHYDRATE 2 PUFF: 160; 4.5 AEROSOL RESPIRATORY (INHALATION) at 09:47

## 2022-11-05 RX ADMIN — METOPROLOL SUCCINATE 25 MG: 25 TABLET, EXTENDED RELEASE ORAL at 09:38

## 2022-11-05 RX ADMIN — HEPARIN SODIUM 5000 UNITS: 5000 INJECTION INTRAVENOUS; SUBCUTANEOUS at 21:15

## 2022-11-05 RX ADMIN — Medication 20 MG: at 05:31

## 2022-11-05 RX ADMIN — PRAVASTATIN SODIUM 20 MG: 20 TABLET ORAL at 15:42

## 2022-11-05 RX ADMIN — FAMOTIDINE 20 MG: 20 TABLET, FILM COATED ORAL at 17:41

## 2022-11-05 RX ADMIN — METRONIDAZOLE 500 MG: 500 INJECTION, SOLUTION INTRAVENOUS at 02:47

## 2022-11-05 RX ADMIN — METRONIDAZOLE 500 MG: 500 INJECTION, SOLUTION INTRAVENOUS at 09:31

## 2022-11-05 RX ADMIN — HEPARIN SODIUM 5000 UNITS: 5000 INJECTION INTRAVENOUS; SUBCUTANEOUS at 13:14

## 2022-11-05 RX ADMIN — PREDNISONE 3 MG: 1 TABLET ORAL at 09:38

## 2022-11-05 RX ADMIN — CALCIUM CARBONATE (ANTACID) CHEW TAB 500 MG 1000 MG: 500 CHEW TAB at 09:38

## 2022-11-05 RX ADMIN — ACETAMINOPHEN 975 MG: 325 TABLET, FILM COATED ORAL at 05:31

## 2022-11-05 RX ADMIN — CEFEPIME 2000 MG: 2 INJECTION, POWDER, FOR SOLUTION INTRAVENOUS at 13:11

## 2022-11-05 RX ADMIN — POTASSIUM & SODIUM PHOSPHATES POWDER PACK 280-160-250 MG 1 PACKET: 280-160-250 PACK at 10:02

## 2022-11-05 RX ADMIN — DEXTROSE, SODIUM CHLORIDE, AND POTASSIUM CHLORIDE 100 ML/HR: 5; .45; .15 INJECTION INTRAVENOUS at 12:09

## 2022-11-05 NOTE — NURSING NOTE
AM K 3 3 and repleted  verbal order by Kristen Washington MD patient can be downgraded to jhoan surg nontele

## 2022-11-05 NOTE — PROGRESS NOTES
Progress Note - Gynecologic Oncology  Sneha Gamez 67 y o  female MRN: [id number]  Unit/Bed#: [facility] 523-01 Encounter: [id number]    Assessment:  70-year-old postoperative day 8 status post diagnostic laparoscopy, exploratory laparotomy, modified radical hysterectomy, bilateral salpingo-oophorectomy with en bloc rectosigmoid resection, gastrocolic omentectomy, splenectomy, small bowel resection with reanastomosis, diaphragm resection, excision ablation of peritoneal implants, ileostomy formation, optimal tumor debulking  Plan:  1  Sepsis as evidence by leukocytosis, 14 5, fever to 101 3, tachycardia to 120 ppm   She received a bolus of IV fluids yesterday, cefepime and Flagyl were started empirically  CT of chest abdomen pelvis were performed  I reviewed the images with Radiology  There is a fluid collection left upper quadrant adjacent to the pancreas without evidence of enhancement or gas collection  There is a collection of gas at the superior aspect of the liver where there was placement of extensive hemostatic agent  There is retroperitoneal bubbles of gas in the deep pelvis but no evidence of air adjacent to the rectosigmoid as stenosis or small-bowel anastomosis  There is a small amount of pelvic fluid present  There is diffuse inflammation of the small intestine without evidence of obstruction  No drainable abscess or collection  No wound abscess  Will plan to continue IV antibiotics, follow-up results of blood and urine cultures and tailor treatment appropriately  2  Remove Corbin catheter  Urine output is adequate and clear  3  Hypokalemia likely secondary to ileostomy output which is now 1 1 L per 24 hours  Will replete potassium and phosphorus  4  Anemia secondary to surgery, hemodilution with current hemoglobin 8 6 grams/deciliter from a prior of 9 6 grams/deciliter  She is asymptomatic  Will continue to trend  Subjective/Objective       Subjective:  Overall feels improved  Abdominal pain is well controlled  No nausea or vomiting  She was able to tolerate some regular diet this morning  Objective:  Awake and alert, no apparent distress  Abdomen is soft, minimally tender  There is ling-incisional erythema which is stable to improved as per markings  There is a slight separation of the distal aspect of the incision without evidence of purulent drainage  The ostomy is pink and is draining bilious fluid  There is gas in the ostomy bag  Drain sites have mild erythema  Blood pressure 139/68, pulse 85, temperature 98 8 °F (37 1 °C), temperature source Bladder, resp  rate 12, height 5' 4 5" (1 638 m), weight 86 5 kg (190 lb 11 2 oz), SpO2 95 %  ,Body mass index is 32 23 kg/m²  Intake/Output Summary (Last 24 hours) at 11/5/2022 1113  Last data filed at 11/5/2022 1015  Gross per 24 hour   Intake 3289 16 ml   Output 3785 ml   Net -495 84 ml       Invasive Devices  Report    Peripherally Inserted Central Catheter Line  Duration           PICC Line 15/91/86 Left Basilic 4 days          Drain  Duration           Ileostomy Standard (Loulou, zo) RUQ 7 days    Urethral Catheter Non-latex 16 Fr  <1 day                Physical Exam:  See objective    Lab, Imaging and other studies:  I have personally reviewed pertinent lab results    , CBC:   Lab Results   Component Value Date    WBC 14 29 (H) 11/05/2022    HGB 8 6 (L) 11/05/2022    HCT 26 2 (L) 11/05/2022    MCV 92 11/05/2022     11/05/2022    MCH 30 2 11/05/2022    MCHC 32 8 11/05/2022    RDW 17 8 (H) 11/05/2022    MPV 10 6 11/05/2022    NRBC 2 11/05/2022   , CMP:   Lab Results   Component Value Date    SODIUM 137 11/05/2022    K 3 3 (L) 11/05/2022     11/05/2022    CO2 27 11/05/2022    BUN 4 (L) 11/05/2022    CREATININE 0 42 (L) 11/05/2022    CALCIUM 7 6 (L) 11/05/2022    EGFR 102 11/05/2022     VTE Pharmacologic Prophylaxis: Heparin  VTE Mechanical Prophylaxis: sequential compression device

## 2022-11-06 LAB
ANION GAP SERPL CALCULATED.3IONS-SCNC: 4 MMOL/L (ref 4–13)
ANISOCYTOSIS BLD QL SMEAR: PRESENT
BASOPHILS # BLD MANUAL: 0 THOUSAND/UL (ref 0–0.1)
BASOPHILS NFR MAR MANUAL: 0 % (ref 0–1)
BUN SERPL-MCNC: 4 MG/DL (ref 5–25)
CALCIUM SERPL-MCNC: 7.1 MG/DL (ref 8.3–10.1)
CHLORIDE SERPL-SCNC: 108 MMOL/L (ref 96–108)
CO2 SERPL-SCNC: 24 MMOL/L (ref 21–32)
CREAT SERPL-MCNC: 0.4 MG/DL (ref 0.6–1.3)
EOSINOPHIL # BLD MANUAL: 0.17 THOUSAND/UL (ref 0–0.4)
EOSINOPHIL NFR BLD MANUAL: 1 % (ref 0–6)
ERYTHROCYTE [DISTWIDTH] IN BLOOD BY AUTOMATED COUNT: 18.3 % (ref 11.6–15.1)
GFR SERPL CREATININE-BSD FRML MDRD: 104 ML/MIN/1.73SQ M
GLUCOSE SERPL-MCNC: 213 MG/DL (ref 65–140)
HCT VFR BLD AUTO: 27 % (ref 34.8–46.1)
HGB BLD-MCNC: 8.8 G/DL (ref 11.5–15.4)
LYMPHOCYTES # BLD AUTO: 0.52 THOUSAND/UL (ref 0.6–4.47)
LYMPHOCYTES # BLD AUTO: 3 % (ref 14–44)
MACROCYTES BLD QL AUTO: PRESENT
MAGNESIUM SERPL-MCNC: 1.9 MG/DL (ref 1.6–2.6)
MCH RBC QN AUTO: 30.6 PG (ref 26.8–34.3)
MCHC RBC AUTO-ENTMCNC: 32.6 G/DL (ref 31.4–37.4)
MCV RBC AUTO: 94 FL (ref 82–98)
MONOCYTES # BLD AUTO: 1.74 THOUSAND/UL (ref 0–1.22)
MONOCYTES NFR BLD: 10 % (ref 4–12)
MYELOCYTES NFR BLD MANUAL: 2 % (ref 0–1)
NEUTROPHILS # BLD MANUAL: 14.62 THOUSAND/UL (ref 1.85–7.62)
NEUTS BAND NFR BLD MANUAL: 2 % (ref 0–8)
NEUTS SEG NFR BLD AUTO: 82 % (ref 43–75)
NRBC BLD AUTO-RTO: 3 /100 WBC (ref 0–2)
PAPPENHEIMER BOD BLD QL SMEAR: PRESENT
PHOSPHATE SERPL-MCNC: 1.6 MG/DL (ref 2.3–4.1)
PLATELET # BLD AUTO: 358 THOUSANDS/UL (ref 149–390)
PLATELET BLD QL SMEAR: ADEQUATE
PMV BLD AUTO: 10.7 FL (ref 8.9–12.7)
POLYCHROMASIA BLD QL SMEAR: PRESENT
POTASSIUM SERPL-SCNC: 3.5 MMOL/L (ref 3.5–5.3)
RBC # BLD AUTO: 2.88 MILLION/UL (ref 3.81–5.12)
RBC MORPH BLD: PRESENT
SODIUM SERPL-SCNC: 136 MMOL/L (ref 135–147)
TARGETS BLD QL SMEAR: PRESENT
WBC # BLD AUTO: 17.41 THOUSAND/UL (ref 4.31–10.16)

## 2022-11-06 RX ORDER — MONTELUKAST SODIUM 10 MG/1
10 TABLET ORAL
Status: DISCONTINUED | OUTPATIENT
Start: 2022-11-06 | End: 2022-11-08 | Stop reason: HOSPADM

## 2022-11-06 RX ORDER — LORATADINE 10 MG/1
10 TABLET ORAL DAILY
Status: DISCONTINUED | OUTPATIENT
Start: 2022-11-06 | End: 2022-11-08 | Stop reason: HOSPADM

## 2022-11-06 RX ORDER — ENOXAPARIN SODIUM 100 MG/ML
40 INJECTION SUBCUTANEOUS
Status: DISCONTINUED | OUTPATIENT
Start: 2022-11-07 | End: 2022-11-08 | Stop reason: HOSPADM

## 2022-11-06 RX ORDER — METRONIDAZOLE 500 MG/1
500 TABLET ORAL EVERY 8 HOURS SCHEDULED
Status: DISCONTINUED | OUTPATIENT
Start: 2022-11-06 | End: 2022-11-07

## 2022-11-06 RX ADMIN — FAMOTIDINE 20 MG: 20 TABLET, FILM COATED ORAL at 09:58

## 2022-11-06 RX ADMIN — CEFEPIME 2000 MG: 2 INJECTION, POWDER, FOR SOLUTION INTRAVENOUS at 13:10

## 2022-11-06 RX ADMIN — METRONIDAZOLE 500 MG: 500 TABLET ORAL at 17:41

## 2022-11-06 RX ADMIN — LORATADINE 10 MG: 10 TABLET ORAL at 13:14

## 2022-11-06 RX ADMIN — Medication 20 MG: at 06:45

## 2022-11-06 RX ADMIN — ACETAMINOPHEN 975 MG: 325 TABLET, FILM COATED ORAL at 21:35

## 2022-11-06 RX ADMIN — METRONIDAZOLE 500 MG: 500 TABLET ORAL at 21:34

## 2022-11-06 RX ADMIN — METOPROLOL SUCCINATE 25 MG: 25 TABLET, EXTENDED RELEASE ORAL at 09:59

## 2022-11-06 RX ADMIN — HEPARIN SODIUM 5000 UNITS: 5000 INJECTION INTRAVENOUS; SUBCUTANEOUS at 05:00

## 2022-11-06 RX ADMIN — METRONIDAZOLE 500 MG: 500 INJECTION, SOLUTION INTRAVENOUS at 01:00

## 2022-11-06 RX ADMIN — ONDANSETRON 4 MG: 2 INJECTION INTRAMUSCULAR; INTRAVENOUS at 03:55

## 2022-11-06 RX ADMIN — HEPARIN SODIUM 5000 UNITS: 5000 INJECTION INTRAVENOUS; SUBCUTANEOUS at 21:34

## 2022-11-06 RX ADMIN — DEXTROSE, SODIUM CHLORIDE, AND POTASSIUM CHLORIDE 100 ML/HR: 5; .45; .15 INJECTION INTRAVENOUS at 06:45

## 2022-11-06 RX ADMIN — FAMOTIDINE 20 MG: 20 TABLET, FILM COATED ORAL at 17:41

## 2022-11-06 RX ADMIN — PREDNISONE 3 MG: 1 TABLET ORAL at 09:58

## 2022-11-06 RX ADMIN — HEPARIN SODIUM 5000 UNITS: 5000 INJECTION INTRAVENOUS; SUBCUTANEOUS at 13:14

## 2022-11-06 RX ADMIN — LISINOPRIL 5 MG: 5 TABLET ORAL at 09:59

## 2022-11-06 RX ADMIN — BUDESONIDE AND FORMOTEROL FUMARATE DIHYDRATE 2 PUFF: 160; 4.5 AEROSOL RESPIRATORY (INHALATION) at 09:49

## 2022-11-06 RX ADMIN — CEFEPIME 2000 MG: 2 INJECTION, POWDER, FOR SOLUTION INTRAVENOUS at 00:30

## 2022-11-06 RX ADMIN — ACETAMINOPHEN 975 MG: 325 TABLET, FILM COATED ORAL at 06:45

## 2022-11-06 RX ADMIN — METRONIDAZOLE 500 MG: 500 INJECTION, SOLUTION INTRAVENOUS at 10:08

## 2022-11-06 RX ADMIN — ACETAMINOPHEN 975 MG: 325 TABLET, FILM COATED ORAL at 13:14

## 2022-11-06 RX ADMIN — POTASSIUM & SODIUM PHOSPHATES POWDER PACK 280-160-250 MG 1 PACKET: 280-160-250 PACK at 10:03

## 2022-11-06 RX ADMIN — MONTELUKAST 10 MG: 10 TABLET, FILM COATED ORAL at 21:35

## 2022-11-06 RX ADMIN — CALCIUM CARBONATE (ANTACID) CHEW TAB 500 MG 1000 MG: 500 CHEW TAB at 09:58

## 2022-11-06 RX ADMIN — PRAVASTATIN SODIUM 20 MG: 20 TABLET ORAL at 17:41

## 2022-11-06 NOTE — PROGRESS NOTES
Progress Note - Gynecologic Oncology  Marleny Aguilera 67 y o  female MRN: [id number]  Unit/Bed#: [facility] 523-01 Encounter: [id number]    Assessment:  42-year-old postoperative day 9 status post diagnostic laparoscopy, exploratory laparotomy, modified radical hysterectomy, bilateral salpingo-oophorectomy with en bloc rectosigmoid resection, gastrocolic omentectomy, splenectomy, small bowel resection with reanastomosis, diaphragm resection, excision ablation of peritoneal implants, ileostomy formation, optimal tumor debulking  Plan:  1  Sepsis as evidence by leukocytosis, fever, tachycardia  Unclear source although there are 2 fluid collections in the abdomen including 1 left upper quadrant and 1 in the pelvis  There is retrohepatic gas which may be secondary to the hemostatic agents  Clinically, her sepsis is resolving  Leukocytosis may be secondary to splenectomy  Tachycardia has resolved  She has been afebrile  She is currently on empiric cefepime and Flagyl  Cultures to date have been negative  No definitive drainable collection based on CT imaging  Will plan to continue empiric antibiotic therapy for now  2  Anemia secondary to acute blood loss from surgery with current hemoglobin 8 8 grams/deciliter from a prior value of 8 6 grams/deciliter  She is asymptomatic  Will continue to trend  3  Hypokalemia secondary to surgery/ostomy output improving with supplementation  4  Hypophosphatemia secondary to extensive abdominal and pelvic surgery  Will continue supplementation  Will continue to trend  Subjective/Objective       Subjective:  Tired, does not require narcotics for abdominal pain  No vomiting, occasional nausea  Voiding spontaneously  Ambulating to the bathroom  Objective:  Awake and alert, no apparent distress  Abdomen is softly tender  No rebound or guarding  There is a small amount of ling-incisional erythema which is receding    Superficial wound separation at the inferior aspect of the incision remains without evidence of purulent discharge  Blood pressure 149/75, pulse 85, temperature 98 °F (36 7 °C), temperature source Oral, resp  rate 16, height 5' 4 5" (1 638 m), weight 87 kg (191 lb 12 8 oz), SpO2 96 %  ,Body mass index is 32 41 kg/m²  Intake/Output Summary (Last 24 hours) at 11/6/2022 1122  Last data filed at 11/6/2022 1037  Gross per 24 hour   Intake 2441 67 ml   Output 5145 ml   Net -2703 33 ml       Invasive Devices  Report    Peripherally Inserted Central Catheter Line  Duration           PICC Line 35/86/19 Left Basilic 5 days          Drain  Duration           Ileostomy Standard (zo Jamil) RUQ 8 days                Physical Exam:  See objective    Lab, Imaging and other studies:  I have personally reviewed pertinent lab results    , CBC:   Lab Results   Component Value Date    WBC 17 41 (H) 11/06/2022    HGB 8 8 (L) 11/06/2022    HCT 27 0 (L) 11/06/2022    MCV 94 11/06/2022     11/06/2022    MCH 30 6 11/06/2022    MCHC 32 6 11/06/2022    RDW 18 3 (H) 11/06/2022    MPV 10 7 11/06/2022    NRBC 3 (H) 11/06/2022   , CMP:   Lab Results   Component Value Date    SODIUM 136 11/06/2022    K 3 5 11/06/2022     11/06/2022    CO2 24 11/06/2022    BUN 4 (L) 11/06/2022    CREATININE 0 40 (L) 11/06/2022    CALCIUM 7 1 (L) 11/06/2022    EGFR 104 11/06/2022     VTE Pharmacologic Prophylaxis: Heparin  VTE Mechanical Prophylaxis: sequential compression device

## 2022-11-06 NOTE — CASE MANAGEMENT
Case Management Progress Note    Patient name Patricia Cam  Location [address] 523/PPHP 939-73 MRN [id number]  : 1950 Date 2022       LOS (days): 9  Geometric Mean LOS (GMLOS) (days): 5 30  Days to GMLOS:-3 6        OBJECTIVE:        Current admission status: Inpatient  Preferred Pharmacy:   [address] [place] [address]   [address] [address]  Phone: [phone] Fax: [address] [facility] [place] [address] [facility] [address] [address]  Phone: [phone] Fax: [phone]    Primary Care Provider: Judith Nicole MD    Primary Insurance: MEDICARE  Secondary Insurance: COMMERCIAL MISCELLANEOUS    PROGRESS NOTE:    Pt requires continued inpatient stay due to fevers, elevated WBC and tachycardia  CM will continue to assist with dc planning  Pt is currently set up with Jovanna Maharaj upon dc to home

## 2022-11-06 NOTE — PROGRESS NOTES
The metronidazole has been converted to Oral per [facility] IV-to-PO Auto-Conversion Protocol for Adults as approved by the Pharmacy and Therapeutics Committee  The patient met all eligible criteria:  3 Age = 25years old   2) Received at least one dose of the IV form   3) Receiving at least one other scheduled oral/enteral medication   4) Tolerating an oral/enteral diet   and did not have any exclusions:   1) Critical care patient   2) Active GI bleed (IF assessing H2RAs or PPIs)   3) Continuous tube feeding (IF assessing cipro, doxycycline, levofloxacin, minocycline, rifampin, or voriconazole)   4) Receiving PO vancomycin (IF assessing metronidazole)   5) Persistent nausea and/or vomiting   6) Ileus or gastrointestinal obstruction   7) Ramesh/nasogastric tube set for continuous suction   8) Specific order not to automatically convert to PO (in the order's comments or if discussed in the most recent Infectious Disease or primary team's progress notes)       Rodolfo Veliz, PharmD, [address] and Internal Medicine Clinical Pharmacist  [phone] or via [employer]

## 2022-11-07 ENCOUNTER — DOCUMENTATION (OUTPATIENT)
Dept: GYNECOLOGIC ONCOLOGY | Facility: CLINIC | Age: 72
End: 2022-11-07

## 2022-11-07 PROBLEM — E87.6 HYPOKALEMIA: Status: ACTIVE | Noted: 2022-11-07

## 2022-11-07 LAB
ANION GAP SERPL CALCULATED.3IONS-SCNC: 3 MMOL/L (ref 4–13)
BUN SERPL-MCNC: 5 MG/DL (ref 5–25)
CALCIUM SERPL-MCNC: 8.2 MG/DL (ref 8.3–10.1)
CHLORIDE SERPL-SCNC: 106 MMOL/L (ref 96–108)
CO2 SERPL-SCNC: 27 MMOL/L (ref 21–32)
CREAT SERPL-MCNC: 0.44 MG/DL (ref 0.6–1.3)
ERYTHROCYTE [DISTWIDTH] IN BLOOD BY AUTOMATED COUNT: 18.6 % (ref 11.6–15.1)
GFR SERPL CREATININE-BSD FRML MDRD: 101 ML/MIN/1.73SQ M
GLUCOSE SERPL-MCNC: 144 MG/DL (ref 65–140)
HCT VFR BLD AUTO: 30.1 % (ref 34.8–46.1)
HGB BLD-MCNC: 9.5 G/DL (ref 11.5–15.4)
MCH RBC QN AUTO: 30.2 PG (ref 26.8–34.3)
MCHC RBC AUTO-ENTMCNC: 31.6 G/DL (ref 31.4–37.4)
MCV RBC AUTO: 96 FL (ref 82–98)
NRBC BLD AUTO-RTO: 0 /100 WBCS
PLATELET # BLD AUTO: 450 THOUSANDS/UL (ref 149–390)
PMV BLD AUTO: 10.7 FL (ref 8.9–12.7)
POTASSIUM SERPL-SCNC: 3.7 MMOL/L (ref 3.5–5.3)
RBC # BLD AUTO: 3.15 MILLION/UL (ref 3.81–5.12)
SODIUM SERPL-SCNC: 136 MMOL/L (ref 135–147)
WBC # BLD AUTO: 17.99 THOUSAND/UL (ref 4.31–10.16)

## 2022-11-07 PROCEDURE — 0HD7XZZ EXTRACTION OF ABDOMEN SKIN, EXTERNAL APPROACH: ICD-10-PCS | Performed by: OBSTETRICS & GYNECOLOGY

## 2022-11-07 RX ORDER — AMOXICILLIN AND CLAVULANATE POTASSIUM 875; 125 MG/1; MG/1
1 TABLET, FILM COATED ORAL EVERY 12 HOURS SCHEDULED
Status: DISCONTINUED | OUTPATIENT
Start: 2022-11-07 | End: 2022-11-08 | Stop reason: HOSPADM

## 2022-11-07 RX ORDER — HYDROMORPHONE HCL/PF 1 MG/ML
0.5 SYRINGE (ML) INJECTION ONCE
Status: COMPLETED | OUTPATIENT
Start: 2022-11-07 | End: 2022-11-07

## 2022-11-07 RX ORDER — LIDOCAINE HYDROCHLORIDE 10 MG/ML
5 INJECTION, SOLUTION EPIDURAL; INFILTRATION; INTRACAUDAL; PERINEURAL ONCE
Status: COMPLETED | OUTPATIENT
Start: 2022-11-07 | End: 2022-11-07

## 2022-11-07 RX ADMIN — FAMOTIDINE 20 MG: 20 TABLET, FILM COATED ORAL at 08:48

## 2022-11-07 RX ADMIN — ACETAMINOPHEN 975 MG: 325 TABLET, FILM COATED ORAL at 13:35

## 2022-11-07 RX ADMIN — PRAVASTATIN SODIUM 20 MG: 20 TABLET ORAL at 17:22

## 2022-11-07 RX ADMIN — LISINOPRIL 5 MG: 5 TABLET ORAL at 08:48

## 2022-11-07 RX ADMIN — LORATADINE 10 MG: 10 TABLET ORAL at 08:48

## 2022-11-07 RX ADMIN — ACETAMINOPHEN 975 MG: 325 TABLET, FILM COATED ORAL at 06:43

## 2022-11-07 RX ADMIN — AMOXICILLIN AND CLAVULANATE POTASSIUM 1 TABLET: 875; 125 TABLET, FILM COATED ORAL at 09:23

## 2022-11-07 RX ADMIN — FAMOTIDINE 20 MG: 20 TABLET, FILM COATED ORAL at 17:22

## 2022-11-07 RX ADMIN — METOPROLOL SUCCINATE 25 MG: 25 TABLET, EXTENDED RELEASE ORAL at 08:48

## 2022-11-07 RX ADMIN — CEFEPIME 2000 MG: 2 INJECTION, POWDER, FOR SOLUTION INTRAVENOUS at 00:30

## 2022-11-07 RX ADMIN — ENOXAPARIN SODIUM 40 MG: 40 INJECTION SUBCUTANEOUS at 08:53

## 2022-11-07 RX ADMIN — METRONIDAZOLE 500 MG: 500 TABLET ORAL at 06:43

## 2022-11-07 RX ADMIN — AMOXICILLIN AND CLAVULANATE POTASSIUM 1 TABLET: 875; 125 TABLET, FILM COATED ORAL at 21:30

## 2022-11-07 RX ADMIN — BUDESONIDE AND FORMOTEROL FUMARATE DIHYDRATE 2 PUFF: 160; 4.5 AEROSOL RESPIRATORY (INHALATION) at 09:08

## 2022-11-07 RX ADMIN — PREDNISONE 3 MG: 1 TABLET ORAL at 09:02

## 2022-11-07 RX ADMIN — LIDOCAINE HYDROCHLORIDE 5 ML: 10 INJECTION, SOLUTION EPIDURAL; INFILTRATION; INTRACAUDAL at 11:01

## 2022-11-07 RX ADMIN — ACETAMINOPHEN 975 MG: 325 TABLET, FILM COATED ORAL at 21:30

## 2022-11-07 RX ADMIN — Medication 20 MG: at 06:44

## 2022-11-07 RX ADMIN — CALCIUM CARBONATE (ANTACID) CHEW TAB 500 MG 1000 MG: 500 CHEW TAB at 08:47

## 2022-11-07 RX ADMIN — HYDROMORPHONE HYDROCHLORIDE 0.5 MG: 1 INJECTION, SOLUTION INTRAMUSCULAR; INTRAVENOUS; SUBCUTANEOUS at 11:01

## 2022-11-07 RX ADMIN — OXYCODONE HYDROCHLORIDE 5 MG: 5 TABLET ORAL at 19:58

## 2022-11-07 RX ADMIN — MONTELUKAST 10 MG: 10 TABLET, FILM COATED ORAL at 21:30

## 2022-11-07 NOTE — PROGRESS NOTES
For questions/concerns on this patient, please reach out to the following:  [facility]-OB GYN-GynOnc- Resident/AP  Gyn Oncology Progress note   Edwin Marsh 67 y o  female MRN: [id number]  Unit/Bed#: [facility] 523-01 Encounter: [id number]    Assessment/Plan:    67 y o  POD# 10 from diagnostic laparoscopy, exploratory laparotomy, modified radical hysterectomy, bilateral salpingo-oophorectomy with en bloc rectosigmoid resection, gastrocolic omentectomy, splenectomy, small bowel resection with reanastomosis, diaphragm resection, excision ablation of peritoneal implants, ileostomy formation, optimal tumor debulking  Post operative course complicated by sepsis which is resolving  Sepsis ([facility] [address] )  Assessment & Plan  Currently on cefepime and flagyl for coverage   Urine culture- no growth   Blood cultures - no growth at 48 hours   Continue to trend WBC   Continue to trend vitals   Afebrile >24h   CT CAP 11/4- "Low-attenuation subcapsular right hepatic fluid collection as described above adjacent to right segment 7 probable laceration  Some mottled low attenuation present in the collection likely reflecting Surgicel packing although gas cannot be excluded  Superimposed infection in this collection cannot be entirely excluded  No active bleeding noted  Fluid collection in the splenectomy bed adjacent to the posterior wall of the stomach which demonstrates some rugal thickening, and adjacent to the tail the pancreas  Possibility of a developing pseudocysts and/or infected collection cannot be excluded  Mottled gas density identified in the presacral region with small complex fluid collection  This is near but not direct continuity with the colorectal anastomosis    While this could be postoperative in nature, an anastomotic leak and/or infectious process cannot be entirely excluded "     Lab Results   Component Value Date/Time    WBC 17 99 (H) 11/07/2022 04:41 AM    WBC 17 41 (H) 11/06/2022 04:57 AM    WBC 14 29 (H) 11/05/2022 04:50 AM    WBC 14 53 (H) 11/04/2022 07:58 AM         Acute blood loss anemia (ABLA)  Assessment & Plan  EBL 1700mL , Hgb 13 5 --> intraop iSTAT 5 8 s/p 4U PRBC, 2U FFP, 750mL albumin--> 10--> 12-->10 9   Abdomen soft, no guarding or rebound   Continue to trend Hgb, vitals and I&O       Lab Results   Component Value Date/Time    HGB 9 5 (L) 11/07/2022 04:41 AM    HGB 8 8 (L) 11/06/2022 04:57 AM    HGB 8 6 (L) 11/05/2022 04:50 AM    HGB 9 6 (L) 11/04/2022 07:58 AM       * S/P total abdominal hysterectomy  Assessment & Plan  LIN, BSO due to 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   Pain: s/p epidural Pain controlled with tylenol and prn huber  FEN: Regular diet as tolerated, Dietary supplemental shakes added   DVT ppx: SCDs and heparin 5000U TID   Passing flatus into ostomy bag  Incision- area of erythema around the umbilicus- previously marked  Induration noted in that area  Above the mons- area of incision opened 1cm   Not draining   Voiding spontaneously       Hypokalemia  Assessment & Plan  Repletion as needed     Lab Results   Component Value Date/Time    K 3 7 11/07/2022 04:41 AM    K 3 5 11/06/2022 04:57 AM    K 3 3 (L) 11/05/2022 04:50 AM       S/P splenectomy  Assessment & Plan  Will need splenectomy vaccines prior to discharge     Ileostomy, has currently [facility])  Assessment & Plan  Ostomy in place   Dark green/brown output   825mL output overnight   Wound care following     Gynecologic malignancy [facility])  Assessment & Plan  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  S/p 3 cycles of carbo/taxol, 2 cycles of bevicizimab   Follow up pathology from surgery    Giant cell aortic arteritis ([address] )  Assessment & Plan  Prednisolone 3mg daily       Hypertension  Assessment & Plan  Home medications     Hyperlipidemia  Assessment & Plan  Home medications     Asthma  Assessment & Plan  Controlled on home Symbicort    Gastroesophageal reflux disease  Assessment & Plan  Pepcid ordered   Tums ordered            Subjective:    Wilberto Lugo  Is doing well this morning  She reports she was able to sleep well last night  Pain is well controlled  Patient is currently voiding  She is ambulating  Patient is currently passing flatus from the ostomy but not down below  and has had no bowel movement  She is tolerating PO, and denies nausea or vomitting  Patient denies fever, chills, chest pain, shortness of breath, or calf tenderness  Objective:  /86   Pulse 94   Temp 98 2 °F (36 8 °C)   Resp 18   Ht 5' 4 5" (1 638 m)   Wt 85 kg (187 lb 6 3 oz)   SpO2 93%   BMI 31 67 kg/m²     I/O last 3 completed shifts: In: 4837 [P O :890; I V :2490]  Out: 7591 [ZMDUT:9156; TWFHT:5007]  I/O this shift: In: 48 [IV Piggyback:50]  Out: 1000 [Urine:800; Stool:200]    Lab Results   Component Value Date    WBC 17 99 (H) 11/07/2022    HGB 9 5 (L) 11/07/2022    HCT 30 1 (L) 11/07/2022    MCV 96 11/07/2022     (H) 11/07/2022       Lab Results   Component Value Date    GLUCOSE 214 (H) 10/28/2022    CALCIUM 8 2 (L) 11/07/2022    K 3 7 11/07/2022    CO2 27 11/07/2022     11/07/2022    BUN 5 11/07/2022    CREATININE 0 44 (L) 11/07/2022           Physical Exam  Constitutional:       General: She is not in acute distress  Appearance: She is not toxic-appearing or diaphoretic  Cardiovascular:      Rate and Rhythm: Normal rate  Pulmonary:      Effort: Pulmonary effort is normal    Chest:      Chest wall: No tenderness  Abdominal:      Palpations: Abdomen is soft  Tenderness: There is no abdominal tenderness  There is no guarding or rebound  Comments: Ostomy output- green/brown output 825cc out over 24h  Erythema around the umbilicus is not spreading- there is induration in that area, no expression of fluid with palpation   At the distal end of the incision above the mons- 1cm area where there incision has opened   Minimal drainage on palpation Musculoskeletal:      Cervical back: Normal range of motion  Right lower leg: No edema  Left lower leg: No edema  Neurological:      Mental Status: She is alert             Solange Wilson  11/7/2022  6:39 AM

## 2022-11-07 NOTE — PROGRESS NOTES
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     Patient was discussed at the Multidisciplinary Gynecologic Oncology Case review on 11/07/2022  The group recommended to consider treatment with systemic chemotherapy  Follow-up appointment with Dr Yolanda Tanner is to be determined as at this time the patient remains hospitalized  NCCN guidelines were available for review  The final treatment plan will be left to the discretion of the patient and the treating physician  DISCLAIMERS:    TO THE TREATING PHYSICIAN:  This conference is a meeting of clinicians from various specialty areas who evaluate and discuss patients for whom a multidisciplinary treatment approach is being considered  Please note that the above opinion was a consensus of the conference attendees and is intended only to assist in quality care of the discussed patient  The responsibility for follow up on the input given during the conference, along with any final decisions regarding plan of care, is that of the patient and the patient's provider  Accordingly, appointments have only been recommended based on this information and have NOT been scheduled unless otherwise noted  TO THE PATIENT:  This summary is a brief record of major aspects of your cancer treatment  You may choose to share a copy with any of your doctors or nurses  However, this is not a detailed or comprehensive record of your care

## 2022-11-07 NOTE — RESTORATIVE TECHNICIAN NOTE
Restorative Technician Note      Patient Name: Vi Maloney     Note Type: Mobility  Patient Position Upon Consult: Supine  Activity Performed: Ambulated  Assistive Device: Roller walker  Patient Position at End of Consult: Supine;  All needs within reach

## 2022-11-07 NOTE — ASSESSMENT & PLAN NOTE
Repletion as needed     Lab Results   Component Value Date/Time    K 3 7 11/07/2022 04:41 AM    K 3 5 11/06/2022 04:57 AM

## 2022-11-07 NOTE — ASSESSMENT & PLAN NOTE
S/p cefepime and flagyl--> transitioned to PO augmentin for 7 days   Urine culture- no growth   Blood cultures - no growth   Continue to trend WBC   Continue to trend vitals   Afebrile >24h   CT CAP 11/4- "Low-attenuation subcapsular right hepatic fluid collection as described above adjacent to right segment 7 probable laceration  Some mottled low attenuation present in the collection likely reflecting Surgicel packing although gas cannot be excluded  Superimposed infection in this collection cannot be entirely excluded  No active bleeding noted  Fluid collection in the splenectomy bed adjacent to the posterior wall of the stomach which demonstrates some rugal thickening, and adjacent to the tail the pancreas  Possibility of a developing pseudocysts and/or infected collection cannot be excluded  Mottled gas density identified in the presacral region with small complex fluid collection  This is near but not direct continuity with the colorectal anastomosis    While this could be postoperative in nature, an anastomotic leak and/or infectious process cannot be entirely excluded "     Lab Results   Component Value Date/Time    WBC 16 50 (H) 11/08/2022 05:42 AM    WBC 17 99 (H) 11/07/2022 04:41 AM    WBC 17 41 (H) 11/06/2022 04:57 AM

## 2022-11-07 NOTE — PROGRESS NOTES
Pastoral Care Progress Note    2022  Patient: Sneha Gamez : 1950  Admission Date & Time: 10/28/2022 0946  MRN: [id number] [facility]: [id number]           22 1500   Clinical Encounter Type   Visited With Patient and family together   Routine Visit Follow-up   Restorationist Encounters   Restorationist Needs Prayer   Sacramental Encounters   Communion Given Indicator Yes     Gisselle Arita followed- up with the patient  The patient's wife was also present  Fr Lazaro Mcfarlane administered [employer], prayers and blessings  No further needs were expressed at this time  Chaplains still remain available

## 2022-11-07 NOTE — WOUND OSTOMY CARE
Progress Note- Ostomy  Edwin Marsh 67 y o  female  [id number]  [facility] 523-[facility] 523-01        Assessment:    Patient seen today for ostomy teaching  Patient is POD#10  Patient is awake and alert   present at bedside  Both listened and participated in 100% of teaching  Patient was able to remove pouch and cleanse ling-stomal skin, and close end of the pouch with minimal assistance from staff  Patient and  required assistance with measuring stoma, cutting opening, and placing pouch  Topics reviewed during visit: normal stoma appearance, how and when to empty (1/3 full) to prevent pulling/lifting of the barrier, importance & how to clean the end of the pouch to prevent odor, gas/odor producing foods, frequency of changing of the pouch (every 3-4 days on a schedule), burping, one piece/two piece pouching systems differences, open and closed end pouches, gas valve functionality of one piece, Clothing- including avoiding placing belt-line/seat belts over the stoma, bathing, ling-stomal skin care , and how to obtain supplies  Step-by-step pouch change done using a [employer]c Soft Convex One Piece Pouch  Reviewed with patient how and when to measure the stoma (weekly)  Demonstrated how to mold two piece barrier/ trace & cut one piece barrier, and how to apply it to the skin using gentle pressure / warmth of the hands  Skin prep applied to ling-stomal skin, rationale explained to patient  Good seal obtained  Patient and  verbalized understanding of education and teaching  Patient is active learner  All questions and concerns answered  Patient gave verbal permission to order sample kits from [place], [place], and [employer]     Patient gave verbal permission to order sample kits from [employer], [employer], and [employer]     Stoma appearance RLQ:       Stoma: Red, budded and slightly oval in shape, stoma with center os, peristomal skin is intact   Stoma attached to skin junction, no separation noted  Large amount of brown liquid effluent in bag  Stoma measures 3 5 cm X 4 cm  Ostomy Care:  Appliance Used: Can use wither Convatec One Piece Pouch or Soft Convexity Pouch   Accessories: 3M No Sting Skin Protectant Barrier Film    Bag Change Steps:   1  Peel back pouch using push-pull method, may use non-alcohol adhesive remover   2  Use warm water only to cleanse skin around the stoma (ling-stomal skin)  3  Make sure all adhesive residue is removed and skin is dry and not oily  4  Measure stoma size using measuring guide and trace correct measurements onto back of pouch (Off set opening away from abdominal incision)  5  Then cut or mold backing of pouch out to correct shape/size  6  Use 3M no sting barrier film to prep the skin around the stoma  7  Place pouch over stoma and onto skin  8  Use warmth of hand to apply gentle pressure to help backing of pouch to adhere well to skin  9  Supplies left the bedside  10  Encourage patient to engage in self care of their ostomy - emptying etc   11  Encourage patient to read over the ostomy educational materials  12  Empty the pouch when no more than 1/3 full to prevent leaking or lifting of the barrier  Change pouch every 3-4 days and as needed for soilage/dislodgement  13  Will order sample kits when closer to d/c      Crusting as needed for moist, red, and fragile skin:   Apply stoma powder to excoriation, move excess powder away with hand  Use no sting barrier to pat area to form "crust"  Repeat X2 before placing new ostomy pouch       Orders listed below and wound care will continue to follow, call or tiger text with questions  Bedside nurse updated of findings and orders  Flowsheets below with pictures and measurements        Lindsey Kline RN, BSN

## 2022-11-07 NOTE — PROGRESS NOTES
Staples removed initially at the distal portion of the incision  There was dark brown drainage from the incision concerning for abscess formation  Fascia interrogated and noted to be intact  Collection tracked from the mons to the umbilicus  Darshan removed up to the umbilicus and wound debrided at the bedside  Saline soaked Kerlix packed into the wound and dressed with ABD pad  Will plan for wound vacuum placement tomorrow  Case management contacted for home wound vacuum supplies, will also need home VNA services for wound vacuum changing       Dr Sammie Smiley at the bedside for the debridement     Lefty Rosado MD  OBGYN PGY-3  1:20 PM  11/7/2022

## 2022-11-08 VITALS
WEIGHT: 184 LBS | RESPIRATION RATE: 18 BRPM | OXYGEN SATURATION: 95 % | BODY MASS INDEX: 30.66 KG/M2 | DIASTOLIC BLOOD PRESSURE: 82 MMHG | SYSTOLIC BLOOD PRESSURE: 157 MMHG | HEIGHT: 65 IN | HEART RATE: 89 BPM | TEMPERATURE: 98.3 F

## 2022-11-08 LAB
ANION GAP SERPL CALCULATED.3IONS-SCNC: 5 MMOL/L (ref 4–13)
ANISOCYTOSIS BLD QL SMEAR: PRESENT
BASOPHILS # BLD MANUAL: 0 THOUSAND/UL (ref 0–0.1)
BASOPHILS NFR MAR MANUAL: 0 % (ref 0–1)
BUN SERPL-MCNC: 6 MG/DL (ref 5–25)
CALCIUM SERPL-MCNC: 8.3 MG/DL (ref 8.3–10.1)
CHLORIDE SERPL-SCNC: 105 MMOL/L (ref 96–108)
CO2 SERPL-SCNC: 28 MMOL/L (ref 21–32)
CREAT SERPL-MCNC: 0.38 MG/DL (ref 0.6–1.3)
EOSINOPHIL # BLD MANUAL: 0.17 THOUSAND/UL (ref 0–0.4)
EOSINOPHIL NFR BLD MANUAL: 1 % (ref 0–6)
ERYTHROCYTE [DISTWIDTH] IN BLOOD BY AUTOMATED COUNT: 18.6 % (ref 11.6–15.1)
GFR SERPL CREATININE-BSD FRML MDRD: 106 ML/MIN/1.73SQ M
GLUCOSE SERPL-MCNC: 155 MG/DL (ref 65–140)
HCT VFR BLD AUTO: 29 % (ref 34.8–46.1)
HGB BLD-MCNC: 9.1 G/DL (ref 11.5–15.4)
LYMPHOCYTES # BLD AUTO: 1.82 THOUSAND/UL (ref 0.6–4.47)
LYMPHOCYTES # BLD AUTO: 11 % (ref 14–44)
MACROCYTES BLD QL AUTO: PRESENT
MCH RBC QN AUTO: 30.4 PG (ref 26.8–34.3)
MCHC RBC AUTO-ENTMCNC: 31.4 G/DL (ref 31.4–37.4)
MCV RBC AUTO: 97 FL (ref 82–98)
METAMYELOCYTES NFR BLD MANUAL: 1 % (ref 0–1)
MONOCYTES # BLD AUTO: 2.31 THOUSAND/UL (ref 0–1.22)
MONOCYTES NFR BLD: 14 % (ref 4–12)
MYELOCYTES NFR BLD MANUAL: 1 % (ref 0–1)
NEUTROPHILS # BLD MANUAL: 11.88 THOUSAND/UL (ref 1.85–7.62)
NEUTS BAND NFR BLD MANUAL: 8 % (ref 0–8)
NEUTS SEG NFR BLD AUTO: 64 % (ref 43–75)
NRBC BLD AUTO-RTO: 3 /100 WBC (ref 0–2)
PLATELET # BLD AUTO: 513 THOUSANDS/UL (ref 149–390)
PLATELET BLD QL SMEAR: ABNORMAL
PMV BLD AUTO: 10.5 FL (ref 8.9–12.7)
POLYCHROMASIA BLD QL SMEAR: PRESENT
POTASSIUM SERPL-SCNC: 3.5 MMOL/L (ref 3.5–5.3)
RBC # BLD AUTO: 2.99 MILLION/UL (ref 3.81–5.12)
RBC MORPH BLD: PRESENT
SODIUM SERPL-SCNC: 138 MMOL/L (ref 135–147)
TARGETS BLD QL SMEAR: PRESENT
WBC # BLD AUTO: 16.5 THOUSAND/UL (ref 4.31–10.16)

## 2022-11-08 RX ORDER — AMOXICILLIN AND CLAVULANATE POTASSIUM 875; 125 MG/1; MG/1
1 TABLET, FILM COATED ORAL EVERY 12 HOURS SCHEDULED
Qty: 11 TABLET | Refills: 0 | Status: SHIPPED | OUTPATIENT
Start: 2022-11-08 | End: 2022-11-16

## 2022-11-08 RX ORDER — HYDROMORPHONE HCL/PF 1 MG/ML
0.5 SYRINGE (ML) INJECTION ONCE
Status: COMPLETED | OUTPATIENT
Start: 2022-11-08 | End: 2022-11-08

## 2022-11-08 RX ADMIN — BUDESONIDE AND FORMOTEROL FUMARATE DIHYDRATE 2 PUFF: 160; 4.5 AEROSOL RESPIRATORY (INHALATION) at 08:10

## 2022-11-08 RX ADMIN — AMOXICILLIN AND CLAVULANATE POTASSIUM 1 TABLET: 875; 125 TABLET, FILM COATED ORAL at 08:10

## 2022-11-08 RX ADMIN — CALCIUM CARBONATE (ANTACID) CHEW TAB 500 MG 1000 MG: 500 CHEW TAB at 08:11

## 2022-11-08 RX ADMIN — Medication 20 MG: at 06:42

## 2022-11-08 RX ADMIN — LISINOPRIL 5 MG: 5 TABLET ORAL at 08:11

## 2022-11-08 RX ADMIN — HYDROMORPHONE HYDROCHLORIDE 0.5 MG: 1 INJECTION, SOLUTION INTRAMUSCULAR; INTRAVENOUS; SUBCUTANEOUS at 10:10

## 2022-11-08 RX ADMIN — NEISSERIA MENINGITIDIS GROUP A CAPSULAR POLYSACCHARIDE TETANUS TOXOID CONJUGATE ANTIGEN, NEISSERIA MENINGITIDIS GROUP C CAPSULAR POLYSACCHARIDE TETANUS TOXOID CONJUGATE ANTIGEN, NEISSERIA MENINGITIDIS GROUP Y CAPSULAR POLYSACCHARIDE TETANUS TOXOID CONJUGATE ANTIGEN, AND NEISSERIA MENINGITIDIS GROUP W-135 CAPSULAR POLYSACCHARIDE TETANUS TOXOID CONJUGATE ANTIGEN 0.5 ML: 10; 10; 10; 10 INJECTION, SOLUTION INTRAMUSCULAR at 15:01

## 2022-11-08 RX ADMIN — PREDNISONE 3 MG: 1 TABLET ORAL at 08:11

## 2022-11-08 RX ADMIN — ACETAMINOPHEN 975 MG: 325 TABLET, FILM COATED ORAL at 06:42

## 2022-11-08 RX ADMIN — METOPROLOL SUCCINATE 25 MG: 25 TABLET, EXTENDED RELEASE ORAL at 08:11

## 2022-11-08 RX ADMIN — LORATADINE 10 MG: 10 TABLET ORAL at 08:11

## 2022-11-08 RX ADMIN — FAMOTIDINE 20 MG: 20 TABLET, FILM COATED ORAL at 08:11

## 2022-11-08 RX ADMIN — ENOXAPARIN SODIUM 40 MG: 40 INJECTION SUBCUTANEOUS at 08:11

## 2022-11-08 NOTE — DISCHARGE INSTR - AVS FIRST PAGE
Complete the Augmentin for another 6 days- two times daily   Daily wet to dry dressings to be completed by home health until wound vac comes in   Take eliquis twice daily for 28 days

## 2022-11-08 NOTE — RESTORATIVE TECHNICIAN NOTE
Restorative Technician Note      Patient Name: Sima Dubin     Note Type: Mobility  Patient Position Upon Consult: Bedside chair  Activity Performed: Ambulated  Assistive Device: Roller walker  Patient Position at End of Consult: Supine;  All needs within reach

## 2022-11-08 NOTE — PLAN OF CARE
Problem: PHYSICAL THERAPY ADULT  Goal: Performs mobility at highest level of function for planned discharge setting  See evaluation for individualized goals  Description: Treatment/Interventions: Functional transfer training, LE strengthening/ROM, Therapeutic exercise, Endurance training, Patient/family training, Equipment eval/education, Bed mobility, Gait training, Spoke to nursing  Equipment Recommended: Sulaiman Fernandez       See flowsheet documentation for full assessment, interventions and recommendations  Outcome: Progressing  Note: Prognosis: Good  Problem List: Decreased strength, Decreased endurance, Impaired balance, Decreased mobility  Assessment: Pt seen for PT session today with a focus on functional transfers, gait, and endurance  Pt has made good progress towards mobility goals since previous session  Pt demonstrating improved endurance, ambulating increased distances  Pt also demonstrating good energy conservation/activity pacing today, taking seated rest break between bouts of ambulation  Pt continues to present with balance deficits, relying on RW for stability when walking  Pt demonstrated understanding of HEP previous therapist taught her  Pt would benefit from continued acute skilled PT while admitted to achieve mobility goals  Continuing to recommend HHPT upon d/c   Barriers to Discharge: Inaccessible home environment, Decreased caregiver support     PT Discharge Recommendation: Home with home health rehabilitation    See flowsheet documentation for full assessment

## 2022-11-08 NOTE — CASE MANAGEMENT
Case Management Discharge Note    Patient name Edwin Marsh  Location [address] 523/PPHP 464-39 MRN [id number]  : 1950 Date 2022       Current Admission Date: 10/28/2022  Current Admission Diagnosis:S/P total abdominal hysterectomy      LOS (days): 11  Geometric Mean LOS (GMLOS) (days): 9 80  Days to GMLOS:-0 9     OBJECTIVE:  Risk of Unplanned Readmission Score: 24 56   Current admission status: Inpatient   Primary Insurance: MEDICARE  Secondary Insurance: COMMERCIAL MISCELLANEOUS    DISCHARGE DETAILS:    Discharge planning discussed with[de-identified] Patient  Freedom of Choice: Yes  CM contacted family/caregiver?: Yes  Were Treatment Team discharge recommendations reviewed with patient/caregiver?: Yes  Did patient/caregiver verbalize understanding of patient care needs?: Yes  Were patient/caregiver advised of the risks associated with not following Treatment Team discharge recommendations?: Yes    Contacts  Patient Contacts: Jud Osman (pt's )  Relationship to Patient[de-identified] Family  Contact Method: Phone  Phone Number: [phone]  Reason/Outcome: Emergency [address]         Is the patient interested in [facility] at discharge?: Yes  [address] requested[de-identified] Nursing, Physical Therapy, Occupational [address] Name[de-identified] Other ([facility]s [facility])  [address] [place] [address] Provider[de-identified] PCP  [address] Needed[de-identified] Post-Op Care and Assessment, Evaluate Functional Status and Safety, Gait/ADL Training, Strengthening/Theraputic Exercises to Improve Function, Wound/Ostomy Care  Homebound Criteria Met[de-identified] Requires the Assistance of Another Person for Safe Ambulation or to Leave the Home  Supporting Clincal Findings[de-identified] Limited Endurance    DME Referral Provided  Referral made for DME?: Yes  DME referral completed for the following items[de-identified] Van Bateman  DME Supplier Name[de-identified] [employer]    Treatment Team Recommendation[facility]  Discharge Destination Plan[de-identified] Home with Home Health Care  Transport at Discharge : Family    Additional Comments: Pt is cleared for d/c this day by GYN Oncology resident Dr Rochelle Garcia  RN Norman Payne was notified of pt's d/c order  Pt is accepted for services by Ch[facility] for her aftercare plan  Pt also received a Rolling Walker from [facility] prior to d/c  Pt will return home to her independent living apt within the [facility] senior complex  The pt and her  Pauline Gallegos were both informed of d/c   will transport pt home later this day, pickup time is TBD  IMM signed by pt  No chart copy from RN is required  CM to follow

## 2022-11-08 NOTE — PROGRESS NOTES
For questions/concerns on this patient, please reach out to the following:  [facility] GYN-GynOnc- Resident/AP  Gyn Oncology Progress note   Laura Blake 67 y o  female MRN: [id number]  Unit/Bed#: [facility] 523-01 Encounter: [id number]    Assessment/Plan:    67 y o  POD# 11 from diagnostic laparoscopy, exploratory laparotomy, modified radical hysterectomy, bilateral salpingo-oophorectomy with en bloc rectosigmoid resection, gastrocolic omentectomy, splenectomy, small bowel resection with reanastomosis, diaphragm resection, excision ablation of peritoneal implants, ileostomy formation, optimal tumor debulking  Post operative course complicated by sepsis which is resolving  * S/P total abdominal hysterectomy  Assessment & Plan  LIN, BSO due to 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   Pain: s/p epidural Pain controlled with tylenol and prn huber  FEN: Regular diet as tolerated, Dietary supplemental shakes added   DVT ppx: SCDs and Lovenox 40mg qD  Passing flatus into ostomy bag  Incision opened 53/3 from umbilicus to mons- packed with Kerlix  Plan for wound vac today   Voiding spontaneously       Sepsis ([facility] [address] )  Assessment & Plan  S/p cefepime and flagyl--> transitioned to PO augmentin for 7 days   Urine culture- no growth   Blood cultures - no growth   Continue to trend WBC   Continue to trend vitals   Afebrile >24h   CT CAP 11/4- "Low-attenuation subcapsular right hepatic fluid collection as described above adjacent to right segment 7 probable laceration  Some mottled low attenuation present in the collection likely reflecting Surgicel packing although gas cannot be excluded  Superimposed infection in this collection cannot be entirely excluded  No active bleeding noted  Fluid collection in the splenectomy bed adjacent to the posterior wall of the stomach which demonstrates some rugal thickening, and adjacent to the tail the pancreas    Possibility of a developing pseudocysts and/or infected collection cannot be excluded  Mottled gas density identified in the presacral region with small complex fluid collection  This is near but not direct continuity with the colorectal anastomosis    While this could be postoperative in nature, an anastomotic leak and/or infectious process cannot be entirely excluded "     Lab Results   Component Value Date/Time    WBC 16 50 (H) 11/08/2022 05:42 AM    WBC 17 99 (H) 11/07/2022 04:41 AM    WBC 17 41 (H) 11/06/2022 04:57 AM         Acute blood loss anemia (ABLA)  Assessment & Plan  EBL 1700mL , Hgb 13 5 --> intraop iSTAT 5 8 s/p 4U PRBC, 2U FFP, 750mL albumin--> 10--> 12-->10 9   Abdomen soft, no guarding or rebound   Continue to trend Hgb, vitals and I&O       Lab Results   Component Value Date/Time    HGB 9 1 (L) 11/08/2022 05:42 AM    HGB 9 5 (L) 11/07/2022 04:41 AM    HGB 8 8 (L) 11/06/2022 04:57 AM       Hypokalemia  Assessment & Plan  Repletion as needed     Lab Results   Component Value Date/Time    K 3 7 11/07/2022 04:41 AM    K 3 5 11/06/2022 04:57 AM       S/P splenectomy  Assessment & Plan  Will need splenectomy vaccines prior to discharge     Ileostomy, has currently [facility])  Assessment & Plan  Ostomy in place   Dark green output   200mL output overnight   Wound care following     Gynecologic malignancy [facility])  Assessment & Plan  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  S/p 3 cycles of carbo/taxol, 2 cycles of bevicizimab   Follow up pathology from surgery    Giant cell aortic arteritis ([address] )  Assessment & Plan  Prednisolone 3mg daily       Hypertension  Assessment & Plan  Home medications     Hyperlipidemia  Assessment & Plan  Home medications     Asthma  Assessment & Plan  Controlled on home Symbicort    Gastroesophageal reflux disease  Assessment & Plan  Pepcid ordered   Tums ordered        Dispo   - pending wound vac placement, VNA services set and home wound vac     Subjective:    Pravin Max  Is doing well this morning  Pain is well controlled  Patient is currently voiding  She is ambulating- "I walked up and down the [place] twice yesterday"  Patient is currently passing flatus from the ostomy but not down below  and has had no bowel movement  She is tolerating PO, and denies nausea or vomitting  Patient denies fever, chills, chest pain, shortness of breath, or calf tenderness  Objective:  /83 (BP Location: Right arm)   Pulse 90   Temp 98 3 °F (36 8 °C) (Oral)   Resp 16   Ht 5' 4 5" (1 638 m)   Wt 83 5 kg (184 lb)   SpO2 96%   BMI 31 10 kg/m²     I/O last 3 completed shifts: In: 0086 [P O :1248; IV Piggyback:50]  Out: 2004 [Urine:2670; Stool:925]  I/O this shift:  In: -   Out: 1200 [Urine:1100; Stool:100]    Lab Results   Component Value Date    WBC 16 50 (H) 11/08/2022    HGB 9 1 (L) 11/08/2022    HCT 29 0 (L) 11/08/2022    MCV 97 11/08/2022     (H) 11/08/2022       Lab Results   Component Value Date    GLUCOSE 214 (H) 10/28/2022    CALCIUM 8 2 (L) 11/07/2022    K 3 7 11/07/2022    CO2 27 11/07/2022     11/07/2022    BUN 5 11/07/2022    CREATININE 0 44 (L) 11/07/2022           Physical Exam  Constitutional:       General: She is not in acute distress  Appearance: She is not toxic-appearing or diaphoretic  Cardiovascular:      Rate and Rhythm: Normal rate  Pulmonary:      Effort: Pulmonary effort is normal    Chest:      Chest wall: No tenderness  Abdominal:      Palpations: Abdomen is soft  Tenderness: There is no abdominal tenderness  There is no guarding or rebound  Comments: Ostomy output- green output 200cc out over 24h  Bag is full this morning     Incision dressed with packing  Plan for wound vac today    Musculoskeletal:      Cervical back: Normal range of motion  Right lower leg: No edema  Left lower leg: No edema  Neurological:      Mental Status: She is alert             Cherise Sultana [facility]  11/8/2022  6:24 AM

## 2022-11-08 NOTE — PLAN OF CARE
Problem: MOBILITY - ADULT  Goal: Maintain or return to baseline ADL function  Description: INTERVENTIONS:  -  Assess patient's ability to carry out ADLs; assess patient's baseline for ADL function and identify physical deficits which impact ability to perform ADLs (bathing, care of mouth/teeth, toileting, grooming, dressing, etc )  - Assess/evaluate cause of self-care deficits   - Assess range of motion  - Assess patient's mobility; develop plan if impaired  - Assess patient's need for assistive devices and provide as appropriate  - Encourage maximum independence but intervene and supervise when necessary  - Involve family in performance of ADLs  - Assess for home care needs following discharge   - Consider OT consult to assist with ADL evaluation and planning for discharge  - Provide patient education as appropriate  Outcome: Progressing     Problem: INFECTION - ADULT  Goal: Absence or prevention of progression during hospitalization  Description: INTERVENTIONS:  - Assess and monitor for signs and symptoms of infection  - Monitor lab/diagnostic results  - Monitor all insertion sites, i e  indwelling lines, tubes, and drains  - Monitor endotracheal if appropriate and nasal secretions for changes in amount and color  - [place] appropriate cooling/warming therapies per order  - Administer medications as ordered  - Instruct and encourage patient and family to use good hand hygiene technique  - Identify and instruct in appropriate isolation precautions for identified infection/condition  Outcome: Progressing     Problem: SAFETY ADULT  Goal: Maintain or return to baseline ADL function  Description: INTERVENTIONS:  -  Assess patient's ability to carry out ADLs; assess patient's baseline for ADL function and identify physical deficits which impact ability to perform ADLs (bathing, care of mouth/teeth, toileting, grooming, dressing, etc )  - Assess/evaluate cause of self-care deficits   - Assess range of motion  - Assess patient's mobility; develop plan if impaired  - Assess patient's need for assistive devices and provide as appropriate  - Encourage maximum independence but intervene and supervise when necessary  - Involve family in performance of ADLs  - Assess for home care needs following discharge   - Consider OT consult to assist with ADL evaluation and planning for discharge  - Provide patient education as appropriate  Outcome: Progressing

## 2022-11-08 NOTE — QUICK NOTE
Packing changed at the bedside and remaining staples removed  New dressing placed  Minimal drainage from the wound  Photo of the wound placed in patient's chart under media   Measures 13cm x 6cm x 5 5cm   [facility]  Patient has wound care visit set up with Kamari W Kenan Huffman for Thursday  She was informed of this appointment     She is cleared for discharge to home  D/w Dr Frances Nolan MD  OBGYN PGY-3  12:35 PM  11/8/2022

## 2022-11-08 NOTE — RESTORATIVE TECHNICIAN NOTE
Restorative Technician Note      Patient Name: Sima Dubin     Note Type: Mobility  Activity Performed: Ambulated  Assistive Device: Roller walker  Patient Position at End of Consult: Bedside chair;  All needs within reach

## 2022-11-08 NOTE — PHYSICAL THERAPY NOTE
Physical Therapy Treatment Note    Patient's Name: Pravin Max  : 1950       11/08/22 1317   PT Last Visit   PT Visit Date 22   Note Type   Note Type Treatment   Pain Assessment   Pain Assessment Tool 0-10   Pain Score No Pain   Restrictions/Precautions   Weight Bearing Precautions Per Order No   Other Precautions Telemetry; Fall Risk   General   Chart Reviewed Yes   Family/Caregiver Present No   Cognition   Overall Cognitive Status WFL   Attention Within functional limits   Orientation Level Oriented X4   Memory Within functional limits   Following Commands Follows one step commands without difficulty   Bed Mobility   Supine to Sit 4  Minimal assistance   Additional items Assist x 1;HOB elevated; Bedrails; Increased time required   Sit to Supine 5  Supervision   Additional items Increased time required   Transfers   Sit to Stand 5  Supervision   Additional items Armrests; Increased time required   Stand to Sit 5  Supervision   Additional items Armrests; Increased time required   Toilet transfer 5  Supervision   Additional items Increased time required;Commode   Additional Comments transfers with RW   Ambulation/Elevation   Gait pattern Excessively slow; Short stride   Gait Assistance 5  Supervision   Additional items Assist x 1   Assistive Device Rolling walker   Distance 90ft + 150ft + 150ft with seated rest breaks in between   Balance   Static Sitting Good   Dynamic Sitting Good   Static Standing Fair +   Dynamic Standing Fair   Ambulatory Fair   Endurance Deficit   Endurance Deficit Yes   Endurance Deficit Description weakness, fatigue   Activity Tolerance   Activity Tolerance Patient limited by fatigue   Nurse Made Aware ok to see per RN   Assessment   Prognosis Good   Problem List Decreased strength;Decreased endurance; Impaired balance;Decreased mobility   Assessment Pt seen for PT session today with a focus on functional transfers, gait, and endurance   Pt has made good progress towards mobility goals since previous session  Pt demonstrating improved endurance, ambulating increased distances  Pt also demonstrating good energy conservation/activity pacing today, taking seated rest break between bouts of ambulation  Pt continues to present with balance deficits, relying on RW for stability when walking  Pt demonstrated understanding of HEP previous therapist taught her  Pt would benefit from continued acute skilled PT while admitted to achieve mobility goals  Continuing to recommend HHPT upon d/c  Goals   Patient Goals to go home soon   STG Expiration Date 11/09/22   Plan   Treatment/Interventions Functional transfer training;LE strengthening/ROM; Elevations; Therapeutic exercise; Endurance training;Equipment eval/education; Bed mobility;Gait training;Spoke to nursing;Spoke to case management;OT   Progress Progressing toward goals   PT Frequency 3-5x/wk   Recommendation   PT Discharge Recommendation Home with home health rehabilitation   [address] [facility] Mobility Inpatient   Turning in Bed Without Bedrails 4   Lying on Back to Sitting on Edge of Flat Bed 3   Moving Bed to Chair 3   Standing Up From Chair 3   Walk in Room 3   Climb 3-5 Stairs 3   Basic Mobility Inpatient Raw Score 19   Basic Mobility Standardized Score 42 48   Highest Level Of Mobility   JH-HLM Goal 6: Walk 10 steps or more   JH-HLM Achieved 7: Walk 25 feet or more   Education   Education Provided Home exercise program  (reviewed with pt)   Patient Demonstrates acceptance/verbal understanding   End of Consult   Patient Position at End of Consult Supine; All needs within reach       Jimmy Schneider, PT, DPT

## 2022-11-09 ENCOUNTER — TELEPHONE (OUTPATIENT)
Dept: GYNECOLOGIC ONCOLOGY | Facility: CLINIC | Age: 72
End: 2022-11-09

## 2022-11-09 ENCOUNTER — TELEPHONE (OUTPATIENT)
Dept: SURGICAL ONCOLOGY | Facility: CLINIC | Age: 72
End: 2022-11-09

## 2022-11-09 PROBLEM — C56.2 MALIGNANT NEOPLASM OF LEFT OVARY (HCC): Status: ACTIVE | Noted: 2022-08-09

## 2022-11-09 LAB
BACTERIA BLD CULT: NORMAL
BACTERIA BLD CULT: NORMAL
DME PARACHUTE DELIVERY DATE ACTUAL: NORMAL
DME PARACHUTE DELIVERY DATE REQUESTED: NORMAL
DME PARACHUTE ITEM DESCRIPTION: NORMAL
DME PARACHUTE ORDER STATUS: NORMAL
DME PARACHUTE SUPPLIER NAME: NORMAL
DME PARACHUTE SUPPLIER PHONE: NORMAL

## 2022-11-09 NOTE — TELEPHONE ENCOUNTER
Josef Chaney from [facility] called into the office to discuss the pt's wound vac care  Josef Chaney states that there is none in place and that it is a wet to dry dressing  Josef Chaney needs to know where it would be going, whether it is the pt's home or the wound care center   Josef Chaney can be called back @ [phone]

## 2022-11-09 NOTE — TELEPHONE ENCOUNTER
Jus Mejia, patient's , called to see when Dr Locwkood would like to see patient for next appointment   Please call back at [phone]

## 2022-11-10 ENCOUNTER — OFFICE VISIT (OUTPATIENT)
Dept: WOUND CARE | Facility: HOSPITAL | Age: 72
End: 2022-11-10

## 2022-11-10 VITALS
HEIGHT: 65 IN | SYSTOLIC BLOOD PRESSURE: 100 MMHG | WEIGHT: 184 LBS | HEART RATE: 120 BPM | DIASTOLIC BLOOD PRESSURE: 60 MMHG | RESPIRATION RATE: 20 BRPM | BODY MASS INDEX: 30.66 KG/M2 | TEMPERATURE: 97.4 F

## 2022-11-10 DIAGNOSIS — T81.31XA DEHISCENCE OF POSTOPERATIVE WOUND OF ABDOMEN: Primary | ICD-10-CM

## 2022-11-10 RX ORDER — SODIUM HYPOCHLORITE 2.5 MG/ML
1 SOLUTION TOPICAL ONCE
Qty: 473 ML | Refills: 0 | Status: SHIPPED | OUTPATIENT
Start: 2022-11-10 | End: 2022-11-10

## 2022-11-10 NOTE — PATIENT INSTRUCTIONS
Orders Placed This Encounter   Procedures    Wound cleansing and dressings     Abdomen wound    Wash your hands with soap and water  Remove old dressing, discard into plastic bag and place in trash  Cleanse the wound with saline prior to applying a clean dressing  Do not use tissue or cotton balls  Do not scrub the wound  Pat dry using gauze  Shower no  Apply skin prep  Apply dakins moist gauze packed loosely into  the abdomen wound  Cover with abds      Change dressing M-W-F  When wound vac arrives stop using dakins moist gauze and apply wound vac    Negative Pressure Wound Therapy Instructions  Apply skin prep to ling wound  Apply adaptic to wound bed prior to foam application ( apply to very base of wound, make sure suture is covered), Apply black granufoam to wound bed, Set negative pressure on continuous at 125 mmhg and VAC dressing to be changed three times per week as ordered     Use Dermatac wound vac dressing, skin is very sensitive  Done today  Return in one week     Standing Status:   Future     Standing Expiration Date:   11/10/2023

## 2022-11-10 NOTE — PROGRESS NOTES
Patient ID: Jan Alonzo is a 67 y o  female Date of Birth 1950     Chief Complaint  No chief complaint on file  Allergies  Nsaids and Pedi-pre tape spray [wound dressing adhesive]    Assessment:     Diagnoses and all orders for this visit:    Dehiscence of postoperative wound of abdomen  -     Wound cleansing and dressings; Future  -     sodium hypochlorite (DAKIN'S HALF-STRENGTH) external solution; Apply 1 application topically once for 1 dose          Plan:  1  Initial visit  Wound cleansed with NSS and gauze  Wound not showing any s/s of infection  Exposed suture present at base of wound bed  Will have wound gently packed with Dakins moistened kerlex covered with ABD and tape until patient's wound VAC arrives  Will have VNA start wound VAC  Will have adaptic applied to base of wound to protect exposed suture, then will have black foam gently tucked into wound  Wound VAC will be set on 125mmHg continuous suction  Patient will follow up in 1 week        Wound 11/10/22 Surgical Abdomen Medial;Lower (Active)   Wound Image Images linked 11/10/22 1440   Wound Length (cm) 10 cm 11/10/22 1439   Wound Width (cm) 5 cm 11/10/22 1439   Wound Depth (cm) 5 5 cm 11/10/22 1439   Wound Surface Area (cm^2) 50 cm^2 11/10/22 1439   Wound Volume (cm^3) 275 cm^3 11/10/22 1439   Calculated Wound Volume (cm^3) 275 cm^3 11/10/22 1439   Undermining 3 5 11/10/22 1439   Undermining is depth extending from 12 11/10/22 1439   Drainage Amount Large 11/10/22 1439   Drainage Description Tan 11/10/22 1439   Non-staged Wound Description Full thickness 11/10/22 1439   Dressing Status Intact 11/10/22 1439       Wound 11/10/22 Surgical Abdomen Medial;Lower (Active)   Date First Assessed/Time First Assessed: 11/10/22 1439   Primary Wound Type: Surgical  Location: Abdomen  Wound Location Orientation: Medial;Lower       [REMOVED] Wound 08/04/22 Abdomen N/A (Removed)   Resolved Date/Resolved Time: 11/10/22 1438  Date First Assessed/Time First Assessed: 08/04/22 1654   Location: Abdomen  Wound Location Orientation: N/A  Wound Description (Comments): 2 trocar incision sites, both with skin glue, per MD   Incision's 1s    [REMOVED] Wound 10/28/22 Abdomen N/A (Removed)   Resolved Date/Resolved Time: 11/10/22 1438  Date First Assessed/Time First Assessed: 10/28/22 1953   Location: Abdomen  Wound Location Orientation: N/A  Wound Description (Comments): SKIN STAPLED CLOSED  Incision's 1st Dressing: DRESSING TELFA 3 X 8 I    [REMOVED] Wound 10/28/22 Abdomen Left (Removed)   Resolved Date/Resolved Time: 11/10/22 1438  Date First Assessed/Time First Assessed: 10/28/22 1953   Location: Abdomen  Wound Location Orientation: Left  Wound Description (Comments): DRAIN SITES X2  Incision's 1st Dressing: DRESSING TRACH T-SLIT 6 PL    Subjective:     Patient is a 67year old female who presents to the [facility] wound center as a new patient for a non-healing surgical wound of her abdomen  Patient is s/p total abdominal hysterectomy completed by Dr Francine Stanford on 10/28  Patient was referred to wound center by Dr Francine Stanford for initiation of wound VAC  Patient's wound has been managed with saline moistened kerlex  Patient has VNA set up who will be helping with wound VAC changes 3x per week  Patient reports her wound VAC has not arrived in the mail yet  She is expecting to receive it either later today or tomorrow  Patient reports some pain in the area of her wound  She denies any fevers or chills            The following portions of the patient's history were reviewed and updated as appropriate:   She  has a past medical history of Abdominal pain, Allergic sinusitis, Anxiety, Arthritis, Asthma, Cholelithiasis, Colon polyp, Degenerative joint disease, Dental bridge present, Exercise involving walking, GERD (gastroesophageal reflux disease), Giant cell aortic arteritis ([address] ), Hiatal hernia, History of cancer chemotherapy, Hyperlipidemia, Hypertension, Nausea, 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[place] [address] ), PMR (polymyalgia rheumatica) ([place] [address] ), Prediabetes, Shortness of breath, Tinnitus, and Wears glasses  She   Patient Active Problem List    Diagnosis Date Noted   • Hypokalemia 11/07/2022   • Sepsis ([place] [address] ) 11/05/2022   • S/P total abdominal hysterectomy 10/31/2022   • Ileostomy, has currently ([place] [address] ) 10/31/2022   • Acute blood loss anemia (ABLA) 10/31/2022   • S/P splenectomy 10/31/2022   • Anxiety disorder due to medical condition 09/29/2022   • Malignant neoplasm of left ovary ([place] [address] ) 08/09/2022   • Giant cell aortic arteritis ([place] [address] )    • Prediabetes    • 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[place] [address] ) 07/21/2022   • Lower abdominal pain 06/15/2022   • Change in bowel habit 06/15/2022   • Personal history of colonic polyps 06/15/2022   • Long term (current) use of systemic steroids 06/15/2022   • Asthma    • Hyperlipidemia    • Hypertension    • Gastroesophageal reflux disease 03/13/2019   • Constipation 03/13/2019   • Hemorrhoids 03/13/2019   • Family history of colonic polyps 03/13/2019     She  has a past surgical history that includes Upper gastrointestinal endoscopy (11/10/2014); Temporal artery biopsy / ligation; Colonoscopy; Dilation and curettage of uterus; pr lap,diagnostic abdomen (N/A, 08/04/2022); [place] tooth extraction; pr lap,diagnostic abdomen (N/A, 10/28/2022); pr exploratory of abdomen (N/A, 10/28/2022); pr total abdom hysterectomy (N/A, 10/28/2022); pr part removal colon w anastomosis (N/A, 10/28/2022); Small intestine surgery (N/A, 10/28/2022); Liver surgery (10/28/2022); Ileo loop diversion (N/A, 10/28/2022); Coloproctectomy (N/A, 10/28/2022); and Splenectomy, total (N/A, 10/28/2022)  Her family history includes Alzheimer's disease in her mother; Brain cancer in her father; Colon polyps in her mother and sister; Diabetes in her sister; MARLEY disease in her sister; Heart disease in her father and sister  She  reports that she has never smoked   She has never used smokeless tobacco  She reports current alcohol use  She reports that she does not use drugs    Current Outpatient Medications   Medication Sig Dispense Refill   • Acetaminophen (TYLENOL ARTHRITIS PAIN PO) Take by mouth as needed     • albuterol (PROVENTIL HFA,VENTOLIN HFA) 90 mcg/act inhaler Inhale 2 puffs every 4 (four) hours as needed      • amoxicillin-clavulanate (AUGMENTIN) 875-125 mg per tablet Take 1 tablet by mouth every 12 (twelve) hours for 11 doses 11 tablet 0   • Ascorbic Acid (VITAMIN C GUMMIE PO) Take by mouth     • aspirin 81 mg chewable tablet Chew 81 mg daily     • B Complex-C (B COMPLEX-VITAMIN C PO) Take by mouth daily     • betamethasone, augmented, (DIPROLENE) 0 05 % lotion Apply topically as needed     • Calcium Carb-Cholecalciferol (CALCIUM 500+D3 PO) Take 1 tablet by mouth 2 (two) times a day      • cholecalciferol (VITAMIN D3) 1,000 units tablet Take 1,000 Units by mouth daily     • Docusate Calcium (STOOL SOFTENER PO) Take 1 tablet by mouth 2 (two) times a day     • escitalopram (LEXAPRO) 10 mg tablet TAKE 1 TABLET BY MOUTH EVERY DAY 90 tablet 2   • famotidine (PEPCID) 40 MG tablet Take 1 tablet (40 mg total) by mouth daily at bedtime 90 tablet 3   • fexofenadine (ALLEGRA) 180 MG tablet Take 180 mg by mouth daily     • FIBER ADULT GUMMIES PO Take by mouth if needed     • GLUCOSAMINE-CHONDROITIN PO Take 1 tablet by mouth 2 (two) times a day      • lisinopril (ZESTRIL) 5 mg tablet Take 5 mg by mouth daily dinner     • LORazepam (ATIVAN) 1 mg tablet Take 1 tablet (1 mg total) by mouth every 8 (eight) hours as needed for anxiety (nausea or anxiety) (Patient taking differently: Take 1 mg by mouth every 8 (eight) hours as needed for anxiety (nausea or anxiety) Per pt usually takes q night) 30 tablet 0   • metFORMIN (GLUCOPHAGE) 500 mg tablet Take 500 mg by mouth daily Daily at lunch     • metoprolol succinate (TOPROL-XL) 25 mg 24 hr tablet Take 25 mg by mouth daily dinner     • montelukast (SINGULAIR) 10 mg tablet Take 10 mg by mouth daily dinner     • multivitamin (THERAGRAN) TABS Take 1 tablet by mouth daily     • omeprazole (PriLOSEC) 20 mg delayed release capsule TAKE 1 CAPSULE BY MOUTH EVERY DAY 30 MINUTES BEFORE MORNING MEAL FOR 90 DAYS     • ondansetron (ZOFRAN) 8 mg tablet Take 1 tablet (8 mg total) by mouth every 8 (eight) hours as needed for nausea or vomiting 20 tablet 1   • predniSONE 1 mg tablet Take 3 mg by mouth daily     • Probiotic Product (PROBIOTIC DAILY PO) Take by mouth daily     • simvastatin (ZOCOR) 10 mg tablet Take 10 mg by mouth daily at bedtime     • SYMBICORT 160-4 5 MCG/ACT inhaler 2 puffs daily        No current facility-administered medications for this visit  She is allergic to nsaids and pedi-pre tape spray [wound dressing adhesive]       Review of Systems   Constitutional: Negative  HENT: Negative for ear pain and hearing loss  Eyes: Negative for pain  Respiratory: Negative for chest tightness and shortness of breath  Cardiovascular: Negative for chest pain, palpitations and leg swelling  Gastrointestinal: Negative for diarrhea, nausea and vomiting  Ileostomy   Genitourinary: Negative for dysuria  Musculoskeletal: Negative for gait problem  Skin: Positive for wound  Neurological: Negative for tremors and weakness  Psychiatric/Behavioral: Negative for behavioral problems, confusion and suicidal ideas           Objective:       Wound 11/10/22 Surgical Abdomen Medial;Lower (Active)   Wound Image Images linked 11/10/22 1440   Wound Length (cm) 10 cm 11/10/22 1439   Wound Width (cm) 5 cm 11/10/22 1439   Wound Depth (cm) 5 5 cm 11/10/22 1439   Wound Surface Area (cm^2) 50 cm^2 11/10/22 1439   Wound Volume (cm^3) 275 cm^3 11/10/22 1439   Calculated Wound Volume (cm^3) 275 cm^3 11/10/22 1439   Undermining 3 5 11/10/22 1439   Undermining is depth extending from 12 11/10/22 1439   Drainage Amount Large 11/10/22 1439   Drainage Description Tan 11/10/22 1439   Non-staged Wound Description Full thickness 11/10/22 1439   Dressing Status Intact 11/10/22 1439       /60   Pulse (!) 120   Temp (!) 97 4 °F (36 3 °C)   Resp 20   Ht 5' 4 5" (1 638 m)   Wt 83 5 kg (184 lb)   BMI 31 10 kg/m²     Physical Exam  Vitals and nursing note reviewed  Constitutional:       General: She is not in acute distress  Appearance: Normal appearance  She is normal weight  HENT:      Head: Normocephalic and atraumatic  Eyes:      General:         Right eye: No discharge  Left eye: No discharge  Pulmonary:      Effort: Pulmonary effort is normal  No respiratory distress  Musculoskeletal:         General: Normal range of motion  Cervical back: Normal range of motion and neck supple  No rigidity  Right lower leg: No edema  Left lower leg: No edema  Skin:     General: Skin is warm and dry  Findings: Wound present  No erythema  Neurological:      General: No focal deficit present  Mental Status: She is alert and oriented to person, place, and time  Mental status is at baseline  Psychiatric:         Mood and Affect: Mood normal          Behavior: Behavior normal          Thought Content: Thought content normal          Judgment: Judgment normal                    Wound Instructions:  Orders Placed This Encounter   Procedures   • Wound cleansing and dressings     Abdomen wound    Wash your hands with soap and water  Remove old dressing, discard into plastic bag and place in trash  Cleanse the wound with saline prior to applying a clean dressing  Do not use tissue or cotton balls  Do not scrub the wound  Pat dry using gauze  Shower no  Apply skin prep  Apply dakins moist gauze packed loosely into  the abdomen wound    Cover with abds      Change dressing M-W-F  When wound vac arrives stop using dakins moist gauze and apply wound vac    Negative Pressure Wound Therapy Instructions  Apply skin prep to ling wound  Apply adaptic to wound bed prior to foam application ( apply to very base of wound, make sure suture is covered), Apply black granufoam to wound bed, Set negative pressure on continuous at 125 mmhg and VAC dressing to be changed three times per week as ordered  Use Dermatac wound vac dressing, skin is very sensitive  Done today  Return in one week     Standing Status:   Future     Standing Expiration Date:   11/10/2023        Diagnosis ICD-10-CM Associated Orders   1   Dehiscence of postoperative wound of abdomen  T81 31XA Wound cleansing and dressings     sodium hypochlorite (DAKIN'S HALF-STRENGTH) external solution

## 2022-11-10 NOTE — LETTER
[address] [facility]  [address] [place] [address]  Phone#  [phone]  Fax#  [phone]    Patient:  Genevieve Shirley  YOB: 1950  Phone:  [phone]  Date of Visit:  11/10/2022    Orders Placed This Encounter   Procedures   • Wound cleansing and dressings     Abdomen wound    Wash your hands with soap and water  Remove old dressing, discard into plastic bag and place in trash  Cleanse the wound with saline prior to applying a clean dressing  Do not use tissue or cotton balls  Do not scrub the wound  Pat dry using gauze  Shower no  Apply skin prep  Apply dakins moist gauze packed loosely into  the abdomen wound  Cover with abds      Change dressing M-W-F  When wound vac arrives stop using dakins moist gauze and apply wound vac    Negative Pressure Wound Therapy Instructions  Apply skin prep to ling wound  Apply adaptic to wound bed prior to foam application ( apply to very base of wound, make sure suture is covered), Apply black granufoam to wound bed, Set negative pressure on continuous at 125 mmhg and VAC dressing to be changed three times per week as ordered     Use Dermatac wound vac dressing, skin is very sensitive  Done today  Return in one week     Standing Status:   Future     Standing Expiration Date:   11/10/2023         Electronically signed by ANTONELLA Jacobs

## 2022-11-15 ENCOUNTER — HOSPITAL ENCOUNTER (OUTPATIENT)
Facility: HOSPITAL | Age: 72
Setting detail: OBSERVATION
Discharge: HOME | End: 2022-11-16
Attending: EMERGENCY MEDICINE | Admitting: OBSTETRICS & GYNECOLOGY

## 2022-11-15 ENCOUNTER — APPOINTMENT (EMERGENCY)
Dept: RADIOLOGY | Facility: HOSPITAL | Age: 72
End: 2022-11-15

## 2022-11-15 ENCOUNTER — TELEPHONE (OUTPATIENT)
Dept: GYNECOLOGIC ONCOLOGY | Facility: CLINIC | Age: 72
End: 2022-11-15

## 2022-11-15 DIAGNOSIS — R58 BLEEDING: Primary | ICD-10-CM

## 2022-11-15 DIAGNOSIS — D75.839 THROMBOCYTOSIS: ICD-10-CM

## 2022-11-15 DIAGNOSIS — D72.829 LEUKOCYTOSIS: ICD-10-CM

## 2022-11-15 DIAGNOSIS — D72.825 BANDEMIA: ICD-10-CM

## 2022-11-15 LAB
ABO GROUP BLD: NORMAL
ANION GAP SERPL CALCULATED.3IONS-SCNC: 8 MMOL/L (ref 4–13)
ANISOCYTOSIS BLD QL SMEAR: PRESENT
BASOPHILS # BLD MANUAL: 0.18 THOUSAND/UL (ref 0–0.1)
BASOPHILS NFR MAR MANUAL: 1 % (ref 0–1)
BLD GP AB SCN SERPL QL: NEGATIVE
BUN SERPL-MCNC: 11 MG/DL (ref 5–25)
CALCIUM SERPL-MCNC: 9.3 MG/DL (ref 8.3–10.1)
CHLORIDE SERPL-SCNC: 104 MMOL/L (ref 96–108)
CO2 SERPL-SCNC: 24 MMOL/L (ref 21–32)
CREAT SERPL-MCNC: 0.51 MG/DL (ref 0.6–1.3)
EOSINOPHIL # BLD MANUAL: 0.18 THOUSAND/UL (ref 0–0.4)
EOSINOPHIL NFR BLD MANUAL: 1 % (ref 0–6)
ERYTHROCYTE [DISTWIDTH] IN BLOOD BY AUTOMATED COUNT: 17.8 % (ref 11.6–15.1)
GFR SERPL CREATININE-BSD FRML MDRD: 96 ML/MIN/1.73SQ M
GIANT PLATELETS BLD QL SMEAR: PRESENT
GLUCOSE SERPL-MCNC: 178 MG/DL (ref 65–140)
HCT VFR BLD AUTO: 29.6 % (ref 34.8–46.1)
HGB BLD-MCNC: 9.6 G/DL (ref 11.5–15.4)
LACTATE SERPL-SCNC: 1.8 MMOL/L (ref 0.5–2)
LYMPHOCYTES # BLD AUTO: 0.36 THOUSAND/UL (ref 0.6–4.47)
LYMPHOCYTES # BLD AUTO: 2 % (ref 14–44)
MACROCYTES BLD QL AUTO: PRESENT
MCH RBC QN AUTO: 30.9 PG (ref 26.8–34.3)
MCHC RBC AUTO-ENTMCNC: 32.4 G/DL (ref 31.4–37.4)
MCV RBC AUTO: 95 FL (ref 82–98)
MONOCYTES # BLD AUTO: 1.81 THOUSAND/UL (ref 0–1.22)
MONOCYTES NFR BLD: 10 % (ref 4–12)
MYELOCYTES NFR BLD MANUAL: 1 % (ref 0–1)
NEUTROPHILS # BLD MANUAL: 14.45 THOUSAND/UL (ref 1.85–7.62)
NEUTS BAND NFR BLD MANUAL: 9 % (ref 0–8)
NEUTS SEG NFR BLD AUTO: 71 % (ref 43–75)
PLATELET # BLD AUTO: 820 THOUSANDS/UL (ref 149–390)
PLATELET BLD QL SMEAR: ABNORMAL
PMV BLD AUTO: 9.2 FL (ref 8.9–12.7)
POLYCHROMASIA BLD QL SMEAR: PRESENT
POTASSIUM SERPL-SCNC: 4 MMOL/L (ref 3.5–5.3)
RBC # BLD AUTO: 3.11 MILLION/UL (ref 3.81–5.12)
RBC MORPH BLD: PRESENT
RH BLD: POSITIVE
SCHISTOCYTES BLD QL SMEAR: PRESENT
SODIUM SERPL-SCNC: 136 MMOL/L (ref 135–147)
SPECIMEN EXPIRATION DATE: NORMAL
TARGETS BLD QL SMEAR: PRESENT
VARIANT LYMPHS # BLD AUTO: 5 %
WBC # BLD AUTO: 18.06 THOUSAND/UL (ref 4.31–10.16)

## 2022-11-15 RX ORDER — PANTOPRAZOLE SODIUM 20 MG/1
20 TABLET, DELAYED RELEASE ORAL
Status: DISCONTINUED | OUTPATIENT
Start: 2022-11-16 | End: 2022-11-16 | Stop reason: HOSPADM

## 2022-11-15 RX ORDER — ALBUTEROL SULFATE 90 UG/1
2 AEROSOL, METERED RESPIRATORY (INHALATION) EVERY 4 HOURS PRN
Status: DISCONTINUED | OUTPATIENT
Start: 2022-11-15 | End: 2022-11-16 | Stop reason: HOSPADM

## 2022-11-15 RX ORDER — MONTELUKAST SODIUM 10 MG/1
10 TABLET ORAL DAILY
Status: DISCONTINUED | OUTPATIENT
Start: 2022-11-16 | End: 2022-11-16 | Stop reason: HOSPADM

## 2022-11-15 RX ORDER — ACETAMINOPHEN 325 MG/1
650 TABLET ORAL EVERY 6 HOURS PRN
Status: DISCONTINUED | OUTPATIENT
Start: 2022-11-15 | End: 2022-11-16 | Stop reason: HOSPADM

## 2022-11-15 RX ORDER — FAMOTIDINE 20 MG/1
20 TABLET, FILM COATED ORAL
Status: DISCONTINUED | OUTPATIENT
Start: 2022-11-15 | End: 2022-11-16 | Stop reason: HOSPADM

## 2022-11-15 RX ORDER — PREDNISONE 1 MG/1
3 TABLET ORAL DAILY
Status: DISCONTINUED | OUTPATIENT
Start: 2022-11-16 | End: 2022-11-16 | Stop reason: HOSPADM

## 2022-11-15 RX ORDER — BUDESONIDE AND FORMOTEROL FUMARATE DIHYDRATE 160; 4.5 UG/1; UG/1
2 AEROSOL RESPIRATORY (INHALATION) DAILY
Status: DISCONTINUED | OUTPATIENT
Start: 2022-11-16 | End: 2022-11-16 | Stop reason: HOSPADM

## 2022-11-15 RX ORDER — LORAZEPAM 1 MG/1
1 TABLET ORAL EVERY 8 HOURS PRN
Status: DISCONTINUED | OUTPATIENT
Start: 2022-11-15 | End: 2022-11-16 | Stop reason: HOSPADM

## 2022-11-15 RX ORDER — ONDANSETRON 2 MG/ML
4 INJECTION INTRAMUSCULAR; INTRAVENOUS EVERY 6 HOURS PRN
Status: CANCELLED | OUTPATIENT
Start: 2022-11-15

## 2022-11-15 RX ORDER — PRAVASTATIN SODIUM 10 MG
10 TABLET ORAL
Status: DISCONTINUED | OUTPATIENT
Start: 2022-11-16 | End: 2022-11-16 | Stop reason: HOSPADM

## 2022-11-15 RX ORDER — ESCITALOPRAM OXALATE 10 MG/1
10 TABLET ORAL DAILY
Status: DISCONTINUED | OUTPATIENT
Start: 2022-11-16 | End: 2022-11-16 | Stop reason: HOSPADM

## 2022-11-15 RX ORDER — LISINOPRIL 5 MG/1
5 TABLET ORAL DAILY
Status: DISCONTINUED | OUTPATIENT
Start: 2022-11-16 | End: 2022-11-15

## 2022-11-15 RX ORDER — LORATADINE 10 MG/1
10 TABLET ORAL DAILY
Status: DISCONTINUED | OUTPATIENT
Start: 2022-11-16 | End: 2022-11-16 | Stop reason: HOSPADM

## 2022-11-15 RX ORDER — ONDANSETRON 4 MG/1
4 TABLET, ORALLY DISINTEGRATING ORAL EVERY 8 HOURS PRN
Refills: 1 | Status: DISCONTINUED | OUTPATIENT
Start: 2022-11-15 | End: 2022-11-16 | Stop reason: HOSPADM

## 2022-11-15 RX ORDER — CALCIUM CARBONATE 500(1250)
1 TABLET ORAL
Status: DISCONTINUED | OUTPATIENT
Start: 2022-11-16 | End: 2022-11-16 | Stop reason: HOSPADM

## 2022-11-15 RX ORDER — METOPROLOL SUCCINATE 25 MG/1
25 TABLET, EXTENDED RELEASE ORAL DAILY
Status: DISCONTINUED | OUTPATIENT
Start: 2022-11-16 | End: 2022-11-16 | Stop reason: HOSPADM

## 2022-11-15 RX ORDER — DOCUSATE SODIUM 100 MG/1
100 CAPSULE, LIQUID FILLED ORAL 2 TIMES DAILY
Status: DISCONTINUED | OUTPATIENT
Start: 2022-11-15 | End: 2022-11-15

## 2022-11-15 RX ORDER — GARLIC EXTRACT 500 MG
CAPSULE ORAL DAILY
Status: DISCONTINUED | OUTPATIENT
Start: 2022-11-16 | End: 2022-11-15

## 2022-11-15 RX ADMIN — APIXABAN 2.5 MG: 2.5 TABLET, FILM COATED ORAL at 23:38

## 2022-11-15 RX ADMIN — IOHEXOL 85 ML: 350 INJECTION, SOLUTION INTRAVENOUS at 21:28

## 2022-11-15 RX ADMIN — FAMOTIDINE 20 MG: 20 TABLET, FILM COATED ORAL at 23:38

## 2022-11-15 NOTE — TELEPHONE ENCOUNTER
Spoke with TABITHA who reports a "fair amount of jose blood" in the ostomy bag (in addition to some stool) the blood does not appear to be old, and the blood was just noted to have started this afternoon   systolic  Instructed that the patient present either to [facility] or [place] ED for further evaluation

## 2022-11-16 VITALS
DIASTOLIC BLOOD PRESSURE: 78 MMHG | RESPIRATION RATE: 16 BRPM | OXYGEN SATURATION: 94 % | TEMPERATURE: 97.9 F | SYSTOLIC BLOOD PRESSURE: 128 MMHG | HEART RATE: 95 BPM

## 2022-11-16 PROBLEM — D72.825 BANDEMIA: Status: ACTIVE | Noted: 2022-11-16

## 2022-11-16 LAB
ANION GAP SERPL CALCULATED.3IONS-SCNC: 8 MMOL/L (ref 4–13)
ANISOCYTOSIS BLD QL SMEAR: PRESENT
BASOPHILS # BLD MANUAL: 0 THOUSAND/UL (ref 0–0.1)
BASOPHILS NFR MAR MANUAL: 0 % (ref 0–1)
BUN SERPL-MCNC: 7 MG/DL (ref 5–25)
CALCIUM SERPL-MCNC: 9.1 MG/DL (ref 8.3–10.1)
CHLORIDE SERPL-SCNC: 103 MMOL/L (ref 96–108)
CO2 SERPL-SCNC: 24 MMOL/L (ref 21–32)
CREAT SERPL-MCNC: 0.52 MG/DL (ref 0.6–1.3)
EOSINOPHIL # BLD MANUAL: 0.16 THOUSAND/UL (ref 0–0.4)
EOSINOPHIL NFR BLD MANUAL: 1 % (ref 0–6)
ERYTHROCYTE [DISTWIDTH] IN BLOOD BY AUTOMATED COUNT: 17.9 % (ref 11.6–15.1)
GFR SERPL CREATININE-BSD FRML MDRD: 95 ML/MIN/1.73SQ M
GIANT PLATELETS BLD QL SMEAR: PRESENT
GLUCOSE SERPL-MCNC: 168 MG/DL (ref 65–140)
HCT VFR BLD AUTO: 29.7 % (ref 34.8–46.1)
HGB BLD-MCNC: 9.7 G/DL (ref 11.5–15.4)
LYMPHOCYTES # BLD AUTO: 0.81 THOUSAND/UL (ref 0.6–4.47)
LYMPHOCYTES # BLD AUTO: 5 % (ref 14–44)
MCH RBC QN AUTO: 30.9 PG (ref 26.8–34.3)
MCHC RBC AUTO-ENTMCNC: 32.7 G/DL (ref 31.4–37.4)
MCV RBC AUTO: 95 FL (ref 82–98)
MONOCYTES # BLD AUTO: 1.62 THOUSAND/UL (ref 0–1.22)
MONOCYTES NFR BLD: 10 % (ref 4–12)
NEUTROPHILS # BLD MANUAL: 13.29 THOUSAND/UL (ref 1.85–7.62)
NEUTS BAND NFR BLD MANUAL: 4 % (ref 0–8)
NEUTS SEG NFR BLD AUTO: 78 % (ref 43–75)
PLATELET # BLD AUTO: 785 THOUSANDS/UL (ref 149–390)
PLATELET BLD QL SMEAR: ABNORMAL
PMV BLD AUTO: 9.2 FL (ref 8.9–12.7)
POLYCHROMASIA BLD QL SMEAR: PRESENT
POTASSIUM SERPL-SCNC: 3.9 MMOL/L (ref 3.5–5.3)
RBC # BLD AUTO: 3.14 MILLION/UL (ref 3.81–5.12)
RBC MORPH BLD: PRESENT
SODIUM SERPL-SCNC: 135 MMOL/L (ref 135–147)
VARIANT LYMPHS # BLD AUTO: 2 %
WBC # BLD AUTO: 16.21 THOUSAND/UL (ref 4.31–10.16)

## 2022-11-16 RX ADMIN — CALCIUM 1 TABLET: 500 TABLET ORAL at 09:24

## 2022-11-16 RX ADMIN — APIXABAN 2.5 MG: 2.5 TABLET, FILM COATED ORAL at 09:24

## 2022-11-16 RX ADMIN — PREDNISONE 3 MG: 1 TABLET ORAL at 12:25

## 2022-11-16 RX ADMIN — PANTOPRAZOLE SODIUM 20 MG: 20 TABLET, DELAYED RELEASE ORAL at 06:34

## 2022-11-16 RX ADMIN — LORATADINE 10 MG: 10 TABLET ORAL at 09:25

## 2022-11-16 RX ADMIN — B-COMPLEX W/ C & FOLIC ACID TAB 1 TABLET: TAB at 09:24

## 2022-11-16 RX ADMIN — METOPROLOL SUCCINATE 25 MG: 25 TABLET, EXTENDED RELEASE ORAL at 09:24

## 2022-11-16 RX ADMIN — MONTELUKAST SODIUM 10 MG: 10 TABLET, COATED ORAL at 09:24

## 2022-11-16 NOTE — NURSING NOTE
Patient discharged to home with  transporting  AVS reviewed with patient and sig other, no further questions or concerns   pushed pt to hospital lobby for transport home

## 2022-11-16 NOTE — ASSESSMENT & PLAN NOTE
Bleeding from ileostomy at home  No bleeding on presentation  Likely from irritation when ostomy caught onto patient's pants     Continue to monitor

## 2022-11-16 NOTE — ASSESSMENT & PLAN NOTE
WBC   Date Value Ref Range Status   11/15/2022 18 06 (H) 4 31 - 10 16 Thousand/uL Final   11/08/2022 16 50 (H) 4 31 - 10 16 Thousand/uL Final     Bands 9%  Afebrile

## 2022-11-16 NOTE — PROGRESS NOTES
For questions/concerns on this patient, please reach out to the following:  [facility] GYN-GynOnc- Resident/AP  Gyn Oncology Progress note   Alia Jain 67 y o  female MRN: [id number]  Unit/Bed#: [facility] Fink 912-01 Encounter: [id number]    Assessment/Plan:    67 y o  underwent a dx lap, ex lap, modified radical hysterectomy, BSO, with en bloc rectosigmoid resection, gastrocolic omentectomy, splenectomy, small bowel resection with reanastamosis, diaphragm resection, excision ablation of peritoneal implants, ileosctomy formation, optimal tumor debulking for stage IIIC ovarian cancer  She was readmitted for observation in the setting of bleeding from ileostomy and incidental elevated WBC and bandemia  Bandemia  Assessment & Plan  WBC   Date Value Ref Range Status   11/15/2022 18 06 (H) 4 31 - 10 16 Thousand/uL Final   11/08/2022 16 50 (H) 4 31 - 10 16 Thousand/uL Final     Bands 9%  Afebrile    Ileostomy, has currently [facility])  Assessment & Plan  Bleeding from ileostomy at home  No bleeding on presentation  Likely from irritation when ostomy caught onto patient's pants  Continue to monitor     Subjective:    Alia Jain has no current complaints  Pain is well controlled with po pain meds  Patient is currently voiding  She is ambulating  Patient is currently passing flatus and has had bowel movement  She is tolerating PO, and denies nausea or vomitting  Patient denies fever, chills, chest pain, shortness of breath, or calf tenderness  Objective:  /80   Pulse 102   Temp 97 9 °F (36 6 °C)   Resp 18   SpO2 97%     No intake/output data recorded  No intake/output data recorded      Lab Results   Component Value Date    WBC 18 06 (H) 11/15/2022    HGB 9 6 (L) 11/15/2022    HCT 29 6 (L) 11/15/2022    MCV 95 11/15/2022     (H) 11/15/2022       Lab Results   Component Value Date    GLUCOSE 214 (H) 10/28/2022    CALCIUM 9 3 11/15/2022    K 4 0 11/15/2022    CO2 24 11/15/2022     11/15/2022 BUN 11 11/15/2022    CREATININE 0 51 (L) 11/15/2022           Physical Exam  Vitals reviewed  Constitutional:       Appearance: Normal appearance  Cardiovascular:      Rate and Rhythm: Normal rate  Pulses: Normal pulses  Pulmonary:      Effort: Pulmonary effort is normal  No respiratory distress  Abdominal:      Palpations: Abdomen is soft  Tenderness: There is no abdominal tenderness  Comments: Incisions c/d/i   Musculoskeletal:      Cervical back: Normal range of motion  Skin:     General: Skin is warm and dry  Neurological:      Mental Status: She is alert     Psychiatric:         Mood and Affect: Mood normal          Behavior: Behavior normal            Aaliyah Myers MD  11/16/2022  6:22 AM

## 2022-11-16 NOTE — ED PROVIDER NOTES
History  Chief Complaint   Patient presents with   • Medical Problem     Pt has an ileostomy and got the bag caught on purse and has been noticing blood in bag  Patient is a 66-year-old female, past medical history of anxiety, arthritis, asthma, GERD, hyperlipidemia, hypertension, peritoneal carcinoma status post ostomy placement 2 5 weeks ago, who presents to the emergency department for blood coming from her ostomy  Patient states that earlier today while going to the bathroom she accidentally got her ostomy back stuck on her clothing  She states the ostomy bag came off  She subsequently noticed blood in the stool that was in the ostomy bag  She states since then she has continued to notice blood in her ostomy bag  There are no modifying factors  There are no other associated symptoms  She denies any abdominal pain, fevers, chills, nausea, vomiting, chest pain, shortness of breath, or any other new or concerning symptoms  Of note, patient states she was evaluated by her family doctor for a separate issue earlier today  While there, she had 1 low blood pressure reading with a systolic in the lower 378K  Per chart review, on 10/28/22 patient underwent a diagnostic laparoscopy, exploratory laparotomy, modified radical hysterectomy, bilateral salpingo-oophorectomy with en bloc rectosigmoid resection, gastrocolic omentectomy, splenectomy, small-bowel resection with reanastomosis, diaphragm resection, excision ablation of peritoneal implants, ileostomy formation, optimal tumor debulking for metastatic gynecologic malignancy  Prior to Admission Medications   Prescriptions Last Dose Informant Patient Reported? Taking?    Acetaminophen (TYLENOL ARTHRITIS PAIN PO)  Self Yes No   Sig: Take by mouth as needed   Ascorbic Acid (VITAMIN C GUMMIE PO)   Yes No   Sig: Take by mouth   B Complex-C (B COMPLEX-VITAMIN C PO)  Self Yes No   Sig: Take by mouth daily   Calcium Carb-Cholecalciferol (CALCIUM 500+D3 PO)  Self Yes No   Sig: Take 1 tablet by mouth 2 (two) times a day    Docusate Calcium (STOOL SOFTENER PO)  Self Yes No   Sig: Take 1 tablet by mouth 2 (two) times a day   FIBER ADULT GUMMIES PO  Self Yes No   Sig: Take by mouth if needed   GLUCOSAMINE-CHONDROITIN PO  Self Yes No   Sig: Take 1 tablet by mouth 2 (two) times a day    LORazepam (ATIVAN) 1 mg tablet   No No   Sig: Take 1 tablet (1 mg total) by mouth every 8 (eight) hours as needed for anxiety (nausea or anxiety)   Patient taking differently: Take 1 mg by mouth every 8 (eight) hours as needed for anxiety (nausea or anxiety) Per pt usually takes q night   Probiotic Product (PROBIOTIC DAILY PO)  Self Yes No   Sig: Take by mouth daily   SYMBICORT 160-4 5 MCG/ACT inhaler  Self Yes No   Si puffs daily    albuterol (PROVENTIL HFA,VENTOLIN HFA) 90 mcg/act inhaler  Self Yes No   Sig: Inhale 2 puffs every 4 (four) hours as needed    amoxicillin-clavulanate (AUGMENTIN) 875-125 mg per tablet   No No   Sig: Take 1 tablet by mouth every 12 (twelve) hours for 11 doses   aspirin 81 mg chewable tablet  Self Yes No   Sig: Chew 81 mg daily   betamethasone, augmented, (DIPROLENE) 0 05 % lotion  Self Yes No   Sig: Apply topically as needed   cholecalciferol (VITAMIN D3) 1,000 units tablet  Self Yes No   Sig: Take 1,000 Units by mouth daily   escitalopram (LEXAPRO) 10 mg tablet   No No   Sig: TAKE 1 TABLET BY MOUTH EVERY DAY   famotidine (PEPCID) 40 MG tablet  Self No No   Sig: Take 1 tablet (40 mg total) by mouth daily at bedtime   fexofenadine (ALLEGRA) 180 MG tablet  Self Yes No   Sig: Take 180 mg by mouth daily   lisinopril (ZESTRIL) 5 mg tablet  Self Yes No   Sig: Take 5 mg by mouth daily dinner   metFORMIN (GLUCOPHAGE) 500 mg tablet  Self Yes No   Sig: Take 500 mg by mouth daily Daily at lunch   metoprolol succinate (TOPROL-XL) 25 mg 24 hr tablet   Yes No   Sig: Take 25 mg by mouth daily dinner   montelukast (SINGULAIR) 10 mg tablet  Self Yes No   Sig: Take 10 mg by mouth daily dinner   multivitamin (THERAGRAN) TABS  Self Yes No   Sig: Take 1 tablet by mouth daily   omeprazole (PriLOSEC) 20 mg delayed release capsule   Yes No   Sig: TAKE 1 CAPSULE BY MOUTH EVERY DAY 30 MINUTES BEFORE MORNING MEAL FOR 90 DAYS   ondansetron (ZOFRAN) 8 mg tablet   No No   Sig: Take 1 tablet (8 mg total) by mouth every 8 (eight) hours as needed for nausea or vomiting   predniSONE 1 mg tablet   Yes No   Sig: Take 3 mg by mouth daily   simvastatin (ZOCOR) 10 mg tablet  Self Yes No   Sig: Take 10 mg by mouth daily at bedtime      Facility-Administered Medications: None       Past Medical History:   Diagnosis Date   • Abdominal pain     off and on   • Allergic sinusitis     "seasonal allergies"-has received allergy shots "   • Anxiety     with current diagnosis   • Arthritis    • Asthma    • Cholelithiasis    • Colon polyp    • Degenerative joint disease    • Dental bridge present     permanent lower left   • Exercise involving walking     1-2x/week   • GERD (gastroesophageal reflux disease)    • 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   • Hiatal hernia    • History of cancer chemotherapy    • Hyperlipidemia    • Hypertension    • Nausea    • 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[facility] [address] )     "gynecologic malignacy"   • PMR (polymyalgia rheumatica) (HCC)    • Prediabetes    • Shortness of breath     per pt "asthma related --with climbing mult  flights of stairs--or exertion -not new"   • Tinnitus    • Wears glasses        Past Surgical History:   Procedure Laterality Date   • COLONOSCOPY      October 2021: Adenomatous polyps and rectum in transverse colon, sigmoid diverticulosis, internal hemorrhoids  October 2016 diverticulosis and internal hemorrhoids, September 2011 diverticulosis and internal hemorrhoids     • COLOPROCTECTOMY N/A 10/28/2022    Procedure: PROCTECTOMY;  Surgeon: Stoney Salamanca MD;  [facility];  Service: Surgical Oncology   • DILATION AND CURETTAGE OF UTERUS     • ILEO LOOP DIVERSION N/A 10/28/2022    Procedure: ILEOSTOMY LOOP;  Surgeon: Bonifacio Lam MD;  [facility];  Service: Surgical Oncology   • LIVER SURGERY  10/28/2022    Procedure: LIVER CONTROL OF BLEED ;  Surgeon: Bonifacio Lam MD;  [facility];  Service: Surgical Oncology   • IA EXPLORATORY OF ABDOMEN N/A 10/28/2022    Procedure: LAPAROTOMY EXPLORATORY;  Surgeon: Pawan Roberts MD;  [facility];  Service: Gynecology Oncology   • IA LAP,DIAGNOSTIC ABDOMEN N/A 08/04/2022    Procedure: DIAGNOSTIC LAPAROSCOPY, PERITONEAL BIOPSY, SAMPLING OF BLOODY ASCITES ;  Surgeon: Maud Carrel, MD;  [facility];  Service: Gynecology Oncology   • IA LAP,DIAGNOSTIC ABDOMEN N/A 10/28/2022    Procedure: LAPAROSCOPY DIAGNOSTIC;  Surgeon: Pawan Roberts MD;  [facility];  Service: Gynecology Oncology   • IA PART REMOVAL COLON W ANASTOMOSIS N/A 10/28/2022    Procedure: RESECTION COLON LEFT;  Surgeon: Pawan Roberts MD;  [facility];  Service: Gynecology Oncology   • IA TOTAL ABDOM HYSTERECTOMY N/A 10/28/2022    Procedure: HYSTERECTOMY MODIFIED RADICAL, BILATERAL SALPINGO-OOPHORECTOMY, GASTROCOLIC OMENTECTOMY, DIAPHRAGM RESECTION, EXCISION AND ABLATION OF PERITONEAL TUMORS;  Surgeon: Pawan Roberts MD;  [facility];  Service: Gynecology Oncology   • SMALL INTESTINE SURGERY N/A 10/28/2022    Procedure: RESECTION SMALL BOWEL;  Surgeon: aPwan Roberts MD;  [facility];  Service: Gynecology Oncology   • SPLENECTOMY, TOTAL N/A 10/28/2022    Procedure: SPLENECTOMY;  Surgeon: Pawan Roberts MD;  [facility];  Service: Gynecology Oncology   • TEMPORAL ARTERY BIOPSY / LIGATION     • UPPER GASTROINTESTINAL ENDOSCOPY  11/10/2014    November 2014 irregular Z-line and Schatzki ring, hiatal hernia, gastritis    Biopsies negative for celiac, H pylori, or Phillip's or eosinophilic esophagitis  • WISDOM TOOTH EXTRACTION         Family History   Problem Relation Age of Onset   • Colon polyps Mother    • Alzheimer's disease Mother    • Heart disease Father    • Brain cancer Father    • Colon polyps Sister    • Heart disease Sister    • Diabetes Sister    • MARLEY disease Sister    • Colon cancer Neg Hx      I have reviewed and agree with the history as documented  E-Cigarette/Vaping   • E-Cigarette Use Never User      E-Cigarette/Vaping Substances   • Nicotine No    • THC No    • CBD No    • Flavoring No    • Other No    • Unknown No      Social History     Tobacco Use   • Smoking status: Never Smoker   • Smokeless tobacco: Never Used   Vaping Use   • Vaping Use: Never used   Substance Use Topics   • Alcohol use: Yes     Comment: a glass of wine maybe 2 times a month   • Drug use: Never        Review of Systems   Constitutional: Negative for chills and fever  Respiratory: Negative for cough and shortness of breath  Cardiovascular: Negative for chest pain and leg swelling  Gastrointestinal: Positive for blood in stool  Negative for abdominal pain, diarrhea, nausea and vomiting  Musculoskeletal: Negative for back pain and neck pain  All other systems reviewed and are negative  Physical Exam  ED Triage Vitals [11/15/22 1812]   Temperature Pulse Respirations Blood Pressure SpO2   98 5 °F (36 9 °C) (!) 112 18 157/93 95 %      Temp Source Heart Rate Source Patient Position - Orthostatic VS BP Location FiO2 (%)   Oral Monitor -- Left arm --      Pain Score       --             Orthostatic Vital Signs  Vitals:    11/15/22 1812 11/15/22 2118 11/15/22 2200 11/15/22 2309   BP: 157/93 135/70  128/80   Pulse: (!) 112 99 96 102       Physical Exam  Vitals and nursing note reviewed  Constitutional:       General: She is not in acute distress  Appearance: She is well-developed  She is not diaphoretic  HENT:      Head: Normocephalic and atraumatic        Right Ear: External ear normal  Left Ear: External ear normal       Nose: Nose normal    Eyes:      General: Lids are normal  No scleral icterus  Cardiovascular:      Rate and Rhythm: Normal rate and regular rhythm  Heart sounds: Normal heart sounds  No murmur heard  No friction rub  No gallop  Pulmonary:      Effort: Pulmonary effort is normal  No respiratory distress  Breath sounds: Normal breath sounds  No wheezing or rales  Abdominal:      Palpations: Abdomen is soft  Tenderness: There is no abdominal tenderness  There is no guarding or rebound  Comments: Ostomy present  There is brown stool mixed with what appears to be blood  No melena  Abdomen is soft, nontender  Musculoskeletal:         General: No deformity  Normal range of motion  Cervical back: Normal range of motion and neck supple  Skin:     General: Skin is warm and dry  Neurological:      General: No focal deficit present  Mental Status: She is alert     Psychiatric:         Mood and Affect: Mood normal          Behavior: Behavior normal          ED Medications  Medications   acetaminophen (TYLENOL) tablet 650 mg (has no administration in time range)   albuterol (PROVENTIL HFA,VENTOLIN HFA) inhaler 2 puff (has no administration in time range)   calcium carbonate (OYSTER SHELL,OSCAL) 500 mg tablet 1 tablet (has no administration in time range)   escitalopram (LEXAPRO) tablet 10 mg (has no administration in time range)   famotidine (PEPCID) tablet 20 mg (has no administration in time range)   loratadine (CLARITIN) tablet 10 mg (has no administration in time range)   LORazepam (ATIVAN) tablet 1 mg (has no administration in time range)   metoprolol succinate (TOPROL-XL) 24 hr tablet 25 mg (has no administration in time range)   montelukast (SINGULAIR) tablet 10 mg (has no administration in time range)   multivitamin stress formula tablet 1 tablet (has no administration in time range)   pantoprazole (PROTONIX) EC tablet 20 mg (has no administration in time range)   ondansetron (ZOFRAN-ODT) dispersible tablet 4 mg (has no administration in time range)   pravastatin (PRAVACHOL) tablet 10 mg (has no administration in time range)   budesonide-formoterol (SYMBICORT) 160-4 5 mcg/act inhaler 2 puff (has no administration in time range)   apixaban (ELIQUIS) tablet 2 5 mg (has no administration in time range)   predniSONE tablet 3 mg (has no administration in time range)   iohexol (OMNIPAQUE) 350 MG/ML injection (SINGLE-DOSE) 100 mL (85 mL Intravenous Given 11/15/22 2128)       Diagnostic Studies  Results Reviewed     Procedure Component Value Units Date/Time    Lactic acid, plasma [[id number]]  (Normal) Collected: 11/15/22 2107    Lab Status: Final result Specimen: Blood from Arm, Right Updated: 11/15/22 2141     LACTIC ACID 1 8 mmol/L     Narrative:      Result may be elevated if tourniquet was used during collection      UA w Reflex to Microscopic w Reflex to Culture [[id number]]     Lab Status: No result Specimen: Urine     Manual Differential(PHLEBS Do Not Order) [[id number]]  (Abnormal) Collected: 11/15/22 1927    Lab Status: Final result Specimen: Blood from Arm, Right Updated: 11/15/22 2021     Segmented % 71 %      Bands % 9 %      Lymphocytes % 2 %      Monocytes % 10 %      Eosinophils, % 1 %      Basophils % 1 %      Myelocytes % 1 %      Atypical Lymphocytes % 5 %      Absolute Neutrophils 14 45 Thousand/uL      Lymphocytes Absolute 0 36 Thousand/uL      Monocytes Absolute 1 81 Thousand/uL      Eosinophils Absolute 0 18 Thousand/uL      Basophils Absolute 0 18 Thousand/uL      Total Counted --     RBC Morphology Present     Anisocytosis Present     Macrocytes Present     Polychromasia Present     Schistocytes Present     Target Cells Present     Platelet Estimate Increased     Giant PLTs Present    CBC and differential [[id number]]  (Abnormal) Collected: 11/15/22 1927    Lab Status: Final result Specimen: Blood from Arm, Right Updated: 11/15/22 2021     WBC 18 06 Thousand/uL      RBC 3 11 Million/uL      Hemoglobin 9 6 g/dL      Hematocrit 29 6 %      MCV 95 fL      MCH 30 9 pg      MCHC 32 4 g/dL      RDW 17 8 %      MPV 9 2 fL      Platelets 992 Thousands/uL     Narrative: This is an appended report  These results have been appended to a previously verified report  Basic metabolic panel [[id number]]  (Abnormal) Collected: 11/15/22 1927    Lab Status: Final result Specimen: Blood from Arm, Right Updated: 11/15/22 1953     Sodium 136 mmol/L      Potassium 4 0 mmol/L      Chloride 104 mmol/L      CO2 24 mmol/L      ANION GAP 8 mmol/L      BUN 11 mg/dL      Creatinine 0 51 mg/dL      Glucose 178 mg/dL      Calcium 9 3 mg/dL      eGFR 96 ml/min/1 73sq m     Narrative:      [place] guidelines for Chronic Kidney Disease (CKD):   •  Stage 1 with normal or high GFR (GFR > 90 mL/min/1 73 square meters)  •  Stage 2 Mild CKD (GFR = 60-89 mL/min/1 73 square meters)  •  Stage 3A Moderate CKD (GFR = 45-59 mL/min/1 73 square meters)  •  Stage 3B Moderate CKD (GFR = 30-44 mL/min/1 73 square meters)  •  Stage 4 Severe CKD (GFR = 15-29 mL/min/1 73 square meters)  •  Stage 5 End Stage CKD (GFR <15 mL/min/1 73 square meters)  Note: GFR calculation is accurate only with a steady state creatinine                 CT chest abdomen pelvis w contrast   Final Result by Pepe Peterson MD (11/15 2214)      1  Again noted are extensive postsurgical changes  There remains a subcapsular hepatic collection as described, similar in size  The persistence of peritoneal thickening in this location raises suspicion for abscess or peritonitis  2   Peripheral hepatic lesions as described, attention on follow-up   3  Mild volume ascites, with presacral fluid, and collection within the splenectomy bed, similar to prior   4    Small left pleural effusion            Workstation performed: [id number]               Procedures  Procedures      ED Course  ED Course as of 11/15/22 2333   e Nov 15, 2022   1911 TT sent to gyn onc   [facility] with Dr Maisha Rodriguez  Recommends labs, no imaging at this time  Will have AP evaluate  1944 WBC(!): 18 06   1944 Hemoglobin(!): 9 6   1944 Platelet Count(!): 168   1955 Sodium: 136   1955 Potassium: 4 0   1955 CO2: 24   2021 Bands Relative(!): 9  Unclear etiology  2022 Spoke with gyn onc AP  Will evaluate shortly  2200 Spoke with gyn onc  Given bandemia and leukocytosis I recommended obs admission  They accepted admission  MDM  Number of Diagnoses or Management Options  Bandemia  Bleeding  Leukocytosis  Thrombocytosis  Diagnosis management comments: Patient is a 67 y o  female who presents to the ED for blood from her ostomy bag  Patient nontoxic, well appearing  Vitals are stable  I suspect the blood is likely from the trauma of having the bag ripped off  However, differential includes GI bleed  Plan:  Labs, Gyn onc consult, reassessment                 Portions of the record may have been created with voice recognition software  Occasional wrong word or "sound a like" substitutions may have occurred due to the inherent limitations of voice recognition software  Read the chart carefully and recognize, using context, where substitutions have occurred         Amount and/or Complexity of Data Reviewed  Clinical lab tests: ordered and reviewed  Tests in the radiology section of CPT®: ordered and reviewed  Tests in the medicine section of CPT®: ordered and reviewed  Review and summarize past medical records: yes  Independent visualization of images, tracings, or specimens: yes    Risk of Complications, Morbidity, and/or Mortality  Presenting problems: moderate  Diagnostic procedures: moderate  Management options: moderate    Patient Progress  Patient progress: stable      Disposition  Final diagnoses:   Bleeding   Bandemia   Leukocytosis   Thrombocytosis     Time reflects when diagnosis was documented in both MDM as applicable and the Disposition within this note     Time User Action Codes Description Comment    11/15/2022  7:33 PM Santiago Math Add [R58] Bleeding     11/15/2022 11:27 PM Santiago Math Add [C42 587] Bandemia     11/15/2022 11:28 PM Santiago Math Add [D59 280] Leukocytosis     11/15/2022 11:31 PM Santiago Edward Add [K91 232] Thrombocytosis       ED Disposition     ED Disposition   Admit    Condition   Stable    Date/Time   Tue Nov 15, 2022 11:27 PM    Comment   Case was discussed with Dr Ziggy Denise and the patient's admission status was agreed to be Admission Status: observation status to the service of Dr Ziggy Denise              Follow-up Information    None         Current Discharge Medication List      CONTINUE these medications which have NOT CHANGED    Details   Acetaminophen (TYLENOL ARTHRITIS PAIN PO) Take by mouth as needed      albuterol (PROVENTIL HFA,VENTOLIN HFA) 90 mcg/act inhaler Inhale 2 puffs every 4 (four) hours as needed       Ascorbic Acid (VITAMIN C GUMMIE PO) Take by mouth      aspirin 81 mg chewable tablet Chew 81 mg daily      B Complex-C (B COMPLEX-VITAMIN C PO) Take by mouth daily      betamethasone, augmented, (DIPROLENE) 0 05 % lotion Apply topically as needed      Calcium Carb-Cholecalciferol (CALCIUM 500+D3 PO) Take 1 tablet by mouth 2 (two) times a day       cholecalciferol (VITAMIN D3) 1,000 units tablet Take 1,000 Units by mouth daily      Docusate Calcium (STOOL SOFTENER PO) Take 1 tablet by mouth 2 (two) times a day      escitalopram (LEXAPRO) 10 mg tablet TAKE 1 TABLET BY MOUTH EVERY DAY  Qty: 90 tablet, Refills: 2    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      famotidine (PEPCID) 40 MG tablet Take 1 tablet (40 mg total) by mouth daily at bedtime  Qty: 90 tablet, Refills: 3    Associated Diagnoses: Gastroesophageal reflux disease      fexofenadine (ALLEGRA) 180 MG tablet Take 180 mg by mouth daily      FIBER ADULT Gayleen Niharika PO Take by mouth if needed      GLUCOSAMINE-CHONDROITIN PO Take 1 tablet by mouth 2 (two) times a day       lisinopril (ZESTRIL) 5 mg tablet Take 5 mg by mouth daily dinner      LORazepam (ATIVAN) 1 mg tablet Take 1 tablet (1 mg total) by mouth every 8 (eight) hours as needed for anxiety (nausea or anxiety)  Qty: 30 tablet, Refills: 0    Associated Diagnoses: Gynecologic malignancy ([address] )      metFORMIN (GLUCOPHAGE) 500 mg tablet Take 500 mg by mouth daily Daily at lunch      metoprolol succinate (TOPROL-XL) 25 mg 24 hr tablet Take 25 mg by mouth daily dinner      montelukast (SINGULAIR) 10 mg tablet Take 10 mg by mouth daily dinner      multivitamin (THERAGRAN) TABS Take 1 tablet by mouth daily      omeprazole (PriLOSEC) 20 mg delayed release capsule TAKE 1 CAPSULE BY MOUTH EVERY DAY 30 MINUTES BEFORE MORNING MEAL FOR 90 DAYS      ondansetron (ZOFRAN) 8 mg tablet Take 1 tablet (8 mg total) by mouth every 8 (eight) hours as needed for nausea or vomiting  Qty: 20 tablet, Refills: 1    Associated Diagnoses: Gynecologic malignancy (HCC)      predniSONE 1 mg tablet Take 3 mg by mouth daily      Probiotic Product (PROBIOTIC DAILY PO) Take by mouth daily      simvastatin (ZOCOR) 10 mg tablet Take 10 mg by mouth daily at bedtime      SYMBICORT 160-4 5 MCG/ACT inhaler 2 puffs daily          STOP taking these medications       amoxicillin-clavulanate (AUGMENTIN) 875-125 mg per tablet Comments:   Reason for Stopping:             No discharge procedures on file  PDMP Review       Value Time User    PDMP Reviewed  Yes 9/29/2022 10:59 AM Denny Arita MD           ED Provider  Attending physically available and evaluated Janice Lainez  I managed the patient along with the ED Attending      Electronically Signed by         Ambrocio Meadows DO  11/15/22 1638

## 2022-11-16 NOTE — H&P
H&P Exam - General Surgery   Elaina Deluca 67 y o  female MRN: [id number]  Unit/Bed#: ED 25 Encounter: [id number]      History of Present Illness     HPI:  Elaina Deluca is a 67 y o  female who presents who on 10/28/22 underwent a diagnostic laparoscopy, exploratory laparotomy, modified radical hysterectomy, bilateral salpingo-oophorectomy with en bloc rectosigmoid resection, gastrocolic omentectomy, splenectomy, small-bowel resection with reanastomosis, diaphragm resection, excision ablation of peritoneal implants, ileostomy formation, optimal tumor debulking for metastatic gynecologic malignancy  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, right diaphragm, splenic capsule, omentum, pelvic peritoneum, perirectal tissues, ovary, uterus and small bowel mesentery  She had a lengthy hospitalization, had a postoperative superficial wound separation requiring placement of a VAC, and was discharged home on November 8, 2022  She has been followed by visiting nurses for management of ileostomy appliance and the wound care  Patient states she was doing well until this evening when while pulling down her pants she caught her ileostomy  She states that the appliance pulled off and emptied on her, however she then noticed several drops of blood on her leg  She came into the hospital for further evaluation as she was concerned for bleeding from the ileostomy  Upon examination of the ileostomy in the emergency room, there was no bleeding noted, however patient's white blood cell count with elevated and she had bandemia  Her hemoglobin was stable  She is being admitted for observation overnight           Historical Information   Past Medical History:   Diagnosis Date   • Abdominal pain     off and on   • Allergic sinusitis     "seasonal allergies"-has received allergy shots "   • Anxiety     with current diagnosis   • Arthritis    • Asthma    • Cholelithiasis    • Colon polyp    • Degenerative joint disease    • Dental bridge present     permanent lower left   • Exercise involving walking     1-2x/week   • GERD (gastroesophageal reflux disease)    • Giant cell aortic arteritis ([facility] [address] )     "hereditary Giant Cell Temporal Arteritis"per pt   • Hiatal hernia    • History of cancer chemotherapy    • Hyperlipidemia    • Hypertension    • Nausea    • 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[facility] [address] )     "gynecologic malignacy"   • PMR (polymyalgia rheumatica) (HCC)    • Prediabetes    • Shortness of breath     per pt "asthma related --with climbing mult  flights of stairs--or exertion -not new"   • Tinnitus    • Wears glasses      Past Surgical History:   Procedure Laterality Date   • COLONOSCOPY      October 2021: Adenomatous polyps and rectum in transverse colon, sigmoid diverticulosis, internal hemorrhoids  October 2016 diverticulosis and internal hemorrhoids, September 2011 diverticulosis and internal hemorrhoids     • COLOPROCTECTOMY N/A 10/28/2022    Procedure: PROCTECTOMY;  Surgeon: Og Ware MD;  [facility];  Service: Surgical Oncology   • DILATION AND CURETTAGE OF UTERUS     • ILEO LOOP DIVERSION N/A 10/28/2022    Procedure: ILEOSTOMY LOOP;  Surgeon: Og Ware MD;  [facility];  Service: Surgical Oncology   • LIVER SURGERY  10/28/2022    Procedure: LIVER CONTROL OF BLEED ;  Surgeon: Og Ware MD;  [facility];  Service: Surgical Oncology   • GA EXPLORATORY OF ABDOMEN N/A 10/28/2022    Procedure: LAPAROTOMY EXPLORATORY;  Surgeon: Anabella Calderon MD;  [facility];  Service: Gynecology Oncology   • GA LAP,DIAGNOSTIC ABDOMEN N/A 08/04/2022    Procedure: DIAGNOSTIC LAPAROSCOPY, PERITONEAL BIOPSY, SAMPLING OF BLOODY ASCITES ;  Surgeon: Mansoor Mckeon MD;  [facility];  Service: Gynecology Oncology   • GA LAP,DIAGNOSTIC ABDOMEN N/A 10/28/2022    Procedure: LAPAROSCOPY DIAGNOSTIC;  Surgeon: Anabella Calderon MD;  [facility];  Service: Gynecology Oncology   • DE PART REMOVAL COLON W ANASTOMOSIS N/A 10/28/2022    Procedure: RESECTION COLON LEFT;  Surgeon: Priti Dixon MD;  [facility];  Service: Gynecology Oncology   • DE TOTAL ABDOM HYSTERECTOMY N/A 10/28/2022    Procedure: HYSTERECTOMY MODIFIED RADICAL, BILATERAL SALPINGO-OOPHORECTOMY, GASTROCOLIC OMENTECTOMY, DIAPHRAGM RESECTION, EXCISION AND ABLATION OF PERITONEAL TUMORS;  Surgeon: Priti Dixon MD;  [facility];  Service: Gynecology Oncology   • SMALL INTESTINE SURGERY N/A 10/28/2022    Procedure: RESECTION SMALL BOWEL;  Surgeon: Priti Dixon MD;  [facility];  Service: Gynecology Oncology   • SPLENECTOMY, TOTAL N/A 10/28/2022    Procedure: SPLENECTOMY;  Surgeon: Priti Dixon MD;  [facility];  Service: Gynecology Oncology   • TEMPORAL ARTERY BIOPSY / LIGATION     • UPPER GASTROINTESTINAL ENDOSCOPY  11/10/2014    November 2014 irregular Z-line and Schatzki ring, hiatal hernia, gastritis  Biopsies negative for celiac, H pylori, or Phillip's or eosinophilic esophagitis  • WISDOM TOOTH EXTRACTION       Social History   Social History     Substance and Sexual Activity   Alcohol Use Yes    Comment: a glass of wine maybe 2 times a month     Social History     Substance and Sexual Activity   Drug Use Never     Social History     Tobacco Use   Smoking Status Never Smoker   Smokeless Tobacco Never Used     Family History: non-contributory  Review of Systems   Constitutional: Negative for activity change, appetite change, chills and diaphoresis  HENT: Negative  Respiratory: Negative  Cardiovascular: Negative  Gastrointestinal: Negative  Genitourinary: Negative  Musculoskeletal: Negative  Meds/Allergies   PTA meds:   Prior to Admission Medications   Prescriptions Last Dose Informant Patient Reported? Taking?    Acetaminophen (TYLENOL ARTHRITIS PAIN PO)  Self Yes No   Sig: Take by mouth as needed Ascorbic Acid (VITAMIN C GUMMIE PO)   Yes No   Sig: Take by mouth   B Complex-C (B COMPLEX-VITAMIN C PO)  Self Yes No   Sig: Take by mouth daily   Calcium Carb-Cholecalciferol (CALCIUM 500+D3 PO)  Self Yes No   Sig: Take 1 tablet by mouth 2 (two) times a day    Docusate Calcium (STOOL SOFTENER PO)  Self Yes No   Sig: Take 1 tablet by mouth 2 (two) times a day   FIBER ADULT GUMMIES PO  Self Yes No   Sig: Take by mouth if needed   GLUCOSAMINE-CHONDROITIN PO  Self Yes No   Sig: Take 1 tablet by mouth 2 (two) times a day    LORazepam (ATIVAN) 1 mg tablet   No No   Sig: Take 1 tablet (1 mg total) by mouth every 8 (eight) hours as needed for anxiety (nausea or anxiety)   Patient taking differently: Take 1 mg by mouth every 8 (eight) hours as needed for anxiety (nausea or anxiety) Per pt usually takes q night   Probiotic Product (PROBIOTIC DAILY PO)  Self Yes No   Sig: Take by mouth daily   SYMBICORT 160-4 5 MCG/ACT inhaler  Self Yes No   Si puffs daily    albuterol (PROVENTIL HFA,VENTOLIN HFA) 90 mcg/act inhaler  Self Yes No   Sig: Inhale 2 puffs every 4 (four) hours as needed    amoxicillin-clavulanate (AUGMENTIN) 875-125 mg per tablet   No No   Sig: Take 1 tablet by mouth every 12 (twelve) hours for 11 doses   aspirin 81 mg chewable tablet  Self Yes No   Sig: Chew 81 mg daily   betamethasone, augmented, (DIPROLENE) 0 05 % lotion  Self Yes No   Sig: Apply topically as needed   cholecalciferol (VITAMIN D3) 1,000 units tablet  Self Yes No   Sig: Take 1,000 Units by mouth daily   escitalopram (LEXAPRO) 10 mg tablet   No No   Sig: TAKE 1 TABLET BY MOUTH EVERY DAY   famotidine (PEPCID) 40 MG tablet  Self No No   Sig: Take 1 tablet (40 mg total) by mouth daily at bedtime   fexofenadine (ALLEGRA) 180 MG tablet  Self Yes No   Sig: Take 180 mg by mouth daily   lisinopril (ZESTRIL) 5 mg tablet  Self Yes No   Sig: Take 5 mg by mouth daily dinner   metFORMIN (GLUCOPHAGE) 500 mg tablet  Self Yes No   Sig: Take 500 mg by mouth daily Daily at lunch   metoprolol succinate (TOPROL-XL) 25 mg 24 hr tablet   Yes No   Sig: Take 25 mg by mouth daily dinner   montelukast (SINGULAIR) 10 mg tablet  Self Yes No   Sig: Take 10 mg by mouth daily dinner   multivitamin (THERAGRAN) TABS  Self Yes No   Sig: Take 1 tablet by mouth daily   omeprazole (PriLOSEC) 20 mg delayed release capsule   Yes No   Sig: TAKE 1 CAPSULE BY MOUTH EVERY DAY 30 MINUTES BEFORE MORNING MEAL FOR 90 DAYS   ondansetron (ZOFRAN) 8 mg tablet   No No   Sig: Take 1 tablet (8 mg total) by mouth every 8 (eight) hours as needed for nausea or vomiting   predniSONE 1 mg tablet   Yes No   Sig: Take 3 mg by mouth daily   simvastatin (ZOCOR) 10 mg tablet  Self Yes No   Sig: Take 10 mg by mouth daily at bedtime      Facility-Administered Medications: None     Allergies   Allergen Reactions   • Nsaids Other (See Comments)      pt is avoiding per family doctor instructions-  Able to take Tylenol   • Pedi-Pre Tape Spray [Wound Dressing Adhesive] Other (See Comments)     Please use sensitive skin tape, pt gets bad bruising from strong medical adhesive tape  Objective   First Vitals:   Blood Pressure: 157/93 (11/15/22 1812)  Pulse: (!) 112 (11/15/22 1812)  Temperature: 98 5 °F (36 9 °C) (11/15/22 1812)  Temp Source: Oral (11/15/22 1812)  Respirations: 18 (11/15/22 1812)  SpO2: 95 % (11/15/22 1812)    Current Vitals:   Blood Pressure: 157/93 (11/15/22 1812)  Pulse: (!) 112 (11/15/22 1812)  Temperature: 98 5 °F (36 9 °C) (11/15/22 1812)  Temp Source: Oral (11/15/22 1812)  Respirations: 18 (11/15/22 1812)  SpO2: 95 % (11/15/22 1812)    No intake or output data in the 24 hours ending 11/15/22 2115    Invasive Devices  Report    Drain  Duration           Ileostomy Standard (Loulou, zo) RUQ 18 days                Physical Exam  Constitutional:       General: She is not in acute distress  Appearance: Normal appearance  She is obese  She is not ill-appearing, toxic-appearing or diaphoretic  HENT:      Head: Normocephalic and atraumatic  Nose: Nose normal       Mouth/Throat:      Mouth: Mucous membranes are moist    Eyes:      Pupils: Pupils are equal, round, and reactive to light  Cardiovascular:      Rate and Rhythm: Normal rate and regular rhythm  Heart sounds: No murmur heard  No gallop  Pulmonary:      Effort: Pulmonary effort is normal  No respiratory distress  Breath sounds: Normal breath sounds  No wheezing  Abdominal:      General: Bowel sounds are normal       Palpations: Abdomen is soft  Comments: Patient has a VAC dressing over the midline incision, patient has a right-sided ileostomy with no signs of bleeding  The ileostomy is pink  There is mild tenderness in the mid abdomen near the [facility] dressing otherwise no guarding or rebound noted   Musculoskeletal:         General: Normal range of motion  Cervical back: Normal range of motion  Skin:     General: Skin is warm and dry  Neurological:      General: No focal deficit present  Mental Status: She is alert and oriented to person, place, and time  Psychiatric:         Mood and Affect: Mood normal          Lab Results: I have personally reviewed pertinent lab results      Imaging:  CT chest abdomen and pelvis pending    Assessment/Plan     Assessment:  Patient Active Problem List    Diagnosis Date Noted   • Hypokalemia 11/07/2022   • Sepsis ([facility] [address] ) 11/05/2022   • S/P total abdominal hysterectomy 10/31/2022   • Ileostomy, has currently ([facility] [address] ) 10/31/2022   • Acute blood loss anemia (ABLA) 10/31/2022   • S/P splenectomy 10/31/2022   • Anxiety disorder due to medical condition 09/29/2022   • Malignant neoplasm of left ovary ([facility] [address] ) 08/09/2022   • Giant cell aortic arteritis ([facility] [address] )    • Prediabetes    • 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[facility] [address] ) 07/21/2022   • Lower abdominal pain 06/15/2022   • Change in bowel habit 06/15/2022   • Personal history of colonic polyps 06/15/2022   • Long term (current) use of systemic steroids 06/15/2022   • Asthma    • Hyperlipidemia    • Hypertension    • Gastroesophageal reflux disease 03/13/2019   • Constipation 03/13/2019   • Hemorrhoids 03/13/2019   • Family history of colonic polyps 03/13/2019       Plan:  Patient is being admitted to the Gynecologic Oncology Service for observation and further evaluation of the leukocytosis and bandemia  We will also observe ileostomy for further bleeding  We will obtain a CT chest abdomen and pelvis this evening  Repeat labs in a m  including blood cultures and lactate level  Counseling / Coordination of Care  Total floor / unit time spent today 1 hour minutes  Greater than 50% of total time was spent with the patient and / or family counseling and / or coordination of care  Apla Dillon PA-C     This text is generated with voice recognition software  There may be translation, syntax,  or grammatical errors  If you have any questions, please contact the dictating provider

## 2022-11-16 NOTE — CASE MANAGEMENT
Case Management Discharge Planning Note    Patient name Celia Buchanan  Location [address]57[address]56 MRN [id number]  : 1950 Date 2022       Current Admission Date: 11/15/2022  Current Admission Diagnosis:Ileostomy, has currently [facility])   Patient Active Problem List    Diagnosis Date Noted   • Bandemia 2022   • Hypokalemia 2022   • Sepsis ([facility] [address] ) 2022   • S/P total abdominal hysterectomy 10/31/2022   • Ileostomy, has currently ([facility] ) 10/31/2022   • Acute blood loss anemia (ABLA) 10/31/2022   • S/P splenectomy 10/31/2022   • Anxiety disorder due to medical condition 2022   • Malignant neoplasm of left ovary ([facility] [address] ) 2022   • Giant cell aortic arteritis ([facility] )    • Prediabetes    • 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[facility] ) 2022   • Lower abdominal pain 06/15/2022   • Change in bowel habit 06/15/2022   • Personal history of colonic polyps 06/15/2022   • Long term (current) use of systemic steroids 06/15/2022   • Asthma    • Hyperlipidemia    • Hypertension    • Gastroesophageal reflux disease 2019   • Constipation 2019   • Hemorrhoids 2019   • Family history of colonic polyps 2019      LOS (days): 0  Geometric Mean LOS (GMLOS) (days):   Days to GMLOS:     OBJECTIVE:            Current admission status: Observation   Preferred Pharmacy:   [facility]/pharmacy [address] [place] [address] [address]  [address] [address]  Phone: [phone] Fax: [address] [place] [address]  Phone: [phone] Fax: [phone]    Primary Care Provider: Rosanna Penny MD    Primary Insurance: MEDICARE  Secondary Insurance: COMMERCIAL MISCELLANEOUS    DISCHARGE DETAILS:             Additional Comments: Patient is currently written for discharge  She is here under "observation" status  CM has not been notified of any needs for time of discharge

## 2022-11-16 NOTE — DISCHARGE INSTR - OTHER ORDERS
Skin Care Plan:  1-Preventative hydraguard to B/L sacro-buttocks twice a day or as needed  2-Turn/reposition q2h or when medically stable for pressure re-distribution on skin   3-Elevate heels to offload pressure  4-Moisturize skin daily with skin nourishing cream  5-Ehob cushion in chair when out of bed  Ostomy Bag Change Steps:   1  Peel back pouch using push-pull method, may use non-alcohol adhesive remover   2  Use warm water only to cleanse skin around the stoma (ling-stomal skin)  3  Make sure all adhesive residue is removed and skin is dry and not oily  4  Measure stoma size using measuring guide and trace correct measurements onto back of pouch (Off set opening away from abdominal incision)  5  Then cut or mold backing of pouch out to correct shape/size  6  Use 3M no sting barrier film to prep the skin around the stoma  7  Place pouch over stoma and onto skin  8  Use warmth of hand to apply gentle pressure to help backing of pouch to adhere well to skin  9  Supplies left the bedside  10  Encourage patient to engage in self care of their ostomy - emptying etc   11  Encourage patient to read over the ostomy educational materials  12  Empty the pouch when no more than 1/3 full to prevent leaking or lifting of the barrier  Change pouch every 3-4 days and as needed for soilage/dislodgement

## 2022-11-17 ENCOUNTER — OFFICE VISIT (OUTPATIENT)
Dept: WOUND CARE | Facility: HOSPITAL | Age: 72
End: 2022-11-17

## 2022-11-17 VITALS
SYSTOLIC BLOOD PRESSURE: 104 MMHG | TEMPERATURE: 100.4 F | DIASTOLIC BLOOD PRESSURE: 68 MMHG | RESPIRATION RATE: 16 BRPM | HEART RATE: 72 BPM

## 2022-11-17 DIAGNOSIS — T81.31XA DEHISCENCE OF POSTOPERATIVE WOUND OF ABDOMEN: Primary | ICD-10-CM

## 2022-11-17 RX ORDER — LIDOCAINE HYDROCHLORIDE 40 MG/ML
5 SOLUTION TOPICAL ONCE
Status: COMPLETED | OUTPATIENT
Start: 2022-11-17 | End: 2022-11-17

## 2022-11-17 RX ADMIN — LIDOCAINE HYDROCHLORIDE 5 ML: 40 SOLUTION TOPICAL at 15:39

## 2022-11-17 NOTE — PROGRESS NOTES
Patient ID: Scot Strickland is a 67 y o  female Date of Birth 1950     Chief Complaint  Chief Complaint   Patient presents with   • Follow Up Wound Care Visit     Vac dressing intact on arrival, being changed every other day by VNA  Allergies  Nsaids and Pedi-pre tape spray [wound dressing adhesive]    Assessment:     Diagnoses and all orders for this visit:    Dehiscence of postoperative wound of abdomen  -     lidocaine (XYLOCAINE) 4 % topical solution 5 mL  -     Wound cleansing and dressings; Future  -     Debridement              Debridement   Wound 11/10/22 Surgical Abdomen Medial;Lower    Universal Protocol:  Consent: Written consent obtained  Consent given by: patient  Time out: Immediately prior to procedure a "time out" was called to verify the correct patient, procedure, equipment, support staff and site/side marked as required  Timeout called at: 11/17/2022 3:50 PM   Patient identity confirmed: verbally with patient      Performed by: NP  Debridement type: selective  Pain control: lidocaine 4%  Pre-debridement measurements  Length (cm): 9 5  Width (cm): 3 9  Depth (cm): 3  Surface Area (cm^2): 37 05  Volume (cm^3): 111 15    Post-debridement measurements  Length (cm): 9 5  Width (cm): 3 9  Depth (cm): 3  Percent debrided: 100%  Surface Area (cm^2): 37 05  Area debrided (cm^2): 37 05  Volume (cm^3): 111 15  Devitalized tissue debrided: biofilm, fibrin and slough  Instrument(s) utilized: curette  Bleeding: small  Hemostasis obtained with: pressure  Procedural pain (0-10): 0  Post-procedural pain: 0   Response to treatment: procedure was tolerated well          Plan:  1  F/u visit  Wound debrided  Wound improving and measuring smaller  Will D/C adaptic since exposed suture is covered with granulation tissue  Will continue to apply black foam to wound on 125mmHg continuous suction changed 3x per week  2  Patient had a low grade temp 100 at appointment today   Patient's wound not showing any s/s of infection  Patient otherwise feels fine  Counseled patient to continue to monitor her temp over the weekend and if it increases she needs to be seen in the ED immediately  Patient verbalized agreement and understanding  3  Patient will follow up in 2 weeks  Wound 11/10/22 Surgical Abdomen Medial;Lower (Active)   Wound Image Images linked 11/17/22 1458   Wound Description Granulation tissue 11/17/22 1458   Ailyn-wound Assessment Intact 11/17/22 1458   Wound Length (cm) 9 5 cm 11/17/22 1458   Wound Width (cm) 3 9 cm 11/17/22 1458   Wound Depth (cm) 3 cm 11/17/22 1458   Wound Surface Area (cm^2) 37 05 cm^2 11/17/22 1458   Wound Volume (cm^3) 111 15 cm^3 11/17/22 1458   Calculated Wound Volume (cm^3) 111 15 cm^3 11/17/22 1458   Change in Wound Size % 59 58 11/17/22 1458   Drainage Amount Moderate 11/17/22 1458   Drainage Description Serosanguineous 11/17/22 1458   Non-staged Wound Description Full thickness 11/17/22 1458   Dressing Status Intact 11/17/22 1458       Wound 11/10/22 Surgical Abdomen Medial;Lower (Active)   Date First Assessed/Time First Assessed: 11/10/22 1439   Primary Wound Type: Surgical  Location: Abdomen  Wound Location Orientation: Medial;Lower       [REMOVED] Wound 08/04/22 Abdomen N/A (Removed)   Resolved Date/Resolved Time: 11/10/22 1438  Date First Assessed/Time First Assessed: 08/04/22 1654   Location: Abdomen  Wound Location Orientation: N/A  Wound Description (Comments): 2 trocar incision sites, both with skin glue, per MD   Incision's 1s    [REMOVED] Wound 10/28/22 Abdomen N/A (Removed)   Resolved Date/Resolved Time: 11/10/22 1438  Date First Assessed/Time First Assessed: 10/28/22 1953   Location: Abdomen  Wound Location Orientation: N/A  Wound Description (Comments): SKIN STAPLED CLOSED  Incision's 1st Dressing: DRESSING TELFA 3 X 8 I           [REMOVED] Wound 10/28/22 Abdomen Left (Removed)   Resolved Date/Resolved Time: 11/10/22 1438  Date First Assessed/Time First Assessed: 10/28/22 1953   Location: Abdomen  Wound Location Orientation: Left  Wound Description (Comments): DRAIN SITES X2  Incision's 1st Dressing: DRESSING TRACH T-SLIT 6 PL    Subjective:     F/u visit non-healing surgical wound of abdomen  No new complaints  She denies any pain, fevers, or chills  Tolerating wound VAC  The following portions of the patient's history were reviewed and updated as appropriate:   She  has a past medical history of Abdominal pain, Allergic sinusitis, Anxiety, Arthritis, Asthma, Cholelithiasis, Colon polyp, Degenerative joint disease, Dental bridge present, Exercise involving walking, GERD (gastroesophageal reflux disease), Giant cell aortic arteritis ([facility] ), Hiatal hernia, History of cancer chemotherapy, Hyperlipidemia, Hypertension, Nausea, 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[facility] [facility] ), PMR (polymyalgia rheumatica) ([facility] [facility] ), Prediabetes, Shortness of breath, Tinnitus, and Wears glasses  She   Patient Active Problem List    Diagnosis Date Noted   • Bandemia 11/16/2022   • Hypokalemia 11/07/2022   • Sepsis ([facility] [facility] ) 11/05/2022   • S/P total abdominal hysterectomy 10/31/2022   • Ileostomy, has currently ([facility] ) 10/31/2022   • Acute blood loss anemia (ABLA) 10/31/2022   • S/P splenectomy 10/31/2022   • Anxiety disorder due to medical condition 09/29/2022   • Malignant neoplasm of left ovary ([facility] ) 08/09/2022   • Giant cell aortic arteritis ([facility] )    • Prediabetes    • 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[facility] ) 07/21/2022   • Lower abdominal pain 06/15/2022   • Change in bowel habit 06/15/2022   • Personal history of colonic polyps 06/15/2022   • Long term (current) use of systemic steroids 06/15/2022   • Asthma    • Hyperlipidemia    • Hypertension    • Gastroesophageal reflux disease 03/13/2019   • Constipation 03/13/2019   • Hemorrhoids 03/13/2019   • Family history of colonic polyps 03/13/2019     She  has a past surgical history that includes Upper gastrointestinal endoscopy (11/10/2014);  Temporal artery biopsy / ligation; Colonoscopy; Dilation and curettage of uterus; pr lap,diagnostic abdomen (N/A, 08/04/2022); [place] tooth extraction; pr lap,diagnostic abdomen (N/A, 10/28/2022); pr exploratory of abdomen (N/A, 10/28/2022); pr total abdom hysterectomy (N/A, 10/28/2022); pr part removal colon w anastomosis (N/A, 10/28/2022); Small intestine surgery (N/A, 10/28/2022); Liver surgery (10/28/2022); [place] loop diversion (N/A, 10/28/2022); Coloproctectomy (N/A, 10/28/2022); and Splenectomy, total (N/A, 10/28/2022)  Her family history includes Alzheimer's disease in her mother; Brain cancer in her father; Colon polyps in her mother and sister; Diabetes in her sister; MARLEY disease in her sister; Heart disease in her father and sister  She  reports that she has never smoked  She has never used smokeless tobacco  She reports current alcohol use  She reports that she does not use drugs    Current Outpatient Medications   Medication Sig Dispense Refill   • Acetaminophen (TYLENOL ARTHRITIS PAIN PO) Take by mouth as needed     • albuterol (PROVENTIL HFA,VENTOLIN HFA) 90 mcg/act inhaler Inhale 2 puffs every 4 (four) hours as needed      • Ascorbic Acid (VITAMIN C GUMMIE PO) Take by mouth     • aspirin 81 mg chewable tablet Chew 81 mg daily     • B Complex-C (B COMPLEX-VITAMIN C PO) Take by mouth daily     • betamethasone, augmented, (DIPROLENE) 0 05 % lotion Apply topically as needed     • Calcium Carb-Cholecalciferol (CALCIUM 500+D3 PO) Take 1 tablet by mouth 2 (two) times a day      • cholecalciferol (VITAMIN D3) 1,000 units tablet Take 1,000 Units by mouth daily     • Docusate Calcium (STOOL SOFTENER PO) Take 1 tablet by mouth 2 (two) times a day     • escitalopram (LEXAPRO) 10 mg tablet TAKE 1 TABLET BY MOUTH EVERY DAY 90 tablet 2   • famotidine (PEPCID) 40 MG tablet Take 1 tablet (40 mg total) by mouth daily at bedtime 90 tablet 3   • fexofenadine (ALLEGRA) 180 MG tablet Take 180 mg by mouth daily     • FIBER ADULT GUMMIES PO Take by mouth if needed     • GLUCOSAMINE-CHONDROITIN PO Take 1 tablet by mouth 2 (two) times a day      • lisinopril (ZESTRIL) 5 mg tablet Take 5 mg by mouth daily dinner     • LORazepam (ATIVAN) 1 mg tablet Take 1 tablet (1 mg total) by mouth every 8 (eight) hours as needed for anxiety (nausea or anxiety) (Patient taking differently: Take 1 mg by mouth every 8 (eight) hours as needed for anxiety (nausea or anxiety) Per pt usually takes q night) 30 tablet 0   • metFORMIN (GLUCOPHAGE) 500 mg tablet Take 500 mg by mouth daily Daily at lunch     • metoprolol succinate (TOPROL-XL) 25 mg 24 hr tablet Take 25 mg by mouth daily dinner     • montelukast (SINGULAIR) 10 mg tablet Take 10 mg by mouth daily dinner     • multivitamin (THERAGRAN) TABS Take 1 tablet by mouth daily     • omeprazole (PriLOSEC) 20 mg delayed release capsule TAKE 1 CAPSULE BY MOUTH EVERY DAY 30 MINUTES BEFORE MORNING MEAL FOR 90 DAYS     • ondansetron (ZOFRAN) 8 mg tablet Take 1 tablet (8 mg total) by mouth every 8 (eight) hours as needed for nausea or vomiting 20 tablet 1   • predniSONE 1 mg tablet Take 3 mg by mouth daily     • Probiotic Product (PROBIOTIC DAILY PO) Take by mouth daily     • simvastatin (ZOCOR) 10 mg tablet Take 10 mg by mouth daily at bedtime     • SYMBICORT 160-4 5 MCG/ACT inhaler 2 puffs daily        No current facility-administered medications for this visit  She is allergic to nsaids and pedi-pre tape spray [wound dressing adhesive]       Review of Systems   Constitutional: Negative  HENT: Negative for ear pain and hearing loss  Eyes: Negative for pain  Respiratory: Negative for chest tightness and shortness of breath  Cardiovascular: Negative for chest pain, palpitations and leg swelling  Gastrointestinal: Negative for diarrhea, nausea and vomiting  Genitourinary: Negative for dysuria  Musculoskeletal: Negative for gait problem  Skin: Positive for wound  Neurological: Negative for tremors and weakness  Psychiatric/Behavioral: Negative for behavioral problems, confusion and suicidal ideas  Objective:       Wound 11/10/22 Surgical Abdomen Medial;Lower (Active)   Wound Image Images linked 11/17/22 1458   Wound Description Granulation tissue 11/17/22 1458   Ailyn-wound Assessment Intact 11/17/22 1458   Wound Length (cm) 9 5 cm 11/17/22 1458   Wound Width (cm) 3 9 cm 11/17/22 1458   Wound Depth (cm) 3 cm 11/17/22 1458   Wound Surface Area (cm^2) 37 05 cm^2 11/17/22 1458   Wound Volume (cm^3) 111 15 cm^3 11/17/22 1458   Calculated Wound Volume (cm^3) 111 15 cm^3 11/17/22 1458   Change in Wound Size % 59 58 11/17/22 1458   Drainage Amount Moderate 11/17/22 1458   Drainage Description Serosanguineous 11/17/22 1458   Non-staged Wound Description Full thickness 11/17/22 1458   Dressing Status Intact 11/17/22 1458       /68   Pulse 72   Temp 100 4 °F (38 °C)   Resp 16             Wound Instructions:  Orders Placed This Encounter   Procedures   • Wound cleansing and dressings     Abdomen wound    Wash your hands with soap and water  Remove old dressing, discard into plastic bag and place in trash  Cleanse the wound with saline,  prior to applying a clean dressing  Do not use tissue or cotton balls  Do not scrub the wound  Pat dry using gauze  Shower no   dakins moist gauze placed into wound bed today, covered with abd and paper tape  VNA to apply wound vac Friday 11/18/2022  Negative Pressure Wound Therapy Instructions    Apply black granufoam to wound bed, discontinue adaptic to wound bed    Set negative pressure on continuous at 125 and VAC dressing to be changed three times per week as ordered  Done today  Return 12/5/2022     Standing Status:   Future     Standing Expiration Date:   11/17/2023   • Debridement     This order was created via procedure documentation        Diagnosis ICD-10-CM Associated Orders   1   Dehiscence of postoperative wound of abdomen  T81 31XA lidocaine (XYLOCAINE) 4 % topical solution 5 mL     Wound cleansing and dressings     Debridement

## 2022-11-17 NOTE — PATIENT INSTRUCTIONS
Orders Placed This Encounter   Procedures    Wound cleansing and dressings     Abdomen wound    Wash your hands with soap and water  Remove old dressing, discard into plastic bag and place in trash  Cleanse the wound with saline,  prior to applying a clean dressing  Do not use tissue or cotton balls  Do not scrub the wound  Pat dry using gauze  Shower no   dakins moist gauze placed into wound bed today, covered with abd and paper tape  VNA to apply wound vac Friday 11/18/2022  Negative Pressure Wound Therapy Instructions    Apply black granufoam to wound bed, discontinue adaptic to wound bed    Set negative pressure on continuous at 125 and VAC dressing to be changed three times per week as ordered         Done today  Return 12/5/2022     Standing Status:   Future     Standing Expiration Date:   11/17/2023

## 2022-11-17 NOTE — LETTER
[address] [facility]  [address] [place] [address]  Phone#  [phone]  Fax#  [phone]    Patient:  Gypsy Suresh  YOB: 1950  Phone:  [phone]  Date of Visit:  11/17/2022    Orders Placed This Encounter   Procedures   • Wound cleansing and dressings     Abdomen wound    Wash your hands with soap and water  Remove old dressing, discard into plastic bag and place in trash  Cleanse the wound with saline,  prior to applying a clean dressing  Do not use tissue or cotton balls  Do not scrub the wound  Pat dry using gauze  Shower no   dakins moist gauze placed into wound bed today, covered with abd and paper tape  VNA to apply wound vac Friday 11/18/2022  Negative Pressure Wound Therapy Instructions    Apply black granufoam to wound bed, discontinue adaptic to wound bed    Set negative pressure on continuous at 125 and VAC dressing to be changed three times per week as ordered         Done today  Return 12/5/2022     Standing Status:   Future     Standing Expiration Date:   11/17/2023         Electronically signed by ANTONELLA Durham

## 2022-11-22 ENCOUNTER — TELEPHONE (OUTPATIENT)
Dept: SURGICAL ONCOLOGY | Facility: CLINIC | Age: 72
End: 2022-11-22

## 2022-11-22 NOTE — TELEPHONE ENCOUNTER
Pt called asking if Dr Staci Esteban would be removing items for woundcare at her appointment on Monday 11/28/22 and would like a call back from someone that would know that answer [phone]

## 2022-11-28 ENCOUNTER — OFFICE VISIT (OUTPATIENT)
Dept: GYNECOLOGIC ONCOLOGY | Facility: CLINIC | Age: 72
End: 2022-11-28

## 2022-11-28 VITALS
OXYGEN SATURATION: 97 % | WEIGHT: 167 LBS | HEART RATE: 114 BPM | HEIGHT: 65 IN | SYSTOLIC BLOOD PRESSURE: 160 MMHG | BODY MASS INDEX: 27.82 KG/M2 | TEMPERATURE: 97.4 F | DIASTOLIC BLOOD PRESSURE: 98 MMHG

## 2022-11-28 DIAGNOSIS — C56.2 MALIGNANT NEOPLASM OF LEFT OVARY (HCC): Primary | ICD-10-CM

## 2022-11-28 DIAGNOSIS — T81.49XA WOUND INFECTION AFTER SURGERY: ICD-10-CM

## 2022-11-28 RX ORDER — CEPHALEXIN 500 MG/1
CAPSULE ORAL
COMMUNITY
Start: 2022-11-22

## 2022-11-28 NOTE — LETTER
November 29, 2022     Floresita Grigsby, [address] [facility] [address]    Patient: Loi Rodrigues   YOB: 1950   Date of Visit: 11/28/2022       Dear Dr Gilbert Peck:    Thank you for referring Danilosie Bence to me for evaluation  Below are my notes for this consultation  If you have questions, please do not hesitate to call me  I look forward to following your patient along with you  Sincerely,        Akilah Lindsey MD        CC: No Recipients  Akilah Lindsey MD  11/29/2022  6:47 AM  Sign when Signing Visit  Assessment/Plan:    Problem List Items Addressed This Visit        Endocrine    Malignant neoplasm of left ovary ([facility] [address] ) - Primary     22-year-old with stage IIIC ovarian cancer status post 3 cycles of neoadjuvant chemotherapy and tumor debulking surgery inclusive of modified radical hysterectomy, en bloc rectosigmoid resection with end-to-end reanastomosis, small bowel resection with reanastomosis, radical omentectomy, splenectomy, diaphragm resection, ileostomy, optimal tumor debulking  Postoperative course complicated by wound infection with debridement  She has a wound VAC in Situ  I reviewed her CBC, BMP, CT chest abdomen pelvis images  Her performance status is 2   1  Wound VAC was change in the office  The wound measures 8 cm in length by 2 cm in depth by 3 cm in width  There is excellent granulation tissue present  2  Leukocytosis and thrombocytosis likely reactive and or secondary to splenectomy  Based on CT imaging, there is no evidence of abscess  She has been afebrile  Clinically, she continues to improve  3  Plan for adjuvant chemotherapy with carboplatin at AUC 5, Taxol 135 milligrams/meter squared for cycle 4  Will add Avastin with cycle 5  Depending on wound healing  4  Referral to interventional Radiology for MediPort placement  5  Continue prophylactic Eliquis  She is also on aspirin 81 mg daily             Relevant Orders Ambulatory referral to Interventional Radiology         CHIEF COMPLAINT:  Postoperative evaluation       Problem:  Cancer Staging   Malignant neoplasm of left ovary (HCC)  Staging form: Ovary, Fallopian Tube, Primary Peritoneal, AJCC 8th Edition  - Clinical: FIGO Stage IIIC (cT3c, cN0, cM0) - Signed by Viraj Doty MD on 11/29/2022        Previous therapy:  Oncology History   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[facility] [address] )   7/21/2022 Initial Diagnosis    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[facility] [address] )     8/4/2022 Surgery    Diagnostic laparoscopy, peritoneal biopsy     8/18/2022 Genetic Testing    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, serous    Results revealed patient has the following mutation(s):  None     Malignant neoplasm of left ovary ([facility] [address] )   8/4/2022 Surgery    Diagnostic laparoscopy, peritoneal biopsy     8/9/2022 Initial Diagnosis    [facility] )     8/23/2022 -  Chemotherapy    palonosetron (ALOXI), 0 25 mg, Intravenous, Once, 3 of 7 cycles  Administration: 0 25 mg (8/23/2022), 0 25 mg (9/13/2022), 0 25 mg (10/4/2022)  fosaprepitant (EMEND) IVPB, 150 mg, Intravenous, Once, 3 of 7 cycles  Administration: 150 mg (8/23/2022), 150 mg (9/13/2022), 150 mg (10/4/2022)  CARBOplatin (PARAPLATIN) IVPB (GOG AUC DOSING), 605 4 mg, Intravenous, Once, 3 of 6 cycles  Administration: 600 mg (8/23/2022), 564 mg (9/13/2022), 601 8 mg (10/4/2022)  bevacizumab (AVASTIN) IVPB, 1,252 5 mg, Intravenous, Once, 2 of 5 cycles  Administration: 1,200 mg (8/23/2022), 1,200 mg (9/13/2022)  PACLItaxel (TAXOL) chemo IVPB, 175 mg/m2 = 330 6 mg, Intravenous, Once, 3 of 6 cycles  Administration: 330 6 mg (8/23/2022), 330 6 mg (9/13/2022), 330 6 mg (10/4/2022)     10/28/2022 Surgery    diagnostic laparoscopy, exploratory laparotomy, modified radical hysterectomy, bilateral salpingo-oophorectomy with en bloc rectosigmoid resection, gastrocolic omentectomy, splenectomy, small bowel resection with reanastomosis, diaphragm resection, excision ablation of peritoneal implants, ileostomy formation, optimal tumor debulking     11/29/2022 -  Cancer Staged    Staging form: Ovary, Fallopian Tube, Primary Peritoneal, AJCC 8th Edition  - Clinical: FIGO Stage IIIC (cT3c, cN0, cM0) - Signed by Louann Duron MD on 11/29/2022  Histologic grade (G): G3  Histologic grading system: 4 grade system             Patient ID: Edwin Marsh is a 67 y o  female  Who returns for postoperative evaluation  She would extensive debulking surgery with a postoperative course was complicated by superficial wound separation with debridement and placement of wound VAC  She is being seen by wound care  She was readmitted for bleeding at the ostomy site  This did not require transfusion  It resolved spontaneously  She is currently at home  She is ambulating to the bathroom and is able to walk some without the walker for assistance  She is caring for her ileostomy  She has been afebrile but states that when she goes to Wound Care or is evaluated by the wound care nurse, her temperature is around 100  She had a CT scan of the chest abdomen pelvis on 11/15/2022 that revealed a 5 9 x 4 4 cm fluid collection in the splenic bed without evidence of gas or rim enhancement  There was no other evidence of abscess  There was fluid and thickening of the right hemidiaphragm consistent with postoperative changes  CBC on 11/16/2022 revealed a total white blood cell count of 16 2, hemoglobin 9 7 grams/deciliter, platelet count 136 K  Serum creatinine 0 52 mg/dL  Blood glucose 168 mg/dL  She is not having any vaginal bleeding  Her pain is controlled with Tylenol  She continues to take Eliquis for DVT prophylaxis and has started aspirin 81 mg  She is also taking her prednisone for her temporal arteritis  She is not vomiting  She has occasional nausea  Her appetite is relatively poor          The following portions of the patient's history were reviewed and updated as appropriate: allergies, current medications, past family history, past medical history, past social history, past surgical history and problem list     Review of Systems      Current Outpatient Medications:   •  Acetaminophen (TYLENOL ARTHRITIS PAIN PO), Take by mouth as needed, Disp: , Rfl:   •  albuterol (PROVENTIL HFA,VENTOLIN HFA) 90 mcg/act inhaler, Inhale 2 puffs every 4 (four) hours as needed , Disp: , Rfl:   •  Ascorbic Acid (VITAMIN C GUMMIE PO), Take by mouth, Disp: , Rfl:   •  B Complex-C (B COMPLEX-VITAMIN C PO), Take by mouth daily, Disp: , Rfl:   •  betamethasone, augmented, (DIPROLENE) 0 05 % lotion, Apply topically as needed, Disp: , Rfl:   •  Calcium Carb-Cholecalciferol (CALCIUM 500+D3 PO), Take 1 tablet by mouth 2 (two) times a day , Disp: , Rfl:   •  cephalexin (KEFLEX) 500 mg capsule, TAKE 1 CAPSULE 3 TIMES A DAY FOR 7 DAYS, Disp: , Rfl:   •  cholecalciferol (VITAMIN D3) 1,000 units tablet, Take 1,000 Units by mouth daily, Disp: , Rfl:   •  Docusate Calcium (STOOL SOFTENER PO), Take 1 tablet by mouth 2 (two) times a day, Disp: , Rfl:   •  escitalopram (LEXAPRO) 10 mg tablet, TAKE 1 TABLET BY MOUTH EVERY DAY, Disp: 90 tablet, Rfl: 2  •  famotidine (PEPCID) 40 MG tablet, Take 1 tablet (40 mg total) by mouth daily at bedtime, Disp: 90 tablet, Rfl: 3  •  fexofenadine (ALLEGRA) 180 MG tablet, Take 180 mg by mouth daily, Disp: , Rfl:   •  FIBER ADULT GUMMIES PO, Take by mouth if needed, Disp: , Rfl:   •  GLUCOSAMINE-CHONDROITIN PO, Take 1 tablet by mouth 2 (two) times a day , Disp: , Rfl:   •  lisinopril (ZESTRIL) 5 mg tablet, Take 5 mg by mouth daily dinner, Disp: , Rfl:   •  LORazepam (ATIVAN) 1 mg tablet, Take 1 tablet (1 mg total) by mouth every 8 (eight) hours as needed for anxiety (nausea or anxiety) (Patient taking differently: Take 1 mg by mouth every 8 (eight) hours as needed for anxiety (nausea or anxiety) Per pt usually takes q night), Disp: 30 tablet, Rfl: 0  •  metFORMIN (GLUCOPHAGE) 500 mg tablet, Take 500 mg by mouth daily Daily at lunch, Disp: , Rfl:   •  metoprolol succinate (TOPROL-XL) 25 mg 24 hr tablet, Take 25 mg by mouth daily dinner, Disp: , Rfl:   •  montelukast (SINGULAIR) 10 mg tablet, Take 10 mg by mouth daily dinner, Disp: , Rfl:   •  multivitamin (THERAGRAN) TABS, Take 1 tablet by mouth daily, Disp: , Rfl:   •  omeprazole (PriLOSEC) 20 mg delayed release capsule, TAKE 1 CAPSULE BY MOUTH EVERY DAY 30 MINUTES BEFORE MORNING MEAL FOR 90 DAYS, Disp: , Rfl:   •  ondansetron (ZOFRAN) 8 mg tablet, Take 1 tablet (8 mg total) by mouth every 8 (eight) hours as needed for nausea or vomiting, Disp: 20 tablet, Rfl: 1  •  predniSONE 1 mg tablet, Take 3 mg by mouth daily, Disp: , Rfl:   •  Probiotic Product (PROBIOTIC DAILY PO), Take by mouth daily, Disp: , Rfl:   •  simvastatin (ZOCOR) 10 mg tablet, Take 10 mg by mouth daily at bedtime, Disp: , Rfl:   •  SYMBICORT 160-4 5 MCG/ACT inhaler, 2 puffs daily , Disp: , Rfl:     Objective:    Blood pressure 160/98, pulse (!) 114, temperature (!) 97 4 °F (36 3 °C), temperature source Temporal, height 5' 4 5" (1 638 m), weight 75 8 kg (167 lb), SpO2 97 %  Body mass index is 28 22 kg/m²  Body surface area is 1 82 meters squared  Physical Exam  Vitals reviewed  Constitutional:       General: She is not in acute distress  Appearance: Normal appearance  She is normal weight  She is not ill-appearing, toxic-appearing or diaphoretic  HENT:      Head: Normocephalic and atraumatic  Mouth/Throat:      Mouth: Mucous membranes are dry  Eyes:      General: No scleral icterus  Right eye: No discharge  Left eye: No discharge  Conjunctiva/sclera: Conjunctivae normal    Pulmonary:      Effort: Pulmonary effort is normal    Abdominal:      General: There is no distension  Palpations: Abdomen is soft  There is no mass  Tenderness: There is no abdominal tenderness  Hernia: No hernia is present  Musculoskeletal:      Right lower leg: No edema  Left lower leg: No edema  Skin:     General: Skin is warm and dry  Coloration: Skin is pale  Skin is not jaundiced  Findings: No rash  Comments: The wound VAC was removed using saline  There was excellent granulation tissue present in the wound  No evidence of erythema, purulent discharge  The wound VAC was then replaced  The wound VAC canister did not have purulent discharge  There was minimal output  Neurological:      General: No focal deficit present  Mental Status: She is alert and oriented to person, place, and time  Cranial Nerves: No cranial nerve deficit  Motor: No weakness  Gait: Gait abnormal       Comments: Ambulating with walker   Psychiatric:         Mood and Affect: Mood normal          Behavior: Behavior normal          Thought Content:  Thought content normal          Judgment: Judgment normal

## 2022-11-29 DIAGNOSIS — C56.2 MALIGNANT NEOPLASM OF LEFT OVARY (HCC): Primary | ICD-10-CM

## 2022-11-29 PROBLEM — Z45.2 ENCOUNTER FOR VENOUS ACCESS DEVICE CARE: Status: ACTIVE | Noted: 2022-11-29

## 2022-11-29 PROBLEM — T81.49XA WOUND INFECTION AFTER SURGERY: Status: ACTIVE | Noted: 2022-11-29

## 2022-11-29 NOTE — H&P (VIEW-ONLY)
Assessment/Plan:    Problem List Items Addressed This Visit        Endocrine    Malignant neoplasm of left ovary ([facility] [address] ) - Primary     66-year-old with stage IIIC ovarian cancer status post 3 cycles of neoadjuvant chemotherapy and tumor debulking surgery inclusive of modified radical hysterectomy, en bloc rectosigmoid resection with end-to-end reanastomosis, small bowel resection with reanastomosis, radical omentectomy, splenectomy, diaphragm resection, ileostomy, optimal tumor debulking  Postoperative course complicated by wound infection with debridement  She has a wound VAC in Situ  I reviewed her CBC, BMP, CT chest abdomen pelvis images  Her performance status is 2   1  Wound VAC was change in the office  The wound measures 8 cm in length by 2 cm in depth by 3 cm in width  There is excellent granulation tissue present  2  Leukocytosis and thrombocytosis likely reactive and or secondary to splenectomy  Based on CT imaging, there is no evidence of abscess  She has been afebrile  Clinically, she continues to improve  3  Plan for adjuvant chemotherapy with carboplatin at AUC 5, Taxol 135 milligrams/meter squared for cycle 4  Will add Avastin with cycle 5  Depending on wound healing  4  Referral to interventional Radiology for MediPort placement  5  Continue prophylactic Eliquis  She is also on aspirin 81 mg daily             Relevant Orders    Ambulatory referral to Interventional Radiology         CHIEF COMPLAINT:  Postoperative evaluation       Problem:  Cancer Staging   Malignant neoplasm of left ovary (HCC)  Staging form: Ovary, Fallopian Tube, Primary Peritoneal, AJCC 8th Edition  - Clinical: FIGO Stage IIIC (cT3c, cN0, cM0) - Signed by James Holland MD on 11/29/2022        Previous therapy:  Oncology History   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[facility] [address] )   7/21/2022 Initial Diagnosis    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[facility] [address] )     8/4/2022 Surgery    Diagnostic laparoscopy, peritoneal biopsy     8/18/2022 Genetic Testing    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, serous  Results revealed patient has the following mutation(s):  None     Malignant neoplasm of left ovary ([facility] [address] )   8/4/2022 Surgery    Diagnostic laparoscopy, peritoneal biopsy     8/9/2022 Initial Diagnosis    Gynecologic malignancy ([facility] [address] )     8/23/2022 -  Chemotherapy    palonosetron (ALOXI), 0 25 mg, Intravenous, Once, 3 of 7 cycles  Administration: 0 25 mg (8/23/2022), 0 25 mg (9/13/2022), 0 25 mg (10/4/2022)  fosaprepitant (EMEND) IVPB, 150 mg, Intravenous, Once, 3 of 7 cycles  Administration: 150 mg (8/23/2022), 150 mg (9/13/2022), 150 mg (10/4/2022)  CARBOplatin (PARAPLATIN) IVPB (GOG AUC DOSING), 605 4 mg, Intravenous, Once, 3 of 6 cycles  Administration: 600 mg (8/23/2022), 564 mg (9/13/2022), 601 8 mg (10/4/2022)  bevacizumab (AVASTIN) IVPB, 1,252 5 mg, Intravenous, Once, 2 of 5 cycles  Administration: 1,200 mg (8/23/2022), 1,200 mg (9/13/2022)  PACLItaxel (TAXOL) chemo IVPB, 175 mg/m2 = 330 6 mg, Intravenous, Once, 3 of 6 cycles  Administration: 330 6 mg (8/23/2022), 330 6 mg (9/13/2022), 330 6 mg (10/4/2022)     10/28/2022 Surgery    diagnostic laparoscopy, exploratory laparotomy, modified radical hysterectomy, bilateral salpingo-oophorectomy with en bloc rectosigmoid resection, gastrocolic omentectomy, splenectomy, small bowel resection with reanastomosis, diaphragm resection, excision ablation of peritoneal implants, ileostomy formation, optimal tumor debulking     11/29/2022 -  Cancer Staged    Staging form: Ovary, Fallopian Tube, Primary Peritoneal, AJCC 8th Edition  - Clinical: FIGO Stage IIIC (cT3c, cN0, cM0) - Signed by Eliane Sales MD on 11/29/2022  Histologic grade (G): G3  Histologic grading system: 4 grade system             Patient ID: Viktor Branch is a 67 y o  female  Who returns for postoperative evaluation    She would extensive debulking surgery with a postoperative course was complicated by superficial wound separation with debridement and placement of wound VAC  She is being seen by wound care  She was readmitted for bleeding at the ostomy site  This did not require transfusion  It resolved spontaneously  She is currently at home  She is ambulating to the bathroom and is able to walk some without the walker for assistance  She is caring for her ileostomy  She has been afebrile but states that when she goes to Wound Care or is evaluated by the wound care nurse, her temperature is around 100  She had a CT scan of the chest abdomen pelvis on 11/15/2022 that revealed a 5 9 x 4 4 cm fluid collection in the splenic bed without evidence of gas or rim enhancement  There was no other evidence of abscess  There was fluid and thickening of the right hemidiaphragm consistent with postoperative changes  CBC on 11/16/2022 revealed a total white blood cell count of 16 2, hemoglobin 9 7 grams/deciliter, platelet count 226 K  Serum creatinine 0 52 mg/dL  Blood glucose 168 mg/dL  She is not having any vaginal bleeding  Her pain is controlled with Tylenol  She continues to take Eliquis for DVT prophylaxis and has started aspirin 81 mg  She is also taking her prednisone for her temporal arteritis  She is not vomiting  She has occasional nausea  Her appetite is relatively poor          The following portions of the patient's history were reviewed and updated as appropriate: allergies, current medications, past family history, past medical history, past social history, past surgical history and problem list     Review of Systems      Current Outpatient Medications:   •  Acetaminophen (TYLENOL ARTHRITIS PAIN PO), Take by mouth as needed, Disp: , Rfl:   •  albuterol (PROVENTIL HFA,VENTOLIN HFA) 90 mcg/act inhaler, Inhale 2 puffs every 4 (four) hours as needed , Disp: , Rfl:   •  Ascorbic Acid (VITAMIN C GUMMIE PO), Take by mouth, Disp: , Rfl:   •  B Complex-C (B COMPLEX-VITAMIN C PO), Take by mouth daily, Disp: , Rfl:   •  betamethasone, augmented, (DIPROLENE) 0 05 % lotion, Apply topically as needed, Disp: , Rfl:   •  Calcium Carb-Cholecalciferol (CALCIUM 500+D3 PO), Take 1 tablet by mouth 2 (two) times a day , Disp: , Rfl:   •  cephalexin (KEFLEX) 500 mg capsule, TAKE 1 CAPSULE 3 TIMES A DAY FOR 7 DAYS, Disp: , Rfl:   •  cholecalciferol (VITAMIN D3) 1,000 units tablet, Take 1,000 Units by mouth daily, Disp: , Rfl:   •  Docusate Calcium (STOOL SOFTENER PO), Take 1 tablet by mouth 2 (two) times a day, Disp: , Rfl:   •  escitalopram (LEXAPRO) 10 mg tablet, TAKE 1 TABLET BY MOUTH EVERY DAY, Disp: 90 tablet, Rfl: 2  •  famotidine (PEPCID) 40 MG tablet, Take 1 tablet (40 mg total) by mouth daily at bedtime, Disp: 90 tablet, Rfl: 3  •  fexofenadine (ALLEGRA) 180 MG tablet, Take 180 mg by mouth daily, Disp: , Rfl:   •  FIBER ADULT GUMMIES PO, Take by mouth if needed, Disp: , Rfl:   •  GLUCOSAMINE-CHONDROITIN PO, Take 1 tablet by mouth 2 (two) times a day , Disp: , Rfl:   •  lisinopril (ZESTRIL) 5 mg tablet, Take 5 mg by mouth daily dinner, Disp: , Rfl:   •  LORazepam (ATIVAN) 1 mg tablet, Take 1 tablet (1 mg total) by mouth every 8 (eight) hours as needed for anxiety (nausea or anxiety) (Patient taking differently: Take 1 mg by mouth every 8 (eight) hours as needed for anxiety (nausea or anxiety) Per pt usually takes q night), Disp: 30 tablet, Rfl: 0  •  metFORMIN (GLUCOPHAGE) 500 mg tablet, Take 500 mg by mouth daily Daily at lunch, Disp: , Rfl:   •  metoprolol succinate (TOPROL-XL) 25 mg 24 hr tablet, Take 25 mg by mouth daily dinner, Disp: , Rfl:   •  montelukast (SINGULAIR) 10 mg tablet, Take 10 mg by mouth daily dinner, Disp: , Rfl:   •  multivitamin (THERAGRAN) TABS, Take 1 tablet by mouth daily, Disp: , Rfl:   •  omeprazole (PriLOSEC) 20 mg delayed release capsule, TAKE 1 CAPSULE BY MOUTH EVERY DAY 30 MINUTES BEFORE MORNING MEAL FOR 90 DAYS, Disp: , Rfl:   •  ondansetron (ZOFRAN) 8 mg tablet, Take 1 tablet (8 mg total) by mouth every 8 (eight) hours as needed for nausea or vomiting, Disp: 20 tablet, Rfl: 1  •  predniSONE 1 mg tablet, Take 3 mg by mouth daily, Disp: , Rfl:   •  Probiotic Product (PROBIOTIC DAILY PO), Take by mouth daily, Disp: , Rfl:   •  simvastatin (ZOCOR) 10 mg tablet, Take 10 mg by mouth daily at bedtime, Disp: , Rfl:   •  SYMBICORT 160-4 5 MCG/ACT inhaler, 2 puffs daily , Disp: , Rfl:     Objective:    Blood pressure 160/98, pulse (!) 114, temperature (!) 97 4 °F (36 3 °C), temperature source Temporal, height 5' 4 5" (1 638 m), weight 75 8 kg (167 lb), SpO2 97 %  Body mass index is 28 22 kg/m²  Body surface area is 1 82 meters squared  Physical Exam  Vitals reviewed  Constitutional:       General: She is not in acute distress  Appearance: Normal appearance  She is normal weight  She is not ill-appearing, toxic-appearing or diaphoretic  HENT:      Head: Normocephalic and atraumatic  Mouth/Throat:      Mouth: Mucous membranes are dry  Eyes:      General: No scleral icterus  Right eye: No discharge  Left eye: No discharge  Conjunctiva/sclera: Conjunctivae normal    Pulmonary:      Effort: Pulmonary effort is normal    Abdominal:      General: There is no distension  Palpations: Abdomen is soft  There is no mass  Tenderness: There is no abdominal tenderness  Hernia: No hernia is present  Musculoskeletal:      Right lower leg: No edema  Left lower leg: No edema  Skin:     General: Skin is warm and dry  Coloration: Skin is pale  Skin is not jaundiced  Findings: No rash  Comments: The wound VAC was removed using saline  There was excellent granulation tissue present in the wound  No evidence of erythema, purulent discharge  The wound VAC was then replaced  The wound VAC canister did not have purulent discharge  There was minimal output  Neurological:      General: No focal deficit present        Mental Status: She is alert and oriented to person, place, and time  Cranial Nerves: No cranial nerve deficit  Motor: No weakness  Gait: Gait abnormal       Comments: Ambulating with walker   Psychiatric:         Mood and Affect: Mood normal          Behavior: Behavior normal          Thought Content:  Thought content normal          Judgment: Judgment normal

## 2022-11-29 NOTE — ASSESSMENT & PLAN NOTE
79-year-old with stage IIIC ovarian cancer status post 3 cycles of neoadjuvant chemotherapy and tumor debulking surgery inclusive of modified radical hysterectomy, en bloc rectosigmoid resection with end-to-end reanastomosis, small bowel resection with reanastomosis, radical omentectomy, splenectomy, diaphragm resection, ileostomy, optimal tumor debulking  Postoperative course complicated by wound infection with debridement  She has a wound VAC in Situ  I reviewed her CBC, BMP, CT chest abdomen pelvis images  Her performance status is 2   1  Wound VAC was change in the office  The wound measures 8 cm in length by 2 cm in depth by 3 cm in width  There is excellent granulation tissue present  2  Leukocytosis and thrombocytosis likely reactive and or secondary to splenectomy  Based on CT imaging, there is no evidence of abscess  She has been afebrile  Clinically, she continues to improve  3  Plan for adjuvant chemotherapy with carboplatin at AUC 5, Taxol 135 milligrams/meter squared for cycle 4  Will add Avastin with cycle 5  Depending on wound healing  4  Referral to interventional Radiology for MediPort placement  5  Continue prophylactic Eliquis  She is also on aspirin 81 mg daily

## 2022-11-29 NOTE — PROGRESS NOTES
Assessment/Plan:    Problem List Items Addressed This Visit        Endocrine    Malignant neoplasm of left ovary ([facility] [address] ) - Primary     72-year-old with stage IIIC ovarian cancer status post 3 cycles of neoadjuvant chemotherapy and tumor debulking surgery inclusive of modified radical hysterectomy, en bloc rectosigmoid resection with end-to-end reanastomosis, small bowel resection with reanastomosis, radical omentectomy, splenectomy, diaphragm resection, ileostomy, optimal tumor debulking  Postoperative course complicated by wound infection with debridement  She has a wound VAC in Situ  I reviewed her CBC, BMP, CT chest abdomen pelvis images  Her performance status is 2   1  Wound VAC was change in the office  The wound measures 8 cm in length by 2 cm in depth by 3 cm in width  There is excellent granulation tissue present  2  Leukocytosis and thrombocytosis likely reactive and or secondary to splenectomy  Based on CT imaging, there is no evidence of abscess  She has been afebrile  Clinically, she continues to improve  3  Plan for adjuvant chemotherapy with carboplatin at AUC 5, Taxol 135 milligrams/meter squared for cycle 4  Will add Avastin with cycle 5  Depending on wound healing  4  Referral to interventional Radiology for MediPort placement  5  Continue prophylactic Eliquis  She is also on aspirin 81 mg daily             Relevant Orders    Ambulatory referral to Interventional Radiology         CHIEF COMPLAINT:  Postoperative evaluation       Problem:  Cancer Staging   Malignant neoplasm of left ovary (HCC)  Staging form: Ovary, Fallopian Tube, Primary Peritoneal, AJCC 8th Edition  - Clinical: FIGO Stage IIIC (cT3c, cN0, cM0) - Signed by Ignacia Armenta MD on 11/29/2022        Previous therapy:  Oncology History   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[address] )   7/21/2022 Initial Diagnosis    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[address] )     8/4/2022 Surgery    Diagnostic laparoscopy, peritoneal biopsy     8/18/2022 Genetic Testing    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, serous  Results revealed patient has the following mutation(s):  None     Malignant neoplasm of left ovary ([facility] [address] )   8/4/2022 Surgery    Diagnostic laparoscopy, peritoneal biopsy     8/9/2022 Initial Diagnosis    Gynecologic malignancy ([facility] [address] )     8/23/2022 -  Chemotherapy    palonosetron (ALOXI), 0 25 mg, Intravenous, Once, 3 of 7 cycles  Administration: 0 25 mg (8/23/2022), 0 25 mg (9/13/2022), 0 25 mg (10/4/2022)  fosaprepitant (EMEND) IVPB, 150 mg, Intravenous, Once, 3 of 7 cycles  Administration: 150 mg (8/23/2022), 150 mg (9/13/2022), 150 mg (10/4/2022)  CARBOplatin (PARAPLATIN) IVPB (GOG AUC DOSING), 605 4 mg, Intravenous, Once, 3 of 6 cycles  Administration: 600 mg (8/23/2022), 564 mg (9/13/2022), 601 8 mg (10/4/2022)  bevacizumab (AVASTIN) IVPB, 1,252 5 mg, Intravenous, Once, 2 of 5 cycles  Administration: 1,200 mg (8/23/2022), 1,200 mg (9/13/2022)  PACLItaxel (TAXOL) chemo IVPB, 175 mg/m2 = 330 6 mg, Intravenous, Once, 3 of 6 cycles  Administration: 330 6 mg (8/23/2022), 330 6 mg (9/13/2022), 330 6 mg (10/4/2022)     10/28/2022 Surgery    diagnostic laparoscopy, exploratory laparotomy, modified radical hysterectomy, bilateral salpingo-oophorectomy with en bloc rectosigmoid resection, gastrocolic omentectomy, splenectomy, small bowel resection with reanastomosis, diaphragm resection, excision ablation of peritoneal implants, ileostomy formation, optimal tumor debulking     11/29/2022 -  Cancer Staged    Staging form: Ovary, Fallopian Tube, Primary Peritoneal, AJCC 8th Edition  - Clinical: FIGO Stage IIIC (cT3c, cN0, cM0) - Signed by Paul Buchanan MD on 11/29/2022  Histologic grade (G): G3  Histologic grading system: 4 grade system             Patient ID: Darwin Guerrero is a 67 y o  female  Who returns for postoperative evaluation    She would extensive debulking surgery with a postoperative course was complicated by superficial wound separation with debridement and placement of wound VAC  She is being seen by wound care  She was readmitted for bleeding at the ostomy site  This did not require transfusion  It resolved spontaneously  She is currently at home  She is ambulating to the bathroom and is able to walk some without the walker for assistance  She is caring for her ileostomy  She has been afebrile but states that when she goes to Wound Care or is evaluated by the wound care nurse, her temperature is around 100  She had a CT scan of the chest abdomen pelvis on 11/15/2022 that revealed a 5 9 x 4 4 cm fluid collection in the splenic bed without evidence of gas or rim enhancement  There was no other evidence of abscess  There was fluid and thickening of the right hemidiaphragm consistent with postoperative changes  CBC on 11/16/2022 revealed a total white blood cell count of 16 2, hemoglobin 9 7 grams/deciliter, platelet count 809 K  Serum creatinine 0 52 mg/dL  Blood glucose 168 mg/dL  She is not having any vaginal bleeding  Her pain is controlled with Tylenol  She continues to take Eliquis for DVT prophylaxis and has started aspirin 81 mg  She is also taking her prednisone for her temporal arteritis  She is not vomiting  She has occasional nausea  Her appetite is relatively poor          The following portions of the patient's history were reviewed and updated as appropriate: allergies, current medications, past family history, past medical history, past social history, past surgical history and problem list     Review of Systems      Current Outpatient Medications:   •  Acetaminophen (TYLENOL ARTHRITIS PAIN PO), Take by mouth as needed, Disp: , Rfl:   •  albuterol (PROVENTIL HFA,VENTOLIN HFA) 90 mcg/act inhaler, Inhale 2 puffs every 4 (four) hours as needed , Disp: , Rfl:   •  Ascorbic Acid (VITAMIN C GUMMIE PO), Take by mouth, Disp: , Rfl:   •  B Complex-C (B COMPLEX-VITAMIN C PO), Take by mouth daily, Disp: , Rfl:   •  betamethasone, augmented, (DIPROLENE) 0 05 % lotion, Apply topically as needed, Disp: , Rfl:   •  Calcium Carb-Cholecalciferol (CALCIUM 500+D3 PO), Take 1 tablet by mouth 2 (two) times a day , Disp: , Rfl:   •  cephalexin (KEFLEX) 500 mg capsule, TAKE 1 CAPSULE 3 TIMES A DAY FOR 7 DAYS, Disp: , Rfl:   •  cholecalciferol (VITAMIN D3) 1,000 units tablet, Take 1,000 Units by mouth daily, Disp: , Rfl:   •  Docusate Calcium (STOOL SOFTENER PO), Take 1 tablet by mouth 2 (two) times a day, Disp: , Rfl:   •  escitalopram (LEXAPRO) 10 mg tablet, TAKE 1 TABLET BY MOUTH EVERY DAY, Disp: 90 tablet, Rfl: 2  •  famotidine (PEPCID) 40 MG tablet, Take 1 tablet (40 mg total) by mouth daily at bedtime, Disp: 90 tablet, Rfl: 3  •  fexofenadine (ALLEGRA) 180 MG tablet, Take 180 mg by mouth daily, Disp: , Rfl:   •  FIBER ADULT GUMMIES PO, Take by mouth if needed, Disp: , Rfl:   •  GLUCOSAMINE-CHONDROITIN PO, Take 1 tablet by mouth 2 (two) times a day , Disp: , Rfl:   •  lisinopril (ZESTRIL) 5 mg tablet, Take 5 mg by mouth daily dinner, Disp: , Rfl:   •  LORazepam (ATIVAN) 1 mg tablet, Take 1 tablet (1 mg total) by mouth every 8 (eight) hours as needed for anxiety (nausea or anxiety) (Patient taking differently: Take 1 mg by mouth every 8 (eight) hours as needed for anxiety (nausea or anxiety) Per pt usually takes q night), Disp: 30 tablet, Rfl: 0  •  metFORMIN (GLUCOPHAGE) 500 mg tablet, Take 500 mg by mouth daily Daily at lunch, Disp: , Rfl:   •  metoprolol succinate (TOPROL-XL) 25 mg 24 hr tablet, Take 25 mg by mouth daily dinner, Disp: , Rfl:   •  montelukast (SINGULAIR) 10 mg tablet, Take 10 mg by mouth daily dinner, Disp: , Rfl:   •  multivitamin (THERAGRAN) TABS, Take 1 tablet by mouth daily, Disp: , Rfl:   •  omeprazole (PriLOSEC) 20 mg delayed release capsule, TAKE 1 CAPSULE BY MOUTH EVERY DAY 30 MINUTES BEFORE MORNING MEAL FOR 90 DAYS, Disp: , Rfl:   •  ondansetron (ZOFRAN) 8 mg tablet, Take 1 tablet (8 mg total) by mouth every 8 (eight) hours as needed for nausea or vomiting, Disp: 20 tablet, Rfl: 1  •  predniSONE 1 mg tablet, Take 3 mg by mouth daily, Disp: , Rfl:   •  Probiotic Product (PROBIOTIC DAILY PO), Take by mouth daily, Disp: , Rfl:   •  simvastatin (ZOCOR) 10 mg tablet, Take 10 mg by mouth daily at bedtime, Disp: , Rfl:   •  SYMBICORT 160-4 5 MCG/ACT inhaler, 2 puffs daily , Disp: , Rfl:     Objective:    Blood pressure 160/98, pulse (!) 114, temperature (!) 97 4 °F (36 3 °C), temperature source Temporal, height 5' 4 5" (1 638 m), weight 75 8 kg (167 lb), SpO2 97 %  Body mass index is 28 22 kg/m²  Body surface area is 1 82 meters squared  Physical Exam  Vitals reviewed  Constitutional:       General: She is not in acute distress  Appearance: Normal appearance  She is normal weight  She is not ill-appearing, toxic-appearing or diaphoretic  HENT:      Head: Normocephalic and atraumatic  Mouth/Throat:      Mouth: Mucous membranes are dry  Eyes:      General: No scleral icterus  Right eye: No discharge  Left eye: No discharge  Conjunctiva/sclera: Conjunctivae normal    Pulmonary:      Effort: Pulmonary effort is normal    Abdominal:      General: There is no distension  Palpations: Abdomen is soft  There is no mass  Tenderness: There is no abdominal tenderness  Hernia: No hernia is present  Musculoskeletal:      Right lower leg: No edema  Left lower leg: No edema  Skin:     General: Skin is warm and dry  Coloration: Skin is pale  Skin is not jaundiced  Findings: No rash  Comments: The wound VAC was removed using saline  There was excellent granulation tissue present in the wound  No evidence of erythema, purulent discharge  The wound VAC was then replaced  The wound VAC canister did not have purulent discharge  There was minimal output  Neurological:      General: No focal deficit present        Mental Status: She is alert and oriented to person, place, and time  Cranial Nerves: No cranial nerve deficit  Motor: No weakness  Gait: Gait abnormal       Comments: Ambulating with walker   Psychiatric:         Mood and Affect: Mood normal          Behavior: Behavior normal          Thought Content:  Thought content normal          Judgment: Judgment normal

## 2022-11-30 RX ORDER — CEFAZOLIN SODIUM 1 G/50ML
1000 SOLUTION INTRAVENOUS ONCE
Status: CANCELLED | OUTPATIENT
Start: 2022-11-30 | End: 2022-11-30

## 2022-11-30 RX ORDER — SODIUM CHLORIDE 9 MG/ML
75 INJECTION, SOLUTION INTRAVENOUS CONTINUOUS
Status: CANCELLED | OUTPATIENT
Start: 2022-12-08

## 2022-12-01 ENCOUNTER — TELEPHONE (OUTPATIENT)
Dept: INTERVENTIONAL RADIOLOGY/VASCULAR | Facility: HOSPITAL | Age: 72
End: 2022-12-01

## 2022-12-05 ENCOUNTER — OFFICE VISIT (OUTPATIENT)
Dept: WOUND CARE | Facility: HOSPITAL | Age: 72
End: 2022-12-05

## 2022-12-05 VITALS
RESPIRATION RATE: 16 BRPM | SYSTOLIC BLOOD PRESSURE: 120 MMHG | HEART RATE: 72 BPM | DIASTOLIC BLOOD PRESSURE: 68 MMHG | TEMPERATURE: 99.1 F

## 2022-12-05 DIAGNOSIS — T81.31XA DEHISCENCE OF POSTOPERATIVE WOUND OF ABDOMEN: Primary | ICD-10-CM

## 2022-12-05 RX ORDER — LIDOCAINE HYDROCHLORIDE 40 MG/ML
5 SOLUTION TOPICAL ONCE
Status: COMPLETED | OUTPATIENT
Start: 2022-12-05 | End: 2022-12-05

## 2022-12-05 RX ADMIN — LIDOCAINE HYDROCHLORIDE 5 ML: 40 SOLUTION TOPICAL at 15:06

## 2022-12-05 NOTE — PATIENT INSTRUCTIONS
Orders Placed This Encounter   Procedures    Wound cleansing and dressings     Wound cleansing and dressings      Abdomen wound     Wash your hands with soap and water  Remove old dressing, discard into plastic bag and place in trash  Cleanse the wound with saline,  prior to applying a clean dressing  Do not use tissue or cotton balls  Do not scrub the wound  Pat dry using gauze  Shower no   Apply silver alginate to wound  Cover with allevyn with border  Change 3xweek   Monday Wednesday and Friday  VAC ON HOLD  BRING ALONG AT NEXT APPT AT [facility]  RETURN IN 1 WEEK    Done today     Standing Status:   Future     Standing Expiration Date:   12/5/2023

## 2022-12-08 ENCOUNTER — HOSPITAL ENCOUNTER (OUTPATIENT)
Dept: INTERVENTIONAL RADIOLOGY/VASCULAR | Facility: HOSPITAL | Age: 72
Discharge: HOME | End: 2022-12-08
Attending: OBSTETRICS & GYNECOLOGY

## 2022-12-08 ENCOUNTER — APPOINTMENT (OUTPATIENT)
Dept: LAB | Facility: HOSPITAL | Age: 72
End: 2022-12-08

## 2022-12-08 VITALS
DIASTOLIC BLOOD PRESSURE: 64 MMHG | SYSTOLIC BLOOD PRESSURE: 121 MMHG | OXYGEN SATURATION: 98 % | RESPIRATION RATE: 18 BRPM | HEART RATE: 86 BPM

## 2022-12-08 DIAGNOSIS — C56.2 MALIGNANT NEOPLASM OF LEFT OVARY (HCC): ICD-10-CM

## 2022-12-08 DIAGNOSIS — C56.2 MALIGNANT NEOPLASM OF LEFT OVARY (HCC): Primary | ICD-10-CM

## 2022-12-08 LAB — CANCER AG125 SERPL-ACNC: 37.9 U/ML (ref 0–30)

## 2022-12-08 RX ORDER — FENTANYL CITRATE 50 UG/ML
INJECTION, SOLUTION INTRAMUSCULAR; INTRAVENOUS AS NEEDED
Status: COMPLETED | OUTPATIENT
Start: 2022-12-08 | End: 2022-12-08

## 2022-12-08 RX ORDER — LIDOCAINE AND PRILOCAINE 25; 25 MG/G; MG/G
CREAM TOPICAL AS NEEDED
Qty: 30 G | Refills: 0 | Status: SHIPPED | OUTPATIENT
Start: 2022-12-08 | End: 2023-07-12 | Stop reason: SDUPTHER

## 2022-12-08 RX ORDER — CEFAZOLIN SODIUM 1 G/50ML
1000 SOLUTION INTRAVENOUS ONCE
Status: COMPLETED | OUTPATIENT
Start: 2022-12-08 | End: 2022-12-08

## 2022-12-08 RX ORDER — MIDAZOLAM HYDROCHLORIDE 2 MG/2ML
INJECTION, SOLUTION INTRAMUSCULAR; INTRAVENOUS AS NEEDED
Status: COMPLETED | OUTPATIENT
Start: 2022-12-08 | End: 2022-12-08

## 2022-12-08 RX ORDER — SODIUM CHLORIDE 9 MG/ML
75 INJECTION, SOLUTION INTRAVENOUS CONTINUOUS
Status: DISCONTINUED | OUTPATIENT
Start: 2022-12-08 | End: 2022-12-09 | Stop reason: HOSPADM

## 2022-12-08 RX ADMIN — FENTANYL CITRATE 50 MCG: 50 INJECTION, SOLUTION INTRAMUSCULAR; INTRAVENOUS at 10:58

## 2022-12-08 RX ADMIN — MIDAZOLAM 1 MG: 1 INJECTION INTRAMUSCULAR; INTRAVENOUS at 10:59

## 2022-12-08 RX ADMIN — FENTANYL CITRATE 25 MCG: 50 INJECTION, SOLUTION INTRAMUSCULAR; INTRAVENOUS at 11:07

## 2022-12-08 RX ADMIN — FENTANYL CITRATE 25 MCG: 50 INJECTION, SOLUTION INTRAMUSCULAR; INTRAVENOUS at 11:11

## 2022-12-08 RX ADMIN — MIDAZOLAM 0.5 MG: 1 INJECTION INTRAMUSCULAR; INTRAVENOUS at 11:11

## 2022-12-08 RX ADMIN — FENTANYL CITRATE 50 MCG: 50 INJECTION, SOLUTION INTRAMUSCULAR; INTRAVENOUS at 10:51

## 2022-12-08 RX ADMIN — SODIUM CHLORIDE 75 ML/HR: 0.9 INJECTION, SOLUTION INTRAVENOUS at 10:48

## 2022-12-08 RX ADMIN — MIDAZOLAM 1 MG: 1 INJECTION INTRAMUSCULAR; INTRAVENOUS at 10:51

## 2022-12-08 RX ADMIN — CEFAZOLIN SODIUM 1000 MG: 1 SOLUTION INTRAVENOUS at 10:46

## 2022-12-08 RX ADMIN — MIDAZOLAM 0.5 MG: 1 INJECTION INTRAMUSCULAR; INTRAVENOUS at 11:07

## 2022-12-08 NOTE — BRIEF OP NOTE (RAD/CATH)
INTERVENTIONAL RADIOLOGY PROCEDURE NOTE    Date: 12/8/2022    Procedure:   Procedure Summary     Date: 12/08/22 Room / Location: [facility] Interventional Radiology    Anesthesia Start:  Anesthesia Stop:     Procedure: IR PORT PLACEMENT Diagnosis:       Malignant neoplasm of left ovary ([address] )      (chemotherapy)    Scheduled Providers:  Responsible Provider:     Anesthesia Type: Not recorded ASA Status: Not recorded          Preoperative diagnosis:   1  Malignant neoplasm of left ovary (HCC)         Postoperative diagnosis: Same  Surgeon: Jose De Jesus Mendez MD     Assistant: None  No qualified resident was available  Blood loss: 5 mL    Specimens: None     Findings: Placement of a right chest port  The port may be used immediately  Complications: None immediate      Anesthesia: conscious sedation and local

## 2022-12-08 NOTE — DISCHARGE INSTRUCTIONS
Implanted Venous Access Port     WHAT YOU NEED TO KNOW:   An implanted venous access port is a device used to give treatments and take blood  It may also be called a central venous access device (CVAD)  The port is a small container that is placed under your skin, usually in your upper chest  The port is attached to a catheter that enters a large vein  DISCHARGE INSTRUCTIONS:   Resume your normal diet  Small sips of flat soda will help with mild nausea  Prevent an infection:   Wash your hands often  Use soap and water  Clean your hands before and after you care for your port  Remind everyone who cares for your port to wash their hands  Check your skin for infection every day  Look for redness, swelling, or fluid oozing from the port site  Care for your port:   1  You may shower beginning 48 hours after procedure  2   Leave glue in place  3  It is normal for some bruising to occur  4  Use Tylenol for pain  5  Limit use of arm on the side that your port was placed  Lift nothing heavier than 5 pounds for 1 week, and then gradually increase activity as tolerated  6  DO NOT apply ointment, lotion or cream to port site until incision is healed  Allow glue to fall off  DO NOT attempt to peel glue from skin even it it begins to flake  7  After the port incision is healed you may swim, bathe  Notify the Interventional Radiologist if you have any of the followin  Fever above 101 F    2  Increased redness or swelling after 1st day  3  Increased pain after 1st day  4  Any sign of infection (drainage from port site, skin separation, hot to touch)  5  Persistent nausea or vomiting  Contact Interventional Radiology at [phone] [facility] PATIENTS: Contact Interventional Radiology at [phone]) ([address]: Contact Interventional Radiology at [phone])

## 2022-12-08 NOTE — INTERVAL H&P NOTE
Update: (This section must be completed if the H&P was completed greater than 24 hrs to procedure or admission)    H&P reviewed  After examining the patient, I find no changed to the H&P since it had been written  Patient re-evaluated  Accept as history and physical     We will proceed with port placement today      Jacinta Becerril MD/December 8, 2022/10:40 AM

## 2022-12-09 RX ORDER — SODIUM CHLORIDE 9 MG/ML
20 INJECTION, SOLUTION INTRAVENOUS ONCE
Status: CANCELLED | OUTPATIENT
Start: 2022-12-13

## 2022-12-09 RX ORDER — PALONOSETRON 0.05 MG/ML
0.25 INJECTION, SOLUTION INTRAVENOUS ONCE
Status: CANCELLED | OUTPATIENT
Start: 2022-12-13

## 2022-12-12 ENCOUNTER — OFFICE VISIT (OUTPATIENT)
Dept: WOUND CARE | Facility: HOSPITAL | Age: 72
End: 2022-12-12

## 2022-12-12 VITALS
HEART RATE: 76 BPM | SYSTOLIC BLOOD PRESSURE: 114 MMHG | DIASTOLIC BLOOD PRESSURE: 68 MMHG | RESPIRATION RATE: 16 BRPM | TEMPERATURE: 60.8 F

## 2022-12-12 DIAGNOSIS — T81.31XA DEHISCENCE OF POSTOPERATIVE WOUND OF ABDOMEN: Primary | ICD-10-CM

## 2022-12-12 DIAGNOSIS — C57.9 GYNECOLOGIC MALIGNANCY (HCC): ICD-10-CM

## 2022-12-12 RX ORDER — LORAZEPAM 1 MG/1
1 TABLET ORAL EVERY 8 HOURS PRN
Qty: 30 TABLET | Refills: 0 | Status: SHIPPED | OUTPATIENT
Start: 2022-12-12

## 2022-12-12 RX ORDER — LIDOCAINE HYDROCHLORIDE 40 MG/ML
5 SOLUTION TOPICAL ONCE
Status: COMPLETED | OUTPATIENT
Start: 2022-12-12 | End: 2022-12-12

## 2022-12-12 RX ADMIN — LIDOCAINE HYDROCHLORIDE 5 ML: 40 SOLUTION TOPICAL at 11:18

## 2022-12-12 NOTE — PROGRESS NOTES
Patient ID: Cornell Interiano is a 67 y o  female Date of Birth 1950     Chief Complaint  Chief Complaint   Patient presents with   • Follow Up Wound Care Visit     Wound care       Allergies  Nsaids and Pedi-pre tape spray [wound dressing adhesive]    Assessment:    No problem-specific Assessment & Plan notes found for this encounter  Diagnoses and all orders for this visit:    Dehiscence of postoperative wound of abdomen  -     lidocaine (XYLOCAINE) 4 % topical solution 5 mL  -     Cancel: Wound cleansing and dressings; Future              Procedures    Plan:  Wound is improved but there is minimal drainage coming out from the [facility]  No clinical signs of infection  No debridement today  Hold the VAC this week and start silver alginate, see wound orders below  Adequate protein intake  Follow-up in 1 week or call sooner with questions or concerns    Wound 11/10/22 Surgical Abdomen Medial;Lower (Active)   Wound Image Images linked 12/05/22 1448   Wound Description Granulation tissue 12/05/22 1448   Ailyn-wound Assessment Intact; Clean 12/05/22 1448   Wound Length (cm) 7 4 cm 12/05/22 1448   Wound Width (cm) 2 4 cm 12/05/22 1448   Wound Depth (cm) 0 5 cm 12/05/22 1448   Wound Surface Area (cm^2) 17 76 cm^2 12/05/22 1448   Wound Volume (cm^3) 8 88 cm^3 12/05/22 1448   Calculated Wound Volume (cm^3) 8 88 cm^3 12/05/22 1448   Change in Wound Size % 96 77 12/05/22 1448   Drainage Amount Moderate 12/05/22 1448   Drainage Description Serosanguineous 12/05/22 1448   Non-staged Wound Description Full thickness 12/05/22 1448   Treatments Cleansed 12/05/22 1448   Wound packed?  Yes 12/05/22 1448   Packing- # removed 1 12/05/22 1448   Dressing Changed Changed 12/05/22 1448   Patient Tolerance Tolerated well 12/05/22 1448   Dressing Status Intact 12/05/22 1448       Wound 11/10/22 Surgical Abdomen Medial;Lower (Active)   Date First Assessed/Time First Assessed: 11/10/22 1439   Primary Wound Type: Surgical  Location: Abdomen Wound Location Orientation: Medial;Lower       Wound 12/08/22 Incision Chest Right;Upper (Active)   Date First Assessed/Time First Assessed: 12/08/22 1120   Primary Wound Type: Incision  Location: Chest  Wound Location Orientation: Right;Upper  Wound Description (Comments): Port Placement       [REMOVED] Wound 08/04/22 Abdomen N/A (Removed)   Resolved Date/Resolved Time: 11/10/22 1438  Date First Assessed/Time First Assessed: 08/04/22 1654   Location: Abdomen  Wound Location Orientation: N/A  Wound Description (Comments): 2 trocar incision sites, both with skin glue, per MD   Incision's 1s    [REMOVED] Wound 10/28/22 Abdomen N/A (Removed)   Resolved Date/Resolved Time: 11/10/22 1438  Date First Assessed/Time First Assessed: 10/28/22 1953   Location: Abdomen  Wound Location Orientation: N/A  Wound Description (Comments): SKIN STAPLED CLOSED  Incision's 1st Dressing: DRESSING TELFA 3 X 8 I    [REMOVED] Wound 10/28/22 Abdomen Left (Removed)   Resolved Date/Resolved Time: 11/10/22 1438  Date First Assessed/Time First Assessed: 10/28/22 1953   Location: Abdomen  Wound Location Orientation: Left  Wound Description (Comments): DRAIN SITES X2  Incision's 1st Dressing: DRESSING TRACH T-SLIT 6 PL    Subjective:        Patient presents for follow-up of a nonhealing surgical wound to the abdomen  Has had a wound VAC in place  No increased pain or drainage        The following portions of the patient's history were reviewed and updated as appropriate:   She  has a past medical history of Abdominal pain, Allergic sinusitis, Anxiety, Arthritis, Asthma, Cholelithiasis, Colon polyp, Degenerative joint disease, Dental bridge present, Exercise involving walking, GERD (gastroesophageal reflux disease), Giant cell aortic arteritis ([facility] ), Hiatal hernia, History of cancer chemotherapy, Hyperlipidemia, Hypertension, Nausea, 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[address] ), PMR (polymyalgia rheumatica) ([facility] [address] ), Prediabetes, Shortness of breath, Tinnitus, and Wears glasses  She   Patient Active Problem List    Diagnosis Date Noted   • Wound infection after surgery 11/29/2022   • Encounter for venous access device care 11/29/2022   • Bandemia 11/16/2022   • Hypokalemia 11/07/2022   • Sepsis ([address] ) 11/05/2022   • S/P total abdominal hysterectomy 10/31/2022   • Ileostomy, has currently ([facility] ) 10/31/2022   • Acute blood loss anemia (ABLA) 10/31/2022   • S/P splenectomy 10/31/2022   • Anxiety disorder due to medical condition 09/29/2022   • Malignant neoplasm of left ovary ([facility] ) 08/09/2022   • Giant cell aortic arteritis ([place] [address] )    • Prediabetes    • 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[place] [address] ) 07/21/2022   • Lower abdominal pain 06/15/2022   • Change in bowel habit 06/15/2022   • Personal history of colonic polyps 06/15/2022   • Long term (current) use of systemic steroids 06/15/2022   • Asthma    • Hyperlipidemia    • Hypertension    • Gastroesophageal reflux disease 03/13/2019   • Constipation 03/13/2019   • Hemorrhoids 03/13/2019   • Family history of colonic polyps 03/13/2019     She  reports that she has never smoked  She has never used smokeless tobacco  She reports current alcohol use  She reports that she does not use drugs    Current Outpatient Medications   Medication Sig Dispense Refill   • Acetaminophen (TYLENOL ARTHRITIS PAIN PO) Take by mouth as needed     • albuterol (PROVENTIL HFA,VENTOLIN HFA) 90 mcg/act inhaler Inhale 2 puffs every 4 (four) hours as needed      • Ascorbic Acid (VITAMIN C GUMMIE PO) Take by mouth     • B Complex-C (B COMPLEX-VITAMIN C PO) Take by mouth daily     • betamethasone, augmented, (DIPROLENE) 0 05 % lotion Apply topically as needed     • Calcium Carb-Cholecalciferol (CALCIUM 500+D3 PO) Take 1 tablet by mouth 2 (two) times a day      • cephalexin (KEFLEX) 500 mg capsule TAKE 1 CAPSULE 3 TIMES A DAY FOR 7 DAYS     • cholecalciferol (VITAMIN D3) 1,000 units tablet Take 1,000 Units by mouth daily     • Docusate Calcium (STOOL SOFTENER PO) Take 1 tablet by mouth 2 (two) times a day     • escitalopram (LEXAPRO) 10 mg tablet TAKE 1 TABLET BY MOUTH EVERY DAY 90 tablet 2   • famotidine (PEPCID) 40 MG tablet Take 1 tablet (40 mg total) by mouth daily at bedtime 90 tablet 3   • fexofenadine (ALLEGRA) 180 MG tablet Take 180 mg by mouth daily     • FIBER ADULT GUMMIES PO Take by mouth if needed     • GLUCOSAMINE-CHONDROITIN PO Take 1 tablet by mouth 2 (two) times a day      • lidocaine-prilocaine (EMLA) cream Apply topically as needed for mild pain 30 g 0   • lisinopril (ZESTRIL) 5 mg tablet Take 5 mg by mouth daily dinner     • LORazepam (ATIVAN) 1 mg tablet Take 1 tablet (1 mg total) by mouth every 8 (eight) hours as needed for anxiety (nausea or anxiety) 30 tablet 0   • metFORMIN (GLUCOPHAGE) 500 mg tablet Take 500 mg by mouth daily Daily at lunch     • metoprolol succinate (TOPROL-XL) 25 mg 24 hr tablet Take 25 mg by mouth daily dinner     • montelukast (SINGULAIR) 10 mg tablet Take 10 mg by mouth daily dinner     • multivitamin (THERAGRAN) TABS Take 1 tablet by mouth daily     • omeprazole (PriLOSEC) 20 mg delayed release capsule TAKE 1 CAPSULE BY MOUTH EVERY DAY 30 MINUTES BEFORE MORNING MEAL FOR 90 DAYS     • ondansetron (ZOFRAN) 8 mg tablet Take 1 tablet (8 mg total) by mouth every 8 (eight) hours as needed for nausea or vomiting 20 tablet 1   • predniSONE 1 mg tablet Take 3 mg by mouth daily     • Probiotic Product (PROBIOTIC DAILY PO) Take by mouth daily     • simvastatin (ZOCOR) 10 mg tablet Take 10 mg by mouth daily at bedtime     • SYMBICORT 160-4 5 MCG/ACT inhaler 2 puffs daily        No current facility-administered medications for this visit  She is allergic to nsaids and pedi-pre tape spray [wound dressing adhesive]       Review of Systems   Constitutional: Negative for chills and fever  HENT: Negative for congestion and sneezing  Respiratory: Negative for cough  Skin: Positive for wound     Psychiatric/Behavioral: Negative for agitation  Objective:       Wound 11/10/22 Surgical Abdomen Medial;Lower (Active)   Wound Image Images linked 12/05/22 1448   Wound Description Granulation tissue 12/05/22 1448   Ailyn-wound Assessment Intact; Clean 12/05/22 1448   Wound Length (cm) 7 4 cm 12/05/22 1448   Wound Width (cm) 2 4 cm 12/05/22 1448   Wound Depth (cm) 0 5 cm 12/05/22 1448   Wound Surface Area (cm^2) 17 76 cm^2 12/05/22 1448   Wound Volume (cm^3) 8 88 cm^3 12/05/22 1448   Calculated Wound Volume (cm^3) 8 88 cm^3 12/05/22 1448   Change in Wound Size % 96 77 12/05/22 1448   Drainage Amount Moderate 12/05/22 1448   Drainage Description Serosanguineous 12/05/22 1448   Non-staged Wound Description Full thickness 12/05/22 1448   Treatments Cleansed 12/05/22 1448   Wound packed? Yes 12/05/22 1448   Packing- # removed 1 12/05/22 1448   Dressing Changed Changed 12/05/22 1448   Patient Tolerance Tolerated well 12/05/22 1448   Dressing Status Intact 12/05/22 1448       /68   Pulse 72   Temp 99 1 °F (37 3 °C)   Resp 16     Physical Exam  Vitals reviewed  Constitutional:       General: She is not in acute distress  Appearance: Normal appearance  She is not ill-appearing, toxic-appearing or diaphoretic  HENT:      Head: Normocephalic and atraumatic  Right Ear: External ear normal       Left Ear: External ear normal    Eyes:      Conjunctiva/sclera: Conjunctivae normal    Pulmonary:      Effort: Pulmonary effort is normal  No respiratory distress  Musculoskeletal:      Cervical back: Neck supple  Skin:     Comments: See wound assessment   Neurological:      Mental Status: She is alert  Gait: Gait abnormal (Uses walker)     Psychiatric:         Mood and Affect: Mood normal          Behavior: Behavior normal            Wound 11/10/22 Surgical Abdomen Medial;Lower (Active)   Wound Image   12/12/22 1111   Wound Description Granulation tissue;Bleeding 12/12/22 1110   Ailyn-wound Assessment Scar Tissue 12/12/22 1110   Wound Length (cm) 7 cm 12/12/22 1110   Wound Width (cm) 1 cm 12/12/22 1110   Wound Depth (cm) 0 3 cm 12/12/22 1110   Wound Surface Area (cm^2) 7 cm^2 12/12/22 1110   Wound Volume (cm^3) 2 1 cm^3 12/12/22 1110   Calculated Wound Volume (cm^3) 2 1 cm^3 12/12/22 1110   Change in Wound Size % 99 24 12/12/22 1110   Undermining 3 5 11/10/22 1439   Undermining is depth extending from 12 11/10/22 1439   Drainage Amount Moderate 12/12/22 1110   Drainage Description Sanguineous 12/12/22 1110   Non-staged Wound Description Full thickness 12/12/22 1110   Treatments Cleansed 12/05/22 1448   Wound packed? Yes 12/05/22 1448   Packing- # removed 1 12/05/22 1448   Dressing Changed Changed 12/05/22 1448   Patient Tolerance Tolerated well 12/05/22 1448   Dressing Status Intact 12/12/22 1110       Wound 12/08/22 Incision Chest Right;Upper (Active)                         Wound Instructions:  No orders of the defined types were placed in this encounter  Diagnosis ICD-10-CM Associated Orders   1   Dehiscence of postoperative wound of abdomen  T81 31XA lidocaine (XYLOCAINE) 4 % topical solution 5 mL

## 2022-12-12 NOTE — PROGRESS NOTES
Patient ID: Rosamaria Crooks is a 67 y o  female Date of Birth 1950     Chief Complaint  Chief Complaint   Patient presents with   • Follow Up Wound Care Visit     Abdomen wound       Allergies  Nsaids and Pedi-pre tape spray [wound dressing adhesive]    Assessment:    No problem-specific Assessment & Plan notes found for this encounter  Diagnoses and all orders for this visit:    Dehiscence of postoperative wound of abdomen  -     lidocaine (XYLOCAINE) 4 % topical solution 5 mL  -     Wound cleansing and dressings; Future  -     Wound home care; Future              Procedures    Plan:  Wound is much improved  DC wound VAC  Continue wound management with silver alginate, see wound orders below  Adequate protein intake  Follow-up in 4 weeks or call sooner with questions or concerns    Wound 11/10/22 Surgical Abdomen Medial;Lower (Active)   Wound Image Images linked 12/12/22 1111   Wound Description Granulation tissue;Bleeding 12/12/22 1110   Ailyn-wound Assessment Scar Tissue 12/12/22 1110   Wound Length (cm) 7 cm 12/12/22 1110   Wound Width (cm) 1 cm 12/12/22 1110   Wound Depth (cm) 0 3 cm 12/12/22 1110   Wound Surface Area (cm^2) 7 cm^2 12/12/22 1110   Wound Volume (cm^3) 2 1 cm^3 12/12/22 1110   Calculated Wound Volume (cm^3) 2 1 cm^3 12/12/22 1110   Change in Wound Size % 99 24 12/12/22 1110   Drainage Amount Moderate 12/12/22 1110   Drainage Description Sanguineous 12/12/22 1110   Non-staged Wound Description Full thickness 12/12/22 1110   Dressing Status Intact 12/12/22 1110       Wound 11/10/22 Surgical Abdomen Medial;Lower (Active)   Date First Assessed/Time First Assessed: 11/10/22 1439   Primary Wound Type: Surgical  Location: Abdomen  Wound Location Orientation: Medial;Lower       Wound 12/08/22 Incision Chest Right;Upper (Active)   Date First Assessed/Time First Assessed: 12/08/22 1120   Primary Wound Type:  Incision  Location: Chest  Wound Location Orientation: Right;Upper  Wound Description (Comments): Port Placement       [REMOVED] Wound 08/04/22 Abdomen N/A (Removed)   Resolved Date/Resolved Time: 11/10/22 1438  Date First Assessed/Time First Assessed: 08/04/22 1654   Location: Abdomen  Wound Location Orientation: N/A  Wound Description (Comments): 2 trocar incision sites, both with skin glue, per MD   Incision's 1s    [REMOVED] Wound 10/28/22 Abdomen N/A (Removed)   Resolved Date/Resolved Time: 11/10/22 1438  Date First Assessed/Time First Assessed: 10/28/22 1953   Location: Abdomen  Wound Location Orientation: N/A  Wound Description (Comments): SKIN STAPLED CLOSED  Incision's 1st Dressing: DRESSING TELFA 3 X 8 I    [REMOVED] Wound 10/28/22 Abdomen Left (Removed)   Resolved Date/Resolved Time: 11/10/22 1438  Date First Assessed/Time First Assessed: 10/28/22 1953   Location: Abdomen  Wound Location Orientation: Left  Wound Description (Comments): DRAIN SITES X2  Incision's 1st Dressing: DRESSING TRACH T-SLIT 6 PL    Subjective:        Patient presents for follow-up of a nonhealing surgical wound to the abdomen  Wound VAC was put on hold this past week and silver alginate was used on the wound  No increased pain or drainage  No new complaints  The following portions of the patient's history were reviewed and updated as appropriate:   She  has a past medical history of Abdominal pain, Allergic sinusitis, Anxiety, Arthritis, Asthma, Cholelithiasis, Colon polyp, Degenerative joint disease, Dental bridge present, Exercise involving walking, GERD (gastroesophageal reflux disease), Giant cell aortic arteritis ([address] ), Hiatal hernia, History of cancer chemotherapy, Hyperlipidemia, Hypertension, Nausea, 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[address] ), PMR (polymyalgia rheumatica) ([address] ), Prediabetes, Shortness of breath, Tinnitus, and Wears glasses    She   Patient Active Problem List    Diagnosis Date Noted   • Wound infection after surgery 11/29/2022   • Encounter for venous access device care 11/29/2022   • Bandemia 11/16/2022   • Hypokalemia 11/07/2022   • Sepsis ([facility] ) 11/05/2022   • S/P total abdominal hysterectomy 10/31/2022   • Ileostomy, has currently ([facility] ) 10/31/2022   • Acute blood loss anemia (ABLA) 10/31/2022   • S/P splenectomy 10/31/2022   • Anxiety disorder due to medical condition 09/29/2022   • Malignant neoplasm of left ovary ([facility] ) 08/09/2022   • Giant cell aortic arteritis ([place] [address] )    • Prediabetes    • 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[place] [address] ) 07/21/2022   • Lower abdominal pain 06/15/2022   • Change in bowel habit 06/15/2022   • Personal history of colonic polyps 06/15/2022   • Long term (current) use of systemic steroids 06/15/2022   • Asthma    • Hyperlipidemia    • Hypertension    • Gastroesophageal reflux disease 03/13/2019   • Constipation 03/13/2019   • Hemorrhoids 03/13/2019   • Family history of colonic polyps 03/13/2019     She  reports that she has never smoked  She has never used smokeless tobacco  She reports current alcohol use  She reports that she does not use drugs    Current Outpatient Medications   Medication Sig Dispense Refill   • Acetaminophen (TYLENOL ARTHRITIS PAIN PO) Take by mouth as needed     • albuterol (PROVENTIL HFA,VENTOLIN HFA) 90 mcg/act inhaler Inhale 2 puffs every 4 (four) hours as needed      • Ascorbic Acid (VITAMIN C GUMMIE PO) Take by mouth     • B Complex-C (B COMPLEX-VITAMIN C PO) Take by mouth daily     • betamethasone, augmented, (DIPROLENE) 0 05 % lotion Apply topically as needed     • Calcium Carb-Cholecalciferol (CALCIUM 500+D3 PO) Take 1 tablet by mouth 2 (two) times a day      • cephalexin (KEFLEX) 500 mg capsule TAKE 1 CAPSULE 3 TIMES A DAY FOR 7 DAYS     • cholecalciferol (VITAMIN D3) 1,000 units tablet Take 1,000 Units by mouth daily     • Docusate Calcium (STOOL SOFTENER PO) Take 1 tablet by mouth 2 (two) times a day     • escitalopram (LEXAPRO) 10 mg tablet TAKE 1 TABLET BY MOUTH EVERY DAY 90 tablet 2   • famotidine (PEPCID) 40 MG tablet Take 1 tablet (40 mg total) by mouth daily at bedtime 90 tablet 3   • fexofenadine (ALLEGRA) 180 MG tablet Take 180 mg by mouth daily     • FIBER ADULT GUMMIES PO Take by mouth if needed     • GLUCOSAMINE-CHONDROITIN PO Take 1 tablet by mouth 2 (two) times a day      • lidocaine-prilocaine (EMLA) cream Apply topically as needed for mild pain 30 g 0   • lisinopril (ZESTRIL) 5 mg tablet Take 5 mg by mouth daily dinner     • LORazepam (ATIVAN) 1 mg tablet Take 1 tablet (1 mg total) by mouth every 8 (eight) hours as needed for anxiety (nausea or anxiety) 30 tablet 0   • metFORMIN (GLUCOPHAGE) 500 mg tablet Take 500 mg by mouth daily Daily at lunch     • metoprolol succinate (TOPROL-XL) 25 mg 24 hr tablet Take 25 mg by mouth daily dinner     • montelukast (SINGULAIR) 10 mg tablet Take 10 mg by mouth daily dinner     • multivitamin (THERAGRAN) TABS Take 1 tablet by mouth daily     • omeprazole (PriLOSEC) 20 mg delayed release capsule TAKE 1 CAPSULE BY MOUTH EVERY DAY 30 MINUTES BEFORE MORNING MEAL FOR 90 DAYS     • ondansetron (ZOFRAN) 8 mg tablet Take 1 tablet (8 mg total) by mouth every 8 (eight) hours as needed for nausea or vomiting 20 tablet 1   • predniSONE 1 mg tablet Take 3 mg by mouth daily     • Probiotic Product (PROBIOTIC DAILY PO) Take by mouth daily     • simvastatin (ZOCOR) 10 mg tablet Take 10 mg by mouth daily at bedtime     • SYMBICORT 160-4 5 MCG/ACT inhaler 2 puffs daily        No current facility-administered medications for this visit  She is allergic to nsaids and pedi-pre tape spray [wound dressing adhesive]       Review of Systems   Constitutional: Negative for chills and fever  HENT: Negative for congestion and sneezing  Respiratory: Negative for cough  Skin: Positive for wound  Psychiatric/Behavioral: Negative for agitation           Objective:       Wound 11/10/22 Surgical Abdomen Medial;Lower (Active)   Wound Image Images linked 12/12/22 1111   Wound Description Granulation tissue;Bleeding 12/12/22 1110   Ailyn-wound Assessment Scar Tissue 12/12/22 1110   Wound Length (cm) 7 cm 12/12/22 1110   Wound Width (cm) 1 cm 12/12/22 1110   Wound Depth (cm) 0 3 cm 12/12/22 1110   Wound Surface Area (cm^2) 7 cm^2 12/12/22 1110   Wound Volume (cm^3) 2 1 cm^3 12/12/22 1110   Calculated Wound Volume (cm^3) 2 1 cm^3 12/12/22 1110   Change in Wound Size % 99 24 12/12/22 1110   Drainage Amount Moderate 12/12/22 1110   Drainage Description Sanguineous 12/12/22 1110   Non-staged Wound Description Full thickness 12/12/22 1110   Dressing Status Intact 12/12/22 1110       /68   Pulse 76   Temp (!) 60 8 °F (16 °C)   Resp 16     Physical Exam  Vitals reviewed  Constitutional:       General: She is not in acute distress  Appearance: Normal appearance  She is not ill-appearing, toxic-appearing or diaphoretic  HENT:      Head: Normocephalic and atraumatic  Right Ear: External ear normal       Left Ear: External ear normal    Eyes:      Conjunctiva/sclera: Conjunctivae normal    Pulmonary:      Effort: Pulmonary effort is normal  No respiratory distress  Musculoskeletal:      Cervical back: Neck supple  Skin:     Comments: See wound assessment   Neurological:      Mental Status: She is alert     Psychiatric:         Mood and Affect: Mood normal          Behavior: Behavior normal            Wound 11/10/22 Surgical Abdomen Medial;Lower (Active)   Wound Image   12/12/22 1111   Wound Description Granulation tissue;Bleeding 12/12/22 1110   Ailyn-wound Assessment Scar Tissue 12/12/22 1110   Wound Length (cm) 7 cm 12/12/22 1110   Wound Width (cm) 1 cm 12/12/22 1110   Wound Depth (cm) 0 3 cm 12/12/22 1110   Wound Surface Area (cm^2) 7 cm^2 12/12/22 1110   Wound Volume (cm^3) 2 1 cm^3 12/12/22 1110   Calculated Wound Volume (cm^3) 2 1 cm^3 12/12/22 1110   Change in Wound Size % 99 24 12/12/22 1110   Undermining 3 5 11/10/22 1439   Undermining is depth extending from 12 11/10/22 1439   Drainage Amount Moderate 12/12/22 1110   Drainage Description Sanguineous 12/12/22 1110   Non-staged Wound Description Full thickness 12/12/22 1110   Treatments Cleansed 12/05/22 1448   Wound packed? Yes 12/05/22 1448   Packing- # removed 1 12/05/22 1448   Dressing Changed Changed 12/05/22 1448   Patient Tolerance Tolerated well 12/05/22 1448   Dressing Status Intact 12/12/22 1110       Wound 12/08/22 Incision Chest Right;Upper (Active)                         Wound Instructions:  Orders Placed This Encounter   Procedures   • Wound cleansing and dressings     ABDOMEN WOUND     Wash your hands with soap and water  Remove old dressing, discard into plastic bag and place in trash  Cleanse the wound with saline,  prior to applying a clean dressing  Do not use tissue or cotton balls  Do not scrub the wound  Pat dry using gauze  Shower no   Apply silver alginate to wound  Cover with allevyn with border  Change 3xweek   Monday Wednesday and Friday     dressing above done today at the [facility]  Next appt in 4 weeks   The wound vac is discontinued today at appointment  Standing Status:   Future     Standing Expiration Date:   12/12/2023   • Wound home care     Continue with the VNA     Standing Status:   Future     Standing Expiration Date:   12/12/2023        Diagnosis ICD-10-CM Associated Orders   1   Dehiscence of postoperative wound of abdomen  T81 31XA lidocaine (XYLOCAINE) 4 % topical solution 5 mL     Wound cleansing and dressings     Wound home care

## 2022-12-12 NOTE — LETTER
[address] [facility]  [address] [place] [address]  Phone#  [phone]  Fax#  [phone]    Patient:  Afia Galvin  YOB: 1950  Phone:  [phone]  Date of Visit:  12/12/2022    Orders Placed This Encounter   Procedures   • Wound cleansing and dressings     ABDOMEN WOUND     Wash your hands with soap and water  Remove old dressing, discard into plastic bag and place in trash  Cleanse the wound with saline,  prior to applying a clean dressing  Do not use tissue or cotton balls  Do not scrub the wound  Pat dry using gauze  Shower no   Apply silver alginate to wound  Cover with allevyn with border  Change 3xweek   Monday Wednesday and Friday     dressing above done today at the [facility]  Next appt in 4 weeks   The wound vac is discontinued today at appointment       Standing Status:   Future     Standing Expiration Date:   12/12/2023   • Wound home care     Continue with the VNA     Standing Status:   Future     Standing Expiration Date:   12/12/2023         Electronically signed by Kirsten Garcia DO

## 2022-12-12 NOTE — PATIENT INSTRUCTIONS
Orders Placed This Encounter   Procedures    Wound cleansing and dressings     ABDOMEN WOUND     Wash your hands with soap and water  Remove old dressing, discard into plastic bag and place in trash  Cleanse the wound with saline,  prior to applying a clean dressing  Do not use tissue or cotton balls  Do not scrub the wound  Pat dry using gauze  Shower no   Apply silver alginate to wound  Cover with allevyn with border  Change 3xweek   Monday Wednesday and Friday  dressing above done today at the [facility]  Next appt in 4 weeks   The wound vac is discontinued today at appointment       Standing Status:   Future     Standing Expiration Date:   12/12/2023    Wound home care     Continue with the VNA     Standing Status:   Future     Standing Expiration Date:   12/12/2023

## 2022-12-13 ENCOUNTER — TELEPHONE (OUTPATIENT)
Dept: NUTRITION | Facility: CLINIC | Age: 72
End: 2022-12-13

## 2022-12-13 ENCOUNTER — HOSPITAL ENCOUNTER (OUTPATIENT)
Dept: INFUSION CENTER | Facility: HOSPITAL | Age: 72
Discharge: HOME | End: 2022-12-13
Attending: OBSTETRICS & GYNECOLOGY

## 2022-12-13 ENCOUNTER — TELEPHONE (OUTPATIENT)
Dept: GYNECOLOGIC ONCOLOGY | Facility: CLINIC | Age: 72
End: 2022-12-13

## 2022-12-13 VITALS
SYSTOLIC BLOOD PRESSURE: 129 MMHG | HEIGHT: 65 IN | WEIGHT: 155.42 LBS | TEMPERATURE: 96.9 F | RESPIRATION RATE: 16 BRPM | DIASTOLIC BLOOD PRESSURE: 63 MMHG | BODY MASS INDEX: 25.9 KG/M2 | HEART RATE: 97 BPM

## 2022-12-13 DIAGNOSIS — R63.4 WEIGHT DECREASING: Primary | ICD-10-CM

## 2022-12-13 DIAGNOSIS — C56.2 MALIGNANT NEOPLASM OF LEFT OVARY (HCC): Primary | ICD-10-CM

## 2022-12-13 RX ORDER — PALONOSETRON 0.05 MG/ML
0.25 INJECTION, SOLUTION INTRAVENOUS ONCE
Status: COMPLETED | OUTPATIENT
Start: 2022-12-13 | End: 2022-12-13

## 2022-12-13 RX ORDER — SODIUM CHLORIDE 9 MG/ML
20 INJECTION, SOLUTION INTRAVENOUS ONCE
Status: COMPLETED | OUTPATIENT
Start: 2022-12-13 | End: 2022-12-13

## 2022-12-13 RX ADMIN — FAMOTIDINE 20 MG: 20 INJECTION, SOLUTION INTRAVENOUS at 09:20

## 2022-12-13 RX ADMIN — FOSAPREPITANT 150 MG: 150 INJECTION, POWDER, LYOPHILIZED, FOR SOLUTION INTRAVENOUS at 09:42

## 2022-12-13 RX ADMIN — DIPHENHYDRAMINE HYDROCHLORIDE 25 MG: 50 INJECTION, SOLUTION INTRAMUSCULAR; INTRAVENOUS at 08:55

## 2022-12-13 RX ADMIN — PALONOSETRON 0.25 MG: 0.05 INJECTION, SOLUTION INTRAVENOUS at 08:36

## 2022-12-13 RX ADMIN — SODIUM CHLORIDE 20 ML/HR: 0.9 INJECTION, SOLUTION INTRAVENOUS at 08:32

## 2022-12-13 RX ADMIN — PACLITAXEL 246 MG: 6 INJECTION, SOLUTION INTRAVENOUS at 10:24

## 2022-12-13 RX ADMIN — DEXAMETHASONE SODIUM PHOSPHATE 10 MG: 10 INJECTION, SOLUTION INTRAMUSCULAR; INTRAVENOUS at 08:32

## 2022-12-13 RX ADMIN — CARBOPLATIN 491.5 MG: 10 INJECTION, SOLUTION INTRAVENOUS at 13:24

## 2022-12-13 NOTE — TELEPHONE ENCOUNTER
Haritha Johnson from [facility] called into the office requesting a letter to be faxed over to the office for the approval of the patient's dental cleaning in January  The letter can be faxed over to [phone]

## 2022-12-13 NOTE — TELEPHONE ENCOUNTER
Received notification by infusion RN, Sukhwinder Bertrand  on 78/34/28 that pt has triggered for oncology nutrition care (reason for referral: Malnutrition Screening Tool (MST) Triggers: scored a 3 indicating 14-23# (6 4-10 5 kg) recent wt loss and is eating poorly due to a decreased appetite  (Date of MST: 12/13/22))  Christina Angel and introduced self and explained the reason for today's call  Spoke with Anika Barragan today who reports she would like to meet with RD to discuss her nutrition  She reports decreased appetite and unintentional weight loss  She states she has a sensitive stomach, and change in taste and smell  She has been trying to drink oral nutrition supplements with 30g protein, aims for about 2 per day      Discussed oncology nutrition services available (options for in-person and phone consultation) and the benefits of meeting for a consultation  Initial RD phone consultation set up for 12/23/22 at 11 am       Provided this RD’s contact information asking that Anika Barragan reach out prn  All questions/concerns addressed at this time

## 2022-12-14 ENCOUNTER — TELEPHONE (OUTPATIENT)
Dept: GYNECOLOGIC ONCOLOGY | Facility: CLINIC | Age: 72
End: 2022-12-14

## 2022-12-14 NOTE — TELEPHONE ENCOUNTER
Called and informed patient that her letter was faxed to her dental office that she can have her cleaning in January  Instructed the patient to call the office with any questions or concerns

## 2022-12-16 ENCOUNTER — HOSPITAL ENCOUNTER (OUTPATIENT)
Dept: INFUSION CENTER | Facility: HOSPITAL | Age: 72
End: 2022-12-16

## 2022-12-16 DIAGNOSIS — Z45.2 ENCOUNTER FOR VENOUS ACCESS DEVICE CARE: ICD-10-CM

## 2022-12-16 DIAGNOSIS — C56.2 MALIGNANT NEOPLASM OF LEFT OVARY (HCC): Primary | ICD-10-CM

## 2022-12-16 LAB
ALBUMIN SERPL BCP-MCNC: 2.8 G/DL (ref 3.5–5)
ALP SERPL-CCNC: 107 U/L (ref 46–116)
ALT SERPL W P-5'-P-CCNC: 23 U/L (ref 12–78)
ANION GAP SERPL CALCULATED.3IONS-SCNC: 15 MMOL/L (ref 4–13)
ANISOCYTOSIS BLD QL SMEAR: PRESENT
AST SERPL W P-5'-P-CCNC: 15 U/L (ref 5–45)
BASOPHILS # BLD MANUAL: 0 THOUSAND/UL (ref 0–0.1)
BASOPHILS NFR MAR MANUAL: 0 % (ref 0–1)
BILIRUB SERPL-MCNC: 0.2 MG/DL (ref 0.2–1)
BUN SERPL-MCNC: 15 MG/DL (ref 5–25)
CALCIUM ALBUM COR SERPL-MCNC: 9.6 MG/DL (ref 8.3–10.1)
CALCIUM SERPL-MCNC: 8.6 MG/DL (ref 8.3–10.1)
CHLORIDE SERPL-SCNC: 98 MMOL/L (ref 96–108)
CO2 SERPL-SCNC: 26 MMOL/L (ref 21–32)
CREAT SERPL-MCNC: 0.48 MG/DL (ref 0.6–1.3)
EOSINOPHIL # BLD MANUAL: 0 THOUSAND/UL (ref 0–0.4)
EOSINOPHIL NFR BLD MANUAL: 0 % (ref 0–6)
ERYTHROCYTE [DISTWIDTH] IN BLOOD BY AUTOMATED COUNT: 16.3 % (ref 11.6–15.1)
GFR SERPL CREATININE-BSD FRML MDRD: 98 ML/MIN/1.73SQ M
GLUCOSE SERPL-MCNC: 220 MG/DL (ref 65–140)
HCT VFR BLD AUTO: 30.6 % (ref 34.8–46.1)
HGB BLD-MCNC: 9.9 G/DL (ref 11.5–15.4)
LG PLATELETS BLD QL SMEAR: PRESENT
LYMPHOCYTES # BLD AUTO: 0.62 THOUSAND/UL (ref 0.6–4.47)
LYMPHOCYTES # BLD AUTO: 5 % (ref 14–44)
MAGNESIUM SERPL-MCNC: 1.5 MG/DL (ref 1.6–2.6)
MCH RBC QN AUTO: 29.8 PG (ref 26.8–34.3)
MCHC RBC AUTO-ENTMCNC: 32.4 G/DL (ref 31.4–37.4)
MCV RBC AUTO: 92 FL (ref 82–98)
MONOCYTES # BLD AUTO: 0.25 THOUSAND/UL (ref 0–1.22)
MONOCYTES NFR BLD: 2 % (ref 4–12)
NEUTROPHILS # BLD MANUAL: 11.61 THOUSAND/UL (ref 1.85–7.62)
NEUTS SEG NFR BLD AUTO: 93 % (ref 43–75)
PLATELET # BLD AUTO: 457 THOUSANDS/UL (ref 149–390)
PLATELET BLD QL SMEAR: ABNORMAL
PMV BLD AUTO: 10.3 FL (ref 8.9–12.7)
POTASSIUM SERPL-SCNC: 4 MMOL/L (ref 3.5–5.3)
PROT SERPL-MCNC: 7.6 G/DL (ref 6.4–8.4)
RBC # BLD AUTO: 3.32 MILLION/UL (ref 3.81–5.12)
SODIUM SERPL-SCNC: 139 MMOL/L (ref 135–147)
WBC # BLD AUTO: 12.48 THOUSAND/UL (ref 4.31–10.16)

## 2022-12-16 NOTE — PROGRESS NOTES
Pt  Denied new symptoms or concerns today  Labs obtained as ordered via port a cath  Excellent blood return noted  Future appointments reviewed  AVS declined

## 2022-12-21 ENCOUNTER — HOSPITAL ENCOUNTER (OUTPATIENT)
Dept: INFUSION CENTER | Facility: HOSPITAL | Age: 72
Discharge: HOME | End: 2022-12-21

## 2022-12-21 ENCOUNTER — OFFICE VISIT (OUTPATIENT)
Dept: GYNECOLOGIC ONCOLOGY | Facility: HOSPITAL | Age: 72
End: 2022-12-21

## 2022-12-21 VITALS
DIASTOLIC BLOOD PRESSURE: 70 MMHG | HEART RATE: 105 BPM | SYSTOLIC BLOOD PRESSURE: 132 MMHG | OXYGEN SATURATION: 98 % | HEIGHT: 65 IN | BODY MASS INDEX: 27.32 KG/M2 | WEIGHT: 164 LBS

## 2022-12-21 DIAGNOSIS — Z45.2 ENCOUNTER FOR VENOUS ACCESS DEVICE CARE: ICD-10-CM

## 2022-12-21 DIAGNOSIS — C56.2 MALIGNANT NEOPLASM OF LEFT OVARY (HCC): Primary | ICD-10-CM

## 2022-12-21 LAB
ALBUMIN SERPL BCP-MCNC: 2.9 G/DL (ref 3.5–5)
ALP SERPL-CCNC: 99 U/L (ref 46–116)
ALT SERPL W P-5'-P-CCNC: 25 U/L (ref 12–78)
ANION GAP SERPL CALCULATED.3IONS-SCNC: 7 MMOL/L (ref 4–13)
AST SERPL W P-5'-P-CCNC: 16 U/L (ref 5–45)
BASOPHILS # BLD MANUAL: 0 THOUSAND/UL (ref 0–0.1)
BASOPHILS NFR MAR MANUAL: 0 % (ref 0–1)
BILIRUB SERPL-MCNC: 0.1 MG/DL (ref 0.2–1)
BUN SERPL-MCNC: 14 MG/DL (ref 5–25)
CALCIUM ALBUM COR SERPL-MCNC: 10 MG/DL (ref 8.3–10.1)
CALCIUM SERPL-MCNC: 9.1 MG/DL (ref 8.3–10.1)
CHLORIDE SERPL-SCNC: 94 MMOL/L (ref 96–108)
CO2 SERPL-SCNC: 25 MMOL/L (ref 21–32)
CREAT SERPL-MCNC: 0.58 MG/DL (ref 0.6–1.3)
CREAT UR-MCNC: 32 MG/DL
EOSINOPHIL # BLD MANUAL: 0.11 THOUSAND/UL (ref 0–0.4)
EOSINOPHIL NFR BLD MANUAL: 2 % (ref 0–6)
ERYTHROCYTE [DISTWIDTH] IN BLOOD BY AUTOMATED COUNT: 16.5 % (ref 11.6–15.1)
GFR SERPL CREATININE-BSD FRML MDRD: 92 ML/MIN/1.73SQ M
GLUCOSE SERPL-MCNC: 184 MG/DL (ref 65–140)
HCT VFR BLD AUTO: 29.4 % (ref 34.8–46.1)
HGB BLD-MCNC: 9.5 G/DL (ref 11.5–15.4)
LYMPHOCYTES # BLD AUTO: 1.19 THOUSAND/UL (ref 0.6–4.47)
LYMPHOCYTES # BLD AUTO: 22 % (ref 14–44)
MAGNESIUM SERPL-MCNC: 1.4 MG/DL (ref 1.6–2.6)
MCH RBC QN AUTO: 30.2 PG (ref 26.8–34.3)
MCHC RBC AUTO-ENTMCNC: 32.3 G/DL (ref 31.4–37.4)
MCV RBC AUTO: 93 FL (ref 82–98)
MONOCYTES # BLD AUTO: 1.08 THOUSAND/UL (ref 0–1.22)
MONOCYTES NFR BLD: 20 % (ref 4–12)
NEUTROPHILS # BLD MANUAL: 3.02 THOUSAND/UL (ref 1.85–7.62)
NEUTS SEG NFR BLD AUTO: 56 % (ref 43–75)
PLATELET # BLD AUTO: 381 THOUSANDS/UL (ref 149–390)
PLATELET BLD QL SMEAR: ADEQUATE
PMV BLD AUTO: 10.4 FL (ref 8.9–12.7)
POTASSIUM SERPL-SCNC: 3.8 MMOL/L (ref 3.5–5.3)
PROT SERPL-MCNC: 7.5 G/DL (ref 6.4–8.4)
PROT UR-MCNC: <6 MG/DL
RBC # BLD AUTO: 3.15 MILLION/UL (ref 3.81–5.12)
RBC MORPH BLD: NORMAL
SODIUM SERPL-SCNC: 126 MMOL/L (ref 135–147)
WBC # BLD AUTO: 5.4 THOUSAND/UL (ref 4.31–10.16)

## 2022-12-21 NOTE — PROGRESS NOTES
Assessment/Plan:    Problem List Items Addressed This Visit        Endocrine    Malignant neoplasm of left ovary ([facility] ) - Primary     66-year-old with stage IIIc ovarian cancer status post 3 cycles of neoadjuvant chemotherapy and optimal tumor debulking  She is recovering well from surgery  Her wound VAC has been removed  She is ambulating with and without a walker  She is currently receiving adjuvant chemotherapy with carboplatin AUC 5 and Taxol 135 mg per metered squared every 21 days and is here prior to cycle 5 of treatment  Avastin has been held for postoperative wound healing  Reviewed her CBC, CMP,   Her performance status is 0   1   Continue chemotherapy with carboplatin AUC 5 and Taxol 135 mg per metered squared every 21 days  We will add Avastin at 10 mg/kg with cycle 5  We discussed that Avastin may impair wound healing but that given her progress the benefit of adding Avastin for cancer treatment outweighs the risk of delayed healing  2   Plan for CT imaging after cycle 6   3   Continue local wound care  4  We will plan for 3 months of Avastin maintenance followed by CT with rectal contrast to evaluate the rectal anastomosis prior to scheduling ileostomy reversal               CHIEF COMPLAINT: Prechemotherapy evaluation and treatment discussion    Problem:  Cancer Staging   Malignant neoplasm of left ovary ([facility] )  Staging form: Ovary, Fallopian Tube, Primary Peritoneal, AJCC 8th Edition  - Clinical: FIGO Stage IIIC (cT3c, cN0, cM0) - Signed by Cass Malin MD on 11/29/2022        Previous therapy:  Oncology History   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[facility] [address] [address] )   7/21/2022 Initial Diagnosis    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[facility] [address] )     8/4/2022 Surgery    Diagnostic laparoscopy, peritoneal biopsy     8/18/2022 Genetic Testing    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, serous    Results revealed patient has the following mutation(s):  None     Malignant neoplasm of left ovary ([facility] [address] ) 8/4/2022 Surgery    Diagnostic laparoscopy, peritoneal biopsy     8/9/2022 Initial Diagnosis    Gynecologic malignancy ([facility] )     8/23/2022 -  Chemotherapy    palonosetron (ALOXI), 0 25 mg, Intravenous, Once, 4 of 7 cycles  Administration: 0 25 mg (8/23/2022), 0 25 mg (9/13/2022), 0 25 mg (10/4/2022), 0 25 mg (12/13/2022)  fosaprepitant (EMEND) IVPB, 150 mg, Intravenous, Once, 4 of 7 cycles  Administration: 150 mg (8/23/2022), 150 mg (9/13/2022), 150 mg (10/4/2022), 150 mg (12/13/2022)  CARBOplatin (PARAPLATIN) IVPB ([facility] AUC DOSING), 605 4 mg, Intravenous, Once, 4 of 6 cycles  Administration: 600 mg (8/23/2022), 564 mg (9/13/2022), 601 8 mg (10/4/2022), 491 5 mg (12/13/2022)  bevacizumab (AVASTIN) IVPB, 1,252 5 mg, Intravenous, Once, 2 of 5 cycles  Dose modification: 10 mg/kg (original dose 15 mg/kg, Cycle 5, Reason: Other (Must fill in a comment))  Administration: 1,200 mg (8/23/2022), 1,200 mg (9/13/2022)  PACLItaxel (TAXOL) chemo IVPB, 175 mg/m2 = 330 6 mg, Intravenous, Once, 4 of 6 cycles  Dose modification: 135 mg/m2 (original dose 175 mg/m2, Cycle 4, Reason: Other (Must fill in a comment))  Administration: 330 6 mg (8/23/2022), 330 6 mg (9/13/2022), 330 6 mg (10/4/2022), 246 mg (12/13/2022)     10/28/2022 Surgery    diagnostic laparoscopy, exploratory laparotomy, modified radical hysterectomy, bilateral salpingo-oophorectomy with en bloc rectosigmoid resection, gastrocolic omentectomy, splenectomy, small bowel resection with reanastomosis, diaphragm resection, excision ablation of peritoneal implants, ileostomy formation, optimal tumor debulking     11/29/2022 -  Cancer Staged    Staging form: Ovary, Fallopian Tube, Primary Peritoneal, AJCC 8th Edition  - Clinical: FIGO Stage IIIC (cT3c, cN0, cM0) - Signed by Bay Canchola MD on 11/29/2022  Histologic grade (G): G3  Histologic grading system: 4 grade system             Patient ID: Darreld Cranker is a 67 y o  female  Who returns for prechemotherapy evaluation and treatment discussion  She is here prior to cycle #5 of treatment  She has been receiving Taxol and carboplatin  Bevacizumab was held postoperatively for wound healing purposes  Her wound VAC has been removed  The wound care center states that the wound has significantly improved  Her ostomy has been functioning well  She states that she occasionally has difficulty with gas and bloating depending on what she eats  She has some output from the rectum  It is mainly mucoid  No bleeding  Labs from December 16, 2022 revealed a total white blood cell count of 12 48, hemoglobin 9 9 g/dL, platelet count 409  CMP was normal with the exception of a glucose of 220  Serum creatinine 0 48 mg/dL  On 12/8/2022,  was 37 9 units/mL  She had a port placed on 12/8/2022 as well  No other interval change in medications or medical history since her last visit  The following portions of the patient's history were reviewed and updated as appropriate: allergies, current medications, past family history, past medical history, past social history, past surgical history and problem list     Review of Systems   Constitutional: Positive for unexpected weight change  Negative for activity change  HENT: Negative  Eyes: Negative  Respiratory: Negative  Cardiovascular: Negative  Gastrointestinal: Positive for abdominal distention and abdominal pain  Negative for nausea and vomiting  Endocrine: Negative  Genitourinary: Negative for pelvic pain and vaginal bleeding  Musculoskeletal: Negative  Skin: Negative  Allergic/Immunologic: Negative  Neurological: Negative  Hematological: Negative  Psychiatric/Behavioral: Negative          Current Outpatient Medications   Medication Sig Dispense Refill   • Acetaminophen (TYLENOL ARTHRITIS PAIN PO) Take by mouth as needed     • albuterol (PROVENTIL HFA,VENTOLIN HFA) 90 mcg/act inhaler Inhale 2 puffs every 4 (four) hours as needed      • Ascorbic Acid (VITAMIN C GUMMIE PO) Take by mouth     • B Complex-C (B COMPLEX-VITAMIN C PO) Take by mouth daily     • betamethasone, augmented, (DIPROLENE) 0 05 % lotion Apply topically as needed     • Calcium Carb-Cholecalciferol (CALCIUM 500+D3 PO) Take 1 tablet by mouth 2 (two) times a day      • cephalexin (KEFLEX) 500 mg capsule TAKE 1 CAPSULE 3 TIMES A DAY FOR 7 DAYS     • cholecalciferol (VITAMIN D3) 1,000 units tablet Take 1,000 Units by mouth daily     • Docusate Calcium (STOOL SOFTENER PO) Take 1 tablet by mouth 2 (two) times a day     • escitalopram (LEXAPRO) 10 mg tablet TAKE 1 TABLET BY MOUTH EVERY DAY 90 tablet 2   • famotidine (PEPCID) 40 MG tablet Take 1 tablet (40 mg total) by mouth daily at bedtime 90 tablet 3   • fexofenadine (ALLEGRA) 180 MG tablet Take 180 mg by mouth daily     • FIBER ADULT GUMMIES PO Take by mouth if needed     • GLUCOSAMINE-CHONDROITIN PO Take 1 tablet by mouth 2 (two) times a day      • lidocaine-prilocaine (EMLA) cream Apply topically as needed for mild pain 30 g 0   • lisinopril (ZESTRIL) 5 mg tablet Take 5 mg by mouth daily dinner     • LORazepam (ATIVAN) 1 mg tablet Take 1 tablet (1 mg total) by mouth every 8 (eight) hours as needed for anxiety (nausea or anxiety) 30 tablet 0   • metFORMIN (GLUCOPHAGE) 500 mg tablet Take 500 mg by mouth daily Daily at lunch     • metoprolol succinate (TOPROL-XL) 25 mg 24 hr tablet Take 25 mg by mouth daily dinner     • montelukast (SINGULAIR) 10 mg tablet Take 10 mg by mouth daily dinner     • multivitamin (THERAGRAN) TABS Take 1 tablet by mouth daily     • omeprazole (PriLOSEC) 20 mg delayed release capsule TAKE 1 CAPSULE BY MOUTH EVERY DAY 30 MINUTES BEFORE MORNING MEAL FOR 90 DAYS     • ondansetron (ZOFRAN) 8 mg tablet Take 1 tablet (8 mg total) by mouth every 8 (eight) hours as needed for nausea or vomiting 20 tablet 1   • predniSONE 1 mg tablet Take 3 mg by mouth daily     • Probiotic Product (PROBIOTIC DAILY PO) Take by mouth daily     • simvastatin (ZOCOR) 10 mg tablet Take 10 mg by mouth daily at bedtime     • SYMBICORT 160-4 5 MCG/ACT inhaler 2 puffs daily        No current facility-administered medications for this visit  Facility-Administered Medications Ordered in Other Visits   Medication Dose Route Frequency Provider Last Rate Last Admin   • alteplase (CATHFLO) injection 2 mg  2 mg Intracatheter Q2H PRN Alfie Taveras MD               Objective:    Blood pressure 132/70, pulse 105, height 5' 4 5" (1 638 m), weight 74 4 kg (164 lb), SpO2 98 %  Body mass index is 27 72 kg/m²  Body surface area is 1 81 meters squared  Physical Exam  Vitals reviewed  Constitutional:       General: She is not in acute distress  Appearance: Normal appearance  She is not ill-appearing  HENT:      Head: Normocephalic and atraumatic  Mouth/Throat:      Mouth: Mucous membranes are moist    Eyes:      General: No scleral icterus  Right eye: No discharge  Left eye: No discharge  Conjunctiva/sclera: Conjunctivae normal    Pulmonary:      Effort: Pulmonary effort is normal    Abdominal:      General: There is no distension  Palpations: Abdomen is soft  There is no mass  Tenderness: There is no abdominal tenderness  Hernia: No hernia is present  Musculoskeletal:      Right lower leg: No edema  Left lower leg: No edema  Skin:     General: Skin is warm and dry  Coloration: Skin is not jaundiced  Findings: No rash  Neurological:      General: No focal deficit present  Mental Status: She is alert and oriented to person, place, and time  Cranial Nerves: No cranial nerve deficit  Motor: No weakness  Gait: Gait normal    Psychiatric:         Mood and Affect: Mood normal          Behavior: Behavior normal          Thought Content:  Thought content normal          Judgment: Judgment normal          Lab Results   Component Value Date     37 9 (H) 12/08/2022     Lab Results   Component Value Date    K 3 8 12/21/2022    CL 94 (L) 12/21/2022    CO2 25 12/21/2022    BUN 14 12/21/2022    CREATININE 0 58 (L) 12/21/2022    GLUCOSE 214 (H) 10/28/2022    GLUF 161 (H) 12/08/2022    CALCIUM 9 1 12/21/2022    CORRECTEDCA 10 0 12/21/2022    AST 16 12/21/2022    ALT 25 12/21/2022    ALKPHOS 99 12/21/2022    EGFR 92 12/21/2022     Lab Results   Component Value Date    WBC 5 40 12/21/2022    HGB 9 5 (L) 12/21/2022    HCT 29 4 (L) 12/21/2022    MCV 93 12/21/2022     12/21/2022     Lab Results   Component Value Date    NEUTROABS 14 57 (H) 12/08/2022        Trend:  Lab Results   Component Value Date     37 9 (H) 12/08/2022     8 6 10/20/2022     8 4 10/07/2022     12 5 09/29/2022     32 0 (H) 09/08/2022     52 4 (H) 09/02/2022     85 5 (H) 08/26/2022     77 3 (H) 08/19/2022

## 2022-12-21 NOTE — PROGRESS NOTES
Pt denied new symptoms or concerns today  Labs and urine obtained as ordered via port a cath noting excellent blood return  Future appointments reviewed  AVS declined

## 2022-12-21 NOTE — ASSESSMENT & PLAN NOTE
79-year-old with stage IIIc ovarian cancer status post 3 cycles of neoadjuvant chemotherapy and optimal tumor debulking  She is recovering well from surgery  Her wound VAC has been removed  She is ambulating with and without a walker  She is currently receiving adjuvant chemotherapy with carboplatin AUC 5 and Taxol 135 mg per metered squared every 21 days and is here prior to cycle 5 of treatment  Avastin has been held for postoperative wound healing  Reviewed her CBC, CMP,   Her performance status is 0   1   Continue chemotherapy with carboplatin AUC 5 and Taxol 135 mg per metered squared every 21 days  We will add Avastin at 10 mg/kg with cycle 5  We discussed that Avastin may impair wound healing but that given her progress the benefit of adding Avastin for cancer treatment outweighs the risk of delayed healing  2   Plan for CT imaging after cycle 6   3   Continue local wound care  4    We will plan for 3 months of Avastin maintenance followed by CT with rectal contrast to evaluate the rectal anastomosis prior to scheduling ileostomy reversal

## 2022-12-23 ENCOUNTER — NUTRITION (OUTPATIENT)
Dept: NUTRITION | Facility: HOSPITAL | Age: 72
End: 2022-12-23

## 2022-12-23 DIAGNOSIS — Z71.3 NUTRITIONAL COUNSELING: Primary | ICD-10-CM

## 2022-12-23 NOTE — PATIENT INSTRUCTIONS
Nutrition Rx & Recommendations:  Diet: high calorie/high protein, low fiber  Keep a daily food journal (pen/paper, chula such as [employer])  Nutrition Supplements: continue Premier protein shakes twice daily  May use homemade shakes/smoothies as desired  If using a pre-made shake/bar, choose ones with >300 calories and >10 grams protein (ex  Ensure Complete, Ensure Enlive, Ensure Plus, Boost Plus, Boost Very High Calorie, etc )  Small, frequent meals/snacks may be easier to tolerate than 3 large daily meals  Aim for 5-6 small meals per day (every 2-3 hours)  Include protein at all meals/snacks  Include a variety of foods (as tolerated/allowed by diet)  Incorporate physical activity as able/allowed  Stay hydrated by sipping fluids of choice/tolerance throughout the day  Liquid nutrition may be better tolerated than solids at times  Alter food choices and eating patterns to accommodate changing needs  Refer to Eating Hints book for other meal/snack ideas and symptom management  Follow proper oral care; Try baking soda/salt water rinse recipe (mix 3/4 tsp salt + 1 tsp baking soda + 1 qt water; rinse with plain water after using) in Eating Hints book (pg 18)  Brush your teeth before/after meals & before bed  For lack of taste: Practice good oral care  Blend fresh fruits into shakes, ice cream or yogurt  Eat frozen fruits: grapes, chopped cantaloupe, watermelon  Select fresh vegetables (may be more appealing than canned/frozen)  Add extra flavor to foods: experiment with spices, salt & sweeteners, extra oil/butter, acidic foods; marinate meats (see pages 29-30 in your Eating Hints book)  Weigh yourself regularly  If you notice weight loss, make an effort to increase your daily food/calorie intake  If you continue to notice loss after these efforts, reach out to your dietitian to establish a plan to stabilize weight     Mix in nut butters (almond or cashew) OR ice cream with premier protein shakes for added calories  Try fortified mashed potatoes for more calories and protein: make mashed potatoes with fairlife milk, extra butter, plain whole milk [place] yogurt (gives a sour cream taste but with protein), cheese, and dry milk powder    Snack ideas:  fairlife milk  Egg salad  Hard boiled egg  Nut butters + crackers  Cheese and crackers    Follow Up Plan: 1/20/23 phone follow up at 11 am  Recommend Referral to Other Providers: none at this time

## 2022-12-23 NOTE — PROGRESS NOTES
Outpatient Oncology Nutrition Consultation   Type of Consult: Initial Consult  Care Location: Telephone Call    Reason for referral: Received notification by infusion RN, Carola Narayan  on 52/66/87 that pt has triggered for oncology nutrition care (reason for referral: Malnutrition Screening Tool (MST) Triggers: scored a 3 indicating 14-23# (6 4-10 5 kg) recent wt loss and is eating poorly due to a decreased appetite  (Date of MST: 12/13/22))  Nutrition Assessment:   Oncology Diagnosis & Treatments: initially dx in July 2022 (peritoneal carcinoma, stage IIIc ovarian cancer)  -10/28/22-diagnostic laparoscopy, exploratory laparotomy, modified radical hysterectomy, bilateral salpingo-oophorectomy with en bloc rectosigmoid resection, gastrocolic omentectomy, splenectomy, small bowel resection with reanastomosis, diaphragm resection, excision ablation of peritoneal implants, ileostomy formation, optimal tumor debulking  -started chemo (8/23/22), Taxol, Carboplatin, Avastin  Oncology History   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[facility] [address] )   7/21/2022 Initial Diagnosis    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[facility] [address] )     8/4/2022 Surgery    Diagnostic laparoscopy, peritoneal biopsy     8/18/2022 Genetic Testing    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, serous    Results revealed patient has the following mutation(s):  None     Malignant neoplasm of left ovary ([address] )   8/4/2022 Surgery    Diagnostic laparoscopy, peritoneal biopsy     8/9/2022 Initial Diagnosis    Gynecologic malignancy ([facility] [address] )     8/23/2022 -  Chemotherapy    palonosetron (ALOXI), 0 25 mg, Intravenous, Once, 4 of 7 cycles  Administration: 0 25 mg (8/23/2022), 0 25 mg (9/13/2022), 0 25 mg (10/4/2022), 0 25 mg (12/13/2022)  fosaprepitant (EMEND) IVPB, 150 mg, Intravenous, Once, 4 of 7 cycles  Administration: 150 mg (8/23/2022), 150 mg (9/13/2022), 150 mg (10/4/2022), 150 mg (12/13/2022)  CARBOplatin (PARAPLATIN) IVPB (GOG AUC DOSING), 605 4 mg, Intravenous, Once, 4 of 6 cycles  Administration: 600 mg (8/23/2022), 564 mg (9/13/2022), 601 8 mg (10/4/2022), 491 5 mg (12/13/2022)  bevacizumab (AVASTIN) IVPB, 1,252 5 mg, Intravenous, Once, 2 of 5 cycles  Dose modification: 10 mg/kg (original dose 15 mg/kg, Cycle 5, Reason: Other (Must fill in a comment))  Administration: 1,200 mg (8/23/2022), 1,200 mg (9/13/2022)  PACLItaxel (TAXOL) chemo IVPB, 175 mg/m2 = 330 6 mg, Intravenous, Once, 4 of 6 cycles  Dose modification: 135 mg/m2 (original dose 175 mg/m2, Cycle 4, Reason: Other (Must fill in a comment))  Administration: 330 6 mg (8/23/2022), 330 6 mg (9/13/2022), 330 6 mg (10/4/2022), 246 mg (12/13/2022)     10/28/2022 Surgery    diagnostic laparoscopy, exploratory laparotomy, modified radical hysterectomy, bilateral salpingo-oophorectomy with en bloc rectosigmoid resection, gastrocolic omentectomy, splenectomy, small bowel resection with reanastomosis, diaphragm resection, excision ablation of peritoneal implants, ileostomy formation, optimal tumor debulking     11/29/2022 -  Cancer Staged    Staging form: Ovary, Fallopian Tube, Primary Peritoneal, AJCC 8th Edition  - Clinical: FIGO Stage IIIC (cT3c, cN0, cM0) - Signed by Laura Burrows MD on 11/29/2022  Histologic grade (G): G3  Histologic grading system: 4 grade system         Past Medical & Surgical Hx:   Patient Active Problem List   Diagnosis   • Gastroesophageal reflux disease   • Constipation   • Hemorrhoids   • Family history of colonic polyps   • Asthma   • Hyperlipidemia   • Hypertension   • Lower abdominal pain   • Change in bowel habit   • Personal history of colonic polyps   • Long term (current) use of systemic steroids   • 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[facility] [address] )   • Giant cell aortic arteritis ([facility] [address] )   • Prediabetes   • Malignant neoplasm of left ovary (HCC)   • Anxiety disorder due to medical condition   • S/P total abdominal hysterectomy   • Ileostomy, has currently [facility])   • Acute blood loss anemia (ABLA)   • S/P splenectomy   • Sepsis ([facility] )   • Hypokalemia   • Bandemia   • Wound infection after surgery   • Encounter for venous access device care     Past Medical History:   Diagnosis Date   • Abdominal pain     off and on   • Allergic sinusitis     "seasonal allergies"-has received allergy shots "   • Anxiety     with current diagnosis   • Arthritis    • Asthma    • Cholelithiasis    • Colon polyp    • Degenerative joint disease    • Dental bridge present     permanent lower left   • Exercise involving walking     1-2x/week   • GERD (gastroesophageal reflux disease)    • Giant cell aortic arteritis ([place] [address] )     "hereditary Giant Cell Temporal Arteritis"per pt   • Hiatal hernia    • History of cancer chemotherapy    • Hyperlipidemia    • Hypertension    • Nausea    • 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[place] [address] )     "gynecologic malignacy"   • PMR (polymyalgia rheumatica) (HCC)    • Prediabetes    • Shortness of breath     per pt "asthma related --with climbing mult  flights of stairs--or exertion -not new"   • Tinnitus    • Wears glasses      Past Surgical History:   Procedure Laterality Date   • COLONOSCOPY      October 2021: Adenomatous polyps and rectum in transverse colon, sigmoid diverticulosis, internal hemorrhoids  October 2016 diverticulosis and internal hemorrhoids, September 2011 diverticulosis and internal hemorrhoids     • COLOPROCTECTOMY N/A 10/28/2022    Procedure: PROCTECTOMY;  Surgeon: Kal Ty MD;  [facility];  Service: Surgical Oncology   • DILATION AND CURETTAGE OF UTERUS     • ILEO LOOP DIVERSION N/A 10/28/2022    Procedure: ILEOSTOMY LOOP;  Surgeon: Kal Ty MD;  [facility];  Service: Surgical Oncology   • IR PORT PLACEMENT  12/8/2022   • LIVER SURGERY  10/28/2022    Procedure: LIVER CONTROL OF BLEED ;  Surgeon: Kal Ty MD;  [facility];  Service: Surgical Oncology   • NY EXPLORATORY OF ABDOMEN N/A 10/28/2022    Procedure: LAPAROTOMY EXPLORATORY;  Surgeon: Laura Burrows MD;  [facility];  Service: Gynecology Oncology   • GA LAP,DIAGNOSTIC ABDOMEN N/A 08/04/2022    Procedure: DIAGNOSTIC LAPAROSCOPY, PERITONEAL BIOPSY, SAMPLING OF BLOODY ASCITES ;  Surgeon: Kathy Dawn MD;  [facility];  Service: Gynecology Oncology   • GA LAP,DIAGNOSTIC ABDOMEN N/A 10/28/2022    Procedure: LAPAROSCOPY DIAGNOSTIC;  Surgeon: Radha Floyd MD;  [facility];  Service: Gynecology Oncology   • GA PART REMOVAL COLON W ANASTOMOSIS N/A 10/28/2022    Procedure: RESECTION COLON LEFT;  Surgeon: Radha Floyd MD;  [facility];  Service: Gynecology Oncology   • GA TOTAL ABDOM HYSTERECTOMY N/A 10/28/2022    Procedure: HYSTERECTOMY MODIFIED RADICAL, BILATERAL SALPINGO-OOPHORECTOMY, GASTROCOLIC OMENTECTOMY, DIAPHRAGM RESECTION, EXCISION AND ABLATION OF PERITONEAL TUMORS;  Surgeon: Radha Floyd MD;  [facility];  Service: Gynecology Oncology   • SMALL INTESTINE SURGERY N/A 10/28/2022    Procedure: RESECTION SMALL BOWEL;  Surgeon: Radha Floyd MD;  [facility];  Service: Gynecology Oncology   • SPLENECTOMY, TOTAL N/A 10/28/2022    Procedure: SPLENECTOMY;  Surgeon: Radha Floyd MD;  [facility];  Service: Gynecology Oncology   • TEMPORAL ARTERY BIOPSY / LIGATION     • UPPER GASTROINTESTINAL ENDOSCOPY  11/10/2014    November 2014 irregular Z-line and Schatzki ring, hiatal hernia, gastritis  Biopsies negative for celiac, H pylori, or Phillip's or eosinophilic esophagitis     • WISDOM TOOTH EXTRACTION         Review of Medications:   Vitamins, Supplements and Herbals: Med List Reviewed & pt is only taking: centrum womens MVI, vitamin B, calcium, vitamin D, probiotic    Current Outpatient Medications:   •  Acetaminophen (TYLENOL ARTHRITIS PAIN PO), Take by mouth as needed, Disp: , Rfl:   •  albuterol (PROVENTIL HFA,VENTOLIN HFA) 90 mcg/act inhaler, Inhale 2 puffs every 4 (four) hours as needed , Disp: , Rfl:   •  Ascorbic Acid (VITAMIN C GUMMIE PO), Take by mouth, Disp: , Rfl:   •  B Complex-C (B COMPLEX-VITAMIN C PO), Take by mouth daily, Disp: , Rfl:   •  betamethasone, augmented, (DIPROLENE) 0 05 % lotion, Apply topically as needed, Disp: , Rfl:   •  Calcium Carb-Cholecalciferol (CALCIUM 500+D3 PO), Take 1 tablet by mouth 2 (two) times a day , Disp: , Rfl:   •  cephalexin (KEFLEX) 500 mg capsule, TAKE 1 CAPSULE 3 TIMES A DAY FOR 7 DAYS, Disp: , Rfl:   •  cholecalciferol (VITAMIN D3) 1,000 units tablet, Take 1,000 Units by mouth daily, Disp: , Rfl:   •  Docusate Calcium (STOOL SOFTENER PO), Take 1 tablet by mouth 2 (two) times a day, Disp: , Rfl:   •  escitalopram (LEXAPRO) 10 mg tablet, TAKE 1 TABLET BY MOUTH EVERY DAY, Disp: 90 tablet, Rfl: 2  •  famotidine (PEPCID) 40 MG tablet, Take 1 tablet (40 mg total) by mouth daily at bedtime, Disp: 90 tablet, Rfl: 3  •  fexofenadine (ALLEGRA) 180 MG tablet, Take 180 mg by mouth daily, Disp: , Rfl:   •  FIBER ADULT GUMMIES PO, Take by mouth if needed, Disp: , Rfl:   •  GLUCOSAMINE-CHONDROITIN PO, Take 1 tablet by mouth 2 (two) times a day , Disp: , Rfl:   •  lidocaine-prilocaine (EMLA) cream, Apply topically as needed for mild pain, Disp: 30 g, Rfl: 0  •  lisinopril (ZESTRIL) 5 mg tablet, Take 5 mg by mouth daily dinner, Disp: , Rfl:   •  LORazepam (ATIVAN) 1 mg tablet, Take 1 tablet (1 mg total) by mouth every 8 (eight) hours as needed for anxiety (nausea or anxiety), Disp: 30 tablet, Rfl: 0  •  metFORMIN (GLUCOPHAGE) 500 mg tablet, Take 500 mg by mouth daily Daily at lunch, Disp: , Rfl:   •  metoprolol succinate (TOPROL-XL) 25 mg 24 hr tablet, Take 25 mg by mouth daily dinner, Disp: , Rfl:   •  montelukast (SINGULAIR) 10 mg tablet, Take 10 mg by mouth daily dinner, Disp: , Rfl:   •  multivitamin (THERAGRAN) TABS, Take 1 tablet by mouth daily, Disp: , Rfl:   •  omeprazole (PriLOSEC) 20 mg delayed release capsule, TAKE 1 CAPSULE BY MOUTH EVERY DAY 30 MINUTES BEFORE MORNING MEAL FOR 90 DAYS, Disp: , Rfl:   •  ondansetron (ZOFRAN) 8 mg tablet, Take 1 tablet (8 mg total) by mouth every 8 (eight) hours as needed for nausea or vomiting, Disp: 20 tablet, Rfl: 1  •  predniSONE 1 mg tablet, Take 3 mg by mouth daily, Disp: , Rfl:   •  Probiotic Product (PROBIOTIC DAILY PO), Take by mouth daily, Disp: , Rfl:   •  simvastatin (ZOCOR) 10 mg tablet, Take 10 mg by mouth daily at bedtime, Disp: , Rfl:   •  SYMBICORT 160-4 5 MCG/ACT inhaler, 2 puffs daily , Disp: , Rfl:   No current facility-administered medications for this visit      Facility-Administered Medications Ordered in Other Visits:   •  alteplase (CATHFLO) injection 2 mg, 2 mg, Intracatheter, Q2H PRN, Bharathi Carmichael MD    Most Recent Lab Results:   Lab Results   Component Value Date    WBC 5 40 12/21/2022    NEUTROABS 14 57 (H) 12/08/2022    ALT 25 12/21/2022    AST 16 12/21/2022    ALB 2 9 (L) 12/21/2022    SODIUM 126 (L) 12/21/2022    SODIUM 139 12/16/2022    K 3 8 12/21/2022    K 4 0 12/16/2022    CL 94 (L) 12/21/2022    BUN 14 12/21/2022    BUN 15 12/16/2022    CREATININE 0 58 (L) 12/21/2022    CREATININE 0 48 (L) 12/16/2022    EGFR 92 12/21/2022    PHOS 1 6 (L) 11/06/2022    PHOS 2 5 11/04/2022    GLUCOSE 214 (H) 10/28/2022    POCGLU 186 (H) 10/28/2022    GLUF 161 (H) 12/08/2022    GLUF 111 (H) 10/14/2022    GLUC 184 (H) 12/21/2022    HGBA1C 6 3 (H) 10/14/2022    HGBA1C 6 2 07/01/2022    HGBA1C 5 5 05/28/2019    CALCIUM 9 1 12/21/2022    MG 1 4 (L) 12/21/2022       Anthropometric Measurements:   Height:64"  Ht Readings from Last 1 Encounters:   12/21/22 5' 4 5" (1 638 m)     Wt Readings from Last 20 Encounters:   12/21/22 74 4 kg (164 lb)   12/13/22 70 5 kg (155 lb 6 8 oz)   11/28/22 75 8 kg (167 lb)   11/10/22 83 5 kg (184 lb)   11/08/22 83 5 kg (184 lb)   10/20/22 81 2 kg (179 lb)   10/04/22 83 1 kg (183 lb 3 2 oz)   09/29/22 82 8 kg (182 lb 9 6 oz)   09/13/22 83 4 kg (183 lb 13 8 oz)   09/08/22 83 kg (183 lb)   08/23/22 85 9 kg (189 lb 6 oz)   08/04/22 83 5 kg (184 lb)   07/28/22 86 2 kg (190 lb)   07/21/22 86 5 kg (190 lb 9 6 oz)   06/28/22 87 5 kg (193 lb)   06/15/22 89 4 kg (197 lb 3 2 oz)   02/03/22 87 3 kg (192 lb 6 4 oz)   11/19/21 85 3 kg (188 lb)   02/23/21 80 7 kg (178 lb)   02/24/20 81 6 kg (180 lb)       Weight History:   • Usual Weight: June of 2021, was put on prednisone  Was 180-185# at that time  Went up to about 193# with prednisone  Has lost weight since surgery in october 2022  • Varian: n/a  • Home Scale: n/a    Oncology Nutrition-Anthropometrics    Flowsheet Row Nutrition from 12/23/2022 in [address] [place] [address] Dietitian [facility]   Patient age (years): 67 years   Patient (female) height (in): 59 in   Current Weight to be used for anthropometric calculations (lbs) 164 lbs   Current Weight to be used for anthropometric calculations (kg) 74 5 kg   BMI: 28 1   IBW female: 120 lbs   IBW (kg) female: 54 5 kg   IBW % (female) 136 7 %   Adjusted BW (female): 131 lbs   Adjusted BW kg (female): 59 5 kg   % weight change after 1 month: -1 8 %   Weight change after 1 month (lbs) -3 lbs   % weight change after 3 months: -10 2 %   Weight change after 3 months (lbs) -18 6 lbs   % weight change after 6 months: -15 %   Weight change after 6 months (lbs) -29 lbs          Nutrition-Focused Physical Findings: n/a due to telephone call    Food/Nutrition-Related History & Client/Social History:    Current Nutrition Impact Symptoms:  [] Nausea  [x] Reduced Appetite -usually right after chemo, but then improves [] Acid Reflux    [] Vomiting  [x] Unintended Wt Loss  [] Malabsorption    [] Diarrhea  [] Unintended Wt Gain  [] Dumping Syndrome    [] Constipation  [] Thick Mucous/Secretions  [] Abdominal Pain    [x] Dysgeusia (Altered Taste) -right after chemo, food tastes "off" [x] Xerostomia (Dry Mouth) -sometimes at night   Has humidifier  [] Gas    [] Dysosmia (Altered Smell)  [] Thrush  [] Difficulty Chewing    [] Oral Mucositis (Sore Mouth)  [] Fatigue  [] Hyperglycemia   Lab Results   Component Value Date    HGBA1C 6 3 (H) 10/14/2022      [] Odynophagia  [] Esophagitis  [x] Other: wound from surgery  S/p wound vac (removed), wound is healing per patient  -has ostomy bag   [] Dysphagia  [] Early Satiety  [] No Problems Eating      Food Allergies & Intolerances: no    Current Diet: Low fiber diet (avoids fatty/greasy foods)  Current Nutrition Intake: Unchanged from usual  Appetite: Good, Fair   Nutrition Route: PO  Oral Care: brushes 1-2 times per day  Activity level: ADLs, almost off the walker  Does some laundry around the house, and other housework    24 Hr Diet Recall:   Breakfast: muffin with jelly  Snack: piece of coffee cake and tea  Lunch: 1/2 sandwich (ham and cheese) and cheese balls  Snack: premier protein shake  Dinner: last night had small cheese steak sandwich and applesauce  Tonight will have homemade pork fried rice  Snack: premier protein shake and pretzels    Beverages: water (12 x 3-6), tea (8oz x 1), premier protein shake (11 oz x2)  Supplements: usually 2 per day  • Premier Protein Shake (11 oz, 160 kcal, 30 g pro)        Oncology Nutrition-Estimated Needs    Flowsheet Row Nutrition from 2022 in [address] [place] [address] Dietitian [facility]   Weight type used Actual weight   Weight in kilograms (kg) used for estimated needs 74 5 kg   Energy needs formula:  35-40 kcal/kg   Energy needs based on 35 kcal/k kcal   Energy needs based on 40 kcal/k kcal   Protein needs formula: 1 5-2 g/kg   Protein needs based on 1 5 g/kg 112 g   Protein needs based on 2 g/kg 149 g   Fluid needs formula: 30-35 mL/kg   Fluid needs based on 30 mL/kg 2235 mL   Fluid needs in ounces 76 oz   Fluid needs based on 35 mL/kg 2608 mL   Fluid needs in ounces 88 oz           Discussion & Intervention:   Carlos Flores was evaluated today for an initial RD consultation regarding unintended weight loss and poor po intake    Carlos Evangelistagary is currently undergoing tx for ovarian cancer  Spoke with Beronica Florez and  today  She is has had 4 cycles of chemo, and s/p surgery with ostomy bag  She states her wound is healing and she no longer has the wound vac  She has lost weight since starting treatment and since surgery, but she feels that she's eating about the same  Discussed increased nutrient needs during treatment and post treatment  She has been following a low fiber diet, and small frequent meals  She drinks premier protein shakes twice daily, and reports Ensure/Boost products did not agree with her  Discussed ways to add more calories/protein  Reviewed 24 hour recall, which revealed an suboptimal po intake, and discussed ways to increase kcal, protein, and fluid intakes and optimize nutrient intake  Also reviewed the importance of wt management throughout the tx process and the role of a high kcal/ high protein diet and low fiber diet in managing wt and overall health    Based on today's assessment, discussion included: MNT for: ileostomy, dysgeusia, wt loss, small/frequent meals, how to modify foods for anticipated nutrition impact symptoms pt may experience during CA tx, practicing proper oral care, a high kcal/protein diet & food choices to include at all meals & snacks (Examples of high kcal foods: cheese, full-fat dairy products, nut butter, plant-based fats, coconut oil/milk, avocado, butter, cream soup, etc  Examples of high protein foods: eggs, chicken, fish, beans/legumes, nuts/nut butters, bone broth, etc ) , fortifying foods for added kcal and protein (examples include: adding cheese to foods such as eggs, mashed potatoes, casseroles, etc ; Making oatmeal with whole milk rather than water; Making fortified mashed potatoes with cream, butter, dry milk powder, plain [place] yogurt, and cheese ), adequate hydration & fluid choices, sipping on calorie containing beverages (examples include: adding whole milk or cream to coffee, oral nutrition supplements, juice, electrolyte replacement beverages, milk, etc ), eating smaller more frequent meals every 2-3 hours (5-6 small meals/day), continuing oral nutrition supplements, recipe suggestions/resources, trying new recipes, & cooking at home more often and adding extra fat to foods while cooking such as butter, plant-based oil, coconut oil/milk, avocado, nut butters, etc    Moving forward, Tsering Gonzales was encouraged to increase kcal, protein, and fluid intakes  Materials Provided: all mailed to pt: , [facility] Eating Hints book, ileostomy nutrition therapy handout, Oncology Nutrition Services Brochure and Oncology Nutrition Class Flyer  All questions and concerns addressed during today’s visit  Tsering Gonzales has RD contact information  Nutrition Diagnosis:   • Inadequate Energy Intake related to physiological causes, disease state and treatment related issues as evidenced by food recall, wt loss and discussion with pt and/or family  • Increased Nutrient Needs (kcal & pro) related to increased demand for nutrients and disease state as evidenced by cancer dx and pt undergoing tx for cancer  Monitoring & Evaluation:   Goals:  · weight maintenance/stabilization  · adequate nutrition impact symptom management  · pt to meet >/=75% estimated nutrition needs daily    · Progress Towards Goals: Initiated    Nutrition Rx & Recommendations:  · Diet: high calorie/high protein, low fiber  · Keep a daily food journal (pen/paper, chula such as Awdio)  · Nutrition Supplements: continue Premier protein shakes twice daily  May use homemade shakes/smoothies as desired  If using a pre-made shake/bar, choose ones with >300 calories and >10 grams protein (ex  Ensure Complete, Ensure Enlive, Ensure Plus, Boost Plus, Boost Very High Calorie, etc )  · Small, frequent meals/snacks may be easier to tolerate than 3 large daily meals  Aim for 5-6 small meals per day (every 2-3 hours)    · Include protein at all meals/snacks  · Include a variety of foods (as tolerated/allowed by diet)  · Incorporate physical activity as able/allowed  · Stay hydrated by sipping fluids of choice/tolerance throughout the day  · Liquid nutrition may be better tolerated than solids at times  · Alter food choices and eating patterns to accommodate changing needs  · Refer to Eating Hints book for other meal/snack ideas and symptom management  · Follow proper oral care; Try baking soda/salt water rinse recipe (mix 3/4 tsp salt + 1 tsp baking soda + 1 qt water; rinse with plain water after using) in Eating Hints book (pg 18)  Brush your teeth before/after meals & before bed  · For lack of taste: Practice good oral care  Blend fresh fruits into shakes, ice cream or yogurt  Eat frozen fruits: grapes, chopped cantaloupe, watermelon  Select fresh vegetables (may be more appealing than canned/frozen)  Add extra flavor to foods: experiment with spices, salt & sweeteners, extra oil/butter, acidic foods; marinate meats (see pages 29-30 in your Eating Hints book)  · Weigh yourself regularly  If you notice weight loss, make an effort to increase your daily food/calorie intake  If you continue to notice loss after these efforts, reach out to your dietitian to establish a plan to stabilize weight  · Mix in nut butters (almond or cashew) OR ice cream with premier protein shakes for added calories  Try fortified mashed potatoes for more calories and protein: make mashed potatoes with fairlife milk, extra butter, plain whole milk [place] yogurt (gives a sour cream taste but with protein), cheese, and dry milk powder    · Snack ideas:  · fairlife milk  · Egg salad  · Hard boiled egg  · Nut butters + crackers  · Cheese and crackers    Follow Up Plan: 1/20/23 phone follow up at 11 am  Recommend Referral to Other Providers: none at this time

## 2022-12-29 ENCOUNTER — TELEPHONE (OUTPATIENT)
Dept: WOUND CARE | Facility: HOSPITAL | Age: 72
End: 2022-12-29

## 2022-12-29 NOTE — TELEPHONE ENCOUNTER
[address] called today to let us know that the patient's wound is healed  Demi applied dry dressing to the wound today  Maria E Deepa is scheduled to be seen her at the [facility] on 1/9/23

## 2022-12-30 ENCOUNTER — HOSPITAL ENCOUNTER (OUTPATIENT)
Dept: INFUSION CENTER | Facility: HOSPITAL | Age: 72
End: 2022-12-30

## 2022-12-30 DIAGNOSIS — Z45.2 ENCOUNTER FOR VENOUS ACCESS DEVICE CARE: Primary | ICD-10-CM

## 2022-12-30 DIAGNOSIS — C56.2 MALIGNANT NEOPLASM OF LEFT OVARY (HCC): ICD-10-CM

## 2022-12-30 LAB
ALBUMIN SERPL BCP-MCNC: 3.1 G/DL (ref 3.5–5)
ALP SERPL-CCNC: 115 U/L (ref 46–116)
ALT SERPL W P-5'-P-CCNC: 22 U/L (ref 12–78)
ANION GAP SERPL CALCULATED.3IONS-SCNC: 5 MMOL/L (ref 4–13)
AST SERPL W P-5'-P-CCNC: 15 U/L (ref 5–45)
BASOPHILS # BLD AUTO: 0.1 THOUSANDS/ÂΜL (ref 0–0.1)
BASOPHILS NFR BLD AUTO: 1 % (ref 0–1)
BILIRUB SERPL-MCNC: 0.1 MG/DL (ref 0.2–1)
BUN SERPL-MCNC: 19 MG/DL (ref 5–25)
CALCIUM ALBUM COR SERPL-MCNC: 10.2 MG/DL (ref 8.3–10.1)
CALCIUM SERPL-MCNC: 9.5 MG/DL (ref 8.3–10.1)
CHLORIDE SERPL-SCNC: 104 MMOL/L (ref 96–108)
CO2 SERPL-SCNC: 26 MMOL/L (ref 21–32)
CREAT SERPL-MCNC: 0.74 MG/DL (ref 0.6–1.3)
EOSINOPHIL # BLD AUTO: 0.08 THOUSAND/ÂΜL (ref 0–0.61)
EOSINOPHIL NFR BLD AUTO: 1 % (ref 0–6)
ERYTHROCYTE [DISTWIDTH] IN BLOOD BY AUTOMATED COUNT: 17.3 % (ref 11.6–15.1)
GFR SERPL CREATININE-BSD FRML MDRD: 81 ML/MIN/1.73SQ M
GLUCOSE SERPL-MCNC: 193 MG/DL (ref 65–140)
HCT VFR BLD AUTO: 32.5 % (ref 34.8–46.1)
HGB BLD-MCNC: 10.2 G/DL (ref 11.5–15.4)
IMM GRANULOCYTES # BLD AUTO: 0.35 THOUSAND/UL (ref 0–0.2)
IMM GRANULOCYTES NFR BLD AUTO: 2 % (ref 0–2)
LYMPHOCYTES # BLD AUTO: 1.48 THOUSANDS/ÂΜL (ref 0.6–4.47)
LYMPHOCYTES NFR BLD AUTO: 9 % (ref 14–44)
MAGNESIUM SERPL-MCNC: 1.7 MG/DL (ref 1.6–2.6)
MCH RBC QN AUTO: 29.8 PG (ref 26.8–34.3)
MCHC RBC AUTO-ENTMCNC: 31.4 G/DL (ref 31.4–37.4)
MCV RBC AUTO: 95 FL (ref 82–98)
MONOCYTES # BLD AUTO: 2.31 THOUSAND/ÂΜL (ref 0.17–1.22)
MONOCYTES NFR BLD AUTO: 14 % (ref 4–12)
NEUTROPHILS # BLD AUTO: 12.07 THOUSANDS/ÂΜL (ref 1.85–7.62)
NEUTS SEG NFR BLD AUTO: 73 % (ref 43–75)
NRBC BLD AUTO-RTO: 0 /100 WBCS
PLATELET # BLD AUTO: 372 THOUSANDS/UL (ref 149–390)
PMV BLD AUTO: 10.1 FL (ref 8.9–12.7)
POTASSIUM SERPL-SCNC: 4 MMOL/L (ref 3.5–5.3)
PROT SERPL-MCNC: 7.2 G/DL (ref 6.4–8.4)
RBC # BLD AUTO: 3.42 MILLION/UL (ref 3.81–5.12)
SODIUM SERPL-SCNC: 135 MMOL/L (ref 135–147)
WBC # BLD AUTO: 16.39 THOUSAND/UL (ref 4.31–10.16)

## 2022-12-30 NOTE — PROGRESS NOTES
Pt had labs drawn from [facility] port  Labs obtained; port de-accessed and band aid applied  Next appt scheduled  Pt left ambulatory with steady gait for d/c

## 2022-12-31 LAB — CANCER AG125 SERPL-ACNC: 15.8 U/ML (ref 0–30)

## 2023-01-03 ENCOUNTER — HOSPITAL ENCOUNTER (OUTPATIENT)
Dept: INFUSION CENTER | Facility: HOSPITAL | Age: 73
Discharge: HOME | End: 2023-01-03
Attending: OBSTETRICS & GYNECOLOGY

## 2023-01-03 VITALS
WEIGHT: 161.82 LBS | RESPIRATION RATE: 18 BRPM | DIASTOLIC BLOOD PRESSURE: 78 MMHG | SYSTOLIC BLOOD PRESSURE: 127 MMHG | HEIGHT: 64 IN | OXYGEN SATURATION: 97 % | BODY MASS INDEX: 27.63 KG/M2 | TEMPERATURE: 97.3 F | HEART RATE: 96 BPM

## 2023-01-03 DIAGNOSIS — C57.9 GYNECOLOGIC MALIGNANCY (HCC): ICD-10-CM

## 2023-01-03 DIAGNOSIS — C56.2 MALIGNANT NEOPLASM OF LEFT OVARY (HCC): Primary | ICD-10-CM

## 2023-01-03 RX ORDER — SODIUM CHLORIDE 9 MG/ML
20 INJECTION, SOLUTION INTRAVENOUS ONCE
Status: COMPLETED | OUTPATIENT
Start: 2023-01-03 | End: 2023-01-03

## 2023-01-03 RX ORDER — PALONOSETRON 0.05 MG/ML
0.25 INJECTION, SOLUTION INTRAVENOUS ONCE
Status: COMPLETED | OUTPATIENT
Start: 2023-01-03 | End: 2023-01-03

## 2023-01-03 RX ADMIN — DEXAMETHASONE SODIUM PHOSPHATE 10 MG: 10 INJECTION, SOLUTION INTRAMUSCULAR; INTRAVENOUS at 09:00

## 2023-01-03 RX ADMIN — PALONOSETRON HYDROCHLORIDE 0.25 MG: 0.25 INJECTION, SOLUTION INTRAVENOUS at 09:01

## 2023-01-03 RX ADMIN — FAMOTIDINE 20 MG: 20 INJECTION, SOLUTION INTRAVENOUS at 08:38

## 2023-01-03 RX ADMIN — DIPHENHYDRAMINE HYDROCHLORIDE 25 MG: 50 INJECTION, SOLUTION INTRAMUSCULAR; INTRAVENOUS at 09:26

## 2023-01-03 RX ADMIN — SODIUM CHLORIDE 20 ML/HR: 0.9 INJECTION, SOLUTION INTRAVENOUS at 08:38

## 2023-01-03 RX ADMIN — CARBOPLATIN 468.5 MG: 10 INJECTION, SOLUTION INTRAVENOUS at 13:52

## 2023-01-03 RX ADMIN — BEVACIZUMAB 745 MG: 400 INJECTION, SOLUTION INTRAVENOUS at 15:03

## 2023-01-03 RX ADMIN — PACLITAXEL 244.2 MG: 6 INJECTION, SOLUTION INTRAVENOUS at 10:46

## 2023-01-03 RX ADMIN — FOSAPREPITANT 150 MG: 150 INJECTION, POWDER, LYOPHILIZED, FOR SOLUTION INTRAVENOUS at 09:55

## 2023-01-03 NOTE — PROGRESS NOTES
Ok to run Avastin over 30 mins today as per Daniel BERMAN/ Dr Rosalee Ness since this is the 3rd time pt is getting Avastin

## 2023-01-03 NOTE — PROGRESS NOTES
Pt tolerated treatment with no adv reactions; AVS given; pt left unit ambulatory with steady gait with

## 2023-01-04 ENCOUNTER — TELEPHONE (OUTPATIENT)
Dept: WOUND CARE | Facility: HOSPITAL | Age: 73
End: 2023-01-04

## 2023-01-04 NOTE — TELEPHONE ENCOUNTER
Returned patient phone call, patient wanted to see if she could shower since wound is closed  Discussed with patient it is okay to shower  Patient confirmed she will still come to appt on Monday 1/9/23

## 2023-01-06 ENCOUNTER — TELEPHONE (OUTPATIENT)
Dept: GYNECOLOGIC ONCOLOGY | Facility: CLINIC | Age: 73
End: 2023-01-06

## 2023-01-06 ENCOUNTER — HOSPITAL ENCOUNTER (OUTPATIENT)
Dept: INFUSION CENTER | Facility: HOSPITAL | Age: 73
End: 2023-01-06

## 2023-01-06 DIAGNOSIS — Z45.2 ENCOUNTER FOR VENOUS ACCESS DEVICE CARE: Primary | ICD-10-CM

## 2023-01-06 DIAGNOSIS — C56.2 MALIGNANT NEOPLASM OF LEFT OVARY (HCC): ICD-10-CM

## 2023-01-06 LAB
ALBUMIN SERPL BCP-MCNC: 2.9 G/DL (ref 3.5–5)
ALP SERPL-CCNC: 101 U/L (ref 46–116)
ALT SERPL W P-5'-P-CCNC: 25 U/L (ref 12–78)
ANION GAP SERPL CALCULATED.3IONS-SCNC: 5 MMOL/L (ref 4–13)
AST SERPL W P-5'-P-CCNC: 21 U/L (ref 5–45)
BASOPHILS # BLD MANUAL: 0 THOUSAND/UL (ref 0–0.1)
BASOPHILS NFR MAR MANUAL: 0 % (ref 0–1)
BILIRUB SERPL-MCNC: 0.3 MG/DL (ref 0.2–1)
BUN SERPL-MCNC: 15 MG/DL (ref 5–25)
CALCIUM ALBUM COR SERPL-MCNC: 9.8 MG/DL (ref 8.3–10.1)
CALCIUM SERPL-MCNC: 8.9 MG/DL (ref 8.3–10.1)
CHLORIDE SERPL-SCNC: 101 MMOL/L (ref 96–108)
CO2 SERPL-SCNC: 27 MMOL/L (ref 21–32)
CREAT SERPL-MCNC: 0.63 MG/DL (ref 0.6–1.3)
EOSINOPHIL # BLD MANUAL: 0 THOUSAND/UL (ref 0–0.4)
EOSINOPHIL NFR BLD MANUAL: 0 % (ref 0–6)
ERYTHROCYTE [DISTWIDTH] IN BLOOD BY AUTOMATED COUNT: 17.3 % (ref 11.6–15.1)
GFR SERPL CREATININE-BSD FRML MDRD: 89 ML/MIN/1.73SQ M
GLUCOSE SERPL-MCNC: 210 MG/DL (ref 65–140)
HCT VFR BLD AUTO: 31.3 % (ref 34.8–46.1)
HGB BLD-MCNC: 10 G/DL (ref 11.5–15.4)
LYMPHOCYTES # BLD AUTO: 0.73 THOUSAND/UL (ref 0.6–4.47)
LYMPHOCYTES # BLD AUTO: 6 % (ref 14–44)
MAGNESIUM SERPL-MCNC: 1.7 MG/DL (ref 1.6–2.6)
MCH RBC QN AUTO: 29.8 PG (ref 26.8–34.3)
MCHC RBC AUTO-ENTMCNC: 31.9 G/DL (ref 31.4–37.4)
MCV RBC AUTO: 93 FL (ref 82–98)
MONOCYTES # BLD AUTO: 0.73 THOUSAND/UL (ref 0–1.22)
MONOCYTES NFR BLD: 6 % (ref 4–12)
NEUTROPHILS # BLD MANUAL: 10.72 THOUSAND/UL (ref 1.85–7.62)
NEUTS BAND NFR BLD MANUAL: 1 % (ref 0–8)
NEUTS SEG NFR BLD AUTO: 87 % (ref 43–75)
PLATELET # BLD AUTO: 292 THOUSANDS/UL (ref 149–390)
PLATELET BLD QL SMEAR: ADEQUATE
PMV BLD AUTO: 10.8 FL (ref 8.9–12.7)
POTASSIUM SERPL-SCNC: 3.8 MMOL/L (ref 3.5–5.3)
PROT SERPL-MCNC: 7 G/DL (ref 6.4–8.4)
RBC # BLD AUTO: 3.36 MILLION/UL (ref 3.81–5.12)
RBC MORPH BLD: NORMAL
SODIUM SERPL-SCNC: 133 MMOL/L (ref 135–147)
WBC # BLD AUTO: 12.18 THOUSAND/UL (ref 4.31–10.16)

## 2023-01-06 NOTE — TELEPHONE ENCOUNTER
Return call placed  Appears vaginal  Patient no longer has spotting or discharge  No pain  Will continue to closely monitor

## 2023-01-06 NOTE — PROGRESS NOTES
Pt here for port labs  Accessed without difficulty  Labs drawn and sent  Pt tolerated procedure well  Pt left unit ambulatory with steady gait    Pt refused AVS

## 2023-01-06 NOTE — TELEPHONE ENCOUNTER
Patient called and reported that she was bleeding from the rectum and thinks that she passed a clot  She will like to talk to NT or [facility]  Patient is not in pain now and she stopped bleeding  Per [facility], she will call be back @ [phone]

## 2023-01-07 ENCOUNTER — TELEPHONE (OUTPATIENT)
Dept: OTHER | Facility: OTHER | Age: 73
End: 2023-01-07

## 2023-01-07 PROBLEM — A41.9 SEPSIS (HCC): Status: RESOLVED | Noted: 2022-11-05 | Resolved: 2023-01-07

## 2023-01-09 ENCOUNTER — OFFICE VISIT (OUTPATIENT)
Dept: GYNECOLOGIC ONCOLOGY | Facility: CLINIC | Age: 73
End: 2023-01-09

## 2023-01-09 ENCOUNTER — OFFICE VISIT (OUTPATIENT)
Dept: WOUND CARE | Facility: HOSPITAL | Age: 73
End: 2023-01-09

## 2023-01-09 VITALS
RESPIRATION RATE: 20 BRPM | HEART RATE: 106 BPM | DIASTOLIC BLOOD PRESSURE: 58 MMHG | TEMPERATURE: 97.1 F | SYSTOLIC BLOOD PRESSURE: 116 MMHG

## 2023-01-09 VITALS
SYSTOLIC BLOOD PRESSURE: 130 MMHG | WEIGHT: 161 LBS | DIASTOLIC BLOOD PRESSURE: 80 MMHG | HEIGHT: 65 IN | BODY MASS INDEX: 26.82 KG/M2 | TEMPERATURE: 99.7 F

## 2023-01-09 DIAGNOSIS — K62.5 RECTAL BLEEDING: Primary | ICD-10-CM

## 2023-01-09 DIAGNOSIS — T81.31XA DEHISCENCE OF POSTOPERATIVE WOUND OF ABDOMEN: Primary | ICD-10-CM

## 2023-01-09 DIAGNOSIS — C56.2 MALIGNANT NEOPLASM OF LEFT OVARY (HCC): ICD-10-CM

## 2023-01-09 RX ORDER — LIDOCAINE HYDROCHLORIDE 40 MG/ML
5 SOLUTION TOPICAL ONCE
Status: COMPLETED | OUTPATIENT
Start: 2023-01-09 | End: 2023-01-09

## 2023-01-09 RX ADMIN — LIDOCAINE HYDROCHLORIDE 5 ML: 40 SOLUTION TOPICAL at 15:05

## 2023-01-09 NOTE — ASSESSMENT & PLAN NOTE
New onset rectal vs  vaginal bleeding  Based on clinic exam, likely rectal  Will hold aspirin and avastin and plan for evaluation by colorectal      Patient to continue weekly labs and monitor bleeding  She will call immediately if it worsens

## 2023-01-09 NOTE — PROGRESS NOTES
Patient ID: Lisette Brittle is a 67 y o  female Date of Birth 1950     Chief Complaint  Chief Complaint   Patient presents with   • Follow Up Wound Care Visit     Abdominal wound       Allergies  Nsaids and Pedi-pre tape spray [wound dressing adhesive]    Assessment:    No problem-specific Assessment & Plan notes found for this encounter  Diagnoses and all orders for this visit:    Dehiscence of postoperative wound of abdomen  -     lidocaine (XYLOCAINE) 4 % topical solution 5 mL  -     Cancel: Wound cleansing and dressings; Future  -     Wound home care; Future  -     Wound cleansing and dressings; Future              Procedures    Plan:  Wound is much improved, almost closed  No clinical signs of infection  No debridement today  Change wound management to foam, see wound orders below  Follow-up in 2 weeks or call sooner with questions or concerns    Wound 11/10/22 Surgical Abdomen Medial;Lower (Active)   Wound Image Images linked 01/09/23 1500   Wound Description Epithelialization;Pink;Granulation tissue 01/09/23 1500   Ailyn-wound Assessment Scar Tissue 01/09/23 1500   Wound Length (cm) 1 1 cm 01/09/23 1500   Wound Width (cm) 0 4 cm 01/09/23 1500   Wound Depth (cm) 0 1 cm 01/09/23 1500   Wound Surface Area (cm^2) 0 44 cm^2 01/09/23 1500   Wound Volume (cm^3) 0 044 cm^3 01/09/23 1500   Calculated Wound Volume (cm^3) 0 04 cm^3 01/09/23 1500   Change in Wound Size % 99 99 01/09/23 1500   Drainage Amount RADHA 01/09/23 1500   Non-staged Wound Description Full thickness 01/09/23 1500   Wound packed?  No 01/09/23 1500   Dressing Status Other (Comment) (JABIER upon arrival) 01/09/23 1500       Wound 11/10/22 Surgical Abdomen Medial;Lower (Active)   Date First Assessed/Time First Assessed: 11/10/22 1439   Primary Wound Type: Surgical  Location: Abdomen  Wound Location Orientation: Medial;Lower       [REMOVED] Wound 08/04/22 Abdomen N/A (Removed)   Resolved Date/Resolved Time: 11/10/22 1438  Date First Assessed/Time First Assessed: 08/04/22 1654   Location: Abdomen  Wound Location Orientation: N/A  Wound Description (Comments): 2 trocar incision sites, both with skin glue, per MD   Incision's 1s    [REMOVED] Wound 10/28/22 Abdomen N/A (Removed)   Resolved Date/Resolved Time: 11/10/22 1438  Date First Assessed/Time First Assessed: 10/28/22 1953   Location: Abdomen  Wound Location Orientation: N/A  Wound Description (Comments): SKIN STAPLED CLOSED  Incision's 1st Dressing: DRESSING TELFA 3 X 8 I    [REMOVED] Wound 10/28/22 Abdomen Left (Removed)   Resolved Date/Resolved Time: 11/10/22 1438  Date First Assessed/Time First Assessed: 10/28/22 1953   Location: Abdomen  Wound Location Orientation: Left  Wound Description (Comments): DRAIN SITES X2  Incision's 1st Dressing: DRESSING TRACH T-SLIT 6 PL    [REMOVED] Wound 12/08/22 Incision Chest Right;Upper (Removed)   Resolved Date: 01/09/23  Date First Assessed/Time First Assessed: 12/08/22 1120   Primary Wound Type: Incision  Location: Chest  Wound Location Orientation: Right;Upper  Wound Description (Comments): Port Placement  Wound Outcome: Healed       Subjective:        Patient presents for follow-up of a nonhealing surgical wound to the abdomen  No increased pain or drainage  Has been using silver alginate on the wound    Patient states that she has not had a dressing on the wound for the past week or so because she thought it was healed      The following portions of the patient's history were reviewed and updated as appropriate:   She  has a past medical history of Abdominal pain, Allergic sinusitis, Anxiety, Arthritis, Asthma, Cholelithiasis, Colon polyp, Degenerative joint disease, Dental bridge present, Exercise involving walking, GERD (gastroesophageal reflux disease), Giant cell aortic arteritis ([facility] ), Hiatal hernia, History of cancer chemotherapy, Hyperlipidemia, Hypertension, Nausea, 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[facility] ), PMR (polymyalgia rheumatica) ([facility] ), Prediabetes, Shortness of breath, Tinnitus, and Wears glasses  She   Patient Active Problem List    Diagnosis Date Noted   • Wound infection after surgery 11/29/2022   • Encounter for venous access device care 11/29/2022   • Bandemia 11/16/2022   • Hypokalemia 11/07/2022   • S/P total abdominal hysterectomy 10/31/2022   • Ileostomy, has currently ([facility] ) 10/31/2022   • Acute blood loss anemia (ABLA) 10/31/2022   • S/P splenectomy 10/31/2022   • Anxiety disorder due to medical condition 09/29/2022   • Malignant neoplasm of left ovary ([facility] ) 08/09/2022   • Giant cell aortic arteritis ([place] [address] )    • Prediabetes    • 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[place] [address] ) 07/21/2022   • Lower abdominal pain 06/15/2022   • Change in bowel habit 06/15/2022   • Personal history of colonic polyps 06/15/2022   • Long term (current) use of systemic steroids 06/15/2022   • Asthma    • Hyperlipidemia    • Hypertension    • Gastroesophageal reflux disease 03/13/2019   • Constipation 03/13/2019   • Hemorrhoids 03/13/2019   • Family history of colonic polyps 03/13/2019     She  reports that she has never smoked  She has never used smokeless tobacco  She reports current alcohol use  She reports that she does not use drugs    Current Outpatient Medications   Medication Sig Dispense Refill   • Acetaminophen (TYLENOL ARTHRITIS PAIN PO) Take by mouth as needed     • albuterol (PROVENTIL HFA,VENTOLIN HFA) 90 mcg/act inhaler Inhale 2 puffs every 4 (four) hours as needed      • Ascorbic Acid (VITAMIN C GUMMIE PO) Take by mouth     • B Complex-C (B COMPLEX-VITAMIN C PO) Take by mouth daily     • betamethasone, augmented, (DIPROLENE) 0 05 % lotion Apply topically as needed     • Calcium Carb-Cholecalciferol (CALCIUM 500+D3 PO) Take 1 tablet by mouth 2 (two) times a day      • cephalexin (KEFLEX) 500 mg capsule TAKE 1 CAPSULE 3 TIMES A DAY FOR 7 DAYS     • cholecalciferol (VITAMIN D3) 1,000 units tablet Take 1,000 Units by mouth daily     • Docusate Calcium (STOOL SOFTENER PO) Take 1 tablet by mouth 2 (two) times a day     • escitalopram (LEXAPRO) 10 mg tablet TAKE 1 TABLET BY MOUTH EVERY DAY 90 tablet 2   • famotidine (PEPCID) 40 MG tablet Take 1 tablet (40 mg total) by mouth daily at bedtime 90 tablet 3   • fexofenadine (ALLEGRA) 180 MG tablet Take 180 mg by mouth daily     • FIBER ADULT GUMMIES PO Take by mouth if needed     • GLUCOSAMINE-CHONDROITIN PO Take 1 tablet by mouth 2 (two) times a day      • lidocaine-prilocaine (EMLA) cream Apply topically as needed for mild pain 30 g 0   • lisinopril (ZESTRIL) 5 mg tablet Take 5 mg by mouth daily dinner     • LORazepam (ATIVAN) 1 mg tablet Take 1 tablet (1 mg total) by mouth every 8 (eight) hours as needed for anxiety (nausea or anxiety) 30 tablet 0   • metFORMIN (GLUCOPHAGE) 500 mg tablet Take 500 mg by mouth daily Daily at lunch     • metoprolol succinate (TOPROL-XL) 25 mg 24 hr tablet Take 25 mg by mouth daily dinner     • montelukast (SINGULAIR) 10 mg tablet Take 10 mg by mouth daily dinner     • multivitamin (THERAGRAN) TABS Take 1 tablet by mouth daily     • omeprazole (PriLOSEC) 20 mg delayed release capsule TAKE 1 CAPSULE BY MOUTH EVERY DAY 30 MINUTES BEFORE MORNING MEAL FOR 90 DAYS     • ondansetron (ZOFRAN) 8 mg tablet Take 1 tablet (8 mg total) by mouth every 8 (eight) hours as needed for nausea or vomiting 20 tablet 1   • predniSONE 1 mg tablet Take 3 mg by mouth daily     • Probiotic Product (PROBIOTIC DAILY PO) Take by mouth daily     • simvastatin (ZOCOR) 10 mg tablet Take 10 mg by mouth daily at bedtime     • SYMBICORT 160-4 5 MCG/ACT inhaler 2 puffs daily        No current facility-administered medications for this visit  She is allergic to nsaids and pedi-pre tape spray [wound dressing adhesive]       Review of Systems   Constitutional: Negative for chills and fever  HENT: Negative for congestion and sneezing  Respiratory: Negative for cough  Skin: Positive for wound     Psychiatric/Behavioral: Negative for agitation  Objective:       Wound 11/10/22 Surgical Abdomen Medial;Lower (Active)   Wound Image Images linked 01/09/23 1500   Wound Description Epithelialization;Pink;Granulation tissue 01/09/23 1500   Ailyn-wound Assessment Scar Tissue 01/09/23 1500   Wound Length (cm) 1 1 cm 01/09/23 1500   Wound Width (cm) 0 4 cm 01/09/23 1500   Wound Depth (cm) 0 1 cm 01/09/23 1500   Wound Surface Area (cm^2) 0 44 cm^2 01/09/23 1500   Wound Volume (cm^3) 0 044 cm^3 01/09/23 1500   Calculated Wound Volume (cm^3) 0 04 cm^3 01/09/23 1500   Change in Wound Size % 99 99 01/09/23 1500   Drainage Amount RADHA 01/09/23 1500   Non-staged Wound Description Full thickness 01/09/23 1500   Wound packed? No 01/09/23 1500   Dressing Status Other (Comment) (JABIER upon arrival) 01/09/23 1500       /58   Pulse (!) 106   Temp (!) 97 1 °F (36 2 °C)   Resp 20     Physical Exam  Constitutional:       General: She is not in acute distress  Appearance: Normal appearance  She is not ill-appearing, toxic-appearing or diaphoretic  HENT:      Head: Normocephalic and atraumatic  Right Ear: External ear normal       Left Ear: External ear normal    Eyes:      Conjunctiva/sclera: Conjunctivae normal    Pulmonary:      Effort: Pulmonary effort is normal  No respiratory distress  Musculoskeletal:      Cervical back: Neck supple  Skin:     Comments: See wound assessment   Neurological:      Mental Status: She is alert     Psychiatric:         Mood and Affect: Mood normal          Behavior: Behavior normal            Wound 11/10/22 Surgical Abdomen Medial;Lower (Active)   Wound Image   01/09/23 1500   Wound Description Epithelialization;Pink;Granulation tissue 01/09/23 1500   Ailyn-wound Assessment Scar Tissue 01/09/23 1500   Wound Length (cm) 1 1 cm 01/09/23 1500   Wound Width (cm) 0 4 cm 01/09/23 1500   Wound Depth (cm) 0 1 cm 01/09/23 1500   Wound Surface Area (cm^2) 0 44 cm^2 01/09/23 1500   Wound Volume (cm^3) 0 044 cm^3 01/09/23 1500   Calculated Wound Volume (cm^3) 0 04 cm^3 01/09/23 1500   Change in Wound Size % 99 99 01/09/23 1500   Undermining 3 5 11/10/22 1439   Undermining is depth extending from 12 11/10/22 1439   Drainage Amount RADHA 01/09/23 1500   Drainage Description Sanguineous 12/12/22 1110   Non-staged Wound Description Full thickness 01/09/23 1500   Treatments Cleansed 12/05/22 1448   Wound packed? No 01/09/23 1500   Packing- # removed 1 12/05/22 1448   Dressing Changed Changed 12/05/22 1448   Patient Tolerance Tolerated well 12/05/22 1448   Dressing Status Other (Comment) 01/09/23 1500                         Wound Instructions:  Orders Placed This Encounter   Procedures   • Wound home care     Continue with the VNA     Standing Status:   Future     Standing Expiration Date:   1/9/2024   • Wound cleansing and dressings     ABDOMEN WOUND     Wash your hands with soap and water  Remove old dressing, discard into plastic bag and place in trash  Cleanse the wound with saline,  prior to applying a clean dressing  Do not use tissue or cotton balls  Do not scrub the wound  Pat dry using gauze  Shower yes  Cover with allevyn with border  Change 3xweek   Monday Wednesday and Friday     dressing above done today at the [facility]  Next appt in 2-3 weeks             Standing Status:   Future     Standing Expiration Date:   1/9/2024        Diagnosis ICD-10-CM Associated Orders   1   Dehiscence of postoperative wound of abdomen  T81 31XA lidocaine (XYLOCAINE) 4 % topical solution 5 mL     Wound home care     Wound cleansing and dressings

## 2023-01-09 NOTE — PROGRESS NOTES
Assessment/Plan:    Problem List Items Addressed This Visit        Digestive    Rectal bleeding - Primary     New onset rectal vs  vaginal bleeding  Based on clinic exam, likely rectal  Will hold aspirin and avastin and plan for evaluation by colorectal      Patient to continue weekly labs and monitor bleeding  She will call immediately if it worsens  Relevant Orders    Ambulatory Referral to Colorectal Surgery       Endocrine    Malignant neoplasm of left ovary ([facility] )     Stage IIIC ovarian cancer s/p 3 cycles of neoadjuvant chemotherapy and optimal tumor debulking  She is receiving adjuvant chemotherapy with taxol/carboplatin and avastin  Avastin was recently added with her treatment on 1/3/22  Now with likely rectal bleeding  Clinical exam without evidence of significantly delayed vaginal healing or acute vaginal bleeding  Plan to hold avastin/aspirin and proceed with colorectal consult  CHIEF COMPLAINT:  Vaginal vs  rectal bleeding     Problem:  Cancer Staging   Malignant neoplasm of left ovary (HCC)  Staging form: Ovary, Fallopian Tube, Primary Peritoneal, AJCC 8th Edition  - Clinical: FIGO Stage IIIC (cT3c, cN0, cM0) - Signed by Kris Platt MD on 11/29/2022        Previous therapy:  Oncology History   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[place] [address] )   7/21/2022 Initial Diagnosis    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[place] [address] )     8/4/2022 Surgery    Diagnostic laparoscopy, peritoneal biopsy     8/18/2022 Genetic Testing    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, serous    Results revealed patient has the following mutation(s):  None     Malignant neoplasm of left ovary ([place] [address] )   8/4/2022 Surgery    Diagnostic laparoscopy, peritoneal biopsy     8/9/2022 Initial Diagnosis    Gynecologic malignancy ([place] [address] )     8/23/2022 -  Chemotherapy    palonosetron (ALOXI), 0 25 mg, Intravenous, Once, 5 of 7 cycles  Administration: 0 25 mg (8/23/2022), 0 25 mg (9/13/2022), 0 25 mg (10/4/2022), 0 25 mg (12/13/2022), 0 25 mg (1/3/2023)  fosaprepitant (EMEND) IVPB, 150 mg, Intravenous, Once, 5 of 7 cycles  Administration: 150 mg (8/23/2022), 150 mg (9/13/2022), 150 mg (10/4/2022), 150 mg (12/13/2022), 150 mg (1/3/2023)  CARBOplatin (PARAPLATIN) IVPB (GO AUC DOSING), 605 4 mg, Intravenous, Once, 5 of 6 cycles  Administration: 600 mg (8/23/2022), 564 mg (9/13/2022), 601 8 mg (10/4/2022), 491 5 mg (12/13/2022), 468 5 mg (1/3/2023)  bevacizumab (AVASTIN) IVPB, 1,252 5 mg, Intravenous, Once, 3 of 5 cycles  Dose modification: 10 mg/kg (original dose 15 mg/kg, Cycle 5, Reason: Other (Must fill in a comment))  Administration: 1,200 mg (8/23/2022), 1,200 mg (9/13/2022), 745 mg (1/3/2023)  PACLItaxel (TAXOL) chemo IVPB, 175 mg/m2 = 330 6 mg, Intravenous, Once, 5 of 6 cycles  Dose modification: 135 mg/m2 (original dose 175 mg/m2, Cycle 4, Reason: Other (Must fill in a comment))  Administration: 330 6 mg (8/23/2022), 330 6 mg (9/13/2022), 330 6 mg (10/4/2022), 246 mg (12/13/2022), 244 2 mg (1/3/2023)     10/28/2022 Surgery    diagnostic laparoscopy, exploratory laparotomy, modified radical hysterectomy, bilateral salpingo-oophorectomy with en bloc rectosigmoid resection, gastrocolic omentectomy, splenectomy, small bowel resection with reanastomosis, diaphragm resection, excision ablation of peritoneal implants, ileostomy formation, optimal tumor debulking     11/29/2022 -  Cancer Staged    Staging form: Ovary, Fallopian Tube, Primary Peritoneal, AJCC 8th Edition  - Clinical: FIGO Stage IIIC (cT3c, cN0, cM0) - Signed by Yuri Duron MD on 11/29/2022  Histologic grade (G): G3  Histologic grading system: 4 grade system             Patient ID: Alia Jain is a 67 y o  female  who presents to the office for acute evaluation of what the patient believes is rectal bleeding  She notes new onset as of Friday  She initially felt as though she was to have a bowel movement and passed a clot   The patient denies blood per her stoma/ostomy bag  She is without abdominal pain  She notes intermittent episodes, but they have decreased since holding her avastin  Normal bladder function  She is fatigued  The following portions of the patient's history were reviewed and updated as appropriate: allergies, current medications, past medical history, past surgical history and problem list     Review of Systems   Constitutional: Positive for fatigue  Negative for fever  Respiratory: Negative  Cardiovascular: Negative  Gastrointestinal: Positive for anal bleeding  Negative for abdominal pain, blood in stool (per ostomy), nausea and vomiting  Genitourinary: Negative  Skin: Negative            Current Outpatient Medications:   •  Acetaminophen (TYLENOL ARTHRITIS PAIN PO), Take by mouth as needed, Disp: , Rfl:   •  albuterol (PROVENTIL HFA,VENTOLIN HFA) 90 mcg/act inhaler, Inhale 2 puffs every 4 (four) hours as needed , Disp: , Rfl:   •  Ascorbic Acid (VITAMIN C GUMMIE PO), Take by mouth, Disp: , Rfl:   •  B Complex-C (B COMPLEX-VITAMIN C PO), Take by mouth daily, Disp: , Rfl:   •  betamethasone, augmented, (DIPROLENE) 0 05 % lotion, Apply topically as needed, Disp: , Rfl:   •  Calcium Carb-Cholecalciferol (CALCIUM 500+D3 PO), Take 1 tablet by mouth 2 (two) times a day , Disp: , Rfl:   •  cephalexin (KEFLEX) 500 mg capsule, TAKE 1 CAPSULE 3 TIMES A DAY FOR 7 DAYS, Disp: , Rfl:   •  cholecalciferol (VITAMIN D3) 1,000 units tablet, Take 1,000 Units by mouth daily, Disp: , Rfl:   •  Docusate Calcium (STOOL SOFTENER PO), Take 1 tablet by mouth 2 (two) times a day, Disp: , Rfl:   •  escitalopram (LEXAPRO) 10 mg tablet, TAKE 1 TABLET BY MOUTH EVERY DAY, Disp: 90 tablet, Rfl: 2  •  famotidine (PEPCID) 40 MG tablet, Take 1 tablet (40 mg total) by mouth daily at bedtime, Disp: 90 tablet, Rfl: 3  •  fexofenadine (ALLEGRA) 180 MG tablet, Take 180 mg by mouth daily, Disp: , Rfl:   •  FIBER ADULT GUMMIES PO, Take by mouth if needed, Disp: , Rfl: •  GLUCOSAMINE-CHONDROITIN PO, Take 1 tablet by mouth 2 (two) times a day , Disp: , Rfl:   •  lidocaine-prilocaine (EMLA) cream, Apply topically as needed for mild pain, Disp: 30 g, Rfl: 0  •  lisinopril (ZESTRIL) 5 mg tablet, Take 5 mg by mouth daily dinner, Disp: , Rfl:   •  LORazepam (ATIVAN) 1 mg tablet, Take 1 tablet (1 mg total) by mouth every 8 (eight) hours as needed for anxiety (nausea or anxiety), Disp: 30 tablet, Rfl: 0  •  metFORMIN (GLUCOPHAGE) 500 mg tablet, Take 500 mg by mouth daily Daily at lunch, Disp: , Rfl:   •  metoprolol succinate (TOPROL-XL) 25 mg 24 hr tablet, Take 25 mg by mouth daily dinner, Disp: , Rfl:   •  montelukast (SINGULAIR) 10 mg tablet, Take 10 mg by mouth daily dinner, Disp: , Rfl:   •  multivitamin (THERAGRAN) TABS, Take 1 tablet by mouth daily, Disp: , Rfl:   •  omeprazole (PriLOSEC) 20 mg delayed release capsule, TAKE 1 CAPSULE BY MOUTH EVERY DAY 30 MINUTES BEFORE MORNING MEAL FOR 90 DAYS, Disp: , Rfl:   •  ondansetron (ZOFRAN) 8 mg tablet, Take 1 tablet (8 mg total) by mouth every 8 (eight) hours as needed for nausea or vomiting, Disp: 20 tablet, Rfl: 1  •  predniSONE 1 mg tablet, Take 3 mg by mouth daily, Disp: , Rfl:   •  Probiotic Product (PROBIOTIC DAILY PO), Take by mouth daily, Disp: , Rfl:   •  simvastatin (ZOCOR) 10 mg tablet, Take 10 mg by mouth daily at bedtime, Disp: , Rfl:   •  SYMBICORT 160-4 5 MCG/ACT inhaler, 2 puffs daily , Disp: , Rfl:     Objective:    Blood pressure 130/80, temperature 99 7 °F (37 6 °C), temperature source Tympanic, height 5' 4 5" (1 638 m), weight 73 kg (161 lb)  Body mass index is 27 21 kg/m²  Body surface area is 1 79 meters squared  Physical Exam  Vitals reviewed  Exam conducted with a chaperone present  Constitutional:       General: She is not in acute distress  Appearance: Normal appearance  HENT:      Head: Normocephalic and atraumatic        Mouth/Throat:      Mouth: Mucous membranes are moist    Abdominal: Palpations: Abdomen is soft  There is no mass  Tenderness: There is no abdominal tenderness  Genitourinary:     Comments: The external female genitalia is normal  The bartholin's, uretheral and skenes glands are normal  The urethral meatus is normal (midline with no lesions)  Anus without fissure or lesion  Speculum exam reveals a grossly normal vagina  Vagina is intact, without dehiscense  Small area of delayed healing with exudate at mid-apex  No masses, lesions, discharge or bleeding  No significant cystocele or rectocele noted  Skin:     General: Skin is warm and dry  Neurological:      Mental Status: She is alert and oriented to person, place, and time  Psychiatric:         Mood and Affect: Mood normal          Behavior: Behavior normal          Thought Content:  Thought content normal          Judgment: Judgment normal

## 2023-01-09 NOTE — LETTER
[address] [facility]  [address] [place] [address]  Phone#  [phone]  Fax#  [phone]    Patient:  Cornell Interiano  YOB: 1950  Phone:  [phone]  Date of Visit:  1/9/2023    Orders Placed This Encounter   Procedures   • Wound home care     Continue with the VNA     Standing Status:   Future     Standing Expiration Date:   1/9/2024   • Wound cleansing and dressings     ABDOMEN WOUND     Wash your hands with soap and water  Remove old dressing, discard into plastic bag and place in trash  Cleanse the wound with saline,  prior to applying a clean dressing  Do not use tissue or cotton balls  Do not scrub the wound  Pat dry using gauze  Shower yes  Cover with allevyn with border  Change 3xweek   Monday Wednesday and Friday     dressing above done today at the [facility]     Next appt in 2-3 weeks             Standing Status:   Future     Standing Expiration Date:   1/9/2024         Electronically signed by Aries Weiss DO

## 2023-01-09 NOTE — ASSESSMENT & PLAN NOTE
Stage IIIC ovarian cancer s/p 3 cycles of neoadjuvant chemotherapy and optimal tumor debulking  She is receiving adjuvant chemotherapy with taxol/carboplatin and avastin  Avastin was recently added with her treatment on 1/3/22  Now with likely rectal bleeding  Clinical exam without evidence of significantly delayed vaginal healing or acute vaginal bleeding  Plan to hold avastin/aspirin and proceed with colorectal consult

## 2023-01-09 NOTE — PATIENT INSTRUCTIONS
Orders Placed This Encounter   Procedures    Wound home care     Continue with the VNA     Standing Status:   Future     Standing Expiration Date:   1/9/2024    Wound cleansing and dressings     ABDOMEN WOUND     Wash your hands with soap and water  Remove old dressing, discard into plastic bag and place in trash  Cleanse the wound with saline,  prior to applying a clean dressing  Do not use tissue or cotton balls  Do not scrub the wound  Pat dry using gauze  Shower yes  Cover with allevyn with border  Change 3xweek   Monday Wednesday and Friday  dressing above done today at the [facility]     Next appt in 2-3 weeks             Standing Status:   Future     Standing Expiration Date:   1/9/2024

## 2023-01-10 ENCOUNTER — OFFICE VISIT (OUTPATIENT)
Age: 73
End: 2023-01-10

## 2023-01-10 VITALS — HEIGHT: 64 IN | WEIGHT: 164 LBS | BODY MASS INDEX: 28 KG/M2

## 2023-01-10 DIAGNOSIS — C56.2 MALIGNANT NEOPLASM OF LEFT OVARY (HCC): Primary | ICD-10-CM

## 2023-01-10 DIAGNOSIS — K62.5 RECTAL BLEEDING: ICD-10-CM

## 2023-01-10 NOTE — PROGRESS NOTES
Colon and Rectal Surgery   Soni Puentes 67 y o  female MRN: [id number]   Encounter: [id number]  01/10/23   3:06 PM        ASSESSMENT:    Judah Apodaca presents today for rectal bleeding after Avastin course, status post left colectomy/pelvic anastomosis for ovarian cancer  Rigid protosigmoidoscopy today healthy anastomotic ring with staple noted approximately 12 cm, minimal irritation at the staple line, otherwise normal visualized rectal mucosa, minimal hemorrhoidal irritation  PLAN:  May restart baby aspirin from my surgical standpoint  Continue with expectant management per gynecologic oncology team  CC:  Dr Anushka Roberts, MARY    HPI  Soni Puentes is a 67 y o  female referred for evaluation today by MARY Peterson for revaluation of rectal bleeding  The patient reports bright red rectal bleeding since Friday; she first noticed it on her pants and notes the bleeding subsided Saturday night  The patient also notes what she describes as a sensation of rectal pressure  Her ostomy output is good  Has since stopped Avastin and Aspirin  The patient underwent left colon and small bowel resection; proctectomy, loop ileostomy, radical modified hysterectomy, bilateral salpingo-oophorectomy, gastrocolic omentectomy, diaphragm resection on 10/28/2022 with Dr Priti Dixon and Dr Sarika Thornton  Post-Op Diagnosis Codes:     * Peritoneal carcinoma      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    Surgica pathology:   Final Diagnosis   A  Right diaphragm (biopsy):  - High grade serous adenocarcinoma   - Skeletal muscle negative for carcinoma      B  Falciform ligament (biopsy):  - No carcinoma identified      C   Gastrocolic omentum and spleen (omentectomy, splenectomy):  - High grade serous adenocarcinoma involving omentum (largest focus 6 5 cm) and splenic capsule   - Pancreas with low grade pancreatic intraepithelial neoplasia (PanIN 1), negative for carcinoma      D  Lesser omentum (omentectomy):  - High grade serous adenocarcinoma       E  Left gutter (biopsy):  - High grade serous adenocarcinoma      F  Right gutter (biopsy):  - High grade serous adenocarcinoma involving skeletal muscle, small bowel serosa      G  Uterus, bilateral ovaries and fallopian tubes, rectum (LIN-BSO, resection):  - High grade serous adenocarcinoma involving bilateral fallopian tubes, left ovary, colonic serosa, uterine serosa    - Serous tubal intraepithelial carcinoma (STIC), left fallopian tube with associated invasion (3mm)  - Carcinoma involves right posterior parametrial margin    - Carcinoma is present 0 35mm from the right anterior parametrial margin    - All other margins negative for carcinoma   - Lymph-vascular invasion present   - See staging protocol ([id number])     Comment:  - Her2 IHC ordered       H  Ileum (resection):  - No carcinoma identified  - One (1) lymph node negative for carcinoma (0/1)     I  Sigmoid colon (resection):  - No carcinoma identified  - Three (3) lymph nodes negative for carcinoma (0/3)     J  Colon, anastomotic donuts (excision):  - No carcinoma identified         Addendum   The tumor cells stain for WT1, PAX8, p16, ER with aberrant p53 expression  IL is negative  The immunoprofile is consistent with serous adenocarcinoma  The original diagnosis is NOT changed  Addendum 2   G  P16 is negative with Ki67 staining only basal cells within atrophic cervix mucosa  The original diagnosis is NOT changed  Addendum 3   HER2 IMMUNOHISTOCHEMICAL ANALYSIS      Test Description                                        Result                                                     HER2/DIMITRIS by IHC (clone [id number])                   1+/Negative            CT chest abdomen and pelvis on 11/15/2022 showed: Indication: Bandemia  Impression: 1  Again noted are extensive postsurgical changes    There remains a subcapsular hepatic collection as described, similar in size  The persistence of peritoneal thickening in this location raises suspicion for abscess or peritonitis  2   Peripheral hepatic lesions as described, attention on follow-up  3  Mild volume ascites, with presacral fluid, and collection within the splenectomy bed, similar to prior  4  Small left pleural effusion    The patient is currently on chemotherapy  Lab Results   Component Value Date    WBC 12 18 (H) 01/06/2023    HGB 10 0 (L) 01/06/2023    HCT 31 3 (L) 01/06/2023    MCV 93 01/06/2023     01/06/2023     Lab Results   Component Value Date    SODIUM 133 (L) 01/06/2023    K 3 8 01/06/2023     01/06/2023    CO2 27 01/06/2023    AGAP 5 01/06/2023    BUN 15 01/06/2023    CREATININE 0 63 01/06/2023    GLUC 210 (H) 01/06/2023    GLUF 161 (H) 12/08/2022    CALCIUM 8 9 01/06/2023    AST 21 01/06/2023    ALT 25 01/06/2023    ALKPHOS 101 01/06/2023    TP 7 0 01/06/2023    TBILI 0 30 01/06/2023    EGFR 89 01/06/2023     Historical Information   Past Medical History:   Diagnosis Date   • Abdominal pain     off and on   • Allergic sinusitis     "seasonal allergies"-has received allergy shots "   • Anxiety     with current diagnosis   • Arthritis    • Asthma    • Cholelithiasis    • Colon polyp    • Degenerative joint disease    • Dental bridge present     permanent lower left   • Exercise involving walking     1-2x/week   • GERD (gastroesophageal reflux disease)    • [facility]     "hereditary Giant Cell Temporal Arteritis"per pt   • Hiatal hernia    • History of cancer chemotherapy    • Hyperlipidemia    • Hypertension    • Nausea    • 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[facility] [address] )     "gynecologic malignacy"   • PMR (polymyalgia rheumatica) (HCC)    • Prediabetes    • Shortness of breath     per pt "asthma related --with climbing mult   flights of stairs--or exertion -not new"   • Tinnitus    • Wears glasses      Past Surgical History:   Procedure Laterality Date   • COLONOSCOPY      October 2021: Adenomatous polyps and rectum in transverse colon, sigmoid diverticulosis, internal hemorrhoids  October 2016 diverticulosis and internal hemorrhoids, September 2011 diverticulosis and internal hemorrhoids     • COLOPROCTECTOMY N/A 10/28/2022    Procedure: PROCTECTOMY;  Surgeon: Daniel Gutierrez MD;  [facility];  Service: Surgical Oncology   • DILATION AND CURETTAGE OF UTERUS     • ILEO LOOP DIVERSION N/A 10/28/2022    Procedure: ILEOSTOMY LOOP;  Surgeon: Daniel Gutierrez MD;  [facility];  Service: Surgical Oncology   • IR PORT PLACEMENT  12/8/2022   • LIVER SURGERY  10/28/2022    Procedure: LIVER CONTROL OF BLEED ;  Surgeon: Daniel Gutierrez MD;  [facility];  Service: Surgical Oncology   • NC COLECTOMY PARTIAL W/ANASTOMOSIS N/A 10/28/2022    Procedure: RESECTION COLON LEFT;  Surgeon: Yuri Duron MD;  [facility];  Service: Gynecology Oncology   • NC EXPLORATORY LAPAROTOMY CELIOTOMY W/WO BIOPSY [facility] N/A 10/28/2022    Procedure: LAPAROTOMY EXPLORATORY;  Surgeon: Yuri Duron MD;  [facility];  Service: Gynecology Oncology   • NC LAPS ABD PRTM&OMENTUM DX W/WO [address] [facility] [address]/ [facility] N/A 08/04/2022    Procedure: DIAGNOSTIC LAPAROSCOPY, PERITONEAL BIOPSY, SAMPLING OF BLOODY ASCITES ;  Surgeon: Lorie Vegas MD;  [facility];  Service: Gynecology Oncology   • NC LAPS ABD PRTM&OMENTUM DX W/WO [address] [facility] [address]/ [facility] N/A 10/28/2022    Procedure: LAPAROSCOPY DIAGNOSTIC;  Surgeon: Yuri Duron MD;  [facility];  Service: Gynecology Oncology   • NC TOTAL ABDOMINAL HYSTERECT W/WO [facility] TUBE [facility] N/A 10/28/2022    Procedure: HYSTERECTOMY MODIFIED RADICAL, BILATERAL SALPINGO-OOPHORECTOMY, GASTROCOLIC OMENTECTOMY, DIAPHRAGM RESECTION, EXCISION AND ABLATION OF PERITONEAL TUMORS;  Surgeon: Yuri Duron MD;  [facility];  Service: Gynecology Oncology   • SMALL INTESTINE SURGERY N/A 10/28/2022 Procedure: RESECTION SMALL BOWEL;  Surgeon: Priti Dixon MD;  [facility];  Service: Gynecology Oncology   • SPLENECTOMY, TOTAL N/A 10/28/2022    Procedure: SPLENECTOMY;  Surgeon: Priti Dixon MD;  [facility];  Service: Gynecology Oncology   • TEMPORAL ARTERY BIOPSY / LIGATION     • UPPER GASTROINTESTINAL ENDOSCOPY  11/10/2014    November 2014 irregular Z-line and Schatzki ring, hiatal hernia, gastritis  Biopsies negative for celiac, H pylori, or Phillip's or eosinophilic esophagitis     • WISDOM TOOTH EXTRACTION         Meds/Allergies       Current Outpatient Medications:   •  Acetaminophen (TYLENOL ARTHRITIS PAIN PO), Take by mouth as needed, Disp: , Rfl:   •  albuterol (PROVENTIL HFA,VENTOLIN HFA) 90 mcg/act inhaler, Inhale 2 puffs every 4 (four) hours as needed , Disp: , Rfl:   •  Ascorbic Acid (VITAMIN C GUMMIE PO), Take by mouth, Disp: , Rfl:   •  B Complex-C (B COMPLEX-VITAMIN C PO), Take by mouth daily, Disp: , Rfl:   •  betamethasone, augmented, (DIPROLENE) 0 05 % lotion, Apply topically as needed, Disp: , Rfl:   •  Calcium Carb-Cholecalciferol (CALCIUM 500+D3 PO), Take 1 tablet by mouth 2 (two) times a day , Disp: , Rfl:   •  cephalexin (KEFLEX) 500 mg capsule, TAKE 1 CAPSULE 3 TIMES A DAY FOR 7 DAYS, Disp: , Rfl:   •  cholecalciferol (VITAMIN D3) 1,000 units tablet, Take 1,000 Units by mouth daily, Disp: , Rfl:   •  Docusate Calcium (STOOL SOFTENER PO), Take 1 tablet by mouth 2 (two) times a day, Disp: , Rfl:   •  escitalopram (LEXAPRO) 10 mg tablet, TAKE 1 TABLET BY MOUTH EVERY DAY, Disp: 90 tablet, Rfl: 2  •  famotidine (PEPCID) 40 MG tablet, Take 1 tablet (40 mg total) by mouth daily at bedtime, Disp: 90 tablet, Rfl: 3  •  fexofenadine (ALLEGRA) 180 MG tablet, Take 180 mg by mouth daily, Disp: , Rfl:   •  FIBER ADULT GUMMIES PO, Take by mouth if needed, Disp: , Rfl:   •  GLUCOSAMINE-CHONDROITIN PO, Take 1 tablet by mouth 2 (two) times a day , Disp: , Rfl:   • lidocaine-prilocaine (EMLA) cream, Apply topically as needed for mild pain, Disp: 30 g, Rfl: 0  •  lisinopril (ZESTRIL) 5 mg tablet, Take 5 mg by mouth daily dinner, Disp: , Rfl:   •  LORazepam (ATIVAN) 1 mg tablet, Take 1 tablet (1 mg total) by mouth every 8 (eight) hours as needed for anxiety (nausea or anxiety), Disp: 30 tablet, Rfl: 0  •  metFORMIN (GLUCOPHAGE) 500 mg tablet, Take 500 mg by mouth daily Daily at lunch, Disp: , Rfl:   •  metoprolol succinate (TOPROL-XL) 25 mg 24 hr tablet, Take 25 mg by mouth daily dinner, Disp: , Rfl:   •  montelukast (SINGULAIR) 10 mg tablet, Take 10 mg by mouth daily dinner, Disp: , Rfl:   •  multivitamin (THERAGRAN) TABS, Take 1 tablet by mouth daily, Disp: , Rfl:   •  omeprazole (PriLOSEC) 20 mg delayed release capsule, TAKE 1 CAPSULE BY MOUTH EVERY DAY 30 MINUTES BEFORE MORNING MEAL FOR 90 DAYS, Disp: , Rfl:   •  ondansetron (ZOFRAN) 8 mg tablet, Take 1 tablet (8 mg total) by mouth every 8 (eight) hours as needed for nausea or vomiting, Disp: 20 tablet, Rfl: 1  •  predniSONE 1 mg tablet, Take 3 mg by mouth daily, Disp: , Rfl:   •  Probiotic Product (PROBIOTIC DAILY PO), Take by mouth daily, Disp: , Rfl:   •  simvastatin (ZOCOR) 10 mg tablet, Take 10 mg by mouth daily at bedtime, Disp: , Rfl:   •  SYMBICORT 160-4 5 MCG/ACT inhaler, 2 puffs daily , Disp: , Rfl:       Allergies   Allergen Reactions   • Nsaids Other (See Comments)      pt is avoiding per family doctor instructions-  Able to take Tylenol   • Pedi-Pre Tape Spray [Wound Dressing Adhesive] Other (See Comments)     Please use sensitive skin tape, pt gets bad bruising from strong medical adhesive tape            Social History   Social History     Substance and Sexual Activity   Alcohol Use Yes    Comment: a glass of wine maybe 2 times a month     Social History     Substance and Sexual Activity   Drug Use Never     Social History     Tobacco Use   Smoking Status Never   Smokeless Tobacco Never         Family History: Family History   Problem Relation Age of Onset   • Colon polyps Mother    • Alzheimer's disease Mother    • Heart disease Father    • Brain cancer Father    • Colon polyps Sister    • Heart disease Sister    • Diabetes Sister    • MARLEY disease Sister    • Colon cancer Cousin        Review of Systems   Constitutional: Negative  HENT: Negative  Eyes: Negative  Respiratory: Negative  Cardiovascular: Negative  Gastrointestinal: Positive for anal bleeding  Endocrine: Negative  Genitourinary: Negative  Musculoskeletal: Negative  Skin: Negative  Allergic/Immunologic: Negative  Neurological: Negative  Hematological: Negative  Psychiatric/Behavioral: Negative  Objective   Current Vitals:   Vitals:    01/10/23 1322   Weight: 74 4 kg (164 lb)   Height: 5' 4" (1 626 m)     Physical Exam:  General:no distress  Eyes:perrla/eomi  ENT:moist mucus membranes  Neck:supple  Pulm:no increased work of breathing, clear bilateral  CV:sinus  Abdomen:soft,nontender  Rectal:normal perianal skin/sphincter tone, no masses palpated  Extremities:no edema    Rigid proctosigmoidoscopy    Date/Time: 1/10/2023 3:07 PM  Performed by: Mary Kay Mena MD  Authorized by: Mary Kay Mena MD     Verbal consent obtained?: Yes    Consent given by:  Patient  Patient identity confirmed:  Verbally with patient  Indications: rectal bleeding    Scope type:  Rigid sigmoidoscope  Distance inserted (cm):  12  Prep quality:  Fair  Withdraw and retroflex quality:  Fair   Healthy anastomotic ring with staple noted approximately 12 cm, minimal irritation at the staple line, otherwise normal visualized rectal mucosa, minimal hemorrhoidal irritation

## 2023-01-10 NOTE — PATIENT INSTRUCTIONS
ASSESSMENT:    Mihir Tirado presents today for rectal bleeding after Avastin course, status post left colectomy/pelvic anastomosis for ovarian cancer  Rigid protosigmoidoscopy today healthy anastomotic ring with staple noted approximately 12 cm, minimal irritation at the staple line, otherwise normal visualized rectal mucosa, minimal hemorrhoidal irritation      PLAN:  May restart baby aspirin from my surgical standpoint  Continue with expectant management per gynecologic oncology team  CC:  Dr Marin Hanley, PA-C

## 2023-01-13 ENCOUNTER — HOSPITAL ENCOUNTER (OUTPATIENT)
Dept: INFUSION CENTER | Facility: HOSPITAL | Age: 73
End: 2023-01-13

## 2023-01-13 DIAGNOSIS — C56.2 MALIGNANT NEOPLASM OF LEFT OVARY (HCC): ICD-10-CM

## 2023-01-13 DIAGNOSIS — Z45.2 ENCOUNTER FOR VENOUS ACCESS DEVICE CARE: Primary | ICD-10-CM

## 2023-01-13 LAB
ALBUMIN SERPL BCP-MCNC: 2.7 G/DL (ref 3.5–5)
ALP SERPL-CCNC: 104 U/L (ref 46–116)
ALT SERPL W P-5'-P-CCNC: 24 U/L (ref 12–78)
ANION GAP SERPL CALCULATED.3IONS-SCNC: 9 MMOL/L (ref 4–13)
AST SERPL W P-5'-P-CCNC: 19 U/L (ref 5–45)
BASOPHILS # BLD MANUAL: 0 THOUSAND/UL (ref 0–0.1)
BASOPHILS NFR MAR MANUAL: 0 % (ref 0–1)
BILIRUB SERPL-MCNC: 0.1 MG/DL (ref 0.2–1)
BUN SERPL-MCNC: 18 MG/DL (ref 5–25)
CALCIUM ALBUM COR SERPL-MCNC: 9.6 MG/DL (ref 8.3–10.1)
CALCIUM SERPL-MCNC: 8.6 MG/DL (ref 8.3–10.1)
CHLORIDE SERPL-SCNC: 105 MMOL/L (ref 96–108)
CO2 SERPL-SCNC: 28 MMOL/L (ref 21–32)
CREAT SERPL-MCNC: 0.64 MG/DL (ref 0.6–1.3)
EOSINOPHIL # BLD MANUAL: 0 THOUSAND/UL (ref 0–0.4)
EOSINOPHIL NFR BLD MANUAL: 0 % (ref 0–6)
ERYTHROCYTE [DISTWIDTH] IN BLOOD BY AUTOMATED COUNT: 17.8 % (ref 11.6–15.1)
GFR SERPL CREATININE-BSD FRML MDRD: 89 ML/MIN/1.73SQ M
GLUCOSE SERPL-MCNC: 163 MG/DL (ref 65–140)
HCT VFR BLD AUTO: 28.7 % (ref 34.8–46.1)
HGB BLD-MCNC: 9.2 G/DL (ref 11.5–15.4)
LG PLATELETS BLD QL SMEAR: PRESENT
LYMPHOCYTES # BLD AUTO: 1.31 THOUSAND/UL (ref 0.6–4.47)
LYMPHOCYTES # BLD AUTO: 27 % (ref 14–44)
MAGNESIUM SERPL-MCNC: 1.5 MG/DL (ref 1.6–2.6)
MCH RBC QN AUTO: 30.4 PG (ref 26.8–34.3)
MCHC RBC AUTO-ENTMCNC: 32.1 G/DL (ref 31.4–37.4)
MCV RBC AUTO: 95 FL (ref 82–98)
METAMYELOCYTES NFR BLD MANUAL: 2 % (ref 0–1)
MONOCYTES # BLD AUTO: 1.36 THOUSAND/UL (ref 0–1.22)
MONOCYTES NFR BLD: 28 % (ref 4–12)
MYELOCYTES NFR BLD MANUAL: 1 % (ref 0–1)
NEUTROPHILS # BLD MANUAL: 2.04 THOUSAND/UL (ref 1.85–7.62)
NEUTS BAND NFR BLD MANUAL: 3 % (ref 0–8)
NEUTS SEG NFR BLD AUTO: 39 % (ref 43–75)
PLATELET # BLD AUTO: 308 THOUSANDS/UL (ref 149–390)
PLATELET BLD QL SMEAR: ADEQUATE
PMV BLD AUTO: 10.5 FL (ref 8.9–12.7)
POTASSIUM SERPL-SCNC: 4 MMOL/L (ref 3.5–5.3)
PROT SERPL-MCNC: 6.7 G/DL (ref 6.4–8.4)
RBC # BLD AUTO: 3.03 MILLION/UL (ref 3.81–5.12)
SODIUM SERPL-SCNC: 142 MMOL/L (ref 135–147)
WBC # BLD AUTO: 4.86 THOUSAND/UL (ref 4.31–10.16)

## 2023-01-19 ENCOUNTER — OFFICE VISIT (OUTPATIENT)
Dept: GYNECOLOGIC ONCOLOGY | Facility: HOSPITAL | Age: 73
End: 2023-01-19

## 2023-01-19 ENCOUNTER — HOSPITAL ENCOUNTER (OUTPATIENT)
Dept: INFUSION CENTER | Facility: HOSPITAL | Age: 73
Discharge: HOME | End: 2023-01-19

## 2023-01-19 VITALS
DIASTOLIC BLOOD PRESSURE: 76 MMHG | HEART RATE: 98 BPM | RESPIRATION RATE: 18 BRPM | OXYGEN SATURATION: 97 % | SYSTOLIC BLOOD PRESSURE: 148 MMHG | TEMPERATURE: 97.2 F

## 2023-01-19 VITALS
DIASTOLIC BLOOD PRESSURE: 70 MMHG | HEIGHT: 64 IN | TEMPERATURE: 97.2 F | BODY MASS INDEX: 27.83 KG/M2 | WEIGHT: 163 LBS | SYSTOLIC BLOOD PRESSURE: 126 MMHG

## 2023-01-19 DIAGNOSIS — M31.5 GIANT CELL ARTERITIS WITH POLYMYALGIA RHEUMATICA (HCC): ICD-10-CM

## 2023-01-19 DIAGNOSIS — C56.2 MALIGNANT NEOPLASM OF LEFT OVARY (HCC): ICD-10-CM

## 2023-01-19 DIAGNOSIS — C79.9 METASTATIC SIGNET RING CELL CARCINOMA (HCC): ICD-10-CM

## 2023-01-19 DIAGNOSIS — Z45.2 ENCOUNTER FOR VENOUS ACCESS DEVICE CARE: Primary | ICD-10-CM

## 2023-01-19 DIAGNOSIS — C56.2 MALIGNANT NEOPLASM OF LEFT OVARY (HCC): Primary | ICD-10-CM

## 2023-01-19 DIAGNOSIS — I95.1 HYPOTENSION, POSTURAL: ICD-10-CM

## 2023-01-19 DIAGNOSIS — C48.2 CANCER OF PERITONEUM (HCC): ICD-10-CM

## 2023-01-19 DIAGNOSIS — C48.2 MALIGNANT NEOPLASM OF PERITONEUM, UNSPECIFIED (HCC): ICD-10-CM

## 2023-01-19 DIAGNOSIS — C79.9 METASTATIC MALIGNANT NEOPLASM, UNSPECIFIED SITE (HCC): ICD-10-CM

## 2023-01-19 LAB
ALBUMIN SERPL BCP-MCNC: 3.1 G/DL (ref 3.5–5)
ALP SERPL-CCNC: 105 U/L (ref 46–116)
ALT SERPL W P-5'-P-CCNC: 22 U/L (ref 12–78)
ANION GAP SERPL CALCULATED.3IONS-SCNC: 10 MMOL/L (ref 4–13)
AST SERPL W P-5'-P-CCNC: 21 U/L (ref 5–45)
BASOPHILS # BLD AUTO: 0.07 THOUSANDS/ÂΜL (ref 0–0.1)
BASOPHILS NFR BLD AUTO: 1 % (ref 0–1)
BILIRUB SERPL-MCNC: 0.2 MG/DL (ref 0.2–1)
BUN SERPL-MCNC: 19 MG/DL (ref 5–25)
CALCIUM ALBUM COR SERPL-MCNC: 10.1 MG/DL (ref 8.3–10.1)
CALCIUM SERPL-MCNC: 9.4 MG/DL (ref 8.3–10.1)
CHLORIDE SERPL-SCNC: 99 MMOL/L (ref 96–108)
CO2 SERPL-SCNC: 26 MMOL/L (ref 21–32)
CREAT SERPL-MCNC: 0.55 MG/DL (ref 0.6–1.3)
CREAT UR-MCNC: 96 MG/DL
EOSINOPHIL # BLD AUTO: 0.03 THOUSAND/ÂΜL (ref 0–0.61)
EOSINOPHIL NFR BLD AUTO: 0 % (ref 0–6)
ERYTHROCYTE [DISTWIDTH] IN BLOOD BY AUTOMATED COUNT: 19.4 % (ref 11.6–15.1)
ERYTHROCYTE [SEDIMENTATION RATE] IN BLOOD: 76 MM/HOUR (ref 0–29)
GFR SERPL CREATININE-BSD FRML MDRD: 94 ML/MIN/1.73SQ M
GLUCOSE SERPL-MCNC: 125 MG/DL (ref 65–140)
HCT VFR BLD AUTO: 29.9 % (ref 34.8–46.1)
HGB BLD-MCNC: 9.5 G/DL (ref 11.5–15.4)
IMM GRANULOCYTES # BLD AUTO: 0.14 THOUSAND/UL (ref 0–0.2)
IMM GRANULOCYTES NFR BLD AUTO: 1 % (ref 0–2)
LYMPHOCYTES # BLD AUTO: 1.48 THOUSANDS/ÂΜL (ref 0.6–4.47)
LYMPHOCYTES NFR BLD AUTO: 11 % (ref 14–44)
MAGNESIUM SERPL-MCNC: 1.6 MG/DL (ref 1.6–2.6)
MCH RBC QN AUTO: 30.2 PG (ref 26.8–34.3)
MCHC RBC AUTO-ENTMCNC: 31.8 G/DL (ref 31.4–37.4)
MCV RBC AUTO: 95 FL (ref 82–98)
MONOCYTES # BLD AUTO: 1.86 THOUSAND/ÂΜL (ref 0.17–1.22)
MONOCYTES NFR BLD AUTO: 13 % (ref 4–12)
NEUTROPHILS # BLD AUTO: 10.29 THOUSANDS/ÂΜL (ref 1.85–7.62)
NEUTS SEG NFR BLD AUTO: 74 % (ref 43–75)
NRBC BLD AUTO-RTO: 0 /100 WBCS
PLATELET # BLD AUTO: 370 THOUSANDS/UL (ref 149–390)
PMV BLD AUTO: 9.8 FL (ref 8.9–12.7)
POTASSIUM SERPL-SCNC: 4.2 MMOL/L (ref 3.5–5.3)
PROT SERPL-MCNC: 7.8 G/DL (ref 6.4–8.4)
PROT UR-MCNC: 24 MG/DL
PROT/CREAT UR: 0.25 MG/G{CREAT} (ref 0–0.1)
RBC # BLD AUTO: 3.15 MILLION/UL (ref 3.81–5.12)
SODIUM SERPL-SCNC: 135 MMOL/L (ref 135–147)
T4 FREE SERPL-MCNC: 1.04 NG/DL (ref 0.76–1.46)
T4 SERPL-MCNC: 8.2 UG/DL (ref 4.7–13.3)
TSH SERPL DL<=0.05 MIU/L-ACNC: 1.81 UIU/ML (ref 0.45–4.5)
WBC # BLD AUTO: 13.87 THOUSAND/UL (ref 4.31–10.16)

## 2023-01-19 NOTE — PROGRESS NOTES
Pt here for port labs and urine; labs and urine obtained without difficulty; pt aware of next appt; left unit ambulatory with steady gait

## 2023-01-19 NOTE — ASSESSMENT & PLAN NOTE
66-year-old with stage IIIc ovarian cancer currently receiving adjuvant chemotherapy after interval cytoreductive surgery  She has a diverting loop ileostomy  She has rectal and vaginal bleeding which is likely secondary to Avastin  She is currently off therapeutic anticoagulation  I reviewed her CBC, CMP,   She is clinically without evidence of disease recurrence  Her performance status is 0   1   Proceed with cycle 6 of chemotherapy with carboplatin AUC 5 and Taxol 135 mg per metered squared  Avastin will be held this cycle due to bleeding  2   Anemia likely multifactorial and secondary to rectal/vaginal bleeding in addition to chemotherapy  We will continue to trend  Current hemoglobin is 9 2 g/dL  3   Plan for CT of chest abdomen pelvis after cycle #6 to evaluate disease response and consider reversal of diverting loop ileostomy  4   We discussed alternative maintenance strategies including no maintenance therapy, PARP inhibitor therapy in the event that Avastin cannot be used due to side effects

## 2023-01-19 NOTE — PROGRESS NOTES
Assessment/Plan:    Problem List Items Addressed This Visit        Endocrine    Malignant neoplasm of left ovary ([facility] [address] ) - Primary     79-year-old with stage IIIc ovarian cancer currently receiving adjuvant chemotherapy after interval cytoreductive surgery  She has a diverting loop ileostomy  She has rectal and vaginal bleeding which is likely secondary to Avastin  She is currently off therapeutic anticoagulation  I reviewed her CBC, CMP,   She is clinically without evidence of disease recurrence  Her performance status is 0   1   Proceed with cycle 6 of chemotherapy with carboplatin AUC 5 and Taxol 135 mg per metered squared  Avastin will be held this cycle due to bleeding  2   Anemia likely multifactorial and secondary to rectal/vaginal bleeding in addition to chemotherapy  We will continue to trend  Current hemoglobin is 9 2 g/dL  3   Plan for CT of chest abdomen pelvis after cycle #6 to evaluate disease response and consider reversal of diverting loop ileostomy  4   We discussed alternative maintenance strategies including no maintenance therapy, PARP inhibitor therapy in the event that Avastin cannot be used due to side effects  Relevant Orders    CT chest abdomen pelvis w contrast         CHIEF COMPLAINT: Chemotherapy evaluation, vaginal and rectal bleeding      Problem:  Cancer Staging   Malignant neoplasm of left ovary (HCC)  Staging form: Ovary, Fallopian Tube, Primary Peritoneal, AJCC 8th Edition  - Clinical: FIGO Stage IIIC (cT3c, cN0, cM0) - Signed by Alfie Taveras MD on 11/29/2022        Previous therapy:  Oncology History   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[address] )   7/21/2022 Initial Diagnosis    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[address] )     8/4/2022 Surgery    Diagnostic laparoscopy, peritoneal biopsy     8/18/2022 Genetic Testing    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, serous    Results revealed patient has the following mutation(s):  None     Malignant neoplasm of left ovary ([facility] [address] )   8/4/2022 Surgery    Diagnostic laparoscopy, peritoneal biopsy     8/9/2022 Initial Diagnosis    Gynecologic malignancy ([facility] [address] )     8/23/2022 -  Chemotherapy    palonosetron (ALOXI), 0 25 mg, Intravenous, Once, 5 of 6 cycles  Administration: 0 25 mg (8/23/2022), 0 25 mg (9/13/2022), 0 25 mg (10/4/2022), 0 25 mg (12/13/2022), 0 25 mg (1/3/2023)  fosaprepitant (EMEND) IVPB, 150 mg, Intravenous, Once, 5 of 6 cycles  Administration: 150 mg (8/23/2022), 150 mg (9/13/2022), 150 mg (10/4/2022), 150 mg (12/13/2022), 150 mg (1/3/2023)  CARBOplatin (PARAPLATIN) IVPB ([facility] DOSING), 605 4 mg, Intravenous, Once, 5 of 6 cycles  Administration: 600 mg (8/23/2022), 564 mg (9/13/2022), 601 8 mg (10/4/2022), 491 5 mg (12/13/2022), 468 5 mg (1/3/2023)  bevacizumab (AVASTIN) IVPB, 1,252 5 mg, Intravenous, Once, 3 of 3 cycles  Dose modification: 10 mg/kg (original dose 15 mg/kg, Cycle 5, Reason: Other (Must fill in a comment))  Administration: 1,200 mg (8/23/2022), 1,200 mg (9/13/2022), 745 mg (1/3/2023)  PACLItaxel (TAXOL) chemo IVPB, 175 mg/m2 = 330 6 mg, Intravenous, Once, 5 of 6 cycles  Dose modification: 135 mg/m2 (original dose 175 mg/m2, Cycle 4, Reason: Other (Must fill in a comment))  Administration: 330 6 mg (8/23/2022), 330 6 mg (9/13/2022), 330 6 mg (10/4/2022), 246 mg (12/13/2022), 244 2 mg (1/3/2023)     10/28/2022 Surgery    diagnostic laparoscopy, exploratory laparotomy, modified radical hysterectomy, bilateral salpingo-oophorectomy with en bloc rectosigmoid resection, gastrocolic omentectomy, splenectomy, small bowel resection with reanastomosis, diaphragm resection, excision ablation of peritoneal implants, ileostomy formation, optimal tumor debulking     11/29/2022 -  Cancer Staged    Staging form: Ovary, Fallopian Tube, Primary Peritoneal, AJCC 8th Edition  - Clinical: FIGO Stage IIIC (cT3c, cN0, cM0) - Signed by Angelica Britton MD on 11/29/2022  Histologic grade (G): G3  Histologic grading system: 4 grade system             Patient ID: Raghav Vera is a 67 y o  female  Who returns for evaluation  She has had 5 cycles of chemotherapy with carboplatin and Taxol  Avastin was administered with cycle #5  She had rectal bleeding as well as vaginal bleeding  She was evaluated by colorectal surgery on 1/10/2023  The anastomosis appeared intact  There were no rectal lesions  Therapeutic anticoagulation has since been held  The bleeding is not heavy  Labs from 1/13/2023 demonstrated a hemoglobin of 9 2 g/dL  CMP is normal with exception of albumin 2 7 g/dL, glucose of 164 mg/dL and a magnesium of 1 5    from 12/30/2022 is 15 8 units/mL  She has been going to wound care  The wound is nearly closed  She has been more active  She now is able to ambulate without a walker  She is tolerating a regular diet  No other interval change in medications or medical history since her last visit  The following portions of the patient's history were reviewed and updated as appropriate: allergies, current medications, past family history, past medical history, past social history, past surgical history and problem list     Review of Systems   Constitutional: Negative for activity change and unexpected weight change  HENT: Negative  Eyes: Negative  Respiratory: Negative  Cardiovascular: Negative  Gastrointestinal: Positive for anal bleeding  Negative for abdominal distention and abdominal pain  Endocrine: Negative  Genitourinary: Positive for vaginal bleeding  Negative for pelvic pain  Musculoskeletal: Negative  Skin: Negative  Allergic/Immunologic: Negative  Neurological: Negative  Hematological: Negative  Psychiatric/Behavioral: Negative          Current Outpatient Medications   Medication Sig Dispense Refill   • Acetaminophen (TYLENOL ARTHRITIS PAIN PO) Take by mouth as needed     • albuterol (PROVENTIL HFA,VENTOLIN HFA) 90 mcg/act inhaler Inhale 2 puffs every 4 (four) hours as needed      • Ascorbic Acid (VITAMIN C GUMMIE PO) Take by mouth     • B Complex-C (B COMPLEX-VITAMIN C PO) Take by mouth daily     • betamethasone, augmented, (DIPROLENE) 0 05 % lotion Apply topically as needed     • Calcium Carb-Cholecalciferol (CALCIUM 500+D3 PO) Take 1 tablet by mouth 2 (two) times a day      • cephalexin (KEFLEX) 500 mg capsule TAKE 1 CAPSULE 3 TIMES A DAY FOR 7 DAYS     • cholecalciferol (VITAMIN D3) 1,000 units tablet Take 1,000 Units by mouth daily     • Docusate Calcium (STOOL SOFTENER PO) Take 1 tablet by mouth 2 (two) times a day     • escitalopram (LEXAPRO) 10 mg tablet TAKE 1 TABLET BY MOUTH EVERY DAY 90 tablet 2   • famotidine (PEPCID) 40 MG tablet Take 1 tablet (40 mg total) by mouth daily at bedtime 90 tablet 3   • fexofenadine (ALLEGRA) 180 MG tablet Take 180 mg by mouth daily     • FIBER ADULT GUMMIES PO Take by mouth if needed     • GLUCOSAMINE-CHONDROITIN PO Take 1 tablet by mouth 2 (two) times a day      • lidocaine-prilocaine (EMLA) cream Apply topically as needed for mild pain 30 g 0   • lisinopril (ZESTRIL) 5 mg tablet Take 5 mg by mouth daily dinner     • LORazepam (ATIVAN) 1 mg tablet Take 1 tablet (1 mg total) by mouth every 8 (eight) hours as needed for anxiety (nausea or anxiety) 30 tablet 0   • metFORMIN (GLUCOPHAGE) 500 mg tablet Take 500 mg by mouth daily Daily at lunch     • metoprolol succinate (TOPROL-XL) 25 mg 24 hr tablet Take 25 mg by mouth daily dinner     • montelukast (SINGULAIR) 10 mg tablet Take 10 mg by mouth daily dinner     • multivitamin (THERAGRAN) TABS Take 1 tablet by mouth daily     • omeprazole (PriLOSEC) 20 mg delayed release capsule TAKE 1 CAPSULE BY MOUTH EVERY DAY 30 MINUTES BEFORE MORNING MEAL FOR 90 DAYS     • ondansetron (ZOFRAN) 8 mg tablet Take 1 tablet (8 mg total) by mouth every 8 (eight) hours as needed for nausea or vomiting 20 tablet 1   • predniSONE 1 mg tablet Take 3 mg by mouth daily     • Probiotic Product (PROBIOTIC DAILY PO) Take by mouth daily     • simvastatin (ZOCOR) 10 mg tablet Take 10 mg by mouth daily at bedtime     • SYMBICORT 160-4 5 MCG/ACT inhaler 2 puffs daily        No current facility-administered medications for this visit  Objective:    Blood pressure 126/70, temperature (!) 97 2 °F (36 2 °C), temperature source Tympanic, height 5' 4" (1 626 m), weight 73 9 kg (163 lb)  Body mass index is 27 98 kg/m²  Body surface area is 1 79 meters squared  Physical Exam  Vitals reviewed  Exam conducted with a chaperone present  Constitutional:       General: She is not in acute distress  Appearance: Normal appearance  She is well-developed and normal weight  She is not ill-appearing, toxic-appearing or diaphoretic  HENT:      Head: Normocephalic and atraumatic  Mouth/Throat:      Mouth: Mucous membranes are moist    Eyes:      General: No scleral icterus  Extraocular Movements: Extraocular movements intact  Conjunctiva/sclera: Conjunctivae normal    Neck:      Thyroid: No thyromegaly  Pulmonary:      Effort: Pulmonary effort is normal    Abdominal:      General: There is no distension  Palpations: Abdomen is soft  There is no mass  Tenderness: There is no abdominal tenderness  There is no guarding or rebound  Hernia: No hernia is present  Comments: Right lower quadrant ostomy is pink and functioning   Genitourinary:     Comments: The external female genitalia is normal  The bartholin's, uretheral and skenes glands are normal  The urethral meatus is normal (midline with no lesions)  Anus without fissure or lesion  Speculum exam reveals a grossly normal vagina  There is a small amount of dark blood present at the apex  No visible lesions  No evidence of dehiscence  No significant cystocele or rectocele noted  Bimanual exam notes a surgical absent cervix, uterus and adnexal structures  No masses or fullness   Bladder is without fullness, mass or tenderness  Musculoskeletal:      Cervical back: Normal range of motion and neck supple  Right lower leg: No edema  Left lower leg: No edema  Lymphadenopathy:      Cervical: No cervical adenopathy  Skin:     General: Skin is warm and dry  Coloration: Skin is not jaundiced or pale  Findings: No lesion or rash  Comments: Midline incision is completely healed  Dressing was removed  Neurological:      General: No focal deficit present  Mental Status: She is alert and oriented to person, place, and time  Mental status is at baseline  Cranial Nerves: No cranial nerve deficit  Motor: No weakness  Gait: Gait normal    Psychiatric:         Mood and Affect: Mood normal          Behavior: Behavior normal          Thought Content:  Thought content normal          Judgment: Judgment normal          Lab Results   Component Value Date     15 8 12/30/2022     Lab Results   Component Value Date    K 4 0 01/13/2023     01/13/2023    CO2 28 01/13/2023    BUN 18 01/13/2023    CREATININE 0 64 01/13/2023    GLUCOSE 214 (H) 10/28/2022    GLUF 161 (H) 12/08/2022    CALCIUM 8 6 01/13/2023    CORRECTEDCA 9 6 01/13/2023    AST 19 01/13/2023    ALT 24 01/13/2023    ALKPHOS 104 01/13/2023    EGFR 89 01/13/2023     Lab Results   Component Value Date    WBC 4 86 01/13/2023    HGB 9 2 (L) 01/13/2023    HCT 28 7 (L) 01/13/2023    MCV 95 01/13/2023     01/13/2023     Lab Results   Component Value Date    NEUTROABS 12 07 (H) 12/30/2022        Trend:  Lab Results   Component Value Date     15 8 12/30/2022     37 9 (H) 12/08/2022     8 6 10/20/2022     8 4 10/07/2022     12 5 09/29/2022     32 0 (H) 09/08/2022     52 4 (H) 09/02/2022     85 5 (H) 08/26/2022     77 3 (H) 08/19/2022

## 2023-01-20 ENCOUNTER — NUTRITION (OUTPATIENT)
Dept: NUTRITION | Facility: HOSPITAL | Age: 73
End: 2023-01-20

## 2023-01-20 DIAGNOSIS — Z71.3 NUTRITIONAL COUNSELING: Primary | ICD-10-CM

## 2023-01-20 LAB
CANCER AG125 SERPL-ACNC: 8.7 U/ML (ref 0–30)
EST. AVERAGE GLUCOSE BLD GHB EST-MCNC: 160 MG/DL
HBA1C MFR BLD: 7.2 %

## 2023-01-20 NOTE — PROGRESS NOTES
Outpatient Oncology Nutrition Consultation   Type of Consult: Follow Up  Care Location: Telephone Call    Reason for referral: Received notification by infusion RNNataliia  on 67/70/90 that pt has triggered for oncology nutrition care (reason for referral: Malnutrition Screening Tool (MST) Triggers: scored a 3 indicating 14-23# (6 4-10 5 kg) recent wt loss and is eating poorly due to a decreased appetite  (Date of MST: 12/13/22))  Nutrition Assessment:   Oncology Diagnosis & Treatments: initially dx in July 2022 (peritoneal carcinoma, stage IIIc ovarian cancer)  -10/28/22-diagnostic laparoscopy, exploratory laparotomy, modified radical hysterectomy, bilateral salpingo-oophorectomy with en bloc rectosigmoid resection, gastrocolic omentectomy, splenectomy, small bowel resection with reanastomosis, diaphragm resection, excision ablation of peritoneal implants, ileostomy formation, optimal tumor debulking  -started chemo (8/23/22), Taxol, Carboplatin, Avastin  Oncology History   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[facility] [address] )   7/21/2022 Initial Diagnosis    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[facility] [address] )     8/4/2022 Surgery    Diagnostic laparoscopy, peritoneal biopsy     8/18/2022 Genetic Testing    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, serous    Results revealed patient has the following mutation(s):  None     Malignant neoplasm of left ovary ([facility] [address] )   8/4/2022 Surgery    Diagnostic laparoscopy, peritoneal biopsy     8/9/2022 Initial Diagnosis    Gynecologic malignancy ([facility] [address] )     8/23/2022 -  Chemotherapy    palonosetron (ALOXI), 0 25 mg, Intravenous, Once, 5 of 6 cycles  Administration: 0 25 mg (8/23/2022), 0 25 mg (9/13/2022), 0 25 mg (10/4/2022), 0 25 mg (12/13/2022), 0 25 mg (1/3/2023)  fosaprepitant (EMEND) IVPB, 150 mg, Intravenous, Once, 5 of 6 cycles  Administration: 150 mg (8/23/2022), 150 mg (9/13/2022), 150 mg (10/4/2022), 150 mg (12/13/2022), 150 mg (1/3/2023)  CARBOplatin (PARAPLATIN) IVPB (GOG AUC DOSING), 605 4 mg, Intravenous, Once, 5 of 6 cycles  Administration: 600 mg (8/23/2022), 564 mg (9/13/2022), 601 8 mg (10/4/2022), 491 5 mg (12/13/2022), 468 5 mg (1/3/2023)  bevacizumab (AVASTIN) IVPB, 1,252 5 mg, Intravenous, Once, 3 of 3 cycles  Dose modification: 10 mg/kg (original dose 15 mg/kg, Cycle 5, Reason: Other (Must fill in a comment))  Administration: 1,200 mg (8/23/2022), 1,200 mg (9/13/2022), 745 mg (1/3/2023)  PACLItaxel (TAXOL) chemo IVPB, 175 mg/m2 = 330 6 mg, Intravenous, Once, 5 of 6 cycles  Dose modification: 135 mg/m2 (original dose 175 mg/m2, Cycle 4, Reason: Other (Must fill in a comment))  Administration: 330 6 mg (8/23/2022), 330 6 mg (9/13/2022), 330 6 mg (10/4/2022), 246 mg (12/13/2022), 244 2 mg (1/3/2023)     10/28/2022 Surgery    diagnostic laparoscopy, exploratory laparotomy, modified radical hysterectomy, bilateral salpingo-oophorectomy with en bloc rectosigmoid resection, gastrocolic omentectomy, splenectomy, small bowel resection with reanastomosis, diaphragm resection, excision ablation of peritoneal implants, ileostomy formation, optimal tumor debulking     11/29/2022 -  Cancer Staged    Staging form: Ovary, Fallopian Tube, Primary Peritoneal, AJCC 8th Edition  - Clinical: FIGO Stage IIIC (cT3c, cN0, cM0) - Signed by Bay Canchola MD on 11/29/2022  Histologic grade (G): G3  Histologic grading system: 4 grade system         Past Medical & Surgical Hx:   Patient Active Problem List   Diagnosis   • Gastroesophageal reflux disease   • Constipation   • Hemorrhoids   • Family history of colonic polyps   • Asthma   • Hyperlipidemia   • Hypertension   • Lower abdominal pain   • Change in bowel habit   • Personal history of colonic polyps   • Long term (current) use of systemic steroids   • 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[facility] [address] )   • Giant cell aortic arteritis ([facility] [address] )   • Prediabetes   • Malignant neoplasm of left ovary (HCC)   • Anxiety disorder due to medical condition   • S/P total abdominal hysterectomy • Ileostomy, has currently [facility])   • Acute blood loss anemia (ABLA)   • S/P splenectomy   • Hypokalemia   • Bandemia   • Wound infection after surgery   • Encounter for venous access device care   • Rectal bleeding     Past Medical History:   Diagnosis Date   • Abdominal pain     off and on   • Allergic sinusitis     "seasonal allergies"-has received allergy shots "   • Anxiety     with current diagnosis   • Arthritis    • Asthma    • Cholelithiasis    • Colon polyp    • Degenerative joint disease    • Dental bridge present     permanent lower left   • Exercise involving walking     1-2x/week   • GERD (gastroesophageal reflux disease)    • Giant cell aortic arteritis ([facility] [address] )     "hereditary Giant Cell Temporal Arteritis"per pt   • Hiatal hernia    • History of cancer chemotherapy    • Hyperlipidemia    • Hypertension    • Nausea    • 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[facility] [address] )     "gynecologic malignacy"   • PMR (polymyalgia rheumatica) (HCC)    • Prediabetes    • Shortness of breath     per pt "asthma related --with climbing mult  flights of stairs--or exertion -not new"   • Tinnitus    • Wears glasses      Past Surgical History:   Procedure Laterality Date   • COLONOSCOPY      October 2021: Adenomatous polyps and rectum in transverse colon, sigmoid diverticulosis, internal hemorrhoids  October 2016 diverticulosis and internal hemorrhoids, September 2011 diverticulosis and internal hemorrhoids     • COLOPROCTECTOMY N/A 10/28/2022    Procedure: PROCTECTOMY;  Surgeon: Mumtaz Lilly MD;  [facility];  Service: Surgical Oncology   • DILATION AND CURETTAGE OF UTERUS     • ILEO LOOP DIVERSION N/A 10/28/2022    Procedure: ILEOSTOMY LOOP;  Surgeon: Mumtaz Lilly MD;  [facility];  Service: Surgical Oncology   • IR PORT PLACEMENT  12/8/2022   • LIVER SURGERY  10/28/2022    Procedure: LIVER CONTROL OF BLEED ;  Surgeon: Mumtaz Lilly MD;  [facility];  Service: Surgical Oncology   • NE COLECTOMY PARTIAL W/ANASTOMOSIS N/A 10/28/2022    Procedure: RESECTION COLON LEFT;  Surgeon: Marifer Deleon MD;  [facility];  Service: Gynecology Oncology   • IN EXPLORATORY LAPAROTOMY CELIOTOMY W/WO BIOPSY SPX N/A 10/28/2022    Procedure: LAPAROTOMY EXPLORATORY;  Surgeon: Marifer Deleon MD;  [facility];  Service: Gynecology Oncology   • IN LAPS ABD PRTM&OMENTUM DX W/WO SPEC BR/WA SPX N/A 08/04/2022    Procedure: DIAGNOSTIC LAPAROSCOPY, PERITONEAL BIOPSY, SAMPLING OF BLOODY ASCITES ;  Surgeon: Flor Salgado MD;  [facility];  Service: Gynecology Oncology   • IN LAPS ABD PRTM&OMENTUM DX W/WO [facility] BR/ [facility] N/A 10/28/2022    Procedure: LAPAROSCOPY DIAGNOSTIC;  Surgeon: Marifer Deleon MD;  [facility];  Service: Gynecology Oncology   • IN TOTAL ABDOMINAL HYSTERECT W/WO RMVL TUBE OVARY N/A 10/28/2022    Procedure: HYSTERECTOMY MODIFIED RADICAL, BILATERAL SALPINGO-OOPHORECTOMY, GASTROCOLIC OMENTECTOMY, DIAPHRAGM RESECTION, EXCISION AND ABLATION OF PERITONEAL TUMORS;  Surgeon: Marifer Deleon MD;  [facility];  Service: Gynecology Oncology   • SMALL INTESTINE SURGERY N/A 10/28/2022    Procedure: RESECTION SMALL BOWEL;  Surgeon: Marifer Deleon MD;  [facility];  Service: Gynecology Oncology   • SPLENECTOMY, TOTAL N/A 10/28/2022    Procedure: SPLENECTOMY;  Surgeon: Marifer Deleon MD;  [facility];  Service: Gynecology Oncology   • TEMPORAL ARTERY BIOPSY / LIGATION     • UPPER GASTROINTESTINAL ENDOSCOPY  11/10/2014    November 2014 irregular Z-line and Schatzki ring, hiatal hernia, gastritis  Biopsies negative for celiac, H pylori, or Phillip's or eosinophilic esophagitis     • WISDOM TOOTH EXTRACTION         Review of Medications:   Vitamins, Supplements and Herbals: Med List Reviewed & pt is only taking: centrum womens MVI, vitamin B, calcium, vitamin D, probiotic    Current Outpatient Medications:   • Acetaminophen (TYLENOL ARTHRITIS PAIN PO), Take by mouth as needed, Disp: , Rfl:   •  albuterol (PROVENTIL HFA,VENTOLIN HFA) 90 mcg/act inhaler, Inhale 2 puffs every 4 (four) hours as needed , Disp: , Rfl:   •  Ascorbic Acid (VITAMIN C GUMMIE PO), Take by mouth, Disp: , Rfl:   •  B Complex-C (B COMPLEX-VITAMIN C PO), Take by mouth daily, Disp: , Rfl:   •  betamethasone, augmented, (DIPROLENE) 0 05 % lotion, Apply topically as needed, Disp: , Rfl:   •  Calcium Carb-Cholecalciferol (CALCIUM 500+D3 PO), Take 1 tablet by mouth 2 (two) times a day , Disp: , Rfl:   •  cephalexin (KEFLEX) 500 mg capsule, TAKE 1 CAPSULE 3 TIMES A DAY FOR 7 DAYS, Disp: , Rfl:   •  cholecalciferol (VITAMIN D3) 1,000 units tablet, Take 1,000 Units by mouth daily, Disp: , Rfl:   •  Docusate Calcium (STOOL SOFTENER PO), Take 1 tablet by mouth 2 (two) times a day, Disp: , Rfl:   •  escitalopram (LEXAPRO) 10 mg tablet, TAKE 1 TABLET BY MOUTH EVERY DAY, Disp: 90 tablet, Rfl: 2  •  famotidine (PEPCID) 40 MG tablet, Take 1 tablet (40 mg total) by mouth daily at bedtime, Disp: 90 tablet, Rfl: 3  •  fexofenadine (ALLEGRA) 180 MG tablet, Take 180 mg by mouth daily, Disp: , Rfl:   •  FIBER ADULT GUMMIES PO, Take by mouth if needed, Disp: , Rfl:   •  GLUCOSAMINE-CHONDROITIN PO, Take 1 tablet by mouth 2 (two) times a day , Disp: , Rfl:   •  lidocaine-prilocaine (EMLA) cream, Apply topically as needed for mild pain, Disp: 30 g, Rfl: 0  •  lisinopril (ZESTRIL) 5 mg tablet, Take 5 mg by mouth daily dinner, Disp: , Rfl:   •  LORazepam (ATIVAN) 1 mg tablet, Take 1 tablet (1 mg total) by mouth every 8 (eight) hours as needed for anxiety (nausea or anxiety), Disp: 30 tablet, Rfl: 0  •  metFORMIN (GLUCOPHAGE) 500 mg tablet, Take 500 mg by mouth daily Daily at lunch, Disp: , Rfl:   •  metoprolol succinate (TOPROL-XL) 25 mg 24 hr tablet, Take 25 mg by mouth daily dinner, Disp: , Rfl:   •  montelukast (SINGULAIR) 10 mg tablet, Take 10 mg by mouth daily dinner, Disp: , Rfl:   •  multivitamin (THERAGRAN) TABS, Take 1 tablet by mouth daily, Disp: , Rfl:   •  omeprazole (PriLOSEC) 20 mg delayed release capsule, TAKE 1 CAPSULE BY MOUTH EVERY DAY 30 MINUTES BEFORE MORNING MEAL FOR 90 DAYS, Disp: , Rfl:   •  ondansetron (ZOFRAN) 8 mg tablet, Take 1 tablet (8 mg total) by mouth every 8 (eight) hours as needed for nausea or vomiting, Disp: 20 tablet, Rfl: 1  •  predniSONE 1 mg tablet, Take 3 mg by mouth daily, Disp: , Rfl:   •  Probiotic Product (PROBIOTIC DAILY PO), Take by mouth daily, Disp: , Rfl:   •  simvastatin (ZOCOR) 10 mg tablet, Take 10 mg by mouth daily at bedtime, Disp: , Rfl:   •  SYMBICORT 160-4 5 MCG/ACT inhaler, 2 puffs daily , Disp: , Rfl:   No current facility-administered medications for this visit      Facility-Administered Medications Ordered in Other Visits:   •  alteplase (CATHFLO) injection 2 mg, 2 mg, Intracatheter, Q2H PRN, Angel Orellana MD    Most Recent Lab Results:   Lab Results   Component Value Date    WBC 13 87 (H) 01/19/2023    NEUTROABS 10 29 (H) 01/19/2023    ALT 22 01/19/2023    AST 21 01/19/2023    ALB 3 1 (L) 01/19/2023    SODIUM 135 01/19/2023    SODIUM 142 01/13/2023    K 4 2 01/19/2023    K 4 0 01/13/2023    CL 99 01/19/2023    BUN 19 01/19/2023    BUN 18 01/13/2023    CREATININE 0 55 (L) 01/19/2023    CREATININE 0 64 01/13/2023    EGFR 94 01/19/2023    PHOS 1 6 (L) 11/06/2022    PHOS 2 5 11/04/2022    GLUCOSE 214 (H) 10/28/2022    POCGLU 186 (H) 10/28/2022    GLUF 161 (H) 12/08/2022    GLUF 111 (H) 10/14/2022    GLUC 125 01/19/2023    HGBA1C 7 2 (H) 01/19/2023    HGBA1C 6 3 (H) 10/14/2022    HGBA1C 6 2 07/01/2022    CALCIUM 9 4 01/19/2023    MG 1 6 01/19/2023       Anthropometric Measurements:   Height:64"  Ht Readings from Last 1 Encounters:   01/19/23 5' 4" (1 626 m)     Wt Readings from Last 20 Encounters:   01/19/23 73 9 kg (163 lb)   01/10/23 74 4 kg (164 lb)   01/09/23 73 kg (161 lb)   01/03/23 73 4 kg (161 lb 13 1 oz)   12/21/22 74 4 kg (164 lb)   12/13/22 70 5 kg (155 lb 6 8 oz)   11/28/22 75 8 kg (167 lb)   11/10/22 83 5 kg (184 lb)   11/08/22 83 5 kg (184 lb)   10/20/22 81 2 kg (179 lb)   10/04/22 83 1 kg (183 lb 3 2 oz)   09/29/22 82 8 kg (182 lb 9 6 oz)   09/13/22 83 4 kg (183 lb 13 8 oz)   09/08/22 83 kg (183 lb)   08/23/22 85 9 kg (189 lb 6 oz)   08/04/22 83 5 kg (184 lb)   07/28/22 86 2 kg (190 lb)   07/21/22 86 5 kg (190 lb 9 6 oz)   06/28/22 87 5 kg (193 lb)   06/15/22 89 4 kg (197 lb 3 2 oz)       Weight History:   • Usual Weight: June of 2021, was put on prednisone  Was 180-185# at that time  Went up to about 193# with prednisone   Has lost weight since surgery in october 2022  • Varian: n/a  • Home Scale: n/a    Oncology Nutrition-Anthropometrics    Flowsheet Row Nutrition from 1/20/2023 in [address] [place] [address] Dietitian [facility] Nutrition from 12/23/2022 in [facility] Dietitian [facility]   Patient age (years): 67 years 67 years   Patient (female) height (in): 59 in 59 in   Current Weight to be used for anthropometric calculations (lbs) 163 lbs 164 lbs   Current Weight to be used for anthropometric calculations (kg) 74 1 kg 74 5 kg   BMI: 28 28 1   IBW female: 120 lbs 120 lbs   IBW (kg) female: 54 5 kg 54 5 kg   IBW % (female) 135 8 % 136 7 %   Adjusted BW (female): 130 8 lbs 131 lbs   Adjusted BW kg (female): 59 5 kg 59 5 kg   % weight change after 1 month: -0 6 % -1 8 %   Weight change after 1 month (lbs) -1 lbs -3 lbs   % weight change after 3 months: -8 9 % -10 2 %   Weight change after 3 months (lbs) -16 lbs -18 6 lbs   % weight change after 6 months: -14 5 % -15 %   Weight change after 6 months (lbs) -27 6 lbs -29 lbs          Nutrition-Focused Physical Findings: n/a due to telephone call    Food/Nutrition-Related History & Client/Social History:    Current Nutrition Impact Symptoms:  [] Nausea  [x] Reduced Appetite -usually right after chemo, but then improves [] Acid Reflux    [] Vomiting  [x] Unintended Wt Loss -stable the last month [] Malabsorption    [] Diarrhea  [] Unintended Wt Gain  [] Dumping Syndrome    [] Constipation  [] Thick Mucous/Secretions  [] Abdominal Pain    [x] Dysgeusia (Altered Taste) -right after chemo, food tastes "off"  -better than last visit [x] Xerostomia (Dry Mouth) -sometimes at night  Has humidifier  [] Gas    [] Dysosmia (Altered Smell)  [] Thrush  [] Difficulty Chewing    [] Oral Mucositis (Sore Mouth)  [] Fatigue  [x] Hyperglycemia -per patient, last A1C was 7 2%  -on prednisone  Lab Results   Component Value Date    HGBA1C 7 2 (H) 2023      [] Odynophagia  [] Esophagitis  [x] Other: wound from surgery  S/p wound vac (removed), wound is healing per patient  -has ostomy bag   [] Dysphagia  [] Early Satiety  [] No Problems Eating      Food Allergies & Intolerances: no    Current Diet: Low fiber diet (avoids fatty/greasy foods)  Current Nutrition Intake: Unchanged from last visit  Appetite: Good, Fair   Nutrition Route: PO  Oral Care: brushes 1-2 times per day  Activity level: ADLs, almost off the walker   Does some laundry around the house, and other housework    24 Hr Diet Recall:   Breakfast: 1 egg, english muffin with jelly OR will have an egg sandwich with lemus or sausage  Snack: chex mix  Lunch: plain cheeseburger and applesauce (went out to eat to [employer])  Snack: premier protein shake  Dinner: leftover from lunch (rest of burger), and peaches  Snack: premier protein shake and pretzels    Beverages: water (12 x 3-6), tea (8oz x 1), premier protein shake (11 oz x2)  Supplements: usually 2 per day  • Premier Protein Shake (11 oz, 160 kcal, 30 g pro)  , mixes with some ice cream      Oncology Nutrition-Estimated Needs    Flowsheet Row Nutrition from 2022 in [address] [place] [address] Dietitian [facility]   Weight type used Actual weight   Weight in kilograms (kg) used for estimated needs 74 5 kg   Energy needs formula:  35-40 kcal/kg   Energy needs based on 35 kcal/k kcal   Energy needs based on 40 kcal/k kcal   Protein needs formula: 1 5-2 g/kg   Protein needs based on 1 5 g/kg 112 g   Protein needs based on 2 g/kg 149 g   Fluid needs formula: 30-35 mL/kg   Fluid needs based on 30 mL/kg 2235 mL   Fluid needs in ounces 76 oz   Fluid needs based on 35 mL/kg 2608 mL   Fluid needs in ounces 88 oz           Discussion & Intervention:   Mikel Butler was evaluated today for an RD follow up regarding unintended weight loss and poor po intake  Mikel Butler is currently undergoing tx for ovarian cancer  Today, Mikel Butler reports doing well  She has been maintaining her weight over the last month  Her appetite is good  She continues to aim for 3 meals/day + snacks and 2 premier protein shakes daily  She states she feels like she's able to finish her meals now  She went out to eat yesterday for the first time since surgery which she was excited about  Still c/o some dysgeusia, but this is improving  Her last chemo is scheduled for next week  She will then have a CT scan  Reviewed 24 hour recall, which revealed an adequate po intake, and discussed ways to increase kcal, protein, and fluid intakes and optimize nutrient intake  Also reviewed the importance of wt management throughout the tx process and the role of a high kcal/ high protein diet and low fiber diet in managing wt and overall health    Based on today's assessment, discussion included: MNT for: ileostomy, dysgeusia, wt loss, small/frequent meals, how to modify foods for anticipated nutrition impact symptoms pt may experience during CA tx, practicing proper oral care, a high kcal/protein diet & food choices to include at all meals & snacks (Examples of high kcal foods: cheese, full-fat dairy products, nut butter, plant-based fats, coconut oil/milk, avocado, butter, cream soup, etc  Examples of high protein foods: eggs, chicken, fish, beans/legumes, nuts/nut butters, bone broth, etc ) , fortifying foods for added kcal and protein (examples include: adding cheese to foods such as eggs, mashed potatoes, casseroles, etc ; Making oatmeal with whole milk rather than water; Making fortified mashed potatoes with cream, butter, dry milk powder, plain [place] yogurt, and cheese ), adequate hydration & fluid choices, sipping on calorie containing beverages (examples include: adding whole milk or cream to coffee, oral nutrition supplements, juice, electrolyte replacement beverages, milk, etc ), eating smaller more frequent meals every 2-3 hours (5-6 small meals/day), continuing oral nutrition supplements, recipe suggestions/resources, trying new recipes, & cooking at home more often and adding extra fat to foods while cooking such as butter, plant-based oil, coconut oil/milk, avocado, nut butters, etc    Moving forward, Arianna Alamo will continue current nutrition plan of care  Materials Provided: not applicable  All questions and concerns addressed during today’s visit  Arianna Alamo has RD contact information  Nutrition Diagnosis:   • Increased Nutrient Needs (kcal & pro) related to increased demand for nutrients and disease state as evidenced by cancer dx and pt undergoing tx for cancer  Monitoring & Evaluation:   Goals:  · weight maintenance/stabilization  · adequate nutrition impact symptom management  · pt to meet >/=75% estimated nutrition needs daily    · Progress Towards Goals: Progressing    Nutrition Rx & Recommendations:  · Diet: high calorie/high protein, low fiber  · Keep a daily food journal (pen/paper, chula such as [place])  · Nutrition Supplements: continue Premier protein shakes twice daily  May use homemade shakes/smoothies as desired  If using a pre-made shake/bar, choose ones with >300 calories and >10 grams protein (ex  Ensure Complete, Ensure Enlive, Ensure Plus, Boost Plus, Boost Very High Calorie, etc )  · Small, frequent meals/snacks may be easier to tolerate than 3 large daily meals    Aim for 5-6 small meals per day (every 2-3 hours)  · Include protein at all meals/snacks  · Include a variety of foods (as tolerated/allowed by diet)  · Incorporate physical activity as able/allowed  · Stay hydrated by sipping fluids of choice/tolerance throughout the day  · Liquid nutrition may be better tolerated than solids at times  · Alter food choices and eating patterns to accommodate changing needs  · Refer to Eating Hints book for other meal/snack ideas and symptom management  · Follow proper oral care; Try baking soda/salt water rinse recipe (mix 3/4 tsp salt + 1 tsp baking soda + 1 qt water; rinse with plain water after using) in Eating Hints book (pg 18)  Brush your teeth before/after meals & before bed  · For lack of taste: Practice good oral care  Blend fresh fruits into shakes, ice cream or yogurt  Eat frozen fruits: grapes, chopped cantaloupe, watermelon  Select fresh vegetables (may be more appealing than canned/frozen)  Add extra flavor to foods: experiment with spices, salt & sweeteners, extra oil/butter, acidic foods; marinate meats (see pages 29-30 in your Eating Hints book)  · Weigh yourself regularly  If you notice weight loss, make an effort to increase your daily food/calorie intake  If you continue to notice loss after these efforts, reach out to your dietitian to establish a plan to stabilize weight  · Mix in nut butters (almond or cashew) OR ice cream with premier protein shakes for added calories  Try fortified mashed potatoes for more calories and protein: make mashed potatoes with fairlife milk, extra butter, plain whole milk [place] yogurt (gives a sour cream taste but with protein), cheese, and dry milk powder    · Snack ideas:  · fairlife milk  · Egg salad  · Hard boiled egg  · Nut butters + crackers  · Cheese and crackers    Follow Up Plan: PRN per patient request  Recommend Referral to Other Providers: none at this time

## 2023-01-20 NOTE — PATIENT INSTRUCTIONS
Nutrition Rx & Recommendations:  Diet: high calorie/high protein, low fiber  Keep a daily food journal (pen/paper, chula such as [employer])  Nutrition Supplements: continue Premier protein shakes twice daily  May use homemade shakes/smoothies as desired  If using a pre-made shake/bar, choose ones with >300 calories and >10 grams protein (ex  Ensure Complete, Ensure Enlive, Ensure Plus, Boost Plus, Boost Very High Calorie, etc )  Small, frequent meals/snacks may be easier to tolerate than 3 large daily meals  Aim for 5-6 small meals per day (every 2-3 hours)  Include protein at all meals/snacks  Include a variety of foods (as tolerated/allowed by diet)  Incorporate physical activity as able/allowed  Stay hydrated by sipping fluids of choice/tolerance throughout the day  Liquid nutrition may be better tolerated than solids at times  Alter food choices and eating patterns to accommodate changing needs  Refer to Eating Hints book for other meal/snack ideas and symptom management  Follow proper oral care; Try baking soda/salt water rinse recipe (mix 3/4 tsp salt + 1 tsp baking soda + 1 qt water; rinse with plain water after using) in Eating Hints book (pg 18)  Brush your teeth before/after meals & before bed  For lack of taste: Practice good oral care  Blend fresh fruits into shakes, ice cream or yogurt  Eat frozen fruits: grapes, chopped cantaloupe, watermelon  Select fresh vegetables (may be more appealing than canned/frozen)  Add extra flavor to foods: experiment with spices, salt & sweeteners, extra oil/butter, acidic foods; marinate meats (see pages 29-30 in your Eating Hints book)  Weigh yourself regularly  If you notice weight loss, make an effort to increase your daily food/calorie intake  If you continue to notice loss after these efforts, reach out to your dietitian to establish a plan to stabilize weight     Mix in nut butters (almond or cashew) OR ice cream with premier protein shakes for added calories  Try fortified mashed potatoes for more calories and protein: make mashed potatoes with fairlife milk, extra butter, plain whole milk [place] yogurt (gives a sour cream taste but with protein), cheese, and dry milk powder    Snack ideas:  fairlife milk  Egg salad  Hard boiled egg  Nut butters + crackers  Cheese and crackers    Follow Up Plan: PRN per patient request  Recommend Referral to Other Providers: none at this time

## 2023-01-21 LAB — T3RU NFR SERPL: 26 % (ref 24–39)

## 2023-01-23 ENCOUNTER — OFFICE VISIT (OUTPATIENT)
Dept: WOUND CARE | Facility: HOSPITAL | Age: 73
End: 2023-01-23

## 2023-01-23 VITALS
HEART RATE: 100 BPM | SYSTOLIC BLOOD PRESSURE: 124 MMHG | TEMPERATURE: 97.1 F | DIASTOLIC BLOOD PRESSURE: 78 MMHG | RESPIRATION RATE: 18 BRPM

## 2023-01-23 DIAGNOSIS — T81.31XA DEHISCENCE OF POSTOPERATIVE WOUND OF ABDOMEN: Primary | ICD-10-CM

## 2023-01-23 RX ORDER — ASPIRIN 81 MG/1
81 TABLET ORAL DAILY
COMMUNITY

## 2023-01-23 RX ORDER — SODIUM CHLORIDE 9 MG/ML
20 INJECTION, SOLUTION INTRAVENOUS ONCE
Status: CANCELLED | OUTPATIENT
Start: 2023-01-24

## 2023-01-23 RX ORDER — PALONOSETRON 0.05 MG/ML
0.25 INJECTION, SOLUTION INTRAVENOUS ONCE
Status: CANCELLED | OUTPATIENT
Start: 2023-01-24

## 2023-01-23 NOTE — PROGRESS NOTES
Patient ID: Charity Echols is a 67 y o  female Date of Birth 1950     Chief Complaint  Chief Complaint   Patient presents with   • Follow Up Wound Care Visit     Abdominal wound       Allergies  Nsaids and Pedi-pre tape spray [wound dressing adhesive]    Assessment:    No problem-specific Assessment & Plan notes found for this encounter  Diagnoses and all orders for this visit:    Dehiscence of postoperative wound of abdomen  -     Wound cleansing and dressings; Future    Other orders  -     aspirin (ECOTRIN LOW STRENGTH) 81 mg EC tablet; Take 81 mg by mouth daily              Procedures    Plan:   Wound is closed  F/u prn or call with questions/concerns    Wound 11/10/22 Surgical Abdomen Medial;Lower (Active)   Wound Image Images linked 01/23/23 1318   Wound Description Epithelialization 01/23/23 1318   Ailyn-wound Assessment Clean;Scar Tissue 01/23/23 1318   Wound Length (cm) 0 cm 01/23/23 1318   Wound Width (cm) 0 cm 01/23/23 1318   Wound Depth (cm) 0 cm 01/23/23 1318   Wound Surface Area (cm^2) 0 cm^2 01/23/23 1318   Wound Volume (cm^3) 0 cm^3 01/23/23 1318   Calculated Wound Volume (cm^3) 0 cm^3 01/23/23 1318   Change in Wound Size % 100 01/23/23 1318   Drainage Amount None 01/23/23 1318   Non-staged Wound Description Full thickness 01/23/23 1318   Dressing Status Other (Comment) (left open to air) 01/23/23 1318       Wound 11/10/22 Surgical Abdomen Medial;Lower (Active)   Date First Assessed/Time First Assessed: 11/10/22 1439   Primary Wound Type: Surgical  Location: Abdomen  Wound Location Orientation: Medial;Lower  Wound Outcome: Healed       [REMOVED] Wound 08/04/22 Abdomen N/A (Removed)   Resolved Date/Resolved Time: 11/10/22 1438  Date First Assessed/Time First Assessed: 08/04/22 1654   Location: Abdomen  Wound Location Orientation: N/A  Wound Description (Comments): 2 trocar incision sites, both with skin glue, per MD   Incision's 1s           [REMOVED] Wound 10/28/22 Abdomen N/A (Removed) Resolved Date/Resolved Time: 11/10/22 1438  Date First Assessed/Time First Assessed: 10/28/22 1953   Location: Abdomen  Wound Location Orientation: N/A  Wound Description (Comments): SKIN STAPLED CLOSED  Incision's 1st Dressing: DRESSING TELFA 3 X 8 I    [REMOVED] Wound 10/28/22 Abdomen Left (Removed)   Resolved Date/Resolved Time: 11/10/22 1438  Date First Assessed/Time First Assessed: 10/28/22 1953   Location: Abdomen  Wound Location Orientation: Left  Wound Description (Comments): DRAIN SITES X2  Incision's 1st Dressing: DRESSING TRACH T-SLIT 6 PL    [REMOVED] Wound 12/08/22 Incision Chest Right;Upper (Removed)   Resolved Date: 01/09/23  Date First Assessed/Time First Assessed: 12/08/22 1120   Primary Wound Type: Incision  Location: Chest  Wound Location Orientation: Right;Upper  Wound Description (Comments): Port Placement  Wound Outcome: Healed       Subjective:        Patient presents for follow-up of a nonhealing surgical wound to the abdomen  No significant pain or drainage  Has been using foam on the wound  The following portions of the patient's history were reviewed and updated as appropriate:   She  has a past medical history of Abdominal pain, Allergic sinusitis, Anxiety, Arthritis, Asthma, Cholelithiasis, Colon polyp, Degenerative joint disease, Dental bridge present, Exercise involving walking, GERD (gastroesophageal reflux disease), Giant cell aortic arteritis ([facility] [address] ), Hiatal hernia, History of cancer chemotherapy, Hyperlipidemia, Hypertension, Nausea, 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[address] ), PMR (polymyalgia rheumatica) ([facility] [address] ), Prediabetes, Shortness of breath, Tinnitus, and Wears glasses    She   Patient Active Problem List    Diagnosis Date Noted   • Rectal bleeding 01/09/2023   • Wound infection after surgery 11/29/2022   • Encounter for venous access device care 11/29/2022   • Bandemia 11/16/2022   • Hypokalemia 11/07/2022   • S/P total abdominal hysterectomy 10/31/2022   • Ileostomy, has currently ([place] [address] ) 10/31/2022   • Acute blood loss anemia (ABLA) 10/31/2022   • S/P splenectomy 10/31/2022   • Anxiety disorder due to medical condition 09/29/2022   • Malignant neoplasm of left ovary ([place] [address] ) 08/09/2022   • Giant cell aortic arteritis ([place] [address] )    • Prediabetes    • 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[place] [address] ) 07/21/2022   • Lower abdominal pain 06/15/2022   • Change in bowel habit 06/15/2022   • Personal history of colonic polyps 06/15/2022   • Long term (current) use of systemic steroids 06/15/2022   • Asthma    • Hyperlipidemia    • Hypertension    • Gastroesophageal reflux disease 03/13/2019   • Constipation 03/13/2019   • Hemorrhoids 03/13/2019   • Family history of colonic polyps 03/13/2019     She  has a past surgical history that includes Upper gastrointestinal endoscopy (11/10/2014); Temporal artery biopsy / ligation; Colonoscopy; Dilation and curettage of uterus; pr laps abd prtm&omentum dx w/wo spec br/wa spx (N/A, 08/04/2022); [place] tooth extraction; pr laps abd prtm&omentum dx w/wo spec br/wa spx (N/A, 10/28/2022); pr exploratory laparotomy celiotomy w/wo biopsy spx (N/A, 10/28/2022); pr total abdominal hysterect w/wo rmvl tube ovary (N/A, 10/28/2022); pr colectomy partial w/anastomosis (N/A, 10/28/2022); Small intestine surgery (N/A, 10/28/2022); Liver surgery (10/28/2022); [place] loop diversion (N/A, 10/28/2022); Coloproctectomy (N/A, 10/28/2022); Splenectomy, total (N/A, 10/28/2022); and IR port placement (12/8/2022)  She  reports that she has never smoked  She has never used smokeless tobacco  She reports current alcohol use  She reports that she does not use drugs    Current Outpatient Medications   Medication Sig Dispense Refill   • aspirin (ECOTRIN LOW STRENGTH) 81 mg EC tablet Take 81 mg by mouth daily     • Acetaminophen (TYLENOL ARTHRITIS PAIN PO) Take by mouth as needed     • albuterol (PROVENTIL HFA,VENTOLIN HFA) 90 mcg/act inhaler Inhale 2 puffs every 4 (four) hours as needed      • Ascorbic Acid (VITAMIN C GUMMIE PO) Take by mouth     • B Complex-C (B COMPLEX-VITAMIN C PO) Take by mouth daily     • betamethasone, augmented, (DIPROLENE) 0 05 % lotion Apply topically as needed     • Calcium Carb-Cholecalciferol (CALCIUM 500+D3 PO) Take 1 tablet by mouth 2 (two) times a day      • cephalexin (KEFLEX) 500 mg capsule TAKE 1 CAPSULE 3 TIMES A DAY FOR 7 DAYS     • cholecalciferol (VITAMIN D3) 1,000 units tablet Take 1,000 Units by mouth daily     • Docusate Calcium (STOOL SOFTENER PO) Take 1 tablet by mouth 2 (two) times a day     • escitalopram (LEXAPRO) 10 mg tablet TAKE 1 TABLET BY MOUTH EVERY DAY 90 tablet 2   • famotidine (PEPCID) 40 MG tablet Take 1 tablet (40 mg total) by mouth daily at bedtime 90 tablet 3   • fexofenadine (ALLEGRA) 180 MG tablet Take 180 mg by mouth daily     • FIBER ADULT GUMMIES PO Take by mouth if needed     • GLUCOSAMINE-CHONDROITIN PO Take 1 tablet by mouth 2 (two) times a day      • lidocaine-prilocaine (EMLA) cream Apply topically as needed for mild pain 30 g 0   • lisinopril (ZESTRIL) 5 mg tablet Take 5 mg by mouth daily dinner     • LORazepam (ATIVAN) 1 mg tablet Take 1 tablet (1 mg total) by mouth every 8 (eight) hours as needed for anxiety (nausea or anxiety) 30 tablet 0   • metFORMIN (GLUCOPHAGE) 500 mg tablet Take 500 mg by mouth daily Daily at lunch     • metoprolol succinate (TOPROL-XL) 25 mg 24 hr tablet Take 25 mg by mouth daily dinner     • montelukast (SINGULAIR) 10 mg tablet Take 10 mg by mouth daily dinner     • multivitamin (THERAGRAN) TABS Take 1 tablet by mouth daily     • omeprazole (PriLOSEC) 20 mg delayed release capsule TAKE 1 CAPSULE BY MOUTH EVERY DAY 30 MINUTES BEFORE MORNING MEAL FOR 90 DAYS     • ondansetron (ZOFRAN) 8 mg tablet Take 1 tablet (8 mg total) by mouth every 8 (eight) hours as needed for nausea or vomiting 20 tablet 1   • predniSONE 1 mg tablet Take 3 mg by mouth daily     • Probiotic Product (PROBIOTIC DAILY PO) Take by mouth daily     • simvastatin (ZOCOR) 10 mg tablet Take 10 mg by mouth daily at bedtime     • SYMBICORT 160-4 5 MCG/ACT inhaler 2 puffs daily        No current facility-administered medications for this visit  Facility-Administered Medications Ordered in Other Visits   Medication Dose Route Frequency Provider Last Rate Last Admin   • CARBOplatin (PARAPLATIN) 488 mg in sodium chloride 0 9 % 250 mL IVPB  488 mg Intravenous Once Andreas Bronson MD       • PACLitaxel (TAXOL) 244 2 mg in sodium chloride 0 9 % 500 mL chemo IVPB  135 mg/m2 (Treatment Plan Recorded) Intravenous Once Andreas Bronson  9 mL/hr at 01/24/23 1033 244 2 mg at 01/24/23 1033     She is allergic to nsaids and pedi-pre tape spray [wound dressing adhesive]       Review of Systems   Constitutional: Negative for chills and fever  HENT: Negative for congestion and sneezing  Respiratory: Negative for cough  Skin: Positive for wound  Psychiatric/Behavioral: Negative for agitation  Objective:       Wound 11/10/22 Surgical Abdomen Medial;Lower (Active)   Wound Image Images linked 01/23/23 1318   Wound Description Epithelialization 01/23/23 1318   Ailyn-wound Assessment Clean;Scar Tissue 01/23/23 1318   Wound Length (cm) 0 cm 01/23/23 1318   Wound Width (cm) 0 cm 01/23/23 1318   Wound Depth (cm) 0 cm 01/23/23 1318   Wound Surface Area (cm^2) 0 cm^2 01/23/23 1318   Wound Volume (cm^3) 0 cm^3 01/23/23 1318   Calculated Wound Volume (cm^3) 0 cm^3 01/23/23 1318   Change in Wound Size % 100 01/23/23 1318   Drainage Amount None 01/23/23 1318   Non-staged Wound Description Full thickness 01/23/23 1318   Dressing Status Other (Comment) (left open to air) 01/23/23 1318       /78   Pulse 100   Temp (!) 97 1 °F (36 2 °C)   Resp 18     Physical Exam  Vitals reviewed  Constitutional:       General: She is not in acute distress  Appearance: Normal appearance  She is not ill-appearing, toxic-appearing or diaphoretic     HENT: Head: Normocephalic and atraumatic  Right Ear: External ear normal       Left Ear: External ear normal    Eyes:      Conjunctiva/sclera: Conjunctivae normal    Pulmonary:      Effort: Pulmonary effort is normal  No respiratory distress  Musculoskeletal:      Cervical back: Neck supple  Skin:     Comments: See wound assessment, wound appears closed   Neurological:      Mental Status: She is alert  Psychiatric:         Mood and Affect: Mood normal          Behavior: Behavior normal              Wound 11/10/22 Surgical Abdomen Medial;Lower (Active)   Wound Image   01/23/23 1318   Wound Description Epithelialization 01/23/23 1318   Ailyn-wound Assessment Clean;Scar Tissue 01/23/23 1318   Wound Length (cm) 0 cm 01/23/23 1318   Wound Width (cm) 0 cm 01/23/23 1318   Wound Depth (cm) 0 cm 01/23/23 1318   Wound Surface Area (cm^2) 0 cm^2 01/23/23 1318   Wound Volume (cm^3) 0 cm^3 01/23/23 1318   Calculated Wound Volume (cm^3) 0 cm^3 01/23/23 1318   Change in Wound Size % 100 01/23/23 1318   Undermining 3 5 11/10/22 1439   Undermining is depth extending from 12 11/10/22 1439   Drainage Amount None 01/23/23 1318   Drainage Description Sanguineous 12/12/22 1110   Non-staged Wound Description Full thickness 01/23/23 1318   Treatments Cleansed 12/05/22 1448   Wound packed? No 01/09/23 1500   Packing- # removed 1 12/05/22 1448   Dressing Changed Changed 12/05/22 1448   Patient Tolerance Tolerated well 12/05/22 1448   Dressing Status Other (Comment) 01/23/23 1318                       Wound Instructions:  Orders Placed This Encounter   Procedures   • Wound cleansing and dressings     Abdominal wound healed  Leave open to air  Standing Status:   Future     Standing Expiration Date:   1/23/2024        Diagnosis ICD-10-CM Associated Orders   1   Dehiscence of postoperative wound of abdomen  T81 [id number] Wound cleansing and dressings

## 2023-01-23 NOTE — PATIENT INSTRUCTIONS
Orders Placed This Encounter   Procedures    Wound cleansing and dressings     Abdominal wound healed  Leave open to air       Standing Status:   Future     Standing Expiration Date:   1/23/2024

## 2023-01-24 ENCOUNTER — HOSPITAL ENCOUNTER (OUTPATIENT)
Dept: INFUSION CENTER | Facility: HOSPITAL | Age: 73
Discharge: HOME | End: 2023-01-24
Attending: OBSTETRICS & GYNECOLOGY

## 2023-01-24 VITALS
HEIGHT: 64 IN | WEIGHT: 161.82 LBS | TEMPERATURE: 98.1 F | OXYGEN SATURATION: 99 % | DIASTOLIC BLOOD PRESSURE: 76 MMHG | SYSTOLIC BLOOD PRESSURE: 139 MMHG | HEART RATE: 95 BPM | RESPIRATION RATE: 18 BRPM | BODY MASS INDEX: 27.63 KG/M2

## 2023-01-24 DIAGNOSIS — C56.2 MALIGNANT NEOPLASM OF LEFT OVARY (HCC): Primary | ICD-10-CM

## 2023-01-24 DIAGNOSIS — C57.9 GYNECOLOGIC MALIGNANCY (HCC): ICD-10-CM

## 2023-01-24 RX ORDER — PALONOSETRON 0.05 MG/ML
0.25 INJECTION, SOLUTION INTRAVENOUS ONCE
Status: COMPLETED | OUTPATIENT
Start: 2023-01-24 | End: 2023-01-24

## 2023-01-24 RX ORDER — SODIUM CHLORIDE 9 MG/ML
20 INJECTION, SOLUTION INTRAVENOUS ONCE
Status: COMPLETED | OUTPATIENT
Start: 2023-01-24 | End: 2023-01-24

## 2023-01-24 RX ADMIN — PACLITAXEL 244.2 MG: 6 INJECTION, SOLUTION INTRAVENOUS at 10:33

## 2023-01-24 RX ADMIN — PALONOSETRON 0.25 MG: 0.05 INJECTION, SOLUTION INTRAVENOUS at 08:46

## 2023-01-24 RX ADMIN — SODIUM CHLORIDE 20 ML/HR: 0.9 INJECTION, SOLUTION INTRAVENOUS at 08:39

## 2023-01-24 RX ADMIN — CARBOPLATIN 488 MG: 10 INJECTION, SOLUTION INTRAVENOUS at 13:33

## 2023-01-24 RX ADMIN — DEXAMETHASONE SODIUM PHOSPHATE 10 MG: 10 INJECTION, SOLUTION INTRAMUSCULAR; INTRAVENOUS at 08:40

## 2023-01-24 RX ADMIN — FOSAPREPITANT 150 MG: 150 INJECTION, POWDER, LYOPHILIZED, FOR SOLUTION INTRAVENOUS at 09:55

## 2023-01-24 RX ADMIN — FAMOTIDINE 20 MG: 20 INJECTION, SOLUTION INTRAVENOUS at 09:31

## 2023-01-24 RX ADMIN — DIPHENHYDRAMINE HYDROCHLORIDE 25 MG: 50 INJECTION, SOLUTION INTRAMUSCULAR; INTRAVENOUS at 09:04

## 2023-01-24 NOTE — PROGRESS NOTES
Patient completed final chemotherapy infusion with no adverse reactions  Dressing CD&I  AVS provided, left unit ambulatory with steady gait

## 2023-01-27 ENCOUNTER — HOSPITAL ENCOUNTER (OUTPATIENT)
Dept: INFUSION CENTER | Facility: HOSPITAL | Age: 73
End: 2023-01-27

## 2023-01-27 DIAGNOSIS — C56.2 MALIGNANT NEOPLASM OF LEFT OVARY (HCC): ICD-10-CM

## 2023-01-27 LAB
ALBUMIN SERPL BCP-MCNC: 2.9 G/DL (ref 3.5–5)
ALP SERPL-CCNC: 98 U/L (ref 46–116)
ALT SERPL W P-5'-P-CCNC: 20 U/L (ref 12–78)
ANION GAP SERPL CALCULATED.3IONS-SCNC: 9 MMOL/L (ref 4–13)
AST SERPL W P-5'-P-CCNC: 18 U/L (ref 5–45)
BASOPHILS # BLD AUTO: 0.04 THOUSANDS/ÂΜL (ref 0–0.1)
BASOPHILS NFR BLD AUTO: 0 % (ref 0–1)
BILIRUB SERPL-MCNC: 0.2 MG/DL (ref 0.2–1)
BUN SERPL-MCNC: 16 MG/DL (ref 5–25)
CALCIUM ALBUM COR SERPL-MCNC: 9.3 MG/DL (ref 8.3–10.1)
CALCIUM SERPL-MCNC: 8.4 MG/DL (ref 8.3–10.1)
CHLORIDE SERPL-SCNC: 99 MMOL/L (ref 96–108)
CO2 SERPL-SCNC: 25 MMOL/L (ref 21–32)
CREAT SERPL-MCNC: 0.57 MG/DL (ref 0.6–1.3)
EOSINOPHIL # BLD AUTO: 0.02 THOUSAND/ÂΜL (ref 0–0.61)
EOSINOPHIL NFR BLD AUTO: 0 % (ref 0–6)
ERYTHROCYTE [DISTWIDTH] IN BLOOD BY AUTOMATED COUNT: 20 % (ref 11.6–15.1)
GFR SERPL CREATININE-BSD FRML MDRD: 92 ML/MIN/1.73SQ M
GLUCOSE SERPL-MCNC: 208 MG/DL (ref 65–140)
HCT VFR BLD AUTO: 29.7 % (ref 34.8–46.1)
HGB BLD-MCNC: 9.8 G/DL (ref 11.5–15.4)
IMM GRANULOCYTES # BLD AUTO: 0.03 THOUSAND/UL (ref 0–0.2)
IMM GRANULOCYTES NFR BLD AUTO: 0 % (ref 0–2)
LYMPHOCYTES # BLD AUTO: 0.93 THOUSANDS/ÂΜL (ref 0.6–4.47)
LYMPHOCYTES NFR BLD AUTO: 9 % (ref 14–44)
MAGNESIUM SERPL-MCNC: 1.6 MG/DL (ref 1.6–2.6)
MCH RBC QN AUTO: 31.6 PG (ref 26.8–34.3)
MCHC RBC AUTO-ENTMCNC: 33 G/DL (ref 31.4–37.4)
MCV RBC AUTO: 96 FL (ref 82–98)
MONOCYTES # BLD AUTO: 0.5 THOUSAND/ÂΜL (ref 0.17–1.22)
MONOCYTES NFR BLD AUTO: 5 % (ref 4–12)
NEUTROPHILS # BLD AUTO: 8.43 THOUSANDS/ÂΜL (ref 1.85–7.62)
NEUTS SEG NFR BLD AUTO: 86 % (ref 43–75)
NRBC BLD AUTO-RTO: 0 /100 WBCS
PLATELET # BLD AUTO: 312 THOUSANDS/UL (ref 149–390)
PMV BLD AUTO: 10.1 FL (ref 8.9–12.7)
POTASSIUM SERPL-SCNC: 4.1 MMOL/L (ref 3.5–5.3)
PROT SERPL-MCNC: 7.1 G/DL (ref 6.4–8.4)
RBC # BLD AUTO: 3.1 MILLION/UL (ref 3.81–5.12)
SODIUM SERPL-SCNC: 133 MMOL/L (ref 135–147)
WBC # BLD AUTO: 9.95 THOUSAND/UL (ref 4.31–10.16)

## 2023-01-27 NOTE — PROGRESS NOTES
Patient here for labs  End of port incision looks redenned with either a little piece of skin or a white head coming up  Photo taken and sent to ANTONELLA Allan for opinion  As per Minerva Marx, patient is to apply heat to the incision over the weekend and call the office on Monday if no change  Speculating that maybe it is a suture coming up  This was communicated to patient and her  who both verbalized understanding  Patient left unit ambulatory with steady gait

## 2023-02-02 ENCOUNTER — OFFICE VISIT (OUTPATIENT)
Dept: GYNECOLOGIC ONCOLOGY | Facility: HOSPITAL | Age: 73
End: 2023-02-02

## 2023-02-02 ENCOUNTER — TELEPHONE (OUTPATIENT)
Dept: GYNECOLOGIC ONCOLOGY | Facility: CLINIC | Age: 73
End: 2023-02-02

## 2023-02-02 VITALS
HEIGHT: 64 IN | BODY MASS INDEX: 27.49 KG/M2 | TEMPERATURE: 98.7 F | DIASTOLIC BLOOD PRESSURE: 80 MMHG | SYSTOLIC BLOOD PRESSURE: 124 MMHG | WEIGHT: 161 LBS

## 2023-02-02 DIAGNOSIS — C56.2 MALIGNANT NEOPLASM OF LEFT OVARY (HCC): Primary | ICD-10-CM

## 2023-02-02 NOTE — PROGRESS NOTES
Assessment/Plan:    Problem List Items Addressed This Visit        Endocrine    Malignant neoplasm of left ovary ([facility] [address] ) - Primary     Stage IIIC ovarian cancer s/p neoadjuvant chemotherapy, surgical debulking and is currently receiving adjuvant chemotherapy with taxol 135 mg/m2 and carboplatin AUC 5 every 21 days  Avastin was discontinued with cycle 6 d/t development of rectal vs  vaginal bleeding  She has a diverting loop ileostomy  She has been evaluated by colorectal with internal hemorrhoids identified  Exam today, revealed old blood in the vagina  Due to persistent, intense episodes of rectal/pelvic pressure and ongoing rectal vs  vaginal bleeding, will plan to repeat CT imaging now  I also asked the patient to notify colorectal of her continued symptoms  Will closely monitor and follow-up with patient after CT imaging resulted  CHIEF COMPLAINT:   Vaginal/rectal bleeding and rectal pressure    Problem:  Cancer Staging   Malignant neoplasm of left ovary (HCC)  Staging form: Ovary, Fallopian Tube, Primary Peritoneal, AJCC 8th Edition  - Clinical: FIGO Stage IIIC (cT3c, cN0, cM0) - Signed by Mindy Arellano MD on 11/29/2022        Previous therapy:  Oncology History   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[facility] )   7/21/2022 Initial Diagnosis    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[facility] )     8/4/2022 Surgery    Diagnostic laparoscopy, peritoneal biopsy     8/18/2022 Genetic Testing    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, serous    Results revealed patient has the following mutation(s):  None     Malignant neoplasm of left ovary ([facility] [address] )   8/4/2022 Surgery    Diagnostic laparoscopy, peritoneal biopsy     8/9/2022 Initial Diagnosis    Gynecologic malignancy ([facility]  )     8/23/2022 -  Chemotherapy    palonosetron (ALOXI), 0 25 mg, Intravenous, Once, 6 of 6 cycles  Administration: 0 25 mg (8/23/2022), 0 25 mg (9/13/2022), 0 25 mg (10/4/2022), 0 25 mg (12/13/2022), 0 25 mg (1/3/2023), 0 25 mg (1/24/2023)  fosaprepitant (EMEND) IVPB, 150 mg, Intravenous, Once, 6 of 6 cycles  Administration: 150 mg (8/23/2022), 150 mg (9/13/2022), 150 mg (10/4/2022), 150 mg (12/13/2022), 150 mg (1/3/2023), 150 mg (1/24/2023)  CARBOplatin (PARAPLATIN) IVPB (GO AUC DOSING), 605 4 mg, Intravenous, Once, 6 of 6 cycles  Administration: 600 mg (8/23/2022), 564 mg (9/13/2022), 601 8 mg (10/4/2022), 491 5 mg (12/13/2022), 468 5 mg (1/3/2023), 488 mg (1/24/2023)  bevacizumab (AVASTIN) IVPB, 1,252 5 mg, Intravenous, Once, 3 of 3 cycles  Dose modification: 10 mg/kg (original dose 15 mg/kg, Cycle 5, Reason: Other (Must fill in a comment))  Administration: 1,200 mg (8/23/2022), 1,200 mg (9/13/2022), 745 mg (1/3/2023)  PACLItaxel (TAXOL) chemo IVPB, 175 mg/m2 = 330 6 mg, Intravenous, Once, 6 of 6 cycles  Dose modification: 135 mg/m2 (original dose 175 mg/m2, Cycle 4, Reason: Other (Must fill in a comment))  Administration: 330 6 mg (8/23/2022), 330 6 mg (9/13/2022), 330 6 mg (10/4/2022), 246 mg (12/13/2022), 244 2 mg (1/3/2023), 244 2 mg (1/24/2023)     10/28/2022 Surgery    diagnostic laparoscopy, exploratory laparotomy, modified radical hysterectomy, bilateral salpingo-oophorectomy with en bloc rectosigmoid resection, gastrocolic omentectomy, splenectomy, small bowel resection with reanastomosis, diaphragm resection, excision ablation of peritoneal implants, ileostomy formation, optimal tumor debulking     11/29/2022 -  Cancer Staged    Staging form: Ovary, Fallopian Tube, Primary Peritoneal, AJCC 8th Edition  - Clinical: FIGO Stage IIIC (cT3c, cN0, cM0) - Signed by Bharathi Carmichael MD on 11/29/2022  Histologic grade (G): G3  Histologic grading system: 4 grade system             Patient ID: Burton Morin is a 67 y o  female  who presents to the office for acute evaluation of persistent vaginal/rectal bleeding and episodes of intense rectal pressure   The patient notes she experienced dark red/brown discharge earlier this week  Today, she experienced intense rectal pressure and then noticed bright red blood when wiping  She is fatigued from treatment  Patient was recently evaluated by colorectal and underwent scope examination at the start of these symptoms  Internal hemorrhoids were identified and anastomosis healthy  The following portions of the patient's history were reviewed and updated as appropriate: allergies, current medications, past medical history, past surgical history and problem list     Review of Systems   Constitutional: Positive for fatigue  Negative for fever  HENT: Negative  Eyes: Negative  Respiratory: Negative  Cardiovascular: Negative  Gastrointestinal: Negative  Genitourinary:        As per HPI  Musculoskeletal: Negative  Skin: Negative  Neurological: Negative  Psychiatric/Behavioral: Negative          Current Outpatient Medications   Medication Sig Dispense Refill   • Acetaminophen (TYLENOL ARTHRITIS PAIN PO) Take by mouth as needed     • albuterol (PROVENTIL HFA,VENTOLIN HFA) 90 mcg/act inhaler Inhale 2 puffs every 4 (four) hours as needed      • Ascorbic Acid (VITAMIN C GUMMIE PO) Take by mouth     • aspirin (ECOTRIN LOW STRENGTH) 81 mg EC tablet Take 81 mg by mouth daily     • B Complex-C (B COMPLEX-VITAMIN C PO) Take by mouth daily     • betamethasone, augmented, (DIPROLENE) 0 05 % lotion Apply topically as needed     • Calcium Carb-Cholecalciferol (CALCIUM 500+D3 PO) Take 1 tablet by mouth 2 (two) times a day      • cephalexin (KEFLEX) 500 mg capsule TAKE 1 CAPSULE 3 TIMES A DAY FOR 7 DAYS     • cholecalciferol (VITAMIN D3) 1,000 units tablet Take 1,000 Units by mouth daily     • Docusate Calcium (STOOL SOFTENER PO) Take 1 tablet by mouth 2 (two) times a day     • escitalopram (LEXAPRO) 10 mg tablet TAKE 1 TABLET BY MOUTH EVERY DAY 90 tablet 2   • famotidine (PEPCID) 40 MG tablet Take 1 tablet (40 mg total) by mouth daily at bedtime 90 tablet 3   • fexofenadine (ALLEGRA) 180 MG tablet Take 180 mg by mouth daily     • FIBER ADULT GUMMIES PO Take by mouth if needed     • GLUCOSAMINE-CHONDROITIN PO Take 1 tablet by mouth 2 (two) times a day      • lidocaine-prilocaine (EMLA) cream Apply topically as needed for mild pain 30 g 0   • lisinopril (ZESTRIL) 5 mg tablet Take 5 mg by mouth daily dinner     • LORazepam (ATIVAN) 1 mg tablet Take 1 tablet (1 mg total) by mouth every 8 (eight) hours as needed for anxiety (nausea or anxiety) 30 tablet 0   • metFORMIN (GLUCOPHAGE) 500 mg tablet Take 500 mg by mouth daily Daily at lunch     • metoprolol succinate (TOPROL-XL) 25 mg 24 hr tablet Take 25 mg by mouth daily dinner     • montelukast (SINGULAIR) 10 mg tablet Take 10 mg by mouth daily dinner     • multivitamin (THERAGRAN) TABS Take 1 tablet by mouth daily     • omeprazole (PriLOSEC) 20 mg delayed release capsule TAKE 1 CAPSULE BY MOUTH EVERY DAY 30 MINUTES BEFORE MORNING MEAL FOR 90 DAYS     • ondansetron (ZOFRAN) 8 mg tablet Take 1 tablet (8 mg total) by mouth every 8 (eight) hours as needed for nausea or vomiting 20 tablet 1   • predniSONE 1 mg tablet Take 3 mg by mouth daily     • Probiotic Product (PROBIOTIC DAILY PO) Take by mouth daily     • simvastatin (ZOCOR) 10 mg tablet Take 10 mg by mouth daily at bedtime     • SYMBICORT 160-4 5 MCG/ACT inhaler 2 puffs daily        No current facility-administered medications for this visit  Objective:    Blood pressure 124/80, temperature 98 7 °F (37 1 °C), temperature source Tympanic, height 5' 4 02" (1 626 m), weight 73 kg (161 lb)  Body mass index is 27 62 kg/m²  Body surface area is 1 78 meters squared  Physical Exam  Vitals reviewed  Exam conducted with a chaperone present  Constitutional:       General: She is not in acute distress  Appearance: Normal appearance  HENT:      Head: Normocephalic and atraumatic  Mouth/Throat:      Mouth: Mucous membranes are moist    Abdominal:      Palpations: Abdomen is soft  There is no mass  Tenderness: There is no abdominal tenderness  Comments: Healthy, pink stoma noted  Stool present in bag  Genitourinary:     Comments: The external female genitalia is normal  The bartholin's, uretheral and skenes glands are normal  The urethral meatus is normal (midline with no lesions)  Anus without fissure or lesion  Speculum exam reveals a grossly normal vagina  Vagina is intact, without dehiscense  No masses or lesions  Old blood present  NO active bleeding  No significant cystocele or rectocele noted  Bimanual exam notes a surgical absent cervix, uterus and adnexal structures  No masses or fullness  Bladder is without fullness, mass or tenderness  Skin:     General: Skin is warm and dry  Neurological:      Mental Status: She is alert and oriented to person, place, and time  Psychiatric:         Mood and Affect: Mood normal          Behavior: Behavior normal          Thought Content:  Thought content normal          Judgment: Judgment normal          Lab Results   Component Value Date     8 7 01/19/2023     Lab Results   Component Value Date    K 4 1 01/27/2023    CL 99 01/27/2023    CO2 25 01/27/2023    BUN 16 01/27/2023    CREATININE 0 57 (L) 01/27/2023    GLUCOSE 214 (H) 10/28/2022    GLUF 161 (H) 12/08/2022    CALCIUM 8 4 01/27/2023    CORRECTEDCA 9 3 01/27/2023    AST 18 01/27/2023    ALT 20 01/27/2023    ALKPHOS 98 01/27/2023    EGFR 92 01/27/2023     Lab Results   Component Value Date    WBC 9 95 01/27/2023    HGB 9 8 (L) 01/27/2023    HCT 29 7 (L) 01/27/2023    MCV 96 01/27/2023     01/27/2023     Lab Results   Component Value Date    NEUTROABS 8 43 (H) 01/27/2023        Trend:  Lab Results   Component Value Date     8 7 01/19/2023     15 8 12/30/2022     37 9 (H) 12/08/2022     8 6 10/20/2022     8 4 10/07/2022     12 5 09/29/2022     32 0 (H) 09/08/2022     52 4 (H) 09/02/2022     85 5 (H) 08/26/2022  77 3 (H) 08/19/2022

## 2023-02-02 NOTE — ASSESSMENT & PLAN NOTE
Stage IIIC ovarian cancer s/p neoadjuvant chemotherapy, surgical debulking and is currently receiving adjuvant chemotherapy with taxol 135 mg/m2 and carboplatin AUC 5 every 21 days  Avastin was discontinued with cycle 6 d/t development of rectal vs  vaginal bleeding  She has a diverting loop ileostomy  She has been evaluated by colorectal with internal hemorrhoids identified  Exam today, revealed old blood in the vagina  Due to persistent, intense episodes of rectal/pelvic pressure and ongoing rectal vs  vaginal bleeding, will plan to repeat CT imaging now  I also asked the patient to notify colorectal of her continued symptoms  Will closely monitor and follow-up with patient after CT imaging resulted

## 2023-02-02 NOTE — TELEPHONE ENCOUNTER
Patient called into the office stating that she was having bloody discharge along with having pressure down in the the rectal area  Patient that she was seen by Roel Neighbours recently for rectal bleeding as well and thinks that is what might be causing the pressure   Patient is requesting a phone call back @ [phone]

## 2023-02-03 ENCOUNTER — HOSPITAL ENCOUNTER (OUTPATIENT)
Dept: INFUSION CENTER | Facility: HOSPITAL | Age: 73
End: 2023-02-03

## 2023-02-03 DIAGNOSIS — C56.2 MALIGNANT NEOPLASM OF LEFT OVARY (HCC): ICD-10-CM

## 2023-02-03 DIAGNOSIS — Z45.2 ENCOUNTER FOR VENOUS ACCESS DEVICE CARE: Primary | ICD-10-CM

## 2023-02-03 LAB
ALBUMIN SERPL BCP-MCNC: 3 G/DL (ref 3.5–5)
ALP SERPL-CCNC: 112 U/L (ref 46–116)
ALT SERPL W P-5'-P-CCNC: 25 U/L (ref 12–78)
ANION GAP SERPL CALCULATED.3IONS-SCNC: 9 MMOL/L (ref 4–13)
AST SERPL W P-5'-P-CCNC: 21 U/L (ref 5–45)
BASOPHILS # BLD AUTO: 0.02 THOUSANDS/ÂΜL (ref 0–0.1)
BASOPHILS NFR BLD AUTO: 0 % (ref 0–1)
BILIRUB SERPL-MCNC: 0.1 MG/DL (ref 0.2–1)
BUN SERPL-MCNC: 19 MG/DL (ref 5–25)
CALCIUM ALBUM COR SERPL-MCNC: 10 MG/DL (ref 8.3–10.1)
CALCIUM SERPL-MCNC: 9.2 MG/DL (ref 8.3–10.1)
CHLORIDE SERPL-SCNC: 102 MMOL/L (ref 96–108)
CO2 SERPL-SCNC: 27 MMOL/L (ref 21–32)
CREAT SERPL-MCNC: 0.55 MG/DL (ref 0.6–1.3)
EOSINOPHIL # BLD AUTO: 0.02 THOUSAND/ÂΜL (ref 0–0.61)
EOSINOPHIL NFR BLD AUTO: 0 % (ref 0–6)
ERYTHROCYTE [DISTWIDTH] IN BLOOD BY AUTOMATED COUNT: 19.9 % (ref 11.6–15.1)
GFR SERPL CREATININE-BSD FRML MDRD: 94 ML/MIN/1.73SQ M
GLUCOSE SERPL-MCNC: 154 MG/DL (ref 65–140)
HCT VFR BLD AUTO: 27.4 % (ref 34.8–46.1)
HGB BLD-MCNC: 8.9 G/DL (ref 11.5–15.4)
IMM GRANULOCYTES # BLD AUTO: 0.07 THOUSAND/UL (ref 0–0.2)
IMM GRANULOCYTES NFR BLD AUTO: 1 % (ref 0–2)
LYMPHOCYTES # BLD AUTO: 1.25 THOUSANDS/ÂΜL (ref 0.6–4.47)
LYMPHOCYTES NFR BLD AUTO: 23 % (ref 14–44)
MAGNESIUM SERPL-MCNC: 1.6 MG/DL (ref 1.6–2.6)
MCH RBC QN AUTO: 30.8 PG (ref 26.8–34.3)
MCHC RBC AUTO-ENTMCNC: 32.5 G/DL (ref 31.4–37.4)
MCV RBC AUTO: 95 FL (ref 82–98)
MONOCYTES # BLD AUTO: 1.75 THOUSAND/ÂΜL (ref 0.17–1.22)
MONOCYTES NFR BLD AUTO: 32 % (ref 4–12)
NEUTROPHILS # BLD AUTO: 2.29 THOUSANDS/ÂΜL (ref 1.85–7.62)
NEUTS SEG NFR BLD AUTO: 44 % (ref 43–75)
NRBC BLD AUTO-RTO: 0 /100 WBCS
PLATELET # BLD AUTO: 292 THOUSANDS/UL (ref 149–390)
PMV BLD AUTO: 10.4 FL (ref 8.9–12.7)
POTASSIUM SERPL-SCNC: 4.3 MMOL/L (ref 3.5–5.3)
PROT SERPL-MCNC: 7.2 G/DL (ref 6.4–8.4)
RBC # BLD AUTO: 2.89 MILLION/UL (ref 3.81–5.12)
SODIUM SERPL-SCNC: 138 MMOL/L (ref 135–147)
WBC # BLD AUTO: 5.4 THOUSAND/UL (ref 4.31–10.16)

## 2023-02-03 NOTE — PROGRESS NOTES
Labs drawn via port per protocol without complications  No complaints offered  AVS declined  Left unit in stable condition

## 2023-02-09 ENCOUNTER — HOSPITAL ENCOUNTER (OUTPATIENT)
Dept: INFUSION CENTER | Facility: HOSPITAL | Age: 73
Discharge: HOME | End: 2023-02-09

## 2023-02-09 DIAGNOSIS — Z45.2 ENCOUNTER FOR VENOUS ACCESS DEVICE CARE: ICD-10-CM

## 2023-02-09 DIAGNOSIS — C56.2 MALIGNANT NEOPLASM OF LEFT OVARY (HCC): Primary | ICD-10-CM

## 2023-02-09 LAB
ALBUMIN SERPL BCP-MCNC: 2.9 G/DL (ref 3.5–5)
ALP SERPL-CCNC: 99 U/L (ref 46–116)
ALT SERPL W P-5'-P-CCNC: 24 U/L (ref 12–78)
ANION GAP SERPL CALCULATED.3IONS-SCNC: 7 MMOL/L (ref 4–13)
AST SERPL W P-5'-P-CCNC: 19 U/L (ref 5–45)
BASOPHILS # BLD AUTO: 0.05 THOUSANDS/ÂΜL (ref 0–0.1)
BASOPHILS NFR BLD AUTO: 0 % (ref 0–1)
BILIRUB SERPL-MCNC: 0.1 MG/DL (ref 0.2–1)
BUN SERPL-MCNC: 18 MG/DL (ref 5–25)
CALCIUM ALBUM COR SERPL-MCNC: 9 MG/DL (ref 8.3–10.1)
CALCIUM SERPL-MCNC: 8.1 MG/DL (ref 8.3–10.1)
CHLORIDE SERPL-SCNC: 102 MMOL/L (ref 96–108)
CO2 SERPL-SCNC: 28 MMOL/L (ref 21–32)
CREAT SERPL-MCNC: 0.68 MG/DL (ref 0.6–1.3)
CREAT UR-MCNC: 180 MG/DL
EOSINOPHIL # BLD AUTO: 0.03 THOUSAND/ÂΜL (ref 0–0.61)
EOSINOPHIL NFR BLD AUTO: 0 % (ref 0–6)
ERYTHROCYTE [DISTWIDTH] IN BLOOD BY AUTOMATED COUNT: 19.8 % (ref 11.6–15.1)
GFR SERPL CREATININE-BSD FRML MDRD: 87 ML/MIN/1.73SQ M
GLUCOSE SERPL-MCNC: 142 MG/DL (ref 65–140)
HCT VFR BLD AUTO: 28.7 % (ref 34.8–46.1)
HGB BLD-MCNC: 9.1 G/DL (ref 11.5–15.4)
IMM GRANULOCYTES # BLD AUTO: 0.16 THOUSAND/UL (ref 0–0.2)
IMM GRANULOCYTES NFR BLD AUTO: 1 % (ref 0–2)
LYMPHOCYTES # BLD AUTO: 1.18 THOUSANDS/ÂΜL (ref 0.6–4.47)
LYMPHOCYTES NFR BLD AUTO: 9 % (ref 14–44)
MAGNESIUM SERPL-MCNC: 1.5 MG/DL (ref 1.6–2.6)
MCH RBC QN AUTO: 30.6 PG (ref 26.8–34.3)
MCHC RBC AUTO-ENTMCNC: 31.7 G/DL (ref 31.4–37.4)
MCV RBC AUTO: 97 FL (ref 82–98)
MONOCYTES # BLD AUTO: 1.79 THOUSAND/ÂΜL (ref 0.17–1.22)
MONOCYTES NFR BLD AUTO: 14 % (ref 4–12)
NEUTROPHILS # BLD AUTO: 9.39 THOUSANDS/ÂΜL (ref 1.85–7.62)
NEUTS SEG NFR BLD AUTO: 76 % (ref 43–75)
NRBC BLD AUTO-RTO: 0 /100 WBCS
PLATELET # BLD AUTO: 284 THOUSANDS/UL (ref 149–390)
PMV BLD AUTO: 9.7 FL (ref 8.9–12.7)
POTASSIUM SERPL-SCNC: 3.9 MMOL/L (ref 3.5–5.3)
PROT SERPL-MCNC: 6.9 G/DL (ref 6.4–8.4)
PROT UR-MCNC: 31 MG/DL
PROT/CREAT UR: 0.17 MG/G{CREAT} (ref 0–0.1)
RBC # BLD AUTO: 2.97 MILLION/UL (ref 3.81–5.12)
SODIUM SERPL-SCNC: 137 MMOL/L (ref 135–147)
WBC # BLD AUTO: 12.6 THOUSAND/UL (ref 4.31–10.16)

## 2023-02-09 NOTE — PROGRESS NOTES
Urine collected and labs drawn via port per protocol without complications  No complaints offered  AVS declined   Left unit in stable condition

## 2023-02-10 ENCOUNTER — HOSPITAL ENCOUNTER (OUTPATIENT)
Dept: CT IMAGING | Facility: HOSPITAL | Age: 73
Discharge: HOME | End: 2023-02-10
Attending: OBSTETRICS & GYNECOLOGY

## 2023-02-10 DIAGNOSIS — C56.2 MALIGNANT NEOPLASM OF LEFT OVARY (HCC): ICD-10-CM

## 2023-02-10 LAB — CANCER AG125 SERPL-ACNC: 5.3 U/ML (ref 0–30)

## 2023-02-13 ENCOUNTER — HOSPITAL ENCOUNTER (OUTPATIENT)
Dept: CT IMAGING | Facility: HOSPITAL | Age: 73
Discharge: HOME | End: 2023-02-13
Attending: OBSTETRICS & GYNECOLOGY

## 2023-02-13 RX ADMIN — IOHEXOL 100 ML: 350 INJECTION, SOLUTION INTRAVENOUS at 14:31

## 2023-02-16 DIAGNOSIS — K21.9 GASTROESOPHAGEAL REFLUX DISEASE: ICD-10-CM

## 2023-02-16 RX ORDER — FAMOTIDINE 40 MG/1
TABLET, FILM COATED ORAL
Qty: 90 TABLET | Refills: 3 | Status: SHIPPED | OUTPATIENT
Start: 2023-02-16

## 2023-02-17 ENCOUNTER — HOSPITAL ENCOUNTER (OUTPATIENT)
Dept: INFUSION CENTER | Facility: HOSPITAL | Age: 73
End: 2023-02-17

## 2023-02-17 DIAGNOSIS — C56.2 MALIGNANT NEOPLASM OF LEFT OVARY (HCC): Primary | ICD-10-CM

## 2023-02-17 DIAGNOSIS — Z45.2 ENCOUNTER FOR VENOUS ACCESS DEVICE CARE: ICD-10-CM

## 2023-02-17 LAB
ALBUMIN SERPL BCP-MCNC: 3.7 G/DL (ref 3.5–5)
ALP SERPL-CCNC: 82 U/L (ref 34–104)
ALT SERPL W P-5'-P-CCNC: 17 U/L (ref 7–52)
ANION GAP SERPL CALCULATED.3IONS-SCNC: 5 MMOL/L (ref 4–13)
AST SERPL W P-5'-P-CCNC: 18 U/L (ref 13–39)
BASOPHILS # BLD AUTO: 0.05 THOUSANDS/ÂΜL (ref 0–0.1)
BASOPHILS NFR BLD AUTO: 0 % (ref 0–1)
BILIRUB SERPL-MCNC: 0.19 MG/DL (ref 0.2–1)
BUN SERPL-MCNC: 14 MG/DL (ref 5–25)
CALCIUM SERPL-MCNC: 8.7 MG/DL (ref 8.4–10.2)
CHLORIDE SERPL-SCNC: 104 MMOL/L (ref 96–108)
CO2 SERPL-SCNC: 27 MMOL/L (ref 21–32)
CREAT SERPL-MCNC: 0.73 MG/DL (ref 0.6–1.3)
EOSINOPHIL # BLD AUTO: 0.02 THOUSAND/ÂΜL (ref 0–0.61)
EOSINOPHIL NFR BLD AUTO: 0 % (ref 0–6)
ERYTHROCYTE [DISTWIDTH] IN BLOOD BY AUTOMATED COUNT: 19.5 % (ref 11.6–15.1)
GFR SERPL CREATININE-BSD FRML MDRD: 82 ML/MIN/1.73SQ M
GLUCOSE SERPL-MCNC: 134 MG/DL (ref 65–140)
HCT VFR BLD AUTO: 31.5 % (ref 34.8–46.1)
HGB BLD-MCNC: 10 G/DL (ref 11.5–15.4)
IMM GRANULOCYTES # BLD AUTO: 0.05 THOUSAND/UL (ref 0–0.2)
IMM GRANULOCYTES NFR BLD AUTO: 0 % (ref 0–2)
LYMPHOCYTES # BLD AUTO: 1.22 THOUSANDS/ÂΜL (ref 0.6–4.47)
LYMPHOCYTES NFR BLD AUTO: 10 % (ref 14–44)
MAGNESIUM SERPL-MCNC: 1.8 MG/DL (ref 1.9–2.7)
MCH RBC QN AUTO: 31.3 PG (ref 26.8–34.3)
MCHC RBC AUTO-ENTMCNC: 31.7 G/DL (ref 31.4–37.4)
MCV RBC AUTO: 99 FL (ref 82–98)
MONOCYTES # BLD AUTO: 1.62 THOUSAND/ÂΜL (ref 0.17–1.22)
MONOCYTES NFR BLD AUTO: 13 % (ref 4–12)
NEUTROPHILS # BLD AUTO: 9.46 THOUSANDS/ÂΜL (ref 1.85–7.62)
NEUTS SEG NFR BLD AUTO: 77 % (ref 43–75)
NRBC BLD AUTO-RTO: 0 /100 WBCS
PLATELET # BLD AUTO: 276 THOUSANDS/UL (ref 149–390)
PMV BLD AUTO: 9.7 FL (ref 8.9–12.7)
POTASSIUM SERPL-SCNC: 4 MMOL/L (ref 3.5–5.3)
PROT SERPL-MCNC: 7.1 G/DL (ref 6.4–8.4)
RBC # BLD AUTO: 3.19 MILLION/UL (ref 3.81–5.12)
SODIUM SERPL-SCNC: 136 MMOL/L (ref 135–147)
WBC # BLD AUTO: 12.42 THOUSAND/UL (ref 4.31–10.16)

## 2023-02-17 NOTE — PROGRESS NOTES
Pt accessed, labs drawn and pt de-accessed without any difficulty  Ambulated with a steady gait   Refused AVS

## 2023-02-23 ENCOUNTER — OFFICE VISIT (OUTPATIENT)
Dept: GYNECOLOGIC ONCOLOGY | Facility: HOSPITAL | Age: 73
End: 2023-02-23

## 2023-02-23 ENCOUNTER — HOSPITAL ENCOUNTER (OUTPATIENT)
Dept: INFUSION CENTER | Facility: HOSPITAL | Age: 73
Discharge: HOME | End: 2023-02-23

## 2023-02-23 VITALS
DIASTOLIC BLOOD PRESSURE: 70 MMHG | TEMPERATURE: 98.7 F | BODY MASS INDEX: 27.49 KG/M2 | WEIGHT: 161 LBS | SYSTOLIC BLOOD PRESSURE: 130 MMHG | HEIGHT: 64 IN

## 2023-02-23 DIAGNOSIS — Z90.81 S/P SPLENECTOMY: ICD-10-CM

## 2023-02-23 DIAGNOSIS — C56.2 MALIGNANT NEOPLASM OF LEFT OVARY (HCC): Primary | ICD-10-CM

## 2023-02-23 DIAGNOSIS — Z45.2 ENCOUNTER FOR VENOUS ACCESS DEVICE CARE: ICD-10-CM

## 2023-02-23 LAB
ALBUMIN SERPL BCP-MCNC: 3.6 G/DL (ref 3.5–5)
ALP SERPL-CCNC: 80 U/L (ref 34–104)
ALT SERPL W P-5'-P-CCNC: 15 U/L (ref 7–52)
ANION GAP SERPL CALCULATED.3IONS-SCNC: 7 MMOL/L (ref 4–13)
AST SERPL W P-5'-P-CCNC: 17 U/L (ref 13–39)
BASOPHILS # BLD AUTO: 0.06 THOUSANDS/ÂΜL (ref 0–0.1)
BASOPHILS NFR BLD AUTO: 1 % (ref 0–1)
BILIRUB SERPL-MCNC: 0.24 MG/DL (ref 0.2–1)
BUN SERPL-MCNC: 13 MG/DL (ref 5–25)
CALCIUM SERPL-MCNC: 8.8 MG/DL (ref 8.4–10.2)
CHLORIDE SERPL-SCNC: 102 MMOL/L (ref 96–108)
CO2 SERPL-SCNC: 27 MMOL/L (ref 21–32)
CREAT SERPL-MCNC: 0.54 MG/DL (ref 0.6–1.3)
EOSINOPHIL # BLD AUTO: 0.06 THOUSAND/ÂΜL (ref 0–0.61)
EOSINOPHIL NFR BLD AUTO: 1 % (ref 0–6)
ERYTHROCYTE [DISTWIDTH] IN BLOOD BY AUTOMATED COUNT: 19.5 % (ref 11.6–15.1)
GFR SERPL CREATININE-BSD FRML MDRD: 94 ML/MIN/1.73SQ M
GLUCOSE SERPL-MCNC: 109 MG/DL (ref 65–140)
HCT VFR BLD AUTO: 31.5 % (ref 34.8–46.1)
HGB BLD-MCNC: 10.1 G/DL (ref 11.5–15.4)
IMM GRANULOCYTES # BLD AUTO: 0.11 THOUSAND/UL (ref 0–0.2)
IMM GRANULOCYTES NFR BLD AUTO: 1 % (ref 0–2)
LYMPHOCYTES # BLD AUTO: 1.16 THOUSANDS/ÂΜL (ref 0.6–4.47)
LYMPHOCYTES NFR BLD AUTO: 9 % (ref 14–44)
MAGNESIUM SERPL-MCNC: 1.8 MG/DL (ref 1.9–2.7)
MCH RBC QN AUTO: 31.8 PG (ref 26.8–34.3)
MCHC RBC AUTO-ENTMCNC: 32.1 G/DL (ref 31.4–37.4)
MCV RBC AUTO: 99 FL (ref 82–98)
MONOCYTES # BLD AUTO: 1.94 THOUSAND/ÂΜL (ref 0.17–1.22)
MONOCYTES NFR BLD AUTO: 15 % (ref 4–12)
NEUTROPHILS # BLD AUTO: 9.71 THOUSANDS/ÂΜL (ref 1.85–7.62)
NEUTS SEG NFR BLD AUTO: 73 % (ref 43–75)
NRBC BLD AUTO-RTO: 0 /100 WBCS
PLATELET # BLD AUTO: 352 THOUSANDS/UL (ref 149–390)
PMV BLD AUTO: 10 FL (ref 8.9–12.7)
POTASSIUM SERPL-SCNC: 3.9 MMOL/L (ref 3.5–5.3)
PROT SERPL-MCNC: 7.3 G/DL (ref 6.4–8.4)
RBC # BLD AUTO: 3.18 MILLION/UL (ref 3.81–5.12)
SODIUM SERPL-SCNC: 136 MMOL/L (ref 135–147)
WBC # BLD AUTO: 13.04 THOUSAND/UL (ref 4.31–10.16)

## 2023-02-23 RX ORDER — METRONIDAZOLE 500 MG/100ML
500 INJECTION, SOLUTION INTRAVENOUS EVERY 8 HOURS
OUTPATIENT
Start: 2023-02-23

## 2023-02-23 RX ORDER — ACETAMINOPHEN 325 MG/1
975 TABLET ORAL ONCE
OUTPATIENT
Start: 2023-02-23 | End: 2023-02-23

## 2023-02-23 RX ORDER — SODIUM CHLORIDE, SODIUM LACTATE, POTASSIUM CHLORIDE, CALCIUM CHLORIDE 600; 310; 30; 20 MG/100ML; MG/100ML; MG/100ML; MG/100ML
125 INJECTION, SOLUTION INTRAVENOUS CONTINUOUS
OUTPATIENT
Start: 2023-02-23

## 2023-02-23 RX ORDER — HEPARIN SODIUM 5000 [USP'U]/ML
5000 INJECTION, SOLUTION INTRAVENOUS; SUBCUTANEOUS
OUTPATIENT
Start: 2023-02-24 | End: 2023-02-25

## 2023-02-23 RX ORDER — GABAPENTIN 100 MG/1
100 CAPSULE ORAL ONCE
OUTPATIENT
Start: 2023-02-23 | End: 2023-02-23

## 2023-02-23 RX ORDER — CEFAZOLIN SODIUM 1 G/50ML
1000 SOLUTION INTRAVENOUS ONCE
OUTPATIENT
Start: 2023-02-23 | End: 2023-02-23

## 2023-02-23 NOTE — ASSESSMENT & PLAN NOTE
77-year-old with stage IIIc ovarian cancer who completed 6 cycles of chemotherapy as well as tumor debulking surgery including rectosigmoid resection with 3 anastomosis, ileostomy  I reviewed her CBC, CMP, CT chest abdomen pelvis images  She is clinically without evidence of ovarian cancer recurrence but has an enlarging fluid collection in the left upper quadrant at the site of previous splenectomy now measuring 6 8 cm in diameter  There is a parastomal hernia   is normal at 5 3 units/mL  Her performance status is 0   1  Interventional radiology consultation for drainage of the splenectomy bed collection given increase in size with some thickening of the rim  Fluid can be sent for cytology and culture  If there is purulent material, a drain can be left in situ  2   CT pelvis with rectal contrast to evaluate for anastomotic leak prior to planned ileostomy closure  3   I discussed the risks and benefits of ileostomy closure with parastomal hernia repair  She understands the risks and benefits of closure including the possible risks of infection, anastomotic leak, obstruction and agrees to proceed as outlined  Consent for surgery was obtained by me in the office  4   Hold on any maintenance treatment until after the ileostomy closure  Can consider PARP inhibitor therapy instead of Avastin given surgical risks

## 2023-02-23 NOTE — PROGRESS NOTES
Assessment/Plan:    Problem List Items Addressed This Visit        Endocrine    Malignant neoplasm of left ovary ([address] ) - Primary     70-year-old with stage IIIc ovarian cancer who completed 6 cycles of chemotherapy as well as tumor debulking surgery including rectosigmoid resection with 3 anastomosis, ileostomy  I reviewed her CBC, CMP, CT chest abdomen pelvis images  She is clinically without evidence of ovarian cancer recurrence but has an enlarging fluid collection in the left upper quadrant at the site of previous splenectomy now measuring 6 8 cm in diameter  There is a parastomal hernia   is normal at 5 3 units/mL  Her performance status is 0   1  Interventional radiology consultation for drainage of the splenectomy bed collection given increase in size with some thickening of the rim  Fluid can be sent for cytology and culture  If there is purulent material, a drain can be left in situ  2   CT pelvis with rectal contrast to evaluate for anastomotic leak prior to planned ileostomy closure  3   I discussed the risks and benefits of ileostomy closure with parastomal hernia repair  She understands the risks and benefits of closure including the possible risks of infection, anastomotic leak, obstruction and agrees to proceed as outlined  Consent for surgery was obtained by me in the office  4   Hold on any maintenance treatment until after the ileostomy closure  Can consider PARP inhibitor therapy instead of Avastin given surgical risks             Relevant Orders    CT pelvis w contrast    Case request operating room: CLOSURE ILEOSTOMY (Completed)    Type and screen    CBC and Platelet    Comprehensive metabolic panel    Ambulatory referral to Interventional Radiology       Other    S/P splenectomy    Relevant Orders    Ambulatory referral to Interventional Radiology           CHIEF COMPLAINT: Posttreatment evaluation, discuss ileostomy closure          Problem:  Cancer Staging   Malignant neoplasm of left ovary (HCC)  Staging form: Ovary, Fallopian Tube, Primary Peritoneal, AJCC 8th Edition  - Clinical: FIGO Stage IIIC (cT3c, cN0, cM0) - Signed by Shana Cameron MD on 11/29/2022      Previous therapy:  Oncology History   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[address] )   7/21/2022 Initial Diagnosis    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[address] )     8/4/2022 Surgery    Diagnostic laparoscopy, peritoneal biopsy     8/18/2022 Genetic Testing    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, serous    Results revealed patient has the following mutation(s):  None     Malignant neoplasm of left ovary (HCC)   8/4/2022 Surgery    Diagnostic laparoscopy, peritoneal biopsy     8/9/2022 Initial Diagnosis    Gynecologic malignancy ([facility] [address] )     8/23/2022 -  Chemotherapy    palonosetron (ALOXI), 0 25 mg, Intravenous, Once, 6 of 6 cycles  Administration: 0 25 mg (8/23/2022), 0 25 mg (9/13/2022), 0 25 mg (10/4/2022), 0 25 mg (12/13/2022), 0 25 mg (1/3/2023), 0 25 mg (1/24/2023)  fosaprepitant (EMEND) IVPB, 150 mg, Intravenous, Once, 6 of 6 cycles  Administration: 150 mg (8/23/2022), 150 mg (9/13/2022), 150 mg (10/4/2022), 150 mg (12/13/2022), 150 mg (1/3/2023), 150 mg (1/24/2023)  CARBOplatin (PARAPLATIN) IVPB (GOG AUC DOSING), 605 4 mg, Intravenous, Once, 6 of 6 cycles  Administration: 600 mg (8/23/2022), 564 mg (9/13/2022), 601 8 mg (10/4/2022), 491 5 mg (12/13/2022), 468 5 mg (1/3/2023), 488 mg (1/24/2023)  bevacizumab (AVASTIN) IVPB, 1,252 5 mg, Intravenous, Once, 3 of 3 cycles  Dose modification: 10 mg/kg (original dose 15 mg/kg, Cycle 5, Reason: Other (Must fill in a comment))  Administration: 1,200 mg (8/23/2022), 1,200 mg (9/13/2022), 745 mg (1/3/2023)  PACLItaxel (TAXOL) chemo IVPB, 175 mg/m2 = 330 6 mg, Intravenous, Once, 6 of 6 cycles  Dose modification: 135 mg/m2 (original dose 175 mg/m2, Cycle 4, Reason: Other (Must fill in a comment))  Administration: 330 6 mg (8/23/2022), 330 6 mg (9/13/2022), 330 6 mg (10/4/2022), 246 mg (12/13/2022), 244 2 mg (1/3/2023), 244 2 mg (1/24/2023)     10/28/2022 Surgery    diagnostic laparoscopy, exploratory laparotomy, modified radical hysterectomy, bilateral salpingo-oophorectomy with en bloc rectosigmoid resection, gastrocolic omentectomy, splenectomy, small bowel resection with reanastomosis, diaphragm resection, excision ablation of peritoneal implants, ileostomy formation, optimal tumor debulking     11/29/2022 -  Cancer Staged    Staging form: Ovary, Fallopian Tube, Primary Peritoneal, AJCC 8th Edition  - Clinical: FIGO Stage IIIC (cT3c, cN0, cM0) - Signed by Fede Humphries MD on 11/29/2022  Histologic grade (G): G3  Histologic grading system: 4 grade system             Patient ID: Liz Castillo is a 67 y o  female  Who returns after completion of 6 cycles of chemotherapy to discuss treatment options, ileostomy closure  Her last treatment on 1/24/2023 was with carboplatin AUC 5, Taxol 135 mg per metered squared  Avastin was held due to rectal and vaginal bleeding  Ca1 25 2/9/2023 was 5 3 units/mL  CMP is normal   CBC shows a total white blood cell count of 13 with a hemoglobin of 10, platelet count 088  CT of chest abdomen pelvis on 2/13/2023 revealed a persistent and enlarging fluid collection at the splenectomy bed now measuring 6 8 cm with some thickening of the rim  There is decreasing nodularity along the liver surface, parastomal hernia  No evidence of ascites or peritoneal carcinomatosis  She is tolerating a regular diet  She is ambulating  She is able to perform her activities of daily living without difficulty  No further vaginal or rectal bleeding  Of note, as part of work-up for her rectal bleeding, she received a sigmoidoscopy which did not reveal any evidence of anastomotic complication  Review of Systems   Constitutional: Negative for activity change and unexpected weight change  HENT: Negative  Eyes: Negative  Respiratory: Negative  Cardiovascular: Negative  Gastrointestinal: Negative for abdominal distention and abdominal pain  Endocrine: Negative  Genitourinary: Negative for pelvic pain and vaginal bleeding  Musculoskeletal: Negative  Skin: Negative  Allergic/Immunologic: Negative  Neurological: Negative  Hematological: Negative  Psychiatric/Behavioral: Negative          Current Outpatient Medications   Medication Sig Dispense Refill   • Acetaminophen (TYLENOL ARTHRITIS PAIN PO) Take by mouth as needed     • albuterol (PROVENTIL HFA,VENTOLIN HFA) 90 mcg/act inhaler Inhale 2 puffs every 4 (four) hours as needed      • Ascorbic Acid (VITAMIN C GUMMIE PO) Take by mouth     • aspirin (ECOTRIN LOW STRENGTH) 81 mg EC tablet Take 81 mg by mouth daily     • B Complex-C (B COMPLEX-VITAMIN C PO) Take by mouth daily     • betamethasone, augmented, (DIPROLENE) 0 05 % lotion Apply topically as needed     • Calcium Carb-Cholecalciferol (CALCIUM 500+D3 PO) Take 1 tablet by mouth 2 (two) times a day      • cephalexin (KEFLEX) 500 mg capsule TAKE 1 CAPSULE 3 TIMES A DAY FOR 7 DAYS     • cholecalciferol (VITAMIN D3) 1,000 units tablet Take 1,000 Units by mouth daily     • Docusate Calcium (STOOL SOFTENER PO) Take 1 tablet by mouth 2 (two) times a day     • escitalopram (LEXAPRO) 10 mg tablet TAKE 1 TABLET BY MOUTH EVERY DAY 90 tablet 2   • famotidine (PEPCID) 40 MG tablet TAKE 1 TABLET BY MOUTH DAILY AT BEDTIME 90 tablet 3   • fexofenadine (ALLEGRA) 180 MG tablet Take 180 mg by mouth daily     • FIBER ADULT GUMMIES PO Take by mouth if needed     • GLUCOSAMINE-CHONDROITIN PO Take 1 tablet by mouth 2 (two) times a day      • lidocaine-prilocaine (EMLA) cream Apply topically as needed for mild pain 30 g 0   • lisinopril (ZESTRIL) 5 mg tablet Take 5 mg by mouth daily dinner     • LORazepam (ATIVAN) 1 mg tablet Take 1 tablet (1 mg total) by mouth every 8 (eight) hours as needed for anxiety (nausea or anxiety) 30 tablet 0   • metFORMIN (GLUCOPHAGE) 500 mg tablet Take 500 mg by mouth daily Daily at lunch     • metoprolol succinate (TOPROL-XL) 25 mg 24 hr tablet Take 25 mg by mouth daily dinner     • montelukast (SINGULAIR) 10 mg tablet Take 10 mg by mouth daily dinner     • multivitamin (THERAGRAN) TABS Take 1 tablet by mouth daily     • omeprazole (PriLOSEC) 20 mg delayed release capsule TAKE 1 CAPSULE BY MOUTH EVERY DAY 30 MINUTES BEFORE MORNING MEAL FOR 90 DAYS     • ondansetron (ZOFRAN) 8 mg tablet Take 1 tablet (8 mg total) by mouth every 8 (eight) hours as needed for nausea or vomiting 20 tablet 1   • predniSONE 1 mg tablet Take 3 mg by mouth daily     • Probiotic Product (PROBIOTIC DAILY PO) Take by mouth daily     • simvastatin (ZOCOR) 10 mg tablet Take 10 mg by mouth daily at bedtime     • SYMBICORT 160-4 5 MCG/ACT inhaler 2 puffs daily        No current facility-administered medications for this visit  Facility-Administered Medications Ordered in Other Visits   Medication Dose Route Frequency Provider Last Rate Last Admin   • alteplase (CATHFLO) injection 2 mg  2 mg Intracatheter Q2H PRN Anabella Calderon MD           Allergies   Allergen Reactions   • Nsaids Other (See Comments)      pt is avoiding per family doctor instructions-  Able to take Tylenol   • Pedi-Pre Tape Spray [Wound Dressing Monna Senthil Other (See Comments)     Please use sensitive skin tape, pt gets bad bruising from strong medical adhesive tape          Past Medical History:   Diagnosis Date   • Abdominal pain     off and on   • Allergic sinusitis     "seasonal allergies"-has received allergy shots "   • Anxiety     with current diagnosis   • Arthritis    • Asthma    • Cholelithiasis    • Colon polyp    • Degenerative joint disease    • Dental bridge present     permanent lower left   • Exercise involving walking     1-2x/week   • GERD (gastroesophageal reflux disease)    • Giant cell aortic arteritis ([facility] [address] )     "hereditary Giant Cell Temporal Arteritis"per pt • Hiatal hernia    • History of cancer chemotherapy    • Hyperlipidemia    • Hypertension    • Nausea    • Peritoneal carcinoma [facility])     "gynecologic malignacy"   • PMR (polymyalgia rheumatica) (HCC)    • Prediabetes    • Shortness of breath     per pt "asthma related --with climbing mult  flights of stairs--or exertion -not new"   • Tinnitus    • Wears glasses        Past Surgical History:   Procedure Laterality Date   • COLONOSCOPY      October 2021: Adenomatous polyps and rectum in transverse colon, sigmoid diverticulosis, internal hemorrhoids  October 2016 diverticulosis and internal hemorrhoids, September 2011 diverticulosis and internal hemorrhoids     • COLOPROCTECTOMY N/A 10/28/2022    Procedure: PROCTECTOMY;  Surgeon: Dillon Castillo MD;  [facility];  Service: Surgical Oncology   • DILATION AND CURETTAGE OF UTERUS     • ILEO LOOP DIVERSION N/A 10/28/2022    Procedure: ILEOSTOMY LOOP;  Surgeon: Dillon Castillo MD;  [facility];  Service: Surgical Oncology   • IR PORT PLACEMENT  12/8/2022   • LIVER SURGERY  10/28/2022    Procedure: LIVER CONTROL OF BLEED ;  Surgeon: Dillon Castillo MD;  [facility];  Service: Surgical Oncology   • IL COLECTOMY PARTIAL W/ANASTOMOSIS N/A 10/28/2022    Procedure: RESECTION COLON LEFT;  Surgeon: Gloria Payne MD;  [facility];  Service: Gynecology Oncology   • IL EXPLORATORY LAPAROTOMY CELIOTOMY W/WO BIOPSY [address] [facility] N/A 10/28/2022    Procedure: LAPAROTOMY EXPLORATORY;  Surgeon: Gloria Payne MD;  [facility];  Service: Gynecology Oncology   • IL LAPS ABD PRTM&OMENTUM DX W/WO [address] [facility] [address]/ [facility] N/A 08/04/2022    Procedure: DIAGNOSTIC LAPAROSCOPY, PERITONEAL BIOPSY, SAMPLING OF BLOODY ASCITES ;  Surgeon: Jaylyn Conley MD;  [facility];  Service: Gynecology Oncology   • IL LAPS ABD PRTM&OMENTUM DX W/WO [address] [facility] [address]/ [facility] N/A 10/28/2022    Procedure: LAPAROSCOPY DIAGNOSTIC;  Surgeon: Gloria Payne MD; [facility];  Service: Gynecology Oncology   • MN TOTAL ABDOMINAL HYSTERECT W/WO [facility] TUBE [facility] N/A 10/28/2022    Procedure: HYSTERECTOMY MODIFIED RADICAL, BILATERAL SALPINGO-OOPHORECTOMY, GASTROCOLIC OMENTECTOMY, DIAPHRAGM RESECTION, EXCISION AND ABLATION OF PERITONEAL TUMORS;  Surgeon: Jud Gonsalves MD;  [facility];  Service: Gynecology Oncology   • SMALL INTESTINE SURGERY N/A 10/28/2022    Procedure: RESECTION SMALL BOWEL;  Surgeon: Jud Gonsalves MD;  [facility];  Service: Gynecology Oncology   • SPLENECTOMY, TOTAL N/A 10/28/2022    Procedure: SPLENECTOMY;  Surgeon: Jud Gonsalves MD;  [facility];  Service: Gynecology Oncology   • TEMPORAL ARTERY BIOPSY / LIGATION     • UPPER GASTROINTESTINAL ENDOSCOPY  11/10/2014    November 2014 irregular Z-line and Schatzki ring, hiatal hernia, gastritis  Biopsies negative for celiac, H pylori, or Phillip's or eosinophilic esophagitis  • WISDOM TOOTH EXTRACTION         OB History    No obstetric history on file  Family History   Problem Relation Age of Onset   • Colon polyps Mother    • Alzheimer's disease Mother    • Heart disease Father    • Brain cancer Father    • Colon polyps Sister    • Heart disease Sister    • Diabetes Sister    • MARLEY disease Sister    • Colon cancer Cousin        The following portions of the patient's history were reviewed and updated as appropriate: allergies, current medications, past family history, past medical history, past social history, past surgical history and problem list       Objective:    Blood pressure 130/70, temperature 98 7 °F (37 1 °C), temperature source Tympanic, height 5' 4 02" (1 626 m), weight 73 kg (161 lb)  Body mass index is 27 62 kg/m²  Physical Exam  Vitals reviewed  Constitutional:       General: She is not in acute distress  Appearance: Normal appearance  She is normal weight   She is not ill-appearing, toxic-appearing or diaphoretic  HENT:      Head: Normocephalic and atraumatic  Mouth/Throat:      Mouth: Mucous membranes are moist    Eyes:      General: No scleral icterus  Right eye: No discharge  Left eye: No discharge  Conjunctiva/sclera: Conjunctivae normal    Pulmonary:      Effort: Pulmonary effort is normal    Abdominal:      General: There is no distension  Palpations: Abdomen is soft  There is no mass  Tenderness: There is no abdominal tenderness  There is no guarding or rebound  Hernia: A hernia is present  Musculoskeletal:      Right lower leg: No edema  Left lower leg: No edema  Skin:     General: Skin is warm and dry  Coloration: Skin is not jaundiced  Findings: No rash  Neurological:      General: No focal deficit present  Mental Status: She is alert and oriented to person, place, and time  Cranial Nerves: No cranial nerve deficit  Motor: No weakness  Gait: Gait normal    Psychiatric:         Mood and Affect: Mood normal          Behavior: Behavior normal          Thought Content:  Thought content normal          Judgment: Judgment normal            Lab Results   Component Value Date     5 3 02/09/2023     Lab Results   Component Value Date    WBC 13 04 (H) 02/23/2023    HGB 10 1 (L) 02/23/2023    HCT 31 5 (L) 02/23/2023    MCV 99 (H) 02/23/2023     02/23/2023     Lab Results   Component Value Date    K 3 9 02/23/2023     02/23/2023    CO2 27 02/23/2023    BUN 13 02/23/2023    CREATININE 0 54 (L) 02/23/2023    GLUCOSE 214 (H) 10/28/2022    GLUF 161 (H) 12/08/2022    CALCIUM 8 8 02/23/2023    CORRECTEDCA 9 0 02/09/2023    AST 17 02/23/2023    ALT 15 02/23/2023    ALKPHOS 80 02/23/2023    EGFR 94 02/23/2023        Trend:  Lab Results   Component Value Date     5 3 02/09/2023     8 7 01/19/2023     15 8 12/30/2022     37 9 (H) 12/08/2022     8 6 10/20/2022     8 4 10/07/2022     12 5 09/29/2022     32 0 (H) 09/08/2022     52 4 (H) 09/02/2022     85 5 (H) 08/26/2022     77 3 (H) 08/19/2022

## 2023-02-23 NOTE — LETTER
February 23, 2023     [facility]  [address] [facility]  [address] [address]    Patient: Brandy Kaiser   YOB: 1950   Date of Visit: 2/23/2023       Dear Dr Aden Carroll:    Thank you for referring Nicole Freed to me for evaluation  Below are my notes for this consultation  If you have questions, please do not hesitate to call me  I look forward to following your patient along with you  Sincerely,        Carmen Jacome MD        CC: No Recipients  Carmen Jacome MD  2/23/2023  4:00 PM  Sign when Signing Visit  Assessment/Plan:    Problem List Items Addressed This Visit        Endocrine    Malignant neoplasm of left ovary ([address] ) - Primary     61-year-old with stage IIIc ovarian cancer who completed 6 cycles of chemotherapy as well as tumor debulking surgery including rectosigmoid resection with 3 anastomosis, ileostomy  I reviewed her CBC, CMP, CT chest abdomen pelvis images  She is clinically without evidence of ovarian cancer recurrence but has an enlarging fluid collection in the left upper quadrant at the site of previous splenectomy now measuring 6 8 cm in diameter  There is a parastomal hernia   is normal at 5 3 units/mL  Her performance status is 0   1  Interventional radiology consultation for drainage of the splenectomy bed collection given increase in size with some thickening of the rim  Fluid can be sent for cytology and culture  If there is purulent material, a drain can be left in situ  2   CT pelvis with rectal contrast to evaluate for anastomotic leak prior to planned ileostomy closure  3   I discussed the risks and benefits of ileostomy closure with parastomal hernia repair  She understands the risks and benefits of closure including the possible risks of infection, anastomotic leak, obstruction and agrees to proceed as outlined  Consent for surgery was obtained by me in the office    4   Hold on any maintenance treatment until after the ileostomy closure  Can consider PARP inhibitor therapy instead of Avastin given surgical risks  Relevant Orders    CT pelvis w contrast    Case request operating room: CLOSURE ILEOSTOMY (Completed)    Type and screen    CBC and Platelet    Comprehensive metabolic panel    Ambulatory referral to Interventional Radiology       Other    S/P splenectomy    Relevant Orders    Ambulatory referral to Interventional Radiology           CHIEF COMPLAINT: Posttreatment evaluation, discuss ileostomy closure          Problem:  Cancer Staging   Malignant neoplasm of left ovary ([facility] )  Staging form: Ovary, Fallopian Tube, Primary Peritoneal, AJCC 8th Edition  - Clinical: FIGO Stage IIIC (cT3c, cN0, cM0) - Signed by Kendall Walker MD on 11/29/2022      Previous therapy:  Oncology History   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[facility] )   7/21/2022 Initial Diagnosis    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[facility] [address] )     8/4/2022 Surgery    Diagnostic laparoscopy, peritoneal biopsy     8/18/2022 Genetic Testing    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, serous    Results revealed patient has the following mutation(s):  None     Malignant neoplasm of left ovary ([facility])   8/4/2022 Surgery    Diagnostic laparoscopy, peritoneal biopsy     8/9/2022 Initial Diagnosis    Gynecologic malignancy ([facility] )     8/23/2022 -  Chemotherapy    palonosetron (ALOXI), 0 25 mg, Intravenous, Once, 6 of 6 cycles  Administration: 0 25 mg (8/23/2022), 0 25 mg (9/13/2022), 0 25 mg (10/4/2022), 0 25 mg (12/13/2022), 0 25 mg (1/3/2023), 0 25 mg (1/24/2023)  fosaprepitant (EMEND) IVPB, 150 mg, Intravenous, Once, 6 of 6 cycles  Administration: 150 mg (8/23/2022), 150 mg (9/13/2022), 150 mg (10/4/2022), 150 mg (12/13/2022), 150 mg (1/3/2023), 150 mg (1/24/2023)  CARBOplatin (PARAPLATIN) IVPB (GOG AUC DOSING), 605 4 mg, Intravenous, Once, 6 of 6 cycles  Administration: 600 mg (8/23/2022), 564 mg (9/13/2022), 601 8 mg (10/4/2022), 491 5 mg (12/13/2022), 468 5 mg (1/3/2023), 488 mg (1/24/2023)  bevacizumab (AVASTIN) IVPB, 1,252 5 mg, Intravenous, Once, 3 of 3 cycles  Dose modification: 10 mg/kg (original dose 15 mg/kg, Cycle 5, Reason: Other (Must fill in a comment))  Administration: 1,200 mg (8/23/2022), 1,200 mg (9/13/2022), 745 mg (1/3/2023)  PACLItaxel (TAXOL) chemo IVPB, 175 mg/m2 = 330 6 mg, Intravenous, Once, 6 of 6 cycles  Dose modification: 135 mg/m2 (original dose 175 mg/m2, Cycle 4, Reason: Other (Must fill in a comment))  Administration: 330 6 mg (8/23/2022), 330 6 mg (9/13/2022), 330 6 mg (10/4/2022), 246 mg (12/13/2022), 244 2 mg (1/3/2023), 244 2 mg (1/24/2023)     10/28/2022 Surgery    diagnostic laparoscopy, exploratory laparotomy, modified radical hysterectomy, bilateral salpingo-oophorectomy with en bloc rectosigmoid resection, gastrocolic omentectomy, splenectomy, small bowel resection with reanastomosis, diaphragm resection, excision ablation of peritoneal implants, ileostomy formation, optimal tumor debulking     11/29/2022 -  Cancer Staged    Staging form: Ovary, Fallopian Tube, Primary Peritoneal, AJCC 8th Edition  - Clinical: FIGO Stage IIIC (cT3c, cN0, cM0) - Signed by Ernestina Lobo MD on 11/29/2022  Histologic grade (G): G3  Histologic grading system: 4 grade system             Patient ID: Laina Camp is a 67 y o  female  Who returns after completion of 6 cycles of chemotherapy to discuss treatment options, ileostomy closure  Her last treatment on 1/24/2023 was with carboplatin AUC 5, Taxol 135 mg per metered squared  Avastin was held due to rectal and vaginal bleeding  Ca1 25 2/9/2023 was 5 3 units/mL  CMP is normal   CBC shows a total white blood cell count of 13 with a hemoglobin of 10, platelet count 685  CT of chest abdomen pelvis on 2/13/2023 revealed a persistent and enlarging fluid collection at the splenectomy bed now measuring 6 8 cm with some thickening of the rim    There is decreasing nodularity along the liver surface, parastomal hernia  No evidence of ascites or peritoneal carcinomatosis  She is tolerating a regular diet  She is ambulating  She is able to perform her activities of daily living without difficulty  No further vaginal or rectal bleeding  Of note, as part of work-up for her rectal bleeding, she received a sigmoidoscopy which did not reveal any evidence of anastomotic complication  Review of Systems   Constitutional: Negative for activity change and unexpected weight change  HENT: Negative  Eyes: Negative  Respiratory: Negative  Cardiovascular: Negative  Gastrointestinal: Negative for abdominal distention and abdominal pain  Endocrine: Negative  Genitourinary: Negative for pelvic pain and vaginal bleeding  Musculoskeletal: Negative  Skin: Negative  Allergic/Immunologic: Negative  Neurological: Negative  Hematological: Negative  Psychiatric/Behavioral: Negative          Current Outpatient Medications   Medication Sig Dispense Refill   • Acetaminophen (TYLENOL ARTHRITIS PAIN PO) Take by mouth as needed     • albuterol (PROVENTIL HFA,VENTOLIN HFA) 90 mcg/act inhaler Inhale 2 puffs every 4 (four) hours as needed      • Ascorbic Acid (VITAMIN C GUMMIE PO) Take by mouth     • aspirin (ECOTRIN LOW STRENGTH) 81 mg EC tablet Take 81 mg by mouth daily     • B Complex-C (B COMPLEX-VITAMIN C PO) Take by mouth daily     • betamethasone, augmented, (DIPROLENE) 0 05 % lotion Apply topically as needed     • Calcium Carb-Cholecalciferol (CALCIUM 500+D3 PO) Take 1 tablet by mouth 2 (two) times a day      • cephalexin (KEFLEX) 500 mg capsule TAKE 1 CAPSULE 3 TIMES A DAY FOR 7 DAYS     • cholecalciferol (VITAMIN D3) 1,000 units tablet Take 1,000 Units by mouth daily     • Docusate Calcium (STOOL SOFTENER PO) Take 1 tablet by mouth 2 (two) times a day     • escitalopram (LEXAPRO) 10 mg tablet TAKE 1 TABLET BY MOUTH EVERY DAY 90 tablet 2   • famotidine (PEPCID) 40 MG tablet TAKE 1 TABLET BY MOUTH DAILY AT BEDTIME 90 tablet 3   • fexofenadine (ALLEGRA) 180 MG tablet Take 180 mg by mouth daily     • FIBER ADULT GUMMIES PO Take by mouth if needed     • GLUCOSAMINE-CHONDROITIN PO Take 1 tablet by mouth 2 (two) times a day      • lidocaine-prilocaine (EMLA) cream Apply topically as needed for mild pain 30 g 0   • lisinopril (ZESTRIL) 5 mg tablet Take 5 mg by mouth daily dinner     • LORazepam (ATIVAN) 1 mg tablet Take 1 tablet (1 mg total) by mouth every 8 (eight) hours as needed for anxiety (nausea or anxiety) 30 tablet 0   • metFORMIN (GLUCOPHAGE) 500 mg tablet Take 500 mg by mouth daily Daily at lunch     • metoprolol succinate (TOPROL-XL) 25 mg 24 hr tablet Take 25 mg by mouth daily dinner     • montelukast (SINGULAIR) 10 mg tablet Take 10 mg by mouth daily dinner     • multivitamin (THERAGRAN) TABS Take 1 tablet by mouth daily     • omeprazole (PriLOSEC) 20 mg delayed release capsule TAKE 1 CAPSULE BY MOUTH EVERY DAY 30 MINUTES BEFORE MORNING MEAL FOR 90 DAYS     • ondansetron (ZOFRAN) 8 mg tablet Take 1 tablet (8 mg total) by mouth every 8 (eight) hours as needed for nausea or vomiting 20 tablet 1   • predniSONE 1 mg tablet Take 3 mg by mouth daily     • Probiotic Product (PROBIOTIC DAILY PO) Take by mouth daily     • simvastatin (ZOCOR) 10 mg tablet Take 10 mg by mouth daily at bedtime     • SYMBICORT 160-4 5 MCG/ACT inhaler 2 puffs daily        No current facility-administered medications for this visit       Facility-Administered Medications Ordered in Other Visits   Medication Dose Route Frequency Provider Last Rate Last Admin   • alteplase (CATHFLO) injection 2 mg  2 mg Intracatheter Q2H PRN Denver Kitten, MD           Allergies   Allergen Reactions   • Nsaids Other (See Comments)      pt is avoiding per family doctor instructions-  Able to take Tylenol   • Pedi-Pre Tape Spray [Wound Dressing [employer] Other (See Comments)     Please use sensitive skin tape, pt gets bad bruising from strong medical adhesive tape  Past Medical History:   Diagnosis Date   • Abdominal pain     off and on   • Allergic sinusitis     "seasonal allergies"-has received allergy shots "   • Anxiety     with current diagnosis   • Arthritis    • Asthma    • Cholelithiasis    • Colon polyp    • Degenerative joint disease    • Dental bridge present     permanent lower left   • Exercise involving walking     1-2x/week   • GERD (gastroesophageal reflux disease)    • 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   • Hiatal hernia    • History of cancer chemotherapy    • Hyperlipidemia    • Hypertension    • Nausea    • 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[facility] [address] )     "gynecologic malignacy"   • PMR (polymyalgia rheumatica) (HCC)    • Prediabetes    • Shortness of breath     per pt "asthma related --with climbing mult  flights of stairs--or exertion -not new"   • Tinnitus    • Wears glasses        Past Surgical History:   Procedure Laterality Date   • COLONOSCOPY      October 2021: Adenomatous polyps and rectum in transverse colon, sigmoid diverticulosis, internal hemorrhoids  October 2016 diverticulosis and internal hemorrhoids, September 2011 diverticulosis and internal hemorrhoids     • COLOPROCTECTOMY N/A 10/28/2022    Procedure: PROCTECTOMY;  Surgeon: Shanon Taveras MD;  [facility];  Service: Surgical Oncology   • DILATION AND CURETTAGE OF UTERUS     • ILEO LOOP DIVERSION N/A 10/28/2022    Procedure: ILEOSTOMY LOOP;  Surgeon: Shanon Taveras MD;  [facility];  Service: Surgical Oncology   • IR PORT PLACEMENT  12/8/2022   • LIVER SURGERY  10/28/2022    Procedure: LIVER CONTROL OF BLEED ;  Surgeon: Shanon Taveras MD;  [facility];  Service: Surgical Oncology   • GA COLECTOMY PARTIAL W/ANASTOMOSIS N/A 10/28/2022    Procedure: RESECTION COLON LEFT;  Surgeon: Shana Cameron MD;  [facility];  Service: Gynecology Oncology   • GA EXPLORATORY LAPAROTOMY CELIOTOMY W/WO BIOPSY SPX N/A 10/28/2022    Procedure: LAPAROTOMY EXPLORATORY;  Surgeon: Carmen Jacome MD;  [facility];  Service: Gynecology Oncology   • MN LAPS ABD PRTM&OMENTUM DX W/WO [facility] REHABILITATION BR/WA SPX N/A 08/04/2022    Procedure: DIAGNOSTIC LAPAROSCOPY, PERITONEAL BIOPSY, SAMPLING OF BLOODY ASCITES ;  Surgeon: Amy Curry MD;  [facility];  Service: Gynecology Oncology   • MN LAPS ABD PRTM&OMENTUM DX W/WO [facility] REHABILITATION BR/ [facility] N/A 10/28/2022    Procedure: LAPAROSCOPY DIAGNOSTIC;  Surgeon: Carmen Jacome MD;  [facility];  Service: Gynecology Oncology   • MN TOTAL ABDOMINAL HYSTERECT W/WO [facility] TUBE OVARY N/A 10/28/2022    Procedure: HYSTERECTOMY MODIFIED RADICAL, BILATERAL SALPINGO-OOPHORECTOMY, GASTROCOLIC OMENTECTOMY, DIAPHRAGM RESECTION, EXCISION AND ABLATION OF PERITONEAL TUMORS;  Surgeon: Carmen Jacome MD;  [facility];  Service: Gynecology Oncology   • SMALL INTESTINE SURGERY N/A 10/28/2022    Procedure: RESECTION SMALL BOWEL;  Surgeon: Carmen Jacome MD;  [facility];  Service: Gynecology Oncology   • SPLENECTOMY, TOTAL N/A 10/28/2022    Procedure: SPLENECTOMY;  Surgeon: Carmen Jacome MD;  [facility];  Service: Gynecology Oncology   • TEMPORAL ARTERY BIOPSY / LIGATION     • UPPER GASTROINTESTINAL ENDOSCOPY  11/10/2014    November 2014 irregular Z-line and Schatzki ring, hiatal hernia, gastritis  Biopsies negative for celiac, H pylori, or Phillip's or eosinophilic esophagitis  • WISDOM TOOTH EXTRACTION         OB History    No obstetric history on file           Family History   Problem Relation Age of Onset   • Colon polyps Mother    • Alzheimer's disease Mother    • Heart disease Father    • Brain cancer Father    • Colon polyps Sister    • Heart disease Sister    • Diabetes Sister    • MARLEY disease Sister    • Colon cancer Cousin        The following portions of the patient's history were reviewed and updated as appropriate: allergies, current medications, past family history, past medical history, past social history, past surgical history and problem list       Objective:    Blood pressure 130/70, temperature 98 7 °F (37 1 °C), temperature source Tympanic, height 5' 4 02" (1 626 m), weight 73 kg (161 lb)  Body mass index is 27 62 kg/m²  Physical Exam  Vitals reviewed  Constitutional:       General: She is not in acute distress  Appearance: Normal appearance  She is normal weight  She is not ill-appearing, toxic-appearing or diaphoretic  HENT:      Head: Normocephalic and atraumatic  Mouth/Throat:      Mouth: Mucous membranes are moist    Eyes:      General: No scleral icterus  Right eye: No discharge  Left eye: No discharge  Conjunctiva/sclera: Conjunctivae normal    Pulmonary:      Effort: Pulmonary effort is normal    Abdominal:      General: There is no distension  Palpations: Abdomen is soft  There is no mass  Tenderness: There is no abdominal tenderness  There is no guarding or rebound  Hernia: A hernia is present  Musculoskeletal:      Right lower leg: No edema  Left lower leg: No edema  Skin:     General: Skin is warm and dry  Coloration: Skin is not jaundiced  Findings: No rash  Neurological:      General: No focal deficit present  Mental Status: She is alert and oriented to person, place, and time  Cranial Nerves: No cranial nerve deficit  Motor: No weakness  Gait: Gait normal    Psychiatric:         Mood and Affect: Mood normal          Behavior: Behavior normal          Thought Content:  Thought content normal          Judgment: Judgment normal            Lab Results   Component Value Date     5 3 02/09/2023     Lab Results   Component Value Date    WBC 13 04 (H) 02/23/2023    HGB 10 1 (L) 02/23/2023    HCT 31 5 (L) 02/23/2023    MCV 99 (H) 02/23/2023     02/23/2023     Lab Results Component Value Date    K 3 9 02/23/2023     02/23/2023    CO2 27 02/23/2023    BUN 13 02/23/2023    CREATININE 0 54 (L) 02/23/2023    GLUCOSE 214 (H) 10/28/2022    GLUF 161 (H) 12/08/2022    CALCIUM 8 8 02/23/2023    CORRECTEDCA 9 0 02/09/2023    AST 17 02/23/2023    ALT 15 02/23/2023    ALKPHOS 80 02/23/2023    EGFR 94 02/23/2023        Trend:  Lab Results   Component Value Date     5 3 02/09/2023     8 7 01/19/2023     15 8 12/30/2022     37 9 (H) 12/08/2022     8 6 10/20/2022     8 4 10/07/2022     12 5 09/29/2022     32 0 (H) 09/08/2022     52 4 (H) 09/02/2022     85 5 (H) 08/26/2022     77 3 (H) 08/19/2022

## 2023-02-27 ENCOUNTER — PATIENT MESSAGE (OUTPATIENT)
Dept: GYNECOLOGIC ONCOLOGY | Facility: HOSPITAL | Age: 73
End: 2023-02-27

## 2023-02-28 ENCOUNTER — PREP FOR PROCEDURE (OUTPATIENT)
Dept: INTERVENTIONAL RADIOLOGY/VASCULAR | Facility: CLINIC | Age: 73
End: 2023-02-28

## 2023-02-28 ENCOUNTER — TELEPHONE (OUTPATIENT)
Dept: GYNECOLOGIC ONCOLOGY | Facility: CLINIC | Age: 73
End: 2023-02-28

## 2023-02-28 DIAGNOSIS — Z90.81 S/P SPLENECTOMY: Primary | ICD-10-CM

## 2023-02-28 DIAGNOSIS — T88.8XXA FLUID COLLECTION AT SURGICAL SITE, INITIAL ENCOUNTER: ICD-10-CM

## 2023-02-28 NOTE — TELEPHONE ENCOUNTER
Patient called into the office to inform us that she has not heard back from IR   Patient was given the phone number for IR

## 2023-03-02 ENCOUNTER — HOSPITAL ENCOUNTER (OUTPATIENT)
Dept: INFUSION CENTER | Facility: HOSPITAL | Age: 73
Discharge: HOME | End: 2023-03-02

## 2023-03-02 DIAGNOSIS — C56.2 MALIGNANT NEOPLASM OF LEFT OVARY (HCC): ICD-10-CM

## 2023-03-02 LAB
ALBUMIN SERPL BCP-MCNC: 3.7 G/DL (ref 3.5–5)
ALP SERPL-CCNC: 80 U/L (ref 34–104)
ALT SERPL W P-5'-P-CCNC: 15 U/L (ref 7–52)
ANION GAP SERPL CALCULATED.3IONS-SCNC: 7 MMOL/L (ref 4–13)
AST SERPL W P-5'-P-CCNC: 18 U/L (ref 13–39)
BASOPHILS # BLD AUTO: 0.04 THOUSANDS/ÂΜL (ref 0–0.1)
BASOPHILS NFR BLD AUTO: 0 % (ref 0–1)
BILIRUB SERPL-MCNC: 0.22 MG/DL (ref 0.2–1)
BUN SERPL-MCNC: 13 MG/DL (ref 5–25)
CALCIUM SERPL-MCNC: 9 MG/DL (ref 8.4–10.2)
CHLORIDE SERPL-SCNC: 103 MMOL/L (ref 96–108)
CO2 SERPL-SCNC: 27 MMOL/L (ref 21–32)
CREAT SERPL-MCNC: 0.76 MG/DL (ref 0.6–1.3)
EOSINOPHIL # BLD AUTO: 0.07 THOUSAND/ÂΜL (ref 0–0.61)
EOSINOPHIL NFR BLD AUTO: 1 % (ref 0–6)
ERYTHROCYTE [DISTWIDTH] IN BLOOD BY AUTOMATED COUNT: 19 % (ref 11.6–15.1)
GFR SERPL CREATININE-BSD FRML MDRD: 78 ML/MIN/1.73SQ M
GLUCOSE SERPL-MCNC: 125 MG/DL (ref 65–140)
HCT VFR BLD AUTO: 32.3 % (ref 34.8–46.1)
HGB BLD-MCNC: 10.1 G/DL (ref 11.5–15.4)
IMM GRANULOCYTES # BLD AUTO: 0.08 THOUSAND/UL (ref 0–0.2)
IMM GRANULOCYTES NFR BLD AUTO: 1 % (ref 0–2)
LYMPHOCYTES # BLD AUTO: 1.14 THOUSANDS/ÂΜL (ref 0.6–4.47)
LYMPHOCYTES NFR BLD AUTO: 9 % (ref 14–44)
MAGNESIUM SERPL-MCNC: 1.9 MG/DL (ref 1.9–2.7)
MCH RBC QN AUTO: 31.3 PG (ref 26.8–34.3)
MCHC RBC AUTO-ENTMCNC: 31.3 G/DL (ref 31.4–37.4)
MCV RBC AUTO: 100 FL (ref 82–98)
MONOCYTES # BLD AUTO: 1.37 THOUSAND/ÂΜL (ref 0.17–1.22)
MONOCYTES NFR BLD AUTO: 11 % (ref 4–12)
NEUTROPHILS # BLD AUTO: 10.19 THOUSANDS/ÂΜL (ref 1.85–7.62)
NEUTS SEG NFR BLD AUTO: 78 % (ref 43–75)
NRBC BLD AUTO-RTO: 0 /100 WBCS
PLATELET # BLD AUTO: 374 THOUSANDS/UL (ref 149–390)
PMV BLD AUTO: 9.8 FL (ref 8.9–12.7)
POTASSIUM SERPL-SCNC: 4.1 MMOL/L (ref 3.5–5.3)
PROT SERPL-MCNC: 7.1 G/DL (ref 6.4–8.4)
RBC # BLD AUTO: 3.23 MILLION/UL (ref 3.81–5.12)
SODIUM SERPL-SCNC: 137 MMOL/L (ref 135–147)
WBC # BLD AUTO: 12.89 THOUSAND/UL (ref 4.31–10.16)

## 2023-03-02 NOTE — PROGRESS NOTES
Pt arrived on unit for lab draw  Chest port accessed, labs drawn, port deaccessed  Pt unable to provide urine sample today  Pt exited unit with steady gait for dc home  Will call back to schedule next appt after she sees her surgeon tomorrow

## 2023-03-03 ENCOUNTER — HOSPITAL ENCOUNTER (OUTPATIENT)
Dept: INFUSION CENTER | Facility: HOSPITAL | Age: 73
Discharge: HOME | End: 2023-03-03

## 2023-03-03 ENCOUNTER — OFFICE VISIT (OUTPATIENT)
Dept: SURGICAL ONCOLOGY | Facility: CLINIC | Age: 73
End: 2023-03-03

## 2023-03-03 VITALS
OXYGEN SATURATION: 98 % | DIASTOLIC BLOOD PRESSURE: 82 MMHG | TEMPERATURE: 97.4 F | WEIGHT: 160 LBS | SYSTOLIC BLOOD PRESSURE: 158 MMHG | HEART RATE: 93 BPM | BODY MASS INDEX: 27.31 KG/M2 | HEIGHT: 64 IN

## 2023-03-03 DIAGNOSIS — C56.2 MALIGNANT NEOPLASM OF LEFT OVARY (HCC): Primary | ICD-10-CM

## 2023-03-03 DIAGNOSIS — R79.1 ABNORMAL COAGULATION PROFILE: ICD-10-CM

## 2023-03-03 DIAGNOSIS — K91.2 POSTSURGICAL MALABSORPTION, NOT ELSEWHERE CLASSIFIED: ICD-10-CM

## 2023-03-03 DIAGNOSIS — Z51.89 ENCOUNTER FOR OTHER SPECIFIED AFTERCARE: ICD-10-CM

## 2023-03-03 LAB — CANCER AG125 SERPL-ACNC: 5.1 U/ML (ref 0–30)

## 2023-03-03 RX ORDER — METRONIDAZOLE 500 MG/100ML
500 INJECTION, SOLUTION INTRAVENOUS ONCE
OUTPATIENT
Start: 2023-03-03

## 2023-03-03 RX ORDER — CEFAZOLIN SODIUM 2 G/50ML
2000 SOLUTION INTRAVENOUS ONCE
OUTPATIENT
Start: 2023-03-03

## 2023-03-03 NOTE — PROGRESS NOTES
Surgical Oncology Follow Up       [address] [facility] [place]  [address] [facility] [place] [address]  [phone]    Ana Laura Quan  1950  [id number]  [address] [facility] [place]  [address] [facility] [place] [address]  [phone]    Diagnoses and all orders for this visit:    Malignant neoplasm of left ovary ([facility] )  -     Case request operating room: REVERSAL ILEOSTOMY; Standing  -     Type and screen; Future  -     Comprehensive metabolic panel; Future  -     CBC and differential; Future  -     APTT; Future  -     Protime-INR; Future  -     HEMOGLOBIN A1C W/ EAG ESTIMATION; Future  -     Case request operating room: REVERSAL ILEOSTOMY    Abnormal coagulation profile  -     APTT; Future    Encounter for other specified aftercare  -     Protime-INR; Future    Postsurgical malabsorption, not elsewhere classified  -     HEMOGLOBIN A1C W/ EAG ESTIMATION; Future    Other orders  -     Incentive spirometry; Standing  -     Insert and maintain IV line; Standing  -     Void On-Call to O R ; Standing  -     Place sequential compression device; Standing  -     ceFAZolin (ANCEF) IVPB (premix in dextrose) 2,000 mg 50 mL  -     metroNIDAZOLE (FLAGYL) IVPB (premix) 500 mg 100 mL        Chief Complaint   Patient presents with   • Follow-up       No follow-ups on file  Oncology History   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[facility] )   7/21/2022 Initial Diagnosis    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[facility] )     8/4/2022 Surgery    Diagnostic laparoscopy, peritoneal biopsy     8/18/2022 Genetic Testing    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, serous    Results revealed patient has the following mutation(s):  None     Malignant neoplasm of left ovary ([facility] )   8/4/2022 Surgery    Diagnostic laparoscopy, peritoneal biopsy     8/9/2022 Initial Diagnosis    Gynecologic malignancy [facility])     8/23/2022 -  Chemotherapy palonosetron (ALOXI), 0 25 mg, Intravenous, Once, 6 of 6 cycles  Administration: 0 25 mg (8/23/2022), 0 25 mg (9/13/2022), 0 25 mg (10/4/2022), 0 25 mg (12/13/2022), 0 25 mg (1/3/2023), 0 25 mg (1/24/2023)  fosaprepitant (EMEND) IVPB, 150 mg, Intravenous, Once, 6 of 6 cycles  Administration: 150 mg (8/23/2022), 150 mg (9/13/2022), 150 mg (10/4/2022), 150 mg (12/13/2022), 150 mg (1/3/2023), 150 mg (1/24/2023)  CARBOplatin (PARAPLATIN) IVPB ([facility] [place] [facility] AUC DOSING), 605 4 mg, Intravenous, Once, 6 of 6 cycles  Administration: 600 mg (8/23/2022), 564 mg (9/13/2022), 601 8 mg (10/4/2022), 491 5 mg (12/13/2022), 468 5 mg (1/3/2023), 488 mg (1/24/2023)  bevacizumab (AVASTIN) IVPB, 1,252 5 mg, Intravenous, Once, 3 of 3 cycles  Dose modification: 10 mg/kg (original dose 15 mg/kg, Cycle 5, Reason: Other (Must fill in a comment))  Administration: 1,200 mg (8/23/2022), 1,200 mg (9/13/2022), 745 mg (1/3/2023)  PACLItaxel (TAXOL) chemo IVPB, 175 mg/m2 = 330 6 mg, Intravenous, Once, 6 of 6 cycles  Dose modification: 135 mg/m2 (original dose 175 mg/m2, Cycle 4, Reason: Other (Must fill in a comment))  Administration: 330 6 mg (8/23/2022), 330 6 mg (9/13/2022), 330 6 mg (10/4/2022), 246 mg (12/13/2022), 244 2 mg (1/3/2023), 244 2 mg (1/24/2023)     10/28/2022 Surgery    diagnostic laparoscopy, exploratory laparotomy, modified radical hysterectomy, bilateral salpingo-oophorectomy with en bloc rectosigmoid resection, gastrocolic omentectomy, splenectomy, small bowel resection with reanastomosis, diaphragm resection, excision ablation of peritoneal implants, ileostomy formation, optimal tumor debulking     11/29/2022 -  Cancer Staged    Staging form: Ovary, Fallopian Tube, Primary Peritoneal, AJCC 8th Edition  - Clinical: FIGO Stage IIIC (cT3c, cN0, cM0) - Signed by Nico Gramajo MD on 11/29/2022  Histologic grade (G): G3  Histologic grading system: 4 grade system               History of Present Illness: Patient returns in follow-up  She has been seeing gynecologic oncology and she comes in to discuss ileostomy reversal   She tells me she is going to have an IR drain of a ling-splenic collection  While she was having chemotherapy, she did have rectal bleeding and had a rigid proctoscopy that showed the anastomosis was patent  Currently she is feeling well  Her appetite is good  CT shows no evidence of recurrence  There is a seroma/pancreatic collection near the splenic bed  There is a small parastomal hernia  I personally reviewed the films  Review of Systems  Complete ROS Surg Onc:   Complete ROS Surg Onc:   Constitutional: The patient denies new or recent history of general fatigue, no recent weight loss, no change in appetite  Eyes: No complaints of visual problems, no scleral icterus  ENT: no complaints of ear pain, no hoarseness, no difficulty swallowing,  no tinnitus and no new masses in head, oral cavity, or neck  Cardiovascular: No complaints of chest pain, no palpitations, no ankle edema  Respiratory: No complaints of shortness of breath, no cough  Gastrointestinal: No complaints of jaundice, no bloody stools, no pale stools  Genitourinary: No complaints of dysuria, no hematuria, no nocturia, no frequent urination, no urethral discharge  Musculoskeletal: No complaints of weakness, paralysis, joint stiffness or arthralgias  Integumentary: No complaints of rash, no new lesions  Neurological: No complaints of convulsions, no seizures, no dizziness  Hematologic/Lymphatic: No complaints of easy bruising  Endocrine:  No hot or cold intolerance  No polydipsia, polyphagia, or polyuria  Allergy/immunology:  No environmental allergies  No food allergies  Not immunocompromised  Skin:  No pallor or rash  No wound          Patient Active Problem List   Diagnosis   • Gastroesophageal reflux disease   • Constipation   • Hemorrhoids   • Family history of colonic polyps   • Asthma   • Hyperlipidemia   • Hypertension   • Lower abdominal pain   • Change in bowel habit   • Personal history of colonic polyps   • Long term (current) use of systemic steroids   • 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HCC)   • Giant cell aortic arteritis (HCC)   • Prediabetes   • Malignant neoplasm of left ovary (HCC)   • Anxiety disorder due to medical condition   • S/P total abdominal hysterectomy   • Ileostomy, has currently ([facility] )   • Acute blood loss anemia (ABLA)   • S/P splenectomy   • Hypokalemia   • Bandemia   • Wound infection after surgery   • Encounter for venous access device care   • Rectal bleeding     Past Medical History:   Diagnosis Date   • Abdominal pain     off and on   • Allergic sinusitis     "seasonal allergies"-has received allergy shots "   • Anxiety     with current diagnosis   • Arthritis    • Asthma    • Cholelithiasis    • Colon polyp    • Degenerative joint disease    • Dental bridge present     permanent lower left   • Exercise involving walking     1-2x/week   • GERD (gastroesophageal reflux disease)    • 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   • Hiatal hernia    • History of cancer chemotherapy    • Hyperlipidemia    • Hypertension    • Nausea    • 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[facility] )     "gynecologic malignacy"   • PMR (polymyalgia rheumatica) (HCC)    • Prediabetes    • Shortness of breath     per pt "asthma related --with climbing mult  flights of stairs--or exertion -not new"   • Tinnitus    • Wears glasses      Past Surgical History:   Procedure Laterality Date   • COLONOSCOPY      October 2021: Adenomatous polyps and rectum in transverse colon, sigmoid diverticulosis, internal hemorrhoids  October 2016 diverticulosis and internal hemorrhoids, September 2011 diverticulosis and internal hemorrhoids     • COLOPROCTECTOMY N/A 10/28/2022    Procedure: PROCTECTOMY;  Surgeon: China Mejía MD;  [facility];  Service: Surgical Oncology   • DILATION AND CURETTAGE OF UTERUS     • ILEO LOOP DIVERSION N/A 10/28/2022    Procedure: ILEOSTOMY LOOP;  Surgeon: Naun Moon MD;  [facility];  Service: Surgical Oncology   • IR PORT PLACEMENT  12/8/2022   • LIVER SURGERY  10/28/2022    Procedure: LIVER CONTROL OF BLEED ;  Surgeon: Naun Moon MD;  [facility];  Service: Surgical Oncology   • GA COLECTOMY PARTIAL W/ANASTOMOSIS N/A 10/28/2022    Procedure: RESECTION COLON LEFT;  Surgeon: Angel Orellana MD;  [facility];  Service: Gynecology Oncology   • GA EXPLORATORY LAPAROTOMY CELIOTOMY W/WO BIOPSY [facility] N/A 10/28/2022    Procedure: LAPAROTOMY EXPLORATORY;  Surgeon: Angel Orellana MD;  [facility];  Service: Gynecology Oncology   • GA LAPS ABD PRTM&OMENTUM DX W/WO [address] [facility] [address] [facility] N/A 08/04/2022    Procedure: DIAGNOSTIC LAPAROSCOPY, PERITONEAL BIOPSY, SAMPLING OF BLOODY ASCITES ;  Surgeon: Abby Hendrix MD;  [facility];  Service: Gynecology Oncology   • GA LAPS ABD PRTM&OMENTUM DX W/WO [address] [facility] [address]/ [facility] N/A 10/28/2022    Procedure: LAPAROSCOPY DIAGNOSTIC;  Surgeon: Angel Orellana MD;  [facility];  Service: Gynecology Oncology   • GA TOTAL ABDOMINAL HYSTERECT W/WO [facility] TUBE [facility] N/A 10/28/2022    Procedure: HYSTERECTOMY MODIFIED RADICAL, BILATERAL SALPINGO-OOPHORECTOMY, GASTROCOLIC OMENTECTOMY, DIAPHRAGM RESECTION, EXCISION AND ABLATION OF PERITONEAL TUMORS;  Surgeon: Angel Orellana MD;  [facility];  Service: Gynecology Oncology   • SMALL INTESTINE SURGERY N/A 10/28/2022    Procedure: RESECTION SMALL BOWEL;  Surgeon: Angel Orellana MD;  [facility];  Service: Gynecology Oncology   • SPLENECTOMY, TOTAL N/A 10/28/2022    Procedure: SPLENECTOMY;  Surgeon: Angel Orellana MD;  [facility];  Service: Gynecology Oncology   • TEMPORAL ARTERY BIOPSY / LIGATION     • UPPER GASTROINTESTINAL ENDOSCOPY  11/10/2014    November 2014 irregular Z-line and Schatzki ring, hiatal hernia, gastritis  Biopsies negative for celiac, H pylori, or Phillip's or eosinophilic esophagitis     • WISDOM TOOTH EXTRACTION       Family History   Problem Relation Age of Onset   • Colon polyps Mother    • Alzheimer's disease Mother    • Heart disease Father    • Brain cancer Father    • Colon polyps Sister    • Heart disease Sister    • Diabetes Sister    • MARLEY disease Sister    • Colon cancer Cousin      Social History     Socioeconomic History   • Marital status: /Civil Union     Spouse name: Not on file   • Number of children: Not on file   • Years of education: Not on file   • Highest education level: Not on file   Occupational History   • Not on file   Tobacco Use   • Smoking status: Never   • Smokeless tobacco: Never   Vaping Use   • Vaping Use: Never used   Substance and Sexual Activity   • Alcohol use: Yes     Comment: a glass of wine maybe 2 times a month   • Drug use: Never   • Sexual activity: Not Currently     Partners: Male     Birth control/protection: None     Comment: defer   Other Topics Concern   • Not on file   Social History Narrative   • Not on file     Social Determinants of Health     Financial Resource Strain: Not on file   Food Insecurity: Not on file   Transportation Needs: Not on file   Physical Activity: Not on file   Stress: Not on file   Social Connections: Not on file   Intimate Partner Violence: Not on file   Housing Stability: Not on file       Current Outpatient Medications:   •  Acetaminophen (TYLENOL ARTHRITIS PAIN PO), Take by mouth as needed, Disp: , Rfl:   •  albuterol (PROVENTIL HFA,VENTOLIN HFA) 90 mcg/act inhaler, Inhale 2 puffs every 4 (four) hours as needed , Disp: , Rfl:   •  Ascorbic Acid (VITAMIN C GUMMIE PO), Take by mouth, Disp: , Rfl:   •  aspirin (ECOTRIN LOW STRENGTH) 81 mg EC tablet, Take 81 mg by mouth daily, Disp: , Rfl:   •  B Complex-C (B COMPLEX-VITAMIN C PO), Take by mouth daily, Disp: , Rfl:   •  betamethasone, augmented, (Trinity Roldan) 0 05 % lotion, Apply topically as needed, Disp: , Rfl:   •  Calcium Carb-Cholecalciferol (CALCIUM 500+D3 PO), Take 1 tablet by mouth 2 (two) times a day , Disp: , Rfl:   •  cephalexin (KEFLEX) 500 mg capsule, TAKE 1 CAPSULE 3 TIMES A DAY FOR 7 DAYS, Disp: , Rfl:   •  cholecalciferol (VITAMIN D3) 1,000 units tablet, Take 1,000 Units by mouth daily, Disp: , Rfl:   •  Docusate Calcium (STOOL SOFTENER PO), Take 1 tablet by mouth 2 (two) times a day, Disp: , Rfl:   •  escitalopram (LEXAPRO) 10 mg tablet, TAKE 1 TABLET BY MOUTH EVERY DAY, Disp: 90 tablet, Rfl: 2  •  famotidine (PEPCID) 40 MG tablet, TAKE 1 TABLET BY MOUTH DAILY AT BEDTIME, Disp: 90 tablet, Rfl: 3  •  fexofenadine (ALLEGRA) 180 MG tablet, Take 180 mg by mouth daily, Disp: , Rfl:   •  FIBER ADULT GUMMIES PO, Take by mouth if needed, Disp: , Rfl:   •  GLUCOSAMINE-CHONDROITIN PO, Take 1 tablet by mouth 2 (two) times a day , Disp: , Rfl:   •  lidocaine-prilocaine (EMLA) cream, Apply topically as needed for mild pain, Disp: 30 g, Rfl: 0  •  lisinopril (ZESTRIL) 5 mg tablet, Take 5 mg by mouth daily dinner, Disp: , Rfl:   •  LORazepam (ATIVAN) 1 mg tablet, Take 1 tablet (1 mg total) by mouth every 8 (eight) hours as needed for anxiety (nausea or anxiety), Disp: 30 tablet, Rfl: 0  •  metFORMIN (GLUCOPHAGE) 500 mg tablet, Take 500 mg by mouth daily Daily at lunch, Disp: , Rfl:   •  metoprolol succinate (TOPROL-XL) 25 mg 24 hr tablet, Take 25 mg by mouth daily dinner, Disp: , Rfl:   •  montelukast (SINGULAIR) 10 mg tablet, Take 10 mg by mouth daily dinner, Disp: , Rfl:   •  multivitamin (THERAGRAN) TABS, Take 1 tablet by mouth daily, Disp: , Rfl:   •  omeprazole (PriLOSEC) 20 mg delayed release capsule, TAKE 1 CAPSULE BY MOUTH EVERY DAY 30 MINUTES BEFORE MORNING MEAL FOR 90 DAYS, Disp: , Rfl:   •  ondansetron (ZOFRAN) 8 mg tablet, Take 1 tablet (8 mg total) by mouth every 8 (eight) hours as needed for nausea or vomiting, Disp: 20 tablet, Rfl: 1  •  predniSONE 1 mg tablet, Take 3 mg by mouth daily, Disp: , Rfl:   •  Probiotic Product (PROBIOTIC DAILY PO), Take by mouth daily, Disp: , Rfl:   •  simvastatin (ZOCOR) 10 mg tablet, Take 10 mg by mouth daily at bedtime, Disp: , Rfl:   •  SYMBICORT 160-4 5 MCG/ACT inhaler, 2 puffs daily , Disp: , Rfl:   Allergies   Allergen Reactions   • Nsaids Other (See Comments)      pt is avoiding per family doctor instructions-  Able to take Tylenol   • Pedi-Pre Tape Spray [Wound Dressing Adhesive] Other (See Comments)     Please use sensitive skin tape, pt gets bad bruising from strong medical adhesive tape  Vitals:    03/03/23 1341   BP: 158/82   Pulse: 93   Temp: (!) 97 4 °F (36 3 °C)   SpO2: 98%       Physical Exam  Constitutional: General appearance: The Patient is well-developed and well-nourished who appears the stated age in no acute distress  Patient is pleasant and talkative  HEENT:  Normocephalic  Sclerae are anicteric  Mucous membranes are moist  Neck is supple without adenopathy  No JVD  Chest: The lungs are clear to auscultation  Cardiac: Heart is regular rate  Abdomen: Abdomen is soft, non-tender, non-distended and without masses  There is a functioning ostomy  Extremities: There is no clubbing or cyanosis  There is no edema  Symmetric  Neuro: Grossly nonfocal  Gait is normal      Lymphatic: No evidence of cervical adenopathy bilaterally  No evidence of axillary adenopathy bilaterally  Skin: Warm, anicteric  Psych:  Patient is pleasant and talkative  Breasts:        Pathology:  [unfilled]    Labs:      Imaging  CT chest abdomen pelvis w contrast    Result Date: 2/22/2023  Narrative: CT CHEST, ABDOMEN AND PELVIS WITH IV CONTRAST INDICATION:   C56 2: Malignant neoplasm of left ovary  Stage III C ovarian cancer being status post neoadjuvant chemotherapy and tumor debulking  Now undergoing adjuvant chemotherapy     Persistent intense episodes of rectal/pelvic pressure with ongoing  bleeding  COMPARISON:  Multiple prior examinations, most recently November 15, 2022 TECHNIQUE: CT examination of the chest, abdomen and pelvis was performed  Axial, sagittal, and coronal 2D reformatted images were created from the source data and submitted for interpretation  Radiation dose length product (DLP) for this visit:  915 03 mGy-cm   This examination, like all CT scans performed in the [facility], was performed utilizing techniques to minimize radiation dose exposure, including the use of iterative  reconstruction and automated exposure control  IV Contrast:  100 mL of iohexol (OMNIPAQUE) Enteric Contrast: Enteric contrast was administered  FINDINGS: CHEST LUNGS:  Lungs are clear  There is no tracheal or endobronchial lesion  PLEURA:  Unremarkable  HEART/GREAT VESSELS: Heart is unremarkable for patient's age  No thoracic aortic aneurysm  MEDIASTINUM AND DERREK:  Unremarkable  CHEST WALL AND LOWER NECK:  Right chest port  No axillary lymphadenopathy  ABDOMEN LIVER/BILIARY TREE:  Evolving postsurgical changes between the right hemidiaphragm and the dome of the right hepatic lobe with continued decrease in the size of postsurgical fluid collection now approximately 4 1 x 2 4 cm compared with approximately 6 6 x 3 9 cm when measured using similar technique on November 15, 2022  Again noted is diffusely decreased density hepatic parenchyma consistent with hepatic steatosis  Hepatic cysts in segment 4A, 9 mm on image 96 and 12 mm on image 98 are unchanged over  multiple prior examinations  No new parenchymal mass or surface implant identified  GALLBLADDER:  There are gallstone(s) within the gallbladder, without pericholecystic inflammatory changes  SPLEEN:  Surgical changes of splenectomy    Persistent fluid collection in the splenectomy space and is inseparable from the posterior wall of the gastric fundus, 6 8 x 5 5 cm, slightly increased from 5 9 x 5 3 cm on the previous examination now with increased wall enhancement  PANCREAS:  Unremarkable  ADRENAL GLANDS:  Unremarkable  KIDNEYS/URETERS:  Symmetric nephrographic enhancement  No solid renal mass or urinary tract calculus, or hydronephrosis  STOMACH AND BOWEL:  Right lower quadrant ileostomy  Anastomotic staple line at the rectosigmoid junction  No bowel wall thickening  No bowel obstruction  Nonobstructed loops of small bowel within the right lower quadrant and a parastomal hernia  Larger segment of small bowel protrudes into this parastomal hernia when compared with the previous examination  Mild complex fluid distention of the rectum  APPENDIX:  No findings to suggest appendicitis  ABDOMINOPELVIC CAVITY:  No ascites  No pneumoperitoneum  No significant lymphadenopathy  No developing peritoneal mass identified  VESSELS:  Mild atherosclerotic changes  Circumaortic left renal vein  PELVIS REPRODUCTIVE ORGANS:  Expected surgical changes of hysterectomy and bilateral oophorectomy  No suspicious mass at the vaginal cuff  Few bubbles of gas within the nondistended vagina  URINARY BLADDER:  Unremarkable  ABDOMINAL WALL/INGUINAL REGIONS:  Seroma in the deep midline lower pelvic incision, 1 5 x 1 2 cm on image 194 series 2  Parastomal hernia as described above  OSSEOUS STRUCTURES:  No acute fracture or destructive osseous lesion  Spinal degenerative changes  Impression: No new or enlarging recurrent or metastatic tumor mass identified in the chest, abdomen or pelvis  Evolving postsurgical changes  Specifically, decreasing size of seroma interposed between the right hemidiaphragm and the dome of the right hepatic lobe  Increasing size of seroma in the splenectomy space and along the posterior wall of the gastric fundus/greater curvature  Developing small midline lower body wall seroma  Significantly longer segment of nonobstructed small bowel protrudes into a right lower quadrant parastomal hernia   Rectum is mildly distended with a small volume of complex fluid which could represent bruising from internal hemorrhoids  Otherwise no CT abnormality to account for the reported clinical history of ongoing either rectal or vaginal bleeding  Workstation performed: [id number]     I reviewed the above laboratory and imaging data  Discussion/Summary: 68-year-old female status post cytoreduction for ovarian carcinoma  She has a functioning ileostomy  We discussed reversal of the ileostomy  I explained the risks including bleeding, infection, need for further surgery, wound complications, adjacent organ injury, anastomotic leak, sepsis, DVT, stroke, pulmonary embolism, and death  Informed consent was obtained  We will schedule this at our earliest mutual convenience  In regard to the fluid collection in the left upper quadrant, this may be a small pseudocyst   I doubt this is recurrent tumor or an abscess  She is asymptomatic  Since this was increasing in size on her CT I would consider internal drainage rather than an IR drain that has already been scheduled  I have reached out to gastroenterology as well as Dr Gordan Schlatter to make sure that he is okay with this approach  Prior to the ileostomy reversal I would like to make sure the anastomosis is patent  She did have a rigid proctoscopy that showed this was okay  I have reached out to Dr Annamaria Gomez regarding any further instrumentation such as flexible sigmoidoscopy  I will see her again at the time of surgery  All of her questions were answered

## 2023-03-03 NOTE — LETTER
March 3, 2023     Magen Pierce, [address] [facility] [address]    Patient: Juanpablo Chong   YOB: 1950   Date of Visit: 3/3/2023       Dear Dr Jose Hauser:    Thank you for referring Tommy Gonzalez to me for evaluation  Below are my notes for this consultation  If you have questions, please do not hesitate to call me  I look forward to following your patient along with you  Sincerely,        Lora Luis MD        CC: MD Bev Garcia MD Terrill Dust, MD Eller Barrows, MD  3/3/2023  3:58 PM  Sign when Signing Visit               Surgical Oncology Follow Up       [address] [facility] [address] [phone]    Juanpablo Chong  1950  [id number]  [address] [facility] CAN[facility]ER [facility] [address] [facility]  [phone]    Diagnoses and all orders for this visit:    Malignant neoplasm of left ovary ([address] )  -     Case request operating room: REVERSAL ILEOSTOMY; Standing  -     Type and screen; Future  -     Comprehensive metabolic panel; Future  -     CBC and differential; Future  -     APTT; Future  -     Protime-INR; Future  -     HEMOGLOBIN A1C W/ EAG ESTIMATION; Future  -     Case request operating room: REVERSAL ILEOSTOMY    Abnormal coagulation profile  -     APTT; Future    Encounter for other specified aftercare  -     Protime-INR; Future    Postsurgical malabsorption, not elsewhere classified  -     HEMOGLOBIN A1C W/ EAG ESTIMATION;  Future    Other orders  -     Incentive spirometry; Standing  -     Insert and maintain IV line; Standing  -     Void On-Call to O R ; Standing  -     Place sequential compression device; Standing  -     ceFAZolin (ANCEF) IVPB (premix in dextrose) 2,000 mg 50 mL  -     metroNIDAZOLE (FLAGYL) IVPB (premix) 500 mg 100 mL        Chief Complaint   Patient presents with   • Follow-up       No follow-ups on file  Oncology History   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[facility] [address] )   7/21/2022 Initial Diagnosis    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[facility] [address] )     8/4/2022 Surgery    Diagnostic laparoscopy, peritoneal biopsy     8/18/2022 Genetic Testing    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, serous    Results revealed patient has the following mutation(s):  None     Malignant neoplasm of left ovary (HCC)   8/4/2022 Surgery    Diagnostic laparoscopy, peritoneal biopsy     8/9/2022 Initial Diagnosis    Gynecologic malignancy ([facility] [address] )     8/23/2022 -  Chemotherapy    palonosetron (ALOXI), 0 25 mg, Intravenous, Once, 6 of 6 cycles  Administration: 0 25 mg (8/23/2022), 0 25 mg (9/13/2022), 0 25 mg (10/4/2022), 0 25 mg (12/13/2022), 0 25 mg (1/3/2023), 0 25 mg (1/24/2023)  fosaprepitant (EMEND) IVPB, 150 mg, Intravenous, Once, 6 of 6 cycles  Administration: 150 mg (8/23/2022), 150 mg (9/13/2022), 150 mg (10/4/2022), 150 mg (12/13/2022), 150 mg (1/3/2023), 150 mg (1/24/2023)  CARBOplatin (PARAPLATIN) IVPB (GO AUC DOSING), 605 4 mg, Intravenous, Once, 6 of 6 cycles  Administration: 600 mg (8/23/2022), 564 mg (9/13/2022), 601 8 mg (10/4/2022), 491 5 mg (12/13/2022), 468 5 mg (1/3/2023), 488 mg (1/24/2023)  bevacizumab (AVASTIN) IVPB, 1,252 5 mg, Intravenous, Once, 3 of 3 cycles  Dose modification: 10 mg/kg (original dose 15 mg/kg, Cycle 5, Reason: Other (Must fill in a comment))  Administration: 1,200 mg (8/23/2022), 1,200 mg (9/13/2022), 745 mg (1/3/2023)  PACLItaxel (TAXOL) chemo IVPB, 175 mg/m2 = 330 6 mg, Intravenous, Once, 6 of 6 cycles  Dose modification: 135 mg/m2 (original dose 175 mg/m2, Cycle 4, Reason: Other (Must fill in a comment))  Administration: 330 6 mg (8/23/2022), 330 6 mg (9/13/2022), 330 6 mg (10/4/2022), 246 mg (12/13/2022), 244 2 mg (1/3/2023), 244 2 mg (1/24/2023)     10/28/2022 Surgery    diagnostic laparoscopy, exploratory laparotomy, modified radical hysterectomy, bilateral salpingo-oophorectomy with en bloc rectosigmoid resection, gastrocolic omentectomy, splenectomy, small bowel resection with reanastomosis, diaphragm resection, excision ablation of peritoneal implants, ileostomy formation, optimal tumor debulking     11/29/2022 -  Cancer Staged    Staging form: Ovary, Fallopian Tube, Primary Peritoneal, AJCC 8th Edition  - Clinical: FIGO Stage IIIC (cT3c, cN0, cM0) - Signed by Marifer Deleon MD on 11/29/2022  Histologic grade (G): G3  Histologic grading system: 4 grade system               History of Present Illness: Patient returns in follow-up  She has been seeing gynecologic oncology and she comes in to discuss ileostomy reversal   She tells me she is going to have an IR drain of a ling-splenic collection  While she was having chemotherapy, she did have rectal bleeding and had a rigid proctoscopy that showed the anastomosis was patent  Currently she is feeling well  Her appetite is good  CT shows no evidence of recurrence  There is a seroma/pancreatic collection near the splenic bed  There is a small parastomal hernia  I personally reviewed the films  Review of Systems  Complete ROS Surg Onc:   Complete ROS Surg Onc:   Constitutional: The patient denies new or recent history of general fatigue, no recent weight loss, no change in appetite  Eyes: No complaints of visual problems, no scleral icterus  ENT: no complaints of ear pain, no hoarseness, no difficulty swallowing,  no tinnitus and no new masses in head, oral cavity, or neck  Cardiovascular: No complaints of chest pain, no palpitations, no ankle edema  Respiratory: No complaints of shortness of breath, no cough  Gastrointestinal: No complaints of jaundice, no bloody stools, no pale stools  Genitourinary: No complaints of dysuria, no hematuria, no nocturia, no frequent urination, no urethral discharge     Musculoskeletal: No complaints of weakness, paralysis, joint stiffness or arthralgias  Integumentary: No complaints of rash, no new lesions  Neurological: No complaints of convulsions, no seizures, no dizziness  Hematologic/Lymphatic: No complaints of easy bruising  Endocrine:  No hot or cold intolerance  No polydipsia, polyphagia, or polyuria  Allergy/immunology:  No environmental allergies  No food allergies  Not immunocompromised  Skin:  No pallor or rash  No wound          Patient Active Problem List   Diagnosis   • Gastroesophageal reflux disease   • Constipation   • Hemorrhoids   • Family history of colonic polyps   • Asthma   • Hyperlipidemia   • Hypertension   • Lower abdominal pain   • Change in bowel habit   • Personal history of colonic polyps   • Long term (current) use of systemic steroids   • 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[place] [address] )   • Giant cell aortic arteritis ([place] [address] )   • Prediabetes   • Malignant neoplasm of left ovary (HCC)   • Anxiety disorder due to medical condition   • S/P total abdominal hysterectomy   • Ileostomy, has currently ([place] [address] )   • Acute blood loss anemia (ABLA)   • S/P splenectomy   • Hypokalemia   • Bandemia   • Wound infection after surgery   • Encounter for venous access device care   • Rectal bleeding     Past Medical History:   Diagnosis Date   • Abdominal pain     off and on   • Allergic sinusitis     "seasonal allergies"-has received allergy shots "   • Anxiety     with current diagnosis   • Arthritis    • Asthma    • Cholelithiasis    • Colon polyp    • Degenerative joint disease    • Dental bridge present     permanent lower left   • Exercise involving walking     1-2x/week   • GERD (gastroesophageal reflux disease)    • 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   • Hiatal hernia    • History of cancer chemotherapy    • Hyperlipidemia    • Hypertension    • Nausea    • 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[place] [address] )     "gynecologic malignacy"   • PMR (polymyalgia rheumatica) (HCC)    • Prediabetes    • Shortness of breath per pt "asthma related --with climbing mult  flights of stairs--or exertion -not new"   • Tinnitus    • Wears glasses      Past Surgical History:   Procedure Laterality Date   • COLONOSCOPY      October 2021: Adenomatous polyps and rectum in transverse colon, sigmoid diverticulosis, internal hemorrhoids  October 2016 diverticulosis and internal hemorrhoids, September 2011 diverticulosis and internal hemorrhoids     • COLOPROCTECTOMY N/A 10/28/2022    Procedure: PROCTECTOMY;  Surgeon: Amelia Tao MD;  [facility];  Service: Surgical Oncology   • DILATION AND CURETTAGE OF UTERUS     • ILEO LOOP DIVERSION N/A 10/28/2022    Procedure: ILEOSTOMY LOOP;  Surgeon: Amelia Tao MD;  [facility];  Service: Surgical Oncology   • IR PORT PLACEMENT  12/8/2022   • LIVER SURGERY  10/28/2022    Procedure: LIVER CONTROL OF BLEED ;  Surgeon: Amelia Tao MD;  [facility];  Service: Surgical Oncology   • DC COLECTOMY PARTIAL W/ANASTOMOSIS N/A 10/28/2022    Procedure: RESECTION COLON LEFT;  Surgeon: Bharathi Carmichael MD;  [facility];  Service: Gynecology Oncology   • DC EXPLORATORY LAPAROTOMY CELIOTOMY W/WO BIOPSY [address] [place] [address] N/A 10/28/2022    Procedure: LAPAROTOMY EXPLORATORY;  Surgeon: Bharathi Carmichael MD;  [facility];  Service: Gynecology Oncology   • DC LAPS ABD PRTM&OMENTUM DX W/WO [facility] BR/WA [address] [place] [address] N/A 08/04/2022    Procedure: DIAGNOSTIC LAPAROSCOPY, PERITONEAL BIOPSY, SAMPLING OF BLOODY ASCITES ;  Surgeon: Benitez Harris MD;  [facility];  Service: Gynecology Oncology   • DC LAPS ABD PRTM&OMENTUM DX W/WO [facility] BR/[address] N/A 10/28/2022    Procedure: LAPAROSCOPY DIAGNOSTIC;  Surgeon: Bharathi Carmichael MD;  [facility];  Service: Gynecology Oncology   • DC TOTAL ABDOMINAL HYSTERECT W/WO RMVL TUBE OVARY N/A 10/28/2022    Procedure: HYSTERECTOMY MODIFIED RADICAL, BILATERAL SALPINGO-OOPHORECTOMY, GASTROCOLIC OMENTECTOMY, DIAPHRAGM RESECTION, EXCISION AND ABLATION OF PERITONEAL TUMORS;  Surgeon: Panda Grimes MD;  [facility];  Service: Gynecology Oncology   • SMALL INTESTINE SURGERY N/A 10/28/2022    Procedure: RESECTION SMALL BOWEL;  Surgeon: Panda Grimes MD;  [facility];  Service: Gynecology Oncology   • SPLENECTOMY, TOTAL N/A 10/28/2022    Procedure: SPLENECTOMY;  Surgeon: Panda Grimes MD;  [facility];  Service: Gynecology Oncology   • TEMPORAL ARTERY BIOPSY / LIGATION     • UPPER GASTROINTESTINAL ENDOSCOPY  11/10/2014    November 2014 irregular Z-line and Schatzki ring, hiatal hernia, gastritis  Biopsies negative for celiac, H pylori, or Phillip's or eosinophilic esophagitis     • WISDOM TOOTH EXTRACTION       Family History   Problem Relation Age of Onset   • Colon polyps Mother    • Alzheimer's disease Mother    • Heart disease Father    • Brain cancer Father    • Colon polyps Sister    • Heart disease Sister    • Diabetes Sister    • MARLEY disease Sister    • Colon cancer Cousin      Social History     Socioeconomic History   • Marital status: /Civil Union     Spouse name: Not on file   • Number of children: Not on file   • Years of education: Not on file   • Highest education level: Not on file   Occupational History   • Not on file   Tobacco Use   • Smoking status: Never   • Smokeless tobacco: Never   Vaping Use   • Vaping Use: Never used   Substance and Sexual Activity   • Alcohol use: Yes     Comment: a glass of wine maybe 2 times a month   • Drug use: Never   • Sexual activity: Not Currently     Partners: Male     Birth control/protection: None     Comment: defer   Other Topics Concern   • Not on file   Social History Narrative   • Not on file     Social Determinants of Health     Financial Resource Strain: Not on file   Food Insecurity: Not on file   Transportation Needs: Not on file   Physical Activity: Not on file   Stress: Not on file   Social Connections: Not on file   Intimate Partner Violence: Not on file   Housing Stability: Not on file       Current Outpatient Medications:   •  Acetaminophen (TYLENOL ARTHRITIS PAIN PO), Take by mouth as needed, Disp: , Rfl:   •  albuterol (PROVENTIL HFA,VENTOLIN HFA) 90 mcg/act inhaler, Inhale 2 puffs every 4 (four) hours as needed , Disp: , Rfl:   •  Ascorbic Acid (VITAMIN C GUMMIE PO), Take by mouth, Disp: , Rfl:   •  aspirin (ECOTRIN LOW STRENGTH) 81 mg EC tablet, Take 81 mg by mouth daily, Disp: , Rfl:   •  B Complex-C (B COMPLEX-VITAMIN C PO), Take by mouth daily, Disp: , Rfl:   •  betamethasone, augmented, (DIPROLENE) 0 05 % lotion, Apply topically as needed, Disp: , Rfl:   •  Calcium Carb-Cholecalciferol (CALCIUM 500+D3 PO), Take 1 tablet by mouth 2 (two) times a day , Disp: , Rfl:   •  cephalexin (KEFLEX) 500 mg capsule, TAKE 1 CAPSULE 3 TIMES A DAY FOR 7 DAYS, Disp: , Rfl:   •  cholecalciferol (VITAMIN D3) 1,000 units tablet, Take 1,000 Units by mouth daily, Disp: , Rfl:   •  Docusate Calcium (STOOL SOFTENER PO), Take 1 tablet by mouth 2 (two) times a day, Disp: , Rfl:   •  escitalopram (LEXAPRO) 10 mg tablet, TAKE 1 TABLET BY MOUTH EVERY DAY, Disp: 90 tablet, Rfl: 2  •  famotidine (PEPCID) 40 MG tablet, TAKE 1 TABLET BY MOUTH DAILY AT BEDTIME, Disp: 90 tablet, Rfl: 3  •  fexofenadine (ALLEGRA) 180 MG tablet, Take 180 mg by mouth daily, Disp: , Rfl:   •  FIBER ADULT GUMMIES PO, Take by mouth if needed, Disp: , Rfl:   •  GLUCOSAMINE-CHONDROITIN PO, Take 1 tablet by mouth 2 (two) times a day , Disp: , Rfl:   •  lidocaine-prilocaine (EMLA) cream, Apply topically as needed for mild pain, Disp: 30 g, Rfl: 0  •  lisinopril (ZESTRIL) 5 mg tablet, Take 5 mg by mouth daily dinner, Disp: , Rfl:   •  LORazepam (ATIVAN) 1 mg tablet, Take 1 tablet (1 mg total) by mouth every 8 (eight) hours as needed for anxiety (nausea or anxiety), Disp: 30 tablet, Rfl: 0  •  metFORMIN (GLUCOPHAGE) 500 mg tablet, Take 500 mg by mouth daily Daily at lunch, Disp: , Rfl:   • metoprolol succinate (TOPROL-XL) 25 mg 24 hr tablet, Take 25 mg by mouth daily dinner, Disp: , Rfl:   •  montelukast (SINGULAIR) 10 mg tablet, Take 10 mg by mouth daily dinner, Disp: , Rfl:   •  multivitamin (THERAGRAN) TABS, Take 1 tablet by mouth daily, Disp: , Rfl:   •  omeprazole (PriLOSEC) 20 mg delayed release capsule, TAKE 1 CAPSULE BY MOUTH EVERY DAY 30 MINUTES BEFORE MORNING MEAL FOR 90 DAYS, Disp: , Rfl:   •  ondansetron (ZOFRAN) 8 mg tablet, Take 1 tablet (8 mg total) by mouth every 8 (eight) hours as needed for nausea or vomiting, Disp: 20 tablet, Rfl: 1  •  predniSONE 1 mg tablet, Take 3 mg by mouth daily, Disp: , Rfl:   •  Probiotic Product (PROBIOTIC DAILY PO), Take by mouth daily, Disp: , Rfl:   •  simvastatin (ZOCOR) 10 mg tablet, Take 10 mg by mouth daily at bedtime, Disp: , Rfl:   •  SYMBICORT 160-4 5 MCG/ACT inhaler, 2 puffs daily , Disp: , Rfl:   Allergies   Allergen Reactions   • Nsaids Other (See Comments)      pt is avoiding per family doctor instructions-  Able to take Tylenol   • Pedi-Pre Tape Spray [Wound Dressing Adhesive] Other (See Comments)     Please use sensitive skin tape, pt gets bad bruising from strong medical adhesive tape  Vitals:    03/03/23 1341   BP: 158/82   Pulse: 93   Temp: (!) 97 4 °F (36 3 °C)   SpO2: 98%       Physical Exam  Constitutional: General appearance: The Patient is well-developed and well-nourished who appears the stated age in no acute distress  Patient is pleasant and talkative  HEENT:  Normocephalic  Sclerae are anicteric  Mucous membranes are moist  Neck is supple without adenopathy  No JVD  Chest: The lungs are clear to auscultation  Cardiac: Heart is regular rate  Abdomen: Abdomen is soft, non-tender, non-distended and without masses  There is a functioning ostomy  Extremities: There is no clubbing or cyanosis  There is no edema  Symmetric    Neuro: Grossly nonfocal  Gait is normal      Lymphatic: No evidence of cervical adenopathy bilaterally  No evidence of axillary adenopathy bilaterally  Skin: Warm, anicteric  Psych:  Patient is pleasant and talkative  Breasts:        Pathology:  [unfilled]    Labs:      Imaging  CT chest abdomen pelvis w contrast    Result Date: 2/22/2023  Narrative: CT CHEST, ABDOMEN AND PELVIS WITH IV CONTRAST INDICATION:   C56 2: Malignant neoplasm of left ovary  Stage III C ovarian cancer being status post neoadjuvant chemotherapy and tumor debulking  Now undergoing adjuvant chemotherapy     Persistent intense episodes of rectal/pelvic pressure with ongoing  bleeding  COMPARISON:  Multiple prior examinations, most recently November 15, 2022 TECHNIQUE: CT examination of the chest, abdomen and pelvis was performed  Axial, sagittal, and coronal 2D reformatted images were created from the source data and submitted for interpretation  Radiation dose length product (DLP) for this visit:  915 03 mGy-cm   This examination, like all CT scans performed in the [facility], was performed utilizing techniques to minimize radiation dose exposure, including the use of iterative  reconstruction and automated exposure control  IV Contrast:  100 mL of iohexol (OMNIPAQUE) Enteric Contrast: Enteric contrast was administered  FINDINGS: CHEST LUNGS:  Lungs are clear  There is no tracheal or endobronchial lesion  PLEURA:  Unremarkable  HEART/GREAT VESSELS: Heart is unremarkable for patient's age  No thoracic aortic aneurysm  MEDIASTINUM AND DERREK:  Unremarkable  CHEST WALL AND LOWER NECK:  Right chest port  No axillary lymphadenopathy  ABDOMEN LIVER/BILIARY TREE:  Evolving postsurgical changes between the right hemidiaphragm and the dome of the right hepatic lobe with continued decrease in the size of postsurgical fluid collection now approximately 4 1 x 2 4 cm compared with approximately 6 6 x 3 9 cm when measured using similar technique on November 15, 2022    Again noted is diffusely decreased density hepatic parenchyma consistent with hepatic steatosis  Hepatic cysts in segment 4A, 9 mm on image 96 and 12 mm on image 98 are unchanged over  multiple prior examinations  No new parenchymal mass or surface implant identified  GALLBLADDER:  There are gallstone(s) within the gallbladder, without pericholecystic inflammatory changes  SPLEEN:  Surgical changes of splenectomy  Persistent fluid collection in the splenectomy space and is inseparable from the posterior wall of the gastric fundus, 6 8 x 5 5 cm, slightly increased from 5 9 x 5 3 cm on the previous examination now with increased wall enhancement  PANCREAS:  Unremarkable  ADRENAL GLANDS:  Unremarkable  KIDNEYS/URETERS:  Symmetric nephrographic enhancement  No solid renal mass or urinary tract calculus, or hydronephrosis  STOMACH AND BOWEL:  Right lower quadrant ileostomy  Anastomotic staple line at the rectosigmoid junction  No bowel wall thickening  No bowel obstruction  Nonobstructed loops of small bowel within the right lower quadrant and a parastomal hernia  Larger segment of small bowel protrudes into this parastomal hernia when compared with the previous examination  Mild complex fluid distention of the rectum  APPENDIX:  No findings to suggest appendicitis  ABDOMINOPELVIC CAVITY:  No ascites  No pneumoperitoneum  No significant lymphadenopathy  No developing peritoneal mass identified  VESSELS:  Mild atherosclerotic changes  Circumaortic left renal vein  PELVIS REPRODUCTIVE ORGANS:  Expected surgical changes of hysterectomy and bilateral oophorectomy  No suspicious mass at the vaginal cuff  Few bubbles of gas within the nondistended vagina  URINARY BLADDER:  Unremarkable  ABDOMINAL WALL/INGUINAL REGIONS:  Seroma in the deep midline lower pelvic incision, 1 5 x 1 2 cm on image 194 series 2  Parastomal hernia as described above  OSSEOUS STRUCTURES:  No acute fracture or destructive osseous lesion  Spinal degenerative changes  Impression: No new or enlarging recurrent or metastatic tumor mass identified in the chest, abdomen or pelvis  Evolving postsurgical changes  Specifically, decreasing size of seroma interposed between the right hemidiaphragm and the dome of the right hepatic lobe  Increasing size of seroma in the splenectomy space and along the posterior wall of the gastric fundus/greater curvature  Developing small midline lower body wall seroma  Significantly longer segment of nonobstructed small bowel protrudes into a right lower quadrant parastomal hernia  Rectum is mildly distended with a small volume of complex fluid which could represent bruising from internal hemorrhoids  Otherwise no CT abnormality to account for the reported clinical history of ongoing either rectal or vaginal bleeding  Workstation performed: [id number]     I reviewed the above laboratory and imaging data  Discussion/Summary: 55-year-old female status post cytoreduction for ovarian carcinoma  She has a functioning ileostomy  We discussed reversal of the ileostomy  I explained the risks including bleeding, infection, need for further surgery, wound complications, adjacent organ injury, anastomotic leak, sepsis, DVT, stroke, pulmonary embolism, and death  Informed consent was obtained  We will schedule this at our earliest mutual convenience  In regard to the fluid collection in the left upper quadrant, this may be a small pseudocyst   I doubt this is recurrent tumor or an abscess  She is asymptomatic  Since this was increasing in size on her CT I would consider internal drainage rather than an IR drain that has already been scheduled  I have reached out to gastroenterology as well as Dr Beth Cruz to make sure that he is okay with this approach  Prior to the ileostomy reversal I would like to make sure the anastomosis is patent  She did have a rigid proctoscopy that showed this was okay    I have reached out to Dr Jez Castro regarding any further instrumentation such as flexible sigmoidoscopy  I will see her again at the time of surgery  All of her questions were answered

## 2023-03-06 ENCOUNTER — TELEPHONE (OUTPATIENT)
Dept: SURGICAL ONCOLOGY | Facility: CLINIC | Age: 73
End: 2023-03-06

## 2023-03-06 DIAGNOSIS — Z93.2 ILEOSTOMY, HAS CURRENTLY (HCC): Primary | ICD-10-CM

## 2023-03-07 ENCOUNTER — OFFICE VISIT (OUTPATIENT)
Dept: GASTROENTEROLOGY | Facility: CLINIC | Age: 73
End: 2023-03-07

## 2023-03-07 VITALS
DIASTOLIC BLOOD PRESSURE: 70 MMHG | SYSTOLIC BLOOD PRESSURE: 118 MMHG | BODY MASS INDEX: 27.31 KG/M2 | WEIGHT: 160 LBS | HEIGHT: 64 IN

## 2023-03-07 DIAGNOSIS — C48.2 PERITONEAL CARCINOMA (HCC): ICD-10-CM

## 2023-03-07 DIAGNOSIS — Z86.010 PERSONAL HISTORY OF COLONIC POLYPS: ICD-10-CM

## 2023-03-07 DIAGNOSIS — E73.1 ACQUIRED LACTASE DEFICIENCY: Primary | ICD-10-CM

## 2023-03-07 DIAGNOSIS — K21.9 GASTROESOPHAGEAL REFLUX DISEASE, UNSPECIFIED WHETHER ESOPHAGITIS PRESENT: ICD-10-CM

## 2023-03-07 NOTE — LETTER
March 7, 2023     Ninfa Harris, [facility]  [address]    Patient: Toribio Branham   YOB: 1950   Date of Visit: 3/7/2023       Dear Dr Franklin Man:    Thank you for referring Sarina Medina to me for evaluation  Below are my notes for this consultation  If you have questions, please do not hesitate to call me  I look forward to following your patient along with you  Sincerely,        George Barragan MD        CC: No Recipients  George Barragan MD  3/7/2023  5:59 PM  Incomplete  [address] [facility] - Outpatient Follow-up Note  Toribio Branham 67 y o  female MRN: [id number]  Encounter: [id number]    ASSESSMENT AND PLAN:      1  Acquired lactase deficiency  Patient with lactose intolerance manifested as gassiness and looser stools  Markedly improved with avoidance of lactose  Suspect this was acquired during chemotherapy  Discussed lactase enzyme replacement  · Low lactose diet  · Lactase replacement enzyme with any dairy intake    2  Gastroesophageal reflux disease, unspecified whether esophagitis present  Clinically stable, no dysphagia or bleeding  · Continue with reflux precautions    3  Personal history of colonic polyps  Up-to-date with colonoscopic surveillance  Last colonoscopy July 2022  · Next colonoscopy due July 2027    4  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[address] )  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  Status post chemo and surgical intervention  Ileocolic anastomosis planned in the near future along with additional chemotherapy  Has follow-up with oncology, surgical oncology, GYN oncology  Followup Appointment: 1 year or sooner as needed  ______________________________________________________________________    Chief Complaint   Patient presents with   • yearly checkup had colonoscopy 7 28 22     HPI:  Diagnosed with metastatic ovarian CA  Had LIN, splenectomy, omentectomy and section of colon, diverting ileostomy  Had chemo, surgery then chemo again  Planning on reversal of ileostomy  Little rectal mucus/discharge  Had episode of rectal bleeding, now resolved, ?hemorrhoidal   Eating OK, though not big appetite  Keeping hydrated  Has a lot of gassiness and bloating  Noted to going dairy free decreased symptoms  Discussed lactase deficiency  Historical Information   Past Medical History:   Diagnosis Date   • Abdominal pain     off and on   • Allergic sinusitis     "seasonal allergies"-has received allergy shots "   • Anxiety     with current diagnosis   • Arthritis    • Asthma    • Cholelithiasis    • Colon polyp    • Degenerative joint disease    • Dental bridge present     permanent lower left   • Exercise involving walking     1-2x/week   • GERD (gastroesophageal reflux disease)    • Giant cell aortic arteritis ([facility] [address] )     "hereditary Giant Cell Temporal Arteritis"per pt   • Hiatal hernia    • History of cancer chemotherapy    • Hyperlipidemia    • Hypertension    • Nausea    • 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[facility] [address] )     "gynecologic malignacy"   • PMR (polymyalgia rheumatica) (HCC)    • Prediabetes    • Shortness of breath     per pt "asthma related --with climbing mult  flights of stairs--or exertion -not new"   • Tinnitus    • Wears glasses      Past Surgical History:   Procedure Laterality Date   • COLONOSCOPY      October 2021: Adenomatous polyps and rectum in transverse colon, sigmoid diverticulosis, internal hemorrhoids  October 2016 diverticulosis and internal hemorrhoids, September 2011 diverticulosis and internal hemorrhoids     • COLOPROCTECTOMY N/A 10/28/2022    Procedure: PROCTECTOMY;  Surgeon: Richar Mcmahon MD;  [facility];  Service: Surgical Oncology   • DILATION AND CURETTAGE OF UTERUS     • ILEO LOOP DIVERSION N/A 10/28/2022    Procedure: ILEOSTOMY LOOP;  Surgeon: Richar Mcmahon MD;  [facility];  Service: Surgical Oncology   • IR PORT PLACEMENT  12/8/2022   • LIVER SURGERY  10/28/2022 Procedure: LIVER CONTROL OF BLEED ;  Surgeon: Jojo Terry MD;  [facility];  Service: Surgical Oncology   • IL COLECTOMY PARTIAL W/ANASTOMOSIS N/A 10/28/2022    Procedure: RESECTION COLON LEFT;  Surgeon: Carrie Izaguirre MD;  [facility];  Service: Gynecology Oncology   • IL EXPLORATORY LAPAROTOMY CELIOTOMY W/WO BIOPSY [facility] N/A 10/28/2022    Procedure: LAPAROTOMY EXPLORATORY;  Surgeon: Carrie Izaguirre MD;  [facility];  Service: Gynecology Oncology   • IL LAPS ABD PRTM&OMENTUM DX W/WO [address] [facility] [address] SPX N/A 08/04/2022    Procedure: DIAGNOSTIC LAPAROSCOPY, PERITONEAL BIOPSY, SAMPLING OF BLOODY ASCITES ;  Surgeon: Thao Rahman MD;  [facility];  Service: Gynecology Oncology   • IL LAPS ABD PRTM&OMENTUM DX W/WO [address] [facility] [address]/ [facility] N/A 10/28/2022    Procedure: LAPAROSCOPY DIAGNOSTIC;  Surgeon: Carrie Izaguirre MD;  [facility];  Service: Gynecology Oncology   • IL TOTAL ABDOMINAL HYSTERECT W/WO RMVL TUBE OVARY N/A 10/28/2022    Procedure: HYSTERECTOMY MODIFIED RADICAL, BILATERAL SALPINGO-OOPHORECTOMY, GASTROCOLIC OMENTECTOMY, DIAPHRAGM RESECTION, EXCISION AND ABLATION OF PERITONEAL TUMORS;  Surgeon: Carrie Izaguirre MD;  [facility];  Service: Gynecology Oncology   • SMALL INTESTINE SURGERY N/A 10/28/2022    Procedure: RESECTION SMALL BOWEL;  Surgeon: Carrie Izaguirre MD;  [facility];  Service: Gynecology Oncology   • SPLENECTOMY, TOTAL N/A 10/28/2022    Procedure: SPLENECTOMY;  Surgeon: Carrie Izaguirre MD;  [facility];  Service: Gynecology Oncology   • TEMPORAL ARTERY BIOPSY / LIGATION     • UPPER GASTROINTESTINAL ENDOSCOPY  11/10/2014    November 2014 irregular Z-line and Schatzki ring, hiatal hernia, gastritis  Biopsies negative for celiac, H pylori, or Phillip's or eosinophilic esophagitis     • WISDOM TOOTH EXTRACTION       Social History     Substance and Sexual Activity   Alcohol Use Yes Comment: a glass of wine maybe 2 times a month     Social History     Substance and Sexual Activity   Drug Use Never     Social History     Tobacco Use   Smoking Status Never   Smokeless Tobacco Never     Family History   Problem Relation Age of Onset   • Colon polyps Mother    • Alzheimer's disease Mother    • Heart disease Father    • Brain cancer Father    • Colon polyps Sister    • Heart disease Sister    • Diabetes Sister    • MARLEY disease Sister    • Colon cancer Cousin          Current Outpatient Medications:   •  Acetaminophen (TYLENOL ARTHRITIS PAIN PO)  •  albuterol (PROVENTIL HFA,VENTOLIN HFA) 90 mcg/act inhaler  •  aspirin (ECOTRIN LOW STRENGTH) 81 mg EC tablet  •  B Complex-C (B COMPLEX-VITAMIN C PO)  •  Calcium Carb-Cholecalciferol (CALCIUM 500+D3 PO)  •  cholecalciferol (VITAMIN D3) 1,000 units tablet  •  famotidine (PEPCID) 40 MG tablet  •  fexofenadine (ALLEGRA) 180 MG tablet  •  GLUCOSAMINE-CHONDROITIN PO  •  lidocaine-prilocaine (EMLA) cream  •  LORazepam (ATIVAN) 1 mg tablet  •  metFORMIN (GLUCOPHAGE) 500 mg tablet  •  metoprolol succinate (TOPROL-XL) 25 mg 24 hr tablet  •  montelukast (SINGULAIR) 10 mg tablet  •  multivitamin (THERAGRAN) TABS  •  omeprazole (PriLOSEC) 20 mg delayed release capsule  •  ondansetron (ZOFRAN) 8 mg tablet  •  predniSONE 1 mg tablet  •  Probiotic Product (PROBIOTIC DAILY PO)  •  simvastatin (ZOCOR) 10 mg tablet  •  SYMBICORT 160-4 5 MCG/ACT inhaler  •  Ascorbic Acid (VITAMIN C GUMMIE PO)  •  betamethasone, augmented, (DIPROLENE) 0 05 % lotion  •  cephalexin (KEFLEX) 500 mg capsule  •  Docusate Calcium (STOOL SOFTENER PO)  •  escitalopram (LEXAPRO) 10 mg tablet  •  FIBER ADULT GUMMIES PO  •  lisinopril (ZESTRIL) 5 mg tablet  Allergies   Allergen Reactions   • Lactose - Food Allergy GI Intolerance   • Nsaids Other (See Comments)      pt is avoiding per family doctor instructions-  Able to take Tylenol   • Pedi-Pre Tape Spray [Wound Dressing Adhesive] Other (See Comments) Please use sensitive skin tape, pt gets bad bruising from strong medical adhesive tape  Reviewed medications and allergies and updated as indicated    PHYSICAL EXAM:    Blood pressure 118/70, height 5' 4" (1 626 m), weight 72 6 kg (160 lb)  Body mass index is 27 46 kg/m²  General Appearance: NAD, cooperative, alert  Eyes: Anicteric, PERRLA, EOMI  ENT:  Normocephalic, atraumatic, normal mucosa  Neck:  Supple, symmetrical, trachea midline  Resp:  Clear to auscultation bilaterally; no rales, rhonchi or wheezing; respirations unlabored   CV:  S1 S2, Regular rate and rhythm; no murmur, rub, or gallop  GI:  Soft, non-tender, mild distended; normal bowel sounds; no masses, no organomegaly, ostomy with green stool and gas   Rectal: Deferred  Musculoskeletal: No cyanosis, clubbing or edema  Normal ROM  Skin:  No jaundice, rashes, or lesions   Heme/Lymph: No palpable cervical lymphadenopathy  Psych: Normal affect, good eye contact  Neuro: No gross deficits, AAOx3    Lab Results:   Lab Results   Component Value Date    WBC 12 89 (H) 03/02/2023    HGB 10 1 (L) 03/02/2023    HCT 32 3 (L) 03/02/2023     (H) 03/02/2023     03/02/2023     Lab Results   Component Value Date    K 4 1 03/02/2023     03/02/2023    CO2 27 03/02/2023    BUN 13 03/02/2023    CREATININE 0 76 03/02/2023    GLUCOSE 214 (H) 10/28/2022    GLUF 161 (H) 12/08/2022    CALCIUM 9 0 03/02/2023    CORRECTEDCA 9 0 02/09/2023    AST 18 03/02/2023    ALT 15 03/02/2023    ALKPHOS 80 03/02/2023    EGFR 78 03/02/2023     No results found for: IRON, TIBC, FERRITIN  No results found for: LIPASE    Radiology Results:   CT chest abdomen pelvis w contrast    Result Date: 2/22/2023  Narrative: CT CHEST, ABDOMEN AND PELVIS WITH IV CONTRAST INDICATION:   C56 2: Malignant neoplasm of left ovary  Stage III C ovarian cancer being status post neoadjuvant chemotherapy and tumor debulking  Now undergoing adjuvant chemotherapy     Persistent intense episodes of rectal/pelvic pressure with ongoing  bleeding  COMPARISON:  Multiple prior examinations, most recently November 15, 2022 TECHNIQUE: CT examination of the chest, abdomen and pelvis was performed  Axial, sagittal, and coronal 2D reformatted images were created from the source data and submitted for interpretation  Radiation dose length product (DLP) for this visit:  915 03 mGy-cm   This examination, like all CT scans performed in the [facility], was performed utilizing techniques to minimize radiation dose exposure, including the use of iterative  reconstruction and automated exposure control  IV Contrast:  100 mL of iohexol (OMNIPAQUE) Enteric Contrast: Enteric contrast was administered  FINDINGS: CHEST LUNGS:  Lungs are clear  There is no tracheal or endobronchial lesion  PLEURA:  Unremarkable  HEART/GREAT VESSELS: Heart is unremarkable for patient's age  No thoracic aortic aneurysm  MEDIASTINUM AND DERREK:  Unremarkable  CHEST WALL AND LOWER NECK:  Right chest port  No axillary lymphadenopathy  ABDOMEN LIVER/BILIARY TREE:  Evolving postsurgical changes between the right hemidiaphragm and the dome of the right hepatic lobe with continued decrease in the size of postsurgical fluid collection now approximately 4 1 x 2 4 cm compared with approximately 6 6 x 3 9 cm when measured using similar technique on November 15, 2022  Again noted is diffusely decreased density hepatic parenchyma consistent with hepatic steatosis  Hepatic cysts in segment 4A, 9 mm on image 96 and 12 mm on image 98 are unchanged over  multiple prior examinations  No new parenchymal mass or surface implant identified  GALLBLADDER:  There are gallstone(s) within the gallbladder, without pericholecystic inflammatory changes  SPLEEN:  Surgical changes of splenectomy    Persistent fluid collection in the splenectomy space and is inseparable from the posterior wall of the gastric fundus, 6 8 x 5 5 cm, slightly increased from 5 9 x 5 3 cm on the previous examination now with increased wall enhancement  PANCREAS:  Unremarkable  ADRENAL GLANDS:  Unremarkable  KIDNEYS/URETERS:  Symmetric nephrographic enhancement  No solid renal mass or urinary tract calculus, or hydronephrosis  STOMACH AND BOWEL:  Right lower quadrant ileostomy  Anastomotic staple line at the rectosigmoid junction  No bowel wall thickening  No bowel obstruction  Nonobstructed loops of small bowel within the right lower quadrant and a parastomal hernia  Larger segment of small bowel protrudes into this parastomal hernia when compared with the previous examination  Mild complex fluid distention of the rectum  APPENDIX:  No findings to suggest appendicitis  ABDOMINOPELVIC CAVITY:  No ascites  No pneumoperitoneum  No significant lymphadenopathy  No developing peritoneal mass identified  VESSELS:  Mild atherosclerotic changes  Circumaortic left renal vein  PELVIS REPRODUCTIVE ORGANS:  Expected surgical changes of hysterectomy and bilateral oophorectomy  No suspicious mass at the vaginal cuff  Few bubbles of gas within the nondistended vagina  URINARY BLADDER:  Unremarkable  ABDOMINAL WALL/INGUINAL REGIONS:  Seroma in the deep midline lower pelvic incision, 1 5 x 1 2 cm on image 194 series 2  Parastomal hernia as described above  OSSEOUS STRUCTURES:  No acute fracture or destructive osseous lesion  Spinal degenerative changes  Impression: No new or enlarging recurrent or metastatic tumor mass identified in the chest, abdomen or pelvis  Evolving postsurgical changes  Specifically, decreasing size of seroma interposed between the right hemidiaphragm and the dome of the right hepatic lobe  Increasing size of seroma in the splenectomy space and along the posterior wall of the gastric fundus/greater curvature  Developing small midline lower body wall seroma   Significantly longer segment of nonobstructed small bowel protrudes into a right lower quadrant parastomal hernia  Rectum is mildly distended with a small volume of complex fluid which could represent bruising from internal hemorrhoids  Otherwise no CT abnormality to account for the reported clinical history of ongoing either rectal or vaginal bleeding  Workstation performed: [id number]     Opal Garcia MD  3/7/2023  4:07 PM  Sign when Signing Visit  Diagnosed with metastatic ovarian CA  Had LIN, splenectomy, omentaecomy and section of colon, diverting ileostomy  Had chemo, surgery then chemo agoian  Planning on reversal of ileostomy  Little rectal mucus/discharge  Had episode of rectal bleeding, now resolved, ?hemorrhoidal   Eating OK, though not big appetite  Keeping hydrated

## 2023-03-07 NOTE — PROGRESS NOTES
[facility] - Outpatient Follow-up Note  Katharine Brar 67 y o  female MRN: [id number]  Encounter: [id number]    ASSESSMENT AND PLAN:      1  Acquired lactase deficiency  Patient with lactose intolerance manifested as gassiness and looser stools  Markedly improved with avoidance of lactose  Suspect this was acquired during chemotherapy  Discussed lactase enzyme replacement  · Low lactose diet  · Lactase replacement enzyme with any dairy intake    2  Gastroesophageal reflux disease, unspecified whether esophagitis present  Clinically stable, no dysphagia or bleeding  · Continue with reflux precautions    3  Personal history of colonic polyps  Up-to-date with colonoscopic surveillance  Last colonoscopy July 2022  · Next colonoscopy due July 2027    4  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[facility] [address] )  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  Status post chemo and surgical intervention  Ileocolic anastomosis planned in the near future along with additional chemotherapy  Has follow-up with oncology, surgical oncology, GYN oncology  Followup Appointment: 1 year or sooner as needed  ______________________________________________________________________    Chief Complaint   Patient presents with   • yearly checkup had colonoscopy 7 28 22     HPI:  Diagnosed with metastatic ovarian CA  Had LIN, splenectomy, omentectomy and section of colon, diverting ileostomy  Had chemo, surgery then chemo again  Planning on reversal of ileostomy  Little rectal mucus/discharge  Had episode of rectal bleeding, now resolved, ?hemorrhoidal   Eating OK, though not big appetite  Keeping hydrated  Has a lot of gassiness and bloating  Noted to going dairy free decreased symptoms  Discussed lactase deficiency      Historical Information   Past Medical History:   Diagnosis Date   • Abdominal pain     off and on   • Allergic sinusitis     "seasonal allergies"-has received allergy shots "   • Anxiety     with current diagnosis   • Arthritis    • Asthma    • Cholelithiasis    • Colon polyp    • Degenerative joint disease    • Dental bridge present     permanent lower left   • Exercise involving walking     1-2x/week   • GERD (gastroesophageal reflux disease)    • Giant cell [facility] arter[facility] )     "hereditary Giant Cell Temporal Arteritis"per pt   • Hiatal hernia    • History of cancer chemotherapy    • Hyperlipidemia    • Hypertension    • Nausea    • 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[facility] )     "gynecologic malignacy"   • PMR (polymyalgia rheumatica) (HCC)    • Prediabetes    • Shortness of breath     per pt "asthma related --with climbing mult  flights of stairs--or exertion -not new"   • Tinnitus    • Wears glasses      Past Surgical History:   Procedure Laterality Date   • COLONOSCOPY      October 2021: Adenomatous polyps and rectum in transverse colon, sigmoid diverticulosis, internal hemorrhoids  October 2016 diverticulosis and internal hemorrhoids, September 2011 diverticulosis and internal hemorrhoids     • COLOPROCTECTOMY N/A 10/28/2022    Procedure: PROCTECTOMY;  Surgeon: Mila Alvarez MD;  [facility];  Service: Surgical Oncology   • DILATION AND CURETTAGE OF UTERUS     • ILEO LOOP DIVERSION N/A 10/28/2022    Procedure: ILEOSTOMY LOOP;  Surgeon: Mila Alvarez MD;  [facility];  Service: Surgical Oncology   • IR PORT PLACEMENT  12/8/2022   • LIVER SURGERY  10/28/2022    Procedure: LIVER CONTROL OF BLEED ;  Surgeon: Mila Alvarez MD;  [facility];  Service: Surgical Oncology   • IA COLECTOMY PARTIAL W/ANASTOMOSIS N/A 10/28/2022    Procedure: RESECTION COLON LEFT;  Surgeon: Jet Hills MD;  [facility];  Service: Gynecology Oncology   • IA EXPLORATORY LAPAROTOMY CELIOTOMY W/WO BIOPSY [address] [place] [address] N/A 10/28/2022    Procedure: LAPAROTOMY EXPLORATORY;  Surgeon: Jet Hills MD;  [facility];  Service: Gynecology Oncology   • IA LAPS ABD PRTM&OMENTUM DX W/WO SPEC BR/WA SPX N/A 08/04/2022    Procedure: DIAGNOSTIC LAPAROSCOPY, PERITONEAL BIOPSY, SAMPLING OF BLOODY ASCITES ;  Surgeon: Cathi Bah MD;  [facility];  Service: Gynecology Oncology   • NE LAPS ABD PRTM&OMENTUM DX W/WO [address]/ [facility] N/A 10/28/2022    Procedure: LAPAROSCOPY DIAGNOSTIC;  Surgeon: Johanny Kennedy MD;  [facility];  Service: Gynecology Oncology   • NE TOTAL ABDOMINAL HYSTERECT W/WO RMVL TUBE OVARY N/A 10/28/2022    Procedure: HYSTERECTOMY MODIFIED RADICAL, BILATERAL SALPINGO-OOPHORECTOMY, GASTROCOLIC OMENTECTOMY, DIAPHRAGM RESECTION, EXCISION AND ABLATION OF PERITONEAL TUMORS;  Surgeon: Johanny Kennedy MD;  [facility];  Service: Gynecology Oncology   • SMALL INTESTINE SURGERY N/A 10/28/2022    Procedure: RESECTION SMALL BOWEL;  Surgeon: Johanny Kennedy MD;  [facility];  Service: Gynecology Oncology   • SPLENECTOMY, TOTAL N/A 10/28/2022    Procedure: SPLENECTOMY;  Surgeon: Johanny Kennedy MD;  [facility];  Service: Gynecology Oncology   • TEMPORAL ARTERY BIOPSY / LIGATION     • UPPER GASTROINTESTINAL ENDOSCOPY  11/10/2014    November 2014 irregular Z-line and Schatzki ring, hiatal hernia, gastritis  Biopsies negative for celiac, H pylori, or Phillip's or eosinophilic esophagitis     • WISDOM TOOTH EXTRACTION       Social History     Substance and Sexual Activity   Alcohol Use Yes    Comment: a glass of wine maybe 2 times a month     Social History     Substance and Sexual Activity   Drug Use Never     Social History     Tobacco Use   Smoking Status Never   Smokeless Tobacco Never     Family History   Problem Relation Age of Onset   • Colon polyps Mother    • Alzheimer's disease Mother    • Heart disease Father    • Brain cancer Father    • Colon polyps Sister    • Heart disease Sister    • Diabetes Sister    • MARLEY disease Sister    • Colon cancer Cousin          Current Outpatient Medications:   • Acetaminophen (TYLENOL ARTHRITIS PAIN PO)  •  albuterol (PROVENTIL HFA,VENTOLIN HFA) 90 mcg/act inhaler  •  aspirin (ECOTRIN LOW STRENGTH) 81 mg EC tablet  •  B Complex-C (B COMPLEX-VITAMIN C PO)  •  Calcium Carb-Cholecalciferol (CALCIUM 500+D3 PO)  •  cholecalciferol (VITAMIN D3) 1,000 units tablet  •  famotidine (PEPCID) 40 MG tablet  •  fexofenadine (ALLEGRA) 180 MG tablet  •  GLUCOSAMINE-CHONDROITIN PO  •  lidocaine-prilocaine (EMLA) cream  •  LORazepam (ATIVAN) 1 mg tablet  •  metFORMIN (GLUCOPHAGE) 500 mg tablet  •  metoprolol succinate (TOPROL-XL) 25 mg 24 hr tablet  •  montelukast (SINGULAIR) 10 mg tablet  •  multivitamin (THERAGRAN) TABS  •  omeprazole (PriLOSEC) 20 mg delayed release capsule  •  ondansetron (ZOFRAN) 8 mg tablet  •  predniSONE 1 mg tablet  •  Probiotic Product (PROBIOTIC DAILY PO)  •  simvastatin (ZOCOR) 10 mg tablet  •  SYMBICORT 160-4 5 MCG/ACT inhaler  •  Ascorbic Acid (VITAMIN C GUMMIE PO)  •  betamethasone, augmented, (DIPROLENE) 0 05 % lotion  •  cephalexin (KEFLEX) 500 mg capsule  •  Docusate Calcium (STOOL SOFTENER PO)  •  escitalopram (LEXAPRO) 10 mg tablet  •  FIBER ADULT GUMMIES PO  •  lisinopril (ZESTRIL) 5 mg tablet  Allergies   Allergen Reactions   • Lactose - Food Allergy GI Intolerance   • Nsaids Other (See Comments)      pt is avoiding per family doctor instructions-  Able to take Tylenol   • Pedi-Pre Tape Spray [Wound Dressing Adhesive] Other (See Comments)     Please use sensitive skin tape, pt gets bad bruising from strong medical adhesive tape  Reviewed medications and allergies and updated as indicated    PHYSICAL EXAM:    Blood pressure 118/70, height 5' 4" (1 626 m), weight 72 6 kg (160 lb)  Body mass index is 27 46 kg/m²  General Appearance: NAD, cooperative, alert  Eyes: Anicteric, PERRLA, EOMI  ENT:  Normocephalic, atraumatic, normal mucosa      Neck:  Supple, symmetrical, trachea midline  Resp:  Clear to auscultation bilaterally; no rales, rhonchi or wheezing; respirations unlabored   CV:  S1 S2, Regular rate and rhythm; no murmur, rub, or gallop  GI:  Soft, non-tender, mild distended; normal bowel sounds; no masses, no organomegaly, ostomy with green stool and gas   Rectal: Deferred  Musculoskeletal: No cyanosis, clubbing or edema  Normal ROM  Skin:  No jaundice, rashes, or lesions   Heme/Lymph: No palpable cervical lymphadenopathy  Psych: Normal affect, good eye contact  Neuro: No gross deficits, AAOx3    Lab Results:   Lab Results   Component Value Date    WBC 12 89 (H) 03/02/2023    HGB 10 1 (L) 03/02/2023    HCT 32 3 (L) 03/02/2023     (H) 03/02/2023     03/02/2023     Lab Results   Component Value Date    K 4 1 03/02/2023     03/02/2023    CO2 27 03/02/2023    BUN 13 03/02/2023    CREATININE 0 76 03/02/2023    GLUCOSE 214 (H) 10/28/2022    GLUF 161 (H) 12/08/2022    CALCIUM 9 0 03/02/2023    CORRECTEDCA 9 0 02/09/2023    AST 18 03/02/2023    ALT 15 03/02/2023    ALKPHOS 80 03/02/2023    EGFR 78 03/02/2023     No results found for: IRON, TIBC, FERRITIN  No results found for: LIPASE    Radiology Results:   CT chest abdomen pelvis w contrast    Result Date: 2/22/2023  Narrative: CT CHEST, ABDOMEN AND PELVIS WITH IV CONTRAST INDICATION:   C56 2: Malignant neoplasm of left ovary  Stage III C ovarian cancer being status post neoadjuvant chemotherapy and tumor debulking  Now undergoing adjuvant chemotherapy     Persistent intense episodes of rectal/pelvic pressure with ongoing  bleeding  COMPARISON:  Multiple prior examinations, most recently November 15, 2022 TECHNIQUE: CT examination of the chest, abdomen and pelvis was performed  Axial, sagittal, and coronal 2D reformatted images were created from the source data and submitted for interpretation  Radiation dose length product (DLP) for this visit:  915 03 mGy-cm     This examination, like all CT scans performed in the [facility], was performed utilizing techniques to minimize radiation dose exposure, including the use of iterative  reconstruction and automated exposure control  IV Contrast:  100 mL of iohexol (OMNIPAQUE) Enteric Contrast: Enteric contrast was administered  FINDINGS: CHEST LUNGS:  Lungs are clear  There is no tracheal or endobronchial lesion  PLEURA:  Unremarkable  HEART/GREAT VESSELS: Heart is unremarkable for patient's age  No thoracic aortic aneurysm  MEDIASTINUM AND DERREK:  Unremarkable  CHEST WALL AND LOWER NECK:  Right chest port  No axillary lymphadenopathy  ABDOMEN LIVER/BILIARY TREE:  Evolving postsurgical changes between the right hemidiaphragm and the dome of the right hepatic lobe with continued decrease in the size of postsurgical fluid collection now approximately 4 1 x 2 4 cm compared with approximately 6 6 x 3 9 cm when measured using similar technique on November 15, 2022  Again noted is diffusely decreased density hepatic parenchyma consistent with hepatic steatosis  Hepatic cysts in segment 4A, 9 mm on image 96 and 12 mm on image 98 are unchanged over  multiple prior examinations  No new parenchymal mass or surface implant identified  GALLBLADDER:  There are gallstone(s) within the gallbladder, without pericholecystic inflammatory changes  SPLEEN:  Surgical changes of splenectomy  Persistent fluid collection in the splenectomy space and is inseparable from the posterior wall of the gastric fundus, 6 8 x 5 5 cm, slightly increased from 5 9 x 5 3 cm on the previous examination now with increased wall enhancement  PANCREAS:  Unremarkable  ADRENAL GLANDS:  Unremarkable  KIDNEYS/URETERS:  Symmetric nephrographic enhancement  No solid renal mass or urinary tract calculus, or hydronephrosis  STOMACH AND BOWEL:  Right lower quadrant ileostomy  Anastomotic staple line at the rectosigmoid junction  No bowel wall thickening  No bowel obstruction  Nonobstructed loops of small bowel within the right lower quadrant and a parastomal hernia    Larger segment of small bowel protrudes into this parastomal hernia when compared with the previous examination  Mild complex fluid distention of the rectum  APPENDIX:  No findings to suggest appendicitis  ABDOMINOPELVIC CAVITY:  No ascites  No pneumoperitoneum  No significant lymphadenopathy  No developing peritoneal mass identified  VESSELS:  Mild atherosclerotic changes  Circumaortic left renal vein  PELVIS REPRODUCTIVE ORGANS:  Expected surgical changes of hysterectomy and bilateral oophorectomy  No suspicious mass at the vaginal cuff  Few bubbles of gas within the nondistended vagina  URINARY BLADDER:  Unremarkable  ABDOMINAL WALL/INGUINAL REGIONS:  Seroma in the deep midline lower pelvic incision, 1 5 x 1 2 cm on image 194 series 2  Parastomal hernia as described above  OSSEOUS STRUCTURES:  No acute fracture or destructive osseous lesion  Spinal degenerative changes  Impression: No new or enlarging recurrent or metastatic tumor mass identified in the chest, abdomen or pelvis  Evolving postsurgical changes  Specifically, decreasing size of seroma interposed between the right hemidiaphragm and the dome of the right hepatic lobe  Increasing size of seroma in the splenectomy space and along the posterior wall of the gastric fundus/greater curvature  Developing small midline lower body wall seroma  Significantly longer segment of nonobstructed small bowel protrudes into a right lower quadrant parastomal hernia  Rectum is mildly distended with a small volume of complex fluid which could represent bruising from internal hemorrhoids  Otherwise no CT abnormality to account for the reported clinical history of ongoing either rectal or vaginal bleeding   Workstation performed: [id number]

## 2023-03-07 NOTE — PATIENT INSTRUCTIONS
Low lactose diet  Lactase replacement enzyme with any dairy intake  Continue with reflux precautions

## 2023-03-08 ENCOUNTER — HOSPITAL ENCOUNTER (OUTPATIENT)
Dept: INFUSION CENTER | Facility: HOSPITAL | Age: 73
Discharge: HOME | End: 2023-03-08

## 2023-03-08 DIAGNOSIS — Z51.89 ENCOUNTER FOR OTHER SPECIFIED AFTERCARE: ICD-10-CM

## 2023-03-08 DIAGNOSIS — R79.1 ABNORMAL COAGULATION PROFILE: ICD-10-CM

## 2023-03-08 DIAGNOSIS — Z45.2 ENCOUNTER FOR VENOUS ACCESS DEVICE CARE: Primary | ICD-10-CM

## 2023-03-08 DIAGNOSIS — C56.2 MALIGNANT NEOPLASM OF LEFT OVARY (HCC): ICD-10-CM

## 2023-03-08 DIAGNOSIS — K91.2 POSTSURGICAL MALABSORPTION, NOT ELSEWHERE CLASSIFIED: ICD-10-CM

## 2023-03-08 LAB
ABO GROUP BLD: NORMAL
ALBUMIN SERPL BCP-MCNC: 3.9 G/DL (ref 3.5–5)
ALP SERPL-CCNC: 80 U/L (ref 34–104)
ALT SERPL W P-5'-P-CCNC: 13 U/L (ref 7–52)
ANION GAP SERPL CALCULATED.3IONS-SCNC: 8 MMOL/L (ref 4–13)
APTT PPP: 28 SECONDS (ref 23–37)
AST SERPL W P-5'-P-CCNC: 15 U/L (ref 13–39)
BASOPHILS # BLD AUTO: 0.04 THOUSANDS/ÂΜL (ref 0–0.1)
BASOPHILS NFR BLD AUTO: 0 % (ref 0–1)
BILIRUB SERPL-MCNC: 0.33 MG/DL (ref 0.2–1)
BLD GP AB SCN SERPL QL: NEGATIVE
BUN SERPL-MCNC: 9 MG/DL (ref 5–25)
CALCIUM SERPL-MCNC: 9.2 MG/DL (ref 8.4–10.2)
CHLORIDE SERPL-SCNC: 102 MMOL/L (ref 96–108)
CO2 SERPL-SCNC: 26 MMOL/L (ref 21–32)
CREAT SERPL-MCNC: 0.57 MG/DL (ref 0.6–1.3)
EOSINOPHIL # BLD AUTO: 0.03 THOUSAND/ÂΜL (ref 0–0.61)
EOSINOPHIL NFR BLD AUTO: 0 % (ref 0–6)
ERYTHROCYTE [DISTWIDTH] IN BLOOD BY AUTOMATED COUNT: 18.2 % (ref 11.6–15.1)
EST. AVERAGE GLUCOSE BLD GHB EST-MCNC: 137 MG/DL
GFR SERPL CREATININE-BSD FRML MDRD: 92 ML/MIN/1.73SQ M
GLUCOSE SERPL-MCNC: 115 MG/DL (ref 65–140)
HBA1C MFR BLD: 6.4 %
HCT VFR BLD AUTO: 33.9 % (ref 34.8–46.1)
HGB BLD-MCNC: 10.9 G/DL (ref 11.5–15.4)
IMM GRANULOCYTES # BLD AUTO: 0.08 THOUSAND/UL (ref 0–0.2)
IMM GRANULOCYTES NFR BLD AUTO: 1 % (ref 0–2)
INR PPP: 0.92 (ref 0.84–1.19)
LYMPHOCYTES # BLD AUTO: 1.28 THOUSANDS/ÂΜL (ref 0.6–4.47)
LYMPHOCYTES NFR BLD AUTO: 10 % (ref 14–44)
MCH RBC QN AUTO: 31.9 PG (ref 26.8–34.3)
MCHC RBC AUTO-ENTMCNC: 32.2 G/DL (ref 31.4–37.4)
MCV RBC AUTO: 99 FL (ref 82–98)
MONOCYTES # BLD AUTO: 1.52 THOUSAND/ÂΜL (ref 0.17–1.22)
MONOCYTES NFR BLD AUTO: 12 % (ref 4–12)
NEUTROPHILS # BLD AUTO: 9.81 THOUSANDS/ÂΜL (ref 1.85–7.62)
NEUTS SEG NFR BLD AUTO: 77 % (ref 43–75)
NRBC BLD AUTO-RTO: 0 /100 WBCS
PLATELET # BLD AUTO: 381 THOUSANDS/UL (ref 149–390)
PMV BLD AUTO: 9.3 FL (ref 8.9–12.7)
POTASSIUM SERPL-SCNC: 4 MMOL/L (ref 3.5–5.3)
PROT SERPL-MCNC: 7.4 G/DL (ref 6.4–8.4)
PROTHROMBIN TIME: 13 SECONDS (ref 11.6–14.5)
RBC # BLD AUTO: 3.42 MILLION/UL (ref 3.81–5.12)
RH BLD: POSITIVE
SODIUM SERPL-SCNC: 136 MMOL/L (ref 135–147)
SPECIMEN EXPIRATION DATE: NORMAL
WBC # BLD AUTO: 12.76 THOUSAND/UL (ref 4.31–10.16)

## 2023-03-08 NOTE — PROGRESS NOTES
Pt arrived on unit for lab draw  Chest port accessed, labs drawn, port deaccessed  Pt states she will call to schedule next appt for flush  Pt exited unit with steady gait for dc home

## 2023-03-10 ENCOUNTER — APPOINTMENT (OUTPATIENT)
Dept: CT IMAGING | Facility: HOSPITAL | Age: 73
End: 2023-03-10
Attending: OBSTETRICS & GYNECOLOGY

## 2023-03-10 ENCOUNTER — HOSPITAL ENCOUNTER (OUTPATIENT)
Dept: RADIOLOGY | Facility: HOSPITAL | Age: 73
End: 2023-03-10
Attending: SURGERY

## 2023-03-10 DIAGNOSIS — Z93.2 ILEOSTOMY, HAS CURRENTLY (HCC): ICD-10-CM

## 2023-03-10 RX ADMIN — DIATRIZOATE MEGLUMINE AND DIATRIZOATE SODIUM 360 ML: 660; 100 LIQUID ORAL; RECTAL at 12:44

## 2023-03-14 ENCOUNTER — HOSPITAL ENCOUNTER (OUTPATIENT)
Dept: CT IMAGING | Facility: HOSPITAL | Age: 73
Discharge: HOME | End: 2023-03-14
Attending: RADIOLOGY

## 2023-03-14 VITALS
HEART RATE: 100 BPM | SYSTOLIC BLOOD PRESSURE: 123 MMHG | WEIGHT: 160 LBS | TEMPERATURE: 97.2 F | RESPIRATION RATE: 18 BRPM | HEIGHT: 64 IN | OXYGEN SATURATION: 99 % | BODY MASS INDEX: 27.31 KG/M2 | DIASTOLIC BLOOD PRESSURE: 56 MMHG

## 2023-03-14 DIAGNOSIS — T88.8XXA FLUID COLLECTION AT SURGICAL SITE, INITIAL ENCOUNTER: ICD-10-CM

## 2023-03-14 DIAGNOSIS — Z90.81 S/P SPLENECTOMY: ICD-10-CM

## 2023-03-14 RX ORDER — CEFAZOLIN SODIUM 1 G/50ML
1000 SOLUTION INTRAVENOUS ONCE
Status: DISCONTINUED | OUTPATIENT
Start: 2023-03-14 | End: 2023-03-15 | Stop reason: HOSPADM

## 2023-03-14 RX ORDER — METRONIDAZOLE 500 MG/100ML
500 INJECTION, SOLUTION INTRAVENOUS ONCE
Status: DISCONTINUED | OUTPATIENT
Start: 2023-03-14 | End: 2023-03-15 | Stop reason: HOSPADM

## 2023-03-16 ENCOUNTER — TELEPHONE (OUTPATIENT)
Age: 73
End: 2023-03-16

## 2023-03-16 ENCOUNTER — PREP FOR PROCEDURE (OUTPATIENT)
Age: 73
End: 2023-03-16

## 2023-03-16 DIAGNOSIS — K62.5 RECTAL BLEEDING: Primary | ICD-10-CM

## 2023-03-16 NOTE — TELEPHONE ENCOUNTER
Per Dr Tucker Villalba, pt is to have flex sig   3/17/2023 TR aware, BMI 27    Pt having Ileostomy on 3/20/2023 with Dr Jumana Alves

## 2023-03-17 ENCOUNTER — ANESTHESIA EVENT (OUTPATIENT)
Dept: GASTROENTEROLOGY | Facility: AMBULARY SURGERY CENTER | Age: 73
End: 2023-03-17

## 2023-03-17 ENCOUNTER — ANESTHESIA (OUTPATIENT)
Dept: GASTROENTEROLOGY | Facility: AMBULARY SURGERY CENTER | Age: 73
End: 2023-03-17

## 2023-03-17 ENCOUNTER — TELEPHONE (OUTPATIENT)
Dept: OBGYN CLINIC | Facility: OTHER | Age: 73
End: 2023-03-17

## 2023-03-17 ENCOUNTER — HOSPITAL ENCOUNTER (OUTPATIENT)
Dept: GASTROENTEROLOGY | Facility: AMBULARY SURGERY CENTER | Age: 73
Setting detail: OUTPATIENT SURGERY
Discharge: HOME | End: 2023-03-17
Attending: COLON & RECTAL SURGERY

## 2023-03-17 ENCOUNTER — TELEPHONE (OUTPATIENT)
Dept: SURGICAL ONCOLOGY | Facility: CLINIC | Age: 73
End: 2023-03-17

## 2023-03-17 VITALS
TEMPERATURE: 98.2 F | DIASTOLIC BLOOD PRESSURE: 62 MMHG | HEART RATE: 83 BPM | OXYGEN SATURATION: 98 % | SYSTOLIC BLOOD PRESSURE: 106 MMHG | RESPIRATION RATE: 18 BRPM

## 2023-03-17 DIAGNOSIS — K62.5 RECTAL BLEEDING: ICD-10-CM

## 2023-03-17 RX ORDER — PROPOFOL 10 MG/ML
INJECTION, EMULSION INTRAVENOUS AS NEEDED
Status: DISCONTINUED | OUTPATIENT
Start: 2023-03-17 | End: 2023-03-17

## 2023-03-17 RX ORDER — SODIUM CHLORIDE, SODIUM LACTATE, POTASSIUM CHLORIDE, CALCIUM CHLORIDE 600; 310; 30; 20 MG/100ML; MG/100ML; MG/100ML; MG/100ML
INJECTION, SOLUTION INTRAVENOUS CONTINUOUS PRN
Status: DISCONTINUED | OUTPATIENT
Start: 2023-03-17 | End: 2023-03-17

## 2023-03-17 RX ORDER — LIDOCAINE HYDROCHLORIDE 10 MG/ML
INJECTION, SOLUTION EPIDURAL; INFILTRATION; INTRACAUDAL; PERINEURAL AS NEEDED
Status: DISCONTINUED | OUTPATIENT
Start: 2023-03-17 | End: 2023-03-17

## 2023-03-17 RX ADMIN — SODIUM CHLORIDE, SODIUM LACTATE, POTASSIUM CHLORIDE, AND CALCIUM CHLORIDE: .6; .31; .03; .02 INJECTION, SOLUTION INTRAVENOUS at 11:47

## 2023-03-17 RX ADMIN — PROPOFOL 60 MG: 10 INJECTION, EMULSION INTRAVENOUS at 12:00

## 2023-03-17 RX ADMIN — PROPOFOL 20 MG: 10 INJECTION, EMULSION INTRAVENOUS at 12:01

## 2023-03-17 RX ADMIN — PROPOFOL 30 MG: 10 INJECTION, EMULSION INTRAVENOUS at 12:07

## 2023-03-17 RX ADMIN — PROPOFOL 30 MG: 10 INJECTION, EMULSION INTRAVENOUS at 12:03

## 2023-03-17 RX ADMIN — PROPOFOL 20 MG: 10 INJECTION, EMULSION INTRAVENOUS at 12:05

## 2023-03-17 RX ADMIN — LIDOCAINE HYDROCHLORIDE 50 MG: 10 INJECTION, SOLUTION EPIDURAL; INFILTRATION; INTRACAUDAL; PERINEURAL at 12:00

## 2023-03-17 NOTE — ANESTHESIA POSTPROCEDURE EVALUATION
Post-Op Assessment Note    CV Status:  Stable  Pain Score: 0    Pain management: adequate     Mental Status:  Alert and awake   Hydration Status:  Euvolemic   PONV Controlled:  Controlled   Airway Patency:  Patent and adequate      Post Op Vitals Reviewed: Yes      Staff: CRNA         No notable events documented      BP   105/57   Temp      Pulse 80   Resp 20   SpO2 98

## 2023-03-17 NOTE — TELEPHONE ENCOUNTER
I discussed her sigmoidoscopy findings  There is an 8 mm sinus cavity going to a nearly 6 cm contained collection  I discussed that I am slightly concerned about simply reversing her ostomy  I think with an 8 mm sinus tract, I would be concerned of a clinically significant leak without some type of revision of the anastomosis prior  I will see her in the office next week to discuss, but I would tentatively plan on revision of the anastomosis and then confirm healing in 6 weeks and then takedown her ostomy and repair the hernia  I will see her next week to discuss further  All of her questions were answered

## 2023-03-17 NOTE — TELEPHONE ENCOUNTER
Call Transfer   Reason for patient call? Pt called to ask if Dr Nahun Carpenter wanted to proceed with surgery on 3/20/23  If someone other than patient is calling, are they listed on the communication consent? N/A   Who is the patients Primary Oncologist? Dr Nahun Carpenter    Person/Department that the call was transferred to? Time that call was transferred? [facility]    Informed patient that the message will be forwarded to the team and someone will get back to them as soon as possible  Did you relay this information to the patient?  yes

## 2023-03-17 NOTE — H&P
Consultation  Colon and Rectal Surgery   Rosamaria Crooks 67 y o  female MRN: [id number]  Unit/Bed#:  Encounter: [id number]  03/17/23   12:10 PM          CC: Ovarian cancer  S/P ileostomy and colorectal anastomosis  She had an abnormal CT and Barium enema, at the anastomosis  HPI:  Rosamaria Crooks is a 67 y o  female who presents with a plan for stoma closure  She has a question of abnormality at the rectal anastomosis  I viewed her operative report including the portion specific to the anastomosis  Since her operation last year, she was on chemotherapy and Avastin  She had periods of bleeding from the rectum with the therapy  An evaluation with proctoscopy in the office suggested an open anastomosis  There was no significant source of bleeding seen at the time  During her recent work-up in preparation for closure of ileostomy, the patient was found to have gas around the anastomosis on CT scan  This is in the area of the sacral promontory  I viewed the study and see obvious air collection around the colorectal anastomosis  Very little inflammation is associated with this air collection  Additionally, she had a barium study that reveals a leak at the anastomosis  I am asked to evaluate the patient to clarify any anastomotic leak or other findings that would prohibit ileostomy closure      Historical Information   Past Medical History:   Diagnosis Date   • Abdominal pain     off and on   • Allergic sinusitis     "seasonal allergies"-has received allergy shots "   • Anxiety     with current diagnosis   • Arthritis    • Asthma    • Cholelithiasis    • Colon polyp    • Degenerative joint disease    • Dental bridge present     permanent lower left   • Exercise involving walking     1-2x/week   • GERD (gastroesophageal reflux disease)    • [facility]     "hereditary Giant Cell Temporal Arteritis"per pt   • Hiatal hernia    • History of cancer chemotherapy    • History of transfusion    • Hyperlipidemia    • Hypertension    • Nausea    • Peritoneal carcinoma [facility])     "gynecologic malignacy"   • PMR (polymyalgia rheumatica) (HCC)    • Prediabetes    • Shortness of breath     per pt "asthma related --with climbing mult  flights of stairs--or exertion -not new"   • Tinnitus    • Wears glasses      Past Surgical History:   Procedure Laterality Date   • COLONOSCOPY      October 2021: Adenomatous polyps and rectum in transverse colon, sigmoid diverticulosis, internal hemorrhoids  October 2016 diverticulosis and internal hemorrhoids, September 2011 diverticulosis and internal hemorrhoids     • COLOPROCTECTOMY N/A 10/28/2022    Procedure: PROCTECTOMY;  Surgeon: Jin Mccloud MD;  [facility];  Service: Surgical Oncology   • DILATION AND CURETTAGE OF UTERUS     • ILEO LOOP DIVERSION N/A 10/28/2022    Procedure: ILEOSTOMY LOOP;  Surgeon: Jin Mccloud MD;  [facility];  Service: Surgical Oncology   • IR PORT PLACEMENT  12/8/2022   • LIVER SURGERY  10/28/2022    Procedure: LIVER CONTROL OF BLEED ;  Surgeon: Jin Mccloud MD;  [facility];  Service: Surgical Oncology   • PA COLECTOMY PARTIAL W/ANASTOMOSIS N/A 10/28/2022    Procedure: RESECTION COLON LEFT;  Surgeon: Carson Eckert MD;  [facility];  Service: Gynecology Oncology   • PA EXPLORATORY LAPAROTOMY CELIOTOMY W/WO BIOPSY [address] [place] [address] N/A 10/28/2022    Procedure: LAPAROTOMY EXPLORATORY;  Surgeon: Carson Eckert MD;  [facility];  Service: Gynecology Oncology   • PA LAPS ABD PRTM&OMENTUM DX W/WO [facility] BR/WA [address] [place] [address] N/A 08/04/2022    Procedure: DIAGNOSTIC LAPAROSCOPY, PERITONEAL BIOPSY, SAMPLING OF BLOODY ASCITES ;  Surgeon: Shakira Fitzpatrick MD;  [facility];  Service: Gynecology Oncology   • PA LAPS ABD PRTM&OMENTUM DX W/WO [facility] BR/WA [address] [place] [address] N/A 10/28/2022    Procedure: LAPAROSCOPY DIAGNOSTIC;  Surgeon: Carson Eckert MD;  [facility];  Service: Gynecology Oncology   • DE TOTAL ABDOMINAL HYSTERECT W/WO RMVL TUBE OVARY N/A 10/28/2022    Procedure: HYSTERECTOMY MODIFIED RADICAL, BILATERAL SALPINGO-OOPHORECTOMY, GASTROCOLIC OMENTECTOMY, DIAPHRAGM RESECTION, EXCISION AND ABLATION OF PERITONEAL TUMORS;  Surgeon: Shana Cameron MD;  [facility];  Service: Gynecology Oncology   • SMALL INTESTINE SURGERY N/A 10/28/2022    Procedure: RESECTION SMALL BOWEL;  Surgeon: Shana Cameron MD;  [facility];  Service: Gynecology Oncology   • SPLENECTOMY, TOTAL N/A 10/28/2022    Procedure: SPLENECTOMY;  Surgeon: Shana Cameron MD;  [facility];  Service: Gynecology Oncology   • TEMPORAL ARTERY BIOPSY / LIGATION     • UPPER GASTROINTESTINAL ENDOSCOPY  11/10/2014    November 2014 irregular Z-line and Schatzki ring, hiatal hernia, gastritis  Biopsies negative for celiac, H pylori, or Phillip's or eosinophilic esophagitis     • WISDOM TOOTH EXTRACTION         Meds/Allergies     (Not in a hospital admission)        Current Outpatient Medications:   •  Acetaminophen (TYLENOL ARTHRITIS PAIN PO), Take by mouth as needed, Disp: , Rfl:   •  Ascorbic Acid (VITAMIN C GUMMIE PO), Take by mouth, Disp: , Rfl:   •  aspirin (ECOTRIN LOW STRENGTH) 81 mg EC tablet, Take 81 mg by mouth daily, Disp: , Rfl:   •  B Complex-C (B COMPLEX-VITAMIN C PO), Take by mouth daily, Disp: , Rfl:   •  Calcium Carb-Cholecalciferol (CALCIUM 500+D3 PO), Take 1 tablet by mouth 2 (two) times a day , Disp: , Rfl:   •  cholecalciferol (VITAMIN D3) 1,000 units tablet, Take 1,000 Units by mouth daily, Disp: , Rfl:   •  Docusate Calcium (STOOL SOFTENER PO), Take 1 tablet by mouth 2 (two) times a day, Disp: , Rfl:   •  famotidine (PEPCID) 40 MG tablet, TAKE 1 TABLET BY MOUTH DAILY AT BEDTIME, Disp: 90 tablet, Rfl: 3  •  fexofenadine (ALLEGRA) 180 MG tablet, Take 180 mg by mouth daily, Disp: , Rfl:   •  GLUCOSAMINE-CHONDROITIN PO, Take 1 tablet by mouth 2 (two) times a day , Disp: , Rfl:   •  lidocaine-prilocaine (EMLA) cream, Apply topically as needed for mild pain, Disp: 30 g, Rfl: 0  •  LORazepam (ATIVAN) 1 mg tablet, Take 1 tablet (1 mg total) by mouth every 8 (eight) hours as needed for anxiety (nausea or anxiety), Disp: 30 tablet, Rfl: 0  •  metFORMIN (GLUCOPHAGE) 500 mg tablet, Take 500 mg by mouth daily Daily at lunch, Disp: , Rfl:   •  metoprolol succinate (TOPROL-XL) 25 mg 24 hr tablet, Take 25 mg by mouth daily dinner, Disp: , Rfl:   •  montelukast (SINGULAIR) 10 mg tablet, Take 10 mg by mouth daily dinner, Disp: , Rfl:   •  multivitamin (THERAGRAN) TABS, Take 1 tablet by mouth daily, Disp: , Rfl:   •  nystatin (MYCOSTATIN) powder, Apply topically 4 (four) times a day Under abdominal pannus and in between thighs, Disp: , Rfl:   •  omeprazole (PriLOSEC) 20 mg delayed release capsule, TAKE 1 CAPSULE BY MOUTH EVERY DAY 30 MINUTES BEFORE MORNING MEAL FOR 90 DAYS, Disp: , Rfl:   •  ondansetron (ZOFRAN) 8 mg tablet, Take 1 tablet (8 mg total) by mouth every 8 (eight) hours as needed for nausea or vomiting, Disp: 20 tablet, Rfl: 1  •  predniSONE 1 mg tablet, Take 2 mg by mouth daily 2 mg, Disp: , Rfl:   •  Probiotic Product (PROBIOTIC DAILY PO), Take by mouth daily, Disp: , Rfl:   •  simvastatin (ZOCOR) 10 mg tablet, Take 10 mg by mouth daily at bedtime, Disp: , Rfl:   •  SYMBICORT 160-4 5 MCG/ACT inhaler, 2 puffs daily , Disp: , Rfl:   •  albuterol (PROVENTIL HFA,VENTOLIN HFA) 90 mcg/act inhaler, Inhale 2 puffs every 4 (four) hours as needed , Disp: , Rfl:   No current facility-administered medications for this encounter  Allergies   Allergen Reactions   • Lactose - Food Allergy GI Intolerance   • Nsaids Other (See Comments)      pt is avoiding per family doctor instructions-  Able to take Tylenol   • Pedi-Pre Tape Spray [Wound Dressing Adhesive] Other (See Comments)     Please use sensitive skin tape, pt gets bad bruising from strong medical adhesive tape            Social History   Social History     Substance and Sexual Activity   Alcohol Use Yes    Comment: a glass of wine maybe 2 times a month     Social History     Substance and Sexual Activity   Drug Use Never     Social History     Tobacco Use   Smoking Status Never   Smokeless Tobacco Never         Family History:   Family History   Problem Relation Age of Onset   • Colon polyps Mother    • Alzheimer's disease Mother    • Heart disease Father    • Brain cancer Father    • Colon polyps Sister    • Heart disease Sister    • Diabetes Sister    • MARLEY disease Sister    • Colon cancer Cousin          Objective     Current Vitals:   Blood Pressure: 124/63 (03/17/23 1053)  Pulse: 84 (03/17/23 1053)  Temperature: 98 2 °F (36 8 °C) (03/17/23 1053)  Temp Source: Temporal (03/17/23 1053)  Respirations: 22 (03/17/23 1053)  SpO2: 98 % (03/17/23 1053)    Intake/Output Summary (Last 24 hours) at 3/17/2023 1210  Last data filed at 3/17/2023 1209  Gross per 24 hour   Intake 300 ml   Output --   Net 300 ml       Physical Exam:  General:  A&O, NAD  Eyes: anicteric  Pulm:  CTA  CV:RRR   Abdomen: Soft, flat, NT  Rectal: Done during the sigmoidoscopy procedure  Extremities: All 4s ok            Imaging: I have personally reviewed pertinent reports  ASSESSMENT:  Elaina Deluca is a 67 y o  female who presents with a question of an anastomotic leak  PLAN:  Flexible sigmoidoscopy  Consideration of dilation of a stricture  Colonoscopy risks, not limited to bleeding, perforated colon, need for surgery, and missed lesions were discussed  Alternatives were discussed  Questions were answered  She agreed to the procedure       Medications per primary physician        Consults

## 2023-03-17 NOTE — ANESTHESIA PREPROCEDURE EVALUATION
Procedure:  FLEXIBLE SIGMOIDOSCOPY    Relevant Problems   CARDIO   (+) Hyperlipidemia   (+) Hypertension      GI/HEPATIC   (+) Gastroesophageal reflux disease   (+) Rectal bleeding      GYN   (+) Malignant neoplasm of left ovary (HCC)   (+) S/P total abdominal hysterectomy      HEMATOLOGY   (+) Acute blood loss anemia (ABLA)      NEURO/PSYCH   (+) Anxiety disorder due to medical condition   (+) Personal history of colonic polyps      PULMONARY   (+) Asthma      Lab Results   Component Value Date    WBC 12 76 (H) 03/08/2023    HGB 10 9 (L) 03/08/2023    HCT 33 9 (L) 03/08/2023    MCV 99 (H) 03/08/2023     03/08/2023       Physical Exam    Airway    Mallampati score: II  TM Distance: >3 FB  Neck ROM: full     Dental   No notable dental hx     Cardiovascular  Rhythm: regular, Rate: normal,     Pulmonary  Breath sounds clear to auscultation,     Other Findings  Intercisor Distance > 3cm          Anesthesia Plan  ASA Score- 2     Anesthesia Type- IV sedation with anesthesia with ASA Monitors  Additional Monitors:   Airway Plan:     Comment: NPO appropriate  Discussed benefits/risks of monitored anesthetic care which involves providing a dynamic level of mild to deep sedation  Complications include awareness and/or airway obstruction/aspiration which may necessitate conversion to general anesthesia  All questions answered  Patient understands and wishes to proceed          Plan Factors-Exercise tolerance (METS): >4 METS  Chart reviewed  EKG reviewed  Existing labs reviewed  Induction-     Postoperative Plan- Plan for postoperative opioid use  Informed Consent- Anesthetic plan and risks discussed with patient  I personally reviewed this patient with the CRNA  Discussed and agreed on the Anesthesia Plan with the CRNA  Karrie Nissen

## 2023-03-21 ENCOUNTER — OFFICE VISIT (OUTPATIENT)
Dept: SURGICAL ONCOLOGY | Facility: CLINIC | Age: 73
End: 2023-03-21

## 2023-03-21 VITALS
DIASTOLIC BLOOD PRESSURE: 82 MMHG | RESPIRATION RATE: 18 BRPM | WEIGHT: 157.2 LBS | TEMPERATURE: 97.4 F | HEIGHT: 64 IN | SYSTOLIC BLOOD PRESSURE: 126 MMHG | BODY MASS INDEX: 26.84 KG/M2 | OXYGEN SATURATION: 97 % | HEART RATE: 86 BPM

## 2023-03-21 DIAGNOSIS — C56.2 MALIGNANT NEOPLASM OF LEFT OVARY (HCC): Primary | ICD-10-CM

## 2023-03-21 DIAGNOSIS — Z93.2 ILEOSTOMY, HAS CURRENTLY (HCC): ICD-10-CM

## 2023-03-21 DIAGNOSIS — C57.9 GYNECOLOGIC MALIGNANCY (HCC): ICD-10-CM

## 2023-03-21 RX ORDER — LORAZEPAM 1 MG/1
1 TABLET ORAL EVERY 8 HOURS PRN
Qty: 30 TABLET | Refills: 0 | Status: ON HOLD | OUTPATIENT
Start: 2023-03-21

## 2023-03-21 RX ORDER — METRONIDAZOLE 500 MG/100ML
500 INJECTION, SOLUTION INTRAVENOUS ONCE
Status: CANCELLED | OUTPATIENT
Start: 2023-03-21

## 2023-03-21 RX ORDER — CEFAZOLIN SODIUM 2 G/50ML
2000 SOLUTION INTRAVENOUS ONCE
Status: CANCELLED | OUTPATIENT
Start: 2023-03-21

## 2023-03-21 NOTE — PROGRESS NOTES
Surgical Oncology Follow Up       [address] [facility] [place]  [address]  [facility] [address] [id number] [phone]    Arther Riser  1950  [id number]  [address] [facility]  [address]  [facility] [address] [id number] [phone]    Diagnoses and all orders for this visit:    Malignant neoplasm of left ovary ([address] )    Ileostomy, has currently [facility])        No chief complaint on file  No follow-ups on file  Oncology History   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[address] )   7/21/2022 Initial Diagnosis    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, serous    Results revealed patient has the following mutation(s):  None     Malignant neoplasm of left ovary (HCC)   8/4/2022 Surgery    Diagnostic laparoscopy, peritoneal biopsy     8/9/2022 Initial Diagnosis    Gynecologic malignancy ([address] )     8/23/2022 -  Chemotherapy    palonosetron (ALOXI), 0 25 mg, Intravenous, Once, 6 of 6 cycles  Administration: 0 25 mg (8/23/2022), 0 25 mg (9/13/2022), 0 25 mg (10/4/2022), 0 25 mg (12/13/2022), 0 25 mg (1/3/2023), 0 25 mg (1/24/2023)  fosaprepitant (EMEND) IVPB, 150 mg, Intravenous, Once, 6 of 6 cycles  Administration: 150 mg (8/23/2022), 150 mg (9/13/2022), 150 mg (10/4/2022), 150 mg (12/13/2022), 150 mg (1/3/2023), 150 mg (1/24/2023)  CARBOplatin (PARAPLATIN) IVPB ([facility] AUC DOSING), 605 4 mg, Intravenous, Once, 6 of 6 cycles  Administration: 600 mg (8/23/2022), 564 mg (9/13/2022), 601 8 mg (10/4/2022), 491 5 mg (12/13/2022), 468 5 mg (1/3/2023), 488 mg (1/24/2023)  bevacizumab (AVASTIN) IVPB, 1,252 5 mg, Intravenous, Once, 3 of 3 cycles  Dose modification: 10 mg/kg (original dose 15 mg/kg, Cycle 5, Reason: Other (Must fill in a comment))  Administration: 1,200 mg (8/23/2022), 1,200 mg (9/13/2022), 745 mg (1/3/2023)  PACLItaxel (TAXOL) chemo IVPB, 175 mg/m2 = 330 6 mg, Intravenous, Once, 6 of 6 cycles  Dose modification: 135 mg/m2 (original dose 175 mg/m2, Cycle 4, Reason: Other (Must fill in a comment))  Administration: 330 6 mg (8/23/2022), 330 6 mg (9/13/2022), 330 6 mg (10/4/2022), 246 mg (12/13/2022), 244 2 mg (1/3/2023), 244 2 mg (1/24/2023)     10/28/2022 Surgery    diagnostic laparoscopy, exploratory laparotomy, modified radical hysterectomy, bilateral salpingo-oophorectomy with en bloc rectosigmoid resection, gastrocolic omentectomy, splenectomy, small bowel resection with reanastomosis, diaphragm resection, excision ablation of peritoneal implants, ileostomy formation, optimal tumor debulking     11/29/2022 -  Cancer Staged    Staging form: Ovary, Fallopian Tube, Primary Peritoneal, AJCC 8th Edition  - Clinical: FIGO Stage IIIC (cT3c, cN0, cM0) - Signed by Bev Christianson MD on 11/29/2022  Histologic grade (G): G3  Histologic grading system: 4 grade system               History of Present Illness: Returns in follow-up  Barium enema shows a stricture at the rectal anastomosis  Follow-up sigmoidoscopy revealed an anastomotic leak with a perianastomotic cavity that had an 8 mm opening  Sequently, her surgery was canceled  She comes in now to discuss revision of her anastomosis  Review of Systems  Complete ROS Surg Onc:   Complete ROS Surg Onc:   Constitutional: The patient denies new or recent history of general fatigue, no recent weight loss, no change in appetite  Eyes: No complaints of visual problems, no scleral icterus  ENT: no complaints of ear pain, no hoarseness, no difficulty swallowing,  no tinnitus and no new masses in head, oral cavity, or neck  Cardiovascular: No complaints of chest pain, no palpitations, no ankle edema  Respiratory: No complaints of shortness of breath, no cough     Gastrointestinal: No complaints of jaundice, no bloody stools, no pale stools  Genitourinary: No complaints of dysuria, no hematuria, no nocturia, no frequent urination, no urethral discharge  Musculoskeletal: No complaints of weakness, paralysis, joint stiffness or arthralgias  Integumentary: No complaints of rash, no new lesions  Neurological: No complaints of convulsions, no seizures, no dizziness  Hematologic/Lymphatic: No complaints of easy bruising  Endocrine:  No hot or cold intolerance  No polydipsia, polyphagia, or polyuria  Allergy/immunology:  No environmental allergies  No food allergies  Not immunocompromised  Skin:  No pallor or rash  No wound          Patient Active Problem List   Diagnosis   • Gastroesophageal reflux disease   • Constipation   • Hemorrhoids   • Family history of colonic polyps   • Asthma   • Hyperlipidemia   • Hypertension   • Lower abdominal pain   • Change in bowel habit   • Personal history of colonic polyps   • Long term (current) use of systemic steroids   • 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[place] [address] )   • Giant cell aortic arteritis ([place] [address] )   • Prediabetes   • Malignant neoplasm of left ovary (HCC)   • Anxiety disorder due to medical condition   • S/P total abdominal hysterectomy   • Ileostomy, has currently ([place] [address] )   • Acute blood loss anemia (ABLA)   • S/P splenectomy   • Hypokalemia   • Bandemia   • Wound infection after surgery   • Encounter for venous access device care   • Rectal bleeding     Past Medical History:   Diagnosis Date   • Abdominal pain     off and on   • Allergic sinusitis     "seasonal allergies"-has received allergy shots "   • Anxiety     with current diagnosis   • Arthritis    • Asthma    • Cholelithiasis    • Colon polyp    • Degenerative joint disease    • Dental bridge present     permanent lower left   • Exercise involving walking     1-2x/week   • GERD (gastroesophageal reflux disease)    • Giant cell aortic arteritis ([place] [address] )     "hereditary Giant Cell Temporal Arteritis"per pt   • Hiatal hernia    • History of cancer chemotherapy    • History of transfusion    • Hyperlipidemia    • Hypertension    • Nausea    • Peritoneal carcinoma [facility])     "gynecologic malignacy"   • PMR (polymyalgia rheumatica) (HCC)    • Prediabetes    • Shortness of breath     per pt "asthma related --with climbing mult  flights of stairs--or exertion -not new"   • Tinnitus    • Wears glasses      Past Surgical History:   Procedure Laterality Date   • COLONOSCOPY      October 2021: Adenomatous polyps and rectum in transverse colon, sigmoid diverticulosis, internal hemorrhoids  October 2016 diverticulosis and internal hemorrhoids, September 2011 diverticulosis and internal hemorrhoids     • COLOPROCTECTOMY N/A 10/28/2022    Procedure: PROCTECTOMY;  Surgeon: Will Henry MD;  [facility];  Service: Surgical Oncology   • DILATION AND CURETTAGE OF UTERUS     • ILEO LOOP DIVERSION N/A 10/28/2022    Procedure: ILEOSTOMY LOOP;  Surgeon: Will Henry MD;  [facility];  Service: Surgical Oncology   • IR PORT PLACEMENT  12/8/2022   • LIVER SURGERY  10/28/2022    Procedure: LIVER CONTROL OF BLEED ;  Surgeon: Will Henry MD;  [facility];  Service: Surgical Oncology   • SC COLECTOMY PARTIAL W/ANASTOMOSIS N/A 10/28/2022    Procedure: RESECTION COLON LEFT;  Surgeon: Meka Florse MD;  [facility];  Service: Gynecology Oncology   • SC EXPLORATORY LAPAROTOMY CELIOTOMY W/WO BIOPSY [address] [place] [address] N/A 10/28/2022    Procedure: LAPAROTOMY EXPLORATORY;  Surgeon: Meka Flores MD;  [facility];  Service: Gynecology Oncology   • SC LAPS ABD PRTM&OMENTUM DX W/WO [facility] BR/WA [address] [place] [address] N/A 08/04/2022    Procedure: DIAGNOSTIC LAPAROSCOPY, PERITONEAL BIOPSY, SAMPLING OF BLOODY ASCITES ;  Surgeon: Kermit Payne MD;  [facility];  Service: Gynecology Oncology   • SC LAPS ABD PRTM&OMENTUM DX W/WO [facility] BR/WA [address] [place] [address] N/A 10/28/2022    Procedure: LAPAROSCOPY DIAGNOSTIC;  Surgeon: Meka Flores MD; [facility];  Service: Gynecology Oncology   • NV TOTAL ABDOMINAL HYSTERECT W/WO RMVL TUBE OVARY N/A 10/28/2022    Procedure: HYSTERECTOMY MODIFIED RADICAL, BILATERAL SALPINGO-OOPHORECTOMY, GASTROCOLIC OMENTECTOMY, DIAPHRAGM RESECTION, EXCISION AND ABLATION OF PERITONEAL TUMORS;  Surgeon: Shelby Adorno MD;  [facility];  Service: Gynecology Oncology   • SMALL INTESTINE SURGERY N/A 10/28/2022    Procedure: RESECTION SMALL BOWEL;  Surgeon: Shelby Adorno MD;  [facility];  Service: Gynecology Oncology   • SPLENECTOMY, TOTAL N/A 10/28/2022    Procedure: SPLENECTOMY;  Surgeon: Shelby Adorno MD;  [facility];  Service: Gynecology Oncology   • TEMPORAL ARTERY BIOPSY / LIGATION     • UPPER GASTROINTESTINAL ENDOSCOPY  11/10/2014    November 2014 irregular Z-line and Schatzki ring, hiatal hernia, gastritis  Biopsies negative for celiac, H pylori, or Phillip's or eosinophilic esophagitis     • WISDOM TOOTH EXTRACTION       Family History   Problem Relation Age of Onset   • Colon polyps Mother    • Alzheimer's disease Mother    • Heart disease Father    • Brain cancer Father    • Colon polyps Sister    • Heart disease Sister    • Diabetes Sister    • MARLEY disease Sister    • Colon cancer Cousin      Social History     Socioeconomic History   • Marital status: /Civil Union     Spouse name: Not on file   • Number of children: Not on file   • Years of education: Not on file   • Highest education level: Not on file   Occupational History   • Not on file   Tobacco Use   • Smoking status: Never   • Smokeless tobacco: Never   Vaping Use   • Vaping Use: Never used   Substance and Sexual Activity   • Alcohol use: Yes     Comment: a glass of wine maybe 2 times a month   • Drug use: Never   • Sexual activity: Not Currently     Partners: Male     Birth control/protection: None     Comment: defer   Other Topics Concern   • Not on file   Social History Narrative   • Not on file     Social Determinants of Health     Financial Resource Strain: Not on file   Food Insecurity: Not on file   Transportation Needs: Not on file   Physical Activity: Not on file   Stress: Not on file   Social Connections: Not on file   Intimate Partner Violence: Not on file   Housing Stability: Not on file       Current Outpatient Medications:   •  Acetaminophen (TYLENOL ARTHRITIS PAIN PO), Take by mouth as needed, Disp: , Rfl:   •  albuterol (PROVENTIL HFA,VENTOLIN HFA) 90 mcg/act inhaler, Inhale 2 puffs every 4 (four) hours as needed , Disp: , Rfl:   •  Ascorbic Acid (VITAMIN C GUMMIE PO), Take by mouth, Disp: , Rfl:   •  aspirin (ECOTRIN LOW STRENGTH) 81 mg EC tablet, Take 81 mg by mouth daily, Disp: , Rfl:   •  B Complex-C (B COMPLEX-VITAMIN C PO), Take by mouth daily, Disp: , Rfl:   •  Calcium Carb-Cholecalciferol (CALCIUM 500+D3 PO), Take 1 tablet by mouth 2 (two) times a day , Disp: , Rfl:   •  cholecalciferol (VITAMIN D3) 1,000 units tablet, Take 1,000 Units by mouth daily, Disp: , Rfl:   •  Docusate Calcium (STOOL SOFTENER PO), Take 1 tablet by mouth 2 (two) times a day, Disp: , Rfl:   •  famotidine (PEPCID) 40 MG tablet, TAKE 1 TABLET BY MOUTH DAILY AT BEDTIME, Disp: 90 tablet, Rfl: 3  •  fexofenadine (ALLEGRA) 180 MG tablet, Take 180 mg by mouth daily, Disp: , Rfl:   •  GLUCOSAMINE-CHONDROITIN PO, Take 1 tablet by mouth 2 (two) times a day , Disp: , Rfl:   •  lidocaine-prilocaine (EMLA) cream, Apply topically as needed for mild pain, Disp: 30 g, Rfl: 0  •  LORazepam (ATIVAN) 1 mg tablet, Take 1 tablet (1 mg total) by mouth every 8 (eight) hours as needed for anxiety (nausea or anxiety), Disp: 30 tablet, Rfl: 0  •  metFORMIN (GLUCOPHAGE) 500 mg tablet, Take 500 mg by mouth daily Daily at lunch, Disp: , Rfl:   •  metoprolol succinate (TOPROL-XL) 25 mg 24 hr tablet, Take 25 mg by mouth daily dinner, Disp: , Rfl:   •  montelukast (SINGULAIR) 10 mg tablet, Take 10 mg by mouth daily dinner, Disp: , Rfl:   •  multivitamin (THERAGRAN) TABS, Take 1 tablet by mouth daily, Disp: , Rfl:   •  nystatin (MYCOSTATIN) powder, Apply topically 4 (four) times a day Under abdominal pannus and in between thighs, Disp: , Rfl:   •  omeprazole (PriLOSEC) 20 mg delayed release capsule, TAKE 1 CAPSULE BY MOUTH EVERY DAY 30 MINUTES BEFORE MORNING MEAL FOR 90 DAYS, Disp: , Rfl:   •  ondansetron (ZOFRAN) 8 mg tablet, Take 1 tablet (8 mg total) by mouth every 8 (eight) hours as needed for nausea or vomiting, Disp: 20 tablet, Rfl: 1  •  predniSONE 1 mg tablet, Take 2 mg by mouth daily 2 mg, Disp: , Rfl:   •  Probiotic Product (PROBIOTIC DAILY PO), Take by mouth daily, Disp: , Rfl:   •  simvastatin (ZOCOR) 10 mg tablet, Take 10 mg by mouth daily at bedtime, Disp: , Rfl:   •  SYMBICORT 160-4 5 MCG/ACT inhaler, 2 puffs daily , Disp: , Rfl:   Allergies   Allergen Reactions   • Lactose - Food Allergy GI Intolerance   • Nsaids Other (See Comments)      pt is avoiding per family doctor instructions-  Able to take Tylenol   • Pedi-Pre Tape Spray [Wound Dressing Adhesive] Other (See Comments)     Please use sensitive skin tape, pt gets bad bruising from strong medical adhesive tape  There were no vitals filed for this visit  Physical Exam  Constitutional: General appearance: The Patient is well-developed and well-nourished who appears the stated age in no acute distress  Patient is pleasant and talkative  HEENT:  Normocephalic  Sclerae are anicteric  Mucous membranes are moist  Neck is supple without adenopathy  No JVD  Chest: The lungs are clear to auscultation  Cardiac: Heart is regular rate  Abdomen: Abdomen is soft, non-tender, non-distended and without masses  Extremities: There is no clubbing or cyanosis  There is no edema  Symmetric  Neuro: Grossly nonfocal  Gait is normal      Lymphatic: No evidence of cervical adenopathy bilaterally  Skin: Warm, anicteric      Psych:  Patient is pleasant and talkative  Breasts:        Pathology:  [unfilled]    Labs:      Imaging  FL barium enema    Result Date: 3/10/2023  Narrative: FLUOROSCOPIC ENEMA  SINGLE CONTRAST INDICATION:  Stage III C ovarian cancer being status post chemotherapy and surgical tumor debulking  Right lower quadrant ileostomy  History of rectal/pelvic pressure with ongoing bleeding  COMPARISON:  Multiple prior CT examinations, most recently February 13, 2023 IMAGES: 11 FLUOROSCOPY TIME:   2 8 mins FINDINGS:  image is unremarkable  Rectal tube was inserted and water-soluble Gastrografin contrast was instilled  The patient had extreme difficulty holding contrast in the rectum, despite rectal balloon insufflation  However, with a number of attempts, the rectum was filled with contrast to the level of the anastomosis  Very little (no greater than a few cc) contrast could be instilled past the anastomosis  Contrast continued to leak out of the rectum onto the table around the balloon  Contrast was also seen to enter a sinus tract into a cavity situated along the ventral margin of the sacrum  In retrospect this cavity is visible on sagittal image 103 of series 602 of the February 13, 2023 examination  This circumscribed cavity contains air and fluid  On axial axial images of the February 2020 CT examinations demonstrates an appearance that suggests bowel loop; however this cavity is now shown to represent the contracted residua of a larger presacral collection visible in that location on November 15, 2022  At the conclusion of procedure, when the contrast was drained back into the enema bag, it was noted to be tinged with some blood  Impression: Extremely tight stricturing at the rectal anastomosis beyond which no greater than a few cc of rectal contrast could pass   Sinus tract connecting the posterior margin of the rectal anastomosis with a collapsed cavity along the ventral margin of the sacrum, consistent with the residua of a fluid collection seen in that location on November 15, 2022 CT  At the conclusion of procedure, when the contrast was drained back into the enema bag, it was noted to be tinged with some blood  I reported these results to David Kramer and Hetal Guzman via HIPAA compliant secure electronic messaging on 3/10/2023 at 2:25 PM  Workstation performed: [id number]     Flexible Sigmoidoscopy    Result Date: 3/17/2023  Narrative: Table formatting from the original result was not included  Gonzalo Davalos [address] [phone] DATE OF SERVICE: 3/17/23 PHYSICIAN(S): Attending: Radha Moses MD Fellow: No Staff Documented INDICATION: Rectal bleeding POST-OP DIAGNOSIS: See the impression below  HISTORY: Prior colonoscopy: Less than 3 years ago  It is being repeated at an interval of less than 3 years because: This colonoscopy is being performed for a diagnostic indication BOWEL PREPARATION:  PREPROCEDURE: Informed consent was obtained for the procedure, including sedation  Risks including but not limited to bleeding, infection, perforation, adverse drug reaction and aspiration were explained in detail  Also explained about less than 100% sensitivity with the exam and other alternatives  The patient was placed in the left lateral decubitus position  Procedure: Colonoscopy DETAILS OF PROCEDURE: Patient was taken to the procedure room where a time out was performed to confirm correct patient and correct procedure  The patient underwent monitored anesthesia care, which was administered by an anesthesia professional  The patient's blood pressure, heart rate, level of consciousness, oxygen and respirations were monitored throughout the procedure  A digital rectal exam was performed  The scope was introduced through the anus and advanced to the descending colon  The patient experienced no blood loss  The procedure was not difficult   The patient tolerated the procedure well  There were no apparent complications  Rectal anastomosis at 8 cm from the anal verge  ANESTHESIA INFORMATION: ASA: II Anesthesia Type: IV Sedation with Anesthesia MEDICATIONS: No administrations occurring from 1147 to 1212 on 03/17/23 FINDINGS: There is an anastomotic defect with a well-granulated surface, measuring about 8 mm diameter  Exudative fluid emanated from the opening but no significant inflammation was seen  All observed locations appeared normal, including the descending colon  Normal colonic and distal rectal mucosa  EVENTS: Procedure Events Event Event Time ENDO SCOPE OUT TIME 3/17/2023 12:09 PM SPECIMENS: * No specimens in log * EQUIPMENT: Colonoscope - [id number]     Impression: Anastomotic leak with ling-anastomotic cavity and wide aperture at the anastomotic leak RECOMMENDATION:  Repeat colon evaluation per Dr Patricia Dickson  Stoma closure plan discussed via [place] Text with Dr Patricia Francois MD     I reviewed the above laboratory and imaging data  Discussion/Summary: 77-year-old female status post cytoreduction for ovarian carcinoma  Her ileostomy is functioning  I am concerned about just reversing her ileostomy with an 8 mm sinus draining into a walled off cavity  I would be concerned that this may cause worsening of the anastomotic disruption or fecal spillage  I would recommend that she undergo revision of this anastomosis with a repeat low anterior resection  Assuming this heals well, I would then plan on reversing her ostomy  She will require a repeat sigmoidoscopy prior to reversal of the ostomy  I explained the risks including bleeding, infection, recurrence, need for further surgery, wound complications, adjacent organ injury, anastomotic leak, sepsis, MI, DVT, stroke, pulmonary embolism, and death  Informed consent was obtained  We will schedule this at our earliest mutual convenience    I am not sure that this defect can be closed endoscopically based on its size   She is agreeable to this  All her questions were answered

## 2023-03-21 NOTE — H&P (VIEW-ONLY)
Surgical Oncology Follow Up       [address] [facility] BETEHEM  [address] [facility] [place] [address] [phone]    [facility]  1950  [id number]  [address] [facility] CANCER [facility] Tiara Fernandez  [address] [place] [address]  Tiara Fernandez [place] [address] [phone]    Diagnoses and all orders for this visit:    Malignant neoplasm of left ovary ([facility] )    Ileostomy, has currently 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, serous    Results revealed patient has the following mutation(s):  None     Malignant neoplasm of left ovary (HCC)   8/4/2022 Surgery    Diagnostic laparoscopy, peritoneal biopsy     8/9/2022 Initial Diagnosis    Gynecologic malignancy ([facility] )     8/23/2022 -  Chemotherapy    palonosetron (ALOXI), 0 25 mg, Intravenous, Once, 6 of 6 cycles  Administration: 0 25 mg (8/23/2022), 0 25 mg (9/13/2022), 0 25 mg (10/4/2022), 0 25 mg (12/13/2022), 0 25 mg (1/3/2023), 0 25 mg (1/24/2023)  fosaprepitant (EMEND) IVPB, 150 mg, Intravenous, Once, 6 of 6 cycles  Administration: 150 mg (8/23/2022), 150 mg (9/13/2022), 150 mg (10/4/2022), 150 mg (12/13/2022), 150 mg (1/3/2023), 150 mg (1/24/2023)  CARBOplatin (PARAPLATIN) IVPB ([facility] DOSING), 605 4 mg, Intravenous, Once, 6 of 6 cycles  Administration: 600 mg (8/23/2022), 564 mg (9/13/2022), 601 8 mg (10/4/2022), 491 5 mg (12/13/2022), 468 5 mg (1/3/2023), 488 mg (1/24/2023)  bevacizumab (AVASTIN) IVPB, 1,252 5 mg, Intravenous, Once, 3 of 3 cycles  Dose modification: 10 mg/kg (original dose 15 mg/kg, Cycle 5, Reason: Other (Must fill in a comment))  Administration: 1,200 mg (8/23/2022), 1,200 mg (9/13/2022), 745 mg (1/3/2023)  PACLItaxel (TAXOL) chemo IVPB, 175 mg/m2 = 330 6 mg, Intravenous, Once, 6 of 6 cycles  Dose modification: 135 mg/m2 (original dose 175 mg/m2, Cycle 4, Reason: Other (Must fill in a comment))  Administration: 330 6 mg (8/23/2022), 330 6 mg (9/13/2022), 330 6 mg (10/4/2022), 246 mg (12/13/2022), 244 2 mg (1/3/2023), 244 2 mg (1/24/2023)     10/28/2022 Surgery    diagnostic laparoscopy, exploratory laparotomy, modified radical hysterectomy, bilateral salpingo-oophorectomy with en bloc rectosigmoid resection, gastrocolic omentectomy, splenectomy, small bowel resection with reanastomosis, diaphragm resection, excision ablation of peritoneal implants, ileostomy formation, optimal tumor debulking     11/29/2022 -  Cancer Staged    Staging form: Ovary, Fallopian Tube, Primary Peritoneal, AJCC 8th Edition  - Clinical: FIGO Stage IIIC (cT3c, cN0, cM0) - Signed by Fawad Sanchez MD on 11/29/2022  Histologic grade (G): G3  Histologic grading system: 4 grade system               History of Present Illness: Returns in follow-up  Barium enema shows a stricture at the rectal anastomosis  Follow-up sigmoidoscopy revealed an anastomotic leak with a perianastomotic cavity that had an 8 mm opening  Sequently, her surgery was canceled  She comes in now to discuss revision of her anastomosis  Review of Systems  Complete ROS Surg Onc:   Complete ROS Surg Onc:   Constitutional: The patient denies new or recent history of general fatigue, no recent weight loss, no change in appetite  Eyes: No complaints of visual problems, no scleral icterus  ENT: no complaints of ear pain, no hoarseness, no difficulty swallowing,  no tinnitus and no new masses in head, oral cavity, or neck  Cardiovascular: No complaints of chest pain, no palpitations, no ankle edema  Respiratory: No complaints of shortness of breath, no cough     Gastrointestinal: No complaints of jaundice, no "bloody stools, no pale stools  Genitourinary: No complaints of dysuria, no hematuria, no nocturia, no frequent urination, no urethral discharge  Musculoskeletal: No complaints of weakness, paralysis, joint stiffness or arthralgias  Integumentary: No complaints of rash, no new lesions  Neurological: No complaints of convulsions, no seizures, no dizziness  Hematologic/Lymphatic: No complaints of easy bruising  Endocrine:  No hot or cold intolerance  No polydipsia, polyphagia, or polyuria  Allergy/immunology:  No environmental allergies  No food allergies  Not immunocompromised  Skin:  No pallor or rash  No wound          Patient Active Problem List   Diagnosis   • Gastroesophageal reflux disease   • Constipation   • Hemorrhoids   • Family history of colonic polyps   • Asthma   • Hyperlipidemia   • Hypertension   • Lower abdominal pain   • Change in bowel habit   • Personal history of colonic polyps   • Long term (current) use of systemic steroids   • 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[facility] )   • Giant cell aortic arteritis ([place] [address] )   • Prediabetes   • Malignant neoplasm of left ovary (HCC)   • Anxiety disorder due to medical condition   • S/P total abdominal hysterectomy   • Ileostomy, has currently ([place] [address] )   • Acute blood loss anemia (ABLA)   • S/P splenectomy   • Hypokalemia   • Bandemia   • Wound infection after surgery   • Encounter for venous access device care   • Rectal bleeding     Past Medical History:   Diagnosis Date   • Abdominal pain     off and on   • Allergic sinusitis     \"seasonal allergies\"-has received allergy shots \"   • Anxiety     with current diagnosis   • Arthritis    • Asthma    • Cholelithiasis    • Colon polyp    • Degenerative joint disease    • Dental bridge present     permanent lower left   • Exercise involving walking     1-2x/week   • GERD (gastroesophageal reflux disease)    • Giant cell aortic arteritis (HCC)     \"hereditary Giant Cell Temporal Arteritis\"per pt   • Hiatal hernia    • " "History of cancer chemotherapy    • History of transfusion    • Hyperlipidemia    • Hypertension    • Nausea    • Peritoneal carcinoma [facility])     \"gynecologic malignacy\"   • PMR (polymyalgia rheumatica) (HCC)    • Prediabetes    • Shortness of breath     per pt \"asthma related --with climbing mult  flights of stairs--or exertion -not new\"   • Tinnitus    • Wears glasses      Past Surgical History:   Procedure Laterality Date   • COLONOSCOPY      October 2021: Adenomatous polyps and rectum in transverse colon, sigmoid diverticulosis, internal hemorrhoids  October 2016 diverticulosis and internal hemorrhoids, September 2011 diverticulosis and internal hemorrhoids     • COLOPROCTECTOMY N/A 10/28/2022    Procedure: PROCTECTOMY;  Surgeon: Stu Sherwood MD;  [facility]: [facility];  Service: Surgical Oncology   • DILATION AND CURETTAGE OF UTERUS     • ILEO LOOP DIVERSION N/A 10/28/2022    Procedure: ILEOSTOMY LOOP;  Surgeon: Stu Sherwood MD;  [facility];  Service: Surgical Oncology   • IR PORT PLACEMENT  12/8/2022   • LIVER SURGERY  10/28/2022    Procedure: LIVER CONTROL OF BLEED ;  Surgeon: Stu Sherwood MD;  [facility];  Service: Surgical Oncology   • SC COLECTOMY PARTIAL W/ANASTOMOSIS N/A 10/28/2022    Procedure: RESECTION COLON LEFT;  Surgeon: Justine Mane MD;  [facility];  Service: Gynecology Oncology   • SC EXPLORATORY LAPAROTOMY CELIOTOMY W/WO BIOPSY [address] [facility] N/A 10/28/2022    Procedure: LAPAROTOMY EXPLORATORY;  Surgeon: Justine Mane MD;  [facility];  Service: Gynecology Oncology   • SC LAPS ABD PRTM&OMENTUM DX W/WO [address] [facility] [address]/ [facility] N/A 08/04/2022    Procedure: DIAGNOSTIC LAPAROSCOPY, PERITONEAL BIOPSY, SAMPLING OF BLOODY ASCITES ;  Surgeon: Antonia Landry MD;  [facility];  Service: Gynecology Oncology   • SC LAPS ABD PRTM&OMENTUM DX W/WO [address] [facility] [address]/ [facility] N/A 10/28/2022    Procedure: LAPAROSCOPY DIAGNOSTIC;  Surgeon: Justine Mane MD;  " Location: [facility];  Service: Gynecology Oncology   • CT TOTAL ABDOMINAL HYSTERECT W/WO [facility] TUBE [facility] N/A 10/28/2022    Procedure: HYSTERECTOMY MODIFIED RADICAL, BILATERAL SALPINGO-OOPHORECTOMY, GASTROCOLIC OMENTECTOMY, DIAPHRAGM RESECTION, EXCISION AND ABLATION OF PERITONEAL TUMORS;  Surgeon: Fawad Sanchez MD;  [facility];  Service: Gynecology Oncology   • SMALL INTESTINE SURGERY N/A 10/28/2022    Procedure: RESECTION SMALL BOWEL;  Surgeon: Fawad Sanchez MD;  [facility];  Service: Gynecology Oncology   • SPLENECTOMY, TOTAL N/A 10/28/2022    Procedure: SPLENECTOMY;  Surgeon: Fawad Sanchez MD;  [facility];  Service: Gynecology Oncology   • TEMPORAL ARTERY BIOPSY / LIGATION     • UPPER GASTROINTESTINAL ENDOSCOPY  11/10/2014    November 2014 irregular Z-line and Schatzki ring, hiatal hernia, gastritis  Biopsies negative for celiac, H pylori, or Phillip's or eosinophilic esophagitis     • WISDOM TOOTH EXTRACTION       Family History   Problem Relation Age of Onset   • Colon polyps Mother    • Alzheimer's disease Mother    • Heart disease Father    • Brain cancer Father    • Colon polyps Sister    • Heart disease Sister    • Diabetes Sister    • MARLEY disease Sister    • Colon cancer Cousin      Social History     Socioeconomic History   • Marital status: /Civil Union     Spouse name: Not on file   • Number of children: Not on file   • Years of education: Not on file   • Highest education level: Not on file   Occupational History   • Not on file   Tobacco Use   • Smoking status: Never   • Smokeless tobacco: Never   Vaping Use   • Vaping Use: Never used   Substance and Sexual Activity   • Alcohol use: Yes     Comment: a glass of wine maybe 2 times a month   • Drug use: Never   • Sexual activity: Not Currently     Partners: Male     Birth control/protection: None     Comment: defer   Other Topics Concern   • Not on file   Social History Narrative   • Not on file     Social Determinants of Health     Financial Resource Strain: Not on file   Food Insecurity: Not on file   Transportation Needs: Not on file   Physical Activity: Not on file   Stress: Not on file   Social Connections: Not on file   Intimate Partner Violence: Not on file   Housing Stability: Not on file       Current Outpatient Medications:   •  Acetaminophen (TYLENOL ARTHRITIS PAIN PO), Take by mouth as needed, Disp: , Rfl:   •  albuterol (PROVENTIL HFA,VENTOLIN HFA) 90 mcg/act inhaler, Inhale 2 puffs every 4 (four) hours as needed , Disp: , Rfl:   •  Ascorbic Acid (VITAMIN C GUMMIE PO), Take by mouth, Disp: , Rfl:   •  aspirin (ECOTRIN LOW STRENGTH) 81 mg EC tablet, Take 81 mg by mouth daily, Disp: , Rfl:   •  B Complex-C (B COMPLEX-VITAMIN C PO), Take by mouth daily, Disp: , Rfl:   •  Calcium Carb-Cholecalciferol (CALCIUM 500+D3 PO), Take 1 tablet by mouth 2 (two) times a day , Disp: , Rfl:   •  cholecalciferol (VITAMIN D3) 1,000 units tablet, Take 1,000 Units by mouth daily, Disp: , Rfl:   •  Docusate Calcium (STOOL SOFTENER PO), Take 1 tablet by mouth 2 (two) times a day, Disp: , Rfl:   •  famotidine (PEPCID) 40 MG tablet, TAKE 1 TABLET BY MOUTH DAILY AT BEDTIME, Disp: 90 tablet, Rfl: 3  •  fexofenadine (ALLEGRA) 180 MG tablet, Take 180 mg by mouth daily, Disp: , Rfl:   •  GLUCOSAMINE-CHONDROITIN PO, Take 1 tablet by mouth 2 (two) times a day , Disp: , Rfl:   •  lidocaine-prilocaine (EMLA) cream, Apply topically as needed for mild pain, Disp: 30 g, Rfl: 0  •  LORazepam (ATIVAN) 1 mg tablet, Take 1 tablet (1 mg total) by mouth every 8 (eight) hours as needed for anxiety (nausea or anxiety), Disp: 30 tablet, Rfl: 0  •  metFORMIN (GLUCOPHAGE) 500 mg tablet, Take 500 mg by mouth daily Daily at lunch, Disp: , Rfl:   •  metoprolol succinate (TOPROL-XL) 25 mg 24 hr tablet, Take 25 mg by mouth daily dinner, Disp: , Rfl:   •  montelukast (SINGULAIR) 10 mg tablet, Take 10 mg by mouth daily dinner, Disp: , Rfl:   •  multivitamin (THERAGRAN) TABS, Take 1 tablet by mouth daily, Disp: , Rfl:   •  nystatin (MYCOSTATIN) powder, Apply topically 4 (four) times a day Under abdominal pannus and in between thighs, Disp: , Rfl:   •  omeprazole (PriLOSEC) 20 mg delayed release capsule, TAKE 1 CAPSULE BY MOUTH EVERY DAY 30 MINUTES BEFORE MORNING MEAL FOR 90 DAYS, Disp: , Rfl:   •  ondansetron (ZOFRAN) 8 mg tablet, Take 1 tablet (8 mg total) by mouth every 8 (eight) hours as needed for nausea or vomiting, Disp: 20 tablet, Rfl: 1  •  predniSONE 1 mg tablet, Take 2 mg by mouth daily 2 mg, Disp: , Rfl:   •  Probiotic Product (PROBIOTIC DAILY PO), Take by mouth daily, Disp: , Rfl:   •  simvastatin (ZOCOR) 10 mg tablet, Take 10 mg by mouth daily at bedtime, Disp: , Rfl:   •  SYMBICORT 160-4 5 MCG/ACT inhaler, 2 puffs daily , Disp: , Rfl:   Allergies   Allergen Reactions   • Lactose - Food Allergy GI Intolerance   • Nsaids Other (See Comments)      pt is avoiding per family doctor instructions-  Able to take Tylenol   • Pedi-Pre Tape Spray [Wound Dressing Adhesive] Other (See Comments)     Please use sensitive skin tape, pt gets bad bruising from strong medical adhesive tape  There were no vitals filed for this visit  Physical Exam  Constitutional: General appearance: The Patient is well-developed and well-nourished who appears the stated age in no acute distress  Patient is pleasant and talkative  HEENT:  Normocephalic  Sclerae are anicteric  Mucous membranes are moist  Neck is supple without adenopathy  No JVD  Chest: The lungs are clear to auscultation  Cardiac: Heart is regular rate  Abdomen: Abdomen is soft, non-tender, non-distended and without masses  Extremities: There is no clubbing or cyanosis  There is no edema  Symmetric  Neuro: Grossly nonfocal  Gait is normal      Lymphatic: No evidence of cervical adenopathy bilaterally  Skin: Warm, anicteric      Psych:  Patient is pleasant and talkative  Breasts:        Pathology:  [unfilled]    Labs:      Imaging  FL barium enema    Result Date: 3/10/2023  Narrative: FLUOROSCOPIC ENEMA  SINGLE CONTRAST INDICATION:  Stage III C ovarian cancer being status post chemotherapy and surgical tumor debulking  Right lower quadrant ileostomy  History of rectal/pelvic pressure with ongoing bleeding  COMPARISON:  Multiple prior CT examinations, most recently February 13, 2023 IMAGES: 11 FLUOROSCOPY TIME:   2 8 mins FINDINGS:  image is unremarkable  Rectal tube was inserted and water-soluble Gastrografin contrast was instilled  The patient had extreme difficulty holding contrast in the rectum, despite rectal balloon insufflation  However, with a number of attempts, the rectum was filled with contrast to the level of the anastomosis  Very little (no greater than a few cc) contrast could be instilled past the anastomosis  Contrast continued to leak out of the rectum onto the table around the balloon  Contrast was also seen to enter a sinus tract into a cavity situated along the ventral margin of the sacrum  In retrospect this cavity is visible on sagittal image 103 of series 602 of the February 13, 2023 examination  This circumscribed cavity contains air and fluid  On axial axial images of the February 2020 CT examinations demonstrates an appearance that suggests bowel loop; however this cavity is now shown to represent the contracted residua of a larger presacral collection visible in that location on November 15, 2022  At the conclusion of procedure, when the contrast was drained back into the enema bag, it was noted to be tinged with some blood  Impression: Extremely tight stricturing at the rectal anastomosis beyond which no greater than a few cc of rectal contrast could pass   Sinus tract connecting the posterior margin of the rectal anastomosis with a collapsed cavity along the ventral margin of the sacrum, consistent with the residua of a fluid collection seen in that location on November 15, 2022 CT  At the conclusion of procedure, when the contrast was drained back into the enema bag, it was noted to be tinged with some blood  I reported these results to Edinson Wilson via HIPAA compliant secure electronic messaging on 3/10/2023 at 2:25 PM  Workstation performed: [id number]     Flexible Sigmoidoscopy    Result Date: 3/17/2023  Narrative: Table formatting from the original result was not included  [address] [phone] DATE OF SERVICE: 3/17/23 PHYSICIAN(S): Attending: Ernestina Thompson MD Fellow: No Staff Documented INDICATION: Rectal bleeding POST-OP DIAGNOSIS: See the impression below  HISTORY: Prior colonoscopy: Less than 3 years ago  It is being repeated at an interval of less than 3 years because: This colonoscopy is being performed for a diagnostic indication BOWEL PREPARATION:  PREPROCEDURE: Informed consent was obtained for the procedure, including sedation  Risks including but not limited to bleeding, infection, perforation, adverse drug reaction and aspiration were explained in detail  Also explained about less than 100% sensitivity with the exam and other alternatives  The patient was placed in the left lateral decubitus position  Procedure: Colonoscopy DETAILS OF PROCEDURE: Patient was taken to the procedure room where a time out was performed to confirm correct patient and correct procedure  The patient underwent monitored anesthesia care, which was administered by an anesthesia professional  The patient's blood pressure, heart rate, level of consciousness, oxygen and respirations were monitored throughout the procedure  A digital rectal exam was performed  The scope was introduced through the anus and advanced to the descending colon  The patient experienced no blood loss  The procedure was not difficult   The patient tolerated the procedure well  There were no apparent complications  Rectal anastomosis at 8 cm from the anal verge  ANESTHESIA INFORMATION: ASA: II Anesthesia Type: IV Sedation with Anesthesia MEDICATIONS: No administrations occurring from 1147 to 1212 on 03/17/23 FINDINGS: There is an anastomotic defect with a well-granulated surface, measuring about 8 mm diameter  Exudative fluid emanated from the opening but no significant inflammation was seen  All observed locations appeared normal, including the descending colon  Normal colonic and distal rectal mucosa  EVENTS: Procedure Events Event Event Time ENDO SCOPE OUT TIME 3/17/2023 12:09 PM SPECIMENS: * No specimens in log * EQUIPMENT: Colonoscope - [id number]     Impression: Anastomotic leak with ling-anastomotic cavity and wide aperture at the anastomotic leak RECOMMENDATION:  Repeat colon evaluation per Dr Judah Santana  Stoma closure plan discussed via [place] Text with Dr Judah Ellsworth MD     I reviewed the above laboratory and imaging data  Discussion/Summary: 79-year-old female status post cytoreduction for ovarian carcinoma  Her ileostomy is functioning  I am concerned about just reversing her ileostomy with an 8 mm sinus draining into a walled off cavity  I would be concerned that this may cause worsening of the anastomotic disruption or fecal spillage  I would recommend that she undergo revision of this anastomosis with a repeat low anterior resection  Assuming this heals well, I would then plan on reversing her ostomy  She will require a repeat sigmoidoscopy prior to reversal of the ostomy  I explained the risks including bleeding, infection, recurrence, need for further surgery, wound complications, adjacent organ injury, anastomotic leak, sepsis, MI, DVT, stroke, pulmonary embolism, and death  Informed consent was obtained  We will schedule this at our earliest mutual convenience    I am not sure that this defect can be closed endoscopically based on its size   She is agreeable to this  All her questions were answered

## 2023-03-21 NOTE — LETTER
March 21, 2023     Anne-Marie Kennedy, [address] [facility] [address]    Patient: Faye Florence   YOB: 1950   Date of Visit: 3/21/2023       Dear Dr Lynne Ji:    Thank you for referring Jannet Barron to me for evaluation  Below are my notes for this consultation  If you have questions, please do not hesitate to call me  I look forward to following your patient along with you  Sincerely,        Graciela Kat MD        CC: MD Charley Reese MD Elige Rotter, MD Roselie Park, MD  3/21/2023 12:24 PM  Sign when Signing Visit               Surgical Oncology Follow Up       [address] [facility] [place]  [address] [place] [address] [phone]    Faye Florence  1950  [id number]  [address] [facility]  [address] [facility]  [phone]    Diagnoses and all orders for this visit:    Malignant neoplasm of left ovary ([address] )    Ileostomy, has currently [facility])        No chief complaint on file  No follow-ups on file  Oncology History   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[address] )   7/21/2022 Initial Diagnosis    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[address] )     8/4/2022 Surgery    Diagnostic laparoscopy, peritoneal biopsy     8/18/2022 Genetic Testing    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, serous    Results revealed patient has the following mutation(s):  None     Malignant neoplasm of left ovary ([address] )   8/4/2022 Surgery    Diagnostic laparoscopy, peritoneal biopsy     8/9/2022 Initial Diagnosis    Gynecologic malignancy ([address] )     8/23/2022 -  Chemotherapy    palonosetron (ALOXI), 0 25 mg, Intravenous, Once, 6 of 6 cycles  Administration: 0 25 mg (8/23/2022), 0 25 mg (9/13/2022), 0 25 mg (10/4/2022), 0 25 mg (12/13/2022), 0 25 mg (1/3/2023), 0 25 mg (1/24/2023)  fosaprepitant (EMEND) IVPB, 150 mg, Intravenous, Once, 6 of 6 cycles  Administration: 150 mg (8/23/2022), 150 mg (9/13/2022), 150 mg (10/4/2022), 150 mg (12/13/2022), 150 mg (1/3/2023), 150 mg (1/24/2023)  CARBOplatin (PARAPLATIN) IVPB (GOG AUC DOSING), 605 4 mg, Intravenous, Once, 6 of 6 cycles  Administration: 600 mg (8/23/2022), 564 mg (9/13/2022), 601 8 mg (10/4/2022), 491 5 mg (12/13/2022), 468 5 mg (1/3/2023), 488 mg (1/24/2023)  bevacizumab (AVASTIN) IVPB, 1,252 5 mg, Intravenous, Once, 3 of 3 cycles  Dose modification: 10 mg/kg (original dose 15 mg/kg, Cycle 5, Reason: Other (Must fill in a comment))  Administration: 1,200 mg (8/23/2022), 1,200 mg (9/13/2022), 745 mg (1/3/2023)  PACLItaxel (TAXOL) chemo IVPB, 175 mg/m2 = 330 6 mg, Intravenous, Once, 6 of 6 cycles  Dose modification: 135 mg/m2 (original dose 175 mg/m2, Cycle 4, Reason: Other (Must fill in a comment))  Administration: 330 6 mg (8/23/2022), 330 6 mg (9/13/2022), 330 6 mg (10/4/2022), 246 mg (12/13/2022), 244 2 mg (1/3/2023), 244 2 mg (1/24/2023)     10/28/2022 Surgery    diagnostic laparoscopy, exploratory laparotomy, modified radical hysterectomy, bilateral salpingo-oophorectomy with en bloc rectosigmoid resection, gastrocolic omentectomy, splenectomy, small bowel resection with reanastomosis, diaphragm resection, excision ablation of peritoneal implants, ileostomy formation, optimal tumor debulking     11/29/2022 -  Cancer Staged    Staging form: Ovary, Fallopian Tube, Primary Peritoneal, AJCC 8th Edition  - Clinical: FIGO Stage IIIC (cT3c, cN0, cM0) - Signed by Priti Dixon MD on 11/29/2022  Histologic grade (G): G3  Histologic grading system: 4 grade system               History of Present Illness: Returns in follow-up  Barium enema shows a stricture at the rectal anastomosis  Follow-up sigmoidoscopy revealed an anastomotic leak with a perianastomotic cavity that had an 8 mm opening  Sequently, her surgery was canceled    She comes in now to discuss revision of her anastomosis  Review of Systems  Complete ROS Surg Onc:   Complete ROS Surg Onc:   Constitutional: The patient denies new or recent history of general fatigue, no recent weight loss, no change in appetite  Eyes: No complaints of visual problems, no scleral icterus  ENT: no complaints of ear pain, no hoarseness, no difficulty swallowing,  no tinnitus and no new masses in head, oral cavity, or neck  Cardiovascular: No complaints of chest pain, no palpitations, no ankle edema  Respiratory: No complaints of shortness of breath, no cough  Gastrointestinal: No complaints of jaundice, no bloody stools, no pale stools  Genitourinary: No complaints of dysuria, no hematuria, no nocturia, no frequent urination, no urethral discharge  Musculoskeletal: No complaints of weakness, paralysis, joint stiffness or arthralgias  Integumentary: No complaints of rash, no new lesions  Neurological: No complaints of convulsions, no seizures, no dizziness  Hematologic/Lymphatic: No complaints of easy bruising  Endocrine:  No hot or cold intolerance  No polydipsia, polyphagia, or polyuria  Allergy/immunology:  No environmental allergies  No food allergies  Not immunocompromised  Skin:  No pallor or rash  No wound          Patient Active Problem List   Diagnosis   • Gastroesophageal reflux disease   • Constipation   • Hemorrhoids   • Family history of colonic polyps   • Asthma   • Hyperlipidemia   • Hypertension   • Lower abdominal pain   • Change in bowel habit   • Personal history of colonic polyps   • Long term (current) use of systemic steroids   • 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[facility] )   • Giant cell aortic arteritis (HCC)   • Prediabetes   • Malignant neoplasm of left ovary (HCC)   • Anxiety disorder due to medical condition   • S/P total abdominal hysterectomy   • Ileostomy, has currently [facility])   • Acute blood loss anemia (ABLA)   • S/P splenectomy   • Hypokalemia   • Bandemia   • Wound infection after surgery   • Encounter for venous access device care   • Rectal bleeding     Past Medical History:   Diagnosis Date   • Abdominal pain     off and on   • Allergic sinusitis     "seasonal allergies"-has received allergy shots "   • Anxiety     with current diagnosis   • Arthritis    • Asthma    • Cholelithiasis    • Colon polyp    • Degenerative joint disease    • Dental bridge present     permanent lower left   • Exercise involving walking     1-2x/week   • GERD (gastroesophageal reflux disease)    • Giant cell aortic arteritis ([facility] )     "hereditary Giant Cell Temporal Arteritis"per pt   • Hiatal hernia    • History of cancer chemotherapy    • History of transfusion    • Hyperlipidemia    • Hypertension    • Nausea    • 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[facility] )     "gynecologic malignacy"   • PMR (polymyalgia rheumatica) (HCC)    • Prediabetes    • Shortness of breath     per pt "asthma related --with climbing mult  flights of stairs--or exertion -not new"   • Tinnitus    • Wears glasses      Past Surgical History:   Procedure Laterality Date   • COLONOSCOPY      October 2021: Adenomatous polyps and rectum in transverse colon, sigmoid diverticulosis, internal hemorrhoids  October 2016 diverticulosis and internal hemorrhoids, September 2011 diverticulosis and internal hemorrhoids     • COLOPROCTECTOMY N/A 10/28/2022    Procedure: PROCTECTOMY;  Surgeon: Tam Yarbrough MD;  [facility];  Service: Surgical Oncology   • DILATION AND CURETTAGE OF UTERUS     • ILEO LOOP DIVERSION N/A 10/28/2022    Procedure: ILEOSTOMY LOOP;  Surgeon: Tam Yarbrough MD;  [facility];  Service: Surgical Oncology   • IR PORT PLACEMENT  12/8/2022   • LIVER SURGERY  10/28/2022    Procedure: LIVER CONTROL OF BLEED ;  Surgeon: Tam Yarbrough MD;  [facility];  Service: Surgical Oncology   • KY COLECTOMY PARTIAL W/ANASTOMOSIS N/A 10/28/2022    Procedure: RESECTION COLON LEFT;  Surgeon: Vibha Obando MD;  [facility];  Service: Gynecology Oncology   • SD EXPLORATORY LAPAROTOMY CELIOTOMY W/WO BIOPSY SPX N/A 10/28/2022    Procedure: LAPAROTOMY EXPLORATORY;  Surgeon: Eliane Sales MD;  [facility];  Service: Gynecology Oncology   • SD LAPS ABD PRTM&OMENTUM DX W/WO [address] SPX N/A 08/04/2022    Procedure: DIAGNOSTIC LAPAROSCOPY, PERITONEAL BIOPSY, SAMPLING OF BLOODY ASCITES ;  Surgeon: Raul Guadarrama MD;  [facility];  Service: Gynecology Oncology   • SD LAPS ABD PRTM&OMENTUM DX W/WO [address]/ [address] N/A 10/28/2022    Procedure: LAPAROSCOPY DIAGNOSTIC;  Surgeon: Eliane Sales MD;  [facility];  Service: Gynecology Oncology   • SD TOTAL ABDOMINAL HYSTERECT W/WO RMVL TUBE OVARY N/A 10/28/2022    Procedure: HYSTERECTOMY MODIFIED RADICAL, BILATERAL SALPINGO-OOPHORECTOMY, GASTROCOLIC OMENTECTOMY, DIAPHRAGM RESECTION, EXCISION AND ABLATION OF PERITONEAL TUMORS;  Surgeon: Eliane Sales MD;  [facility];  Service: Gynecology Oncology   • SMALL INTESTINE SURGERY N/A 10/28/2022    Procedure: RESECTION SMALL BOWEL;  Surgeon: Eliane Sales MD;  [facility];  Service: Gynecology Oncology   • SPLENECTOMY, TOTAL N/A 10/28/2022    Procedure: SPLENECTOMY;  Surgeon: Eliane Sales MD;  [facility];  Service: Gynecology Oncology   • TEMPORAL ARTERY BIOPSY / LIGATION     • UPPER GASTROINTESTINAL ENDOSCOPY  11/10/2014    November 2014 irregular Z-line and Schatzki ring, hiatal hernia, gastritis  Biopsies negative for celiac, H pylori, or Phillip's or eosinophilic esophagitis     • WISDOM TOOTH EXTRACTION       Family History   Problem Relation Age of Onset   • Colon polyps Mother    • Alzheimer's disease Mother    • Heart disease Father    • Brain cancer Father    • Colon polyps Sister    • Heart disease Sister    • Diabetes Sister    • MARLEY disease Sister    • Colon cancer Cousin      Social History     Socioeconomic History   • Marital status: /Civil [employer]     Spouse name: Not on file   • Number of children: Not on file   • Years of education: Not on file   • Highest education level: Not on file   Occupational History   • Not on file   Tobacco Use   • Smoking status: Never   • Smokeless tobacco: Never   Vaping Use   • Vaping Use: Never used   Substance and Sexual Activity   • Alcohol use: Yes     Comment: a glass of wine maybe 2 times a month   • Drug use: Never   • Sexual activity: Not Currently     Partners: Male     Birth control/protection: None     Comment: defer   Other Topics Concern   • Not on file   Social History Narrative   • Not on file     Social Determinants of Health     Financial Resource Strain: Not on file   Food Insecurity: Not on file   Transportation Needs: Not on file   Physical Activity: Not on file   Stress: Not on file   Social Connections: Not on file   Intimate Partner Violence: Not on file   Housing Stability: Not on file       Current Outpatient Medications:   •  Acetaminophen (TYLENOL ARTHRITIS PAIN PO), Take by mouth as needed, Disp: , Rfl:   •  albuterol (PROVENTIL HFA,VENTOLIN HFA) 90 mcg/act inhaler, Inhale 2 puffs every 4 (four) hours as needed , Disp: , Rfl:   •  Ascorbic Acid (VITAMIN C GUMMIE PO), Take by mouth, Disp: , Rfl:   •  aspirin (ECOTRIN LOW STRENGTH) 81 mg EC tablet, Take 81 mg by mouth daily, Disp: , Rfl:   •  B Complex-C (B COMPLEX-VITAMIN C PO), Take by mouth daily, Disp: , Rfl:   •  Calcium Carb-Cholecalciferol (CALCIUM 500+D3 PO), Take 1 tablet by mouth 2 (two) times a day , Disp: , Rfl:   •  cholecalciferol (VITAMIN D3) 1,000 units tablet, Take 1,000 Units by mouth daily, Disp: , Rfl:   •  Docusate Calcium (STOOL SOFTENER PO), Take 1 tablet by mouth 2 (two) times a day, Disp: , Rfl:   •  famotidine (PEPCID) 40 MG tablet, TAKE 1 TABLET BY MOUTH DAILY AT BEDTIME, Disp: 90 tablet, Rfl: 3  •  fexofenadine (ALLEGRA) 180 MG tablet, Take 180 mg by mouth daily, Disp: , Rfl:   •  GLUCOSAMINE-CHONDROITIN PO, Take 1 tablet by mouth 2 (two) times a day , Disp: , Rfl:   •  lidocaine-prilocaine (EMLA) cream, Apply topically as needed for mild pain, Disp: 30 g, Rfl: 0  •  LORazepam (ATIVAN) 1 mg tablet, Take 1 tablet (1 mg total) by mouth every 8 (eight) hours as needed for anxiety (nausea or anxiety), Disp: 30 tablet, Rfl: 0  •  metFORMIN (GLUCOPHAGE) 500 mg tablet, Take 500 mg by mouth daily Daily at lunch, Disp: , Rfl:   •  metoprolol succinate (TOPROL-XL) 25 mg 24 hr tablet, Take 25 mg by mouth daily dinner, Disp: , Rfl:   •  montelukast (SINGULAIR) 10 mg tablet, Take 10 mg by mouth daily dinner, Disp: , Rfl:   •  multivitamin (THERAGRAN) TABS, Take 1 tablet by mouth daily, Disp: , Rfl:   •  nystatin (MYCOSTATIN) powder, Apply topically 4 (four) times a day Under abdominal pannus and in between thighs, Disp: , Rfl:   •  omeprazole (PriLOSEC) 20 mg delayed release capsule, TAKE 1 CAPSULE BY MOUTH EVERY DAY 30 MINUTES BEFORE MORNING MEAL FOR 90 DAYS, Disp: , Rfl:   •  ondansetron (ZOFRAN) 8 mg tablet, Take 1 tablet (8 mg total) by mouth every 8 (eight) hours as needed for nausea or vomiting, Disp: 20 tablet, Rfl: 1  •  predniSONE 1 mg tablet, Take 2 mg by mouth daily 2 mg, Disp: , Rfl:   •  Probiotic Product (PROBIOTIC DAILY PO), Take by mouth daily, Disp: , Rfl:   •  simvastatin (ZOCOR) 10 mg tablet, Take 10 mg by mouth daily at bedtime, Disp: , Rfl:   •  SYMBICORT 160-4 5 MCG/ACT inhaler, 2 puffs daily , Disp: , Rfl:   Allergies   Allergen Reactions   • Lactose - Food Allergy GI Intolerance   • Nsaids Other (See Comments)      pt is avoiding per family doctor instructions-  Able to take Tylenol   • Pedi-Pre Tape Spray [Wound Dressing Adhesive] Other (See Comments)     Please use sensitive skin tape, pt gets bad bruising from strong medical adhesive tape  There were no vitals filed for this visit  Physical Exam  Constitutional: General appearance:  The Patient is well-developed and well-nourished who appears the stated age in no acute distress  Patient is pleasant and talkative  HEENT:  Normocephalic  Sclerae are anicteric  Mucous membranes are moist  Neck is supple without adenopathy  No JVD  Chest: The lungs are clear to auscultation  Cardiac: Heart is regular rate  Abdomen: Abdomen is soft, non-tender, non-distended and without masses  Extremities: There is no clubbing or cyanosis  There is no edema  Symmetric  Neuro: Grossly nonfocal  Gait is normal      Lymphatic: No evidence of cervical adenopathy bilaterally  Skin: Warm, anicteric  Psych:  Patient is pleasant and talkative  Breasts:        Pathology:  [unfilled]    Labs:      Imaging  FL barium enema    Result Date: 3/10/2023  Narrative: FLUOROSCOPIC ENEMA  SINGLE CONTRAST INDICATION:  Stage III C ovarian cancer being status post chemotherapy and surgical tumor debulking  Right lower quadrant ileostomy  History of rectal/pelvic pressure with ongoing bleeding  COMPARISON:  Multiple prior CT examinations, most recently February 13, 2023 IMAGES: 11 FLUOROSCOPY TIME:   2 8 mins FINDINGS:  image is unremarkable  Rectal tube was inserted and water-soluble Gastrografin contrast was instilled  The patient had extreme difficulty holding contrast in the rectum, despite rectal balloon insufflation  However, with a number of attempts, the rectum was filled with contrast to the level of the anastomosis  Very little (no greater than a few cc) contrast could be instilled past the anastomosis  Contrast continued to leak out of the rectum onto the table around the balloon  Contrast was also seen to enter a sinus tract into a cavity situated along the ventral margin of the sacrum  In retrospect this cavity is visible on sagittal image 103 of series 602 of the February 13, 2023 examination  This circumscribed cavity contains air and fluid    On axial axial images of the February 2020 CT examinations demonstrates an appearance that suggests bowel loop; however this cavity is now shown to represent the contracted residua of a larger presacral collection visible in that location on November 15, 2022  At the conclusion of procedure, when the contrast was drained back into the enema bag, it was noted to be tinged with some blood  Impression: Extremely tight stricturing at the rectal anastomosis beyond which no greater than a few cc of rectal contrast could pass  Sinus tract connecting the posterior margin of the rectal anastomosis with a collapsed cavity along the ventral margin of the sacrum, consistent with the residua of a fluid collection seen in that location on November 15, 2022 CT  At the conclusion of procedure, when the contrast was drained back into the enema bag, it was noted to be tinged with some blood  I reported these results to Zully Thompson and Maryan Garcia via HIPAA compliant secure electronic messaging on 3/10/2023 at 2:25 PM  Workstation performed: [id number]     Flexible Sigmoidoscopy    Result Date: 3/17/2023  Narrative: Table formatting from the original result was not included  [place] [phone] DATE OF SERVICE: 3/17/23 PHYSICIAN(S): Attending: Fabiano Dominguez MD Fellow: No Staff Documented INDICATION: Rectal bleeding POST-OP DIAGNOSIS: See the impression below  HISTORY: Prior colonoscopy: Less than 3 years ago  It is being repeated at an interval of less than 3 years because: This colonoscopy is being performed for a diagnostic indication BOWEL PREPARATION:  PREPROCEDURE: Informed consent was obtained for the procedure, including sedation  Risks including but not limited to bleeding, infection, perforation, adverse drug reaction and aspiration were explained in detail  Also explained about less than 100% sensitivity with the exam and other alternatives  The patient was placed in the left lateral decubitus position   Procedure: Colonoscopy DETAILS OF PROCEDURE: Patient was taken to the procedure room where a time out was performed to confirm correct patient and correct procedure  The patient underwent monitored anesthesia care, which was administered by an anesthesia professional  The patient's blood pressure, heart rate, level of consciousness, oxygen and respirations were monitored throughout the procedure  A digital rectal exam was performed  The scope was introduced through the anus and advanced to the descending colon  The patient experienced no blood loss  The procedure was not difficult  The patient tolerated the procedure well  There were no apparent complications  Rectal anastomosis at 8 cm from the anal verge  ANESTHESIA INFORMATION: ASA: II Anesthesia Type: IV Sedation with Anesthesia MEDICATIONS: No administrations occurring from 1147 to 1212 on 03/17/23 FINDINGS: There is an anastomotic defect with a well-granulated surface, measuring about 8 mm diameter  Exudative fluid emanated from the opening but no significant inflammation was seen  All observed locations appeared normal, including the descending colon  Normal colonic and distal rectal mucosa  EVENTS: Procedure Events Event Event Time ENDO SCOPE OUT TIME 3/17/2023 12:09 PM SPECIMENS: * No specimens in log * EQUIPMENT: Colonoscope - [id number]     Impression: Anastomotic leak with ling-anastomotic cavity and wide aperture at the anastomotic leak RECOMMENDATION:  Repeat colon evaluation per Dr Braden Sands  Stoma closure plan discussed via Fields Text with Dr Braden Jara MD     I reviewed the above laboratory and imaging data  Discussion/Summary: 70-year-old female status post cytoreduction for ovarian carcinoma  Her ileostomy is functioning  I am concerned about just reversing her ileostomy with an 8 mm sinus draining into a walled off cavity  I would be concerned that this may cause worsening of the anastomotic disruption or fecal spillage    I would recommend that she undergo revision of this anastomosis with a repeat low anterior resection  Assuming this heals well, I would then plan on reversing her ostomy  She will require a repeat sigmoidoscopy prior to reversal of the ostomy  I explained the risks including bleeding, infection, recurrence, need for further surgery, wound complications, adjacent organ injury, anastomotic leak, sepsis, MI, DVT, stroke, pulmonary embolism, and death  Informed consent was obtained  We will schedule this at our earliest mutual convenience  I am not sure that this defect can be closed endoscopically based on its size  She is agreeable to this  All her questions were answered

## 2023-03-21 NOTE — LETTER
March 21, 2023     [facility] [address], [address] [facility] [address]    Patient: Noemi Newell   YOB: 1950   Date of Visit: 3/21/2023       Dear Dr Leonard Goznales:    Thank you for referring Avelina Gutierrez to me for evaluation  Below are my notes for this consultation  If you have questions, please do not hesitate to call me  I look forward to following your patient along with you  Sincerely,        Andrew Tavera MD        CC: MD Amrita Neal MD Rosalynd Levins, MD August Pacific, MD  3/21/2023 12:24 PM  Incomplete               Surgical Oncology Follow Up       [address] [facility] CANCER [facility] [place] [address] [place] [address] [phone]    Noemi Newell  1950  [id number]  [address] [facility] CANCER [facility] [place]  [address] [place] [phone]    Diagnoses and all orders for this visit:    Malignant neoplasm of left ovary ([facility] [address] )    Ileostomy, has currently [facility])        No chief complaint on file  No follow-ups on file  Oncology History   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[facility] [address] )   7/21/2022 Initial Diagnosis    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[facility] [address] )     8/4/2022 Surgery    Diagnostic laparoscopy, peritoneal biopsy     8/18/2022 Genetic Testing    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, serous    Results revealed patient has the following mutation(s):  None     Malignant neoplasm of left ovary ([facility] [address] )   8/4/2022 Surgery    Diagnostic laparoscopy, peritoneal biopsy     8/9/2022 Initial Diagnosis    Gynecologic malignancy ([facility] [address] )     8/23/2022 -  Chemotherapy    palonosetron (ALOXI), 0 25 mg, Intravenous, Once, 6 of 6 cycles  Administration: 0 25 mg (8/23/2022), 0 25 mg (9/13/2022), 0 25 mg (10/4/2022), 0 25 mg (12/13/2022), 0 25 mg (1/3/2023), 0 25 mg (1/24/2023)  fosaprepitant (EMEND) IVPB, 150 mg, Intravenous, Once, 6 of 6 cycles  Administration: 150 mg (8/23/2022), 150 mg (9/13/2022), 150 mg (10/4/2022), 150 mg (12/13/2022), 150 mg (1/3/2023), 150 mg (1/24/2023)  CARBOplatin (PARAPLATIN) IVPB (GOG AUC DOSING), 605 4 mg, Intravenous, Once, 6 of 6 cycles  Administration: 600 mg (8/23/2022), 564 mg (9/13/2022), 601 8 mg (10/4/2022), 491 5 mg (12/13/2022), 468 5 mg (1/3/2023), 488 mg (1/24/2023)  bevacizumab (AVASTIN) IVPB, 1,252 5 mg, Intravenous, Once, 3 of 3 cycles  Dose modification: 10 mg/kg (original dose 15 mg/kg, Cycle 5, Reason: Other (Must fill in a comment))  Administration: 1,200 mg (8/23/2022), 1,200 mg (9/13/2022), 745 mg (1/3/2023)  PACLItaxel (TAXOL) chemo IVPB, 175 mg/m2 = 330 6 mg, Intravenous, Once, 6 of 6 cycles  Dose modification: 135 mg/m2 (original dose 175 mg/m2, Cycle 4, Reason: Other (Must fill in a comment))  Administration: 330 6 mg (8/23/2022), 330 6 mg (9/13/2022), 330 6 mg (10/4/2022), 246 mg (12/13/2022), 244 2 mg (1/3/2023), 244 2 mg (1/24/2023)     10/28/2022 Surgery    diagnostic laparoscopy, exploratory laparotomy, modified radical hysterectomy, bilateral salpingo-oophorectomy with en bloc rectosigmoid resection, gastrocolic omentectomy, splenectomy, small bowel resection with reanastomosis, diaphragm resection, excision ablation of peritoneal implants, ileostomy formation, optimal tumor debulking     11/29/2022 -  Cancer Staged    Staging form: Ovary, Fallopian Tube, Primary Peritoneal, AJCC 8th Edition  - Clinical: FIGO Stage IIIC (cT3c, cN0, cM0) - Signed by Gloria Payne MD on 11/29/2022  Histologic grade (G): G3  Histologic grading system: 4 grade system               History of Present Illness: Returns in follow-up  Barium enema shows a stricture at the rectal anastomosis  Follow-up sigmoidoscopy revealed an anastomotic leak with a perianastomotic cavity that had an 8 mm opening  Sequently, her surgery was canceled  She comes in now to discuss revision of her anastomosis      Review of Systems  Complete ROS Surg Onc:   Complete ROS Surg Onc:   Constitutional: The patient denies new or recent history of general fatigue, no recent weight loss, no change in appetite  Eyes: No complaints of visual problems, no scleral icterus  ENT: no complaints of ear pain, no hoarseness, no difficulty swallowing,  no tinnitus and no new masses in head, oral cavity, or neck  Cardiovascular: No complaints of chest pain, no palpitations, no ankle edema  Respiratory: No complaints of shortness of breath, no cough  Gastrointestinal: No complaints of jaundice, no bloody stools, no pale stools  Genitourinary: No complaints of dysuria, no hematuria, no nocturia, no frequent urination, no urethral discharge  Musculoskeletal: No complaints of weakness, paralysis, joint stiffness or arthralgias  Integumentary: No complaints of rash, no new lesions  Neurological: No complaints of convulsions, no seizures, no dizziness  Hematologic/Lymphatic: No complaints of easy bruising  Endocrine:  No hot or cold intolerance  No polydipsia, polyphagia, or polyuria  Allergy/immunology:  No environmental allergies  No food allergies  Not immunocompromised  Skin:  No pallor or rash  No wound          Patient Active Problem List   Diagnosis   • Gastroesophageal reflux disease   • Constipation   • Hemorrhoids   • Family history of colonic polyps   • Asthma   • Hyperlipidemia   • Hypertension   • Lower abdominal pain   • Change in bowel habit   • Personal history of colonic polyps   • Long term (current) use of systemic steroids   • 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[address] )   • Giant cell aortic arteritis (HCC)   • Prediabetes   • Malignant neoplasm of left ovary (HCC)   • Anxiety disorder due to medical condition   • S/P total abdominal hysterectomy   • Ileostomy, has currently [facility])   • Acute blood loss anemia (ABLA)   • S/P splenectomy   • Hypokalemia   • Bandemia   • Wound infection after surgery   • Encounter for venous access device care   • Rectal bleeding     Past Medical History:   Diagnosis Date   • Abdominal pain     off and on   • Allergic sinusitis     "seasonal allergies"-has received allergy shots "   • Anxiety     with current diagnosis   • Arthritis    • Asthma    • Cholelithiasis    • Colon polyp    • Degenerative joint disease    • Dental bridge present     permanent lower left   • Exercise involving walking     1-2x/week   • GERD (gastroesophageal reflux disease)    • Giant cell aortic arteritis ([facility] )     "hereditary Giant Cell Temporal Arteritis"per pt   • Hiatal hernia    • History of cancer chemotherapy    • History of transfusion    • Hyperlipidemia    • Hypertension    • Nausea    • 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[facility] )     "gynecologic malignacy"   • PMR (polymyalgia rheumatica) (HCC)    • Prediabetes    • Shortness of breath     per pt "asthma related --with climbing mult  flights of stairs--or exertion -not new"   • Tinnitus    • Wears glasses      Past Surgical History:   Procedure Laterality Date   • COLONOSCOPY      October 2021: Adenomatous polyps and rectum in transverse colon, sigmoid diverticulosis, internal hemorrhoids  October 2016 diverticulosis and internal hemorrhoids, September 2011 diverticulosis and internal hemorrhoids     • COLOPROCTECTOMY N/A 10/28/2022    Procedure: PROCTECTOMY;  Surgeon: Jovita Carrasquillo MD;  [facility]: [facility];  Service: Surgical Oncology   • DILATION AND CURETTAGE OF UTERUS     • ILEO LOOP DIVERSION N/A 10/28/2022    Procedure: ILEOSTOMY LOOP;  Surgeon: Jovita Carrasquillo MD;  [facility];  Service: Surgical Oncology   • IR PORT PLACEMENT  12/8/2022   • LIVER SURGERY  10/28/2022    Procedure: LIVER CONTROL OF BLEED ;  Surgeon: Jovita Carrasquillo MD;  [facility];  Service: Surgical Oncology   • ND COLECTOMY PARTIAL W/ANASTOMOSIS N/A 10/28/2022    Procedure: RESECTION COLON LEFT;  Surgeon: Yolanda Wu MD;  [facility];  Service: Gynecology Oncology   • ND EXPLORATORY LAPAROTOMY CELIOTOMY W/WO BIOPSY SPX N/A 10/28/2022    Procedure: LAPAROTOMY EXPLORATORY;  Surgeon: Meka Flores MD;  [facility];  Service: Gynecology Oncology   • SC LAPS ABD PRTM&OMENTUM DX W/WO [address] SPX N/A 08/04/2022    Procedure: DIAGNOSTIC LAPAROSCOPY, PERITONEAL BIOPSY, SAMPLING OF BLOODY ASCITES ;  Surgeon: Kermit Payne MD;  [facility];  Service: Gynecology Oncology   • SC LAPS ABD PRTM&OMENTUM DX W/WO [address]/ [address] N/A 10/28/2022    Procedure: LAPAROSCOPY DIAGNOSTIC;  Surgeon: Meka Flores MD;  [facility];  Service: Gynecology Oncology   • SC TOTAL ABDOMINAL HYSTERECT W/WO RMVL TUBE OVARY N/A 10/28/2022    Procedure: HYSTERECTOMY MODIFIED RADICAL, BILATERAL SALPINGO-OOPHORECTOMY, GASTROCOLIC OMENTECTOMY, DIAPHRAGM RESECTION, EXCISION AND ABLATION OF PERITONEAL TUMORS;  Surgeon: Meka Flores MD;  [facility];  Service: Gynecology Oncology   • SMALL INTESTINE SURGERY N/A 10/28/2022    Procedure: RESECTION SMALL BOWEL;  Surgeon: Meka Flores MD;  [facility];  Service: Gynecology Oncology   • SPLENECTOMY, TOTAL N/A 10/28/2022    Procedure: SPLENECTOMY;  Surgeon: Meka Flores MD;  [facility];  Service: Gynecology Oncology   • TEMPORAL ARTERY BIOPSY / LIGATION     • UPPER GASTROINTESTINAL ENDOSCOPY  11/10/2014    November 2014 irregular Z-line and Schatzki ring, hiatal hernia, gastritis  Biopsies negative for celiac, H pylori, or Phillip's or eosinophilic esophagitis     • WISDOM TOOTH EXTRACTION       Family History   Problem Relation Age of Onset   • Colon polyps Mother    • Alzheimer's disease Mother    • Heart disease Father    • Brain cancer Father    • Colon polyps Sister    • Heart disease Sister    • Diabetes Sister    • MARLEY disease Sister    • Colon cancer Cousin      Social History     Socioeconomic History   • Marital status: /Civil Union     Spouse name: Not on file   • Number of children: Not on file   • Years of education: Not on file   • Highest education level: Not on file   Occupational History   • Not on file   Tobacco Use   • Smoking status: Never   • Smokeless tobacco: Never   Vaping Use   • Vaping Use: Never used   Substance and Sexual Activity   • Alcohol use: Yes     Comment: a glass of wine maybe 2 times a month   • Drug use: Never   • Sexual activity: Not Currently     Partners: Male     Birth control/protection: None     Comment: defer   Other Topics Concern   • Not on file   Social History Narrative   • Not on file     Social Determinants of Health     Financial Resource Strain: Not on file   Food Insecurity: Not on file   Transportation Needs: Not on file   Physical Activity: Not on file   Stress: Not on file   Social Connections: Not on file   Intimate Partner Violence: Not on file   Housing Stability: Not on file       Current Outpatient Medications:   •  Acetaminophen (TYLENOL ARTHRITIS PAIN PO), Take by mouth as needed, Disp: , Rfl:   •  albuterol (PROVENTIL HFA,VENTOLIN HFA) 90 mcg/act inhaler, Inhale 2 puffs every 4 (four) hours as needed , Disp: , Rfl:   •  Ascorbic Acid (VITAMIN C GUMMIE PO), Take by mouth, Disp: , Rfl:   •  aspirin (ECOTRIN LOW STRENGTH) 81 mg EC tablet, Take 81 mg by mouth daily, Disp: , Rfl:   •  B Complex-C (B COMPLEX-VITAMIN C PO), Take by mouth daily, Disp: , Rfl:   •  Calcium Carb-Cholecalciferol (CALCIUM 500+D3 PO), Take 1 tablet by mouth 2 (two) times a day , Disp: , Rfl:   •  cholecalciferol (VITAMIN D3) 1,000 units tablet, Take 1,000 Units by mouth daily, Disp: , Rfl:   •  Docusate Calcium (STOOL SOFTENER PO), Take 1 tablet by mouth 2 (two) times a day, Disp: , Rfl:   •  famotidine (PEPCID) 40 MG tablet, TAKE 1 TABLET BY MOUTH DAILY AT BEDTIME, Disp: 90 tablet, Rfl: 3  •  fexofenadine (ALLEGRA) 180 MG tablet, Take 180 mg by mouth daily, Disp: , Rfl:   •  GLUCOSAMINE-CHONDROITIN PO, Take 1 tablet by mouth 2 (two) times a day , Disp: , Rfl:   •  lidocaine-prilocaine (EMLA) cream, Apply topically as needed for mild pain, Disp: 30 g, Rfl: 0  •  LORazepam (ATIVAN) 1 mg tablet, Take 1 tablet (1 mg total) by mouth every 8 (eight) hours as needed for anxiety (nausea or anxiety), Disp: 30 tablet, Rfl: 0  •  metFORMIN (GLUCOPHAGE) 500 mg tablet, Take 500 mg by mouth daily Daily at lunch, Disp: , Rfl:   •  metoprolol succinate (TOPROL-XL) 25 mg 24 hr tablet, Take 25 mg by mouth daily dinner, Disp: , Rfl:   •  montelukast (SINGULAIR) 10 mg tablet, Take 10 mg by mouth daily dinner, Disp: , Rfl:   •  multivitamin (THERAGRAN) TABS, Take 1 tablet by mouth daily, Disp: , Rfl:   •  nystatin (MYCOSTATIN) powder, Apply topically 4 (four) times a day Under abdominal pannus and in between thighs, Disp: , Rfl:   •  omeprazole (PriLOSEC) 20 mg delayed release capsule, TAKE 1 CAPSULE BY MOUTH EVERY DAY 30 MINUTES BEFORE MORNING MEAL FOR 90 DAYS, Disp: , Rfl:   •  ondansetron (ZOFRAN) 8 mg tablet, Take 1 tablet (8 mg total) by mouth every 8 (eight) hours as needed for nausea or vomiting, Disp: 20 tablet, Rfl: 1  •  predniSONE 1 mg tablet, Take 2 mg by mouth daily 2 mg, Disp: , Rfl:   •  Probiotic Product (PROBIOTIC DAILY PO), Take by mouth daily, Disp: , Rfl:   •  simvastatin (ZOCOR) 10 mg tablet, Take 10 mg by mouth daily at bedtime, Disp: , Rfl:   •  SYMBICORT 160-4 5 MCG/ACT inhaler, 2 puffs daily , Disp: , Rfl:   Allergies   Allergen Reactions   • Lactose - Food Allergy GI Intolerance   • Nsaids Other (See Comments)      pt is avoiding per family doctor instructions-  Able to take Tylenol   • Pedi-Pre Tape Spray [Wound Dressing Adhesive] Other (See Comments)     Please use sensitive skin tape, pt gets bad bruising from strong medical adhesive tape  There were no vitals filed for this visit  Physical Exam  Constitutional: General appearance:  The Patient is well-developed and well-nourished who appears the stated age in no acute distress  Patient is pleasant and talkative  HEENT:  Normocephalic  Sclerae are anicteric  Mucous membranes are moist  Neck is supple without adenopathy  No JVD  Chest: The lungs are clear to auscultation  Cardiac: Heart is regular rate  Abdomen: Abdomen is soft, non-tender, non-distended and without masses  Extremities: There is no clubbing or cyanosis  There is no edema  Symmetric  Neuro: Grossly nonfocal  Gait is normal      Lymphatic: No evidence of cervical adenopathy bilaterally  Skin: Warm, anicteric  Psych:  Patient is pleasant and talkative  Breasts:        Pathology:  [unfilled]    Labs:      Imaging  FL barium enema    Result Date: 3/10/2023  Narrative: FLUOROSCOPIC ENEMA  SINGLE CONTRAST INDICATION:  Stage III C ovarian cancer being status post chemotherapy and surgical tumor debulking  Right lower quadrant ileostomy  History of rectal/pelvic pressure with ongoing bleeding  COMPARISON:  Multiple prior CT examinations, most recently February 13, 2023 IMAGES: 11 FLUOROSCOPY TIME:   2 8 mins FINDINGS:  image is unremarkable  Rectal tube was inserted and water-soluble Gastrografin contrast was instilled  The patient had extreme difficulty holding contrast in the rectum, despite rectal balloon insufflation  However, with a number of attempts, the rectum was filled with contrast to the level of the anastomosis  Very little (no greater than a few cc) contrast could be instilled past the anastomosis  Contrast continued to leak out of the rectum onto the table around the balloon  Contrast was also seen to enter a sinus tract into a cavity situated along the ventral margin of the sacrum  In retrospect this cavity is visible on sagittal image 103 of series 602 of the February 13, 2023 examination  This circumscribed cavity contains air and fluid    On axial axial images of the February 2020 CT examinations demonstrates an appearance that suggests bowel loop; however this cavity is now shown to represent the contracted residua of a larger presacral collection visible in that location on November 15, 2022  At the conclusion of procedure, when the contrast was drained back into the enema bag, it was noted to be tinged with some blood  Impression: Extremely tight stricturing at the rectal anastomosis beyond which no greater than a few cc of rectal contrast could pass  Sinus tract connecting the posterior margin of the rectal anastomosis with a collapsed cavity along the ventral margin of the sacrum, consistent with the residua of a fluid collection seen in that location on November 15, 2022 CT  At the conclusion of procedure, when the contrast was drained back into the enema bag, it was noted to be tinged with some blood  I reported these results to Alyssa Ornelas via HIPAA compliant secure electronic messaging on 3/10/2023 at 2:25 PM  Workstation performed: [id number]     Flexible Sigmoidoscopy    Result Date: 3/17/2023  Narrative: Table formatting from the original result was not included  [place] [phone] DATE OF SERVICE: 3/17/23 PHYSICIAN(S): Attending: Kendrick Ahumada, MD Fellow: No Staff Documented INDICATION: Rectal bleeding POST-OP DIAGNOSIS: See the impression below  HISTORY: Prior colonoscopy: Less than 3 years ago  It is being repeated at an interval of less than 3 years because: This colonoscopy is being performed for a diagnostic indication BOWEL PREPARATION:  PREPROCEDURE: Informed consent was obtained for the procedure, including sedation  Risks including but not limited to bleeding, infection, perforation, adverse drug reaction and aspiration were explained in detail  Also explained about less than 100% sensitivity with the exam and other alternatives  The patient was placed in the left lateral decubitus position   Procedure: Colonoscopy DETAILS OF PROCEDURE: Patient was taken to the procedure room where a time out was performed to confirm correct patient and correct procedure  The patient underwent monitored anesthesia care, which was administered by an anesthesia professional  The patient's blood pressure, heart rate, level of consciousness, oxygen and respirations were monitored throughout the procedure  A digital rectal exam was performed  The scope was introduced through the anus and advanced to the descending colon  The patient experienced no blood loss  The procedure was not difficult  The patient tolerated the procedure well  There were no apparent complications  Rectal anastomosis at 8 cm from the anal verge  ANESTHESIA INFORMATION: ASA: II Anesthesia Type: IV Sedation with Anesthesia MEDICATIONS: No administrations occurring from 1147 to 1212 on 03/17/23 FINDINGS: There is an anastomotic defect with a well-granulated surface, measuring about 8 mm diameter  Exudative fluid emanated from the opening but no significant inflammation was seen  All observed locations appeared normal, including the descending colon  Normal colonic and distal rectal mucosa  EVENTS: Procedure Events Event Event Time ENDO SCOPE OUT TIME 3/17/2023 12:09 PM SPECIMENS: * No specimens in log * EQUIPMENT: Colonoscope - [id number]     Impression: Anastomotic leak with ling-anastomotic cavity and wide aperture at the anastomotic leak RECOMMENDATION:  Repeat colon evaluation per Dr Deon Padilla  Stoma closure plan discussed via [place] Text with Dr Deon Maciel MD     I reviewed the above laboratory and imaging data  Discussion/Summary: 72-year-old female status post cytoreduction for ovarian carcinoma  Her ileostomy is functioning  I am concerned about just reversing her ileostomy with an 8 mm sinus draining into a walled off cavity  I would be concerned that this may cause worsening of the anastomotic disruption or fecal spillage    I would recommend that she undergo revision of this anastomosis with a repeat low anterior resection  Assuming this heals well, I would then plan on reversing her ostomy  She will require a repeat sigmoidoscopy prior to reversal of the ostomy  I explained the risks including bleeding, infection, recurrence, need for further surgery, wound complications, adjacent organ injury, anastomotic leak, sepsis, MI, DVT, stroke, pulmonary embolism, and death  Informed consent was obtained  We will schedule this at our earliest mutual convenience  I am not sure that this defect can be closed endoscopically based on its size  She is agreeable to this  All her questions were answered  Stoney Salamanca MD  3/21/2023 11:21 AM  Incomplete               Surgical Oncology Follow Up       [address] [facility] [place]  [address]  [facility] [address]  [phone]    Marla Delmar  1950  [id number]  [address] [facility] [address]  [facility] [id number] [phone]    Diagnoses and all orders for this visit:    Malignant neoplasm of left ovary ([facility] [address] )    Ileostomy, has currently [facility])        No chief complaint on file  No follow-ups on file  Oncology History   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[address] )   7/21/2022 Initial Diagnosis    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[address] )     8/4/2022 Surgery    Diagnostic laparoscopy, peritoneal biopsy     8/18/2022 Genetic Testing    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, serous    Results revealed patient has the following mutation(s):  None     Malignant neoplasm of left ovary ([address] )   8/4/2022 Surgery    Diagnostic laparoscopy, peritoneal biopsy     8/9/2022 Initial Diagnosis    Gynecologic malignancy ([address] )     8/23/2022 -  Chemotherapy    palonosetron (ALOXI), 0 25 mg, Intravenous, Once, 6 of 6 cycles  Administration: 0 25 mg (8/23/2022), 0 25 mg (9/13/2022), 0 25 mg (10/4/2022), 0 25 mg (12/13/2022), 0 25 mg (1/3/2023), 0 25 mg (1/24/2023)  fosaprepitant (EMEND) IVPB, 150 mg, Intravenous, Once, 6 of 6 cycles  Administration: 150 mg (8/23/2022), 150 mg (9/13/2022), 150 mg (10/4/2022), 150 mg (12/13/2022), 150 mg (1/3/2023), 150 mg (1/24/2023)  CARBOplatin (PARAPLATIN) IVPB ([facility] AUC DOSING), 605 4 mg, Intravenous, Once, 6 of 6 cycles  Administration: 600 mg (8/23/2022), 564 mg (9/13/2022), 601 8 mg (10/4/2022), 491 5 mg (12/13/2022), 468 5 mg (1/3/2023), 488 mg (1/24/2023)  bevacizumab (AVASTIN) IVPB, 1,252 5 mg, Intravenous, Once, 3 of 3 cycles  Dose modification: 10 mg/kg (original dose 15 mg/kg, Cycle 5, Reason: Other (Must fill in a comment))  Administration: 1,200 mg (8/23/2022), 1,200 mg (9/13/2022), 745 mg (1/3/2023)  PACLItaxel (TAXOL) chemo IVPB, 175 mg/m2 = 330 6 mg, Intravenous, Once, 6 of 6 cycles  Dose modification: 135 mg/m2 (original dose 175 mg/m2, Cycle 4, Reason: Other (Must fill in a comment))  Administration: 330 6 mg (8/23/2022), 330 6 mg (9/13/2022), 330 6 mg (10/4/2022), 246 mg (12/13/2022), 244 2 mg (1/3/2023), 244 2 mg (1/24/2023)     10/28/2022 Surgery    diagnostic laparoscopy, exploratory laparotomy, modified radical hysterectomy, bilateral salpingo-oophorectomy with en bloc rectosigmoid resection, gastrocolic omentectomy, splenectomy, small bowel resection with reanastomosis, diaphragm resection, excision ablation of peritoneal implants, ileostomy formation, optimal tumor debulking     11/29/2022 -  Cancer Staged    Staging form: Ovary, Fallopian Tube, Primary Peritoneal, AJCC 8th Edition  - Clinical: FIGO Stage IIIC (cT3c, cN0, cM0) - Signed by Chavo Henriquez MD on 11/29/2022  Histologic grade (G): G3  Histologic grading system: 4 grade system               History of Present Illness: ***Barium enema shows a stricture at the rectal anastomosis    Follow-up sigmoidoscopy revealed an anastomotic leak with a perianastomotic cavity that had an 8 mm opening  Review of Systems  Complete ROS Surg Onc:   Complete ROS Surg Onc:   Constitutional: The patient denies new or recent history of general fatigue, no recent weight loss, no change in appetite  Eyes: No complaints of visual problems, no scleral icterus  ENT: no complaints of ear pain, no hoarseness, no difficulty swallowing,  no tinnitus and no new masses in head, oral cavity, or neck  Cardiovascular: No complaints of chest pain, no palpitations, no ankle edema  Respiratory: No complaints of shortness of breath, no cough  Gastrointestinal: No complaints of jaundice, no bloody stools, no pale stools  Genitourinary: No complaints of dysuria, no hematuria, no nocturia, no frequent urination, no urethral discharge  Musculoskeletal: No complaints of weakness, paralysis, joint stiffness or arthralgias  Integumentary: No complaints of rash, no new lesions  Neurological: No complaints of convulsions, no seizures, no dizziness  Hematologic/Lymphatic: No complaints of easy bruising  Endocrine:  No hot or cold intolerance  No polydipsia, polyphagia, or polyuria  Allergy/immunology:  No environmental allergies  No food allergies  Not immunocompromised  Skin:  No pallor or rash  No wound          Patient Active Problem List   Diagnosis   • Gastroesophageal reflux disease   • Constipation   • Hemorrhoids   • Family history of colonic polyps   • Asthma   • Hyperlipidemia   • Hypertension   • Lower abdominal pain   • Change in bowel habit   • Personal history of colonic polyps   • Long term (current) use of systemic steroids   • 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[facility] )   • Giant cell aortic arteritis (HCC)   • Prediabetes   • Malignant neoplasm of left ovary (HCC)   • Anxiety disorder due to medical condition   • S/P total abdominal hysterectomy   • Ileostomy, has currently ([facility] [address] )   • Acute blood loss anemia (ABLA)   • S/P splenectomy   • Hypokalemia   • Bandemia   • Wound infection after surgery   • Encounter for venous access device care   • Rectal bleeding     Past Medical History:   Diagnosis Date   • Abdominal pain     off and on   • Allergic sinusitis     "seasonal allergies"-has received allergy shots "   • Anxiety     with current diagnosis   • Arthritis    • Asthma    • Cholelithiasis    • Colon polyp    • Degenerative joint disease    • Dental bridge present     permanent lower left   • Exercise involving walking     1-2x/week   • GERD (gastroesophageal reflux disease)    • Giant cell aortic arteritis ([facility] )     "hereditary Giant Cell Temporal Arteritis"per pt   • Hiatal hernia    • History of cancer chemotherapy    • History of transfusion    • Hyperlipidemia    • Hypertension    • Nausea    • 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[facility] )     "gynecologic malignacy"   • PMR (polymyalgia rheumatica) (HCC)    • Prediabetes    • Shortness of breath     per pt "asthma related --with climbing mult  flights of stairs--or exertion -not new"   • Tinnitus    • Wears glasses      Past Surgical History:   Procedure Laterality Date   • COLONOSCOPY      October 2021: Adenomatous polyps and rectum in transverse colon, sigmoid diverticulosis, internal hemorrhoids  October 2016 diverticulosis and internal hemorrhoids, September 2011 diverticulosis and internal hemorrhoids     • COLOPROCTECTOMY N/A 10/28/2022    Procedure: PROCTECTOMY;  Surgeon: Will Henry MD;  [facility];  Service: Surgical Oncology   • DILATION AND CURETTAGE OF UTERUS     • ILEO LOOP DIVERSION N/A 10/28/2022    Procedure: ILEOSTOMY LOOP;  Surgeon: Will Henry MD;  [facility];  Service: Surgical Oncology   • IR PORT PLACEMENT  12/8/2022   • LIVER SURGERY  10/28/2022    Procedure: LIVER CONTROL OF BLEED ;  Surgeon: Will Henry MD;  [facility];  Service: Surgical Oncology   • OK COLECTOMY PARTIAL W/ANASTOMOSIS N/A 10/28/2022    Procedure: RESECTION COLON LEFT;  Surgeon: Meka Flores MD;  [facility];  Service: Gynecology Oncology   • NV EXPLORATORY LAPAROTOMY CELIOTOMY W/WO BIOPSY SPX N/A 10/28/2022    Procedure: LAPAROTOMY EXPLORATORY;  Surgeon: Jesus Mena MD;  [facility];  Service: Gynecology Oncology   • NV LAPS ABD PRTM&OMENTUM DX W/WO [address] SPX N/A 08/04/2022    Procedure: DIAGNOSTIC LAPAROSCOPY, PERITONEAL BIOPSY, SAMPLING OF BLOODY ASCITES ;  Surgeon: Konrad Ross MD;  [facility];  Service: Gynecology Oncology   • NV LAPS ABD PRTM&OMENTUM DX W/WO [address]/ [address] N/A 10/28/2022    Procedure: LAPAROSCOPY DIAGNOSTIC;  Surgeon: Jesus Mena MD;  [facility];  Service: Gynecology Oncology   • NV TOTAL ABDOMINAL HYSTERECT W/WO RMVL TUBE OVARY N/A 10/28/2022    Procedure: HYSTERECTOMY MODIFIED RADICAL, BILATERAL SALPINGO-OOPHORECTOMY, GASTROCOLIC OMENTECTOMY, DIAPHRAGM RESECTION, EXCISION AND ABLATION OF PERITONEAL TUMORS;  Surgeon: Jesus Mena MD;  [facility];  Service: Gynecology Oncology   • SMALL INTESTINE SURGERY N/A 10/28/2022    Procedure: RESECTION SMALL BOWEL;  Surgeon: Jesus Mena MD;  [facility];  Service: Gynecology Oncology   • SPLENECTOMY, TOTAL N/A 10/28/2022    Procedure: SPLENECTOMY;  Surgeon: Jesus Mena MD;  [facility];  Service: Gynecology Oncology   • TEMPORAL ARTERY BIOPSY / LIGATION     • UPPER GASTROINTESTINAL ENDOSCOPY  11/10/2014    November 2014 irregular Z-line and Schatzki ring, hiatal hernia, gastritis  Biopsies negative for celiac, H pylori, or Phillip's or eosinophilic esophagitis     • WISDOM TOOTH EXTRACTION       Family History   Problem Relation Age of Onset   • Colon polyps Mother    • Alzheimer's disease Mother    • Heart disease Father    • Brain cancer Father    • Colon polyps Sister    • Heart disease Sister    • Diabetes Sister    • MARLEY disease Sister    • Colon cancer Cousin      Social History     Socioeconomic History   • Marital status: /Civil Union Spouse name: Not on file   • Number of children: Not on file   • Years of education: Not on file   • Highest education level: Not on file   Occupational History   • Not on file   Tobacco Use   • Smoking status: Never   • Smokeless tobacco: Never   Vaping Use   • Vaping Use: Never used   Substance and Sexual Activity   • Alcohol use: Yes     Comment: a glass of wine maybe 2 times a month   • Drug use: Never   • Sexual activity: Not Currently     Partners: Male     Birth control/protection: None     Comment: defer   Other Topics Concern   • Not on file   Social History Narrative   • Not on file     Social Determinants of Health     Financial Resource Strain: Not on file   Food Insecurity: Not on file   Transportation Needs: Not on file   Physical Activity: Not on file   Stress: Not on file   Social Connections: Not on file   Intimate Partner Violence: Not on file   Housing Stability: Not on file       Current Outpatient Medications:   •  Acetaminophen (TYLENOL ARTHRITIS PAIN PO), Take by mouth as needed, Disp: , Rfl:   •  albuterol (PROVENTIL HFA,VENTOLIN HFA) 90 mcg/act inhaler, Inhale 2 puffs every 4 (four) hours as needed , Disp: , Rfl:   •  Ascorbic Acid (VITAMIN C GUMMIE PO), Take by mouth, Disp: , Rfl:   •  aspirin (ECOTRIN LOW STRENGTH) 81 mg EC tablet, Take 81 mg by mouth daily, Disp: , Rfl:   •  B Complex-C (B COMPLEX-VITAMIN C PO), Take by mouth daily, Disp: , Rfl:   •  Calcium Carb-Cholecalciferol (CALCIUM 500+D3 PO), Take 1 tablet by mouth 2 (two) times a day , Disp: , Rfl:   •  cholecalciferol (VITAMIN D3) 1,000 units tablet, Take 1,000 Units by mouth daily, Disp: , Rfl:   •  Docusate Calcium (STOOL SOFTENER PO), Take 1 tablet by mouth 2 (two) times a day, Disp: , Rfl:   •  famotidine (PEPCID) 40 MG tablet, TAKE 1 TABLET BY MOUTH DAILY AT BEDTIME, Disp: 90 tablet, Rfl: 3  •  fexofenadine (ALLEGRA) 180 MG tablet, Take 180 mg by mouth daily, Disp: , Rfl:   •  GLUCOSAMINE-CHONDROITIN PO, Take 1 tablet by mouth 2 (two) times a day , Disp: , Rfl:   •  lidocaine-prilocaine (EMLA) cream, Apply topically as needed for mild pain, Disp: 30 g, Rfl: 0  •  LORazepam (ATIVAN) 1 mg tablet, Take 1 tablet (1 mg total) by mouth every 8 (eight) hours as needed for anxiety (nausea or anxiety), Disp: 30 tablet, Rfl: 0  •  metFORMIN (GLUCOPHAGE) 500 mg tablet, Take 500 mg by mouth daily Daily at lunch, Disp: , Rfl:   •  metoprolol succinate (TOPROL-XL) 25 mg 24 hr tablet, Take 25 mg by mouth daily dinner, Disp: , Rfl:   •  montelukast (SINGULAIR) 10 mg tablet, Take 10 mg by mouth daily dinner, Disp: , Rfl:   •  multivitamin (THERAGRAN) TABS, Take 1 tablet by mouth daily, Disp: , Rfl:   •  nystatin (MYCOSTATIN) powder, Apply topically 4 (four) times a day Under abdominal pannus and in between thighs, Disp: , Rfl:   •  omeprazole (PriLOSEC) 20 mg delayed release capsule, TAKE 1 CAPSULE BY MOUTH EVERY DAY 30 MINUTES BEFORE MORNING MEAL FOR 90 DAYS, Disp: , Rfl:   •  ondansetron (ZOFRAN) 8 mg tablet, Take 1 tablet (8 mg total) by mouth every 8 (eight) hours as needed for nausea or vomiting, Disp: 20 tablet, Rfl: 1  •  predniSONE 1 mg tablet, Take 2 mg by mouth daily 2 mg, Disp: , Rfl:   •  Probiotic Product (PROBIOTIC DAILY PO), Take by mouth daily, Disp: , Rfl:   •  simvastatin (ZOCOR) 10 mg tablet, Take 10 mg by mouth daily at bedtime, Disp: , Rfl:   •  SYMBICORT 160-4 5 MCG/ACT inhaler, 2 puffs daily , Disp: , Rfl:   Allergies   Allergen Reactions   • Lactose - Food Allergy GI Intolerance   • Nsaids Other (See Comments)      pt is avoiding per family doctor instructions-  Able to take Tylenol   • Pedi-Pre Tape Spray [Wound Dressing Adhesive] Other (See Comments)     Please use sensitive skin tape, pt gets bad bruising from strong medical adhesive tape  There were no vitals filed for this visit  Physical Exam  Constitutional: General appearance:  The Patient is well-developed and well-nourished who appears the stated age in no acute distress  Patient is pleasant and talkative  HEENT:  Normocephalic  Sclerae are anicteric  Mucous membranes are moist  Neck is supple without adenopathy  No JVD  Chest: The lungs are clear to auscultation  Cardiac: Heart is regular rate  Abdomen: Abdomen is soft, non-tender, non-distended and without masses  Extremities: There is no clubbing or cyanosis  There is no edema  Symmetric  Neuro: Grossly nonfocal  Gait is normal      Lymphatic: No evidence of cervical adenopathy bilaterally  No evidence of axillary adenopathy bilaterally  No evidence of inguinal adenopathy bilaterally  Skin: Warm, anicteric  Psych:  Patient is pleasant and talkative  Breasts:        Pathology:  [unfilled]    Labs:      Imaging  FL barium enema    Result Date: 3/10/2023  Narrative: FLUOROSCOPIC ENEMA  SINGLE CONTRAST INDICATION:  Stage III C ovarian cancer being status post chemotherapy and surgical tumor debulking  Right lower quadrant ileostomy  History of rectal/pelvic pressure with ongoing bleeding  COMPARISON:  Multiple prior CT examinations, most recently February 13, 2023 IMAGES: 11 FLUOROSCOPY TIME:   2 8 mins FINDINGS:  image is unremarkable  Rectal tube was inserted and water-soluble Gastrografin contrast was instilled  The patient had extreme difficulty holding contrast in the rectum, despite rectal balloon insufflation  However, with a number of attempts, the rectum was filled with contrast to the level of the anastomosis  Very little (no greater than a few cc) contrast could be instilled past the anastomosis  Contrast continued to leak out of the rectum onto the table around the balloon  Contrast was also seen to enter a sinus tract into a cavity situated along the ventral margin of the sacrum  In retrospect this cavity is visible on sagittal image 103 of series 602 of the February 13, 2023 examination  This circumscribed cavity contains air and fluid    On axial axial images of the February 2020 CT examinations demonstrates an appearance that suggests bowel loop; however this cavity is now shown to represent the contracted residua of a larger presacral collection visible in that location on November 15, 2022  At the conclusion of procedure, when the contrast was drained back into the enema bag, it was noted to be tinged with some blood  Impression: Extremely tight stricturing at the rectal anastomosis beyond which no greater than a few cc of rectal contrast could pass  Sinus tract connecting the posterior margin of the rectal anastomosis with a collapsed cavity along the ventral margin of the sacrum, consistent with the residua of a fluid collection seen in that location on November 15, 2022 CT  At the conclusion of procedure, when the contrast was drained back into the enema bag, it was noted to be tinged with some blood  I reported these results to Desmond Gregory and Michelle King via HIPAA compliant secure electronic messaging on 3/10/2023 at 2:25 PM  Workstation performed: [id number]     Flexible Sigmoidoscopy    Result Date: 3/17/2023  Narrative: Table formatting from the original result was not included  [place] [phone] DATE OF SERVICE: 3/17/23 PHYSICIAN(S): Attending: Korey Correa MD Fellow: No Staff Documented INDICATION: Rectal bleeding POST-OP DIAGNOSIS: See the impression below  HISTORY: Prior colonoscopy: Less than 3 years ago  It is being repeated at an interval of less than 3 years because: This colonoscopy is being performed for a diagnostic indication BOWEL PREPARATION:  PREPROCEDURE: Informed consent was obtained for the procedure, including sedation  Risks including but not limited to bleeding, infection, perforation, adverse drug reaction and aspiration were explained in detail  Also explained about less than 100% sensitivity with the exam and other alternatives  The patient was placed in the left lateral decubitus position  Procedure: Colonoscopy DETAILS OF PROCEDURE: Patient was taken to the procedure room where a time out was performed to confirm correct patient and correct procedure  The patient underwent monitored anesthesia care, which was administered by an anesthesia professional  The patient's blood pressure, heart rate, level of consciousness, oxygen and respirations were monitored throughout the procedure  A digital rectal exam was performed  The scope was introduced through the anus and advanced to the descending colon  The patient experienced no blood loss  The procedure was not difficult  The patient tolerated the procedure well  There were no apparent complications  Rectal anastomosis at 8 cm from the anal verge  ANESTHESIA INFORMATION: ASA: II Anesthesia Type: IV Sedation with Anesthesia MEDICATIONS: No administrations occurring from 1147 to 1212 on 03/17/23 FINDINGS: There is an anastomotic defect with a well-granulated surface, measuring about 8 mm diameter  Exudative fluid emanated from the opening but no significant inflammation was seen  All observed locations appeared normal, including the descending colon  Normal colonic and distal rectal mucosa  EVENTS: Procedure Events Event Event Time ENDO SCOPE OUT TIME 3/17/2023 12:09 PM SPECIMENS: * No specimens in log * EQUIPMENT: Colonoscope - [id number]     Impression: Anastomotic leak with ling-anastomotic cavity and wide aperture at the anastomotic leak RECOMMENDATION:  Repeat colon evaluation per Dr Thania Triplett  Stoma closure plan discussed via [place] Text with Dr Thania Martinez MD     I reviewed the above laboratory and imaging data      Discussion/Summary: ***

## 2023-03-24 ENCOUNTER — ANESTHESIA EVENT (OUTPATIENT)
Dept: PERIOP | Facility: HOSPITAL | Age: 73
End: 2023-03-24

## 2023-03-24 ENCOUNTER — HOSPITAL ENCOUNTER (OUTPATIENT)
Dept: INFUSION CENTER | Facility: HOSPITAL | Age: 73
End: 2023-03-24

## 2023-03-24 DIAGNOSIS — Z45.2 ENCOUNTER FOR VENOUS ACCESS DEVICE CARE: Primary | ICD-10-CM

## 2023-03-24 DIAGNOSIS — C56.2 MALIGNANT NEOPLASM OF LEFT OVARY (HCC): ICD-10-CM

## 2023-03-24 DIAGNOSIS — Z93.2 ILEOSTOMY, HAS CURRENTLY (HCC): ICD-10-CM

## 2023-03-24 LAB
ABO GROUP BLD: NORMAL
ALBUMIN SERPL BCP-MCNC: 3.8 G/DL (ref 3.5–5)
ALP SERPL-CCNC: 76 U/L (ref 34–104)
ALT SERPL W P-5'-P-CCNC: 10 U/L (ref 7–52)
ANION GAP SERPL CALCULATED.3IONS-SCNC: 8 MMOL/L (ref 4–13)
AST SERPL W P-5'-P-CCNC: 14 U/L (ref 13–39)
BILIRUB SERPL-MCNC: 0.31 MG/DL (ref 0.2–1)
BLD GP AB SCN SERPL QL: NEGATIVE
BUN SERPL-MCNC: 11 MG/DL (ref 5–25)
CALCIUM SERPL-MCNC: 9.2 MG/DL (ref 8.4–10.2)
CHLORIDE SERPL-SCNC: 102 MMOL/L (ref 96–108)
CO2 SERPL-SCNC: 27 MMOL/L (ref 21–32)
CREAT SERPL-MCNC: 0.59 MG/DL (ref 0.6–1.3)
GFR SERPL CREATININE-BSD FRML MDRD: 91 ML/MIN/1.73SQ M
GLUCOSE SERPL-MCNC: 111 MG/DL (ref 65–140)
POTASSIUM SERPL-SCNC: 3.8 MMOL/L (ref 3.5–5.3)
PROT SERPL-MCNC: 7.4 G/DL (ref 6.4–8.4)
RH BLD: POSITIVE
SODIUM SERPL-SCNC: 137 MMOL/L (ref 135–147)
SPECIMEN EXPIRATION DATE: NORMAL

## 2023-03-24 NOTE — PROGRESS NOTES
Port labs drawn with some difficulty  Many flushes and position changes required to get a good blood return  Port did however flush easily  Pt deaccessed and discharged to home with steady gait , AVS declined

## 2023-03-25 LAB — CANCER AG125 SERPL-ACNC: 6.2 U/ML (ref 0–30)

## 2023-03-26 NOTE — ANESTHESIA PREPROCEDURE EVALUATION
"Procedure:  INSERTION URETERAL CATHETERS PREOP (Bilateral: Ureter)  LOW ANTERIOR RESECTION (Abdomen)     #Ovarian CA s/p ex-lap/radial LIN-BSO/tumor debulking (10/2022), ileostomy present; this has been c/b rectal stricture c/b anastmotic leak - procedure above      ETT Properties Placement Date: 10/28/22  -DG Placement Time: 3155  -DG Mask Ventilation: Ventilated by mask (1)  -DG Preoxygenated: Yes  -DG Technique: Direct laryngoscopy  -DG Type: Cuffed;Oral  -DG Tube Size: 7 mm  -DG Laryngoscope: Bui  -DG Blade Size: 3  -DG Location: Oral  -DG Grade View: 1  -DG Insertion: Atraumatic; No change in dentition  -DG Insertion attempts: 1  -DG Placement Verification: Auscultation;Cuff palpitation; End tidal CO2;Symmetrical chest wall movement  -DG Removal Date: 10/28/22  -DG Removal Time: 1933  -DG     Hx of easy epidural placements in the past    Lab Results   Component Value Date    WBC 13 73 (H) 03/24/2023    HGB 10 7 (L) 03/24/2023    HCT 33 2 (L) 03/24/2023    MCV 99 (H) 03/24/2023     03/24/2023       Chemistry        Component Value Date/Time    K 3 8 03/24/2023 1315     03/24/2023 1315    CO2 27 03/24/2023 1315    CO2 18 (L) 10/28/2022 1900    BUN 11 03/24/2023 1315    CREATININE 0 59 (L) 03/24/2023 1315        Component Value Date/Time    CALCIUM 9 2 03/24/2023 1315    ALKPHOS 76 03/24/2023 1315    AST 14 03/24/2023 1315    ALT 10 03/24/2023 1315            Past Medical History:   Diagnosis Date   • Abdominal pain     off and on   • Allergic sinusitis     \"seasonal allergies\"-has received allergy shots \"   • Anxiety     with current diagnosis   • Arthritis    • Asthma    • Cholelithiasis    • Colon polyp    • Degenerative joint disease    • Dental bridge present     permanent lower left   • Exercise involving walking     1-2x/week   • GERD (gastroesophageal reflux disease)    • Giant cell aortic arteritis (HCC)     \"hereditary Giant Cell Temporal Arteritis\"per pt   • Hiatal hernia    • History of " "cancer chemotherapy    • History of transfusion    • Hyperlipidemia    • Hypertension    • Nausea    • Peritoneal carcinoma [facility])     \"gynecologic malignacy\"   • PMR (polymyalgia rheumatica) (HCC)    • Prediabetes    • Shortness of breath     per pt \"asthma related --with climbing mult  flights of stairs--or exertion -not new\"   • Tinnitus    • Wears glasses          Physical Exam    Airway    Mallampati score: II  TM Distance: >3 FB  Neck ROM: full     Dental   No notable dental hx     Cardiovascular  Rhythm: regular, Rate: normal,     Pulmonary  Breath sounds clear to auscultation,     Other Findings  Intercisor Distance > 3cm          Anesthesia Plan  ASA Score- 3     Anesthesia Type- general with ASA Monitors  Additional Monitors: arterial line  Airway Plan: ETT  Comment: Discussed benefits/risks of general anesthesia including possibility of mouth/throat pain, injury to lips/teeth, nausea/vomiting, and surgical pain along with more rare complications such as stroke, MI, pneumonia, aspiration, and injury to blood vessels  Patient understands and wishes to proceed  All questions answered  Discussed risks/benefits of thoracic epidural placement for post-operative analgesia  Patient understands and wishes to proceed          Plan Factors-Exercise tolerance (METS): >4 METS  Chart reviewed  EKG reviewed  Existing labs reviewed  Induction- intravenous and rapid sequence induction  Postoperative Plan- Plan for postoperative opioid use  Planned trial extubation    Informed Consent- Anesthetic plan and risks discussed with patient  I personally reviewed this patient with the CRNA  Discussed and agreed on the Anesthesia Plan with the CRNA  Jolene Tovar             "

## 2023-03-27 ENCOUNTER — ANESTHESIA (OUTPATIENT)
Dept: PERIOP | Facility: HOSPITAL | Age: 73
End: 2023-03-27

## 2023-03-27 ENCOUNTER — HOSPITAL ENCOUNTER (INPATIENT)
Facility: HOSPITAL | Age: 73
LOS: 7 days | Discharge: HOME HEALTH | End: 2023-04-03
Attending: UROLOGY | Admitting: SURGERY

## 2023-03-27 DIAGNOSIS — K91.89 LARGE BOWEL ANASTOMOTIC LEAK: Primary | ICD-10-CM

## 2023-03-27 DIAGNOSIS — Z93.2 ILEOSTOMY, HAS CURRENTLY (HCC): ICD-10-CM

## 2023-03-27 DIAGNOSIS — C56.2 MALIGNANT NEOPLASM OF LEFT OVARY (HCC): ICD-10-CM

## 2023-03-27 DIAGNOSIS — R73.03 PREDIABETES: ICD-10-CM

## 2023-03-27 LAB
ABO GROUP BLD: NORMAL
BLD GP AB SCN SERPL QL: NEGATIVE
GLUCOSE SERPL-MCNC: 172 MG/DL (ref 65–140)
GLUCOSE SERPL-MCNC: 206 MG/DL (ref 65–140)
RH BLD: POSITIVE
SPECIMEN EXPIRATION DATE: NORMAL

## 2023-03-27 PROCEDURE — 0DW807Z REVISION OF AUTOLOGOUS TISSUE SUBSTITUTE IN SMALL INTESTINE, OPEN APPROACH: ICD-10-PCS | Performed by: SURGERY

## 2023-03-27 PROCEDURE — 0DTP0ZZ RESECTION OF RECTUM, OPEN APPROACH: ICD-10-PCS | Performed by: SURGERY

## 2023-03-27 PROCEDURE — 0T788DZ DILATION OF BILATERAL URETERS WITH INTRALUMINAL DEVICE, VIA NATURAL OR ARTIFICIAL OPENING ENDOSCOPIC: ICD-10-PCS | Performed by: UROLOGY

## 2023-03-27 RX ORDER — LORAZEPAM 1 MG/1
1 TABLET ORAL EVERY 8 HOURS PRN
Status: DISCONTINUED | OUTPATIENT
Start: 2023-03-27 | End: 2023-03-29

## 2023-03-27 RX ORDER — ONDANSETRON 2 MG/ML
4 INJECTION INTRAMUSCULAR; INTRAVENOUS EVERY 6 HOURS PRN
Status: DISCONTINUED | OUTPATIENT
Start: 2023-03-27 | End: 2023-03-29

## 2023-03-27 RX ORDER — SODIUM CHLORIDE, SODIUM LACTATE, POTASSIUM CHLORIDE, CALCIUM CHLORIDE 600; 310; 30; 20 MG/100ML; MG/100ML; MG/100ML; MG/100ML
125 INJECTION, SOLUTION INTRAVENOUS CONTINUOUS
Status: DISCONTINUED | OUTPATIENT
Start: 2023-03-27 | End: 2023-03-28

## 2023-03-27 RX ORDER — ALBUMIN, HUMAN INJ 5% 5 %
SOLUTION INTRAVENOUS CONTINUOUS PRN
Status: DISCONTINUED | OUTPATIENT
Start: 2023-03-27 | End: 2023-03-27

## 2023-03-27 RX ORDER — BUPIVACAINE HYDROCHLORIDE 2.5 MG/ML
INJECTION, SOLUTION EPIDURAL; INFILTRATION; INTRACAUDAL AS NEEDED
Status: DISCONTINUED | OUTPATIENT
Start: 2023-03-27 | End: 2023-03-27

## 2023-03-27 RX ORDER — ALBUMIN, HUMAN INJ 5% 5 %
12.5 SOLUTION INTRAVENOUS ONCE
Status: COMPLETED | OUTPATIENT
Start: 2023-03-27 | End: 2023-03-27

## 2023-03-27 RX ORDER — SODIUM CHLORIDE, SODIUM LACTATE, POTASSIUM CHLORIDE, CALCIUM CHLORIDE 600; 310; 30; 20 MG/100ML; MG/100ML; MG/100ML; MG/100ML
75 INJECTION, SOLUTION INTRAVENOUS CONTINUOUS
Status: DISCONTINUED | OUTPATIENT
Start: 2023-03-27 | End: 2023-03-27

## 2023-03-27 RX ORDER — DEXAMETHASONE SODIUM PHOSPHATE 10 MG/ML
INJECTION, SOLUTION INTRAMUSCULAR; INTRAVENOUS AS NEEDED
Status: DISCONTINUED | OUTPATIENT
Start: 2023-03-27 | End: 2023-03-27

## 2023-03-27 RX ORDER — ONDANSETRON 2 MG/ML
INJECTION INTRAMUSCULAR; INTRAVENOUS AS NEEDED
Status: DISCONTINUED | OUTPATIENT
Start: 2023-03-27 | End: 2023-03-27

## 2023-03-27 RX ORDER — FENTANYL CITRATE/PF 50 MCG/ML
25 SYRINGE (ML) INJECTION
Status: DISCONTINUED | OUTPATIENT
Start: 2023-03-27 | End: 2023-03-27 | Stop reason: HOSPADM

## 2023-03-27 RX ORDER — SUCCINYLCHOLINE/SOD CL,ISO/PF 100 MG/5ML
SYRINGE (ML) INTRAVENOUS AS NEEDED
Status: DISCONTINUED | OUTPATIENT
Start: 2023-03-27 | End: 2023-03-27

## 2023-03-27 RX ORDER — EPHEDRINE SULFATE 50 MG/ML
INJECTION INTRAVENOUS AS NEEDED
Status: DISCONTINUED | OUTPATIENT
Start: 2023-03-27 | End: 2023-03-27

## 2023-03-27 RX ORDER — ROCURONIUM BROMIDE 10 MG/ML
INJECTION, SOLUTION INTRAVENOUS AS NEEDED
Status: DISCONTINUED | OUTPATIENT
Start: 2023-03-27 | End: 2023-03-27

## 2023-03-27 RX ORDER — LIDOCAINE HYDROCHLORIDE AND EPINEPHRINE 15; 5 MG/ML; UG/ML
INJECTION, SOLUTION EPIDURAL AS NEEDED
Status: DISCONTINUED | OUTPATIENT
Start: 2023-03-27 | End: 2023-03-27

## 2023-03-27 RX ORDER — METRONIDAZOLE 500 MG/100ML
500 INJECTION, SOLUTION INTRAVENOUS ONCE
Status: COMPLETED | OUTPATIENT
Start: 2023-03-27 | End: 2023-03-27

## 2023-03-27 RX ORDER — PROPOFOL 10 MG/ML
INJECTION, EMULSION INTRAVENOUS AS NEEDED
Status: DISCONTINUED | OUTPATIENT
Start: 2023-03-27 | End: 2023-03-27

## 2023-03-27 RX ORDER — SODIUM CHLORIDE 9 MG/ML
INJECTION, SOLUTION INTRAVENOUS CONTINUOUS PRN
Status: DISCONTINUED | OUTPATIENT
Start: 2023-03-27 | End: 2023-03-27

## 2023-03-27 RX ORDER — ACETAMINOPHEN 325 MG/1
975 TABLET ORAL EVERY 8 HOURS SCHEDULED
Status: DISCONTINUED | OUTPATIENT
Start: 2023-03-27 | End: 2023-04-03 | Stop reason: HOSPADM

## 2023-03-27 RX ORDER — ONDANSETRON 2 MG/ML
4 INJECTION INTRAMUSCULAR; INTRAVENOUS ONCE AS NEEDED
Status: DISCONTINUED | OUTPATIENT
Start: 2023-03-27 | End: 2023-03-27 | Stop reason: HOSPADM

## 2023-03-27 RX ORDER — MIDAZOLAM HYDROCHLORIDE 2 MG/2ML
INJECTION, SOLUTION INTRAMUSCULAR; INTRAVENOUS AS NEEDED
Status: DISCONTINUED | OUTPATIENT
Start: 2023-03-27 | End: 2023-03-27

## 2023-03-27 RX ORDER — LIDOCAINE HYDROCHLORIDE 10 MG/ML
INJECTION, SOLUTION EPIDURAL; INFILTRATION; INTRACAUDAL; PERINEURAL AS NEEDED
Status: DISCONTINUED | OUTPATIENT
Start: 2023-03-27 | End: 2023-03-27

## 2023-03-27 RX ORDER — ATORVASTATIN CALCIUM 40 MG/1
40 TABLET, FILM COATED ORAL
Status: DISCONTINUED | OUTPATIENT
Start: 2023-03-27 | End: 2023-04-03 | Stop reason: HOSPADM

## 2023-03-27 RX ORDER — HYDROMORPHONE HCL/PF 1 MG/ML
SYRINGE (ML) INJECTION AS NEEDED
Status: DISCONTINUED | OUTPATIENT
Start: 2023-03-27 | End: 2023-03-27

## 2023-03-27 RX ORDER — BUDESONIDE AND FORMOTEROL FUMARATE DIHYDRATE 160; 4.5 UG/1; UG/1
2 AEROSOL RESPIRATORY (INHALATION) DAILY
Status: DISCONTINUED | OUTPATIENT
Start: 2023-03-28 | End: 2023-04-03 | Stop reason: HOSPADM

## 2023-03-27 RX ORDER — MAGNESIUM HYDROXIDE 1200 MG/15ML
LIQUID ORAL AS NEEDED
Status: DISCONTINUED | OUTPATIENT
Start: 2023-03-27 | End: 2023-03-27 | Stop reason: HOSPADM

## 2023-03-27 RX ORDER — NYSTATIN 100000 [USP'U]/G
POWDER TOPICAL 2 TIMES DAILY
Status: DISCONTINUED | OUTPATIENT
Start: 2023-03-27 | End: 2023-04-03 | Stop reason: HOSPADM

## 2023-03-27 RX ORDER — HEPARIN SODIUM 5000 [USP'U]/ML
5000 INJECTION, SOLUTION INTRAVENOUS; SUBCUTANEOUS EVERY 8 HOURS SCHEDULED
Status: DISCONTINUED | OUTPATIENT
Start: 2023-03-27 | End: 2023-04-03 | Stop reason: HOSPADM

## 2023-03-27 RX ORDER — ALBUTEROL SULFATE 90 UG/1
2 AEROSOL, METERED RESPIRATORY (INHALATION) EVERY 4 HOURS PRN
Status: DISCONTINUED | OUTPATIENT
Start: 2023-03-27 | End: 2023-04-03 | Stop reason: HOSPADM

## 2023-03-27 RX ORDER — ECHINACEA PURPUREA EXTRACT 125 MG
1 TABLET ORAL AS NEEDED
COMMUNITY

## 2023-03-27 RX ORDER — SODIUM CHLORIDE, SODIUM LACTATE, POTASSIUM CHLORIDE, CALCIUM CHLORIDE 600; 310; 30; 20 MG/100ML; MG/100ML; MG/100ML; MG/100ML
INJECTION, SOLUTION INTRAVENOUS CONTINUOUS PRN
Status: DISCONTINUED | OUTPATIENT
Start: 2023-03-27 | End: 2023-03-27

## 2023-03-27 RX ORDER — ACETAMINOPHEN 325 MG/1
975 TABLET ORAL ONCE
Status: COMPLETED | OUTPATIENT
Start: 2023-03-27 | End: 2023-03-27

## 2023-03-27 RX ORDER — FAMOTIDINE 20 MG/1
40 TABLET, FILM COATED ORAL
Status: DISCONTINUED | OUTPATIENT
Start: 2023-03-27 | End: 2023-03-28

## 2023-03-27 RX ORDER — METOPROLOL SUCCINATE 25 MG/1
25 TABLET, EXTENDED RELEASE ORAL DAILY
Status: DISCONTINUED | OUTPATIENT
Start: 2023-03-28 | End: 2023-03-30

## 2023-03-27 RX ORDER — FENTANYL CITRATE 50 UG/ML
INJECTION, SOLUTION INTRAMUSCULAR; INTRAVENOUS AS NEEDED
Status: DISCONTINUED | OUTPATIENT
Start: 2023-03-27 | End: 2023-03-27

## 2023-03-27 RX ORDER — PANTOPRAZOLE SODIUM 40 MG/1
40 TABLET, DELAYED RELEASE ORAL
Status: DISCONTINUED | OUTPATIENT
Start: 2023-03-28 | End: 2023-03-29

## 2023-03-27 RX ORDER — CEFAZOLIN SODIUM 2 G/50ML
2000 SOLUTION INTRAVENOUS ONCE
Status: COMPLETED | OUTPATIENT
Start: 2023-03-27 | End: 2023-03-27

## 2023-03-27 RX ADMIN — ROCURONIUM BROMIDE 10 MG: 10 INJECTION INTRAVENOUS at 15:53

## 2023-03-27 RX ADMIN — ACETAMINOPHEN 975 MG: 325 TABLET ORAL at 21:12

## 2023-03-27 RX ADMIN — Medication 80 MG: at 13:37

## 2023-03-27 RX ADMIN — SUGAMMADEX 200 MG: 100 INJECTION, SOLUTION INTRAVENOUS at 16:15

## 2023-03-27 RX ADMIN — DEXAMETHASONE SODIUM PHOSPHATE 10 MG: 10 INJECTION, SOLUTION INTRAMUSCULAR; INTRAVENOUS at 13:40

## 2023-03-27 RX ADMIN — BUPIVACAINE HYDROCHLORIDE 2 ML: 2.5 INJECTION, SOLUTION EPIDURAL; INFILTRATION; INTRACAUDAL at 16:37

## 2023-03-27 RX ADMIN — MIDAZOLAM 1 MG: 1 INJECTION INTRAMUSCULAR; INTRAVENOUS at 12:47

## 2023-03-27 RX ADMIN — FENTANYL CITRATE 75 MCG: 50 INJECTION, SOLUTION INTRAMUSCULAR; INTRAVENOUS at 13:37

## 2023-03-27 RX ADMIN — ACETAMINOPHEN 975 MG: 325 TABLET ORAL at 12:09

## 2023-03-27 RX ADMIN — FENTANYL CITRATE 50 MCG: 50 INJECTION, SOLUTION INTRAMUSCULAR; INTRAVENOUS at 14:44

## 2023-03-27 RX ADMIN — CEFAZOLIN SODIUM 2000 MG: 2 SOLUTION INTRAVENOUS at 14:00

## 2023-03-27 RX ADMIN — PROPOFOL 150 MG: 10 INJECTION, EMULSION INTRAVENOUS at 13:37

## 2023-03-27 RX ADMIN — FENTANYL CITRATE 25 MCG: 50 INJECTION, SOLUTION INTRAMUSCULAR; INTRAVENOUS at 12:47

## 2023-03-27 RX ADMIN — ONDANSETRON 4 MG: 2 INJECTION INTRAMUSCULAR; INTRAVENOUS at 16:31

## 2023-03-27 RX ADMIN — ROCURONIUM BROMIDE 30 MG: 10 INJECTION INTRAVENOUS at 14:10

## 2023-03-27 RX ADMIN — MIDAZOLAM 1 MG: 1 INJECTION INTRAMUSCULAR; INTRAVENOUS at 13:31

## 2023-03-27 RX ADMIN — EPHEDRINE SULFATE 10 MG: 50 INJECTION INTRAVENOUS at 13:44

## 2023-03-27 RX ADMIN — SODIUM CHLORIDE, SODIUM LACTATE, POTASSIUM CHLORIDE, AND CALCIUM CHLORIDE 75 ML/HR: .6; .31; .03; .02 INJECTION, SOLUTION INTRAVENOUS at 11:44

## 2023-03-27 RX ADMIN — FENTANYL CITRATE 50 MCG: 50 INJECTION, SOLUTION INTRAMUSCULAR; INTRAVENOUS at 14:48

## 2023-03-27 RX ADMIN — HEPARIN SODIUM 5000 UNITS: 5000 INJECTION INTRAVENOUS; SUBCUTANEOUS at 21:12

## 2023-03-27 RX ADMIN — FAMOTIDINE 40 MG: 20 TABLET, FILM COATED ORAL at 21:12

## 2023-03-27 RX ADMIN — ALBUMIN (HUMAN) 12.5 G: 12.5 INJECTION, SOLUTION INTRAVENOUS at 17:22

## 2023-03-27 RX ADMIN — METRONIDAZOLE: 500 INJECTION, SOLUTION INTRAVENOUS at 14:20

## 2023-03-27 RX ADMIN — SODIUM CHLORIDE, SODIUM LACTATE, POTASSIUM CHLORIDE, AND CALCIUM CHLORIDE: .6; .31; .03; .02 INJECTION, SOLUTION INTRAVENOUS at 13:31

## 2023-03-27 RX ADMIN — BUPIVACAINE HYDROCHLORIDE 3 ML: 2.5 INJECTION, SOLUTION EPIDURAL; INFILTRATION; INTRACAUDAL at 16:41

## 2023-03-27 RX ADMIN — BUPIVACAINE HYDROCHLORIDE 5 ML: 2.5 INJECTION, SOLUTION EPIDURAL; INFILTRATION; INTRACAUDAL at 16:29

## 2023-03-27 RX ADMIN — PROPOFOL 50 MG: 10 INJECTION, EMULSION INTRAVENOUS at 14:48

## 2023-03-27 RX ADMIN — HYDROMORPHONE HYDROCHLORIDE 0.5 MG: 1 INJECTION, SOLUTION INTRAMUSCULAR; INTRAVENOUS; SUBCUTANEOUS at 15:04

## 2023-03-27 RX ADMIN — SODIUM CHLORIDE: 0.9 INJECTION, SOLUTION INTRAVENOUS at 13:41

## 2023-03-27 RX ADMIN — LIDOCAINE HYDROCHLORIDE AND EPINEPHRINE 3 ML: 15; 5 INJECTION, SOLUTION EPIDURAL at 12:48

## 2023-03-27 RX ADMIN — NYSTATIN: 100000 POWDER TOPICAL at 22:11

## 2023-03-27 RX ADMIN — FENTANYL CITRATE: 50 INJECTION INTRAMUSCULAR; INTRAVENOUS at 17:24

## 2023-03-27 RX ADMIN — PHENYLEPHRINE HYDROCHLORIDE 40 MCG/MIN: 10 INJECTION INTRAVENOUS at 13:42

## 2023-03-27 RX ADMIN — ALBUMIN (HUMAN): 12.5 INJECTION, SOLUTION INTRAVENOUS at 15:47

## 2023-03-27 RX ADMIN — ROCURONIUM BROMIDE 20 MG: 10 INJECTION INTRAVENOUS at 14:40

## 2023-03-27 RX ADMIN — LIDOCAINE HYDROCHLORIDE 40 MG: 10 INJECTION, SOLUTION EPIDURAL; INFILTRATION; INTRACAUDAL; PERINEURAL at 13:37

## 2023-03-27 NOTE — PROGRESS NOTES
Edilson Dooley is a 59-year-old female with a history of a prior low anterior resection  She had an anastomotic leak  She was diverted and now returns to the operating room for revision of her low anterior resection with Dr Brii Ross  Preoperative ureteral stents have been requested  Prior to surgery I was called urgently to another hospital emergency room and Dr Brii Ross discussed in detail with the patient cystoscopy with stent insertion  The patient then received her epidural   I then spoke to the patient personally and explained the procedure to her  She clearly understood the procedure  She understands that I will place bilateral ureteral stents that will just be temporary  These will allow Dr Brii Ross to identify the ureters intraoperatively, however, it does not exclude the possibility of a ureteral injury  If there is ureteral injury with catheters in place it can facilitate closure and repair  Informed consent was obtained and I cosigned it as well

## 2023-03-27 NOTE — RESPIRATORY THERAPY NOTE
"RT Protocol Note  Teddy Victoria 67 y o  female MRN: [id number]  Unit/Bed#: [facility] 810-01 Encounter: [id number]    Assessment    Principal Problem:    Malignant neoplasm of left ovary ([facility] [address] )      Home Pulmonary Medications:  Albuterol        Past Medical History:   Diagnosis Date    Abdominal pain     off and on    Allergic sinusitis     \"seasonal allergies\"-has received allergy shots \"    Anxiety     with current diagnosis    Arthritis     Asthma     Cholelithiasis     Colon polyp     Degenerative joint disease     Dental bridge present     permanent lower left    Exercise involving walking     1-2x/week    GERD (gastroesophageal reflux disease)     Giant cell aortic arteritis (HCC)     \"hereditary Giant Cell Temporal Arteritis\"per pt    Hiatal hernia     History of cancer chemotherapy     History of transfusion     Hyperlipidemia     Hypertension     Nausea     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[facility] )     \"gynecologic malignacy\"    PMR (polymyalgia rheumatica) ([facility])     Prediabetes     Shortness of breath     per pt \"asthma related --with climbing mult   flights of stairs--or exertion -not new\"    Tinnitus     Wears glasses      Social History     Socioeconomic History    Marital status: /Civil Union     Spouse name: None    Number of children: None    Years of education: None    Highest education level: None   Occupational History    None   Tobacco Use    Smoking status: Never    Smokeless tobacco: Never   Vaping Use    Vaping Use: Never used   Substance and Sexual Activity    Alcohol use: Yes     Comment: a glass of wine maybe 2 times a month    Drug use: Never    Sexual activity: Not Currently     Partners: Male     Birth control/protection: None     Comment: defer   Other Topics Concern    None   Social History Narrative    None     Social Determinants of Health     Financial Resource Strain: Not on file   Food Insecurity: Not on file   Transportation Needs: Not on file   Physical Activity: Not on file   Stress: Not " "on file   Social Connections: Not on file   Intimate Partner Violence: Not on file   Housing Stability: Not on file       Subjective         Objective    Physical Exam:   Assessment Type: Assess only  General Appearance: Alert, Awake  Respiratory Pattern: Normal  Bilateral Breath Sounds: Clear    Vitals:  Blood pressure 101/54, pulse 70, temperature (!) 96 4 °F (35 8 °C), resp  rate 16, height 5' 4\" (1 626 m), weight 71 2 kg (157 lb), SpO2 100 %  Imaging and other studies: I have personally reviewed pertinent reports  Plan    Respiratory Plan: Discontinue Protocol        Resp Comments: (P) Pt evaluated per resp protocol following procedure  Pt offers no resp complaints, bs clear, pt on RA  Pt has an inhaler that they have not used for a few months  Will dc resp protocol and pt has prn inhaler ordered     "

## 2023-03-27 NOTE — OP NOTE
OPERATIVE REPORT  PATIENT NAME: Sugey Dennis    :  1950  MRN: [id number]  Pt Location: BE OR ROOM 05    SURGERY DATE: 3/27/2023    Surgeon(s) and Role:  Panel 1:     * Jaylen Alcantar MD - Primary  Panel 2:     * Liam Jack MD - Primary     * Yakelin Richards MD - Assisting    Preop Diagnosis:  Malignant neoplasm of left ovary ([address] ) [C56 2]  Ileostomy, has currently ([address] ) [Z93 2]    Post-Op Diagnosis Codes:     * Malignant neoplasm of left ovary ([address] ) [C56 2]     * Ileostomy, has currently ([facility] [address] ) [Z93 2]    Procedure(s):  Bilateral - INSERTION URETERAL CATHETERS PREOP  LOW ANTERIOR RESECTION    Specimen(s):  ID Type Source Tests Collected by Time Destination   1 :  Tissue Rectum TISSUE EXAM Liam Jack MD 3/27/2023 1545    2 : ANASTOMIC DONUT Tissue Soft Tissue, Other TISSUE EXAM Liam Jack MD 3/27/2023 1614        Estimated Blood Loss:   250 mL    Drains:  Closed/Suction Drain Right; Anterior RLQ Bulb 19 Fr  (Active)   Number of days: 0       Ileostomy Standard Jennifer Dodrill, end) RUQ (Active)   Stomal Appliance 1 piece 23 1220   Number of days: 150       Urethral Catheter Latex 16 Fr  (Active)   Number of days: 0       [REMOVED] Ureteral Internal Stent Right ureter 5 Fr  (Removed)   Number of days: 0       Anesthesia Type:   General w/ Epidural    Operative Indications:  Malignant neoplasm of left ovary (HCC) [C56 2]  Ileostomy, has currently [facility]) [Z93 2]    Operative Findings:  - rectosigmoid anastomosis with defect into pre-sacral abscess/sinus cavity  - LAR performed with revision of anastomosis; approximately 6cm additional rectosigmoid resected  - both anastomotic donuts intact  - 19Fr jonatan drain left in pelvis    Complications:   None    Procedure and Technique: This is a 67year old female who presents with ovarian CA status post debulking procedure last October complicated by anastomotic leak in need of low anterior resection to revise anastomosis    All risks, benefits, and alternatives were discussed with the patient agreed with the plan of care as stated  Patient was identified by armband and verbal communication and brought back to the operating room  Our anesthesia colleagues had assisted with post-operative pain management by placing a thoracic epidural  She was then induced by general anesthesia via endotracheal intubation  Her abdomen was cleansed  A 3-0 silk suture was utilized to close her ileostomy mucosa with a figure-of-eight suture  A 4x4 was then placed overtop of the ostomy followed by a tegaderm dressing  The abdomen including the tegaderm was then subsequently prepped with ChloraPrep and draped in usual sterile fashion  The anus and perineum were prepped with betadine  A time-out was called to review the procedure and its details  Antibiotics were administered  Our urology colleague Dr Agnieszka Rouse then assisted with placement of ureteral catheters bilaterally  Please see his separately dictated notes for details of this procedure  A separate time out was then called to reiterate the procedure and its details  All were in agreement  A lower midline incision was made over her previous scar and carried down through skin and subcutaneous tissues until fascia was encountered  Fascia was grasped with kochers, elevated, and incised carefully with metzenbaum scissors  The peritoneal cavity was entered carefully  There were loops of small bowel adhesed to the underside of the abdominal wall consistent with her previous surgical procedure  We were able to lyse adhesions with a combination of blunt and sharp dissection until we were able to enter the pelvis  Subsequently we set up the bookwalter retractor in order to assist with exposure  Once we were able to have full exposure into the pelvis, we proceeded with our dissection  The rectosigmoid was mobilized from the pelvic sidewall and other attachments including the left iliac artery  The left ureter was protected   Once we were more mobile, we were able to identify the prior anastomotic staple line  It was tethered posteriorly  Once we were able to dissect circumferentially around the rectum, we entered the presacral cavity that was connected to the sinus tract from the anastomosis  A small amount of murky fluid and tissue was drained  Although the rectum was dissected free circumferentially, during this dissection the rectum was inadvertently entered and mucosa exposed  We attributed this to the known anastomotic defect opening further as we had dissected into the abscess/presacral cavity  There was no fecal spillage however as the rectum was completely clean with only minimal mucus in the rectal vault  At this time, we were dissected free enough in order to have a distal 2-3cm purchase on the rectum past the previous staple line  A contour stapler was utilized to staple across the low rectum  The proximal site for transection was then selected  A small amount of mesentery was taken down with clamps and 0 silk ties  Subsequently the contour stapler was similarly used to transect the sigmoid approximately 3cm proximal from the previous staple line at healthy sigmoid  This specimen was passed off the field and sent to pathology  At this point we proceeded to mobilize the sigmoid/left colon to ensure a tension-free anastomosis  The splenic flexure did not have to be mobilized  Once we were appropriately mobilized, a purse-buck was used to thread a 2-0 prolene through our proximal colon  This was used to secure the anvil for our 29 EEA stapler  After this was secured, the anal dilator followed by the EEA stapler was introduced into the rectum  The two were connected in standard fashion and stapler fired  The anastomotic rings were inspected and all layers appeared to be intact  These were similarly sent to pathology for analysis  Our pelvis was irrigated extensively with warm normal saline   Gloves were changed in accordance with colon bundle procedure  Subsequently a 19Fr jonatan drain was introduced through the left lower quadrant and secured to the skin with 3-0 nylon  The drain was cut to size and draped into the pelvis to drain the presacral cavity  Non-looped #1 PDS was used to reapproximate fascia from above and below in a running fashion  Skin was re-approximated with skin staples  A dry sterile dressing was placed  The stitch in the ileostomy was removed  A new ostomy appliance was applied  Ureteral stents were removed  Patient was then woken from general anesthesia and brought to the PACU in stable condition  All instrument, needle, and sponge counts were correct at the conclusion of the case  Radiofrequency scanning was negative  Dr Stephanie Thornton was present for the entirety of the operation      Patient Disposition:  PACU     This procedure was not performed to treat colon cancer through resection      SIGNATURE: Kendall Shearer MD  DATE: March 27, 2023  TIME: 5:15 PM

## 2023-03-27 NOTE — ANESTHESIA PROCEDURE NOTES
Epidural Block    Start time: 3/27/2023 12:48 PM  Reason for block: procedure for pain and at surgeon's request  Staffing  Performed: Anesthesiologist   Anesthesiologist: Son Khan MD  Preanesthetic Checklist  Completed: patient identified, IV checked, site marked, risks and benefits discussed, surgical consent, monitors and equipment checked, pre-op evaluation and timeout performed  Epidural  Patient position: sitting  Prep: ChloraPrep  Patient monitoring: cardiac monitor and frequent blood pressure checks  Approach: midline  Location: thoracic  Injection technique: FREDERICK air and FREDERICK saline  Needle  Needle type: Tuohy   Needle gauge: 18 G  Catheter type: side hole  Catheter size: 20 G  Catheter at skin depth: 9 cm  Catheter securement method: stabilization device  Test dose: negative  Assessment  Sensory level: T10  Number of attempts: 1negative aspiration for CSF, negative aspiration for heme and no paresthesia on injection  patient tolerated the procedure well with no immediate complications  Additional Notes  Uncomplicated epidural  FREDERICK with saline @ 4cm,   Catheter threaded without issues

## 2023-03-27 NOTE — PLAN OF CARE
Problem: Prexisting or High Potential for Compromised Skin Integrity  Goal: Skin integrity is maintained or improved  Description: INTERVENTIONS:  - Identify patients at risk for skin breakdown  - Assess and monitor skin integrity  - Assess and monitor nutrition and hydration status  - Monitor labs   - Assess for incontinence   - Turn and reposition patient  - Assist with mobility/ambulation  - Relieve pressure over bony prominences  - Avoid friction and shearing  - Provide appropriate hygiene as needed including keeping skin clean and dry  - Evaluate need for skin moisturizer/barrier cream  - Collaborate with interdisciplinary team   - Patient/family teaching  - Consider wound care consult   Outcome: Progressing     Problem: MOBILITY - ADULT  Goal: Maintain or return to baseline ADL function  Description: INTERVENTIONS:  -  Assess patient's ability to carry out ADLs; assess patient's baseline for ADL function and identify physical deficits which impact ability to perform ADLs (bathing, care of mouth/teeth, toileting, grooming, dressing, etc )  - Assess/evaluate cause of self-care deficits   - Assess range of motion  - Assess patient's mobility; develop plan if impaired  - Assess patient's need for assistive devices and provide as appropriate  - Encourage maximum independence but intervene and supervise when necessary  - Involve family in performance of ADLs  - Assess for home care needs following discharge   - Consider OT consult to assist with ADL evaluation and planning for discharge  - Provide patient education as appropriate  Outcome: Progressing  Goal: Maintains/Returns to pre admission functional level  Description: INTERVENTIONS:  - Perform BMAT or MOVE assessment daily    - Set and communicate daily mobility goal to care team and patient/family/caregiver  - Collaborate with rehabilitation services on mobility goals if consulted  - Perform Range of Motion 3 times a day    - Reposition patient every 2 hours   - Dangle patient 3 times a day  - Stand patient 3 times a day  - Ambulate patient 3 times a day  - Out of bed to chair 3 times a day   - Out of bed for meals 3 times a day  - Out of bed for toileting  - Record patient progress and toleration of activity level   Outcome: Progressing     Problem: PAIN - ADULT  Goal: Verbalizes/displays adequate comfort level or baseline comfort level  Description: Interventions:  - Encourage patient to monitor pain and request assistance  - Assess pain using appropriate pain scale  - Administer analgesics based on type and severity of pain and evaluate response  - Implement non-pharmacological measures as appropriate and evaluate response  - Consider cultural and social influences on pain and pain management  - Notify physician/advanced practitioner if interventions unsuccessful or patient reports new pain  Outcome: Progressing     Problem: INFECTION - ADULT  Goal: Absence or prevention of progression during hospitalization  Description: INTERVENTIONS:  - Assess and monitor for signs and symptoms of infection  - Monitor lab/diagnostic results  - Monitor all insertion sites, i e  indwelling lines, tubes, and drains  - Monitor endotracheal if appropriate and nasal secretions for changes in amount and color  - [place] appropriate cooling/warming therapies per order  - Administer medications as ordered  - Instruct and encourage patient and family to use good hand hygiene technique  - Identify and instruct in appropriate isolation precautions for identified infection/condition  Outcome: Progressing  Goal: Absence of fever/infection during neutropenic period  Description: INTERVENTIONS:  - Monitor WBC    Outcome: Progressing     Problem: SAFETY ADULT  Goal: Maintain or return to baseline ADL function  Description: INTERVENTIONS:  -  Assess patient's ability to carry out ADLs; assess patient's baseline for ADL function and identify physical deficits which impact ability to perform ADLs (bathing, care of mouth/teeth, toileting, grooming, dressing, etc )  - Assess/evaluate cause of self-care deficits   - Assess range of motion  - Assess patient's mobility; develop plan if impaired  - Assess patient's need for assistive devices and provide as appropriate  - Encourage maximum independence but intervene and supervise when necessary  - Involve family in performance of ADLs  - Assess for home care needs following discharge   - Consider OT consult to assist with ADL evaluation and planning for discharge  - Provide patient education as appropriate  Outcome: Progressing  Goal: Maintains/Returns to pre admission functional level  Description: INTERVENTIONS:  - Perform BMAT or MOVE assessment daily    - Set and communicate daily mobility goal to care team and patient/family/caregiver     - Collaborate with rehabilitation services on mobility goals if consulted  - Out of bed for toileting  - Record patient progress and toleration of activity level   Outcome: Progressing  Goal: Patient will remain free of falls  Description: INTERVENTIONS:  - Educate patient/family on patient safety including physical limitations  - Instruct patient to call for assistance with activity   - Consult OT/PT to assist with strengthening/mobility   - Keep Call bell within reach  - Keep bed low and locked with side rails adjusted as appropriate  - Keep care items and personal belongings within reach  - Initiate and maintain comfort rounds  - Make Fall Risk Sign visible to staff  - Apply yellow socks and bracelet for high fall risk patients  - Consider moving patient to room near nurses station  Outcome: Progressing     Problem: DISCHARGE PLANNING  Goal: Discharge to home or other facility with appropriate resources  Description: INTERVENTIONS:  - Identify barriers to discharge w/patient and caregiver  - Arrange for needed discharge resources and transportation as appropriate  - Identify discharge learning needs (meds, wound care, etc )  - Arrange for interpretive services to assist at discharge as needed  - Refer to Case Management Department for coordinating discharge planning if the patient needs post-hospital services based on physician/advanced practitioner order or complex needs related to functional status, cognitive ability, or social support system  Outcome: Progressing     Problem: Knowledge Deficit  Goal: Patient/family/caregiver demonstrates understanding of disease process, treatment plan, medications, and discharge instructions  Description: Complete learning assessment and assess knowledge base    Interventions:  - Provide teaching at level of understanding  - Provide teaching via preferred learning methods  Outcome: Progressing

## 2023-03-27 NOTE — ANESTHESIA POSTPROCEDURE EVALUATION
Post-Op Assessment Note    CV Status:  Stable  Pain Score: 0    Pain management: adequate     Mental Status:  Sleepy   Hydration Status:  Stable   PONV Controlled:  None   Airway Patency:  Patent      Post Op Vitals Reviewed: Yes      Staff: CRNA     Post-op block assessment: catheter intact and no complications      No notable events documented      BP   107/51   Temp      Pulse  74   Resp   14   SpO2   99

## 2023-03-27 NOTE — INTERVAL H&P NOTE
H&P reviewed  After examining the patient I find no changes in the patients condition since the H&P had been written      Vitals:    03/27/23 1129   BP: 127/64   Pulse: 93   Resp: 18   Temp: 98 1 °F (36 7 °C)   SpO2: 99%

## 2023-03-27 NOTE — OP NOTE
OPERATIVE REPORT  PATIENT NAME: Eris Magana    :  1950  MRN: [id number]  Pt Location: BE OR ROOM 05    SURGERY DATE: 3/27/2023    Surgeon(s) and Role:  Panel 1:     * Garret Morfin MD - Primary      Preop Diagnosis:  Malignant neoplasm of left ovary ([address] ) [C56 2]  Ileostomy, has currently ([facility] [address] [address] ) [Z93 2]    Post-Op Diagnosis Codes:     * Malignant neoplasm of left ovary ([address] ) [C56 2]     * Ileostomy, has currently [facility]) [Z93 2]    Procedure:  Cystoscopy with insertion of bilateral ureteral catheters    Specimen(s):  ID Type Source Tests Collected by Time Destination   1 :  Tissue Rectum TISSUE EXAM Angel Cee MD 3/27/2023 1545        Estimated Blood Loss:   Minimal    Drains:  Ileostomy Standard zo Sellers) RUQ (Active)   Stomal Appliance 1 piece 23 1220   Number of days: 150       Urethral Catheter Latex 16 Fr  (Active)   Number of days: 0       Ureteral Internal Stent Left ureter 5 Fr  (Active)   Number of days: 0       Ureteral Internal Stent Right ureter 5 Fr  (Active)   Number of days: 0       Anesthesia Type:   General w/ Epidural    Operative Indications:  Malignant neoplasm of left ovary (HCC) [C56 2]  Ileostomy, has currently [facility]) [Z93 2]      Operative Findings:  Standard preoperative bilateral ureteral catheter insertion  Complications:   None    Procedure and Technique:  Christine Bergman is a 79-year-old female known to Dr Nury Gallo with prior LAR with anastomotic leak  She presents today to undergo revision of LAR  Preoperative ureteral stents of been requested  Risk and benefits of the procedure were discussed and reviewed  Informed consent was obtained  The patient was brought to the operating room on 2023  After the smooth induction of general endotracheal anesthesia, the patient was placed in the dorsal lithotomy position  Her genitalia was prepped and draped in a sterile fashion  Intravenous antibiotics were administered    A timeout was performed with all members of the operative team confirming the patient's identity and procedure to be performed  A 22 Brazilian rigid cystoscope with 30° lens was inserted  The bladder was thoroughly inspected  Both ureteral orifices were visualized with clear efflux of urine  Bladder wall was normal     Bilateral 5 [place] open-ended catheters were placed under direct vision into each respective renal pelvis  Catheters were placed without difficulty  The bladder was left full the cystoscope was removed  An 25 Brazilian Corbin catheter was inserted and a 5 [place]i open-ended ureteral stents were backloaded into the distal aspect of the Corbin catheter  The Corbin catheter and bilateral 5 [place] Mei open-ended catheters were then connected to gravity drainage and secured to the patient's inner thigh with tape  We see the dictation of Dr Amparo Castrejon for additional details regarding his colon resection      Patient Disposition:  Per Dr Fartun Dennis: Ashvin Juan MD  DATE: March 27, 2023  TIME: 4:14 PM

## 2023-03-27 NOTE — ANESTHESIA PROCEDURE NOTES
Arterial Line Insertion  Performed by: Tiffanie Ortega MD  Authorized by: Tiffanie Ortega MD   Consent: Verbal consent obtained  Written consent obtained  Consent given by: patient  Patient understanding: patient states understanding of the procedure being performed  Patient consent: the patient's understanding of the procedure matches consent given  Procedure consent: procedure consent matches procedure scheduled  Required items: required blood products, implants, devices, and special equipment available  Patient identity confirmed: arm band and provided demographic data  Preparation: Patient was prepped and draped in the usual sterile fashion    Indications: multiple ABGs and hemodynamic monitoring  Orientation:  Right  Location: radial artery  Procedure Details:  Needle gauge: 20  Seldinger technique: Seldinger technique used  Number of attempts: 2    Post-procedure:  Post-procedure: dressing applied  Waveform: good waveform and waveform confirmed  Patient tolerance: Patient tolerated the procedure well with no immediate complications

## 2023-03-28 LAB
ANION GAP SERPL CALCULATED.3IONS-SCNC: 2 MMOL/L (ref 4–13)
BASOPHILS # BLD AUTO: 0.02 THOUSANDS/ÂΜL (ref 0–0.1)
BASOPHILS NFR BLD AUTO: 0 % (ref 0–1)
BUN SERPL-MCNC: 8 MG/DL (ref 5–25)
CALCIUM SERPL-MCNC: 8.2 MG/DL (ref 8.3–10.1)
CHLORIDE SERPL-SCNC: 110 MMOL/L (ref 96–108)
CO2 SERPL-SCNC: 25 MMOL/L (ref 21–32)
CREAT SERPL-MCNC: 0.74 MG/DL (ref 0.6–1.3)
EOSINOPHIL # BLD AUTO: 0.01 THOUSAND/ÂΜL (ref 0–0.61)
EOSINOPHIL NFR BLD AUTO: 0 % (ref 0–6)
ERYTHROCYTE [DISTWIDTH] IN BLOOD BY AUTOMATED COUNT: 15.8 % (ref 11.6–15.1)
GFR SERPL CREATININE-BSD FRML MDRD: 81 ML/MIN/1.73SQ M
GLUCOSE SERPL-MCNC: 111 MG/DL (ref 65–140)
GLUCOSE SERPL-MCNC: 138 MG/DL (ref 65–140)
GLUCOSE SERPL-MCNC: 171 MG/DL (ref 65–140)
GLUCOSE SERPL-MCNC: 203 MG/DL (ref 65–140)
HCT VFR BLD AUTO: 26.5 % (ref 34.8–46.1)
HGB BLD-MCNC: 8.6 G/DL (ref 11.5–15.4)
IMM GRANULOCYTES # BLD AUTO: 0.12 THOUSAND/UL (ref 0–0.2)
IMM GRANULOCYTES NFR BLD AUTO: 1 % (ref 0–2)
LYMPHOCYTES # BLD AUTO: 1.51 THOUSANDS/ÂΜL (ref 0.6–4.47)
LYMPHOCYTES NFR BLD AUTO: 9 % (ref 14–44)
MAGNESIUM SERPL-MCNC: 1.8 MG/DL (ref 1.6–2.6)
MCH RBC QN AUTO: 32.2 PG (ref 26.8–34.3)
MCHC RBC AUTO-ENTMCNC: 32.5 G/DL (ref 31.4–37.4)
MCV RBC AUTO: 99 FL (ref 82–98)
MONOCYTES # BLD AUTO: 1.56 THOUSAND/ÂΜL (ref 0.17–1.22)
MONOCYTES NFR BLD AUTO: 9 % (ref 4–12)
NEUTROPHILS # BLD AUTO: 14.11 THOUSANDS/ÂΜL (ref 1.85–7.62)
NEUTS SEG NFR BLD AUTO: 81 % (ref 43–75)
NRBC BLD AUTO-RTO: 0 /100 WBCS
PHOSPHATE SERPL-MCNC: 2.6 MG/DL (ref 2.3–4.1)
PLATELET # BLD AUTO: 297 THOUSANDS/UL (ref 149–390)
PMV BLD AUTO: 9.3 FL (ref 8.9–12.7)
POTASSIUM SERPL-SCNC: 3.6 MMOL/L (ref 3.5–5.3)
RBC # BLD AUTO: 2.67 MILLION/UL (ref 3.81–5.12)
SODIUM SERPL-SCNC: 137 MMOL/L (ref 135–147)
WBC # BLD AUTO: 17.33 THOUSAND/UL (ref 4.31–10.16)

## 2023-03-28 RX ORDER — PREDNISONE 1 MG/1
2 TABLET ORAL DAILY
Status: DISCONTINUED | OUTPATIENT
Start: 2023-03-29 | End: 2023-03-29

## 2023-03-28 RX ORDER — LANOLIN ALCOHOL/MO/W.PET/CERES
400 CREAM (GRAM) TOPICAL 2 TIMES DAILY
Status: COMPLETED | OUTPATIENT
Start: 2023-03-28 | End: 2023-03-28

## 2023-03-28 RX ORDER — HYDROMORPHONE HCL IN WATER/PF 6 MG/30 ML
0.2 PATIENT CONTROLLED ANALGESIA SYRINGE INTRAVENOUS
Status: DISCONTINUED | OUTPATIENT
Start: 2023-03-28 | End: 2023-04-01

## 2023-03-28 RX ORDER — MONTELUKAST SODIUM 10 MG/1
10 TABLET ORAL DAILY
Status: DISCONTINUED | OUTPATIENT
Start: 2023-03-29 | End: 2023-04-03 | Stop reason: HOSPADM

## 2023-03-28 RX ORDER — SODIUM CHLORIDE, SODIUM GLUCONATE, SODIUM ACETATE, POTASSIUM CHLORIDE, MAGNESIUM CHLORIDE, SODIUM PHOSPHATE, DIBASIC, AND POTASSIUM PHOSPHATE .53; .5; .37; .037; .03; .012; .00082 G/100ML; G/100ML; G/100ML; G/100ML; G/100ML; G/100ML; G/100ML
84 INJECTION, SOLUTION INTRAVENOUS CONTINUOUS
Status: DISCONTINUED | OUTPATIENT
Start: 2023-03-28 | End: 2023-03-29

## 2023-03-28 RX ORDER — MAGNESIUM SULFATE HEPTAHYDRATE 40 MG/ML
2 INJECTION, SOLUTION INTRAVENOUS ONCE
Status: COMPLETED | OUTPATIENT
Start: 2023-03-28 | End: 2023-03-29

## 2023-03-28 RX ORDER — LORATADINE 10 MG/1
10 TABLET ORAL DAILY
Status: DISCONTINUED | OUTPATIENT
Start: 2023-03-28 | End: 2023-04-03 | Stop reason: HOSPADM

## 2023-03-28 RX ORDER — FLUTICASONE PROPIONATE 50 MCG
1 SPRAY, SUSPENSION (ML) NASAL DAILY
Status: DISCONTINUED | OUTPATIENT
Start: 2023-03-29 | End: 2023-04-03 | Stop reason: HOSPADM

## 2023-03-28 RX ORDER — INSULIN LISPRO 100 [IU]/ML
1-5 INJECTION, SOLUTION INTRAVENOUS; SUBCUTANEOUS
Status: DISCONTINUED | OUTPATIENT
Start: 2023-03-28 | End: 2023-03-30

## 2023-03-28 RX ORDER — POTASSIUM CHLORIDE 20 MEQ/1
20 TABLET, EXTENDED RELEASE ORAL ONCE
Status: COMPLETED | OUTPATIENT
Start: 2023-03-28 | End: 2023-03-28

## 2023-03-28 RX ORDER — DEXTROSE, SODIUM CHLORIDE, AND POTASSIUM CHLORIDE 5; .45; .15 G/100ML; G/100ML; G/100ML
75 INJECTION INTRAVENOUS CONTINUOUS
Status: DISCONTINUED | OUTPATIENT
Start: 2023-03-28 | End: 2023-03-28

## 2023-03-28 RX ORDER — LIDOCAINE 40 MG/G
CREAM TOPICAL ONCE
Status: COMPLETED | OUTPATIENT
Start: 2023-03-28 | End: 2023-03-28

## 2023-03-28 RX ADMIN — METOPROLOL SUCCINATE 25 MG: 25 TABLET, EXTENDED RELEASE ORAL at 09:25

## 2023-03-28 RX ADMIN — NYSTATIN: 100000 POWDER TOPICAL at 09:31

## 2023-03-28 RX ADMIN — LIDOCAINE 4%: 4 CREAM TOPICAL at 09:23

## 2023-03-28 RX ADMIN — LORATADINE 10 MG: 10 TABLET ORAL at 13:15

## 2023-03-28 RX ADMIN — HEPARIN SODIUM 5000 UNITS: 5000 INJECTION INTRAVENOUS; SUBCUTANEOUS at 05:20

## 2023-03-28 RX ADMIN — ATORVASTATIN CALCIUM 40 MG: 40 TABLET, FILM COATED ORAL at 17:23

## 2023-03-28 RX ADMIN — PANTOPRAZOLE SODIUM 40 MG: 40 TABLET, DELAYED RELEASE ORAL at 05:20

## 2023-03-28 RX ADMIN — MAGNESIUM SULFATE HEPTAHYDRATE 2 G: 40 INJECTION, SOLUTION INTRAVENOUS at 13:16

## 2023-03-28 RX ADMIN — ACETAMINOPHEN 975 MG: 325 TABLET ORAL at 13:15

## 2023-03-28 RX ADMIN — HEPARIN SODIUM 5000 UNITS: 5000 INJECTION INTRAVENOUS; SUBCUTANEOUS at 22:28

## 2023-03-28 RX ADMIN — NYSTATIN: 100000 POWDER TOPICAL at 17:26

## 2023-03-28 RX ADMIN — ACETAMINOPHEN 975 MG: 325 TABLET ORAL at 22:27

## 2023-03-28 RX ADMIN — HEPARIN SODIUM 5000 UNITS: 5000 INJECTION INTRAVENOUS; SUBCUTANEOUS at 13:16

## 2023-03-28 RX ADMIN — DEXTROSE, SODIUM CHLORIDE, AND POTASSIUM CHLORIDE 75 ML/HR: 5; .45; .15 INJECTION INTRAVENOUS at 09:26

## 2023-03-28 RX ADMIN — POTASSIUM CHLORIDE 20 MEQ: 1500 TABLET, EXTENDED RELEASE ORAL at 11:50

## 2023-03-28 RX ADMIN — FENTANYL CITRATE: 50 INJECTION INTRAMUSCULAR; INTRAVENOUS at 17:32

## 2023-03-28 RX ADMIN — INSULIN LISPRO 1 UNITS: 100 INJECTION, SOLUTION INTRAVENOUS; SUBCUTANEOUS at 15:14

## 2023-03-28 RX ADMIN — POTASSIUM CHLORIDE 20 MEQ: 1500 TABLET, EXTENDED RELEASE ORAL at 13:15

## 2023-03-28 RX ADMIN — BUDESONIDE AND FORMOTEROL FUMARATE DIHYDRATE 2 PUFF: 160; 4.5 AEROSOL RESPIRATORY (INHALATION) at 09:25

## 2023-03-28 RX ADMIN — ACETAMINOPHEN 975 MG: 325 TABLET ORAL at 05:20

## 2023-03-28 RX ADMIN — MAGNESIUM OXIDE TAB 400 MG (241.3 MG ELEMENTAL MG) 400 MG: 400 (241.3 MG) TAB at 17:23

## 2023-03-28 RX ADMIN — SODIUM CHLORIDE, SODIUM GLUCONATE, SODIUM ACETATE, POTASSIUM CHLORIDE, MAGNESIUM CHLORIDE, SODIUM PHOSPHATE, DIBASIC, AND POTASSIUM PHOSPHATE 75 ML/HR: .53; .5; .37; .037; .03; .012; .00082 INJECTION, SOLUTION INTRAVENOUS at 11:51

## 2023-03-28 RX ADMIN — SODIUM CHLORIDE, SODIUM LACTATE, POTASSIUM CHLORIDE, AND CALCIUM CHLORIDE 125 ML/HR: .6; .31; .03; .02 INJECTION, SOLUTION INTRAVENOUS at 01:31

## 2023-03-28 RX ADMIN — MAGNESIUM OXIDE TAB 400 MG (241.3 MG ELEMENTAL MG) 400 MG: 400 (241.3 MG) TAB at 11:50

## 2023-03-28 NOTE — PLAN OF CARE
Inessa is a 37 year old who is being evaluated via a billable video visit.      How would you like to obtain your AVS? MyChart  If the video visit is dropped, the invitation should be resent by: Text to cell phone: [phone]  Will anyone else be joining your video visit? No      Assessment & Plan     ICD-10-CM    1. Overweight (BMI 25.0-29.9)  E66.3 topiramate (TOPAMAX) 25 MG tablet         - Patient reports feeling that starting Topamax 25 mg has helped   - Down about 4 lbs, feeling like doesn't need to snack as much   - Initially had side effects, but these are gone now   - Discussed increase vs. Staying at 25 mg   - She would like to stay at 25 mg   - Recheck 2-3 months   - Continue exercise and diet regimen       Review of the result(s) of each unique test - None   Diagnosis or treatment significantly limited by social determinants of health - None     25 minutes spent on the date of the encounter doing chart review, history and exam, documentation and further activities as noted above    The patient indicates understanding of these issues and agrees with the plan.    GORDON [employer]   Inessa is a 37 year old, presenting for the following health issues:  Recheck Medication  No flowsheet data found.  History of Present Illness       Reason for visit:  Med follow up    She eats 2-3 servings of fruits and vegetables daily.She consumes 1 sweetened beverage(s) daily.She exercises with enough effort to increase her heart rate 20 to 29 minutes per day.  She exercises with enough effort to increase her heart rate 4 days per week.   She is taking medications regularly.       Medication Followup of Topamax     Taking Medication as prescribed: yes    Side Effects:  Headaches, but improved     Medication Helping Symptoms:  Maybe     - Had headaches at first from Topamax   - Switched it to at night on Friday and now feels fine, much better   -  Problem: Knowledge Deficit  Goal: Patient/family/caregiver demonstrates understanding of disease process, treatment plan, medications, and discharge instructions  Description: Complete learning assessment and assess knowledge base    Interventions:  - Provide teaching at level of understanding  - Provide teaching via preferred learning methods  2/9/2023 1241 by Sandra Sevilla RN  Outcome: Progressing  2/9/2023 1136 by Sandra Sevilla RN  Outcome: Progressing Feeling less hungry, making healthier choices   - 4 lbs down right away, 3 as of today     - Heartburn is gone     - Family history of MIs in 30's         Review of Systems   Constitutional, HEENT, cardiovascular, pulmonary, gi and gu systems are negative, except as otherwise noted.      Objective     Vitals:  No vitals were obtained today due to virtual visit.    Physical Exam   GENERAL: Healthy, alert and no distress  EYES: Eyes grossly normal to inspection.  No discharge or erythema, or obvious scleral/conjunctival abnormalities.  RESP: No audible wheeze, cough, or visible cyanosis.  No visible retractions or increased work of breathing.    SKIN: Visible skin clear. No significant rash, abnormal pigmentation or lesions.  NEURO: Cranial nerves grossly intact.  Mentation and speech appropriate for age.  PSYCH: Mentation appears normal, affect normal/bright, judgement and insight intact, normal speech and appearance well-groomed.    Diagnostics: none         Video-Visit Details    Type of service:  Video Visit   Originating Location (pt. Location): Home  Distant Location (provider location):  On-site  Platform used for Video Visit: Tavern

## 2023-03-28 NOTE — CASE MANAGEMENT
Case Management Assessment & Discharge Planning Note    Patient name Supa Tatum  Location [facility]10/[facility] 998-05 MRN [id number]  : 1950 Date 3/28/2023       Current Admission Date: 3/27/2023  Current Admission Diagnosis:Malignant neoplasm of left ovary [facility])   Patient Active Problem List    Diagnosis Date Noted   • Rectal bleeding 2023   • Wound infection after surgery 2022   • Encounter for venous access device care 2022   • Bandemia 2022   • Hypokalemia 2022   • S/P total abdominal hysterectomy 10/31/2022   • Ileostomy, has currently ([facility] ) 10/31/2022   • Acute blood loss anemia (ABLA) 10/31/2022   • S/P splenectomy 10/31/2022   • Anxiety disorder due to medical condition 2022   • Malignant neoplasm of left ovary ([facility] ) 2022   • Giant cell aortic arteritis ([facility] )    • Prediabetes    • 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[facility] ) 2022   • Lower abdominal pain 06/15/2022   • Change in bowel habit 06/15/2022   • Personal history of colonic polyps 06/15/2022   • Long term (current) use of systemic steroids 06/15/2022   • Asthma    • Hyperlipidemia    • Hypertension    • Gastroesophageal reflux disease 2019   • Constipation 2019   • Hemorrhoids 2019   • Family history of colonic polyps 2019      LOS (days): 1  Geometric Mean LOS (GMLOS) (days): 9 80  Days to GMLOS:8 7     OBJECTIVE:    Risk of Unplanned Readmission Score: 27 67         Current admission status: Inpatient       Preferred Pharmacy:   [facility] [address] [place] [address] [address] [place] [address]  Phone: [phone] Fax: [address], [place] - [place] [place] [place] [address] [facility]  Phone: [phone] Fax: [phone]    Primary Care Provider: Rick Chambers MD    Primary Insurance: MEDICARE  Secondary Insurance: COMMERCIAL MISCELLANEOUS    ASSESSMENT:  [place] Tom[place], [address] Representative - Spouse   Primary Phone: [phone] [facility]  Home Phone: [phone]               Advance Directives  Does patient have Advance Directives?: Yes  Advance Directives: Living will  Primary Contact: Mahogany Castro (spouse)         Readmission Root Cause  30 Day Readmission: No    Patient Information  Admitted from[de-identified] Home  Mental Status: Alert  During Assessment patient was accompanied by: Spouse  Assessment information provided by[de-identified] Patient  Primary Caregiver: Spouse  Caregiver's Name[de-identified] Arjun Whitten Relationship to Patient[de-identified] Family Member  Caregiver's Telephone Number[de-identified] [phone]  Support Systems: Spouse/significant other, Family members  [place] of Residence: [address] [place] [address] do you live in?: [place], [address] [place] [address] Street entry access options   Select all that apply : Elevator  Type of Current Residence: Apartment  Floor Level: 4  Upon entering residence, is there a bedroom on the main floor (no further steps)?: Yes  Upon entering residence, is there a bathroom on the main floor (no further steps)?: Yes  In the last 12 months, was there a time when you were not able to pay the mortgage or rent on time?: No  In the last 12 months, how many places have you lived?: 1  In the last 12 months, was there a time when you did not have a steady place to sleep or slept in a shelter (including now)?: No  Homeless/housing insecurity resource given?: N/A  Living Arrangements: Lives w/ Spouse/significant other    Activities of Daily Living Prior to Admission  Functional Status: Assistance  Completes ADLs independently?: No  Level of ADL dependence: Assistance  Ambulates independently?: Yes  Does patient use assisted devices?: No  Does patient currently own DME?: Yes  What DME does the patient currently own?: Edward Rangel  Does patient have a history of Outpatient Therapy (PT/OT)?: No  Does the patient have a history of Short-Term Rehab?: No  Does patient have a history of HHC?: Yes ([facility]  Does patient currently have [facility]?: Yes    Current Home Health Care  Type of Current Home Care Services: Nurse visit  [address][de-identified] Other (please enter name in comment) ([facility])  [facility] Provider[de-identified] PCP    Patient Information Continued  Income Source: Pension/prison  Does patient have prescription coverage?: Yes  Within the past 12 months, you worried that your food would run out before you got the money to buy more : Never true  Within the past 12 months, the food you bought just didn't last and you didn't have money to get more : Never true  Food insecurity resource given?: N/A  Does patient receive dialysis treatments?: No  Does patient have a history of substance abuse?: No  Does patient have a history of Mental Health Diagnosis?: No         Means of Transportation  Means of Transport to Appts[de-identified] Family transport  In the past 12 months, has lack of transportation kept you from medical appointments or from getting medications?: No  In the past 12 months, has lack of transportation kept you from meetings, work, or from getting things needed for daily living?: No  Was application for public transport provided?: N/A        DISCHARGE DETAILS:    Discharge planning discussed with[de-identified] Pt, pt's spouse  Freedom of Choice: Yes  Comments - Freedom of Choice: Pt wants to continue SN care with [facility] home health  CM contacted family/caregiver?: Yes  Were Treatment Team discharge recommendations reviewed with patient/caregiver?: Yes  Did patient/caregiver verbalize understanding of patient care needs?: Yes  Were patient/caregiver advised of the risks associated with not following Treatment Team discharge recommendations?: Yes    Contacts  Patient Contacts: Tereza Brenner  Relationship to Patient[de-identified] Family  Contact Method:  In Person  Reason/Outcome: Continuity of Care, Discharge [address]         Is the patient interested in [facility] at discharge?: Yes  [address] requested[de-identified] [address] Name[de-identified] Other (Acts)  Home Health Services Needed[de-identified] Wound/Ostomy Care  Homebound Criteria Met[de-identified] Requires the Assistance of Another Person for Safe Ambulation or to Leave the Home  Supporting Clincal Findings[de-identified] Limited Endurance    DME Referral Provided  Referral made for DME?: No    Other Referral/Resources/Interventions Provided:  Interventions: [facility]         Treatment Team Recommendation: Home with [address]  Discharge Destination Plan[de-identified] Home with [address]

## 2023-03-28 NOTE — PLAN OF CARE
Problem: OCCUPATIONAL THERAPY ADULT  Goal: Performs self-care activities at highest level of function for planned discharge setting  See evaluation for individualized goals  Note: Limitation: Decreased ADL status, Decreased endurance, Decreased self-care trans, Decreased high-level ADLs     Assessment: Pt is a 67 y o  female seen for OT evaluation s/p admit to [facility] on 3/27/2023 w/ Malignant neoplasm of left ovary ([facility] [address] [facility] )  She is now s/p low anterior resection on 3/27/23  She is allowed OOB, has pain pump, JOSÉ ANTONIO drain  pt  has a past medical history of Abdominal pain, Allergic sinusitis, Anxiety, Arthritis, Asthma, Cholelithiasis, Colon polyp, Degenerative joint disease, Dental bridge present, Exercise involving walking, GERD (gastroesophageal reflux disease), [facility] ), Hiatal hernia, History of cancer chemotherapy, History of transfusion, Hyperlipidemia, Hypertension, Nausea, 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[facility] ), PMR (polymyalgia rheumatica) ([place] [address] ), Prediabetes, Shortness of breath, Tinnitus, and Wears glasses  Personal factors affecting pt at time of IE include:difficulty performing ADLS and difficulty performing IADLS   Prior to admission, pt was living w her spouse in an apartment in an independent living facility  She was mostly Independent w self care,able to walk w no AD  Upon evaluation: Pt requires min assist LB self care, HHA for mobility 2* the following deficits impacting occupational performance: weakness, decreased balance and decreased tolerance  Pt to benefit from continued skilled OT tx while in the hospital to address deficits as defined above and maximize level of functional independence w ADL's and functional mobility  Occupational Performance areas to address include: bathing/shower, toilet hygiene, dressing, functional mobility and clothing management    Based on findings from OT evaluation and functional performance deficits, pt has been identified as a  high complexity evaluation  The patient's raw score on the AM-PAC Daily Activity inpatient short form is 21, standardized score is 44 27, greater than 39 4  Patients at this level are likely to benefit from discharge to home  From OT standpoint, recommendation at time of d/c would be home w family support       OT Discharge Recommendation: No rehabilitation needs

## 2023-03-28 NOTE — PROGRESS NOTES
"Surgical Oncology  Progress Note   Kezia Bettencourt 67 y o  female MRN: [id number]  Unit/Bed#: [facility] 810-01 Encounter: [id number]    Assessment:  Kezia Bettencourt is a 67 y o  female s/p low anterior resection on 3/27 with Dr Kyra Garcia  Ureteral stents were placed prior to surgery and removed at the completion  Plan:  - advance to clear liquids  - monitor ostomy output and character qShift  - continue coffey through 3/29  - change to maintenance IVFs, will discontinue once good PO intake  - pain and nausea control as needed  - continue PCEA, APS following  - f/u nephrology recommendations  - monitor JOSÉ ANTONIO drain output and character qShift  - DVT ppx with SQH, SCDs  - PT/OT    Subjective/Objective     Subjective: Patient seen and examined at bedside, in no acute distress  No acute events overnight  Patient's pain is well controlled with PCEA  Denies nausea or vomiting  Objective:     Vitals:Blood pressure 104/55, pulse 75, temperature (!) 96 4 °F (35 8 °C), resp  rate 14, height 5' 4\" (1 626 m), weight 71 2 kg (157 lb), SpO2 100 %  ,Body mass index is 26 95 kg/m²  Temp (24hrs), Av 4 °F (36 3 °C), Min:96 4 °F (35 8 °C), Max:98 1 °F (36 7 °C)  Current: Temperature: (!) 96 4 °F (35 8 °C)    I/O last 24 hours: In: 4300 [I V :3800; IV Piggyback:500]  Out: 9406 [Urine:950; Drains:120; Blood:500]    Invasive Devices     Central Venous Catheter Line  Duration           Port A Cath 22 Right Subclavian 109 days          Peripheral Intravenous Line  Duration           Peripheral IV 23 Left Hand <1 day    Peripheral IV 23 Right Hand <1 day          Epidural Line  Duration           Epidural Catheter 23 <1 day          Drain  Duration           Ileostomy Standard (Loulou, zo)  days    Closed/Suction Drain Right; Anterior RLQ Bulb 19 Fr  <1 day    Urethral Catheter Latex 16 Fr  <1 day                Physical Exam:  Physical Exam  Vitals reviewed     Constitutional:       General: She is not in " acute distress  Appearance: She is not ill-appearing  Cardiovascular:      Rate and Rhythm: Normal rate and regular rhythm  Pulmonary:      Effort: Pulmonary effort is normal  No respiratory distress  Abdominal:      Comments: Soft, non distended, appropriately tender, incisions c/d/i, Marcos drain in place with SS output   Skin:     General: Skin is warm and dry  Coloration: Skin is not jaundiced or pale  Neurological:      Mental Status: She is alert and oriented to person, place, and time         Lab Results: Results: I have personally reviewed all pertinent laboratory/tests results  VTE Prophylaxis: Sequential compression device Magi Manjula)  and Heparin    Lm Bhakta MD  PGY-1, General Surgery

## 2023-03-28 NOTE — OCCUPATIONAL THERAPY NOTE
"Occupational Therapy Evaluation      Antonio Nieveso    3/28/2023    Principal Problem:    Malignant neoplasm of left ovary (HCC)      Past Medical History:   Diagnosis Date    Abdominal pain     off and on    Allergic sinusitis     \"seasonal allergies\"-has received allergy shots \"    Anxiety     with current diagnosis    Arthritis     Asthma     Cholelithiasis     Colon polyp     Degenerative joint disease     Dental bridge present     permanent lower left    Exercise involving walking     1-2x/week    GERD (gastroesophageal reflux disease)     Giant cell aortic arteritis ([facility] )     \"hereditary Giant Cell Temporal Arteritis\"per pt    Hiatal hernia     History of cancer chemotherapy     History of transfusion     Hyperlipidemia     Hypertension     Nausea     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[facility] )     \"gynecologic malignacy\"    PMR (polymyalgia rheumatica) (HCC)     Prediabetes     Shortness of breath     per pt \"asthma related --with climbing mult  flights of stairs--or exertion -not new\"    Tinnitus     Wears glasses        Past Surgical History:   Procedure Laterality Date    COLONOSCOPY      October 2021: Adenomatous polyps and rectum in transverse colon, sigmoid diverticulosis, internal hemorrhoids  October 2016 diverticulosis and internal hemorrhoids, September 2011 diverticulosis and internal hemorrhoids      COLOPROCTECTOMY N/A 10/28/2022    Procedure: PROCTECTOMY;  Surgeon: Bob Dowling MD;  [facility];  Service: Surgical Oncology    DILATION AND CURETTAGE OF UTERUS      ILEO LOOP DIVERSION N/A 10/28/2022    Procedure: ILEOSTOMY LOOP;  Surgeon: Bob Dowling MD;  [facility];  Service: Surgical Oncology    IR PORT PLACEMENT  12/8/2022    LIVER SURGERY  10/28/2022    Procedure: LIVER CONTROL OF BLEED ;  Surgeon: Bob Dowling MD;  [facility];  Service: Surgical Oncology    HI COLECTOMY PARTIAL W/ANASTOMOSIS N/A 10/28/2022    Procedure: RESECTION COLON LEFT;  Surgeon: Cydney Hernandez " Gonzalo Mendes MD;  [facility];  Service: Gynecology Oncology    ME EXPLORATORY LAPAROTOMY CELIOTOMY W/WO BIOPSY SPX N/A 10/28/2022    Procedure: LAPAROTOMY EXPLORATORY;  Surgeon: Milton Denise MD;  [facility];  Service: Gynecology Oncology    ME LAPS ABD PRTM&OMENTUM DX W/WO [address] SPX N/A 08/04/2022    Procedure: DIAGNOSTIC LAPAROSCOPY, PERITONEAL BIOPSY, SAMPLING OF BLOODY ASCITES ;  Surgeon: Thad Rosales MD;  [facility];  Service: Gynecology Oncology    ME LAPS ABD PRTM&OMENTUM DX W/WO [address]/ [address] N/A 10/28/2022    Procedure: LAPAROSCOPY DIAGNOSTIC;  Surgeon: Milton Denise MD;  [facility];  Service: Gynecology Oncology    ME TOTAL ABDOMINAL HYSTERECT W/WO RMVL TUBE OVARY N/A 10/28/2022    Procedure: HYSTERECTOMY MODIFIED RADICAL, BILATERAL SALPINGO-OOPHORECTOMY, GASTROCOLIC OMENTECTOMY, DIAPHRAGM RESECTION, EXCISION AND ABLATION OF PERITONEAL TUMORS;  Surgeon: Milton Denise MD;  [facility];  Service: Gynecology Oncology    SMALL INTESTINE SURGERY N/A 10/28/2022    Procedure: RESECTION SMALL BOWEL;  Surgeon: Milton Denise MD;  [facility];  Service: Gynecology Oncology    SPLENECTOMY, TOTAL N/A 10/28/2022    Procedure: SPLENECTOMY;  Surgeon: Milton Denise MD;  [facility];  Service: Gynecology Oncology    TEMPORAL ARTERY BIOPSY / LIGATION      UPPER GASTROINTESTINAL ENDOSCOPY  11/10/2014    November 2014 irregular Z-line and Schatzki ring, hiatal hernia, gastritis  Biopsies negative for celiac, H pylori, or Phillip's or eosinophilic esophagitis  WISDOM TOOTH EXTRACTION          03/28/23 1058   OT Last Visit   OT Visit Date 03/28/23   Note Type   Note type Evaluation   Pain Assessment   Pain Assessment Tool 0-10   Pain Score No Pain   Restrictions/Precautions   Other Precautions Multiple lines; Fall Risk;Bed Alarm  (JOSÉ ANTONIO drain, coffey, IV's, epidural)   Home Living   Type of Home "Apartment  (independent living)   Home Layout One level;Elevator  (if elevator fails (frequently do), she has one flight of stairs to enter)   Bathroom Shower/Tub Walk-in shower   Bathroom Toilet Standard   Bathroom Equipment Grab bars in shower; Shower chair;Grab bars around toilet   [address] Walker;Grab bars   Prior Function   Level of [place] Independent with ADLs; Independent with functional mobility   Lives With Spouse   IADLs Family/Friend/Other provides transportation   Falls in the last 6 months 0   Comments pt reports since November her spouse has been preparing her meds, and doing most of the house work  Lifestyle   Autonomy pt recently re-started doing the laundry, making the beds etc at home  she is mostly independent w self care  spouse drives   Reciprocal Relationships supportive family   Intrinsic Gratification likes to sing, go for lunch w he girlfriends, play games on her tablet, spend time w family  has 5 kids and 8 grand children   General   Additional Pertinent History pt was recently dc'd from home OT   Family/Caregiver Present Yes   Additional General Comments spouse present for parts of OT session   Subjective   Subjective \"I was getting better until this happend\"   ADL   Grooming Assistance 5  Supervision/Setup   Grooming Deficit Setup; Increased time to complete   UB Bathing Assistance 5  Supervision/Setup   UB Bathing Deficit Setup; Increased time to complete   LB Bathing Assistance 4  Minimal Assistance   LB Bathing Deficit Increased time to complete   UB Dressing Assistance 5  Supervision/Setup   LB Dressing Assistance 4  Minimal Assistance   LB Dressing Deficit Don/doff R sock; Don/doff L sock   Bed Mobility   Supine to Sit 4  Minimal assistance   Additional items Assist x 1;Verbal cues; Increased time required;HOB elevated   Sit to Supine 5  Supervision   Additional items Increased time required   Transfers   Sit to Stand 4  Minimal assistance " Additional items Assist x 1;Verbal cues   Stand to Sit 4  Minimal assistance   Additional items Assist x 1;Verbal cues   Stand pivot 4  Minimal assistance   Additional Comments HHA   Functional Mobility   Functional Mobility 4  Minimal assistance   Additional items Hand hold assistance   Balance   Static Sitting Good   Dynamic Sitting Fair +   Static Standing Fair -   Dynamic Standing Poor +   Activity Tolerance   Activity Tolerance Patient tolerated treatment well   Nurse Made Aware ok to see   RUE Assessment   RUE Assessment WFL   LUE Assessment   LUE Assessment WFL   Hand Function   Gross Motor Coordination Functional   Fine Motor Coordination Functional   Psychosocial   Psychosocial (WDL) WDL   Perception   Inattention/Neglect Appears intact   Cognition   Overall Cognitive Status WFL   Arousal/Participation Alert; Cooperative   Attention Within functional limits   Orientation Level Oriented X4   Memory Within functional limits   Following Commands Follows all commands and directions without difficulty   Assessment   Limitation Decreased ADL status; Decreased endurance;Decreased self-care trans;Decreased high-level ADLs   Assessment Pt is a 67 y o  female seen for OT evaluation s/p admit [facility] on 3/27/2023 w/ Malignant neoplasm of left ovary ([facility] [address] )  She is now s/p low anterior resection on 3/27/23  She is allowed OOB, has pain pump, JOSÉ ANTONIO drain  pt  has a past medical history of Abdominal pain, Allergic sinusitis, Anxiety, Arthritis, Asthma, Cholelithiasis, Colon polyp, Degenerative joint disease, Dental bridge present, Exercise involving walking, GERD (gastroesophageal reflux disease), Giant cell aortic arteritis ([facility] ), Hiatal hernia, History of cancer chemotherapy, History of transfusion, Hyperlipidemia, Hypertension, Nausea, 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[facility] ), PMR (polymyalgia rheumatica) ([facility] ), Prediabetes, Shortness of breath, Tinnitus, and Wears glasses   Personal factors affecting pt at time of IE include:difficulty performing ADLS and difficulty performing IADLS   Prior to admission, pt was living w her spouse in an apartment in an independent living facility  She was mostly Independent w self care,able to walk w no AD  Upon evaluation: Pt requires min assist LB self care, HHA for mobility 2* the following deficits impacting occupational performance: weakness, decreased balance and decreased tolerance  Pt to benefit from continued skilled OT tx while in the hospital to address deficits as defined above and maximize level of functional independence w ADL's and functional mobility  Occupational Performance areas to address include: bathing/shower, toilet hygiene, dressing, functional mobility and clothing management  Based on findings from OT evaluation and functional performance deficits, pt has been identified as a  high complexity evaluation  The patient's raw score on the AM-PAC Daily Activity inpatient short form is 21, standardized score is 44 27, greater than 39 4  Patients at this level are likely to benefit from discharge to home  From OT standpoint, recommendation at time of d/c would be home w family support  Goals   Patient Goals to be independent and to do normal things   Plan   Treatment Interventions ADL retraining;Functional transfer training; Endurance training;Patient/family training; Compensatory technique education; Activityengagement; Energy conservation   Goal Expiration Date 04/11/23   OT Frequency 3-5x/wk   Recommendation   OT Discharge Recommendation No rehabilitation needs   Additional Comments  anticipate dc home w family support   AM-[facility] Daily Activity Inpatient   Lower Body Dressing 3   Bathing 3   Toileting 3   Upper Body Dressing 4   Grooming 4   Eating 4   Daily Activity Raw Score 21   Daily Activity Standardized Score (Calc for Raw Score >=11) 44 27     OT GOALS TO BE ACHIEVED IN 14 DAYS:    Patient will complete bed mobility mod I  With good safety     Pt will demonstrate good balance sitting at EOB x 10 min for increased safety w self care and in preparation for increased indpendence    Pt will complete UB bathing and dressing independently    Pt will complete LB bathing and dressing with supervision    Pt will complete toileting w mod I and good safety     Pt will complete functional transfers with S/use of DME as needed demonstrating good safety     Pt will tolerate standing at sinkside x 5 min w F+ balance for increased safety w hygiene    Pt will perform simulated home management activitives w S and good safety     Pt will complete functional mobility in room and bathroom w S and use of most appropriate DME    Pt will demonstrate good ECT/self pacing skills with all self care and functional mobility    Pt will tolerate 30 min of OT session for increased functional activity tolerance

## 2023-03-28 NOTE — QUICK NOTE
Post Op Check  03/27/23 8:17 PM     Patient was seen post-op on the floor  She is doing well so far with adequate pain control from PCEA  No ostomy function yet  Coffey in place with clear, red tinged urine  Incision c/d/i with dressing over, no strike through  JOSÉ ANTONIO drain with SS output in bulb, nearly filled upon inspection  Left hand neuro-intact with bruising up forearm from IV access attempts earlier today  Vitals:    03/27/23 1912   BP: 104/55   Pulse: 75   Resp: 14   Temp:    SpO2: 100%     Continue care on the floor  NPO sips with meds, coffey to remain through 3/29  PCEA for pain control plus MMP regimen   Labs in the Hugo Melendez MD  PGY-1, General Surgery

## 2023-03-28 NOTE — PROGRESS NOTES
"Progress Note - Surgical Oncology   Monet Tse 67 y o  female MRN: [id number]  Unit/Bed#: [facility] 810-01 Encounter: [id number]    Assessment:  67 y o  female with PMH of ovarian carcinoma s/p cytroreductive surgery including an en bloc rectosigmoid resection with diverting ileostomy c/b anastomotic leak with presacral cavity, now s/p low anterior resection with revision of anastomosis on 3/27 with Dr Salvatore Jackson  Ureteral stents were placed prior to surgery and removed at the completion  Vitals normal on room air, some low diastolics yesterday with MAP 63  UOP 3500cc  JOSÉ ANTONIO 195cc serosanguinous  Ileostomy 500cc liquid stool    WBC pending (from 17 53)  Hb pending (from 8 6)  Cr pending (from 0 74)    Plan:  Advance to lo res  D/C IVF  D/C coffey  Maintain JOSÉ ANTONIO drain, monitor output  Continue PCEA for pain control, appreciate APS recommendations  Appreciate nephrology recommendations  Appreciate endocrine recommendations, restart prednisone 2mg for GCA, continue to monitor BG  DVT PPX  OOB/ambulate  PT/OT: No needs    Subjective/Objective     Subjective: No acute events overnight, tolerating clear liquids without nausea or vomiting, having stoma output, pain controlled    Objective:     Blood pressure (!) 105/49, pulse 90, temperature (!) 97 3 °F (36 3 °C), resp  rate 16, height 5' 4\" (1 626 m), weight 71 2 kg (157 lb), SpO2 96 %  ,Body mass index is 26 95 kg/m²  Intake/Output Summary (Last 24 hours) at 3/29/2023 0542  Last data filed at 3/29/2023 0541  Gross per 24 hour   Intake 2124 95 ml   Output 4195 ml   Net -2070 05 ml       Invasive Devices     Central Venous Catheter Line  Duration           Port A Cath 12/08/22 Right Subclavian 111 days          Epidural Line  Duration           Epidural Catheter 03/27/23 1 day          Drain  Duration           Ileostomy Standard (Loulou, zo)  days    Closed/Suction Drain Anterior; Left LLQ Bulb 19 Fr  1 day    Urethral Catheter Latex 16 Fr  1 day          " Physical Exam:  Gen:    NAD  CV:      warm, well-perfused  Lungs: No respiratory distress on room air  Abd:     soft, appropriately Tender/ND, inicision c/d/i, Stoma pink and healthy, having stoma output  Ext:      no CCE  Neuro: A&Ox3

## 2023-03-28 NOTE — PLAN OF CARE
Problem: Prexisting or High Potential for Compromised Skin Integrity  Goal: Skin integrity is maintained or improved  Description: INTERVENTIONS:  - Identify patients at risk for skin breakdown  - Assess and monitor skin integrity  - Assess and monitor nutrition and hydration status  - Monitor labs   - Assess for incontinence   - Turn and reposition patient  - Assist with mobility/ambulation  - Relieve pressure over bony prominences  - Avoid friction and shearing  - Provide appropriate hygiene as needed including keeping skin clean and dry  - Evaluate need for skin moisturizer/barrier cream  - Collaborate with interdisciplinary team   - Patient/family teaching  - Consider wound care consult   Outcome: Progressing     Problem: MOBILITY - ADULT  Goal: Maintain or return to baseline ADL function  Description: INTERVENTIONS:  -  Assess patient's ability to carry out ADLs; assess patient's baseline for ADL function and identify physical deficits which impact ability to perform ADLs (bathing, care of mouth/teeth, toileting, grooming, dressing, etc )  - Assess/evaluate cause of self-care deficits   - Assess range of motion  - Assess patient's mobility; develop plan if impaired  - Assess patient's need for assistive devices and provide as appropriate  - Encourage maximum independence but intervene and supervise when necessary  - Involve family in performance of ADLs  - Assess for home care needs following discharge   - Consider OT consult to assist with ADL evaluation and planning for discharge  - Provide patient education as appropriate  Outcome: Progressing  Goal: Maintains/Returns to pre admission functional level  Description: INTERVENTIONS:  - Perform BMAT or MOVE assessment daily    - Set and communicate daily mobility goal to care team and patient/family/caregiver     - Collaborate with rehabilitation services on mobility goals if consulted  - Out of bed for toileting  - Record patient progress and toleration of activity level   Outcome: Progressing     Problem: PAIN - ADULT  Goal: Verbalizes/displays adequate comfort level or baseline comfort level  Description: Interventions:  - Encourage patient to monitor pain and request assistance  - Assess pain using appropriate pain scale  - Administer analgesics based on type and severity of pain and evaluate response  - Implement non-pharmacological measures as appropriate and evaluate response  - Consider cultural and social influences on pain and pain management  - Notify physician/advanced practitioner if interventions unsuccessful or patient reports new pain  Outcome: Progressing     Problem: INFECTION - ADULT  Goal: Absence or prevention of progression during hospitalization  Description: INTERVENTIONS:  - Assess and monitor for signs and symptoms of infection  - Monitor lab/diagnostic results  - Monitor all insertion sites, i e  indwelling lines, tubes, and drains  - Monitor endotracheal if appropriate and nasal secretions for changes in amount and color  - [place] appropriate cooling/warming therapies per order  - Administer medications as ordered  - Instruct and encourage patient and family to use good hand hygiene technique  - Identify and instruct in appropriate isolation precautions for identified infection/condition  Outcome: Progressing  Goal: Absence of fever/infection during neutropenic period  Description: INTERVENTIONS:  - Monitor WBC    Outcome: Progressing     Problem: SAFETY ADULT  Goal: Maintain or return to baseline ADL function  Description: INTERVENTIONS:  -  Assess patient's ability to carry out ADLs; assess patient's baseline for ADL function and identify physical deficits which impact ability to perform ADLs (bathing, care of mouth/teeth, toileting, grooming, dressing, etc )  - Assess/evaluate cause of self-care deficits   - Assess range of motion  - Assess patient's mobility; develop plan if impaired  - Assess patient's need for assistive devices and provide as appropriate  - Encourage maximum independence but intervene and supervise when necessary  - Involve family in performance of ADLs  - Assess for home care needs following discharge   - Consider OT consult to assist with ADL evaluation and planning for discharge  - Provide patient education as appropriate  Outcome: Progressing  Goal: Maintains/Returns to pre admission functional level  Description: INTERVENTIONS:  - Perform BMAT or MOVE assessment daily    - Set and communicate daily mobility goal to care team and patient/family/caregiver     - Collaborate with rehabilitation services on mobility goals if consulted  - Out of bed for toileting  - Record patient progress and toleration of activity level   Outcome: Progressing  Goal: Patient will remain free of falls  Description: INTERVENTIONS:  - Educate patient/family on patient safety including physical limitations  - Instruct patient to call for assistance with activity   - Consult OT/PT to assist with strengthening/mobility   - Keep Call bell within reach  - Keep bed low and locked with side rails adjusted as appropriate  - Keep care items and personal belongings within reach  - Initiate and maintain comfort rounds  - Make Fall Risk Sign visible to staff  - Apply yellow socks and bracelet for high fall risk patients  - Consider moving patient to room near nurses station  Outcome: Progressing     Problem: DISCHARGE PLANNING  Goal: Discharge to home or other facility with appropriate resources  Description: INTERVENTIONS:  - Identify barriers to discharge w/patient and caregiver  - Arrange for needed discharge resources and transportation as appropriate  - Identify discharge learning needs (meds, wound care, etc )  - Arrange for interpretive services to assist at discharge as needed  - Refer to Case Management Department for coordinating discharge planning if the patient needs post-hospital services based on physician/advanced practitioner order or complex needs related to functional status, cognitive ability, or social support system  Outcome: Progressing     Problem: Knowledge Deficit  Goal: Patient/family/caregiver demonstrates understanding of disease process, treatment plan, medications, and discharge instructions  Description: Complete learning assessment and assess knowledge base    Interventions:  - Provide teaching at level of understanding  - Provide teaching via preferred learning methods  Outcome: Progressing     Problem: GASTROINTESTINAL - ADULT  Goal: Minimal or absence of nausea and/or vomiting  Description: INTERVENTIONS:  - Administer IV fluids if ordered to ensure adequate hydration  - Maintain NPO status until nausea and vomiting are resolved  - Nasogastric tube if ordered  - Administer ordered antiemetic medications as needed  - Provide nonpharmacologic comfort measures as appropriate  - Advance diet as tolerated, if ordered  - Consider nutrition services referral to assist patient with adequate nutrition and appropriate food choices  Outcome: Progressing  Goal: Maintains or returns to baseline bowel function  Description: INTERVENTIONS:  - Assess bowel function  - Encourage oral fluids to ensure adequate hydration  - Administer IV fluids if ordered to ensure adequate hydration  - Administer ordered medications as needed  - Encourage mobilization and activity  - Consider nutritional services referral to assist patient with adequate nutrition and appropriate food choices  Outcome: Progressing  Goal: Maintains adequate nutritional intake  Description: INTERVENTIONS:  - Monitor percentage of each meal consumed  - Identify factors contributing to decreased intake, treat as appropriate  - Assist with meals as needed  - Monitor I&O, weight, and lab values if indicated  - Obtain nutrition services referral as needed  Outcome: Progressing  Goal: Establish and maintain optimal ostomy function  Description: INTERVENTIONS:  - Assess bowel function  - Encourage oral fluids to ensure adequate hydration  - Administer IV fluids if ordered to ensure adequate hydration   - Administer ordered medications as needed  - Encourage mobilization and activity  - Nutrition services referral to assist patient with appropriate food choices  - Assess stoma site  - Consider wound care consult   Outcome: Progressing  Goal: Oral mucous membranes remain intact  Description: INTERVENTIONS  - Assess oral mucosa and hygiene practices  - Implement preventative oral hygiene regimen  - Implement oral medicated treatments as ordered  - Initiate Nutrition services referral as needed  Outcome: Progressing     Problem: GENITOURINARY - ADULT  Goal: Maintains or returns to baseline urinary function  Description: INTERVENTIONS:  - Assess urinary function  - Encourage oral fluids to ensure adequate hydration if ordered  - Administer IV fluids as ordered to ensure adequate hydration  - Administer ordered medications as needed  - Offer frequent toileting  - Follow urinary retention protocol if ordered  Outcome: Progressing  Goal: Absence of urinary retention  Description: INTERVENTIONS:  - Assess patient’s ability to void and empty bladder  - Monitor I/O  - Bladder scan as needed  - Discuss with physician/AP medications to alleviate retention as needed  - Discuss catheterization for long term situations as appropriate  Outcome: Progressing  Goal: Urinary catheter remains patent  Description: INTERVENTIONS:  - Assess patency of urinary catheter  - If patient has a chronic coffey, consider changing catheter if non-functioning  - Follow guidelines for intermittent irrigation of non-functioning urinary catheter  Outcome: Progressing     Problem: HEMATOLOGIC - ADULT  Goal: Maintains hematologic stability  Description: INTERVENTIONS  - Assess for signs and symptoms of bleeding or hemorrhage  - Monitor labs  - Administer supportive blood products/factors as ordered and appropriate  Outcome: Progressing

## 2023-03-28 NOTE — CASE MANAGEMENT
Case Management Discharge Planning Note    Patient name Adriana Conroy  Location [facility] 848-68 MRN [id number]  : 1950 Date 3/28/2023       Current Admission Date: 3/27/2023  Current Admission Diagnosis:Malignant neoplasm of left ovary [facility])   Patient Active Problem List    Diagnosis Date Noted   • Rectal bleeding 2023   • Wound infection after surgery 2022   • Encounter for venous access device care 2022   • Bandemia 2022   • Hypokalemia 2022   • S/P total abdominal hysterectomy 10/31/2022   • Ileostomy, has currently ([facility] ) 10/31/2022   • Acute blood loss anemia (ABLA) 10/31/2022   • S/P splenectomy 10/31/2022   • Anxiety disorder due to medical condition 2022   • Malignant neoplasm of left ovary ([facility] [address] ) 2022   • Giant cell aortic arteritis ([facility] [address] )    • Prediabetes    • 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[address] ) 2022   • Lower abdominal pain 06/15/2022   • Change in bowel habit 06/15/2022   • Personal history of colonic polyps 06/15/2022   • Long term (current) use of systemic steroids 06/15/2022   • Asthma    • Hyperlipidemia    • Hypertension    • Gastroesophageal reflux disease 2019   • Constipation 2019   • Hemorrhoids 2019   • Family history of colonic polyps 2019      LOS (days): 1  Geometric Mean LOS (GMLOS) (days): 9 80  Days to GMLOS:8 6     OBJECTIVE:  Risk of Unplanned Readmission Score: 28 23         Current admission status: Inpatient   Preferred Pharmacy:   [facility] [address] [place] [address] [address] [place] [address]  Phone: [phone] Fax: Ivory Weber , [place] - [address] [place] [address] [facility]  Phone: [phone] Fax: [phone]    Primary Care Provider: Brittney Rodriguez MD    Primary Insurance: MEDICARE  Secondary Insurance: COMMERCIAL MISCELLANEOUS    DISCHARGE DETAILS:              Other Referral/Resources/Interventions Provided:  Interventions: [facility]  Referral Comments: Referral submitted to [address] in [address]

## 2023-03-28 NOTE — PROGRESS NOTES
Epidural Follow-up Note - Acute Pain Service    Zoraida Sanchez 67 y o  female MRN: [id number]  Unit/Bed#: [facility] 810-01 Encounter: [id number]      Assessment:   Principal Problem:    Malignant neoplasm of left ovary ([address] )      Zoraida Sanchez is a 67y o  year old female with a history of ovarian cancer status postresection, ileostomy, and colorectal anastomosis with subsequent rectal anastomotic stricture and  leak discovered during follow-up for planned stoma closure  She is now postop day 1 status post low anterior resection with preoperative thoracic epidural placed for postoperative pain control  APS consulted for pain and epidural management  Upon evaluation today, Romeo Gómez is laying in bed comfortably and in good spirits  She has minimal pain at her incision site with some deeper visceral pain with hip flexion  She is using her PCEA button minimally but appropriately  She has no systemic opioid analgesic needs  She was able to sleep through the night with hospital-based interruptions, but no pain based interruptions  She denies nausea vomiting is tolerating a liquid diet at this time  Passing flatus via ileostomy with some bowel contents in bag as well  She is displaying no objective or subjective signs of lower extremity weakness despite the low lie of her epidural   She has not yet attempted to work with therapy  Given revision surgery through her prior incision site, anticipated reduced sensation at this site, she may be a candidate for expedited epidural removal   However, as she is likely to remain in-house for the remainder of the week, there is no need to remove epidural prematurely  I informed her that we will anticipate at least 3 more days of epidural usage with ongoing reevaluation and conversation with her surgical teams based on her overall hospital progression      Plan:  Analgesia:  - Continue Thoracic epidural infusion of Ropivacaine 0 1% with fentanyl 2 mcg/mL at 8 mL/h continuous with 5 mL demand dose to 10 minutes max 3 doses per hour  Continue to monitor for lower extremity function given lower lie of epidural  - Will add Dilaudid 0 2 mg IV every 3 hours as needed for breakthrough pain  - Anticipate epidural removal and transition to primarily PO regimen in 3 to 4 days time  - Acetaminophen 975 mg p o  every 8 hours scheduled    Bowel Regimen:  - Bowel regimen when appropriate from surgical perspective    APS will continue to follow  Please contact Acute Pain Service - [facility] via [employer] from [phone] with additional questions or concerns  See Tiger[employer] or Neto for additional contacts and after hours information  Pain History  Current pain location(s): Lower abdomen, deep  Pain Scale: 0-4/10, currently tolerable and minimal at rest  Quality: Sore, aching with movement  24 hour history: See assessment    PCEA use: 6/7  Opioid requirement previous 24 hours: No systemic opioid requirements    Meds/Allergies   all current active meds have been reviewed    Allergies   Allergen Reactions   • Lactose - Food Allergy GI Intolerance   • Nsaids Other (See Comments)      pt is avoiding per family doctor instructions-  Able to take Tylenol   • Pedi-Pre Tape Spray [Wound Dressing Adhesive] Other (See Comments)     Please use sensitive skin tape, pt gets bad bruising from strong medical adhesive tape  Objective     Temp:  [96 4 °F (35 8 °C)-98 1 °F (36 7 °C)] 97 3 °F (36 3 °C)  HR:  [68-93] 80  Resp:  [13-20] 16  BP: ()/(45-66) 99/45  Arterial Line BP: (100-126)/(52-62) 126/62    Physical Exam  Vitals and nursing note reviewed  Constitutional:       General: She is not in acute distress  Appearance: Normal appearance  She is not toxic-appearing  HENT:      Head: Normocephalic  Mouth/Throat:      Mouth: Mucous membranes are moist    Eyes:      Pupils: Pupils are equal, round, and reactive to light     Pulmonary:      Effort: Pulmonary effort is normal  No respiratory distress  Abdominal:      General: There is no distension  Palpations: Abdomen is soft  Tenderness: There is abdominal tenderness (Minimal tenderness to deeper palpation near incision line)  Comments: Midline incision with staples clean dry intact, stool contents in ileostomy bag, JOSÉ ANTONIO serosanguineous   Genitourinary:     Comments: Corbin catheter in place with lightly blood-tinged urine  Musculoskeletal:         General: Normal range of motion  Comments: Bilateral lower extremity motor intact with preserved hip flexion and knee extension   Skin:     General: Skin is warm and dry  Neurological:      Mental Status: She is alert and oriented to person, place, and time  Mental status is at baseline  Psychiatric:         Mood and Affect: Mood normal          Behavior: Behavior normal        Epidural: Site clean/dry/intact, no surrounding erythema/edema/induration, infusion functioning appropriately, low thoracic lie    Lab Results:   Results from last 7 days   Lab Units 03/24/23  1401   WBC Thousand/uL 13 73*   HEMOGLOBIN g/dL 10 7*   HEMATOCRIT % 33 2*   PLATELETS Thousands/uL 381      Results from last 7 days   Lab Units 03/24/23  1315   POTASSIUM mmol/L 3 8   CHLORIDE mmol/L 102   CO2 mmol/L 27   BUN mg/dL 11   CREATININE mg/dL 0 59*   CALCIUM mg/dL 9 2   ALK PHOS U/L 76   ALT U/L 10   AST U/L 14          Imaging Studies: I have personally reviewed pertinent reports  EKG, Pathology, and Other Studies: I have personally reviewed pertinent reports  Please note that the APS provides consultative services regarding pain management only  With the exception of ketamine, peripheral nerve catheters, and epidural infusions (and except when indicated), final decisions regarding starting or changing doses of analgesic medications are at the discretion of the consulting service  Off hours consultation and/or medication management is generally not available      Oscar Peralta MD  Acute Pain Service

## 2023-03-28 NOTE — PROGRESS NOTES
Pastoral Care Progress Note    3/28/2023  Patient: Gabe Gaytan : 1950  Admission Date & Time: 3/27/2023 1106  MRN: [id number] CSN: [id number]         23 1400   Clinical Encounter Type   Visited With Patient   Restoration Encounters   Restoration Needs Prayer   Sacramental Encounters   Communion Given Indicator Yes     Swathi Coleman met with the pt and provided prayers, blessings and the Sacrament of [employer]  No further needs were expressed at this time  Chaplains still remain available

## 2023-03-28 NOTE — PLAN OF CARE
Problem: Prexisting or High Potential for Compromised Skin Integrity  Goal: Skin integrity is maintained or improved  Description: INTERVENTIONS:  - Identify patients at risk for skin breakdown  - Assess and monitor skin integrity  - Assess and monitor nutrition and hydration status  - Monitor labs   - Assess for incontinence   - Turn and reposition patient  - Assist with mobility/ambulation  - Relieve pressure over bony prominences  - Avoid friction and shearing  - Provide appropriate hygiene as needed including keeping skin clean and dry  - Evaluate need for skin moisturizer/barrier cream  - Collaborate with interdisciplinary team   - Patient/family teaching  - Consider wound care consult   Outcome: Progressing     Problem: MOBILITY - ADULT  Goal: Maintain or return to baseline ADL function  Description: INTERVENTIONS:  -  Assess patient's ability to carry out ADLs; assess patient's baseline for ADL function and identify physical deficits which impact ability to perform ADLs (bathing, care of mouth/teeth, toileting, grooming, dressing, etc )  - Assess/evaluate cause of self-care deficits   - Assess range of motion  - Assess patient's mobility; develop plan if impaired  - Assess patient's need for assistive devices and provide as appropriate  - Encourage maximum independence but intervene and supervise when necessary  - Involve family in performance of ADLs  - Assess for home care needs following discharge   - Consider OT consult to assist with ADL evaluation and planning for discharge  - Provide patient education as appropriate  Outcome: Progressing  Goal: Maintains/Returns to pre admission functional level  Description: INTERVENTIONS:  - Perform BMAT or MOVE assessment daily    - Set and communicate daily mobility goal to care team and patient/family/caregiver     - Collaborate with rehabilitation services on mobility goals if consulted  - Out of bed for toileting  - Record patient progress and toleration of activity level   Outcome: Progressing     Problem: PAIN - ADULT  Goal: Verbalizes/displays adequate comfort level or baseline comfort level  Description: Interventions:  - Encourage patient to monitor pain and request assistance  - Assess pain using appropriate pain scale  - Administer analgesics based on type and severity of pain and evaluate response  - Implement non-pharmacological measures as appropriate and evaluate response  - Consider cultural and social influences on pain and pain management  - Notify physician/advanced practitioner if interventions unsuccessful or patient reports new pain  Outcome: Progressing     Problem: INFECTION - ADULT  Goal: Absence or prevention of progression during hospitalization  Description: INTERVENTIONS:  - Assess and monitor for signs and symptoms of infection  - Monitor lab/diagnostic results  - Monitor all insertion sites, i e  indwelling lines, tubes, and drains  - Monitor endotracheal if appropriate and nasal secretions for changes in amount and color  - [place] appropriate cooling/warming therapies per order  - Administer medications as ordered  - Instruct and encourage patient and family to use good hand hygiene technique  - Identify and instruct in appropriate isolation precautions for identified infection/condition  Outcome: Progressing  Goal: Absence of fever/infection during neutropenic period  Description: INTERVENTIONS:  - Monitor WBC    Outcome: Progressing     Problem: SAFETY ADULT  Goal: Maintain or return to baseline ADL function  Description: INTERVENTIONS:  -  Assess patient's ability to carry out ADLs; assess patient's baseline for ADL function and identify physical deficits which impact ability to perform ADLs (bathing, care of mouth/teeth, toileting, grooming, dressing, etc )  - Assess/evaluate cause of self-care deficits   - Assess range of motion  - Assess patient's mobility; develop plan if impaired  - Assess patient's need for assistive devices and provide as appropriate  - Encourage maximum independence but intervene and supervise when necessary  - Involve family in performance of ADLs  - Assess for home care needs following discharge   - Consider OT consult to assist with ADL evaluation and planning for discharge  - Provide patient education as appropriate  Outcome: Progressing  Goal: Maintains/Returns to pre admission functional level  Description: INTERVENTIONS:  - Perform BMAT or MOVE assessment daily    - Set and communicate daily mobility goal to care team and patient/family/caregiver     - Collaborate with rehabilitation services on mobility goals if consulted  - Out of bed for toileting  - Record patient progress and toleration of activity level   Outcome: Progressing  Goal: Patient will remain free of falls  Description: INTERVENTIONS:  - Educate patient/family on patient safety including physical limitations  - Instruct patient to call for assistance with activity   - Consult OT/PT to assist with strengthening/mobility   - Keep Call bell within reach  - Keep bed low and locked with side rails adjusted as appropriate  - Keep care items and personal belongings within reach  - Initiate and maintain comfort rounds  - Make Fall Risk Sign visible to staff  - Apply yellow socks and bracelet for high fall risk patients  - Consider moving patient to room near nurses station  Outcome: Progressing     Problem: DISCHARGE PLANNING  Goal: Discharge to home or other facility with appropriate resources  Description: INTERVENTIONS:  - Identify barriers to discharge w/patient and caregiver  - Arrange for needed discharge resources and transportation as appropriate  - Identify discharge learning needs (meds, wound care, etc )  - Arrange for interpretive services to assist at discharge as needed  - Refer to Case Management Department for coordinating discharge planning if the patient needs post-hospital services based on physician/advanced practitioner order or complex needs related to functional status, cognitive ability, or social support system  Outcome: Progressing     Problem: Knowledge Deficit  Goal: Patient/family/caregiver demonstrates understanding of disease process, treatment plan, medications, and discharge instructions  Description: Complete learning assessment and assess knowledge base    Interventions:  - Provide teaching at level of understanding  - Provide teaching via preferred learning methods  Outcome: Progressing     Problem: GASTROINTESTINAL - ADULT  Goal: Minimal or absence of nausea and/or vomiting  Description: INTERVENTIONS:  - Administer IV fluids if ordered to ensure adequate hydration  - Maintain NPO status until nausea and vomiting are resolved  - Nasogastric tube if ordered  - Administer ordered antiemetic medications as needed  - Provide nonpharmacologic comfort measures as appropriate  - Advance diet as tolerated, if ordered  - Consider nutrition services referral to assist patient with adequate nutrition and appropriate food choices  Outcome: Progressing  Goal: Maintains or returns to baseline bowel function  Description: INTERVENTIONS:  - Assess bowel function  - Encourage oral fluids to ensure adequate hydration  - Administer IV fluids if ordered to ensure adequate hydration  - Administer ordered medications as needed  - Encourage mobilization and activity  - Consider nutritional services referral to assist patient with adequate nutrition and appropriate food choices  Outcome: Progressing  Goal: Maintains adequate nutritional intake  Description: INTERVENTIONS:  - Monitor percentage of each meal consumed  - Identify factors contributing to decreased intake, treat as appropriate  - Assist with meals as needed  - Monitor I&O, weight, and lab values if indicated  - Obtain nutrition services referral as needed  Outcome: Progressing  Goal: Establish and maintain optimal ostomy function  Description: INTERVENTIONS:  - Assess bowel function  - Encourage oral fluids to ensure adequate hydration  - Administer IV fluids if ordered to ensure adequate hydration   - Administer ordered medications as needed  - Encourage mobilization and activity  - Nutrition services referral to assist patient with appropriate food choices  - Assess stoma site  - Consider wound care consult   Outcome: Progressing  Goal: Oral mucous membranes remain intact  Description: INTERVENTIONS  - Assess oral mucosa and hygiene practices  - Implement preventative oral hygiene regimen  - Implement oral medicated treatments as ordered  - Initiate Nutrition services referral as needed  Outcome: Progressing     Problem: GENITOURINARY - ADULT  Goal: Maintains or returns to baseline urinary function  Description: INTERVENTIONS:  - Assess urinary function  - Encourage oral fluids to ensure adequate hydration if ordered  - Administer IV fluids as ordered to ensure adequate hydration  - Administer ordered medications as needed  - Offer frequent toileting  - Follow urinary retention protocol if ordered  Outcome: Progressing  Goal: Absence of urinary retention  Description: INTERVENTIONS:  - Assess patient's ability to void and empty bladder  - Monitor I/O  - Bladder scan as needed  - Discuss with physician/AP medications to alleviate retention as needed  - Discuss catheterization for long term situations as appropriate  Outcome: Progressing  Goal: Urinary catheter remains patent  Description: INTERVENTIONS:  - Assess patency of urinary catheter  - If patient has a chronic coffey, consider changing catheter if non-functioning  - Follow guidelines for intermittent irrigation of non-functioning urinary catheter  Outcome: Progressing     Problem: HEMATOLOGIC - ADULT  Goal: Maintains hematologic stability  Description: INTERVENTIONS  - Assess for signs and symptoms of bleeding or hemorrhage  - Monitor labs  - Administer supportive blood products/factors as ordered and appropriate  Outcome: Progressing

## 2023-03-28 NOTE — CONSULTS
"Consultation - Nephrology   Isaura Walton 67 y o  female MRN: [id number]  Unit/Bed#: [facility] 810-01 Encounter: [id number]    ASSESSMENT AND PLAN:  68 yo woman with PMH of lactase deficient colonic polyps, metastatic ovarian cancer status post ileostomy , LIN, splenectomy ongoing chemotherapy  Patient admitted for surgical intervention (low anterior resection, bilateral ureteral stent)  Urology is consulted for prevention of TERI    PLAN:    #TERI prevention  · Continue with IV fluids currently on D5W/s 2 45% saline/potassium  · Enforce fluid intake  · Monitor sodium, if evidence of hyponatremia discontinue half-normal saline and switch to Plasma-Lyte or LR  · Avoid NSAIDs  · Monitor urinary output    #Volume status/hypertension:  • Volume: Euvolemic  • Blood pressure: Hypotensive BP 99/44  • Recommend albumin  • Recommend:  • Avoid further hypotension  • Recommend switch to Plasma-Lyte    #Ovarian cancer  · Status post surgical intervention   · Management as per primary team    #Hypomagnesemia  · Serum magnesium 1 7 mg/dL  · Start magnesium oxide      #Borderline hypokalemia  · Serum potassium 3 8 mg/L  · Recommend potassium chloride 20 mEq once  · We will monitor closely     #Anemia  · Hemoglobin 10 7 mg/dL  · Etiology: Multifactorial secondary to malignancy and poor intake  · Transfuse if hemoglobin<7    HISTORY OF PRESENT ILLNESS:  Requesting Physician: Elias Price MD  Reason for Consult: TERI prevention    Isaura Walton is a 67 y o   female with PMH of lactase deficiency, colonic polyps, metastatic ovarian cancer status post ileostomy, LIN, splenectomy, ongoing chemotherapy who was admitted to [facility] after presenting with surgical intervention (low anterior resection)   A renal consultation is requested today for assistance in the management of prevention of TERI    PAST MEDICAL HISTORY:  Past Medical History:   Diagnosis Date   • Abdominal pain     off and on   • Allergic sinusitis     \"seasonal " "allergies\"-has received allergy shots \"   • Anxiety     with current diagnosis   • Arthritis    • Asthma    • Cholelithiasis    • Colon polyp    • Degenerative joint disease    • Dental bridge present     permanent lower left   • Exercise involving walking     1-2x/week   • GERD (gastroesophageal reflux disease)    • Giant cell aortic arteritis (HCC)     \"hereditary Giant Cell Temporal Arteritis\"per pt   • Hiatal hernia    • History of cancer chemotherapy    • History of transfusion    • Hyperlipidemia    • Hypertension    • Nausea    • 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[address] )     \"gynecologic malignacy\"   • PMR (polymyalgia rheumatica) (HCC)    • Prediabetes    • Shortness of breath     per pt \"asthma related --with climbing mult  flights of stairs--or exertion -not new\"   • Tinnitus    • Wears glasses        PAST SURGICAL HISTORY:  Past Surgical History:   Procedure Laterality Date   • COLONOSCOPY      October 2021: Adenomatous polyps and rectum in transverse colon, sigmoid diverticulosis, internal hemorrhoids  October 2016 diverticulosis and internal hemorrhoids, September 2011 diverticulosis and internal hemorrhoids     • COLOPROCTECTOMY N/A 10/28/2022    Procedure: PROCTECTOMY;  Surgeon: Jorge Duncan MD;  [facility];  Service: Surgical Oncology   • DILATION AND CURETTAGE OF UTERUS     • ILEO LOOP DIVERSION N/A 10/28/2022    Procedure: ILEOSTOMY LOOP;  Surgeon: Jorge Duncan MD;  [facility];  Service: Surgical Oncology   • IR PORT PLACEMENT  12/8/2022   • LIVER SURGERY  10/28/2022    Procedure: LIVER CONTROL OF BLEED ;  Surgeon: Jorge Duncan MD;  [facility];  Service: Surgical Oncology   • WY COLECTOMY PARTIAL W/ANASTOMOSIS N/A 10/28/2022    Procedure: RESECTION COLON LEFT;  Surgeon: Gabe Gill MD;  [facility];  Service: Gynecology Oncology   • WY EXPLORATORY LAPAROTOMY CELIOTOMY W/WO BIOPSY [address] [facility] N/A 10/28/2022    Procedure: LAPAROTOMY EXPLORATORY;  Surgeon: Charlies Babinski " Rachelle Logan MD;  [facility];  Service: Gynecology Oncology   • MT LAPS ABD PRTM&OMENTUM DX W/WO SPEC BR/WA SPX N/A 08/04/2022    Procedure: DIAGNOSTIC LAPAROSCOPY, PERITONEAL BIOPSY, SAMPLING OF BLOODY ASCITES ;  Surgeon: Miguel Andrew MD;  [facility];  Service: Gynecology Oncology   • MT LAPS ABD PRTM&OMENTUM DX W/WO [address] BR/ [facility] N/A 10/28/2022    Procedure: LAPAROSCOPY DIAGNOSTIC;  Surgeon: Shelia Escamilla MD;  [facility];  Service: Gynecology Oncology   • MT TOTAL ABDOMINAL HYSTERECT W/WO RMVL TUBE OVARY N/A 10/28/2022    Procedure: HYSTERECTOMY MODIFIED RADICAL, BILATERAL SALPINGO-OOPHORECTOMY, GASTROCOLIC OMENTECTOMY, DIAPHRAGM RESECTION, EXCISION AND ABLATION OF PERITONEAL TUMORS;  Surgeon: Shelia Escamilla MD;  [facility];  Service: Gynecology Oncology   • SMALL INTESTINE SURGERY N/A 10/28/2022    Procedure: RESECTION SMALL BOWEL;  Surgeon: Shelia Escamilla MD;  [facility];  Service: Gynecology Oncology   • SPLENECTOMY, TOTAL N/A 10/28/2022    Procedure: SPLENECTOMY;  Surgeon: Shelia Escamilla MD;  [facility];  Service: Gynecology Oncology   • TEMPORAL ARTERY BIOPSY / LIGATION     • UPPER GASTROINTESTINAL ENDOSCOPY  11/10/2014    November 2014 irregular Z-line and Schatzki ring, hiatal hernia, gastritis  Biopsies negative for celiac, H pylori, or Phillip's or eosinophilic esophagitis  • WISDOM TOOTH EXTRACTION         ALLERGIES:  Allergies   Allergen Reactions   • Lactose - Food Allergy GI Intolerance   • Nsaids Other (See Comments)      pt is avoiding per family doctor instructions-  Able to take Tylenol   • Pedi-Pre Tape Spray [Wound Dressing Rexene Glow Other (See Comments)     Please use sensitive skin tape, pt gets bad bruising from strong medical adhesive tape          SOCIAL HISTORY:  Social History     Substance and Sexual Activity   Alcohol Use Yes    Comment: a glass of wine maybe 2 times a month Social History     Substance and Sexual Activity   Drug Use Never     Social History     Tobacco Use   Smoking Status Never   Smokeless Tobacco Never       FAMILY HISTORY:  Family History   Problem Relation Age of Onset   • Colon polyps Mother    • Alzheimer's disease Mother    • Heart disease Father    • Brain cancer Father    • Colon polyps Sister    • Heart disease Sister    • Diabetes Sister    • MARLEY disease Sister    • Colon cancer Cousin        MEDICATIONS:    Current Facility-Administered Medications:   •  acetaminophen (TYLENOL) tablet 975 mg, 975 mg, Oral, Q8H [address], Taylor White MD, 975 mg at 03/28/23 0520  •  albuterol (PROVENTIL HFA,VENTOLIN HFA) inhaler 2 puff, 2 puff, Inhalation, Q4H PRN, Talyor White MD  •  atorvastatin (LIPITOR) tablet 40 mg, 40 mg, Oral, Daily With Jihan Dowling MD  •  budesonide-formoterol (SYMBICORT) 160-4 5 mcg/act inhaler 2 puff, 2 puff, Inhalation, Daily, Taylor White MD, 2 puff at 03/28/23 0925  •  dextrose 5 % and sodium chloride 0 45 % with KCl 20 mEq/L infusion, 75 mL/hr, Intravenous, Continuous, Asmita Moreno MD, Last Rate: 75 mL/hr at 03/28/23 0926, 75 mL/hr at 03/28/23 0926  •  heparin (porcine) subcutaneous injection 5,000 Units, 5,000 Units, Subcutaneous, Q8H [address], Taylor White MD, 5,000 Units at 03/28/23 0123  •  HYDROmorphone HCl (DILAUDID) injection 0 2 mg, 0 2 mg, Intravenous, Q3H PRN, Abdiel Galindo MD  •  LORazepam (ATIVAN) tablet 1 mg, 1 mg, Oral, Q8H PRN, Taylor White MD  •  magnesium Oxide (MAG-OX) tablet 400 mg, 400 mg, Oral, BID, Terrence Baltazar MD  •  metoprolol succinate (TOPROL-XL) 24 hr tablet 25 mg, 25 mg, Oral, Daily, Taylor White MD, 25 mg at 03/28/23 3014  •  nystatin (MYCOSTATIN) powder, , Topical, BID, Erwin Mello MD, Given at 03/28/23 0931  •  ondansetron (ZOFRAN) injection 4 mg, 4 mg, Intravenous, Q6H PRN, Taylor White MD  •  pantoprazole (PROTONIX) EC tablet 40 mg, 40 mg, Oral, Early Morning, Burton Moran MD, 40 mg at 03/28/23 4240  •  ropivacaine 0 1% and fentaNYL 2 mcg/mL PCEA, , Epidural, Continuous, Burton Moran MD, Rate Verify at 03/27/23 1800    REVIEW OF SYSTEMS:  Complete 10 point review of systems were obtained and discussed in length with the patient  Complete review of systems were negative / unremarkable except mentioned in the HPI section       Review of Systems - Psychological ROS: negative  Ophthalmic ROS: negative  ENT ROS: negative  Hematological and Lymphatic ROS: negative  Endocrine ROS: negative  Respiratory ROS: no cough, shortness of breath, or wheezing  Cardiovascular ROS: no chest pain or dyspnea on exertion  Gastrointestinal ROS: positive for - heartburn  Genito-Urinary ROS: no dysuria, trouble voiding, or hematuria  Musculoskeletal ROS: negative  Neurological ROS: no TIA or stroke symptoms  Dermatological ROS: negative     PHYSICAL EXAM:  Current Weight: Weight - Scale: 71 2 kg (157 lb)  First Weight: Weight - Scale: 71 2 kg (157 lb)  Vitals:    03/28/23 0740   BP: (!) 99/45   Pulse: 80   Resp: 16   Temp: (!) 97 3 °F (36 3 °C)   SpO2: 97%       Intake/Output Summary (Last 24 hours) at 3/28/2023 1053  Last data filed at 3/28/2023 0925  Gross per 24 hour   Intake 5442 97 ml   Output 2590 ml   Net 2852 97 ml     Wt Readings from Last 3 Encounters:   03/27/23 71 2 kg (157 lb)   03/21/23 71 3 kg (157 lb 3 2 oz)   03/14/23 72 6 kg (160 lb)     Temp Readings from Last 3 Encounters:   03/28/23 (!) 97 3 °F (36 3 °C)   03/21/23 (!) 97 4 °F (36 3 °C) (Tympanic)   03/17/23 98 2 °F (36 8 °C) (Temporal)     BP Readings from Last 3 Encounters:   03/28/23 (!) 99/45   03/21/23 126/82   03/17/23 106/62     Pulse Readings from Last 3 Encounters:   03/28/23 80   03/21/23 86   03/17/23 83      General:  no acute distress at this time  Skin:  No acute rash  Eyes:  No scleral icterus and noninjected  ENT:  mucous membranes moist  Neck:  no carotid bruits  Chest:  Clear to auscultation percussion, "good respiratory effort, no use of accessory respiratory muscles  CVS:  Regular rate and rhythm without rub  Abdomen:  soft and nontender   Extremities:  no significant lower extremity edema  Neuro:  No gross focality  Psych:  Alert , cooperative   Urology: Corbin catheter with pink urine no clots       Invasive Devices:   Urethral Catheter Latex 16 Fr  (Active)   Reasons to continue Urinary Catheter  Post-operative urological requirements 03/27/23 1939   Goal for Removal Remove POD#1 03/27/23 1801   Site Assessment Clean;Skin intact 03/28/23 0925   Corbin Care Done 03/28/23 0900   Collection Container Standard drainage bag 03/27/23 1939   Output (mL) 675 mL 03/28/23 0518     Lab Results:   Results from last 7 days   Lab Units 03/24/23  1401 03/24/23  1315   WBC Thousand/uL 13 73*  --    HEMOGLOBIN g/dL 10 7*  --    HEMATOCRIT % 33 2*  --    PLATELETS Thousands/uL 381  --    POTASSIUM mmol/L  --  3 8   CHLORIDE mmol/L  --  102   CO2 mmol/L  --  27   BUN mg/dL  --  11   CREATININE mg/dL  --  0 59*   CALCIUM mg/dL  --  9 2   MAGNESIUM mg/dL  --  1 7*       Other Studies:   No orders to display       Portions of the record may have been created with voice recognition software  Occasional wrong word or \"sound a like\" substitutions may have occurred due to the inherent limitations of voice recognition software  Read the chart carefully and recognize, using context, where substitutions have occurred      "

## 2023-03-28 NOTE — CONSULTS
Consultation - Miky Pro 67 y o  female MRN: [id number]    Unit/Bed#: [facility] 810-01 Encounter: [id number]      Assessment/Plan     Assessment: This is a 67y o -year-old female with history of steroid induced hyperglycemia and prediabetes who is admitted s/p surgical intervention for revision of anastomosis  Plan:  Prediabetes with steroid induced hyperglycemia  Most recent a1c 6 4  Though there have been a1c's in diabetes range, these have been transient in the setting of steroid usage  Pt received 10mg dexamethasone yesterday  Home regimen: metformin 500mg daily  Check blood sugars with meals and at bedtime while hospitalized, can continue correctional coverage as needed  Suspect blood sugars will improve as dexamethasone is metabolized  On chronic steroids  For giant cell arteritis through rheumatology  Recommend restarting 2mg daily prednisone starting 3/29  - discussed with surgery team     CC: Prediabetes Consult    History of Present Illness     HPI: Miky Pro is a 67y o  year old female with ovarian cancer s/p ileostomy, LIN, splenectomy admited for revision of prior anastomosis with low anterior resection and bilateral ureteral stent placement  Endocrinology is consulted for prediabetes per surgical optimization clinic  Per chart review Hemoglobin a1c has been as high as 7 2 in January 2023 but most recent a1c is 6 4  Pt does report that a1c was previously elevated to 6 9 but this was in the setting of higher dose prednisone use for giant cell arteritis  She also reports receiving steroids with chemotherapy with last dose in January  Pt has been on steroids since 2021 but was previously on 40mg and has more recently been on 2mg through rheumatology  She denies polydipsia but reports she has always drank a lot of water  She is on a clear liquid diet at my time of evaluation this AM and reports discomfort at the site of her IV but denies any other complaints      Inpatient consult to "Endocrinology  Consult performed by: Magdi Almeida DO  Consult ordered by: Sher Capellan MD          Review of Systems   Constitutional: Negative for chills, fatigue and fever  HENT: Negative for rhinorrhea and sore throat  Respiratory: Negative for choking, chest tightness, shortness of breath, wheezing and stridor  Cardiovascular: Negative for chest pain, palpitations and leg swelling  Gastrointestinal: Negative for abdominal pain, blood in stool, constipation, diarrhea, nausea and vomiting  Genitourinary: Negative for hematuria  Neurological: Negative for dizziness and light-headedness  All other systems reviewed and are negative  Historical Information   Past Medical History:   Diagnosis Date   • Abdominal pain     off and on   • Allergic sinusitis     \"seasonal allergies\"-has received allergy shots \"   • Anxiety     with current diagnosis   • Arthritis    • Asthma    • Cholelithiasis    • Colon polyp    • Degenerative joint disease    • Dental bridge present     permanent lower left   • Exercise involving walking     1-2x/week   • GERD (gastroesophageal reflux disease)    • Giant cell aortic arteritis (HCC)     \"hereditary Giant Cell Temporal Arteritis\"per pt   • Hiatal hernia    • History of cancer chemotherapy    • History of transfusion    • Hyperlipidemia    • Hypertension    • Nausea    • 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[address] )     \"gynecologic malignacy\"   • PMR (polymyalgia rheumatica) (HCC)    • Prediabetes    • Shortness of breath     per pt \"asthma related --with climbing mult  flights of stairs--or exertion -not new\"   • Tinnitus    • Wears glasses      Past Surgical History:   Procedure Laterality Date   • COLONOSCOPY      October 2021: Adenomatous polyps and rectum in transverse colon, sigmoid diverticulosis, internal hemorrhoids  October 2016 diverticulosis and internal hemorrhoids, September 2011 diverticulosis and internal hemorrhoids     • COLOPROCTECTOMY N/A 10/28/2022    " Procedure: PROCTECTOMY;  Surgeon: Shakira Mckeon MD;  [facility];  Service: Surgical Oncology   • DILATION AND CURETTAGE OF UTERUS     • ILEO LOOP DIVERSION N/A 10/28/2022    Procedure: ILEOSTOMY LOOP;  Surgeon: Shakira Mckeon MD;  [facility];  Service: Surgical Oncology   • IR PORT PLACEMENT  12/8/2022   • LIVER SURGERY  10/28/2022    Procedure: LIVER CONTROL OF BLEED ;  Surgeon: Shakira Mckeon MD;  [facility];  Service: Surgical Oncology   • NE COLECTOMY PARTIAL W/ANASTOMOSIS N/A 10/28/2022    Procedure: RESECTION COLON LEFT;  Surgeon: Manuel Danielle MD;  [facility];  Service: Gynecology Oncology   • NE COLECTOMY PARTIAL W/ANASTOMOSIS N/A 3/27/2023    Procedure: LOW ANTERIOR RESECTION;  Surgeon: Shakira Mckeon MD;  [facility];  Service: Surgical Oncology   • NE EXPLORATORY LAPAROTOMY CELIOTOMY W/WO BIOPSY SPX N/A 10/28/2022    Procedure: LAPAROTOMY EXPLORATORY;  Surgeon: Manuel Danielle MD;  [facility];  Service: Gynecology Oncology   • NE LAPS ABD PRTM&OMENTUM DX W/WO [address] [facility] [address]/ [facility] N/A 08/04/2022    Procedure: DIAGNOSTIC LAPAROSCOPY, PERITONEAL BIOPSY, SAMPLING OF BLOODY ASCITES ;  Surgeon: Brian Hou MD;  [facility];  Service: Gynecology Oncology   • NE LAPS ABD PRTM&OMENTUM DX W/WO [address] [facility] [address]/ [facility] N/A 10/28/2022    Procedure: LAPAROSCOPY DIAGNOSTIC;  Surgeon: Manuel Danielle MD;  [facility];  Service: Gynecology Oncology   • NE TOTAL ABDOMINAL HYSTERECT W/WO RMVL TUBE OVARY N/A 10/28/2022    Procedure: HYSTERECTOMY MODIFIED RADICAL, BILATERAL SALPINGO-OOPHORECTOMY, GASTROCOLIC OMENTECTOMY, DIAPHRAGM RESECTION, EXCISION AND ABLATION OF PERITONEAL TUMORS;  Surgeon: Manuel Danielle MD;  [facility];  Service: Gynecology Oncology   • SMALL INTESTINE SURGERY N/A 10/28/2022    Procedure: RESECTION SMALL BOWEL;  Surgeon: Manuel Danielle MD;  [facility];  Service: Gynecology Oncology   • SPLENECTOMY, TOTAL N/A 10/28/2022    Procedure: SPLENECTOMY;  Surgeon: Mahin Amaro MD;  [facility];  Service: Gynecology Oncology   • TEMPORAL ARTERY BIOPSY / LIGATION     • UPPER GASTROINTESTINAL ENDOSCOPY  11/10/2014    November 2014 irregular Z-line and Schatzki ring, hiatal hernia, gastritis  Biopsies negative for celiac, H pylori, or Phillip's or eosinophilic esophagitis     • WISDOM TOOTH EXTRACTION       Social History   Social History     Substance and Sexual Activity   Alcohol Use Yes    Comment: a glass of wine maybe 2 times a month     Social History     Substance and Sexual Activity   Drug Use Never     Social History     Tobacco Use   Smoking Status Never   Smokeless Tobacco Never     Family History:   Family History   Problem Relation Age of Onset   • Colon polyps Mother    • Alzheimer's disease Mother    • Heart disease Father    • Brain cancer Father    • Colon polyps Sister    • Heart disease Sister    • Diabetes Sister    • MARLEY disease Sister    • Colon cancer Cousin        Meds/Allergies   Current Facility-Administered Medications   Medication Dose Route Frequency Provider Last Rate Last Admin   • acetaminophen (TYLENOL) tablet 975 mg  975 mg Oral Q8H [address] Watt MD Jie   975 mg at 03/28/23 1315   • albuterol (PROVENTIL HFA,VENTOLIN HFA) inhaler 2 puff  2 puff Inhalation Q4H PRN Ralph Ceron MD       • atorvastatin (LIPITOR) tablet 40 mg  40 mg Oral Daily With Holland Ortega MD       • budesonide-formoterol (SYMBICORT) 160-4 5 mcg/act inhaler 2 puff  2 puff Inhalation Daily Ralph Ceron MD   2 puff at 03/28/23 0925   • [START ON 3/29/2023] fluticasone (FLONASE) 50 mcg/act nasal spray 1 spray  1 spray Each Nare Daily Blayne Gusman PA-C       • heparin (porcine) subcutaneous injection 5,000 Units  5,000 Units Subcutaneous Q8H Dennis Rose MD   5,000 Units at 03/28/23 1316   • HYDROmorphone HCl (DILAUDID) injection 0 2 "mg  0 2 mg Intravenous Q3H PRN Asad Tran MD       • insulin lispro (HumaLOG) 100 units/mL subcutaneous injection 1-5 Units  1-5 Units Subcutaneous TID AC Courtney Kimbrough PA-C   1 Units at 03/28/23 1514   • loratadine (CLARITIN) tablet 10 mg  10 mg Oral Daily Gerri Saravia PA-C   10 mg at 03/28/23 1315   • LORazepam (ATIVAN) tablet 1 mg  1 mg Oral Q8H PRN Corrinne Lavender, MD       • magnesium Oxide (MAG-OX) tablet 400 mg  400 mg Oral BID Miladys Mullen MD   400 mg at 03/28/23 1150   • metoprolol succinate (TOPROL-XL) 24 hr tablet 25 mg  25 mg Oral Daily Corrinne Lavender, MD   25 mg at 03/28/23 0925   • [START ON 3/29/2023] montelukast (SINGULAIR) tablet 10 mg  10 mg Oral Daily Elis Epps PA-C       • multi-electrolyte (PLASMALYTE-A/ISOLYTE-S PH 7 4) IV solution  84 mL/hr Intravenous Continuous Courtney Kimbrough PA-C   Stopped at 03/28/23 1314   • nystatin (MYCOSTATIN) powder   Topical BID Corina Chicas MD   Given at 03/28/23 0931   • ondansetron (ZOFRAN) injection 4 mg  4 mg Intravenous Q6H PRN Corrinne Lavender, MD       • pantoprazole (PROTONIX) EC tablet 40 mg  40 mg Oral Early Morning Corrinne Lavender, MD   40 mg at 03/28/23 0520   • [START ON 3/29/2023] predniSONE tablet 2 mg  2 mg Oral Daily Elis Epps PA-C       • ropivacaine 0 1% and fentaNYL 2 mcg/mL PCEA   Epidural Continuous Corrinne Lavender, MD   Rate Verify at 03/27/23 1800     Allergies   Allergen Reactions   • Lactose - Food Allergy GI Intolerance   • Nsaids Other (See Comments)      pt is avoiding per family doctor instructions-  Able to take Tylenol   • Pedi-Pre Tape Spray [Wound Dressing Adhesive] Other (See Comments)     Please use sensitive skin tape, pt gets bad bruising from strong medical adhesive tape  Objective   Vitals: Blood pressure (!) 98/48, pulse 94, temperature 97 5 °F (36 4 °C), resp   rate 16, height 5' 4\" (1 626 m), weight 71 2 kg (157 lb), SpO2 96 " %     Intake/Output Summary (Last 24 hours) at 3/28/2023 1700  Last data filed at 3/28/2023 1452  Gross per 24 hour   Intake 2527 12 ml   Output 2990 ml   Net -462 88 ml     Invasive Devices     Central Venous Catheter Line  Duration           Port A Cath 12/08/22 Right Subclavian 110 days          Peripheral Intravenous Line  Duration           Peripheral IV 03/27/23 Right Hand 1 day          Epidural Line  Duration           Epidural Catheter 03/27/23 1 day          Drain  Duration           Ileostomy Standard (Loulou, oz)  days    Closed/Suction Drain Anterior; Left LLQ Bulb 19 Fr  1 day    Urethral Catheter Latex 16 Fr  1 day                Physical Exam  Constitutional:       Appearance: She is well-developed  HENT:      Head: Normocephalic and atraumatic  Eyes:      General:         Right eye: No discharge  Left eye: No discharge  Cardiovascular:      Rate and Rhythm: Normal rate and regular rhythm  Heart sounds: Normal heart sounds  No murmur heard  No friction rub  No gallop  Pulmonary:      Effort: Pulmonary effort is normal  No respiratory distress  Breath sounds: Normal breath sounds  No wheezing or rales  Chest:      Chest wall: No tenderness  Abdominal:      General: Bowel sounds are normal  There is no distension  Palpations: Abdomen is soft  There is no mass  Tenderness: There is no abdominal tenderness  Musculoskeletal:         General: Normal range of motion  Cervical back: Normal range of motion and neck supple  Skin:     General: Skin is warm and dry  Neurological:      Mental Status: She is alert and oriented to person, place, and time  Psychiatric:         Behavior: Behavior normal          The history was obtained from the review of the chart, patient      Lab Results:       Lab Results   Component Value Date    WBC 17 33 (H) 03/28/2023    HGB 8 6 (L) 03/28/2023    HCT 26 5 (L) 03/28/2023    MCV 99 (H) 03/28/2023     03/28/2023     Lab Results   Component Value Date/Time    BUN 8 03/28/2023 10:56 AM    K 3 6 03/28/2023 10:56 AM     (H) 03/28/2023 10:56 AM    CO2 25 03/28/2023 10:56 AM    CO2 18 (L) 10/28/2022 07:00 PM    CREATININE 0 74 03/28/2023 10:56 AM    AST 14 03/24/2023 01:15 PM    ALT 10 03/24/2023 01:15 PM    ALB 3 8 03/24/2023 01:15 PM     No results for input(s): CHOL, HDL, LDL, TRIG, VLDL in the last 72 hours  No results found for: [place]  POC Glucose (mg/dl)   Date Value   03/28/2023 203 (H)   03/27/2023 172 (H)   03/27/2023 206 (H)   10/28/2022 186 (H)   10/28/2022 106   08/04/2022 117       Imaging Studies: I have personally reviewed pertinent reports  Portions of the record may have been created with voice recognition software

## 2023-03-29 LAB
ABO GROUP BLD BPU: NORMAL
ABO GROUP BLD BPU: NORMAL
ANION GAP SERPL CALCULATED.3IONS-SCNC: 3 MMOL/L (ref 4–13)
BASOPHILS # BLD AUTO: 0.05 THOUSANDS/ÂΜL (ref 0–0.1)
BASOPHILS NFR BLD AUTO: 0 % (ref 0–1)
BPU ID: NORMAL
BPU ID: NORMAL
BUN SERPL-MCNC: 5 MG/DL (ref 5–25)
CALCIUM SERPL-MCNC: 8.1 MG/DL (ref 8.3–10.1)
CHLORIDE SERPL-SCNC: 109 MMOL/L (ref 96–108)
CO2 SERPL-SCNC: 29 MMOL/L (ref 21–32)
CREAT SERPL-MCNC: 0.49 MG/DL (ref 0.6–1.3)
CROSSMATCH: NORMAL
CROSSMATCH: NORMAL
EOSINOPHIL # BLD AUTO: 0.24 THOUSAND/ÂΜL (ref 0–0.61)
EOSINOPHIL NFR BLD AUTO: 1 % (ref 0–6)
ERYTHROCYTE [DISTWIDTH] IN BLOOD BY AUTOMATED COUNT: 15.9 % (ref 11.6–15.1)
GFR SERPL CREATININE-BSD FRML MDRD: 97 ML/MIN/1.73SQ M
GLUCOSE SERPL-MCNC: 101 MG/DL (ref 65–140)
GLUCOSE SERPL-MCNC: 105 MG/DL (ref 65–140)
GLUCOSE SERPL-MCNC: 128 MG/DL (ref 65–140)
GLUCOSE SERPL-MCNC: 154 MG/DL (ref 65–140)
GLUCOSE SERPL-MCNC: 163 MG/DL (ref 65–140)
HCT VFR BLD AUTO: 25.7 % (ref 34.8–46.1)
HGB BLD-MCNC: 8.3 G/DL (ref 11.5–15.4)
IMM GRANULOCYTES # BLD AUTO: 0.11 THOUSAND/UL (ref 0–0.2)
IMM GRANULOCYTES NFR BLD AUTO: 1 % (ref 0–2)
LYMPHOCYTES # BLD AUTO: 2.07 THOUSANDS/ÂΜL (ref 0.6–4.47)
LYMPHOCYTES NFR BLD AUTO: 12 % (ref 14–44)
MAGNESIUM SERPL-MCNC: 2.3 MG/DL (ref 1.6–2.6)
MCH RBC QN AUTO: 32.3 PG (ref 26.8–34.3)
MCHC RBC AUTO-ENTMCNC: 32.3 G/DL (ref 31.4–37.4)
MCV RBC AUTO: 100 FL (ref 82–98)
MONOCYTES # BLD AUTO: 1.65 THOUSAND/ÂΜL (ref 0.17–1.22)
MONOCYTES NFR BLD AUTO: 9 % (ref 4–12)
NEUTROPHILS # BLD AUTO: 13.74 THOUSANDS/ÂΜL (ref 1.85–7.62)
NEUTS SEG NFR BLD AUTO: 77 % (ref 43–75)
NRBC BLD AUTO-RTO: 0 /100 WBCS
PLATELET # BLD AUTO: 288 THOUSANDS/UL (ref 149–390)
PMV BLD AUTO: 10.3 FL (ref 8.9–12.7)
POTASSIUM SERPL-SCNC: 3.6 MMOL/L (ref 3.5–5.3)
RBC # BLD AUTO: 2.57 MILLION/UL (ref 3.81–5.12)
SODIUM SERPL-SCNC: 141 MMOL/L (ref 135–147)
UNIT DISPENSE STATUS: NORMAL
UNIT DISPENSE STATUS: NORMAL
UNIT PRODUCT CODE: NORMAL
UNIT PRODUCT CODE: NORMAL
UNIT PRODUCT VOLUME: 350 ML
UNIT PRODUCT VOLUME: 350 ML
UNIT RH: NORMAL
UNIT RH: NORMAL
WBC # BLD AUTO: 17.86 THOUSAND/UL (ref 4.31–10.16)

## 2023-03-29 RX ORDER — XYLITOL/YERBA SANTA
5 AEROSOL, SPRAY WITH PUMP (ML) MUCOUS MEMBRANE 4 TIMES DAILY PRN
Status: DISCONTINUED | OUTPATIENT
Start: 2023-03-29 | End: 2023-04-03 | Stop reason: HOSPADM

## 2023-03-29 RX ORDER — ONDANSETRON 2 MG/ML
4 INJECTION INTRAMUSCULAR; INTRAVENOUS EVERY 4 HOURS PRN
Status: DISCONTINUED | OUTPATIENT
Start: 2023-03-29 | End: 2023-04-03 | Stop reason: HOSPADM

## 2023-03-29 RX ORDER — LORAZEPAM 2 MG/ML
1 INJECTION INTRAMUSCULAR EVERY 8 HOURS PRN
Status: DISCONTINUED | OUTPATIENT
Start: 2023-03-29 | End: 2023-04-03 | Stop reason: HOSPADM

## 2023-03-29 RX ORDER — SODIUM CHLORIDE, SODIUM LACTATE, POTASSIUM CHLORIDE, CALCIUM CHLORIDE 600; 310; 30; 20 MG/100ML; MG/100ML; MG/100ML; MG/100ML
100 INJECTION, SOLUTION INTRAVENOUS CONTINUOUS
Status: DISCONTINUED | OUTPATIENT
Start: 2023-03-29 | End: 2023-03-31

## 2023-03-29 RX ORDER — ECHINACEA PURPUREA EXTRACT 125 MG
1 TABLET ORAL
Status: DISCONTINUED | OUTPATIENT
Start: 2023-03-29 | End: 2023-04-03 | Stop reason: HOSPADM

## 2023-03-29 RX ORDER — PANTOPRAZOLE SODIUM 40 MG/10ML
40 INJECTION, POWDER, LYOPHILIZED, FOR SOLUTION INTRAVENOUS
Status: DISCONTINUED | OUTPATIENT
Start: 2023-03-30 | End: 2023-03-31

## 2023-03-29 RX ORDER — POTASSIUM CHLORIDE 20 MEQ/1
40 TABLET, EXTENDED RELEASE ORAL ONCE
Status: COMPLETED | OUTPATIENT
Start: 2023-03-29 | End: 2023-03-29

## 2023-03-29 RX ADMIN — SALINE NASAL SPRAY 1 SPRAY: 1.5 SOLUTION NASAL at 14:11

## 2023-03-29 RX ADMIN — NYSTATIN: 100000 POWDER TOPICAL at 21:24

## 2023-03-29 RX ADMIN — FENTANYL CITRATE: 50 INJECTION INTRAMUSCULAR; INTRAVENOUS at 21:24

## 2023-03-29 RX ADMIN — ATORVASTATIN CALCIUM 40 MG: 40 TABLET, FILM COATED ORAL at 17:27

## 2023-03-29 RX ADMIN — FLUTICASONE PROPIONATE 1 SPRAY: 50 SPRAY, METERED NASAL at 08:40

## 2023-03-29 RX ADMIN — POTASSIUM CHLORIDE 40 MEQ: 1500 TABLET, EXTENDED RELEASE ORAL at 08:46

## 2023-03-29 RX ADMIN — LORATADINE 10 MG: 10 TABLET ORAL at 08:41

## 2023-03-29 RX ADMIN — ONDANSETRON 4 MG: 2 INJECTION INTRAMUSCULAR; INTRAVENOUS at 23:29

## 2023-03-29 RX ADMIN — METOPROLOL SUCCINATE 25 MG: 25 TABLET, EXTENDED RELEASE ORAL at 08:40

## 2023-03-29 RX ADMIN — ACETAMINOPHEN 975 MG: 325 TABLET ORAL at 05:34

## 2023-03-29 RX ADMIN — HEPARIN SODIUM 5000 UNITS: 5000 INJECTION INTRAVENOUS; SUBCUTANEOUS at 05:33

## 2023-03-29 RX ADMIN — SODIUM CHLORIDE, SODIUM GLUCONATE, SODIUM ACETATE, POTASSIUM CHLORIDE, MAGNESIUM CHLORIDE, SODIUM PHOSPHATE, DIBASIC, AND POTASSIUM PHOSPHATE 84 ML/HR: .53; .5; .37; .037; .03; .012; .00082 INJECTION, SOLUTION INTRAVENOUS at 02:58

## 2023-03-29 RX ADMIN — MONTELUKAST 10 MG: 10 TABLET, FILM COATED ORAL at 17:27

## 2023-03-29 RX ADMIN — PANTOPRAZOLE SODIUM 40 MG: 40 TABLET, DELAYED RELEASE ORAL at 05:34

## 2023-03-29 RX ADMIN — SODIUM CHLORIDE, SODIUM LACTATE, POTASSIUM CHLORIDE, AND CALCIUM CHLORIDE 1000 ML: .6; .31; .03; .02 INJECTION, SOLUTION INTRAVENOUS at 20:13

## 2023-03-29 RX ADMIN — ACETAMINOPHEN 975 MG: 325 TABLET ORAL at 14:10

## 2023-03-29 RX ADMIN — INSULIN LISPRO 1 UNITS: 100 INJECTION, SOLUTION INTRAVENOUS; SUBCUTANEOUS at 11:58

## 2023-03-29 RX ADMIN — PREDNISONE 2 MG: 1 TABLET ORAL at 08:41

## 2023-03-29 RX ADMIN — HEPARIN SODIUM 5000 UNITS: 5000 INJECTION INTRAVENOUS; SUBCUTANEOUS at 21:24

## 2023-03-29 RX ADMIN — HEPARIN SODIUM 5000 UNITS: 5000 INJECTION INTRAVENOUS; SUBCUTANEOUS at 14:10

## 2023-03-29 RX ADMIN — NYSTATIN: 100000 POWDER TOPICAL at 08:42

## 2023-03-29 RX ADMIN — SODIUM CHLORIDE, SODIUM LACTATE, POTASSIUM CHLORIDE, AND CALCIUM CHLORIDE 75 ML/HR: .6; .31; .03; .02 INJECTION, SOLUTION INTRAVENOUS at 23:16

## 2023-03-29 RX ADMIN — LORAZEPAM 1 MG: 2 INJECTION INTRAMUSCULAR; INTRAVENOUS at 23:57

## 2023-03-29 RX ADMIN — BUDESONIDE AND FORMOTEROL FUMARATE DIHYDRATE 2 PUFF: 160; 4.5 AEROSOL RESPIRATORY (INHALATION) at 08:40

## 2023-03-29 RX ADMIN — ONDANSETRON 4 MG: 2 INJECTION INTRAMUSCULAR; INTRAVENOUS at 18:21

## 2023-03-29 NOTE — PLAN OF CARE
Problem: Prexisting or High Potential for Compromised Skin Integrity  Goal: Skin integrity is maintained or improved  Description: INTERVENTIONS:  - Identify patients at risk for skin breakdown  - Assess and monitor skin integrity  - Assess and monitor nutrition and hydration status  - Monitor labs   - Assess for incontinence   - Turn and reposition patient  - Assist with mobility/ambulation  - Relieve pressure over bony prominences  - Avoid friction and shearing  - Provide appropriate hygiene as needed including keeping skin clean and dry  - Evaluate need for skin moisturizer/barrier cream  - Collaborate with interdisciplinary team   - Patient/family teaching  - Consider wound care consult   Outcome: Progressing     Problem: MOBILITY - ADULT  Goal: Maintain or return to baseline ADL function  Description: INTERVENTIONS:  -  Assess patient's ability to carry out ADLs; assess patient's baseline for ADL function and identify physical deficits which impact ability to perform ADLs (bathing, care of mouth/teeth, toileting, grooming, dressing, etc )  - Assess/evaluate cause of self-care deficits   - Assess range of motion  - Assess patient's mobility; develop plan if impaired  - Assess patient's need for assistive devices and provide as appropriate  - Encourage maximum independence but intervene and supervise when necessary  - Involve family in performance of ADLs  - Assess for home care needs following discharge   - Consider OT consult to assist with ADL evaluation and planning for discharge  - Provide patient education as appropriate  Outcome: Progressing  Goal: Maintains/Returns to pre admission functional level  Description: INTERVENTIONS:  - Perform BMAT or MOVE assessment daily    - Set and communicate daily mobility goal to care team and patient/family/caregiver     - Collaborate with rehabilitation services on mobility goals if consulted  - Out of bed for toileting  - Record patient progress and toleration of activity level   Outcome: Progressing     Problem: PAIN - ADULT  Goal: Verbalizes/displays adequate comfort level or baseline comfort level  Description: Interventions:  - Encourage patient to monitor pain and request assistance  - Assess pain using appropriate pain scale  - Administer analgesics based on type and severity of pain and evaluate response  - Implement non-pharmacological measures as appropriate and evaluate response  - Consider cultural and social influences on pain and pain management  - Notify physician/advanced practitioner if interventions unsuccessful or patient reports new pain  Outcome: Progressing     Problem: INFECTION - ADULT  Goal: Absence or prevention of progression during hospitalization  Description: INTERVENTIONS:  - Assess and monitor for signs and symptoms of infection  - Monitor lab/diagnostic results  - Monitor all insertion sites, i e  indwelling lines, tubes, and drains  - Monitor endotracheal if appropriate and nasal secretions for changes in amount and color  - Ferney appropriate cooling/warming therapies per order  - Administer medications as ordered  - Instruct and encourage patient and family to use good hand hygiene technique  - Identify and instruct in appropriate isolation precautions for identified infection/condition  Outcome: Progressing  Goal: Absence of fever/infection during neutropenic period  Description: INTERVENTIONS:  - Monitor WBC    Outcome: Progressing     Problem: SAFETY ADULT  Goal: Maintain or return to baseline ADL function  Description: INTERVENTIONS:  -  Assess patient's ability to carry out ADLs; assess patient's baseline for ADL function and identify physical deficits which impact ability to perform ADLs (bathing, care of mouth/teeth, toileting, grooming, dressing, etc )  - Assess/evaluate cause of self-care deficits   - Assess range of motion  - Assess patient's mobility; develop plan if impaired  - Assess patient's need for assistive devices and provide as appropriate  - Encourage maximum independence but intervene and supervise when necessary  - Involve family in performance of ADLs  - Assess for home care needs following discharge   - Consider OT consult to assist with ADL evaluation and planning for discharge  - Provide patient education as appropriate  Outcome: Progressing  Goal: Maintains/Returns to pre admission functional level  Description: INTERVENTIONS:  - Perform BMAT or MOVE assessment daily    - Set and communicate daily mobility goal to care team and patient/family/caregiver     - Collaborate with rehabilitation services on mobility goals if consulted  - Out of bed for toileting  - Record patient progress and toleration of activity level   Outcome: Progressing  Goal: Patient will remain free of falls  Description: INTERVENTIONS:  - Educate patient/family on patient safety including physical limitations  - Instruct patient to call for assistance with activity   - Consult OT/PT to assist with strengthening/mobility   - Keep Call bell within reach  - Keep bed low and locked with side rails adjusted as appropriate  - Keep care items and personal belongings within reach  - Initiate and maintain comfort rounds  - Apply yellow socks and bracelet for high fall risk patients  - Consider moving patient to room near nurses station  Outcome: Progressing     Problem: DISCHARGE PLANNING  Goal: Discharge to home or other facility with appropriate resources  Description: INTERVENTIONS:  - Identify barriers to discharge w/patient and caregiver  - Arrange for needed discharge resources and transportation as appropriate  - Identify discharge learning needs (meds, wound care, etc )  - Arrange for interpretive services to assist at discharge as needed  - Refer to Case Management Department for coordinating discharge planning if the patient needs post-hospital services based on physician/advanced practitioner order or complex needs related to functional status, cognitive ability, or social support system  Outcome: Progressing     Problem: Knowledge Deficit  Goal: Patient/family/caregiver demonstrates understanding of disease process, treatment plan, medications, and discharge instructions  Description: Complete learning assessment and assess knowledge base    Interventions:  - Provide teaching at level of understanding  - Provide teaching via preferred learning methods  Outcome: Progressing     Problem: GASTROINTESTINAL - ADULT  Goal: Minimal or absence of nausea and/or vomiting  Description: INTERVENTIONS:  - Administer IV fluids if ordered to ensure adequate hydration  - Maintain NPO status until nausea and vomiting are resolved  - Nasogastric tube if ordered  - Administer ordered antiemetic medications as needed  - Provide nonpharmacologic comfort measures as appropriate  - Advance diet as tolerated, if ordered  - Consider nutrition services referral to assist patient with adequate nutrition and appropriate food choices  Outcome: Progressing  Goal: Maintains or returns to baseline bowel function  Description: INTERVENTIONS:  - Assess bowel function  - Encourage oral fluids to ensure adequate hydration  - Administer IV fluids if ordered to ensure adequate hydration  - Administer ordered medications as needed  - Encourage mobilization and activity  - Consider nutritional services referral to assist patient with adequate nutrition and appropriate food choices  Outcome: Progressing  Goal: Maintains adequate nutritional intake  Description: INTERVENTIONS:  - Monitor percentage of each meal consumed  - Identify factors contributing to decreased intake, treat as appropriate  - Assist with meals as needed  - Monitor I&O, weight, and lab values if indicated  - Obtain nutrition services referral as needed  Outcome: Progressing  Goal: Establish and maintain optimal ostomy function  Description: INTERVENTIONS:  - Assess bowel function  - Encourage oral fluids to ensure adequate hydration  - Administer IV fluids if ordered to ensure adequate hydration   - Administer ordered medications as needed  - Encourage mobilization and activity  - Nutrition services referral to assist patient with appropriate food choices  - Assess stoma site  - Consider wound care consult   Outcome: Progressing  Goal: Oral mucous membranes remain intact  Description: INTERVENTIONS  - Assess oral mucosa and hygiene practices  - Implement preventative oral hygiene regimen  - Implement oral medicated treatments as ordered  - Initiate Nutrition services referral as needed  Outcome: Progressing     Problem: GENITOURINARY - ADULT  Goal: Maintains or returns to baseline urinary function  Description: INTERVENTIONS:  - Assess urinary function  - Encourage oral fluids to ensure adequate hydration if ordered  - Administer IV fluids as ordered to ensure adequate hydration  - Administer ordered medications as needed  - Offer frequent toileting  - Follow urinary retention protocol if ordered  Outcome: Progressing  Goal: Absence of urinary retention  Description: INTERVENTIONS:  - Assess patient's ability to void and empty bladder  - Monitor I/O  - Bladder scan as needed  - Discuss with physician/AP medications to alleviate retention as needed  - Discuss catheterization for long term situations as appropriate  Outcome: Progressing  Goal: Urinary catheter remains patent  Description: INTERVENTIONS:  - Assess patency of urinary catheter  - If patient has a chronic coffey, consider changing catheter if non-functioning  - Follow guidelines for intermittent irrigation of non-functioning urinary catheter  Outcome: Progressing     Problem: HEMATOLOGIC - ADULT  Goal: Maintains hematologic stability  Description: INTERVENTIONS  - Assess for signs and symptoms of bleeding or hemorrhage  - Monitor labs  - Administer supportive blood products/factors as ordered and appropriate  Outcome: Progressing

## 2023-03-29 NOTE — PROGRESS NOTES
Epidural Follow-up Note - Acute Pain Service    Maria A Degroot 67 y o  female MRN: [id number]  Unit/Bed#: [facility] 810-01 Encounter: [id number]      Assessment:   Principal Problem:    Malignant neoplasm of left ovary ([address] )      Maria A Degroot is a 67y o  year old female with a history of ovarian cancer status postresection, ileostomy, and colorectal anastomosis with subsequent rectal anastomotic stricture and  leak discovered during follow-up for planned stoma closure  She is now postop day 2 status post low anterior resection with preoperative thoracic epidural placed for postoperative pain control  APS consulted for pain and epidural management  Patient doing well today from a pain standpoint  She denies pain at rest  She has not needed her PCEA button often  She is able to ambulate without leg weakness/numbness  Her diet is being advanced today from CLD to solids  She reports her pain is well controlled  Her only report today is that the epidural tape is causing itching  Plan:  Analgesia:  - Continue Thoracic epidural infusion of Ropivacaine 0 1% with fentanyl 2 mcg/mL at 8/5/10/3  - Continue Dilaudid 0 2 mg IV q4hr PRN for breakthrough pain  - Anticipate epidural removal and transition to primarily PO regimen likely tomrrow    Bowel Regimen:  - Bowel regimen when appropriate from surgical perspective    APS will continue to follow  Please contact Acute Pain Service - [facility] via [employer] from [phone] with additional questions or concerns  See Mason or Neto for additional contacts and after hours information  Pain History  Current pain location(s): abdomen  Pain Scale: 2/10  Quality: ache, sore, dull cramp  24 hour history: FERNANDA overnight  Advancing diet today  Ambulating without leg weakness      PCEA use: minimal  Opioid requirement previous 24 hours: n/a    Meds/Allergies   all current active meds have been reviewed    Allergies   Allergen Reactions   • Lactose - Food Allergy GI Intolerance • Nsaids Other (See Comments)      pt is avoiding per family doctor instructions-  Able to take Tylenol   • Pedi-Pre Tape Spray [Wound Dressing Adhesive] Other (See Comments)     Please use sensitive skin tape, pt gets bad bruising from strong medical adhesive tape  Objective     Temp:  [97 3 °F (36 3 °C)-97 7 °F (36 5 °C)] 97 5 °F (36 4 °C)  HR:  [84-94] 84  Resp:  [16-17] 16  BP: ()/(47-61) 123/56    Physical Exam  Constitutional:       General: She is not in acute distress  Appearance: Normal appearance  Eyes:      Extraocular Movements: Extraocular movements intact  Conjunctiva/sclera: Conjunctivae normal    Cardiovascular:      Rate and Rhythm: Normal rate and regular rhythm  Pulmonary:      Effort: Pulmonary effort is normal  No respiratory distress  Abdominal:      General: Abdomen is flat  There is no distension  Musculoskeletal:         General: Normal range of motion  Cervical back: Normal range of motion and neck supple  Skin:     General: Skin is warm and dry  Neurological:      General: No focal deficit present  Mental Status: She is alert and oriented to person, place, and time  Psychiatric:         Mood and Affect: Mood normal          Behavior: Behavior normal        Epidural: Site clean/dry/intact, no surrounding erythema/edema/induration, infusion functioning appropriately    Lab Results:   Results from last 7 days   Lab Units 03/29/23  0539   WBC Thousand/uL 17 86*   HEMOGLOBIN g/dL 8 3*   HEMATOCRIT % 25 7*   PLATELETS Thousands/uL 288      Results from last 7 days   Lab Units 03/29/23  0539 03/28/23  1056 03/24/23  1315   POTASSIUM mmol/L 3 6   < > 3 8   CHLORIDE mmol/L 109*   < > 102   CO2 mmol/L 29   < > 27   BUN mg/dL 5   < > 11   CREATININE mg/dL 0 49*   < > 0 59*   CALCIUM mg/dL 8 1*   < > 9 2   ALK PHOS U/L  --   --  76   ALT U/L  --   --  10   AST U/L  --   --  14    < > = values in this interval not displayed            Please note that the APS provides consultative services regarding pain management only  With the exception of ketamine, peripheral nerve catheters, and epidural infusions (and except when indicated), final decisions regarding starting or changing doses of analgesic medications are at the discretion of the consulting service  Off hours consultation and/or medication management is generally not available      Jennifer Duval MD  Acute Pain Service

## 2023-03-29 NOTE — PROGRESS NOTES
Pastoral Care Progress Note    3/29/2023  Patient: Isaura Walton : 1950  Admission Date & Time: 3/27/2023 1106  MRN: [id number] [facility]: [id number]           23 1400   Clinical Encounter Type   Visited With Patient   Presybeterian Encounters   [facility]eterian Needs Prayer   Sacramental Encounters   Communion Given Indicator Yes     Evelin Kemp met with the pt and provided prayers, blessings, and the Sacrament of [employer]  No further needs were expressed at this time  Chaplains still remain available

## 2023-03-29 NOTE — PLAN OF CARE
Problem: Prexisting or High Potential for Compromised Skin Integrity  Goal: Skin integrity is maintained or improved  Description: INTERVENTIONS:  - Identify patients at risk for skin breakdown  - Assess and monitor skin integrity  - Assess and monitor nutrition and hydration status  - Monitor labs   - Assess for incontinence   - Turn and reposition patient  - Assist with mobility/ambulation  - Relieve pressure over bony prominences  - Avoid friction and shearing  - Provide appropriate hygiene as needed including keeping skin clean and dry  - Evaluate need for skin moisturizer/barrier cream  - Collaborate with interdisciplinary team   - Patient/family teaching  - Consider wound care consult   Outcome: Progressing     Problem: MOBILITY - ADULT  Goal: Maintain or return to baseline ADL function  Description: INTERVENTIONS:  -  Assess patient's ability to carry out ADLs; assess patient's baseline for ADL function and identify physical deficits which impact ability to perform ADLs (bathing, care of mouth/teeth, toileting, grooming, dressing, etc )  - Assess/evaluate cause of self-care deficits   - Assess range of motion  - Assess patient's mobility; develop plan if impaired  - Assess patient's need for assistive devices and provide as appropriate  - Encourage maximum independence but intervene and supervise when necessary  - Involve family in performance of ADLs  - Assess for home care needs following discharge   - Consider OT consult to assist with ADL evaluation and planning for discharge  - Provide patient education as appropriate  Outcome: Progressing  Goal: Maintains/Returns to pre admission functional level  Description: INTERVENTIONS:  - Perform BMAT or MOVE assessment daily    - Set and communicate daily mobility goal to care team and patient/family/caregiver     - Collaborate with rehabilitation services on mobility goals if consulted  - Out of bed for toileting  - Record patient progress and toleration of activity level   Outcome: Progressing     Problem: PAIN - ADULT  Goal: Verbalizes/displays adequate comfort level or baseline comfort level  Description: Interventions:  - Encourage patient to monitor pain and request assistance  - Assess pain using appropriate pain scale  - Administer analgesics based on type and severity of pain and evaluate response  - Implement non-pharmacological measures as appropriate and evaluate response  - Consider cultural and social influences on pain and pain management  - Notify physician/advanced practitioner if interventions unsuccessful or patient reports new pain  Outcome: Progressing     Problem: INFECTION - ADULT  Goal: Absence or prevention of progression during hospitalization  Description: INTERVENTIONS:  - Assess and monitor for signs and symptoms of infection  - Monitor lab/diagnostic results  - Monitor all insertion sites, i e  indwelling lines, tubes, and drains  - Monitor endotracheal if appropriate and nasal secretions for changes in amount and color  - [place] appropriate cooling/warming therapies per order  - Administer medications as ordered  - Instruct and encourage patient and family to use good hand hygiene technique  - Identify and instruct in appropriate isolation precautions for identified infection/condition  Outcome: Progressing  Goal: Absence of fever/infection during neutropenic period  Description: INTERVENTIONS:  - Monitor WBC    Outcome: Progressing     Problem: SAFETY ADULT  Goal: Maintain or return to baseline ADL function  Description: INTERVENTIONS:  -  Assess patient's ability to carry out ADLs; assess patient's baseline for ADL function and identify physical deficits which impact ability to perform ADLs (bathing, care of mouth/teeth, toileting, grooming, dressing, etc )  - Assess/evaluate cause of self-care deficits   - Assess range of motion  - Assess patient's mobility; develop plan if impaired  - Assess patient's need for assistive devices and provide as appropriate  - Encourage maximum independence but intervene and supervise when necessary  - Involve family in performance of ADLs  - Assess for home care needs following discharge   - Consider OT consult to assist with ADL evaluation and planning for discharge  - Provide patient education as appropriate  Outcome: Progressing  Goal: Maintains/Returns to pre admission functional level  Description: INTERVENTIONS:  - Perform BMAT or MOVE assessment daily    - Set and communicate daily mobility goal to care team and patient/family/caregiver     - Collaborate with rehabilitation services on mobility goals if consulted  - Out of bed for toileting  - Record patient progress and toleration of activity level   Outcome: Progressing  Goal: Patient will remain free of falls  Description: INTERVENTIONS:  - Educate patient/family on patient safety including physical limitations  - Instruct patient to call for assistance with activity   - Consult OT/PT to assist with strengthening/mobility   - Keep Call bell within reach  - Keep bed low and locked with side rails adjusted as appropriate  - Keep care items and personal belongings within reach  - Initiate and maintain comfort rounds  - Make Fall Risk Sign visible to staff  - Apply yellow socks and bracelet for high fall risk patients  - Consider moving patient to room near nurses station  Outcome: Progressing     Problem: DISCHARGE PLANNING  Goal: Discharge to home or other facility with appropriate resources  Description: INTERVENTIONS:  - Identify barriers to discharge w/patient and caregiver  - Arrange for needed discharge resources and transportation as appropriate  - Identify discharge learning needs (meds, wound care, etc )  - Arrange for interpretive services to assist at discharge as needed  - Refer to Case Management Department for coordinating discharge planning if the patient needs post-hospital services based on physician/advanced practitioner order or complex needs related to functional status, cognitive ability, or social support system  Outcome: Progressing     Problem: Knowledge Deficit  Goal: Patient/family/caregiver demonstrates understanding of disease process, treatment plan, medications, and discharge instructions  Description: Complete learning assessment and assess knowledge base    Interventions:  - Provide teaching at level of understanding  - Provide teaching via preferred learning methods  Outcome: Progressing     Problem: GASTROINTESTINAL - ADULT  Goal: Minimal or absence of nausea and/or vomiting  Description: INTERVENTIONS:  - Administer IV fluids if ordered to ensure adequate hydration  - Maintain NPO status until nausea and vomiting are resolved  - Nasogastric tube if ordered  - Administer ordered antiemetic medications as needed  - Provide nonpharmacologic comfort measures as appropriate  - Advance diet as tolerated, if ordered  - Consider nutrition services referral to assist patient with adequate nutrition and appropriate food choices  Outcome: Progressing  Goal: Maintains or returns to baseline bowel function  Description: INTERVENTIONS:  - Assess bowel function  - Encourage oral fluids to ensure adequate hydration  - Administer IV fluids if ordered to ensure adequate hydration  - Administer ordered medications as needed  - Encourage mobilization and activity  - Consider nutritional services referral to assist patient with adequate nutrition and appropriate food choices  Outcome: Progressing  Goal: Maintains adequate nutritional intake  Description: INTERVENTIONS:  - Monitor percentage of each meal consumed  - Identify factors contributing to decreased intake, treat as appropriate  - Assist with meals as needed  - Monitor I&O, weight, and lab values if indicated  - Obtain nutrition services referral as needed  Outcome: Progressing  Goal: Establish and maintain optimal ostomy function  Description: INTERVENTIONS:  - Assess bowel function  - Encourage oral fluids to ensure adequate hydration  - Administer IV fluids if ordered to ensure adequate hydration   - Administer ordered medications as needed  - Encourage mobilization and activity  - Nutrition services referral to assist patient with appropriate food choices  - Assess stoma site  - Consider wound care consult   Outcome: Progressing  Goal: Oral mucous membranes remain intact  Description: INTERVENTIONS  - Assess oral mucosa and hygiene practices  - Implement preventative oral hygiene regimen  - Implement oral medicated treatments as ordered  - Initiate Nutrition services referral as needed  Outcome: Progressing     Problem: GENITOURINARY - ADULT  Goal: Maintains or returns to baseline urinary function  Description: INTERVENTIONS:  - Assess urinary function  - Encourage oral fluids to ensure adequate hydration if ordered  - Administer IV fluids as ordered to ensure adequate hydration  - Administer ordered medications as needed  - Offer frequent toileting  - Follow urinary retention protocol if ordered  Outcome: Progressing  Goal: Absence of urinary retention  Description: INTERVENTIONS:  - Assess patient's ability to void and empty bladder  - Monitor I/O  - Bladder scan as needed  - Discuss with physician/AP medications to alleviate retention as needed  - Discuss catheterization for long term situations as appropriate  Outcome: Progressing  Goal: Urinary catheter remains patent  Description: INTERVENTIONS:  - Assess patency of urinary catheter  - If patient has a chronic coffey, consider changing catheter if non-functioning  - Follow guidelines for intermittent irrigation of non-functioning urinary catheter  Outcome: Progressing     Problem: HEMATOLOGIC - ADULT  Goal: Maintains hematologic stability  Description: INTERVENTIONS  - Assess for signs and symptoms of bleeding or hemorrhage  - Monitor labs  - Administer supportive blood products/factors as ordered and appropriate  Outcome: Progressing

## 2023-03-29 NOTE — PHYSICAL THERAPY NOTE
"   Physical Therapy Evaluation     Patient's Name: Whitman Bosworth    Admitting Diagnosis  Malignant neoplasm of left ovary ([facility] ) [C56 2]  Ileostomy, has currently [facility]) [Z93 2]    Problem List  Patient Active Problem List   Diagnosis    Gastroesophageal reflux disease    Constipation    Hemorrhoids    Family history of colonic polyps    Asthma    Hyperlipidemia    Hypertension    Lower abdominal pain    Change in bowel habit    Personal history of colonic polyps    Long term (current) use of systemic steroids    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[place] [address] )    Giant cell aortic arteritis ([place] [address] )    Prediabetes    Malignant neoplasm of left ovary (HCC)    Anxiety disorder due to medical condition    S/P total abdominal hysterectomy    Ileostomy, has currently ([place] [address] )    Acute blood loss anemia (ABLA)    S/P splenectomy    Hypokalemia    Bandemia    Wound infection after surgery    Encounter for venous access device care    Rectal bleeding       Past Medical History  Past Medical History:   Diagnosis Date    Abdominal pain     off and on    Allergic sinusitis     \"seasonal allergies\"-has received allergy shots \"    Anxiety     with current diagnosis    Arthritis     Asthma     Cholelithiasis     Colon polyp     Degenerative joint disease     Dental bridge present     permanent lower left    Exercise involving walking     1-2x/week    GERD (gastroesophageal reflux disease)     Giant cell aortic arteritis (HCC)     \"hereditary Giant Cell Temporal Arteritis\"per pt    Hiatal hernia     History of cancer chemotherapy     History of transfusion     Hyperlipidemia     Hypertension     Nausea     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[place] [address] )     \"gynecologic malignacy\"    PMR (polymyalgia rheumatica) (HCC)     Prediabetes     Shortness of breath     per pt \"asthma related --with climbing mult   flights of stairs--or exertion -not new\"    Tinnitus     Wears glasses        Past Surgical History  Past Surgical History:   Procedure Laterality Date    COLONOSCOPY      " October 2021: Adenomatous polyps and rectum in transverse colon, sigmoid diverticulosis, internal hemorrhoids  October 2016 diverticulosis and internal hemorrhoids, September 2011 diverticulosis and internal hemorrhoids      COLOPROCTECTOMY N/A 10/28/2022    Procedure: PROCTECTOMY;  Surgeon: Heraclio Quiroga MD;  [facility];  Service: Surgical Oncology    DILATION AND CURETTAGE OF UTERUS      ILEO LOOP DIVERSION N/A 10/28/2022    Procedure: ILEOSTOMY LOOP;  Surgeon: Heraclio Quiroga MD;  [facility];  Service: Surgical Oncology    IR PORT PLACEMENT  12/8/2022    LIVER SURGERY  10/28/2022    Procedure: LIVER CONTROL OF BLEED ;  Surgeon: Heraclio Quiroga MD;  [facility];  Service: Surgical Oncology    OH COLECTOMY PARTIAL W/ANASTOMOSIS N/A 10/28/2022    Procedure: RESECTION COLON LEFT;  Surgeon: Mariposa Solomon MD;  [facility];  Service: Gynecology Oncology    OH COLECTOMY PARTIAL W/ANASTOMOSIS N/A 3/27/2023    Procedure: LOW ANTERIOR RESECTION;  Surgeon: Heraclio Quiroga MD;  [facility];  Service: Surgical Oncology    OH EXPLORATORY LAPAROTOMY CELIOTOMY W/WO BIOPSY SPX N/A 10/28/2022    Procedure: LAPAROTOMY EXPLORATORY;  Surgeon: Mariposa Solomon MD;  [facility];  Service: Gynecology Oncology    OH LAPS ABD PRTM&OMENTUM DX W/WO [facility]/[address] N/A 08/04/2022    Procedure: DIAGNOSTIC LAPAROSCOPY, PERITONEAL BIOPSY, SAMPLING OF BLOODY ASCITES ;  Surgeon: Angelique Barone MD;  [facility];  Service: Gynecology Oncology    OH LAPS ABD PRTM&OMENTUM DX W/WO [facility]/[address] N/A 10/28/2022    Procedure: LAPAROSCOPY DIAGNOSTIC;  Surgeon: Mariposa Solomon MD;  [facility];  Service: Gynecology Oncology    OH TOTAL ABDOMINAL HYSTERECT W/WO RMVL TUBE OVARY N/A 10/28/2022    Procedure: HYSTERECTOMY MODIFIED RADICAL, BILATERAL SALPINGO-OOPHORECTOMY, GASTROCOLIC OMENTECTOMY, DIAPHRAGM RESECTION, EXCISION AND ABLATION OF PERITONEAL TUMORS; Surgeon: Ceasar Marrero MD;  [facility];  Service: Gynecology Oncology    SMALL INTESTINE SURGERY N/A 10/28/2022    Procedure: RESECTION SMALL BOWEL;  Surgeon: Ceasar Marrero MD;  [facility];  Service: Gynecology Oncology    SPLENECTOMY, TOTAL N/A 10/28/2022    Procedure: SPLENECTOMY;  Surgeon: Ceasar Marrero MD;  [facility];  Service: Gynecology Oncology    TEMPORAL ARTERY BIOPSY / LIGATION      UPPER GASTROINTESTINAL ENDOSCOPY  11/10/2014    November 2014 irregular Z-line and Schatzki ring, hiatal hernia, gastritis  Biopsies negative for celiac, H pylori, or Phillip's or eosinophilic esophagitis  WISDOM TOOTH EXTRACTION            03/29/23 1009   PT Last Visit   PT Visit Date 03/29/23   Note Type   Note type Evaluation   Pain Assessment   Pain Assessment Tool 0-10   Pain Score No Pain   Restrictions/Precautions   Weight Bearing Precautions Per Order No   Other Precautions Multiple lines; Fall Risk  (PCA pump, JOSÉ ANTONIO drain)   Home Living   Type of Home Apartment  (Independent living)   Home Layout One level;Elevator   Bathroom Shower/Tub Walk-in shower   Bathroom Toilet Raised   Bathroom Equipment Grab bars in shower;Grab bars around toilet; Shower chair   [address] Walker   Prior Function   Level of [place] Independent with ADLs; Independent with functional mobility   Lives With Spouse   Receives Help From Family   IADLs Family/Friend/Other provides transportation   Falls in the last 6 months 0   General   Family/Caregiver Present Yes   Cognition   Overall Cognitive Status WFL   Arousal/Participation Alert   Attention Within functional limits   Orientation Level Oriented X4   Following Commands Follows all commands and directions without difficulty   RLE Assessment   RLE Assessment WFL   LLE Assessment   LLE Assessment WFL   Bed Mobility   Supine to Sit Unable to assess   Additional Comments Pt noted to be in bathroom upon arrival   Transfers   Sit to Stand 5  Supervision   Stand to Sit 5  Supervision   Additional Comments with RW   Ambulation/Elevation   Gait pattern Foward flexed   Gait Assistance 5  Supervision   Assistive Device Rolling walker   Distance 250'   Ambulation/Elevation Additional Comments Pt ambulates with steady gait, no LOB   Balance   Static Sitting Good   Dynamic Sitting Fair +   Static Standing Fair   Dynamic Standing Fair   Ambulatory Fair   Endurance Deficit   Endurance Deficit Yes   Activity Tolerance   Activity Tolerance Patient tolerated treatment well   Nurse Made Aware RN cleared pt for therapy   Assessment   Prognosis Good   Problem List Decreased endurance   Assessment Pt is a 67 y o  female seen for PT evaluation s/p admit to [facility] on 3/27/2023  Pt was admitted with a primary dx of: H/O ovarian cancer status postresection, ileostomy, and colorectal anastomosis with subsequent rectal anastomotic stricture and  leak discovered during follow-up for planned stoma closure, now postop day 2 status post low anterior resection  PT now consulted for assessment of mobility and d/c needs  Pt with Up with assistance orders  Pts current comorbidities effecting treatment include: Asthma, HTN, Anxiety, Prediabetes  Pts current clinical presentation is Evolving (medium complexity) due to Ongoing medical management for primary dx, Increased reliance on more restrictive AD compared to baseline  Prior to admission, pt was Independent for mobility and ADLs, denies use of RW pta, lives with supportive spouse, in [address] [address] living apartment   Pt at the time of PT eval, able to perform functional transfer and ambulate in hallway using RW with supervision( as connected to PCA )  Pt ambulates with steday gait, no LOB noted  Encourage continued mobility while in hospital with staff/restorative team, no further skilled PT services indicated    At conclusion of PT session pt returned back in chair and all needs in reach with phone and call bell within reach  Pt denies any further questions at this time  The patient's AM-PAC Basic Mobility Inpatient Short Form Raw Score is  17  A Raw score of greater than 16 suggests the patient may benefit from discharge to home  Please also refer to the recommendation of the Physical Therapist for safe discharge planning  Recommend Home,no PT services upon hospital D/C  Goals   Patient Goals to go home   Plan   Treatment/Interventions Spoke to nursing   PT Frequency   (PT Eval Only)   Recommendation   PT Discharge Recommendation No rehabilitation needs   Equipment Recommended Walker   AM-PAC Basic Mobility Inpatient   Turning in Flat Bed Without Bedrails 3   Lying on Back to Sitting on Edge of Flat Bed Without Bedrails 3   Moving Bed to Chair 3   Standing Up From Chair Using Arms 3   Walk in Room 3   Climb 3-5 Stairs With Railing 2   Basic Mobility Inpatient Raw Score 17   Basic Mobility Standardized Score 39 67   Highest Level Of Mobility   JH-HLM Goal 5: Stand one or more mins   JH-HLM Achieved 8: Walk 250 feet ot more   Modified [place] Scale   Modified [place] Scale 3   End of Consult   Patient Position at End of Consult Bedside chair; All needs within reach         Tyra Palacios PT DPT

## 2023-03-29 NOTE — PROGRESS NOTES
NEPHROLOGY PROGRESS NOTE   Raina De Souza 67 y o  female MRN: [id number]  Unit/Bed#: [facility] 810-01 Encounter: [id number]  Reason for Consult: TERI prevention    ASSESSMENT AND PLAN:  68 yo woman with PMH of lactase deficient colonic polyps, metastatic ovarian cancer status post ileostomy , LIN, splenectomy ongoing chemotherapy  Patient admitted for surgical intervention (low anterior resection, bilateral ureteral stent)  Urology is consulted for prevention of TERI     PLAN:     #TERI prevention  • Kidney function is stable, at baseline   • enforce fluid intake  • Avoid NSAIDs  • Monitor urinary output     #Volume status/hypertension:  • Volume: Remains euvolemic  • Blood pressure: Tendency to hypotension /54, symptomatic   • Recommend albumin  • Recommend:  • Status post IV fluids  • Increase fluid intake  • Avoid further hypotension     #Ovarian cancer  • Status post surgical intervention   • Management as per primary team     #Hypomagnesemia  • Resolved        #Borderline hypokalemia  • Serum potassium 3 6 mg/L  • Receive potassium chloride 40 mEq     #Anemia  • Hemoglobin 8 3 mg/dL  • Etiology: Multifactorial secondary to malignancy and poor intake and recent intervention  • Transfuse if hemoglobin<7    SUBJECTIVE:  Patient seen and examined at bedside  No events overnight, afebrile    Urinary output 3 5 L    OBJECTIVE:  Current Weight: Weight - Scale: 71 2 kg (157 lb)  Vitals:    03/29/23 1130   BP: 102/54   Pulse: 88   Resp:    Temp:    SpO2: 98%       Intake/Output Summary (Last 24 hours) at 3/29/2023 1339  Last data filed at 3/29/2023 1253  Gross per 24 hour   Intake 1200 95 ml   Output 3100 ml   Net -1899 05 ml     Wt Readings from Last 3 Encounters:   03/27/23 71 2 kg (157 lb)   03/21/23 71 3 kg (157 lb 3 2 oz)   03/14/23 72 6 kg (160 lb)     Temp Readings from Last 3 Encounters:   03/29/23 97 5 °F (36 4 °C)   03/21/23 (!) 97 4 °F (36 3 °C) (Tympanic)   03/17/23 98 2 °F (36 8 °C) (Temporal)     BP Readings from Last 3 Encounters:   03/29/23 102/54   03/21/23 126/82   03/17/23 106/62     Pulse Readings from Last 3 Encounters:   03/29/23 88   03/21/23 86   03/17/23 83      General:   no acute distress at this time  Skin:  No acute rash  Eyes:  No scleral icterus and noninjected  ENT:  mucous membranes moist  Neck:  no carotid bruits  Chest:  Clear to auscultation percussion, good respiratory effort, no use of accessory respiratory muscles  CVS:  Regular rate and rhythm without rub  Abdomen:  soft and nontender   Extremities: no significant lower extremity edema  Neuro:  No gross focality  Psych:  Alert , cooperative     Medications:    Current Facility-Administered Medications:   •  acetaminophen (TYLENOL) tablet 975 mg, 975 mg, Oral, Q8H [address], Ralph Ceron MD, 975 mg at 03/29/23 0534  •  albuterol (PROVENTIL HFA,VENTOLIN HFA) inhaler 2 puff, 2 puff, Inhalation, Q4H PRN, Ralph Ceron MD  •  atorvastatin (LIPITOR) tablet 40 mg, 40 mg, Oral, Daily With Maximiliano Ballard MD, 40 mg at 03/28/23 1723  •  budesonide-formoterol (SYMBICORT) 160-4 5 mcg/act inhaler 2 puff, 2 puff, Inhalation, Daily, Ralph Ceron MD, 2 puff at 03/29/23 0840  •  fluticasone (FLONASE) 50 mcg/act nasal spray 1 spray, 1 spray, Each Nare, Daily, Blayne Gusman PA-C, 1 spray at 03/29/23 0840  •  heparin (porcine) subcutaneous injection 5,000 Units, 5,000 Units, Subcutaneous, Q8H [address], Ralph Ceron MD, 5,000 Units at 03/29/23 0533  •  HYDROmorphone HCl (DILAUDID) injection 0 2 mg, 0 2 mg, Intravenous, Q3H PRN, Kristi Butts MD  •  insulin lispro (HumaLOG) 100 units/mL subcutaneous injection 1-5 Units, 1-5 Units, Subcutaneous, TID AC, 1 Units at 03/29/23 1158 **AND** Fingerstick Glucose (POCT), , , TID AC, Courtney Kimbrough PA-C  •  loratadine (CLARITIN) tablet 10 mg, 10 mg, Oral, Daily, Francy Epps PA-C, 10 mg at 03/29/23 0841  •  LORazepam (ATIVAN) tablet 1 mg, 1 mg, Oral, Q8H PRN, Ralph Ceron, "MD  •  metoprolol succinate (TOPROL-XL) 24 hr tablet 25 mg, 25 mg, Oral, Daily, Ralph Ceron MD, 25 mg at 03/29/23 0840  •  montelukast (SINGULAIR) tablet 10 mg, 10 mg, Oral, Daily, Blayne Gusman PA-C  •  nystatin (MYCOSTATIN) powder, , Topical, BID, Fe Galicia MD, Given at 03/29/23 7471  •  ondansetron (ZOFRAN) injection 4 mg, 4 mg, Intravenous, Q6H PRN, Ralph Ceron MD  •  pantoprazole (PROTONIX) EC tablet 40 mg, 40 mg, Oral, Early Morning, Ralph Ceron MD, 40 mg at 03/29/23 0534  •  ropivacaine 0 1% and fentaNYL 2 mcg/mL PCEA, , Epidural, Continuous, Ralph Ceron MD, New Bag at 03/28/23 1732  •  sodium chloride (OCEAN) 0 65 % nasal spray 1 spray, 1 spray, Each Nare, Q1H PRN, Fe Galicia MD    Laboratory Results:  Results from last 7 days   Lab Units 03/29/23  0539 03/28/23  1056 03/24/23  1401 03/24/23  1315   WBC Thousand/uL 17 86* 17 33* 13 73*  --    HEMOGLOBIN g/dL 8 3* 8 6* 10 7*  --    HEMATOCRIT % 25 7* 26 5* 33 2*  --    PLATELETS Thousands/uL 288 297 381  --    SODIUM mmol/L 141 137  --  137   POTASSIUM mmol/L 3 6 3 6  --  3 8   CHLORIDE mmol/L 109* 110*  --  102   CO2 mmol/L 29 25  --  27   BUN mg/dL 5 8  --  11   CREATININE mg/dL 0 49* 0 74  --  0 59*   CALCIUM mg/dL 8 1* 8 2*  --  9 2   MAGNESIUM mg/dL 2 3 1 8  --  1 7*   PHOSPHORUS mg/dL  --  2 6  --   --        No orders to display       Portions of the record may have been created with voice recognition software  Occasional wrong word or \"sound a like\" substitutions may have occurred due to the inherent limitations of voice recognition software  Read the chart carefully and recognize, using context, where substitutions have occurred      "

## 2023-03-30 ENCOUNTER — APPOINTMENT (OUTPATIENT)
Dept: RADIOLOGY | Facility: HOSPITAL | Age: 73
End: 2023-03-30

## 2023-03-30 LAB
ANION GAP SERPL CALCULATED.3IONS-SCNC: 4 MMOL/L (ref 4–13)
BASOPHILS # BLD AUTO: 0.02 THOUSANDS/ÂΜL (ref 0–0.1)
BASOPHILS NFR BLD AUTO: 0 % (ref 0–1)
BUN SERPL-MCNC: 7 MG/DL (ref 5–25)
CALCIUM SERPL-MCNC: 8.3 MG/DL (ref 8.3–10.1)
CHLORIDE SERPL-SCNC: 106 MMOL/L (ref 96–108)
CO2 SERPL-SCNC: 27 MMOL/L (ref 21–32)
CREAT SERPL-MCNC: 0.58 MG/DL (ref 0.6–1.3)
EOSINOPHIL # BLD AUTO: 0.01 THOUSAND/ÂΜL (ref 0–0.61)
EOSINOPHIL NFR BLD AUTO: 0 % (ref 0–6)
ERYTHROCYTE [DISTWIDTH] IN BLOOD BY AUTOMATED COUNT: 15.6 % (ref 11.6–15.1)
GFR SERPL CREATININE-BSD FRML MDRD: 92 ML/MIN/1.73SQ M
GLUCOSE SERPL-MCNC: 107 MG/DL (ref 65–140)
GLUCOSE SERPL-MCNC: 110 MG/DL (ref 65–140)
GLUCOSE SERPL-MCNC: 130 MG/DL (ref 65–140)
GLUCOSE SERPL-MCNC: 139 MG/DL (ref 65–140)
HCT VFR BLD AUTO: 26.7 % (ref 34.8–46.1)
HGB BLD-MCNC: 8.5 G/DL (ref 11.5–15.4)
IMM GRANULOCYTES # BLD AUTO: 0.05 THOUSAND/UL (ref 0–0.2)
IMM GRANULOCYTES NFR BLD AUTO: 1 % (ref 0–2)
LYMPHOCYTES # BLD AUTO: 0.76 THOUSANDS/ÂΜL (ref 0.6–4.47)
LYMPHOCYTES NFR BLD AUTO: 7 % (ref 14–44)
MCH RBC QN AUTO: 31.5 PG (ref 26.8–34.3)
MCHC RBC AUTO-ENTMCNC: 31.8 G/DL (ref 31.4–37.4)
MCV RBC AUTO: 99 FL (ref 82–98)
MONOCYTES # BLD AUTO: 0.68 THOUSAND/ÂΜL (ref 0.17–1.22)
MONOCYTES NFR BLD AUTO: 7 % (ref 4–12)
NEUTROPHILS # BLD AUTO: 8.81 THOUSANDS/ÂΜL (ref 1.85–7.62)
NEUTS SEG NFR BLD AUTO: 85 % (ref 43–75)
NRBC BLD AUTO-RTO: 0 /100 WBCS
PLATELET # BLD AUTO: 282 THOUSANDS/UL (ref 149–390)
PMV BLD AUTO: 9.7 FL (ref 8.9–12.7)
POTASSIUM SERPL-SCNC: 3.8 MMOL/L (ref 3.5–5.3)
RBC # BLD AUTO: 2.7 MILLION/UL (ref 3.81–5.12)
SODIUM SERPL-SCNC: 137 MMOL/L (ref 135–147)
WBC # BLD AUTO: 10.33 THOUSAND/UL (ref 4.31–10.16)

## 2023-03-30 RX ORDER — INSULIN LISPRO 100 [IU]/ML
1-5 INJECTION, SOLUTION INTRAVENOUS; SUBCUTANEOUS EVERY 6 HOURS
Status: DISCONTINUED | OUTPATIENT
Start: 2023-03-30 | End: 2023-03-30

## 2023-03-30 RX ORDER — INSULIN LISPRO 100 [IU]/ML
1-5 INJECTION, SOLUTION INTRAVENOUS; SUBCUTANEOUS EVERY 6 HOURS
Status: DISCONTINUED | OUTPATIENT
Start: 2023-03-30 | End: 2023-04-02

## 2023-03-30 RX ORDER — POTASSIUM CHLORIDE 14.9 MG/ML
20 INJECTION INTRAVENOUS ONCE
Status: COMPLETED | OUTPATIENT
Start: 2023-03-30 | End: 2023-03-30

## 2023-03-30 RX ORDER — METOPROLOL TARTRATE 5 MG/5ML
5 INJECTION INTRAVENOUS EVERY 6 HOURS
Status: DISCONTINUED | OUTPATIENT
Start: 2023-03-30 | End: 2023-03-31

## 2023-03-30 RX ADMIN — METOROPROLOL TARTRATE 5 MG: 5 INJECTION, SOLUTION INTRAVENOUS at 19:54

## 2023-03-30 RX ADMIN — SODIUM CHLORIDE, SODIUM LACTATE, POTASSIUM CHLORIDE, AND CALCIUM CHLORIDE 100 ML/HR: .6; .31; .03; .02 INJECTION, SOLUTION INTRAVENOUS at 09:19

## 2023-03-30 RX ADMIN — BUDESONIDE AND FORMOTEROL FUMARATE DIHYDRATE 2 PUFF: 160; 4.5 AEROSOL RESPIRATORY (INHALATION) at 09:01

## 2023-03-30 RX ADMIN — NYSTATIN: 100000 POWDER TOPICAL at 09:02

## 2023-03-30 RX ADMIN — Medication 5 SPRAY: at 08:59

## 2023-03-30 RX ADMIN — HEPARIN SODIUM 5000 UNITS: 5000 INJECTION INTRAVENOUS; SUBCUTANEOUS at 21:03

## 2023-03-30 RX ADMIN — POTASSIUM CHLORIDE 20 MEQ: 14.9 INJECTION, SOLUTION INTRAVENOUS at 09:18

## 2023-03-30 RX ADMIN — HEPARIN SODIUM 5000 UNITS: 5000 INJECTION INTRAVENOUS; SUBCUTANEOUS at 13:05

## 2023-03-30 RX ADMIN — NYSTATIN: 100000 POWDER TOPICAL at 17:12

## 2023-03-30 RX ADMIN — PANTOPRAZOLE SODIUM 40 MG: 40 INJECTION, POWDER, FOR SOLUTION INTRAVENOUS at 05:14

## 2023-03-30 RX ADMIN — SODIUM CHLORIDE, SODIUM LACTATE, POTASSIUM CHLORIDE, AND CALCIUM CHLORIDE 100 ML/HR: .6; .31; .03; .02 INJECTION, SOLUTION INTRAVENOUS at 21:24

## 2023-03-30 NOTE — QUICK NOTE
Surgical Oncology Quick Note:    Patient with emesis x 3 overnight  Evaluated bedside  Liquid output in ostomy  Nausea and emesis began after eating chicken for dinner  Zofran given with transient effect  LR bolus given for volume loss and SBPs in high 90s  Given persistent emesis, recommend NPO NGT IVF

## 2023-03-30 NOTE — PROGRESS NOTES
"Progress Note - Surgical Oncology   Shahnaz Vaughan 67 y o  female MRN: [id number]  Unit/Bed#: [facility] 810-01 Encounter: [id number]    Assessment:  67 y o  female with PMH of ovarian carcinoma s/p cytroreductive surgery including an en bloc rectosigmoid resection with diverting ileostomy c/b anastomotic leak with presacral cavity, now s/p low anterior resection with revision of anastomosis on 3/27  Ureteral stents were placed prior to surgery and removed at the completion  Overnight events including recurrent emesis (1 3L volume estimated)  NGT placed, 1L LR bolus   Tachycardia with anxiety post NGT  500 bilious in canister  Ostomy with 1L output  JOSÉ ANTONIO 215 serosang    Plan:  NPO NGT  Strict I/Os    Maintain JOSÉ ANTONIO drain, monitor output  PCEA in place, SQH held mn - with patient now NPO will discuss with attending consideration for removal timing  Appreciate nephrology recommendations  Appreciate endocrine recommendations - holding steriods in setting of prior leak  DVT PPX  OOB/ambulate  PT/OT: No needs    Subjective/Objective     Subjective:   Patient seen and examined bedside  Nausea and three episodes of emesis overnight after eating grilled chicken  Improved after NGT with no continued nausea    Objective:     Blood pressure 121/61, pulse (!) 116, temperature 97 7 °F (36 5 °C), resp  rate 17, height 5' 4\" (1 626 m), weight 71 2 kg (157 lb), SpO2 93 %  ,Body mass index is 26 95 kg/m²  Intake/Output Summary (Last 24 hours) at 3/30/2023 0618  Last data filed at 3/30/2023 0525  Gross per 24 hour   Intake 2061 18 ml   Output 3665 ml   Net -1603 82 ml       Invasive Devices     Central Venous Catheter Line  Duration           Port A Cath 12/08/22 Right Subclavian 112 days          Epidural Line  Duration           Epidural Catheter 03/27/23 2 days          Drain  Duration           Ileostomy Standard (Loulou, zo)  days    Closed/Suction Drain Anterior; Left LLQ Bulb 19 Fr  2 days    NG/OG/Enteral Tube " Nasogastric Left nare <1 day                Physical Exam:  General - no acute distress, responsive and cooperative  CV - warm, regular rate  Pulm - normal work of breathing, no respiratory distress  Abd - soft, nondistended, stoma viable with liquid stool per bag, NGT with bilious output, JOSÉ ANTONIO serosang  Neuro - m/s grossly intact, cn grossly intact  Ext - moving all extremities

## 2023-03-30 NOTE — QUICK NOTE
Endocrinology quick note:  Chart was reviewed  Blood sugars are noted to be well controlled on correctional coverage insulin  Dexamethasone induced hyperglycemia appears to have resolved  Endocrinology will sign off at this time, please do not hesitate to contact our service with any questions or concerns      Alicia Basilio DO  Endocrinology Fellow, Q7

## 2023-03-30 NOTE — CASE MANAGEMENT
Case Management Discharge Planning Note    Patient name Isaura Walton  Location [facility] 475-66 MRN [id number]  : 1950 Date 3/30/2023       Current Admission Date: 3/27/2023  Current Admission Diagnosis:Malignant neoplasm of left ovary [facility])   Patient Active Problem List    Diagnosis Date Noted   • Rectal bleeding 2023   • Wound infection after surgery 2022   • Encounter for venous access device care 2022   • Bandemia 2022   • Hypokalemia 2022   • S/P total abdominal hysterectomy 10/31/2022   • Ileostomy, has currently ([facility] ) 10/31/2022   • Acute blood loss anemia (ABLA) 10/31/2022   • S/P splenectomy 10/31/2022   • Anxiety disorder due to medical condition 2022   • Malignant neoplasm of left ovary ([facility] [facility] ) 2022   • Giant cell aortic arteritis ([facility] [address] )    • Prediabetes    • 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[facility] [address] ) 2022   • Lower abdominal pain 06/15/2022   • Change in bowel habit 06/15/2022   • Personal history of colonic polyps 06/15/2022   • Long term (current) use of systemic steroids 06/15/2022   • Asthma    • Hyperlipidemia    • Hypertension    • Gastroesophageal reflux disease 2019   • Constipation 2019   • Hemorrhoids 2019   • Family history of colonic polyps 2019      LOS (days): 3  Geometric Mean LOS (GMLOS) (days): 9 80  Days to GMLOS:6 6     OBJECTIVE:  Risk of Unplanned Readmission Score: 26 62         Current admission status: Inpatient   Preferred Pharmacy:   [address] [place] [address] [address] [place] [address]  Phone: [phone] Fax: [address], [address] [place] [address]  Phone: [phone] Fax: [phone]    Primary Care Provider: Dewayne Kerns MD    Primary Insurance: MEDICARE  Secondary Insurance: COMMERCIAL MISCELLANEOUS    DISCHARGE DETAILS:    Discharge planning discussed with[de-identified] patient  Freedom of Choice: Yes  Comments - Freedom of Choice: Patient confirmed with this CM that she wants [facility] to resume care           Other Referral/Resources/Interventions Provided:  Referral Comments: Padma Starr wants to resume care  CM made a phone call to [facility] and followed up in [place] to see if they will resume care  No response yet

## 2023-03-30 NOTE — PROGRESS NOTES
NEPHROLOGY PROGRESS NOTE   Miky Pro 67 y o  female MRN: [id number]  Unit/Bed#: [facility] 810-01 Encounter: [id number]  Reason for Consult: Prevention of TERI    ASSESSMENT AND PLAN:  68 yo woman with PMH of lactase deficient colonic polyps, metastatic ovarian cancer status post ileostomy , LIN, splenectomy ongoing chemotherapy   Patient admitted for surgical intervention (low anterior resection, bilateral ureteral stent)   Urology is consulted for prevention of TERI     PLAN:     #TERI prevention  • Remains at baseline  • N p o , on  mils per hour  • Avoid NSAIDs  • Monitor urinary output     #Volume status/hypertension:  • Volume: Euvolemic  • Blood pressure:  Hypotensive BP 96/48 mmHg    • Recommend:  • On IV fluids as above  • Be made if hypotension     #Ovarian cancer  • Status post surgical intervention   • Management as per primary team    #Emesis  · Nausea and vomited overnight  · NG tube was placed overnight  · On IV fluids    #Hypomagnesemia  • Resolved      #Borderline hypokalemia  • Serum potassium 3 8 mg/L  • Replete as needed    #Anemia  • Hemoglobin 8 5 mg/dL  • Etiology: Multifactorial secondary to malignancy and poor intake and recent intervention  • Transfuse if hemoglobin<7    SUBJECTIVE:  Patient seen and examined at bedside  Patient feels weak, NG tube placed overnight due to nausea and vomiting         OBJECTIVE:  Current Weight: Weight - Scale: 71 2 kg (157 lb)  Vitals:    03/30/23 0652   BP: (!) 96/48   Pulse: (!) 114   Resp: 17   Temp: 97 5 °F (36 4 °C)   SpO2: 91%       Intake/Output Summary (Last 24 hours) at 3/30/2023 1127  Last data filed at 3/30/2023 3043  Gross per 24 hour   Intake 1690 18 ml   Output 3515 ml   Net -1824 82 ml     Wt Readings from Last 3 Encounters:   03/27/23 71 2 kg (157 lb)   03/21/23 71 3 kg (157 lb 3 2 oz)   03/14/23 72 6 kg (160 lb)     Temp Readings from Last 3 Encounters:   03/30/23 97 5 °F (36 4 °C)   03/21/23 (!) 97 4 °F (36 3 °C) (Tympanic)   03/17/23 98 2 °F (36 8 °C) (Temporal)     BP Readings from Last 3 Encounters:   03/30/23 (!) 96/48   03/21/23 126/82   03/17/23 106/62     Pulse Readings from Last 3 Encounters:   03/30/23 (!) 114   03/21/23 86   03/17/23 83      General:  no acute distress at this time  Skin:  No acute rash  Eyes:  No scleral icterus and noninjected  ENT: NG tube  Neck:  no carotid bruits  Chest:  Clear to auscultation percussion, good respiratory effort, no use of accessory respiratory muscles  CVS:  Regular rate and rhythm without rub  Abdomen:  soft and nontender   Extremities:   no significant lower extremity edema  Neuro:  No gross focality  Psych:  Alert , cooperative       Medications:    Current Facility-Administered Medications:   •  acetaminophen (TYLENOL) tablet 975 mg, 975 mg, Oral, Q8H [facility], Trista Noel MD, 975 mg at 03/29/23 1410  •  albuterol (PROVENTIL HFA,VENTOLIN HFA) inhaler 2 puff, 2 puff, Inhalation, Q4H PRN, Trista Noel MD  •  atorvastatin (LIPITOR) tablet 40 mg, 40 mg, Oral, Daily With Tanisha Mcconnell MD, 40 mg at 03/29/23 1727  •  budesonide-formoterol (SYMBICORT) 160-4 5 mcg/act inhaler 2 puff, 2 puff, Inhalation, Daily, Trista Noel MD, 2 puff at 03/30/23 0901  •  fluticasone (FLONASE) 50 mcg/act nasal spray 1 spray, 1 spray, Each Nare, Daily, Chucho Crane PA-C, 1 spray at 03/29/23 0840  •  heparin (porcine) subcutaneous injection 5,000 Units, 5,000 Units, Subcutaneous, Q8H [facility], Trista Noel MD, 5,000 Units at 03/29/23 2124  •  HYDROmorphone HCl (DILAUDID) injection 0 2 mg, 0 2 mg, Intravenous, Q3H PRN, Lisette Middleton MD  •  insulin lispro (HumaLOG) 100 units/mL subcutaneous injection 1-5 Units, 1-5 Units, Subcutaneous, Q6H **AND** Fingerstick Glucose (POCT), , , Q6H, German Larios MD  •  lactated ringers infusion, 100 mL/hr, Intravenous, Continuous, Randal Saeed MD, Last Rate: 100 mL/hr at 03/30/23 0919, 100 mL/hr at 03/30/23 0919  •  loratadine (CLARITIN) tablet 10 mg, 10 mg, Oral, Daily, Duong Ramirez PA-C, 10 mg at 03/29/23 4082  •  LORazepam (ATIVAN) injection 1 mg, 1 mg, Intravenous, Q8H PRN, Zara Dalton MD, 1 mg at 03/29/23 2357  •  metoprolol (LOPRESSOR) injection 5 mg, 5 mg, Intravenous, Q6H, Tyree Alvarenga MD  •  montelukast (SINGULAIR) tablet 10 mg, 10 mg, Oral, Daily, Francy Epps PA-C, 10 mg at 03/29/23 1727  •  nystatin (MYCOSTATIN) powder, , Topical, BID, Tyree Alvarenga MD, Given at 03/30/23 0902  •  ondansetron [facility]) injection 4 mg, 4 mg, Intravenous, Q4H PRN, Zara Dalton MD, 4 mg at 03/29/23 2329  •  pantoprazole (PROTONIX) injection 40 mg, 40 mg, Intravenous, Early Morning, Zara Dalton MD, 40 mg at 03/30/23 0514  •  phenol (CHLORASEPTIC) 1 4 % mucosal liquid 1 spray, 1 spray, Mouth/Throat, Q2H PRN, Zara Dalton MD  •  ropivacaine 0 1% and fentaNYL 2 mcg/mL PCEA, , Epidural, Continuous, Heike Arreguin MD, New Bag at 03/29/23 2124  •  saliva substitute (MOUTH KOTE) mucosal solution 5 spray, 5 spray, Mouth/Throat, 4x Daily PRN, Zara Dalton MD, 5 spray at 03/30/23 0859  •  sodium chloride (OCEAN) 0 65 % nasal spray 1 spray, 1 spray, Each Nare, Q1H PRN, Tyree Alvarenag MD, 1 spray at 03/29/23 1411    Laboratory Results:  Results from last 7 days   Lab Units 03/30/23  0518 03/29/23  0539 03/28/23  1056 03/24/23  1401 03/24/23  1315   WBC Thousand/uL 10 33* 17 86* 17 33* 13 73*  --    HEMOGLOBIN g/dL 8 5* 8 3* 8 6* 10 7*  --    HEMATOCRIT % 26 7* 25 7* 26 5* 33 2*  --    PLATELETS Thousands/uL 282 288 297 381  --    SODIUM mmol/L 137 141 137  --  137   POTASSIUM mmol/L 3 8 3 6 3 6  --  3 8   CHLORIDE mmol/L 106 109* 110*  --  102   CO2 mmol/L 27 29 25  --  27   BUN mg/dL 7 5 8  --  11   CREATININE mg/dL 0 58* 0 49* 0 74  --  0 59*   CALCIUM mg/dL 8 3 8 1* 8 2*  --  9 2   MAGNESIUM mg/dL  --  2 3 1 8  --  1 7*   PHOSPHORUS mg/dL  --   --  2 6  --   --        XR abdomen 1 view kub    (Results Pending)       Portions of the record may have been created "with voice recognition software  Occasional wrong word or \"sound a like\" substitutions may have occurred due to the inherent limitations of voice recognition software  Read the chart carefully and recognize, using context, where substitutions have occurred      "

## 2023-03-30 NOTE — PROGRESS NOTES
Epidural Follow-up Note - Acute Pain Service    Basilio Duncan 67 y o  female MRN: [id number]  Unit/Bed#: [facility] 810-01 Encounter: [id number]      Assessment:   Principal Problem:    Malignant neoplasm of left ovary ([address] )      Basilio Duncan is a 67y o  year old female with a history of ovarian cancer status postresection, ileostomy, and colorectal anastomosis with subsequent rectal anastomotic stricture and leak discovered during follow-up for planned stoma closure   She is now postop day 3 status post low anterior resection with preoperative thoracic epidural placed for postoperative pain control   APS consulted for pain and epidural management  Patient seen on rounds this morning  She has an NG in place, placed overnight after emesis with her chicken dinner  Her pain remains well controlled with the epidural  Ambulating as tolerated, denies weakness numbness of LE  Given recent NG placement, we discussed leaving the epidural in place  Plan:  Analgesia:  - Continue Thoracic epidural infusion of Ropivacaine 0 1% with fentanyl 2 mcg/mL at 8/5/10/3  - Continue Dilaudid 0 2 mg IV q3hr PRN for breakthrough pain  - Anticipate epidural removal and transition to primarily PO regimen 1-2 days pending advancement of diet    Bowel Regimen:  - Bowel regimen when appropriate from surgical perspective    APS will continue to follow  Please contact Acute Pain Service - [facility] via [employer] from [phone] with additional questions or concerns  See Mason or Neto for additional contacts and after hours information  Pain History  Current pain location(s): abdomen  Pain Scale: none to mild  Quality: n/a  24 hour history: NG tube placed overnight for n/v  Otherwise pain controlled      PCEA use: minimal  Opioid requirement previous 24 hours: n/a    Meds/Allergies   all current active meds have been reviewed    Allergies   Allergen Reactions   • Lactose - Food Allergy GI Intolerance   • Nsaids Other (See Comments) pt is avoiding per family doctor instructions-  Able to take Tylenol   • Pedi-Pre Tape Spray [Wound Dressing Adhesive] Other (See Comments)     Please use sensitive skin tape, pt gets bad bruising from strong medical adhesive tape  Objective     Temp:  [97 3 °F (36 3 °C)-98 4 °F (36 9 °C)] 97 5 °F (36 4 °C)  HR:  [] 114  Resp:  [17-20] 17  BP: ()/(48-83) 96/48    Physical Exam  Constitutional:       General: She is not in acute distress  Appearance: Normal appearance  HENT:      Nose:      Comments: NG  Eyes:      Extraocular Movements: Extraocular movements intact  Conjunctiva/sclera: Conjunctivae normal    Cardiovascular:      Rate and Rhythm: Normal rate and regular rhythm  Pulmonary:      Effort: Pulmonary effort is normal  No respiratory distress  Abdominal:      General: Abdomen is flat  There is no distension  Palpations: Abdomen is soft  Musculoskeletal:         General: Normal range of motion  Cervical back: Normal range of motion and neck supple  Skin:     General: Skin is warm and dry  Neurological:      General: No focal deficit present  Mental Status: She is alert and oriented to person, place, and time     Psychiatric:         Mood and Affect: Mood normal          Behavior: Behavior normal        Epidural: Site clean/dry/intact, no surrounding erythema/edema/induration, infusion functioning appropriately    Lab Results:   Results from last 7 days   Lab Units 03/30/23  0518   WBC Thousand/uL 10 33*   HEMOGLOBIN g/dL 8 5*   HEMATOCRIT % 26 7*   PLATELETS Thousands/uL 282      Results from last 7 days   Lab Units 03/30/23  0518 03/28/23  1056 03/24/23  1315   POTASSIUM mmol/L 3 8   < > 3 8   CHLORIDE mmol/L 106   < > 102   CO2 mmol/L 27   < > 27   BUN mg/dL 7   < > 11   CREATININE mg/dL 0 58*   < > 0 59*   CALCIUM mg/dL 8 3   < > 9 2   ALK PHOS U/L  --   --  76   ALT U/L  --   --  10   AST U/L  --   --  14    < > = values in this interval not displayed  Please note that the APS provides consultative services regarding pain management only  With the exception of ketamine, peripheral nerve catheters, and epidural infusions (and except when indicated), final decisions regarding starting or changing doses of analgesic medications are at the discretion of the consulting service  Off hours consultation and/or medication management is generally not available      Azra Peace MD  Acute Pain Service

## 2023-03-31 LAB
ANION GAP SERPL CALCULATED.3IONS-SCNC: 3 MMOL/L (ref 4–13)
BASOPHILS # BLD AUTO: 0.04 THOUSANDS/ÂΜL (ref 0–0.1)
BASOPHILS NFR BLD AUTO: 0 % (ref 0–1)
BUN SERPL-MCNC: 8 MG/DL (ref 5–25)
CALCIUM SERPL-MCNC: 8.4 MG/DL (ref 8.3–10.1)
CHLORIDE SERPL-SCNC: 105 MMOL/L (ref 96–108)
CO2 SERPL-SCNC: 27 MMOL/L (ref 21–32)
CREAT SERPL-MCNC: 0.39 MG/DL (ref 0.6–1.3)
EOSINOPHIL # BLD AUTO: 0.32 THOUSAND/ÂΜL (ref 0–0.61)
EOSINOPHIL NFR BLD AUTO: 3 % (ref 0–6)
ERYTHROCYTE [DISTWIDTH] IN BLOOD BY AUTOMATED COUNT: 15.4 % (ref 11.6–15.1)
GFR SERPL CREATININE-BSD FRML MDRD: 105 ML/MIN/1.73SQ M
GLUCOSE SERPL-MCNC: 100 MG/DL (ref 65–140)
GLUCOSE SERPL-MCNC: 111 MG/DL (ref 65–140)
GLUCOSE SERPL-MCNC: 135 MG/DL (ref 65–140)
GLUCOSE SERPL-MCNC: 82 MG/DL (ref 65–140)
GLUCOSE SERPL-MCNC: 83 MG/DL (ref 65–140)
HCT VFR BLD AUTO: 26.1 % (ref 34.8–46.1)
HGB BLD-MCNC: 8 G/DL (ref 11.5–15.4)
IMM GRANULOCYTES # BLD AUTO: 0.09 THOUSAND/UL (ref 0–0.2)
IMM GRANULOCYTES NFR BLD AUTO: 1 % (ref 0–2)
LYMPHOCYTES # BLD AUTO: 1.82 THOUSANDS/ÂΜL (ref 0.6–4.47)
LYMPHOCYTES NFR BLD AUTO: 15 % (ref 14–44)
MAGNESIUM SERPL-MCNC: 2 MG/DL (ref 1.6–2.6)
MCH RBC QN AUTO: 31.9 PG (ref 26.8–34.3)
MCHC RBC AUTO-ENTMCNC: 30.7 G/DL (ref 31.4–37.4)
MCV RBC AUTO: 104 FL (ref 82–98)
MONOCYTES # BLD AUTO: 1.85 THOUSAND/ÂΜL (ref 0.17–1.22)
MONOCYTES NFR BLD AUTO: 15 % (ref 4–12)
NEUTROPHILS # BLD AUTO: 7.86 THOUSANDS/ÂΜL (ref 1.85–7.62)
NEUTS SEG NFR BLD AUTO: 66 % (ref 43–75)
NRBC BLD AUTO-RTO: 0 /100 WBCS
PLATELET # BLD AUTO: 298 THOUSANDS/UL (ref 149–390)
PMV BLD AUTO: 10.3 FL (ref 8.9–12.7)
POTASSIUM SERPL-SCNC: 3.8 MMOL/L (ref 3.5–5.3)
RBC # BLD AUTO: 2.51 MILLION/UL (ref 3.81–5.12)
SODIUM SERPL-SCNC: 135 MMOL/L (ref 135–147)
WBC # BLD AUTO: 11.98 THOUSAND/UL (ref 4.31–10.16)

## 2023-03-31 RX ORDER — OXYCODONE HYDROCHLORIDE 5 MG/1
5 TABLET ORAL EVERY 4 HOURS PRN
Status: DISCONTINUED | OUTPATIENT
Start: 2023-03-31 | End: 2023-04-03 | Stop reason: HOSPADM

## 2023-03-31 RX ORDER — PANTOPRAZOLE SODIUM 40 MG/1
40 TABLET, DELAYED RELEASE ORAL
Status: DISCONTINUED | OUTPATIENT
Start: 2023-04-01 | End: 2023-04-03 | Stop reason: HOSPADM

## 2023-03-31 RX ORDER — DEXTROSE, SODIUM CHLORIDE, AND POTASSIUM CHLORIDE 5; .45; .15 G/100ML; G/100ML; G/100ML
75 INJECTION INTRAVENOUS CONTINUOUS
Status: DISCONTINUED | OUTPATIENT
Start: 2023-03-31 | End: 2023-04-01

## 2023-03-31 RX ORDER — POTASSIUM CHLORIDE 14.9 MG/ML
20 INJECTION INTRAVENOUS ONCE
Status: COMPLETED | OUTPATIENT
Start: 2023-03-31 | End: 2023-03-31

## 2023-03-31 RX ORDER — METOPROLOL SUCCINATE 25 MG/1
25 TABLET, EXTENDED RELEASE ORAL DAILY
Status: DISCONTINUED | OUTPATIENT
Start: 2023-03-31 | End: 2023-03-31

## 2023-03-31 RX ORDER — METOPROLOL SUCCINATE 25 MG/1
25 TABLET, EXTENDED RELEASE ORAL DAILY
Status: DISCONTINUED | OUTPATIENT
Start: 2023-04-01 | End: 2023-04-03 | Stop reason: HOSPADM

## 2023-03-31 RX ADMIN — DEXTROSE, SODIUM CHLORIDE, AND POTASSIUM CHLORIDE 75 ML/HR: 5; .45; .15 INJECTION INTRAVENOUS at 07:36

## 2023-03-31 RX ADMIN — METOROPROLOL TARTRATE 5 MG: 5 INJECTION, SOLUTION INTRAVENOUS at 08:20

## 2023-03-31 RX ADMIN — ACETAMINOPHEN 975 MG: 325 TABLET ORAL at 13:55

## 2023-03-31 RX ADMIN — FENTANYL CITRATE: 50 INJECTION INTRAMUSCULAR; INTRAVENOUS at 02:36

## 2023-03-31 RX ADMIN — ATORVASTATIN CALCIUM 40 MG: 40 TABLET, FILM COATED ORAL at 17:39

## 2023-03-31 RX ADMIN — HEPARIN SODIUM 5000 UNITS: 5000 INJECTION INTRAVENOUS; SUBCUTANEOUS at 22:59

## 2023-03-31 RX ADMIN — HEPARIN SODIUM 5000 UNITS: 5000 INJECTION INTRAVENOUS; SUBCUTANEOUS at 13:57

## 2023-03-31 RX ADMIN — NYSTATIN: 100000 POWDER TOPICAL at 08:11

## 2023-03-31 RX ADMIN — NYSTATIN: 100000 POWDER TOPICAL at 17:39

## 2023-03-31 RX ADMIN — PHENOL 1 SPRAY: 1.5 LIQUID ORAL at 10:49

## 2023-03-31 RX ADMIN — MONTELUKAST 10 MG: 10 TABLET, FILM COATED ORAL at 17:39

## 2023-03-31 RX ADMIN — POTASSIUM CHLORIDE 20 MEQ: 14.9 INJECTION, SOLUTION INTRAVENOUS at 08:04

## 2023-03-31 RX ADMIN — DEXTROSE, SODIUM CHLORIDE, AND POTASSIUM CHLORIDE 75 ML/HR: 5; .45; .15 INJECTION INTRAVENOUS at 20:36

## 2023-03-31 RX ADMIN — BUDESONIDE AND FORMOTEROL FUMARATE DIHYDRATE 2 PUFF: 160; 4.5 AEROSOL RESPIRATORY (INHALATION) at 10:49

## 2023-03-31 RX ADMIN — ACETAMINOPHEN 975 MG: 325 TABLET ORAL at 22:59

## 2023-03-31 RX ADMIN — HEPARIN SODIUM 5000 UNITS: 5000 INJECTION INTRAVENOUS; SUBCUTANEOUS at 06:06

## 2023-03-31 RX ADMIN — PANTOPRAZOLE SODIUM 40 MG: 40 INJECTION, POWDER, FOR SOLUTION INTRAVENOUS at 06:06

## 2023-03-31 NOTE — PROGRESS NOTES
"Progress Note - Acute Pain Service    Pedro Ramirez 67 y o  female MRN: [id number]  Unit/Bed#: [facility] 810-01 Encounter: [id number]      Assessment:   Principal Problem:    Malignant neoplasm of left ovary ([address] )    Pedro Ramirez is a 67 y o  female past medical history of ovarian cancer status post cytoreduction surgery with sigmoid resection and diverting ileostomy complicated by anastomotic leak now status post low anterior resection with anastomotic revision on 3/27  Patient underwent preoperative thoracic epidural for postoperative pain control  On evaluation today, patient continues to do extremely well from a pain perspective  Minimal pain at rest   Ambulating without difficulty and is off that she is allowed to with minimal pain exacerbation  She is looking forward to epidural removal and eventual de-lining to no longer be \"hooked up to the pole  \"  She denies any further nausea or vomiting although it has been eating minimally and maintaining a clear liquid diet at this time  She continues to experience itching and around her epidural insertion site related to the reinforcing tape and her sensitivity to these adhesives    Plan:   -Continue thoracic epidural infusion with ropivacaine 0 1% with fentanyl 2 mcg/mL at 8 mL/h continuous with 5 mL demand dose every 10 minutes max 3 doses per hour   - Anticipate epidural removal tomorrow morning  -Initiate geriatric opioid regimen upon epidural discontinuation with oxycodone 2 5 mg / 5 mg p o  every 4 hours as needed for moderate/severe pain, with Dilaudid 0 2 mg IV every 3 hours as needed for breakthrough pain  - Consider Tylenol 975 mg p o  every 8 hours scheduled upon epidural discontinuation  Patient is hoping to minimize systemic opioid use    Bowel Regimen:  -Not indicated at this time given ongoing ostomy output  Continue to monitor with transition to oral opioid regimen    APS will continue to follow   Please contact Acute Pain Service - [facility] via " Mason from [phone] with additional questions or concerns  See Mason or Neto for additional contacts and after hours information  Pain History  Current pain location(s): None  Pain Scale:   0/10  Quality: Not applicable  24 hour history: See assessment    Opioid requirement previous 24 hours: None  PCEA use: None    Meds/Allergies   all current active meds have been reviewed    Allergies   Allergen Reactions   • Lactose - Food Allergy GI Intolerance   • Nsaids Other (See Comments)      pt is avoiding per family doctor instructions-  Able to take Tylenol   • Pedi-Pre Tape Spray [Wound Dressing Adhesive] Other (See Comments)     Please use sensitive skin tape, pt gets bad bruising from strong medical adhesive tape  Objective     Temp:  [97 2 °F (36 2 °C)-97 5 °F (36 4 °C)] 97 5 °F (36 4 °C)  HR:  [] 99  Resp:  [14-18] 14  BP: (108-131)/(56-73) 131/73    Physical Exam  Vitals and nursing note reviewed  Constitutional:       Appearance: Normal appearance  She is normal weight  HENT:      Head: Normocephalic  Nose: Nose normal       Mouth/Throat:      Mouth: Mucous membranes are moist    Eyes:      Extraocular Movements: Extraocular movements intact  Pupils: Pupils are equal, round, and reactive to light  Pulmonary:      Effort: Pulmonary effort is normal  No respiratory distress  Abdominal:      General: Abdomen is flat  There is no distension  Tenderness: There is no abdominal tenderness  Comments: Midline incision minimally tender to palpation, incision line clean dry intact, loose stool contents in ostomy bag, mild to moderate tenderness to palpation near drain site, minimal surrounding erythema and no induration   Musculoskeletal:         General: Normal range of motion  Comments: Bilateral lower extremity motor intact with 5/5 strength in the hip flexors   Skin:     General: Skin is warm and dry  Neurological:      Mental Status: She is alert   Mental status is at baseline  Psychiatric:         Mood and Affect: Mood normal          Behavior: Behavior normal      Epidural: Epidural site clean dry intact, no surrounding erythema or induration, infusion functioning appropriately with expected sensory deficits    Lab Results:   Results from last 7 days   Lab Units 03/31/23  0547   WBC Thousand/uL 11 98*   HEMOGLOBIN g/dL 8 0*   HEMATOCRIT % 26 1*   PLATELETS Thousands/uL 298      Results from last 7 days   Lab Units 03/31/23  0547 03/28/23  1056 03/24/23  1315   POTASSIUM mmol/L 3 8   < > 3 8   CHLORIDE mmol/L 105   < > 102   CO2 mmol/L 27   < > 27   BUN mg/dL 8   < > 11   CREATININE mg/dL 0 39*   < > 0 59*   CALCIUM mg/dL 8 4   < > 9 2   ALK PHOS U/L  --   --  76   ALT U/L  --   --  10   AST U/L  --   --  14    < > = values in this interval not displayed  Imaging Studies: I have personally reviewed pertinent reports  EKG, Pathology, and Other Studies: I have personally reviewed pertinent reports  Please note that the APS provides consultative services regarding pain management only  With the exception of ketamine and epidural infusions and except when indicated, final decisions regarding starting or changing doses of analgesic medications are at the discretion of the consulting service  Off hours consultation and/or medication management is generally not available      Odessa Guevara MD  Acute Pain Service

## 2023-03-31 NOTE — QUICK NOTE
"Epidural infusion alarming occlusion  Attempting to trouble shoot by removing filter, replacing alligator, and manual flush line  Unable to move any fluid through tubing even with significant manual pressure  Patient was \"washing up\" in bathroom at time of alarm onset  We discussed plan for removal of epidural in near future  Patient wishes to have it removed at this time  Last heparin dosing confirmed appropriate  Epidural removed with tip intact  Will initiate low dose oral opioids though suspect patient will have few needs moving forward based on her history of pain tolerance and desire to avoid systemic medications      Dante Monte MD  "

## 2023-03-31 NOTE — PROGRESS NOTES
NEPHROLOGY PROGRESS NOTE   Isaura Walton 67 y o  female MRN: [id number]  Unit/Bed#: [facility] 810-01 Encounter: [id number]  Reason for Consult: Prevention of TERI    ASSESSMENT AND PLAN:  68 yo woman with PMH of lactase deficient colonic polyps, metastatic ovarian cancer status post ileostomy , LIN, splenectomy ongoing chemotherapy   Patient admitted for surgical intervention (low anterior resection, bilateral ureteral stent)   Urology is consulted for prevention of TERI     PLAN:     #TERI prevention  • No TERI, creatinine at baseline  • Avoid NSAIDs  • Monitor urinary output  • Patient on clear liquids, enforce fluid intake     #Volume status/hypertension:  • Volume:  Appears euvolemic  • Blood pressure:   Normotensive /73H  • Recommend:  • On IV fluids   • No longer n p o   • Enforce fluid intake     #Ovarian cancer  • Status post surgical intervention   • Management as per primary team     #Emesis  • Status post NG tube   • Now resolved   • Clear liquids      #Hypomagnesemia  • Resolved      #Borderline hypokalemia  • Serum potassium 3 8 mg/L  • Replete as needed     #Anemia  • Hemoglobin 8 mg/dL  • Etiology: Multifactorial secondary to malignancy and poor intake and recent intervention  • Transfuse if hemoglobin<7    Nephrology will sign off at this time  Thank you for involving us in this case  Please call us if any question  SUBJECTIVE:  Patient seen and examined at bedside  Patient feels better, NG tube was removed she is drinking fluids with clear liquid diet        OBJECTIVE:  Current Weight: Weight - Scale: 71 2 kg (157 lb)  Vitals:    03/31/23 0753   BP: 131/73   Pulse: 99   Resp: 14   Temp: 97 5 °F (36 4 °C)   SpO2: 96%       Intake/Output Summary (Last 24 hours) at 3/31/2023 1207  Last data filed at 3/31/2023 1033  Gross per 24 hour   Intake 2869 17 ml   Output 1905 ml   Net 964 17 ml     Wt Readings from Last 3 Encounters:   03/27/23 71 2 kg (157 lb)   03/21/23 71 3 kg (157 lb 3 2 oz) 03/14/23 72 6 kg (160 lb)     Temp Readings from Last 3 Encounters:   03/31/23 97 5 °F (36 4 °C)   03/21/23 (!) 97 4 °F (36 3 °C) (Tympanic)   03/17/23 98 2 °F (36 8 °C) (Temporal)     BP Readings from Last 3 Encounters:   03/31/23 131/73   03/21/23 126/82   03/17/23 106/62     Pulse Readings from Last 3 Encounters:   03/31/23 99   03/21/23 86   03/17/23 83        General:  no acute distress at this time  Skin:  No acute rash  Eyes:  No scleral icterus and noninjected  ENT:  mucous membranes moist  Neck:  no carotid bruits  Chest:  Clear to auscultation percussion, good respiratory effort, no use of accessory respiratory muscles  CVS:  Regular rate and rhythm without rub   Abdomen:  soft and nontender   Extremities: no significant lower extremity edema  Neuro:  No gross focality  Psych:  Alert , cooperative       Medications:    Current Facility-Administered Medications:   •  acetaminophen (TYLENOL) tablet 975 mg, 975 mg, Oral, Q8H [address], Martínez Santana MD, 975 mg at 03/29/23 1410  •  albuterol (PROVENTIL HFA,VENTOLIN HFA) inhaler 2 puff, 2 puff, Inhalation, Q4H PRN, Martínez Santana MD  •  atorvastatin (LIPITOR) tablet 40 mg, 40 mg, Oral, Daily With Clary España MD, 40 mg at 03/29/23 1727  •  budesonide-formoterol (SYMBICORT) 160-4 5 mcg/act inhaler 2 puff, 2 puff, Inhalation, Daily, Martínez Santana MD, 2 puff at 03/31/23 1049  •  dextrose 5 % and sodium chloride 0 45 % with KCl 20 mEq/L infusion, 75 mL/hr, Intravenous, Continuous, Ashish Sawant MD, Last Rate: 75 mL/hr at 03/31/23 0736, 75 mL/hr at 03/31/23 0736  •  fluticasone (FLONASE) 50 mcg/act nasal spray 1 spray, 1 spray, Each Nare, Daily, Zully Sandoval PA-C, 1 spray at 03/29/23 0840  •  heparin (porcine) subcutaneous injection 5,000 Units, 5,000 Units, Subcutaneous, Q8H [address], Martínez Santana MD, 5,000 Units at 03/31/23 0606  •  HYDROmorphone HCl (DILAUDID) injection 0 2 mg, 0 2 mg, Intravenous, Q3H PRN, Dmitriy Dobbs MD  •  insulin lispro (HumaLOG) 100 units/mL subcutaneous injection 1-5 Units, 1-5 Units, Subcutaneous, Q6H **AND** Fingerstick Glucose (POCT), , , Q6H, German Larios MD  •  loratadine (CLARITIN) tablet 10 mg, 10 mg, Oral, Daily, Francy Epps PA-C, 10 mg at 23 7420  •  LORazepam (ATIVAN) injection 1 mg, 1 mg, Intravenous, Q8H PRN, Randal Saeed MD, 1 mg at 23 2357  •  metoprolol (LOPRESSOR) injection 5 mg, 5 mg, Intravenous, Q6H, Meet Estrada MD, 5 mg at 23 0820  •  montelukast (SINGULAIR) tablet 10 mg, 10 mg, Oral, Daily, Chucho Crane PA-C, 10 mg at 23 1727  •  nystatin (MYCOSTATIN) powder, , Topical, BID, Meet Estrada MD, Given at 23 8836  •  ondansetron [facility]) injection 4 mg, 4 mg, Intravenous, Q4H PRN, Randal Saeed MD, 4 mg at 23 2329  •  oxyCODONE (ROXICODONE) IR tablet 2 5 mg, 2 5 mg, Oral, Q4H PRN, Lisette Middleton MD  •  oxyCODONE (ROXICODONE) IR tablet 5 mg, 5 mg, Oral, Q4H PRN, Lisette Middleton MD  •  [START ON 2023] pantoprazole (PROTONIX) EC tablet 40 mg, 40 mg, Oral, Early Morning, German Larios MD  •  phenol (CHLORASEPTIC) 1 4 % mucosal liquid 1 spray, 1 spray, Mouth/Throat, Q2H PRN, Randal Saeed MD, 1 spray at 23 1049  •  saliva substitute (MOUTH KOTE) mucosal solution 5 spray, 5 spray, Mouth/Throat, 4x Daily PRN, Randal Saeed MD, 5 spray at 23 0859  •  sodium chloride (OCEAN) 0 65 % nasal spray 1 spray, 1 spray, Each Nare, Q1H PRN, Meet Estrada MD, 1 spray at 23 1411    Laboratory Results:  Results from last 7 days   Lab Units 23  0547 23  0518 23  0539 23  1056 23  1401 23  1315   WBC Thousand/uL 11 98* 10 33* 17 86* 17 33* 13 73*  --    HEMOGLOBIN g/dL 8 0* 8 5* 8 3* 8 6* 10 7*  --    HEMATOCRIT % 26 1* 26 7* 25 7* 26 5* 33 2*  --    PLATELETS Thousands/uL 298 282 288 297 381  --    SODIUM mmol/L 135 137 141 137  --  137   POTASSIUM mmol/L 3 8 3 8 3 6 3 6  --  3 8   CHLORIDE mmol/L 105 "106 109* 110*  --  102   CO2 mmol/L 27 27 29 25  --  27   BUN mg/dL 8 7 5 8  --  11   CREATININE mg/dL 0 39* 0 58* 0 49* 0 74  --  0 59*   CALCIUM mg/dL 8 4 8 3 8 1* 8 2*  --  9 2   MAGNESIUM mg/dL 2 0  --  2 3 1 8  --  1 7*   PHOSPHORUS mg/dL  --   --   --  2 6  --   --        XR abdomen 1 view kub   Final Result by Katie Cadena MD (03/30 1246)      Nasogastric tube terminates in the stomach  No evidence of bowel obstruction or postoperative adynamic ileus  Workstation performed: [id number]             Portions of the record may have been created with voice recognition software  Occasional wrong word or \"sound a like\" substitutions may have occurred due to the inherent limitations of voice recognition software  Read the chart carefully and recognize, using context, where substitutions have occurred      "

## 2023-03-31 NOTE — PLAN OF CARE
Problem: Prexisting or High Potential for Compromised Skin Integrity  Goal: Skin integrity is maintained or improved  Description: INTERVENTIONS:  - Identify patients at risk for skin breakdown  - Assess and monitor skin integrity  - Assess and monitor nutrition and hydration status  - Monitor labs   - Assess for incontinence   - Turn and reposition patient  - Assist with mobility/ambulation  - Relieve pressure over bony prominences  - Avoid friction and shearing  - Provide appropriate hygiene as needed including keeping skin clean and dry  - Evaluate need for skin moisturizer/barrier cream  - Collaborate with interdisciplinary team   - Patient/family teaching  - Consider wound care consult   Outcome: Progressing     Problem: MOBILITY - ADULT  Goal: Maintain or return to baseline ADL function  Description: INTERVENTIONS:  -  Assess patient's ability to carry out ADLs; assess patient's baseline for ADL function and identify physical deficits which impact ability to perform ADLs (bathing, care of mouth/teeth, toileting, grooming, dressing, etc )  - Assess/evaluate cause of self-care deficits   - Assess range of motion  - Assess patient's mobility; develop plan if impaired  - Assess patient's need for assistive devices and provide as appropriate  - Encourage maximum independence but intervene and supervise when necessary  - Involve family in performance of ADLs  - Assess for home care needs following discharge   - Consider OT consult to assist with ADL evaluation and planning for discharge  - Provide patient education as appropriate  Outcome: Progressing  Goal: Maintains/Returns to pre admission functional level  Description: INTERVENTIONS:  - Perform BMAT or MOVE assessment daily    - Set and communicate daily mobility goal to care team and patient/family/caregiver     - Collaborate with rehabilitation services on mobility goals if consulted  - Out of bed for toileting  - Record patient progress and toleration of activity level   Outcome: Progressing     Problem: PAIN - ADULT  Goal: Verbalizes/displays adequate comfort level or baseline comfort level  Description: Interventions:  - Encourage patient to monitor pain and request assistance  - Assess pain using appropriate pain scale  - Administer analgesics based on type and severity of pain and evaluate response  - Implement non-pharmacological measures as appropriate and evaluate response  - Consider cultural and social influences on pain and pain management  - Notify physician/advanced practitioner if interventions unsuccessful or patient reports new pain  Outcome: Progressing

## 2023-03-31 NOTE — PLAN OF CARE
Problem: Prexisting or High Potential for Compromised Skin Integrity  Goal: Skin integrity is maintained or improved  Description: INTERVENTIONS:  - Identify patients at risk for skin breakdown  - Assess and monitor skin integrity  - Assess and monitor nutrition and hydration status  - Monitor labs   - Assess for incontinence   - Turn and reposition patient  - Assist with mobility/ambulation  - Relieve pressure over bony prominences  - Avoid friction and shearing  - Provide appropriate hygiene as needed including keeping skin clean and dry  - Evaluate need for skin moisturizer/barrier cream  - Collaborate with interdisciplinary team   - Patient/family teaching  - Consider wound care consult   Outcome: Progressing     Problem: MOBILITY - ADULT  Goal: Maintain or return to baseline ADL function  Description: INTERVENTIONS:  -  Assess patient's ability to carry out ADLs; assess patient's baseline for ADL function and identify physical deficits which impact ability to perform ADLs (bathing, care of mouth/teeth, toileting, grooming, dressing, etc )  - Assess/evaluate cause of self-care deficits   - Assess range of motion  - Assess patient's mobility; develop plan if impaired  - Assess patient's need for assistive devices and provide as appropriate  - Encourage maximum independence but intervene and supervise when necessary  - Involve family in performance of ADLs  - Assess for home care needs following discharge   - Consider OT consult to assist with ADL evaluation and planning for discharge  - Provide patient education as appropriate  Outcome: Progressing  Goal: Maintains/Returns to pre admission functional level  Description: INTERVENTIONS:  - Perform BMAT or MOVE assessment daily    - Set and communicate daily mobility goal to care team and patient/family/caregiver     - Collaborate with rehabilitation services on mobility goals if consulted  - Out of bed for toileting  - Record patient progress and toleration of activity level   Outcome: Progressing     Problem: SAFETY ADULT  Goal: Maintain or return to baseline ADL function  Description: INTERVENTIONS:  -  Assess patient's ability to carry out ADLs; assess patient's baseline for ADL function and identify physical deficits which impact ability to perform ADLs (bathing, care of mouth/teeth, toileting, grooming, dressing, etc )  - Assess/evaluate cause of self-care deficits   - Assess range of motion  - Assess patient's mobility; develop plan if impaired  - Assess patient's need for assistive devices and provide as appropriate  - Encourage maximum independence but intervene and supervise when necessary  - Involve family in performance of ADLs  - Assess for home care needs following discharge   - Consider OT consult to assist with ADL evaluation and planning for discharge  - Provide patient education as appropriate  Outcome: Progressing  Goal: Maintains/Returns to pre admission functional level  Description: INTERVENTIONS:  - Perform BMAT or MOVE assessment daily    - Set and communicate daily mobility goal to care team and patient/family/caregiver     - Collaborate with rehabilitation services on mobility goals if consulted  - Out of bed for toileting  - Record patient progress and toleration of activity level   Outcome: Progressing  Goal: Patient will remain free of falls  Description: INTERVENTIONS:  - Educate patient/family on patient safety including physical limitations  - Instruct patient to call for assistance with activity   - Consult OT/PT to assist with strengthening/mobility   - Keep Call bell within reach  - Keep bed low and locked with side rails adjusted as appropriate  - Keep care items and personal belongings within reach  - Initiate and maintain comfort rounds  - Make Fall Risk Sign visible to staff  Problem: DISCHARGE PLANNING  Goal: Discharge to home or other facility with appropriate resources  Description: INTERVENTIONS:  - Identify barriers to discharge w/patient and caregiver  - Arrange for needed discharge resources and transportation as appropriate  - Identify discharge learning needs (meds, wound care, etc )  - Arrange for interpretive services to assist at discharge as needed  - Refer to Case Management Department for coordinating discharge planning if the patient needs post-hospital services based on physician/advanced practitioner order or complex needs related to functional status, cognitive ability, or social support system  Outcome: Progressing     - Obtain necessary fall risk management equipment:   - Apply yellow socks and bracelet for high fall risk patients  - Consider moving patient to room near nurses station  Outcome: Progressing

## 2023-03-31 NOTE — CASE MANAGEMENT
Case Management Discharge Planning Note    Patient name Kit Christianson  Location [facility]82 MRN [id number]  : 1950 Date 3/31/2023       Current Admission Date: 3/27/2023  Current Admission Diagnosis:Malignant neoplasm of left ovary [facility])   Patient Active Problem List    Diagnosis Date Noted   • Rectal bleeding 2023   • Wound infection after surgery 2022   • Encounter for venous access device care 2022   • Bandemia 2022   • Hypokalemia 2022   • S/P total abdominal hysterectomy 10/31/2022   • Ileostomy, has currently ([facility] [address] ) 10/31/2022   • Acute blood loss anemia (ABLA) 10/31/2022   • S/P splenectomy 10/31/2022   • Anxiety disorder due to medical condition 2022   • Malignant neoplasm of left ovary ([facility] [address] ) 2022   • Giant cell aortic arteritis ([facility] [address] )    • Prediabetes    • 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[facility] [address] ) 2022   • Lower abdominal pain 06/15/2022   • Change in bowel habit 06/15/2022   • Personal history of colonic polyps 06/15/2022   • Long term (current) use of systemic steroids 06/15/2022   • Asthma    • Hyperlipidemia    • Hypertension    • Gastroesophageal reflux disease 2019   • Constipation 2019   • Hemorrhoids 2019   • Family history of colonic polyps 2019      LOS (days): 4  Geometric Mean LOS (GMLOS) (days): 9 80  Days to GMLOS:5 6     OBJECTIVE:  Risk of Unplanned Readmission Score: 25 07         Current admission status: Inpatient   Preferred Pharmacy:   [facility]/pharmacy [address] [place] [address]   [address] [address]  Phone: [phone] Fax: [address], [place] - [address] [place] [address] [facility]  Phone: [phone] Fax: [phone]    Primary Care Provider: Una Richardson MD    Primary Insurance: MEDICARE  Secondary Insurance: COMMERCIAL MISCELLANEOUS    DISCHARGE DETAILS:    [address] [place] [address] Health Agency Name[de-identified] Other ([address] [facility]:Phone: [phone]  Fax: [phone]  [place] [address])      Other Referral/Resources/Interventions Provided:  Referral Comments: Patient confirmed that she heard from her Nurse at [facility] and they will resume care at discharge       Discharge Destination Plan[de-identified] Home with [facility]

## 2023-04-01 LAB
ANION GAP SERPL CALCULATED.3IONS-SCNC: 2 MMOL/L (ref 4–13)
BUN SERPL-MCNC: 3 MG/DL (ref 5–25)
CALCIUM SERPL-MCNC: 9 MG/DL (ref 8.3–10.1)
CHLORIDE SERPL-SCNC: 105 MMOL/L (ref 96–108)
CO2 SERPL-SCNC: 28 MMOL/L (ref 21–32)
CREAT SERPL-MCNC: 0.53 MG/DL (ref 0.6–1.3)
ERYTHROCYTE [DISTWIDTH] IN BLOOD BY AUTOMATED COUNT: 15.1 % (ref 11.6–15.1)
GFR SERPL CREATININE-BSD FRML MDRD: 95 ML/MIN/1.73SQ M
GLUCOSE SERPL-MCNC: 109 MG/DL (ref 65–140)
GLUCOSE SERPL-MCNC: 129 MG/DL (ref 65–140)
GLUCOSE SERPL-MCNC: 140 MG/DL (ref 65–140)
GLUCOSE SERPL-MCNC: 144 MG/DL (ref 65–140)
GLUCOSE SERPL-MCNC: 154 MG/DL (ref 65–140)
GLUCOSE SERPL-MCNC: 159 MG/DL (ref 65–140)
HCT VFR BLD AUTO: 27 % (ref 34.8–46.1)
HGB BLD-MCNC: 8.7 G/DL (ref 11.5–15.4)
MCH RBC QN AUTO: 32 PG (ref 26.8–34.3)
MCHC RBC AUTO-ENTMCNC: 32.2 G/DL (ref 31.4–37.4)
MCV RBC AUTO: 99 FL (ref 82–98)
PLATELET # BLD AUTO: 345 THOUSANDS/UL (ref 149–390)
PMV BLD AUTO: 9.6 FL (ref 8.9–12.7)
POTASSIUM SERPL-SCNC: 3.6 MMOL/L (ref 3.5–5.3)
RBC # BLD AUTO: 2.72 MILLION/UL (ref 3.81–5.12)
SODIUM SERPL-SCNC: 135 MMOL/L (ref 135–147)
WBC # BLD AUTO: 11.15 THOUSAND/UL (ref 4.31–10.16)

## 2023-04-01 RX ORDER — POTASSIUM CHLORIDE 20 MEQ/1
40 TABLET, EXTENDED RELEASE ORAL ONCE
Status: COMPLETED | OUTPATIENT
Start: 2023-04-01 | End: 2023-04-01

## 2023-04-01 RX ORDER — SIMETHICONE 80 MG
80 TABLET,CHEWABLE ORAL EVERY 6 HOURS PRN
Status: DISCONTINUED | OUTPATIENT
Start: 2023-04-01 | End: 2023-04-03 | Stop reason: HOSPADM

## 2023-04-01 RX ADMIN — INSULIN LISPRO 1 UNITS: 100 INJECTION, SOLUTION INTRAVENOUS; SUBCUTANEOUS at 00:09

## 2023-04-01 RX ADMIN — LORATADINE 10 MG: 10 TABLET ORAL at 08:03

## 2023-04-01 RX ADMIN — INSULIN LISPRO 1 UNITS: 100 INJECTION, SOLUTION INTRAVENOUS; SUBCUTANEOUS at 06:09

## 2023-04-01 RX ADMIN — POTASSIUM CHLORIDE 40 MEQ: 1500 TABLET, EXTENDED RELEASE ORAL at 15:15

## 2023-04-01 RX ADMIN — HEPARIN SODIUM 5000 UNITS: 5000 INJECTION INTRAVENOUS; SUBCUTANEOUS at 06:06

## 2023-04-01 RX ADMIN — ONDANSETRON 4 MG: 2 INJECTION INTRAMUSCULAR; INTRAVENOUS at 15:44

## 2023-04-01 RX ADMIN — BUDESONIDE AND FORMOTEROL FUMARATE DIHYDRATE 2 PUFF: 160; 4.5 AEROSOL RESPIRATORY (INHALATION) at 08:05

## 2023-04-01 RX ADMIN — Medication 2.5 MG: at 12:50

## 2023-04-01 RX ADMIN — ACETAMINOPHEN 975 MG: 325 TABLET ORAL at 06:05

## 2023-04-01 RX ADMIN — NYSTATIN: 100000 POWDER TOPICAL at 08:05

## 2023-04-01 RX ADMIN — SALINE NASAL SPRAY 1 SPRAY: 1.5 SOLUTION NASAL at 13:37

## 2023-04-01 RX ADMIN — MONTELUKAST 10 MG: 10 TABLET, FILM COATED ORAL at 17:25

## 2023-04-01 RX ADMIN — NYSTATIN: 100000 POWDER TOPICAL at 17:28

## 2023-04-01 RX ADMIN — SIMETHICONE 80 MG: 80 TABLET, CHEWABLE ORAL at 17:25

## 2023-04-01 RX ADMIN — ATORVASTATIN CALCIUM 40 MG: 40 TABLET, FILM COATED ORAL at 15:16

## 2023-04-01 RX ADMIN — PANTOPRAZOLE SODIUM 40 MG: 40 TABLET, DELAYED RELEASE ORAL at 06:06

## 2023-04-01 RX ADMIN — METOPROLOL SUCCINATE 25 MG: 25 TABLET, EXTENDED RELEASE ORAL at 08:03

## 2023-04-01 RX ADMIN — HEPARIN SODIUM 5000 UNITS: 5000 INJECTION INTRAVENOUS; SUBCUTANEOUS at 13:36

## 2023-04-01 RX ADMIN — HEPARIN SODIUM 5000 UNITS: 5000 INJECTION INTRAVENOUS; SUBCUTANEOUS at 21:13

## 2023-04-01 RX ADMIN — FLUTICASONE PROPIONATE 1 SPRAY: 50 SPRAY, METERED NASAL at 08:05

## 2023-04-01 NOTE — PLAN OF CARE
Problem: Prexisting or High Potential for Compromised Skin Integrity  Goal: Skin integrity is maintained or improved  Description: INTERVENTIONS:  - Identify patients at risk for skin breakdown  - Assess and monitor skin integrity  - Assess and monitor nutrition and hydration status  - Monitor labs   - Assess for incontinence   - Turn and reposition patient  - Assist with mobility/ambulation  - Relieve pressure over bony prominences  - Avoid friction and shearing  - Provide appropriate hygiene as needed including keeping skin clean and dry  - Evaluate need for skin moisturizer/barrier cream  - Collaborate with interdisciplinary team   - Patient/family teaching  - Consider wound care consult   Outcome: Progressing     Problem: MOBILITY - ADULT  Goal: Maintain or return to baseline ADL function  Description: INTERVENTIONS:  -  Assess patient's ability to carry out ADLs; assess patient's baseline for ADL function and identify physical deficits which impact ability to perform ADLs (bathing, care of mouth/teeth, toileting, grooming, dressing, etc )  - Assess/evaluate cause of self-care deficits   - Assess range of motion  - Assess patient's mobility; develop plan if impaired  - Assess patient's need for assistive devices and provide as appropriate  - Encourage maximum independence but intervene and supervise when necessary  - Involve family in performance of ADLs  - Assess for home care needs following discharge   - Consider OT consult to assist with ADL evaluation and planning for discharge  - Provide patient education as appropriate  Outcome: Progressing  Goal: Maintains/Returns to pre admission functional level  Description: INTERVENTIONS:  - Perform BMAT or MOVE assessment daily    - Set and communicate daily mobility goal to care team and patient/family/caregiver     - Collaborate with rehabilitation services on mobility goals if consulted  - Out of bed for toileting  - Record patient progress and toleration of activity level   Outcome: Progressing     Problem: PAIN - ADULT  Goal: Verbalizes/displays adequate comfort level or baseline comfort level  Description: Interventions:  - Encourage patient to monitor pain and request assistance  - Assess pain using appropriate pain scale  - Administer analgesics based on type and severity of pain and evaluate response  - Implement non-pharmacological measures as appropriate and evaluate response  - Consider cultural and social influences on pain and pain management  - Notify physician/advanced practitioner if interventions unsuccessful or patient reports new pain  Outcome: Progressing     Problem: INFECTION - ADULT  Goal: Absence or prevention of progression during hospitalization  Description: INTERVENTIONS:  - Assess and monitor for signs and symptoms of infection  - Monitor lab/diagnostic results  - Monitor all insertion sites, i e  indwelling lines, tubes, and drains  - Monitor endotracheal if appropriate and nasal secretions for changes in amount and color  - [place] appropriate cooling/warming therapies per order  - Administer medications as ordered  - Instruct and encourage patient and family to use good hand hygiene technique  - Identify and instruct in appropriate isolation precautions for identified infection/condition  Outcome: Progressing  Goal: Absence of fever/infection during neutropenic period  Description: INTERVENTIONS:  - Monitor WBC    Outcome: Progressing     Problem: SAFETY ADULT  Goal: Maintain or return to baseline ADL function  Description: INTERVENTIONS:  -  Assess patient's ability to carry out ADLs; assess patient's baseline for ADL function and identify physical deficits which impact ability to perform ADLs (bathing, care of mouth/teeth, toileting, grooming, dressing, etc )  - Assess/evaluate cause of self-care deficits   - Assess range of motion  - Assess patient's mobility; develop plan if impaired  - Assess patient's need for assistive devices and provide as appropriate  - Encourage maximum independence but intervene and supervise when necessary  - Involve family in performance of ADLs  - Assess for home care needs following discharge   - Consider OT consult to assist with ADL evaluation and planning for discharge  - Provide patient education as appropriate  Outcome: Progressing  Goal: Maintains/Returns to pre admission functional level  Description: INTERVENTIONS:  - Perform BMAT or MOVE assessment daily    - Set and communicate daily mobility goal to care team and patient/family/caregiver     - Collaborate with rehabilitation services on mobility goals if consulted  - Out of bed for toileting  - Record patient progress and toleration of activity level   Outcome: Progressing  Goal: Patient will remain free of falls  Description: INTERVENTIONS:  - Educate patient/family on patient safety including physical limitations  - Instruct patient to call for assistance with activity   - Consult OT/PT to assist with strengthening/mobility   - Keep Call bell within reach  - Keep bed low and locked with side rails adjusted as appropriate  - Keep care items and personal belongings within reach  - Initiate and maintain comfort rounds  - Make Fall Risk Sign visible to staff  - Apply yellow socks and bracelet for high fall risk patients  - Consider moving patient to room near nurses station  Outcome: Progressing     Problem: DISCHARGE PLANNING  Goal: Discharge to home or other facility with appropriate resources  Description: INTERVENTIONS:  - Identify barriers to discharge w/patient and caregiver  - Arrange for needed discharge resources and transportation as appropriate  - Identify discharge learning needs (meds, wound care, etc )  - Arrange for interpretive services to assist at discharge as needed  - Refer to Case Management Department for coordinating discharge planning if the patient needs post-hospital services based on physician/advanced practitioner order or complex needs related to functional status, cognitive ability, or social support system  Outcome: Progressing     Problem: Knowledge Deficit  Goal: Patient/family/caregiver demonstrates understanding of disease process, treatment plan, medications, and discharge instructions  Description: Complete learning assessment and assess knowledge base    Interventions:  - Provide teaching at level of understanding  - Provide teaching via preferred learning methods  Outcome: Progressing     Problem: GASTROINTESTINAL - ADULT  Goal: Minimal or absence of nausea and/or vomiting  Description: INTERVENTIONS:  - Administer IV fluids if ordered to ensure adequate hydration  - Maintain NPO status until nausea and vomiting are resolved  - Nasogastric tube if ordered  - Administer ordered antiemetic medications as needed  - Provide nonpharmacologic comfort measures as appropriate  - Advance diet as tolerated, if ordered  - Consider nutrition services referral to assist patient with adequate nutrition and appropriate food choices  Outcome: Progressing  Goal: Maintains or returns to baseline bowel function  Description: INTERVENTIONS:  - Assess bowel function  - Encourage oral fluids to ensure adequate hydration  - Administer IV fluids if ordered to ensure adequate hydration  - Administer ordered medications as needed  - Encourage mobilization and activity  - Consider nutritional services referral to assist patient with adequate nutrition and appropriate food choices  Outcome: Progressing  Goal: Maintains adequate nutritional intake  Description: INTERVENTIONS:  - Monitor percentage of each meal consumed  - Identify factors contributing to decreased intake, treat as appropriate  - Assist with meals as needed  - Monitor I&O, weight, and lab values if indicated  - Obtain nutrition services referral as needed  Outcome: Progressing  Goal: Establish and maintain optimal ostomy function  Description: INTERVENTIONS:  - Assess bowel function  - Encourage oral fluids to ensure adequate hydration  - Administer IV fluids if ordered to ensure adequate hydration   - Administer ordered medications as needed  - Encourage mobilization and activity  - Nutrition services referral to assist patient with appropriate food choices  - Assess stoma site  - Consider wound care consult   Outcome: Progressing  Goal: Oral mucous membranes remain intact  Description: INTERVENTIONS  - Assess oral mucosa and hygiene practices  - Implement preventative oral hygiene regimen  - Implement oral medicated treatments as ordered  - Initiate Nutrition services referral as needed  Outcome: Progressing     Problem: GENITOURINARY - ADULT  Goal: Maintains or returns to baseline urinary function  Description: INTERVENTIONS:  - Assess urinary function  - Encourage oral fluids to ensure adequate hydration if ordered  - Administer IV fluids as ordered to ensure adequate hydration  - Administer ordered medications as needed  - Offer frequent toileting  - Follow urinary retention protocol if ordered  Outcome: Progressing  Goal: Absence of urinary retention  Description: INTERVENTIONS:  - Assess patient's ability to void and empty bladder  - Monitor I/O  - Bladder scan as needed  - Discuss with physician/AP medications to alleviate retention as needed  - Discuss catheterization for long term situations as appropriate  Outcome: Progressing  Goal: Urinary catheter remains patent  Description: INTERVENTIONS:  - Assess patency of urinary catheter  - If patient has a chronic coffey, consider changing catheter if non-functioning  - Follow guidelines for intermittent irrigation of non-functioning urinary catheter  Outcome: Progressing     Problem: HEMATOLOGIC - ADULT  Goal: Maintains hematologic stability  Description: INTERVENTIONS  - Assess for signs and symptoms of bleeding or hemorrhage  - Monitor labs  - Administer supportive blood products/factors as ordered and appropriate  Outcome: Progressing

## 2023-04-01 NOTE — PROGRESS NOTES
"Progress Note - Surgical Oncology   Franci Chan 67 y o  female MRN: [id number]  Unit/Bed#: [facility] 810-01 Encounter: [id number]    Assessment:  67 y o  female with PMH of ovarian carcinoma s/p cytroreductive surgery including an en bloc rectosigmoid resection with diverting ileostomy c/b anastomotic leak with presacral cavity, now s/p low anterior resection with revision of anastomosis on 3/27  Ureteral stents were placed prior to surgery and removed at the completion  Post-op course c/b emesis requiring NGT, now removed    VSS ORA  JOSÉ ANTONIO 105cc serosang mostly serous  Ileostomy 500cc liquid in bag  UOP 1 46L    Plan:  Advance diet as tolerated  Strict I/Os  Maintain JOSÉ ANTONIO drain, monitor output and character  Appreciate APS recommendations - epidural removed  Appreciate nephrology recommendations  Appreciate endocrine recommendations - holding steriods in setting of prior leak  DVT PPX  OOB/ambulate  PT/OT: No needs    Subjective/Objective     Subjective:   Patient seen and examined bedside  Tolerated clears without nausea or emesis  Voiding spontaneously  Pain well controlled  Objective:     Blood pressure 132/76, pulse 81, temperature (!) 97 3 °F (36 3 °C), resp  rate 16, height 5' 4\" (1 626 m), weight 71 2 kg (157 lb), SpO2 96 %  ,Body mass index is 26 95 kg/m²  Intake/Output Summary (Last 24 hours) at 4/1/2023 0920  Last data filed at 4/1/2023 5041  Gross per 24 hour   Intake 1935 ml   Output 2215 ml   Net -280 ml       Invasive Devices     Central Venous Catheter Line  Duration           Port A Cath 12/08/22 Right Subclavian 114 days          Drain  Duration           Ileostomy Standard ([place], end)  days    Closed/Suction Drain Anterior; Left LLQ Bulb 19 Fr  4 days                Physical Exam:  General - no acute distress, responsive and cooperative  CV - warm, regular rate  Pulm - normal work of breathing, no respiratory distress  Abd - soft, nondistended, ostomy viable with stool per bag, " incisions c/d/i  Neuro - m/s grossly intact, cn grossly intact  Ext - moving all extremities

## 2023-04-01 NOTE — PLAN OF CARE
Problem: Prexisting or High Potential for Compromised Skin Integrity  Goal: Skin integrity is maintained or improved  Description: INTERVENTIONS:  - Identify patients at risk for skin breakdown  - Assess and monitor skin integrity  - Assess and monitor nutrition and hydration status  - Monitor labs   - Assess for incontinence   - Turn and reposition patient  - Assist with mobility/ambulation  - Relieve pressure over bony prominences  - Avoid friction and shearing  - Provide appropriate hygiene as needed including keeping skin clean and dry  - Evaluate need for skin moisturizer/barrier cream  - Collaborate with interdisciplinary team   - Patient/family teaching  - Consider wound care consult   Outcome: Progressing     Problem: MOBILITY - ADULT  Goal: Maintain or return to baseline ADL function  Description: INTERVENTIONS:  -  Assess patient's ability to carry out ADLs; assess patient's baseline for ADL function and identify physical deficits which impact ability to perform ADLs (bathing, care of mouth/teeth, toileting, grooming, dressing, etc )  - Assess/evaluate cause of self-care deficits   - Assess range of motion  - Assess patient's mobility; develop plan if impaired  - Assess patient's need for assistive devices and provide as appropriate  - Encourage maximum independence but intervene and supervise when necessary  - Involve family in performance of ADLs  - Assess for home care needs following discharge   - Consider OT consult to assist with ADL evaluation and planning for discharge  - Provide patient education as appropriate  Outcome: Progressing  Goal: Maintains/Returns to pre admission functional level  Description: INTERVENTIONS:  - Perform BMAT or MOVE assessment daily    - Set and communicate daily mobility goal to care team and patient/family/caregiver     - Collaborate with rehabilitation services on mobility goals if consulted  - Out of bed for toileting  - Record patient progress and toleration of activity level   Outcome: Progressing     Problem: PAIN - ADULT  Goal: Verbalizes/displays adequate comfort level or baseline comfort level  Description: Interventions:  - Encourage patient to monitor pain and request assistance  - Assess pain using appropriate pain scale  - Administer analgesics based on type and severity of pain and evaluate response  - Implement non-pharmacological measures as appropriate and evaluate response  - Consider cultural and social influences on pain and pain management  - Notify physician/advanced practitioner if interventions unsuccessful or patient reports new pain  Outcome: Progressing     Problem: INFECTION - ADULT  Goal: Absence or prevention of progression during hospitalization  Description: INTERVENTIONS:  - Assess and monitor for signs and symptoms of infection  - Monitor lab/diagnostic results  - Monitor all insertion sites, i e  indwelling lines, tubes, and drains  - Monitor endotracheal if appropriate and nasal secretions for changes in amount and color  - [place] appropriate cooling/warming therapies per order  - Administer medications as ordered  - Instruct and encourage patient and family to use good hand hygiene technique  - Identify and instruct in appropriate isolation precautions for identified infection/condition  Outcome: Progressing  Goal: Absence of fever/infection during neutropenic period  Description: INTERVENTIONS:  - Monitor WBC    Outcome: Progressing     Problem: SAFETY ADULT  Goal: Maintain or return to baseline ADL function  Description: INTERVENTIONS:  -  Assess patient's ability to carry out ADLs; assess patient's baseline for ADL function and identify physical deficits which impact ability to perform ADLs (bathing, care of mouth/teeth, toileting, grooming, dressing, etc )  - Assess/evaluate cause of self-care deficits   - Assess range of motion  - Assess patient's mobility; develop plan if impaired  - Assess patient's need for assistive devices and provide as appropriate  - Encourage maximum independence but intervene and supervise when necessary  - Involve family in performance of ADLs  - Assess for home care needs following discharge   - Consider OT consult to assist with ADL evaluation and planning for discharge  - Provide patient education as appropriate  Outcome: Progressing  Goal: Maintains/Returns to pre admission functional level  Description: INTERVENTIONS:  - Perform BMAT or MOVE assessment daily    - Set and communicate daily mobility goal to care team and patient/family/caregiver     - Collaborate with rehabilitation services on mobility goals if consulted  - Out of bed for toileting  - Record patient progress and toleration of activity level   Outcome: Progressing  Goal: Patient will remain free of falls  Description: INTERVENTIONS:  - Educate patient/family on patient safety including physical limitations  - Instruct patient to call for assistance with activity   - Consult OT/PT to assist with strengthening/mobility   - Keep Call bell within reach  - Keep bed low and locked with side rails adjusted as appropriate  - Keep care items and personal belongings within reach  - Initiate and maintain comfort rounds  - Make Fall Risk Sign visible to staff  - Offer Toileting every 2 Hours, in advance of need  - Apply yellow socks and bracelet for high fall risk patients  - Consider moving patient to room near nurses station  Outcome: Progressing     Problem: DISCHARGE PLANNING  Goal: Discharge to home or other facility with appropriate resources  Description: INTERVENTIONS:  - Identify barriers to discharge w/patient and caregiver  - Arrange for needed discharge resources and transportation as appropriate  - Identify discharge learning needs (meds, wound care, etc )  - Arrange for interpretive services to assist at discharge as needed  - Refer to Case Management Department for coordinating discharge planning if the patient needs post-hospital services based on physician/advanced practitioner order or complex needs related to functional status, cognitive ability, or social support system  Outcome: Progressing     Problem: Knowledge Deficit  Goal: Patient/family/caregiver demonstrates understanding of disease process, treatment plan, medications, and discharge instructions  Description: Complete learning assessment and assess knowledge base    Interventions:  - Provide teaching at level of understanding  - Provide teaching via preferred learning methods  Outcome: Progressing     Problem: GASTROINTESTINAL - ADULT  Goal: Minimal or absence of nausea and/or vomiting  Description: INTERVENTIONS:  - Administer IV fluids if ordered to ensure adequate hydration  - Maintain NPO status until nausea and vomiting are resolved  - Nasogastric tube if ordered  - Administer ordered antiemetic medications as needed  - Provide nonpharmacologic comfort measures as appropriate  - Advance diet as tolerated, if ordered  - Consider nutrition services referral to assist patient with adequate nutrition and appropriate food choices  Outcome: Progressing  Goal: Maintains or returns to baseline bowel function  Description: INTERVENTIONS:  - Assess bowel function  - Encourage oral fluids to ensure adequate hydration  - Administer IV fluids if ordered to ensure adequate hydration  - Administer ordered medications as needed  - Encourage mobilization and activity  - Consider nutritional services referral to assist patient with adequate nutrition and appropriate food choices  Outcome: Progressing  Goal: Maintains adequate nutritional intake  Description: INTERVENTIONS:  - Monitor percentage of each meal consumed  - Identify factors contributing to decreased intake, treat as appropriate  - Assist with meals as needed  - Monitor I&O, weight, and lab values if indicated  - Obtain nutrition services referral as needed  Outcome: Progressing  Goal: Establish and maintain optimal ostomy function  Description: INTERVENTIONS:  - Assess bowel function  - Encourage oral fluids to ensure adequate hydration  - Administer IV fluids if ordered to ensure adequate hydration   - Administer ordered medications as needed  - Encourage mobilization and activity  - Nutrition services referral to assist patient with appropriate food choices  - Assess stoma site  - Consider wound care consult   Outcome: Progressing  Goal: Oral mucous membranes remain intact  Description: INTERVENTIONS  - Assess oral mucosa and hygiene practices  - Implement preventative oral hygiene regimen  - Implement oral medicated treatments as ordered  - Initiate Nutrition services referral as needed  Outcome: Progressing     Problem: GENITOURINARY - ADULT  Goal: Maintains or returns to baseline urinary function  Description: INTERVENTIONS:  - Assess urinary function  - Encourage oral fluids to ensure adequate hydration if ordered  - Administer IV fluids as ordered to ensure adequate hydration  - Administer ordered medications as needed  - Offer frequent toileting  - Follow urinary retention protocol if ordered  Outcome: Progressing  Goal: Absence of urinary retention  Description: INTERVENTIONS:  - Assess patient's ability to void and empty bladder  - Monitor I/O  - Bladder scan as needed  - Discuss with physician/AP medications to alleviate retention as needed  - Discuss catheterization for long term situations as appropriate  Outcome: Progressing  Goal: Urinary catheter remains patent  Description: INTERVENTIONS:  - Assess patency of urinary catheter  - If patient has a chronic coffey, consider changing catheter if non-functioning  - Follow guidelines for intermittent irrigation of non-functioning urinary catheter  Outcome: Progressing     Problem: HEMATOLOGIC - ADULT  Goal: Maintains hematologic stability  Description: INTERVENTIONS  - Assess for signs and symptoms of bleeding or hemorrhage  - Monitor labs  - Administer supportive blood products/factors as ordered and appropriate  Outcome: Progressing

## 2023-04-01 NOTE — PROGRESS NOTES
Progress Note - Acute Pain Service    Antonio Scott 67 y o  female MRN: [id number]  Unit/Bed#: [facility] 810-01 Encounter: [id number]      Assessment:   Principal Problem:    Malignant neoplasm of left ovary ([address] )    Antonio Scott is a 67 y o  female past medical history of ovarian cancer status post cytoreduction surgery with sigmoid resection and diverting ileostomy complicated by anastomotic leak now status post low anterior resection with anastomotic revision on 3/27  Patient underwent preoperative thoracic epidural for postoperative pain control which was subsequently removed on 3/31  APS consulted for post-operative pain management  Upon bedside evaluation, Amina Reyes was resting in bed without acute distress  Pain remains to be well controlled without epidural support  Able to get OOB without issues  Denies opioid-induced side effects including nausea/vomiting/itching/constipation  Voiding  Flatus  No other concerns  Plan:   - oxycodone 2 5 mg/5 mg PO q4hrs PRN for moderate/severe pain   - Ativan for anxiety management  -D/c IV dilaudid    Multimodal analgesia:  - - Tylenol 975 mg PO q8hrs standing    Bowel Regimen:  - Bowel regimen when appropriate from surgical perspective    APS will sign off at this time  Thank you for the consult  All opioids and other analgesics to be written at discretion of primary team  Please contact Acute Pain Service - [facility] via [employer] from [phone] with additional questions or concerns  See Mason or Neto for additional contacts and after hours information      Pain History  Current pain location(s): Abdomen  Pain Scale:   2-5/10  Quality: Achy  24 hour history: See above    Opioid requirement previous 24 hours: PO oxycodone 2 5mg    Meds/Allergies   all current active meds have been reviewed    Allergies   Allergen Reactions   • Lactose - Food Allergy GI Intolerance   • Nsaids Other (See Comments)      pt is avoiding per family doctor instructions-  Able to take Tylenol   • Pedi-Pre Tape Spray [Wound Dressing Adhesive] Other (See Comments)     Please use sensitive skin tape, pt gets bad bruising from strong medical adhesive tape  Objective     Temp:  [97 3 °F (36 3 °C)-97 5 °F (36 4 °C)] 97 5 °F (36 4 °C)  HR:  [81-99] 99  Resp:  [16-20] 20  BP: (132-144)/(74-79) 144/79    Physical Exam  HENT:      Head: Atraumatic  Mouth/Throat:      Mouth: Mucous membranes are moist    Eyes:      Pupils: Pupils are equal, round, and reactive to light  Cardiovascular:      Rate and Rhythm: Normal rate  Pulses: Normal pulses  Pulmonary:      Effort: Pulmonary effort is normal    Abdominal:      Palpations: Abdomen is soft  Tenderness: There is abdominal tenderness  Musculoskeletal:         General: Normal range of motion  Skin:     General: Skin is dry  Neurological:      General: No focal deficit present  Mental Status: She is alert and oriented to person, place, and time  Psychiatric:         Mood and Affect: Mood normal          Lab Results:   Results from last 7 days   Lab Units 04/01/23  0610   WBC Thousand/uL 11 15*   HEMOGLOBIN g/dL 8 7*   HEMATOCRIT % 27 0*   PLATELETS Thousands/uL 345      Results from last 7 days   Lab Units 04/01/23  0610   POTASSIUM mmol/L 3 6   CHLORIDE mmol/L 105   CO2 mmol/L 28   BUN mg/dL 3*   CREATININE mg/dL 0 53*   CALCIUM mg/dL 9 0       Imaging Studies: I have personally reviewed pertinent reports  EKG, Pathology, and Other Studies: I have personally reviewed pertinent reports  Counseling / Coordination of Care  Total floor / unit time spent today 20 minutes  Greater than 50% of total time was spent with the patient and / or family counseling and / or coordination of care  Please note that the APS provides consultative services regarding pain management only    With the exception of ketamine and epidural infusions and except when indicated, final decisions regarding starting or changing doses of analgesic medications are at the discretion of the consulting service  Off hours consultation and/or medication management is generally not available      Madelaine Nick MD  Acute Pain Service

## 2023-04-02 LAB
ANION GAP SERPL CALCULATED.3IONS-SCNC: 4 MMOL/L (ref 4–13)
BASOPHILS # BLD MANUAL: 0 THOUSAND/UL (ref 0–0.1)
BASOPHILS NFR MAR MANUAL: 0 % (ref 0–1)
BUN SERPL-MCNC: 5 MG/DL (ref 5–25)
CALCIUM SERPL-MCNC: 8.8 MG/DL (ref 8.3–10.1)
CHLORIDE SERPL-SCNC: 107 MMOL/L (ref 96–108)
CO2 SERPL-SCNC: 27 MMOL/L (ref 21–32)
CREAT SERPL-MCNC: 0.52 MG/DL (ref 0.6–1.3)
EOSINOPHIL # BLD MANUAL: 0.27 THOUSAND/UL (ref 0–0.4)
EOSINOPHIL NFR BLD MANUAL: 2 % (ref 0–6)
ERYTHROCYTE [DISTWIDTH] IN BLOOD BY AUTOMATED COUNT: 15.1 % (ref 11.6–15.1)
GFR SERPL CREATININE-BSD FRML MDRD: 95 ML/MIN/1.73SQ M
GLUCOSE SERPL-MCNC: 109 MG/DL (ref 65–140)
GLUCOSE SERPL-MCNC: 121 MG/DL (ref 65–140)
GLUCOSE SERPL-MCNC: 151 MG/DL (ref 65–140)
GLUCOSE SERPL-MCNC: 95 MG/DL (ref 65–140)
HCT VFR BLD AUTO: 26 % (ref 34.8–46.1)
HGB BLD-MCNC: 8.3 G/DL (ref 11.5–15.4)
LYMPHOCYTES # BLD AUTO: 0.93 THOUSAND/UL (ref 0.6–4.47)
LYMPHOCYTES # BLD AUTO: 7 % (ref 14–44)
MACROCYTES BLD QL AUTO: PRESENT
MCH RBC QN AUTO: 31.4 PG (ref 26.8–34.3)
MCHC RBC AUTO-ENTMCNC: 31.9 G/DL (ref 31.4–37.4)
MCV RBC AUTO: 99 FL (ref 82–98)
MONOCYTES # BLD AUTO: 0.93 THOUSAND/UL (ref 0–1.22)
MONOCYTES NFR BLD: 7 % (ref 4–12)
MYELOCYTES NFR BLD MANUAL: 2 % (ref 0–1)
NEUTROPHILS # BLD MANUAL: 10.87 THOUSAND/UL (ref 1.85–7.62)
NEUTS SEG NFR BLD AUTO: 82 % (ref 43–75)
PLATELET # BLD AUTO: 352 THOUSANDS/UL (ref 149–390)
PLATELET BLD QL SMEAR: ADEQUATE
PMV BLD AUTO: 9.8 FL (ref 8.9–12.7)
POLYCHROMASIA BLD QL SMEAR: PRESENT
POTASSIUM SERPL-SCNC: 3.9 MMOL/L (ref 3.5–5.3)
RBC # BLD AUTO: 2.64 MILLION/UL (ref 3.81–5.12)
RBC MORPH BLD: PRESENT
SODIUM SERPL-SCNC: 138 MMOL/L (ref 135–147)
TARGETS BLD QL SMEAR: PRESENT
WBC # BLD AUTO: 13.26 THOUSAND/UL (ref 4.31–10.16)

## 2023-04-02 RX ORDER — INSULIN LISPRO 100 [IU]/ML
1-5 INJECTION, SOLUTION INTRAVENOUS; SUBCUTANEOUS
Status: DISCONTINUED | OUTPATIENT
Start: 2023-04-03 | End: 2023-04-03 | Stop reason: HOSPADM

## 2023-04-02 RX ADMIN — HEPARIN SODIUM 5000 UNITS: 5000 INJECTION INTRAVENOUS; SUBCUTANEOUS at 21:02

## 2023-04-02 RX ADMIN — PANTOPRAZOLE SODIUM 40 MG: 40 TABLET, DELAYED RELEASE ORAL at 05:51

## 2023-04-02 RX ADMIN — HEPARIN SODIUM 5000 UNITS: 5000 INJECTION INTRAVENOUS; SUBCUTANEOUS at 14:27

## 2023-04-02 RX ADMIN — METOPROLOL SUCCINATE 25 MG: 25 TABLET, EXTENDED RELEASE ORAL at 08:52

## 2023-04-02 RX ADMIN — LORATADINE 10 MG: 10 TABLET ORAL at 08:52

## 2023-04-02 RX ADMIN — HEPARIN SODIUM 5000 UNITS: 5000 INJECTION INTRAVENOUS; SUBCUTANEOUS at 05:51

## 2023-04-02 RX ADMIN — MONTELUKAST 10 MG: 10 TABLET, FILM COATED ORAL at 17:06

## 2023-04-02 RX ADMIN — BUDESONIDE AND FORMOTEROL FUMARATE DIHYDRATE 2 PUFF: 160; 4.5 AEROSOL RESPIRATORY (INHALATION) at 08:52

## 2023-04-02 RX ADMIN — FLUTICASONE PROPIONATE 1 SPRAY: 50 SPRAY, METERED NASAL at 08:52

## 2023-04-02 RX ADMIN — NYSTATIN: 100000 POWDER TOPICAL at 08:52

## 2023-04-02 RX ADMIN — ATORVASTATIN CALCIUM 40 MG: 40 TABLET, FILM COATED ORAL at 17:06

## 2023-04-02 RX ADMIN — NYSTATIN: 100000 POWDER TOPICAL at 17:06

## 2023-04-02 RX ADMIN — SALINE NASAL SPRAY 1 SPRAY: 1.5 SOLUTION NASAL at 17:06

## 2023-04-02 NOTE — PROGRESS NOTES
"Progress Note - Surgical Oncology   Sugey Dennis 67 y o  female MRN: [id number]  Unit/Bed#: [facility] 810-01 Encounter: [id number]    Assessment:  67 y o  female with PMH of ovarian carcinoma s/p cytroreductive surgery including an en bloc rectosigmoid resection with diverting ileostomy c/b anastomotic leak with presacral cavity, now s/p low anterior resection with revision of anastomosis on 3/27  Ureteral stents were placed prior to surgery and removed at the completion  Post-op course c/b emesis requiring NGT, now removed    VSS ORA  JOSÉ ANTONIO 105cc serosang mostly serous  Ileostomy 500cc liquid in bag  UOP 1 46L    Plan:  Advance diet as tolerated  Ensures  Strict I/Os  Maintain JOSÉ ANTONIO drain, monitor output and character  Appreciate APS recommendations - epidural removed  Appreciate nephrology recommendations  Appreciate endocrine recommendations - holding steriods in setting of prior leak  DVT PPX  OOB/ambulate  PT/OT: No needs  CM re: HHC    Subjective/Objective     Subjective:   Patient seen and examined bedside  OOB and ambulating  Tolerated clears/tst/crackers  Ostomy viable  Pain well controlled  Objective:     Blood pressure 110/66, pulse (!) 107, temperature 97 5 °F (36 4 °C), resp  rate 20, height 5' 4\" (1 626 m), weight 71 2 kg (157 lb), SpO2 95 %  ,Body mass index is 26 95 kg/m²  Intake/Output Summary (Last 24 hours) at 4/2/2023 7008  Last data filed at 4/2/2023 0321  Gross per 24 hour   Intake --   Output 1535 ml   Net -1535 ml       Invasive Devices     Central Venous Catheter Line  Duration           Port A Cath 12/08/22 Right Subclavian 115 days          Drain  Duration           Ileostomy Standard ([place]oul[place], zo)  days    Closed/Suction Drain Anterior; Left LLQ Bulb 19 Fr  5 days                Physical Exam:  General - no acute distress, responsive and cooperative  CV - warm, regular rate  Pulm - normal work of breathing, no respiratory distress  Abd - soft, nondistended, nontender, " incision c/d/i, ostomy viable w/stool per bag  Neuro - m/s grossly intact, cn grossly intact  Ext - moving all extremities

## 2023-04-02 NOTE — PLAN OF CARE
Problem: Prexisting or High Potential for Compromised Skin Integrity  Goal: Skin integrity is maintained or improved  Description: INTERVENTIONS:  - Identify patients at risk for skin breakdown  - Assess and monitor skin integrity  - Assess and monitor nutrition and hydration status  - Monitor labs   - Assess for incontinence   - Turn and reposition patient  - Assist with mobility/ambulation  - Relieve pressure over bony prominences  - Avoid friction and shearing  - Provide appropriate hygiene as needed including keeping skin clean and dry  - Evaluate need for skin moisturizer/barrier cream  - Collaborate with interdisciplinary team   - Patient/family teaching  - Consider wound care consult   Outcome: Progressing     Problem: MOBILITY - ADULT  Goal: Maintain or return to baseline ADL function  Description: INTERVENTIONS:  -  Assess patient's ability to carry out ADLs; assess patient's baseline for ADL function and identify physical deficits which impact ability to perform ADLs (bathing, care of mouth/teeth, toileting, grooming, dressing, etc )  - Assess/evaluate cause of self-care deficits   - Assess range of motion  - Assess patient's mobility; develop plan if impaired  - Assess patient's need for assistive devices and provide as appropriate  - Encourage maximum independence but intervene and supervise when necessary  - Involve family in performance of ADLs  - Assess for home care needs following discharge   - Consider OT consult to assist with ADL evaluation and planning for discharge  - Provide patient education as appropriate  Outcome: Progressing  Goal: Maintains/Returns to pre admission functional level  Description: INTERVENTIONS:  - Perform BMAT or MOVE assessment daily    - Set and communicate daily mobility goal to care team and patient/family/caregiver     - Collaborate with rehabilitation services on mobility goals if consulted  - Out of bed for toileting  - Record patient progress and toleration of activity level   Outcome: Progressing     Problem: PAIN - ADULT  Goal: Verbalizes/displays adequate comfort level or baseline comfort level  Description: Interventions:  - Encourage patient to monitor pain and request assistance  - Assess pain using appropriate pain scale  - Administer analgesics based on type and severity of pain and evaluate response  - Implement non-pharmacological measures as appropriate and evaluate response  - Consider cultural and social influences on pain and pain management  - Notify physician/advanced practitioner if interventions unsuccessful or patient reports new pain  Outcome: Progressing     Problem: INFECTION - ADULT  Goal: Absence or prevention of progression during hospitalization  Description: INTERVENTIONS:  - Assess and monitor for signs and symptoms of infection  - Monitor lab/diagnostic results  - Monitor all insertion sites, i e  indwelling lines, tubes, and drains  - Monitor endotracheal if appropriate and nasal secretions for changes in amount and color  - [place] appropriate cooling/warming therapies per order  - Administer medications as ordered  - Instruct and encourage patient and family to use good hand hygiene technique  - Identify and instruct in appropriate isolation precautions for identified infection/condition  Outcome: Progressing  Goal: Absence of fever/infection during neutropenic period  Description: INTERVENTIONS:  - Monitor WBC    Outcome: Progressing     Problem: SAFETY ADULT  Goal: Maintain or return to baseline ADL function  Description: INTERVENTIONS:  -  Assess patient's ability to carry out ADLs; assess patient's baseline for ADL function and identify physical deficits which impact ability to perform ADLs (bathing, care of mouth/teeth, toileting, grooming, dressing, etc )  - Assess/evaluate cause of self-care deficits   - Assess range of motion  - Assess patient's mobility; develop plan if impaired  - Assess patient's need for assistive devices and provide as appropriate  - Encourage maximum independence but intervene and supervise when necessary  - Involve family in performance of ADLs  - Assess for home care needs following discharge   - Consider OT consult to assist with ADL evaluation and planning for discharge  - Provide patient education as appropriate  Outcome: Progressing  Goal: Maintains/Returns to pre admission functional level  Description: INTERVENTIONS:  - Perform BMAT or MOVE assessment daily    - Set and communicate daily mobility goal to care team and patient/family/caregiver     - Collaborate with rehabilitation services on mobility goals if consulted  - Out of bed for toileting  - Record patient progress and toleration of activity level   Outcome: Progressing  Goal: Patient will remain free of falls  Description: INTERVENTIONS:  - Educate patient/family on patient safety including physical limitations  - Instruct patient to call for assistance with activity   - Consult OT/PT to assist with strengthening/mobility   - Keep Call bell within reach  - Keep bed low and locked with side rails adjusted as appropriate  - Keep care items and personal belongings within reach  - Initiate and maintain comfort rounds  - Make Fall Risk Sign visible to staff  - Apply yellow socks and bracelet for high fall risk patients  - Consider moving patient to room near nurses station  Outcome: Progressing     Problem: DISCHARGE PLANNING  Goal: Discharge to home or other facility with appropriate resources  Description: INTERVENTIONS:  - Identify barriers to discharge w/patient and caregiver  - Arrange for needed discharge resources and transportation as appropriate  - Identify discharge learning needs (meds, wound care, etc )  - Arrange for interpretive services to assist at discharge as needed  - Refer to Case Management Department for coordinating discharge planning if the patient needs post-hospital services based on physician/advanced practitioner order or complex needs related to functional status, cognitive ability, or social support system  Outcome: Progressing     Problem: Knowledge Deficit  Goal: Patient/family/caregiver demonstrates understanding of disease process, treatment plan, medications, and discharge instructions  Description: Complete learning assessment and assess knowledge base    Interventions:  - Provide teaching at level of understanding  - Provide teaching via preferred learning methods  Outcome: Progressing     Problem: GASTROINTESTINAL - ADULT  Goal: Minimal or absence of nausea and/or vomiting  Description: INTERVENTIONS:  - Administer IV fluids if ordered to ensure adequate hydration  - Maintain NPO status until nausea and vomiting are resolved  - Nasogastric tube if ordered  - Administer ordered antiemetic medications as needed  - Provide nonpharmacologic comfort measures as appropriate  - Advance diet as tolerated, if ordered  - Consider nutrition services referral to assist patient with adequate nutrition and appropriate food choices  Outcome: Progressing  Goal: Maintains or returns to baseline bowel function  Description: INTERVENTIONS:  - Assess bowel function  - Encourage oral fluids to ensure adequate hydration  - Administer IV fluids if ordered to ensure adequate hydration  - Administer ordered medications as needed  - Encourage mobilization and activity  - Consider nutritional services referral to assist patient with adequate nutrition and appropriate food choices  Outcome: Progressing  Goal: Maintains adequate nutritional intake  Description: INTERVENTIONS:  - Monitor percentage of each meal consumed  - Identify factors contributing to decreased intake, treat as appropriate  - Assist with meals as needed  - Monitor I&O, weight, and lab values if indicated  - Obtain nutrition services referral as needed  Outcome: Progressing  Goal: Establish and maintain optimal ostomy function  Description: INTERVENTIONS:  - Assess bowel function  - Encourage oral fluids to ensure adequate hydration  - Administer IV fluids if ordered to ensure adequate hydration   - Administer ordered medications as needed  - Encourage mobilization and activity  - Nutrition services referral to assist patient with appropriate food choices  - Assess stoma site  - Consider wound care consult   Outcome: Progressing  Goal: Oral mucous membranes remain intact  Description: INTERVENTIONS  - Assess oral mucosa and hygiene practices  - Implement preventative oral hygiene regimen  - Implement oral medicated treatments as ordered  - Initiate Nutrition services referral as needed  Outcome: Progressing     Problem: GENITOURINARY - ADULT  Goal: Maintains or returns to baseline urinary function  Description: INTERVENTIONS:  - Assess urinary function  - Encourage oral fluids to ensure adequate hydration if ordered  - Administer IV fluids as ordered to ensure adequate hydration  - Administer ordered medications as needed  - Offer frequent toileting  - Follow urinary retention protocol if ordered  Outcome: Progressing  Goal: Absence of urinary retention  Description: INTERVENTIONS:  - Assess patient's ability to void and empty bladder  - Monitor I/O  - Bladder scan as needed  - Discuss with physician/AP medications to alleviate retention as needed  - Discuss catheterization for long term situations as appropriate  Outcome: Progressing  Goal: Urinary catheter remains patent  Description: INTERVENTIONS:  - Assess patency of urinary catheter  - If patient has a chronic coffey, consider changing catheter if non-functioning  - Follow guidelines for intermittent irrigation of non-functioning urinary catheter  Outcome: Progressing     Problem: HEMATOLOGIC - ADULT  Goal: Maintains hematologic stability  Description: INTERVENTIONS  - Assess for signs and symptoms of bleeding or hemorrhage  - Monitor labs  - Administer supportive blood products/factors as ordered and appropriate  Outcome: Progressing

## 2023-04-03 ENCOUNTER — APPOINTMENT (INPATIENT)
Dept: RADIOLOGY | Facility: HOSPITAL | Age: 73
End: 2023-04-03

## 2023-04-03 VITALS
RESPIRATION RATE: 16 BRPM | WEIGHT: 157 LBS | SYSTOLIC BLOOD PRESSURE: 119 MMHG | OXYGEN SATURATION: 96 % | DIASTOLIC BLOOD PRESSURE: 68 MMHG | TEMPERATURE: 97.9 F | BODY MASS INDEX: 26.8 KG/M2 | HEIGHT: 64 IN | HEART RATE: 97 BPM

## 2023-04-03 LAB
ANION GAP SERPL CALCULATED.3IONS-SCNC: 4 MMOL/L (ref 4–13)
BUN SERPL-MCNC: 7 MG/DL (ref 5–25)
CALCIUM SERPL-MCNC: 8.4 MG/DL (ref 8.3–10.1)
CHLORIDE SERPL-SCNC: 106 MMOL/L (ref 96–108)
CO2 SERPL-SCNC: 27 MMOL/L (ref 21–32)
CREAT SERPL-MCNC: 0.58 MG/DL (ref 0.6–1.3)
ERYTHROCYTE [DISTWIDTH] IN BLOOD BY AUTOMATED COUNT: 15.4 % (ref 11.6–15.1)
GFR SERPL CREATININE-BSD FRML MDRD: 92 ML/MIN/1.73SQ M
GLUCOSE SERPL-MCNC: 122 MG/DL (ref 65–140)
GLUCOSE SERPL-MCNC: 128 MG/DL (ref 65–140)
GLUCOSE SERPL-MCNC: 134 MG/DL (ref 65–140)
HCT VFR BLD AUTO: 25.6 % (ref 34.8–46.1)
HGB BLD-MCNC: 8.2 G/DL (ref 11.5–15.4)
MAGNESIUM SERPL-MCNC: 1.8 MG/DL (ref 1.6–2.6)
MCH RBC QN AUTO: 31.7 PG (ref 26.8–34.3)
MCHC RBC AUTO-ENTMCNC: 32 G/DL (ref 31.4–37.4)
MCV RBC AUTO: 99 FL (ref 82–98)
PHOSPHATE SERPL-MCNC: 3.3 MG/DL (ref 2.3–4.1)
PLATELET # BLD AUTO: 371 THOUSANDS/UL (ref 149–390)
PMV BLD AUTO: 9.8 FL (ref 8.9–12.7)
POTASSIUM SERPL-SCNC: 3.5 MMOL/L (ref 3.5–5.3)
RBC # BLD AUTO: 2.59 MILLION/UL (ref 3.81–5.12)
SODIUM SERPL-SCNC: 137 MMOL/L (ref 135–147)
WBC # BLD AUTO: 15.9 THOUSAND/UL (ref 4.31–10.16)

## 2023-04-03 RX ORDER — MAGNESIUM SULFATE 1 G/100ML
1 INJECTION INTRAVENOUS ONCE
Status: COMPLETED | OUTPATIENT
Start: 2023-04-03 | End: 2023-04-03

## 2023-04-03 RX ORDER — OXYCODONE HYDROCHLORIDE 5 MG/1
TABLET ORAL
Qty: 5 TABLET | Refills: 0 | Status: SHIPPED | OUTPATIENT
Start: 2023-04-03

## 2023-04-03 RX ORDER — POTASSIUM CHLORIDE 20 MEQ/1
40 TABLET, EXTENDED RELEASE ORAL ONCE
Status: COMPLETED | OUTPATIENT
Start: 2023-04-03 | End: 2023-04-03

## 2023-04-03 RX ADMIN — NYSTATIN: 100000 POWDER TOPICAL at 08:10

## 2023-04-03 RX ADMIN — POTASSIUM CHLORIDE 40 MEQ: 1500 TABLET, EXTENDED RELEASE ORAL at 10:08

## 2023-04-03 RX ADMIN — FLUTICASONE PROPIONATE 1 SPRAY: 50 SPRAY, METERED NASAL at 08:10

## 2023-04-03 RX ADMIN — METOPROLOL SUCCINATE 25 MG: 25 TABLET, EXTENDED RELEASE ORAL at 08:10

## 2023-04-03 RX ADMIN — BUDESONIDE AND FORMOTEROL FUMARATE DIHYDRATE 2 PUFF: 160; 4.5 AEROSOL RESPIRATORY (INHALATION) at 08:10

## 2023-04-03 RX ADMIN — MAGNESIUM SULFATE HEPTAHYDRATE 1 G: 1 INJECTION, SOLUTION INTRAVENOUS at 11:53

## 2023-04-03 RX ADMIN — LORATADINE 10 MG: 10 TABLET ORAL at 08:10

## 2023-04-03 RX ADMIN — PANTOPRAZOLE SODIUM 40 MG: 40 TABLET, DELAYED RELEASE ORAL at 05:48

## 2023-04-03 RX ADMIN — IOHEXOL 100 ML: 350 INJECTION, SOLUTION INTRAVENOUS at 08:39

## 2023-04-03 RX ADMIN — HEPARIN SODIUM 5000 UNITS: 5000 INJECTION INTRAVENOUS; SUBCUTANEOUS at 05:48

## 2023-04-03 NOTE — PLAN OF CARE
Problem: Prexisting or High Potential for Compromised Skin Integrity  Goal: Skin integrity is maintained or improved  Description: INTERVENTIONS:  - Identify patients at risk for skin breakdown  - Assess and monitor skin integrity  - Assess and monitor nutrition and hydration status  - Monitor labs   - Assess for incontinence   - Turn and reposition patient  - Assist with mobility/ambulation  - Relieve pressure over bony prominences  - Avoid friction and shearing  - Provide appropriate hygiene as needed including keeping skin clean and dry  - Evaluate need for skin moisturizer/barrier cream  - Collaborate with interdisciplinary team   - Patient/family teaching  - Consider wound care consult   Outcome: Progressing     Problem: MOBILITY - ADULT  Goal: Maintain or return to baseline ADL function  Description: INTERVENTIONS:  -  Assess patient's ability to carry out ADLs; assess patient's baseline for ADL function and identify physical deficits which impact ability to perform ADLs (bathing, care of mouth/teeth, toileting, grooming, dressing, etc )  - Assess/evaluate cause of self-care deficits   - Assess range of motion  - Assess patient's mobility; develop plan if impaired  - Assess patient's need for assistive devices and provide as appropriate  - Encourage maximum independence but intervene and supervise when necessary  - Involve family in performance of ADLs  - Assess for home care needs following discharge   - Consider OT consult to assist with ADL evaluation and planning for discharge  - Provide patient education as appropriate  Outcome: Progressing  Goal: Maintains/Returns to pre admission functional level  Description: INTERVENTIONS:  - Perform BMAT or MOVE assessment daily    - Set and communicate daily mobility goal to care team and patient/family/caregiver     - Collaborate with rehabilitation services on mobility goals if consulted  - Out of bed for toileting  - Record patient progress and toleration of activity level   Outcome: Progressing     Problem: PAIN - ADULT  Goal: Verbalizes/displays adequate comfort level or baseline comfort level  Description: Interventions:  - Encourage patient to monitor pain and request assistance  - Assess pain using appropriate pain scale  - Administer analgesics based on type and severity of pain and evaluate response  - Implement non-pharmacological measures as appropriate and evaluate response  - Consider cultural and social influences on pain and pain management  - Notify physician/advanced practitioner if interventions unsuccessful or patient reports new pain  Outcome: Progressing     Problem: INFECTION - ADULT  Goal: Absence or prevention of progression during hospitalization  Description: INTERVENTIONS:  - Assess and monitor for signs and symptoms of infection  - Monitor lab/diagnostic results  - Monitor all insertion sites, i e  indwelling lines, tubes, and drains  - Monitor endotracheal if appropriate and nasal secretions for changes in amount and color  - [place] appropriate cooling/warming therapies per order  - Administer medications as ordered  - Instruct and encourage patient and family to use good hand hygiene technique  - Identify and instruct in appropriate isolation precautions for identified infection/condition  Outcome: Progressing  Goal: Absence of fever/infection during neutropenic period  Description: INTERVENTIONS:  - Monitor WBC    Outcome: Progressing     Problem: SAFETY ADULT  Goal: Maintain or return to baseline ADL function  Description: INTERVENTIONS:  -  Assess patient's ability to carry out ADLs; assess patient's baseline for ADL function and identify physical deficits which impact ability to perform ADLs (bathing, care of mouth/teeth, toileting, grooming, dressing, etc )  - Assess/evaluate cause of self-care deficits   - Assess range of motion  - Assess patient's mobility; develop plan if impaired  - Assess patient's need for assistive devices and provide as appropriate  - Encourage maximum independence but intervene and supervise when necessary  - Involve family in performance of ADLs  - Assess for home care needs following discharge   - Consider OT consult to assist with ADL evaluation and planning for discharge  - Provide patient education as appropriate  Outcome: Progressing  Goal: Maintains/Returns to pre admission functional level  Description: INTERVENTIONS:  - Perform BMAT or MOVE assessment daily    - Set and communicate daily mobility goal to care team and patient/family/caregiver     - Collaborate with rehabilitation services on mobility goals if consulted  - Out of bed for toileting  - Record patient progress and toleration of activity level   Outcome: Progressing  Goal: Patient will remain free of falls  Description: INTERVENTIONS:  - Educate patient/family on patient safety including physical limitations  - Instruct patient to call for assistance with activity   - Consult OT/PT to assist with strengthening/mobility   - Keep Call bell within reach  - Keep bed low and locked with side rails adjusted as appropriate  - Keep care items and personal belongings within reach  - Initiate and maintain comfort rounds  - Make Fall Risk Sign visible to staff  - Apply yellow socks and bracelet for high fall risk patients  - Consider moving patient to room near nurses station  Outcome: Progressing     Problem: GASTROINTESTINAL - ADULT  Goal: Minimal or absence of nausea and/or vomiting  Description: INTERVENTIONS:  - Administer IV fluids if ordered to ensure adequate hydration  - Maintain NPO status until nausea and vomiting are resolved  - Nasogastric tube if ordered  - Administer ordered antiemetic medications as needed  - Provide nonpharmacologic comfort measures as appropriate  - Advance diet as tolerated, if ordered  - Consider nutrition services referral to assist patient with adequate nutrition and appropriate food choices  Outcome: Progressing  Goal: Maintains or returns to baseline bowel function  Description: INTERVENTIONS:  - Assess bowel function  - Encourage oral fluids to ensure adequate hydration  - Administer IV fluids if ordered to ensure adequate hydration  - Administer ordered medications as needed  - Encourage mobilization and activity  - Consider nutritional services referral to assist patient with adequate nutrition and appropriate food choices  Outcome: Progressing  Goal: Maintains adequate nutritional intake  Description: INTERVENTIONS:  - Monitor percentage of each meal consumed  - Identify factors contributing to decreased intake, treat as appropriate  - Assist with meals as needed  - Monitor I&O, weight, and lab values if indicated  - Obtain nutrition services referral as needed  Outcome: Progressing  Goal: Establish and maintain optimal ostomy function  Description: INTERVENTIONS:  - Assess bowel function  - Encourage oral fluids to ensure adequate hydration  - Administer IV fluids if ordered to ensure adequate hydration   - Administer ordered medications as needed  - Encourage mobilization and activity  - Nutrition services referral to assist patient with appropriate food choices  - Assess stoma site  - Consider wound care consult   Outcome: Progressing  Goal: Oral mucous membranes remain intact  Description: INTERVENTIONS  - Assess oral mucosa and hygiene practices  - Implement preventative oral hygiene regimen  - Implement oral medicated treatments as ordered  - Initiate Nutrition services referral as needed  Outcome: Progressing

## 2023-04-03 NOTE — DISCHARGE INSTR - AVS FIRST PAGE
Routine ostomy care    Call the office [phone] if ileostomy output > 2 liters for 2 days in a row    Stay hydrated    May shower    No heavy lifting, pushing, pulling, no vacuuming x 5 weeks    NO LAXATIVES    NOTHING PER RECTUM (suppositories, enemas, etc)    Colace as stool softener okay

## 2023-04-03 NOTE — PROGRESS NOTES
"Progress Note - Surgical Oncology  Adriana Conroy 67 y o  female MRN: [id number]  Unit/Bed#: [facility] 810-01 Encounter: [id number]      Objective: Patient is doing well this morning  No nausea, no emesis  Her ileostomy has soft formed stool  No fevers, no chills  Stays slightly tachycardic in the low 100s  Patient ambulating the halls well with a walker  Still with little appetite however tolerating her diabetic low fiber diet  Christiano-Quinones drain was removed yesterday  Patient is voiding well on her own, anxious to get home  200 + 4 UA  50 JOSÉ ANTONIO - out now  350 ileostomy, soft stool    Blood pressure 118/69, pulse (!) 110, temperature 97 5 °F (36 4 °C), resp  rate 20, height 5' 4\" (1 626 m), weight 71 2 kg (157 lb), SpO2 96 %  ,Body mass index is 26 95 kg/m²  Intake/Output Summary (Last 24 hours) at 4/3/2023 0518  Last data filed at 4/3/2023 0200  Gross per 24 hour   Intake 360 ml   Output 600 ml   Net -240 ml       Invasive Devices     Central Venous Catheter Line  Duration           Port A Cath 12/08/22 Right Subclavian 116 days          Drain  Duration           Ileostomy Standard (Loulou, zo)  days    Closed/Suction Drain Anterior; Left LLQ Bulb 19 Fr  6 days                Physical Exam:   Abdomen: Soft, nondistended, midline surgical wound is clean and dry with staples intact, Christiano-Quinones site left abdomen is slightly erythematous, Tegaderm removed, no drainage, dry dressing, ileostomy with soft formed stool, tenderness only over the midline incision  Extremities: No pedal edema no calf tenderness bilaterally, Venodyne's are on functioning bilaterally    Lab, Imaging and other studies: Pending  VTE Pharmacologic Prophylaxis: Heparin  VTE Mechanical Prophylaxis: sequential compression device    Assessment:  POD # 7 preoperative ureteral stenting and subsequent removal, exploratory laparotomy, low anterior resection  Postoperative ileus    Plan:  1    Check a m  labs specifically WBC (patient " postsplenectomy in October 2022)  2  Continue ileostomy care and teaching  3  Continue ambulating the halls multiple times throughout the day  4   DC planning, PT OT cleared with no needs  5    Home soon      Addendum: WBC 15 9 this am  To have CTAP with IV contrast only

## 2023-04-03 NOTE — DISCHARGE SUMMARY
Discharge Summary - Surgical Oncology   Jud Saldana 67 y o  female MRN: [id number]  Unit/Bed#: [facility] 810-01 Encounter: [id number]    Admission Date: 3/27/2023     Admitting Diagnosis: Malignant neoplasm of left ovary ([address] ) [C56 2]  Ileostomy, has currently [facility]) [Z93 2]    HPI: Lilia Rodríguez is a 66-year-old woman with a history of ovarian cancer  Patient previously had an exploratory lap modified radical hysterectomy bilateral salpingo-oophorectomy with an en bloc rectosigmoid resection, gastrocolic omentectomy along with a splenectomy and small bowel resection  Patient also had a  Diaphragmatic resection, excision ablation of peritoneal implants within ileostomy back in October 2022  Patient now underwent a barium enema that showed a stricture at her rectal anastomosis  A follow-up sigmoidoscopy revealed she had an anastomotic leak with a perianastomotic cavity that had an 8 mm opening  She is now coming in for revision of her anastomosis  Procedures Performed: 3/27/2023 preoperative ureteral stenting with subsequent removal, low anterior colon resection -Dr Arelis Schultz: Patient has done well postoperatively  Patient had a nasogastric tube for a few days postop due to an expected ileus  Patient was started on a clear liquid diet which she initially tolerated, then her abdomen became distended tympanic, and the nasogastric tube had to be reinserted  Patient's ileostomy started to function 4 to 5 days into her postoperative course  Patient was initially restarted on her prednisone 2 mg daily and then was discontinued for the fear of nonhealing of her anastomosis  Patient has continued to be afebrile, she has not had any of her PMR symptoms  Patient's Christiano-Quinones drain was pulled prior to discharge since the output was minimal   Patient is able to tolerate a high-fiber diabetic diet    We placed her on a high-fiber diet with Metamucil 3 times daily with meals due to a high ileostomy output of fluid  She is now having soft formed stool from her ileostomy  Her abdomen is soft and nondistended  Her midline surgical wound has her staples intact  Patient is up and ambulating within her room without a walker but is using a walker when ambulating the [place]  Patient had her port accessed since she is a difficult peripheral IVs stick  Patient's port was deaccessed prior to her discharge  She will stay off her prednisone until she sees Dr Kyar Garcia in the office on 4/13/2023 at 2:30 in the afternoon at the [place] office  She has an appointment to see Dr Katie Gomez on 4/6/2023 at 1:45 in the afternoon at the 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      B  Colon (anastomotic donuts):  - Colon with no diagnostic abnormality  - No carcinoma identified          Complications: None    Discharge Diagnosis: Same    Discharge Date: 4/3/23    Condition at Discharge: Good    Discharge instructions/Information to patient and family:   See after visit summary for information provided to patient and family  Provisions for Follow-Up Care:  See after visit summary for information related to follow-up care and any pertinent home health orders  Disposition: Home with [facility]    Planned Readmission: No    Discharge Statement   I spent 45 minutes discharging the patient   This time was spent on the day of discharge  I had direct contact with the patient on the day of discharge  Additional documentation is required if more than 30 minutes were spent on discharge  Discharge Medications:  See after visit summary for reconciled discharge medications provided to patient and family

## 2023-04-06 ENCOUNTER — OFFICE VISIT (OUTPATIENT)
Age: 73
End: 2023-04-06

## 2023-04-06 ENCOUNTER — DOCUMENTATION (OUTPATIENT)
Dept: HEMATOLOGY ONCOLOGY | Facility: CLINIC | Age: 73
End: 2023-04-06

## 2023-04-06 VITALS
BODY MASS INDEX: 26.7 KG/M2 | SYSTOLIC BLOOD PRESSURE: 112 MMHG | TEMPERATURE: 97.7 F | WEIGHT: 156.4 LBS | HEIGHT: 64 IN | DIASTOLIC BLOOD PRESSURE: 70 MMHG

## 2023-04-06 DIAGNOSIS — C56.2 MALIGNANT NEOPLASM OF LEFT OVARY (HCC): Primary | ICD-10-CM

## 2023-04-06 NOTE — ASSESSMENT & PLAN NOTE
70-year-old with stage IIIc ovarian cancer who is recently status post resection of the low rectal anastomosis with 3 anastomosis due to evidence of stricture and leak  She has an existing ileostomy  CT abdomen pelvis from 4/3/2023 reveals a fluid collection by the spleen which is stable and a fluid collection adjacent to the rectum  Final pathology from the low rectal anastomosis resection on 3/27/2023 revealed tumor cells within lymphovascular spaces  There was no visible evidence of malignancy  She is recovering well from her surgery  Her performance status is 1   1   I discussed that the finding of microscopic disease is unlikely to change the current management plan which is to start maintenance niraparib therapy when she has recovered from surgery  Bevacizumab would not be utilized given need for wound healing and plan to take down her ileostomy  Will discuss treatment planning with multidisciplinary tumor board as this finding would technically be considered platinum refractory disease  2   After multidisciplinary tumor board discussion, will plan for maintenance treatment with niraparib at 200 mg daily prior to her ileostomy takedown unless cytotoxic chemotherapy is recommended

## 2023-04-06 NOTE — LETTER
April 6, 2023     Jannet Lee MD  [address] [facility]  [address] [facility] [address] [facility]    Patient: Ninoska Norwood   YOB: 1950   Date of Visit: 4/6/2023       Dear Dr Enma Graham:    Thank you for referring Gurpreet Jean-Baptiste to me for evaluation  Below are my notes for this consultation  If you have questions, please do not hesitate to call me  I look forward to following your patient along with you  Sincerely,        Home Card MD        CC: No Recipients  Home Card MD  4/6/2023  5:26 PM  Sign when Signing Visit  Assessment/Plan:    Problem List Items Addressed This Visit        Endocrine    Malignant neoplasm of left ovary ([address] ) - Primary     51-year-old with stage IIIc ovarian cancer who is recently status post resection of the low rectal anastomosis with 3 anastomosis due to evidence of stricture and leak  She has an existing ileostomy  CT abdomen pelvis from 4/3/2023 reveals a fluid collection by the spleen which is stable and a fluid collection adjacent to the rectum  Final pathology from the low rectal anastomosis resection on 3/27/2023 revealed tumor cells within lymphovascular spaces  There was no visible evidence of malignancy  She is recovering well from her surgery  Her performance status is 1   1   I discussed that the finding of microscopic disease is unlikely to change the current management plan which is to start maintenance niraparib therapy when she has recovered from surgery  Bevacizumab would not be utilized given need for wound healing and plan to take down her ileostomy  Will discuss treatment planning with multidisciplinary tumor board as this finding would technically be considered platinum refractory disease  2   After multidisciplinary tumor board discussion, will plan for maintenance treatment with niraparib at 200 mg daily prior to her ileostomy takedown unless cytotoxic chemotherapy is recommended                CHIEF COMPLAINT: Review pathology from recent surgery      Problem:  Cancer Staging   Malignant neoplasm of left ovary (HCC)  Staging form: Ovary, Fallopian Tube, Primary Peritoneal, AJCC 8th Edition  - Clinical: FIGO Stage IIIC (cT3c, cN0, cM0) - Signed by Jane Lind MD on 11/29/2022        Previous therapy:  Oncology History   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 [address] )   7/21/2022 Initial Diagnosis    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[facility] [address] )     8/4/2022 Surgery    Diagnostic laparoscopy, peritoneal biopsy     8/18/2022 Genetic Testing    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, serous    Results revealed patient has the following mutation(s):  None     Malignant neoplasm of left ovary (HCC)   8/4/2022 Surgery    Diagnostic laparoscopy, peritoneal biopsy     8/9/2022 Initial Diagnosis    Gynecologic malignancy ([facility] [address] )     8/23/2022 -  Chemotherapy    palonosetron (ALOXI), 0 25 mg, Intravenous, Once, 6 of 6 cycles  Administration: 0 25 mg (8/23/2022), 0 25 mg (9/13/2022), 0 25 mg (10/4/2022), 0 25 mg (12/13/2022), 0 25 mg (1/3/2023), 0 25 mg (1/24/2023)  fosaprepitant (EMEND) IVPB, 150 mg, Intravenous, Once, 6 of 6 cycles  Administration: 150 mg (8/23/2022), 150 mg (9/13/2022), 150 mg (10/4/2022), 150 mg (12/13/2022), 150 mg (1/3/2023), 150 mg (1/24/2023)  CARBOplatin (PARAPLATIN) IVPB (GO AUC DOSING), 605 4 mg, Intravenous, Once, 6 of 6 cycles  Administration: 600 mg (8/23/2022), 564 mg (9/13/2022), 601 8 mg (10/4/2022), 491 5 mg (12/13/2022), 468 5 mg (1/3/2023), 488 mg (1/24/2023)  bevacizumab (AVASTIN) IVPB, 1,252 5 mg, Intravenous, Once, 3 of 3 cycles  Dose modification: 10 mg/kg (original dose 15 mg/kg, Cycle 5, Reason: Other (Must fill in a comment))  Administration: 1,200 mg (8/23/2022), 1,200 mg (9/13/2022), 745 mg (1/3/2023)  PACLItaxel (TAXOL) chemo IVPB, 175 mg/m2 = 330 6 mg, Intravenous, Once, 6 of 6 cycles  Dose modification: 135 mg/m2 (original dose 175 mg/m2, Cycle 4, Reason: Other (Must fill in a comment))  Administration: 330 6 mg (8/23/2022), 330 6 mg (9/13/2022), 330 6 mg (10/4/2022), 246 mg (12/13/2022), 244 2 mg (1/3/2023), 244 2 mg (1/24/2023)     10/28/2022 Surgery    diagnostic laparoscopy, exploratory laparotomy, modified radical hysterectomy, bilateral salpingo-oophorectomy with en bloc rectosigmoid resection, gastrocolic omentectomy, splenectomy, small bowel resection with reanastomosis, diaphragm resection, excision ablation of peritoneal implants, ileostomy formation, optimal tumor debulking     11/29/2022 -  Cancer Staged    Staging form: Ovary, Fallopian Tube, Primary Peritoneal, AJCC 8th Edition  - Clinical: FIGO Stage IIIC (cT3c, cN0, cM0) - Signed by Home Card MD on 11/29/2022  Histologic grade (G): G3  Histologic grading system: 4 grade system       3/27/2023 Surgery    Low anterior resection  Residual tumor identified in lymphovascular spaces           Patient ID: Ninoska Norwood is a 67 y o  female  Returns for treatment discussion after exploratory laparotomy, resection of strictured prior low rectal anastomosis and redo of the low rectal anastomosis on 3/27/2023  There was no visible evidence of malignancy identified at the time of surgery, however, final pathology revealed lymphovascular space invasion  Her most recent CT scan on 4/3/2023 of the abdomen pelvis revealed a small fluid collection posterior to the rectum with some adjacent inflammatory change, fluid collection adjacent to the spleen measuring 5 7 x 5 3 cm which is stable  She is ambulating with the assistance of a walker  She is tolerating protein shakes  Overall, she stated that recovery from the surgery has been somewhat easier than recovery from her tumor debulking surgery        The following portions of the patient's history were reviewed and updated as appropriate: allergies, current medications, past family history, past medical history, past social history, past surgical history and problem list     Review of Systems   Constitutional: Positive for fatigue  Negative for activity change and unexpected weight change  HENT: Negative  Eyes: Negative  Respiratory: Negative  Cardiovascular: Negative  Gastrointestinal: Negative for abdominal distention and abdominal pain  Endocrine: Negative  Genitourinary: Negative for pelvic pain and vaginal bleeding  Musculoskeletal: Positive for gait problem  Skin: Negative  Allergic/Immunologic: Negative  Neurological: Positive for weakness  Hematological: Negative  Psychiatric/Behavioral: Negative          Current Outpatient Medications   Medication Sig Dispense Refill   • Acetaminophen (TYLENOL ARTHRITIS PAIN PO) Take by mouth as needed     • Ascorbic Acid (VITAMIN C GUMMIE PO) Take by mouth     • aspirin (ECOTRIN LOW STRENGTH) 81 mg EC tablet Take 81 mg by mouth daily     • B Complex-C (B COMPLEX-VITAMIN C PO) Take by mouth daily     • Calcium Carb-Cholecalciferol (CALCIUM 500+D3 PO) Take 1 tablet by mouth 2 (two) times a day      • cholecalciferol (VITAMIN D3) 1,000 units tablet Take 1,000 Units by mouth daily     • Docusate Calcium (STOOL SOFTENER PO) Take 1 tablet by mouth 2 (two) times a day     • famotidine (PEPCID) 40 MG tablet TAKE 1 TABLET BY MOUTH DAILY AT BEDTIME 90 tablet 3   • fexofenadine (ALLEGRA) 180 MG tablet Take 180 mg by mouth daily     • GLUCOSAMINE-CHONDROITIN PO Take 1 tablet by mouth 2 (two) times a day      • lidocaine-prilocaine (EMLA) cream Apply topically as needed for mild pain 30 g 0   • LORazepam (ATIVAN) 1 mg tablet Take 1 tablet (1 mg total) by mouth every 8 (eight) hours as needed for anxiety (nausea or anxiety) 30 tablet 0   • metFORMIN (GLUCOPHAGE) 500 mg tablet Take 500 mg by mouth daily Daily at lunch     • metoprolol succinate (TOPROL-XL) 25 mg 24 hr tablet Take 25 mg by mouth daily dinner     • montelukast (SINGULAIR) 10 mg tablet Take 10 mg by mouth daily dinner     • multivitamin (THERAGRAN) TABS "Take 1 tablet by mouth daily     • nystatin (MYCOSTATIN) powder Apply topically 4 (four) times a day Under abdominal pannus and in between thighs     • omeprazole (PriLOSEC) 20 mg delayed release capsule TAKE 1 CAPSULE BY MOUTH EVERY DAY 30 MINUTES BEFORE MORNING MEAL FOR 90 DAYS     • ondansetron (ZOFRAN) 8 mg tablet Take 1 tablet (8 mg total) by mouth every 8 (eight) hours as needed for nausea or vomiting 20 tablet 1   • oxyCODONE (ROXICODONE) 5 immediate release tablet 1/2 tab every 6 hours as needed for pain 5 tablet 0   • Oxymetazoline HCl (NASAL SPRAY NA) into each nostril     • Probiotic Product (PROBIOTIC DAILY PO) Take by mouth daily     • simvastatin (ZOCOR) 10 mg tablet Take 10 mg by mouth daily at bedtime     • sodium chloride (OCEAN) 0 65 % nasal spray 1 spray into each nostril as needed for congestion     • SYMBICORT 160-4 5 MCG/ACT inhaler 2 puffs daily      • Triamcinolone Acetonide (NASACORT ALLERGY 24HR NA) into each nostril       No current facility-administered medications for this visit  Facility-Administered Medications Ordered in Other Visits   Medication Dose Route Frequency Provider Last Rate Last Admin   • alteplase (CATHFLO) injection 2 mg  2 mg Intracatheter Q2H PRN Davi Lloyd MD               Objective:    Blood pressure 112/70, temperature 97 7 °F (36 5 °C), temperature source Tympanic, height 5' 4\" (1 626 m), weight 70 9 kg (156 lb 6 4 oz)  Body mass index is 26 85 kg/m²  Body surface area is 1 76 meters squared  Physical Exam  Vitals reviewed  Constitutional:       General: She is not in acute distress  Appearance: Normal appearance  She is not ill-appearing  HENT:      Head: Normocephalic and atraumatic  Mouth/Throat:      Mouth: Mucous membranes are moist    Eyes:      General: No scleral icterus  Right eye: No discharge  Left eye: No discharge        Conjunctiva/sclera: Conjunctivae normal    Pulmonary:      Effort: Pulmonary effort " is normal    Musculoskeletal:      Right lower leg: No edema  Left lower leg: No edema  Skin:     General: Skin is warm and dry  Coloration: Skin is not jaundiced  Findings: No rash  Neurological:      General: No focal deficit present  Mental Status: She is alert and oriented to person, place, and time  Cranial Nerves: No cranial nerve deficit  Motor: No weakness  Gait: Gait normal    Psychiatric:         Mood and Affect: Mood normal          Behavior: Behavior normal          Thought Content:  Thought content normal          Judgment: Judgment normal          Lab Results   Component Value Date     6 2 03/24/2023     Lab Results   Component Value Date    K 3 5 04/06/2023     04/06/2023    CO2 26 04/06/2023    BUN 9 04/06/2023    CREATININE 0 50 (L) 04/06/2023    GLUCOSE 214 (H) 10/28/2022    GLUF 161 (H) 12/08/2022    CALCIUM 8 6 04/06/2023    CORRECTEDCA 9 0 02/09/2023    AST 14 03/24/2023    ALT 10 03/24/2023    ALKPHOS 76 03/24/2023    EGFR 97 04/06/2023     Lab Results   Component Value Date    WBC 14 50 (H) 04/06/2023    HGB 8 4 (L) 04/06/2023    HCT 25 7 (L) 04/06/2023    MCV 98 04/06/2023     (H) 04/06/2023     Lab Results   Component Value Date    NEUTROABS 7 86 (H) 03/31/2023        Trend:  Lab Results   Component Value Date     6 2 03/24/2023     5 1 03/02/2023     5 3 02/09/2023     8 7 01/19/2023     15 8 12/30/2022     37 9 (H) 12/08/2022     8 6 10/20/2022     8 4 10/07/2022     12 5 09/29/2022     32 0 (H) 09/08/2022     52 4 (H) 09/02/2022     85 5 (H) 08/26/2022     77 3 (H) 08/19/2022

## 2023-04-06 NOTE — PROGRESS NOTES
Assessment/Plan:    Problem List Items Addressed This Visit        Endocrine    Malignant neoplasm of left ovary ([facility] [address] ) - Primary     26-year-old with stage IIIc ovarian cancer who is recently status post resection of the low rectal anastomosis with 3 anastomosis due to evidence of stricture and leak  She has an existing ileostomy  CT abdomen pelvis from 4/3/2023 reveals a fluid collection by the spleen which is stable and a fluid collection adjacent to the rectum  Final pathology from the low rectal anastomosis resection on 3/27/2023 revealed tumor cells within lymphovascular spaces  There was no visible evidence of malignancy  She is recovering well from her surgery  Her performance status is 1   1   I discussed that the finding of microscopic disease is unlikely to change the current management plan which is to start maintenance niraparib therapy when she has recovered from surgery  Bevacizumab would not be utilized given need for wound healing and plan to take down her ileostomy  Will discuss treatment planning with multidisciplinary tumor board as this finding would technically be considered platinum refractory disease  2   After multidisciplinary tumor board discussion, will plan for maintenance treatment with niraparib at 200 mg daily prior to her ileostomy takedown unless cytotoxic chemotherapy is recommended                CHIEF COMPLAINT: Review pathology from recent surgery      Problem:  Cancer Staging   Malignant neoplasm of left ovary (HCC)  Staging form: Ovary, Fallopian Tube, Primary Peritoneal, AJCC 8th Edition  - Clinical: FIGO Stage IIIC (cT3c, cN0, cM0) - Signed by Shawna Guillen MD on 11/29/2022        Previous therapy:  Oncology History   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[address] )   7/21/2022 Initial Diagnosis    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[address] )     8/4/2022 Surgery    Diagnostic laparoscopy, peritoneal biopsy     8/18/2022 Genetic Testing    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, serous    Results revealed patient has the following mutation(s):  None     Malignant neoplasm of left ovary (HCC)   8/4/2022 Surgery    Diagnostic laparoscopy, peritoneal biopsy     8/9/2022 Initial Diagnosis    Gynecologic malignancy ([facility] [address] )     8/23/2022 -  Chemotherapy    palonosetron (ALOXI), 0 25 mg, Intravenous, Once, 6 of 6 cycles  Administration: 0 25 mg (8/23/2022), 0 25 mg (9/13/2022), 0 25 mg (10/4/2022), 0 25 mg (12/13/2022), 0 25 mg (1/3/2023), 0 25 mg (1/24/2023)  fosaprepitant (EMEND) IVPB, 150 mg, Intravenous, Once, 6 of 6 cycles  Administration: 150 mg (8/23/2022), 150 mg (9/13/2022), 150 mg (10/4/2022), 150 mg (12/13/2022), 150 mg (1/3/2023), 150 mg (1/24/2023)  CARBOplatin (PARAPLATIN) IVPB (GOG AUC DOSING), 605 4 mg, Intravenous, Once, 6 of 6 cycles  Administration: 600 mg (8/23/2022), 564 mg (9/13/2022), 601 8 mg (10/4/2022), 491 5 mg (12/13/2022), 468 5 mg (1/3/2023), 488 mg (1/24/2023)  bevacizumab (AVASTIN) IVPB, 1,252 5 mg, Intravenous, Once, 3 of 3 cycles  Dose modification: 10 mg/kg (original dose 15 mg/kg, Cycle 5, Reason: Other (Must fill in a comment))  Administration: 1,200 mg (8/23/2022), 1,200 mg (9/13/2022), 745 mg (1/3/2023)  PACLItaxel (TAXOL) chemo IVPB, 175 mg/m2 = 330 6 mg, Intravenous, Once, 6 of 6 cycles  Dose modification: 135 mg/m2 (original dose 175 mg/m2, Cycle 4, Reason: Other (Must fill in a comment))  Administration: 330 6 mg (8/23/2022), 330 6 mg (9/13/2022), 330 6 mg (10/4/2022), 246 mg (12/13/2022), 244 2 mg (1/3/2023), 244 2 mg (1/24/2023)     10/28/2022 Surgery    diagnostic laparoscopy, exploratory laparotomy, modified radical hysterectomy, bilateral salpingo-oophorectomy with en bloc rectosigmoid resection, gastrocolic omentectomy, splenectomy, small bowel resection with reanastomosis, diaphragm resection, excision ablation of peritoneal implants, ileostomy formation, optimal tumor debulking     11/29/2022 -  Cancer Staged    Staging form: Ovary, Fallopian Tube, Primary Peritoneal, AJCC 8th Edition  - Clinical: FIGO Stage IIIC (cT3c, cN0, cM0) - Signed by Bettina Murphy MD on 11/29/2022  Histologic grade (G): G3  Histologic grading system: 4 grade system       3/27/2023 Surgery    Low anterior resection  Residual tumor identified in lymphovascular spaces           Patient ID: Whitman Bosworth is a 67 y o  female  Returns for treatment discussion after exploratory laparotomy, resection of strictured prior low rectal anastomosis and redo of the low rectal anastomosis on 3/27/2023  There was no visible evidence of malignancy identified at the time of surgery, however, final pathology revealed lymphovascular space invasion  Her most recent CT scan on 4/3/2023 of the abdomen pelvis revealed a small fluid collection posterior to the rectum with some adjacent inflammatory change, fluid collection adjacent to the spleen measuring 5 7 x 5 3 cm which is stable  She is ambulating with the assistance of a walker  She is tolerating protein shakes  Overall, she stated that recovery from the surgery has been somewhat easier than recovery from her tumor debulking surgery  The following portions of the patient's history were reviewed and updated as appropriate: allergies, current medications, past family history, past medical history, past social history, past surgical history and problem list     Review of Systems   Constitutional: Positive for fatigue  Negative for activity change and unexpected weight change  HENT: Negative  Eyes: Negative  Respiratory: Negative  Cardiovascular: Negative  Gastrointestinal: Negative for abdominal distention and abdominal pain  Endocrine: Negative  Genitourinary: Negative for pelvic pain and vaginal bleeding  Musculoskeletal: Positive for gait problem  Skin: Negative  Allergic/Immunologic: Negative  Neurological: Positive for weakness  Hematological: Negative      Psychiatric/Behavioral: Negative          Current Outpatient Medications   Medication Sig Dispense Refill   • Acetaminophen (TYLENOL ARTHRITIS PAIN PO) Take by mouth as needed     • Ascorbic Acid (VITAMIN C GUMMIE PO) Take by mouth     • aspirin (ECOTRIN LOW STRENGTH) 81 mg EC tablet Take 81 mg by mouth daily     • B Complex-C (B COMPLEX-VITAMIN C PO) Take by mouth daily     • Calcium Carb-Cholecalciferol (CALCIUM 500+D3 PO) Take 1 tablet by mouth 2 (two) times a day      • cholecalciferol (VITAMIN D3) 1,000 units tablet Take 1,000 Units by mouth daily     • Docusate Calcium (STOOL SOFTENER PO) Take 1 tablet by mouth 2 (two) times a day     • famotidine (PEPCID) 40 MG tablet TAKE 1 TABLET BY MOUTH DAILY AT BEDTIME 90 tablet 3   • fexofenadine (ALLEGRA) 180 MG tablet Take 180 mg by mouth daily     • GLUCOSAMINE-CHONDROITIN PO Take 1 tablet by mouth 2 (two) times a day      • lidocaine-prilocaine (EMLA) cream Apply topically as needed for mild pain 30 g 0   • LORazepam (ATIVAN) 1 mg tablet Take 1 tablet (1 mg total) by mouth every 8 (eight) hours as needed for anxiety (nausea or anxiety) 30 tablet 0   • metFORMIN (GLUCOPHAGE) 500 mg tablet Take 500 mg by mouth daily Daily at lunch     • metoprolol succinate (TOPROL-XL) 25 mg 24 hr tablet Take 25 mg by mouth daily dinner     • montelukast (SINGULAIR) 10 mg tablet Take 10 mg by mouth daily dinner     • multivitamin (THERAGRAN) TABS Take 1 tablet by mouth daily     • nystatin (MYCOSTATIN) powder Apply topically 4 (four) times a day Under abdominal pannus and in between thighs     • omeprazole (PriLOSEC) 20 mg delayed release capsule TAKE 1 CAPSULE BY MOUTH EVERY DAY 30 MINUTES BEFORE MORNING MEAL FOR 90 DAYS     • ondansetron (ZOFRAN) 8 mg tablet Take 1 tablet (8 mg total) by mouth every 8 (eight) hours as needed for nausea or vomiting 20 tablet 1   • oxyCODONE (ROXICODONE) 5 immediate release tablet 1/2 tab every 6 hours as needed for pain 5 tablet 0   • Oxymetazoline HCl (NASAL SPRAY NA) into "each nostril     • Probiotic Product (PROBIOTIC DAILY PO) Take by mouth daily     • simvastatin (ZOCOR) 10 mg tablet Take 10 mg by mouth daily at bedtime     • sodium chloride (OCEAN) 0 65 % nasal spray 1 spray into each nostril as needed for congestion     • SYMBICORT 160-4 5 MCG/ACT inhaler 2 puffs daily      • Triamcinolone Acetonide (NASACORT ALLERGY 24HR NA) into each nostril       No current facility-administered medications for this visit  Facility-Administered Medications Ordered in Other Visits   Medication Dose Route Frequency Provider Last Rate Last Admin   • alteplase (CATHFLO) injection 2 mg  2 mg Intracatheter Q2H PRN Jose Armando Rangel MD               Objective:    Blood pressure 112/70, temperature 97 7 °F (36 5 °C), temperature source Tympanic, height 5' 4\" (1 626 m), weight 70 9 kg (156 lb 6 4 oz)  Body mass index is 26 85 kg/m²  Body surface area is 1 76 meters squared  Physical Exam  Vitals reviewed  Constitutional:       General: She is not in acute distress  Appearance: Normal appearance  She is not ill-appearing  HENT:      Head: Normocephalic and atraumatic  Mouth/Throat:      Mouth: Mucous membranes are moist    Eyes:      General: No scleral icterus  Right eye: No discharge  Left eye: No discharge  Conjunctiva/sclera: Conjunctivae normal    Pulmonary:      Effort: Pulmonary effort is normal    Musculoskeletal:      Right lower leg: No edema  Left lower leg: No edema  Skin:     General: Skin is warm and dry  Coloration: Skin is not jaundiced  Findings: No rash  Neurological:      General: No focal deficit present  Mental Status: She is alert and oriented to person, place, and time  Cranial Nerves: No cranial nerve deficit  Motor: No weakness  Gait: Gait normal    Psychiatric:         Mood and Affect: Mood normal          Behavior: Behavior normal          Thought Content:  Thought content normal          " Judgment: Judgment normal          Lab Results   Component Value Date     6 2 03/24/2023     Lab Results   Component Value Date    K 3 5 04/06/2023     04/06/2023    CO2 26 04/06/2023    BUN 9 04/06/2023    CREATININE 0 50 (L) 04/06/2023    GLUCOSE 214 (H) 10/28/2022    GLUF 161 (H) 12/08/2022    CALCIUM 8 6 04/06/2023    CORRECTEDCA 9 0 02/09/2023    AST 14 03/24/2023    ALT 10 03/24/2023    ALKPHOS 76 03/24/2023    EGFR 97 04/06/2023     Lab Results   Component Value Date    WBC 14 50 (H) 04/06/2023    HGB 8 4 (L) 04/06/2023    HCT 25 7 (L) 04/06/2023    MCV 98 04/06/2023     (H) 04/06/2023     Lab Results   Component Value Date    NEUTROABS 7 86 (H) 03/31/2023        Trend:  Lab Results   Component Value Date     6 2 03/24/2023     5 1 03/02/2023     5 3 02/09/2023     8 7 01/19/2023     15 8 12/30/2022     37 9 (H) 12/08/2022     8 6 10/20/2022     8 4 10/07/2022     12 5 09/29/2022     32 0 (H) 09/08/2022     52 4 (H) 09/02/2022     85 5 (H) 08/26/2022     77 3 (H) 08/19/2022

## 2023-04-06 NOTE — PROGRESS NOTES
In-basket message received from Dr Franki Pendleton to add patient to GYN oncology tumor board on 4/10/2023  Chart reviewed and tumor board prep completed

## 2023-04-07 ENCOUNTER — TELEPHONE (OUTPATIENT)
Dept: GYNECOLOGIC ONCOLOGY | Facility: CLINIC | Age: 73
End: 2023-04-07

## 2023-04-21 ENCOUNTER — APPOINTMENT (OUTPATIENT)
Dept: LAB | Facility: HOSPITAL | Age: 73
End: 2023-04-21

## 2023-04-21 ENCOUNTER — HOSPITAL ENCOUNTER (OUTPATIENT)
Dept: INFUSION CENTER | Facility: HOSPITAL | Age: 73
Discharge: HOME | End: 2023-04-21

## 2023-04-21 DIAGNOSIS — Z45.2 ENCOUNTER FOR VENOUS ACCESS DEVICE CARE: Primary | ICD-10-CM

## 2023-04-21 DIAGNOSIS — C56.2 MALIGNANT NEOPLASM OF LEFT OVARY (HCC): ICD-10-CM

## 2023-04-21 DIAGNOSIS — M31.5 POLYMYALGIA ARTERITICA (HCC): ICD-10-CM

## 2023-04-21 LAB — ERYTHROCYTE [SEDIMENTATION RATE] IN BLOOD: 63 MM/HOUR (ref 0–29)

## 2023-04-21 NOTE — PROGRESS NOTES
Pt arrived for port labs from an outside Dr for ESR  Port accessed, labs drawn, de-accessed, dsd applied  Disch amb to home, steady gait

## 2023-04-26 ENCOUNTER — TELEPHONE (OUTPATIENT)
Dept: GYNECOLOGIC ONCOLOGY | Facility: CLINIC | Age: 73
End: 2023-04-26

## 2023-04-26 NOTE — TELEPHONE ENCOUNTER
Call placed to patient's   He notes [employer] told them their household income is too high for assistance  I will f/u with our finance team for additional information/clarity and will call the patient/ back with next steps when I have additional information

## 2023-04-26 NOTE — TELEPHONE ENCOUNTER
----- Message from Spenser Blanco sent at 4/26/2023 11:53 AM EDT -----  Regarding: FW: consult appointment  Contact: [phone]    ----- Message -----  From: Gene Casiano  Sent: 4/26/2023  11:35 AM EDT  To: [facility]  Subject: consult appointment                              HIDr Kim!!!!     I am wondering if you will be scheduling a consult appointment with me so we can discuss all my options regarding the next step in my care  These last several weeks receiving letters from my prescription plan to phone calls wanting our financial information has been a bit overwhelming to say the least and I have a list of questions for you  Thank you for your time       enrique jurado

## 2023-04-27 ENCOUNTER — TELEPHONE (OUTPATIENT)
Dept: GYNECOLOGIC ONCOLOGY | Facility: CLINIC | Age: 73
End: 2023-04-27

## 2023-04-27 ENCOUNTER — PREP FOR PROCEDURE (OUTPATIENT)
Age: 73
End: 2023-04-27

## 2023-04-27 ENCOUNTER — TELEPHONE (OUTPATIENT)
Age: 73
End: 2023-04-27

## 2023-04-27 DIAGNOSIS — K62.5 RECTAL BLEEDING: Primary | ICD-10-CM

## 2023-04-27 DIAGNOSIS — C56.2 MALIGNANT NEOPLASM OF LEFT OVARY (HCC): Primary | ICD-10-CM

## 2023-04-27 NOTE — TELEPHONE ENCOUNTER
flex sig   Scheduled for 6/14/2023 at [facility] w/TR to check area prior to surgery with Judah Santana

## 2023-04-27 NOTE — TELEPHONE ENCOUNTER
Called and spoke to patient about recent CT scan order placed  Patient informed me of the location and time frame she prefers  I called central scheduling and scheduled that for the patient  I also scheduled a follow-up after her CT Scan with Dr Lockwood  called patient back and provided all the appointment time, dates and locations  Patient stated she understood  Instructed the patient to call the office back if she has any further questions or concerns

## 2023-06-08 ENCOUNTER — TELEPHONE (OUTPATIENT)
Dept: HEMATOLOGY ONCOLOGY | Facility: CLINIC | Age: 73
End: 2023-06-08

## 2023-06-08 NOTE — TELEPHONE ENCOUNTER
Appointment Change  Cancel, Reschedule, Change to Virtual      Who are you speaking with? Patient   If it is not the patient, are they listed on an active communication consent form? N/A   Which provider is the appointment scheduled with? Dr Rocio Sadler   When is the appointment scheduled? Please list date and time  6/20/23 @ 2pm   At which location is the appointment scheduled to take place? [place]   Was the appointment rescheduled or changed from an in person visit to a virtual visit? If so, please list the details of the change  Yes 6/27/23 @ 11:15am   What is the reason for the appointment change? Dr Rocio Sadler will be out of the office    Was [facility] scheduled for this visit? no   Does [facility] transport need to be scheduled for the new visit (if applicable) no   Does the patient need an infusion appointment rescheduled? no   Does the patient have an infusion appointment scheduled? If so, when? no   Is the patient undergoing chemotherapy? no   Was the no-show policy reviewed for appointments being changed with less then 24 hours of notice?  yes

## 2023-06-14 ENCOUNTER — ANESTHESIA EVENT (OUTPATIENT)
Dept: GASTROENTEROLOGY | Facility: HOSPITAL | Age: 73
End: 2023-06-14

## 2023-06-14 ENCOUNTER — HOSPITAL ENCOUNTER (OUTPATIENT)
Dept: GASTROENTEROLOGY | Facility: HOSPITAL | Age: 73
Setting detail: OUTPATIENT SURGERY
Discharge: HOME | End: 2023-06-14
Attending: COLON & RECTAL SURGERY
Payer: MEDICARE

## 2023-06-14 ENCOUNTER — ANESTHESIA (OUTPATIENT)
Dept: GASTROENTEROLOGY | Facility: HOSPITAL | Age: 73
End: 2023-06-14

## 2023-06-14 VITALS
DIASTOLIC BLOOD PRESSURE: 76 MMHG | WEIGHT: 152.8 LBS | TEMPERATURE: 97.4 F | OXYGEN SATURATION: 98 % | HEIGHT: 64 IN | RESPIRATION RATE: 18 BRPM | BODY MASS INDEX: 26.09 KG/M2 | HEART RATE: 92 BPM | SYSTOLIC BLOOD PRESSURE: 126 MMHG

## 2023-06-14 DIAGNOSIS — K62.5 RECTAL BLEEDING: ICD-10-CM

## 2023-06-14 PROCEDURE — 45330 DIAGNOSTIC SIGMOIDOSCOPY: CPT | Performed by: COLON & RECTAL SURGERY

## 2023-06-14 RX ORDER — SODIUM CHLORIDE 9 MG/ML
INJECTION, SOLUTION INTRAVENOUS CONTINUOUS PRN
Status: DISCONTINUED | OUTPATIENT
Start: 2023-06-14 | End: 2023-06-14

## 2023-06-14 RX ORDER — PROPOFOL 10 MG/ML
INJECTION, EMULSION INTRAVENOUS AS NEEDED
Status: DISCONTINUED | OUTPATIENT
Start: 2023-06-14 | End: 2023-06-14

## 2023-06-14 RX ADMIN — SODIUM CHLORIDE: 9 INJECTION, SOLUTION INTRAVENOUS at 08:48

## 2023-06-14 RX ADMIN — PROPOFOL 80 MG: 10 INJECTION, EMULSION INTRAVENOUS at 08:51

## 2023-06-14 NOTE — ANESTHESIA PREPROCEDURE EVALUATION
Procedure:  FLEXIBLE SIGMOIDOSCOPY    NPO since 630am - had sips of H20 with meds  Denies recent rectal bleeding    Relevant Problems   CARDIO   (+) Hyperlipidemia   (+) Hypertension      GI/HEPATIC   (+) Gastroesophageal reflux disease   (+) Rectal bleeding      GYN   (+) Malignant neoplasm of left ovary (HCC)      HEMATOLOGY   (+) Acute blood loss anemia (ABLA)      NEURO/PSYCH   (+) Anxiety disorder due to medical condition      PULMONARY   (+) Asthma      Lab Results   Component Value Date    HCT 26 3 (L) 04/11/2023    HGB 8 5 (L) 04/11/2023    MCV 99 (H) 04/11/2023     (H) 04/11/2023    WBC 13 43 (H) 04/11/2023     Hemoglobin stable  Physical Exam    Airway    Mallampati score: II  TM Distance: <3 FB  Neck ROM: full     Dental   No notable dental hx     Cardiovascular      Pulmonary      Other Findings        Anesthesia Plan  ASA Score- 2     Anesthesia Type- IV sedation with anesthesia with ASA Monitors  Additional Monitors:   Airway Plan:           Plan Factors-    Chart reviewed  Existing labs reviewed  Patient summary reviewed  Patient is not a current smoker  Induction- intravenous  Postoperative Plan-     Informed Consent- Anesthetic plan and risks discussed with patient and spouse  I personally reviewed this patient with the CRNA  Discussed and agreed on the Anesthesia Plan with the CRNA  Kole Kelley

## 2023-06-14 NOTE — ANESTHESIA POSTPROCEDURE EVALUATION
Post-Op Assessment Note    CV Status:  Stable  Pain Score: 0    Pain management: adequate     Mental Status:  Alert and awake   Hydration Status:  Euvolemic   PONV Controlled:  Controlled   Airway Patency:  Patent      Post Op Vitals Reviewed: Yes      Staff: CRNA         No notable events documented      BP   92/58   Temp  97 2   Pulse  73   Resp   14   SpO2   99% ra

## 2023-06-14 NOTE — H&P
"History and Physical   Colon and Rectal Surgery   Kelsey Paredes 68 y o  female MRN: [id number]  Unit/Bed#:  Encounter: [id number]  06/14/23   8:39 AM      CC:  History of colon cancer  History of Present Illness   HPI:  Kelsey Paredes is a 68 y o  female for evaluation before stoma closure  Historical Information   Past Medical History:   Diagnosis Date   • Abdominal pain     off and on   • Allergic sinusitis     \"seasonal allergies\"-has received allergy shots \"   • Anxiety     with current diagnosis   • Arthritis    • Asthma    • Cholelithiasis    • Colon polyp    • Degenerative joint disease    • Dental bridge present     permanent lower left   • Exercise involving walking     1-2x/week   • GERD (gastroesophageal reflux disease)    • Giant cell aortic arteritis (HCC)     \"hereditary Giant Cell Temporal Arteritis\"per pt   • Hiatal hernia    • History of cancer chemotherapy    • History of transfusion    • Hyperlipidemia    • Hypertension    • Nausea    • 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[facility] [address] )     \"gynecologic malignacy\"   • PMR (polymyalgia rheumatica) (HCC)    • Prediabetes    • Shortness of breath     per pt \"asthma related --with climbing mult  flights of stairs--or exertion -not new\"   • Tinnitus    • Wears glasses      Past Surgical History:   Procedure Laterality Date   • COLONOSCOPY      October 2021: Adenomatous polyps and rectum in transverse colon, sigmoid diverticulosis, internal hemorrhoids  October 2016 diverticulosis and internal hemorrhoids, September 2011 diverticulosis and internal hemorrhoids     • COLOPROCTECTOMY N/A 10/28/2022    Procedure: PROCTECTOMY;  Surgeon: David Peres MD;  [facility];  Service: Surgical Oncology   • DILATION AND CURETTAGE OF UTERUS     • ILEO LOOP DIVERSION N/A 10/28/2022    Procedure: ILEOSTOMY LOOP;  Surgeon: David Peres MD;  [facility];  Service: Surgical Oncology   • IR PORT PLACEMENT  12/8/2022   • LIVER SURGERY  10/28/2022    Procedure: LIVER " CONTROL OF BLEED ;  Surgeon: Kelly Ayoub MD;  [facility];  Service: Surgical Oncology   • MN COLECTOMY PARTIAL W/ANASTOMOSIS N/A 10/28/2022    Procedure: RESECTION COLON LEFT;  Surgeon: Jodie Sotomayor MD;  [facility];  Service: Gynecology Oncology   • MN COLECTOMY PARTIAL W/ANASTOMOSIS N/A 3/27/2023    Procedure: LOW ANTERIOR RESECTION;  Surgeon: Kelly Ayoub MD;  [facility];  Service: Surgical Oncology   • MN EXPLORATORY LAPAROTOMY CELIOTOMY W/WO BIOPSY SPX N/A 10/28/2022    Procedure: LAPAROTOMY EXPLORATORY;  Surgeon: Jodie Sotomayor MD;  [facility];  Service: Gynecology Oncology   • MN LAPS ABD PRTM&OMENTUM DX W/WO [facility]/WA SPX N/A 08/04/2022    Procedure: DIAGNOSTIC LAPAROSCOPY, PERITONEAL BIOPSY, SAMPLING OF BLOODY ASCITES ;  Surgeon: Robert Miller MD;  [facility];  Service: Gynecology Oncology   • MN LAPS ABD PRTM&OMENTUM DX W/WO [facility]/WA [address] [place] [address] N/A 10/28/2022    Procedure: LAPAROSCOPY DIAGNOSTIC;  Surgeon: Jodie Sotomayor MD;  [facility];  Service: Gynecology Oncology   • MN TOTAL ABDOMINAL HYSTERECT W/WO RMVL TUBE OVARY N/A 10/28/2022    Procedure: HYSTERECTOMY MODIFIED RADICAL, BILATERAL SALPINGO-OOPHORECTOMY, GASTROCOLIC OMENTECTOMY, DIAPHRAGM RESECTION, EXCISION AND ABLATION OF PERITONEAL TUMORS;  Surgeon: Jodie Sotomayor MD;  [facility];  Service: Gynecology Oncology   • SMALL INTESTINE SURGERY N/A 10/28/2022    Procedure: RESECTION SMALL BOWEL;  Surgeon: Jodie Sotomayor MD;  [facility];  Service: Gynecology Oncology   • SPLENECTOMY, TOTAL N/A 10/28/2022    Procedure: SPLENECTOMY;  Surgeon: Jodie Sotomayor MD;  [facility];  Service: Gynecology Oncology   • TEMPORAL ARTERY BIOPSY / LIGATION     • UPPER GASTROINTESTINAL ENDOSCOPY  11/10/2014    November 2014 irregular Z-line and Schatzki ring, hiatal hernia, gastritis    Biopsies negative for celiac, H pylori, or Phillip's or eosinophilic esophagitis     • WISDOM TOOTH EXTRACTION         Meds/Allergies     (Not in a hospital admission)        Current Outpatient Medications:   •  LORazepam (ATIVAN) 1 mg tablet, Take 1 tablet (1 mg total) by mouth every 8 (eight) hours as needed for anxiety (nausea or anxiety), Disp: 30 tablet, Rfl: 0  •  metoprolol succinate (TOPROL-XL) 25 mg 24 hr tablet, Take 25 mg by mouth daily dinner, Disp: , Rfl:   •  omeprazole (PriLOSEC) 20 mg delayed release capsule, TAKE 1 CAPSULE BY MOUTH EVERY DAY 30 MINUTES BEFORE MORNING MEAL FOR 90 DAYS, Disp: , Rfl:   •  Acetaminophen (TYLENOL ARTHRITIS PAIN PO), Take by mouth as needed, Disp: , Rfl:   •  Ascorbic Acid (VITAMIN C GUMMIE PO), Take by mouth (Patient not taking: Reported on 4/13/2023), Disp: , Rfl:   •  aspirin (ECOTRIN LOW STRENGTH) 81 mg EC tablet, Take 81 mg by mouth daily, Disp: , Rfl:   •  B Complex-C (B COMPLEX-VITAMIN C PO), Take by mouth daily, Disp: , Rfl:   •  Calcium Carb-Cholecalciferol (CALCIUM 500+D3 PO), Take 1 tablet by mouth 2 (two) times a day , Disp: , Rfl:   •  cholecalciferol (VITAMIN D3) 1,000 units tablet, Take 1,000 Units by mouth daily, Disp: , Rfl:   •  Docusate Calcium (STOOL SOFTENER PO), Take 1 tablet by mouth 2 (two) times a day (Patient not taking: Reported on 4/13/2023), Disp: , Rfl:   •  famotidine (PEPCID) 40 MG tablet, TAKE 1 TABLET BY MOUTH DAILY AT BEDTIME, Disp: 90 tablet, Rfl: 3  •  fexofenadine (ALLEGRA) 180 MG tablet, Take 180 mg by mouth daily, Disp: , Rfl:   •  GLUCOSAMINE-CHONDROITIN PO, Take 1 tablet by mouth 2 (two) times a day , Disp: , Rfl:   •  lidocaine-prilocaine (EMLA) cream, Apply topically as needed for mild pain, Disp: 30 g, Rfl: 0  •  metFORMIN (GLUCOPHAGE) 500 mg tablet, Take 500 mg by mouth daily Daily at lunch (Patient not taking: Reported on 4/13/2023), Disp: , Rfl:   •  montelukast (SINGULAIR) 10 mg tablet, Take 10 mg by mouth daily dinner, Disp: , Rfl:   •  multivitamin (THERAGRAN) TABS, Take 1 tablet by mouth daily, Disp: , Rfl:   •  nystatin (MYCOSTATIN) powder, Apply topically 4 (four) times a day Under abdominal pannus and in between thighs, Disp: , Rfl:   •  ondansetron (ZOFRAN) 8 mg tablet, Take 1 tablet (8 mg total) by mouth every 8 (eight) hours as needed for nausea or vomiting, Disp: 20 tablet, Rfl: 1  •  oxyCODONE (ROXICODONE) 5 immediate release tablet, 1/2 tab every 6 hours as needed for pain, Disp: 5 tablet, Rfl: 0  •  Oxymetazoline HCl (NASAL SPRAY NA), into each nostril, Disp: , Rfl:   •  Probiotic Product (PROBIOTIC DAILY PO), Take by mouth daily, Disp: , Rfl:   •  simvastatin (ZOCOR) 10 mg tablet, Take 10 mg by mouth daily at bedtime, Disp: , Rfl:   •  sodium chloride (OCEAN) 0 65 % nasal spray, 1 spray into each nostril as needed for congestion, Disp: , Rfl:   •  SYMBICORT 160-4 5 MCG/ACT inhaler, 2 puffs daily , Disp: , Rfl:   •  Triamcinolone Acetonide (NASACORT ALLERGY 24HR NA), into each nostril, Disp: , Rfl:     Allergies   Allergen Reactions   • Lactose - Food Allergy GI Intolerance   • Nsaids Other (See Comments)      pt is avoiding per family doctor instructions-  Able to take Tylenol   • Pedi-Pre Tape Spray [Wound Dressing Adhesive] Other (See Comments)     Please use sensitive skin tape, pt gets bad bruising from strong medical adhesive tape            Social History   Social History     Substance and Sexual Activity   Alcohol Use Not Currently    Comment: a glass of wine maybe 2 times a month     Social History     Substance and Sexual Activity   Drug Use Never     Social History     Tobacco Use   Smoking Status Never   Smokeless Tobacco Never         Family History:   Family History   Problem Relation Age of Onset   • Colon polyps Mother    • Alzheimer's disease Mother    • Heart disease Father    • Brain cancer Father    • Colon polyps Sister    • Heart disease Sister    • Diabetes Sister    • MARLEY disease Sister    • Colon cancer Cousin "        Objective     Current Vitals:   Blood Pressure: 114/69 (06/14/23 0815)  Pulse: 83 (06/14/23 0815)  Temperature: (!) 96 °F (35 6 °C) (06/14/23 0838)  Temp Source: Tympanic (06/14/23 0815)  Respirations: 18 (06/14/23 0815)  Height: 5' 4\" (162 6 cm) (06/14/23 0815)  Weight - Scale: 69 3 kg (152 lb 12 8 oz) (06/14/23 0815)  SpO2: 99 % (06/14/23 0815)  No intake or output data in the 24 hours ending 06/14/23 0839    Physical Exam:  General:  Well nourished, no distress  Neuro: Alert and oriented  Eyes:Sclera anicteric, conjunctiva pink  Pulm: Clear to auscultation bilaterally  No respiratory Distress  CV:  Regular rate and rhythm  No murmurs  Abdomen:  Soft, flat, non-tender, without masses or hepatosplenomegaly  Lab Results:       ASSESSMENT:  Pro Hurd is a 68 y o  female for preoperative evaluation  She had colonoscopy a year ago  PLAN:  Colonoscopy  Risks , including, but not limited to, bleeding, perforation, missed lesions, and potential need for surgery, were reviewed  Alternatives to colonoscopy were discussed  She will need colonoscopy with a prep after the stoma closure    Ti Felix MD  "

## 2023-06-26 ENCOUNTER — HOSPITAL ENCOUNTER (OUTPATIENT)
Dept: INFUSION CENTER | Facility: HOSPITAL | Age: 73
Discharge: HOME | End: 2023-06-26
Payer: MEDICARE

## 2023-06-26 DIAGNOSIS — Z45.2 ENCOUNTER FOR VENOUS ACCESS DEVICE CARE: ICD-10-CM

## 2023-06-26 DIAGNOSIS — C56.2 MALIGNANT NEOPLASM OF LEFT OVARY (HCC): Primary | ICD-10-CM

## 2023-06-26 LAB
BUN SERPL-MCNC: 14 MG/DL (ref 5–25)
CANCER AG125 SERPL-ACNC: 168.5 U/ML (ref 0–35)
CREAT SERPL-MCNC: 0.77 MG/DL (ref 0.6–1.3)
GFR SERPL CREATININE-BSD FRML MDRD: 76 ML/MIN/1.73SQ M

## 2023-06-26 PROCEDURE — 86304 IMMUNOASSAY TUMOR CA 125: CPT

## 2023-06-26 PROCEDURE — 82565 ASSAY OF CREATININE: CPT

## 2023-06-26 PROCEDURE — 84520 ASSAY OF UREA NITROGEN: CPT

## 2023-06-27 ENCOUNTER — OFFICE VISIT (OUTPATIENT)
Dept: SURGICAL ONCOLOGY | Facility: CLINIC | Age: 73
End: 2023-06-27
Payer: MEDICARE

## 2023-06-27 VITALS
BODY MASS INDEX: 26.5 KG/M2 | HEIGHT: 64 IN | DIASTOLIC BLOOD PRESSURE: 79 MMHG | SYSTOLIC BLOOD PRESSURE: 124 MMHG | HEART RATE: 87 BPM | TEMPERATURE: 98.2 F | RESPIRATION RATE: 16 BRPM | WEIGHT: 155.2 LBS | OXYGEN SATURATION: 98 %

## 2023-06-27 DIAGNOSIS — D47.Z9 OTHER SPECIFIED NEOPLASMS OF UNCERTAIN BEHAVIOR OF LYMPHOID, HEMATOPOIETIC AND RELATED TISSUE (HCC): ICD-10-CM

## 2023-06-27 DIAGNOSIS — C48.2 PERITONEAL CARCINOMA (HCC): ICD-10-CM

## 2023-06-27 DIAGNOSIS — K91.2 POSTSURGICAL MALABSORPTION, NOT ELSEWHERE CLASSIFIED: ICD-10-CM

## 2023-06-27 DIAGNOSIS — C56.2 MALIGNANT NEOPLASM OF LEFT OVARY (HCC): Primary | ICD-10-CM

## 2023-06-27 DIAGNOSIS — R79.1 ABNORMAL COAGULATION PROFILE: ICD-10-CM

## 2023-06-27 PROCEDURE — 99215 OFFICE O/P EST HI 40 MIN: CPT | Performed by: SURGERY

## 2023-06-27 RX ORDER — METRONIDAZOLE 500 MG/100ML
500 INJECTION, SOLUTION INTRAVENOUS ONCE
OUTPATIENT
Start: 2023-06-27 | End: 2023-06-27

## 2023-06-27 RX ORDER — CEFAZOLIN SODIUM 2 G/50ML
2000 SOLUTION INTRAVENOUS ONCE
OUTPATIENT
Start: 2023-06-27 | End: 2023-06-27

## 2023-06-27 NOTE — PROGRESS NOTES
Surgical Oncology Follow Up       [address] [facility] [place]  [address]  [facility] [id number] [phone]    Genie Baseman  1950  [id number]  [address] [facility] [address]  [facility] [address]  [phone]    Diagnoses and all orders for this visit:    Malignant neoplasm of left ovary ([facility] )  -     Case request operating room: ILEOSTOMY REVERSAL; Standing  -     Comprehensive metabolic panel; Future  -     CBC and differential; Future  -     APTT; Future  -     Protime-INR; Future  -     HEMOGLOBIN A1C W/ EAG ESTIMATION; Future  -     EKG 12 lead; Future  -     Case request operating room: ILEOSTOMY REVERSAL    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[facility] )  -     Case request operating room: ILEOSTOMY REVERSAL; Standing  -     Case request operating room: ILEOSTOMY REVERSAL    Abnormal coagulation profile  -     APTT; Future    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, hematopoietic and related tissue ([facility] )  -     Protime-INR; Future    Postsurgical malabsorption, not elsewhere classified  -     HEMOGLOBIN A1C W/ EAG ESTIMATION; Future    Other orders  -     Incentive spirometry; Standing  -     Insert and maintain IV line; Standing  -     Void On-Call to O R ; Standing  -     Place sequential compression device; Standing  -     ceFAZolin (ANCEF) IVPB (premix in dextrose) 2,000 mg 50 mL  -     metroNIDAZOLE (FLAGYL) IVPB (premix) 500 mg 100 mL        Chief Complaint   Patient presents with   • Follow-up       No follow-ups on file  Oncology History   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[facility] )   7/21/2022 Initial Diagnosis    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[facility] )     8/4/2022 Surgery    Diagnostic laparoscopy, peritoneal biopsy     8/18/2022 Genetic Testing    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, serous    Results revealed patient has the following mutation(s):  None     Malignant neoplasm of left ovary (HCC)   8/4/2022 Surgery    Diagnostic laparoscopy, peritoneal biopsy     8/9/2022 Initial Diagnosis    Gynecologic malignancy ([facility] )     8/23/2022 -  Chemotherapy    palonosetron (ALOXI), 0 25 mg, Intravenous, Once, 6 of 6 cycles  Administration: 0 25 mg (8/23/2022), 0 25 mg (9/13/2022), 0 25 mg (10/4/2022), 0 25 mg (12/13/2022), 0 25 mg (1/3/2023), 0 25 mg (1/24/2023)  fosaprepitant (EMEND) IVPB, 150 mg, Intravenous, Once, 6 of 6 cycles  Administration: 150 mg (8/23/2022), 150 mg (9/13/2022), 150 mg (10/4/2022), 150 mg (12/13/2022), 150 mg (1/3/2023), 150 mg (1/24/2023)  CARBOplatin (PARAPLATIN) IVPB (GOG AUC DOSING), 605 4 mg, Intravenous, Once, 6 of 6 cycles  Administration: 600 mg (8/23/2022), 564 mg (9/13/2022), 601 8 mg (10/4/2022), 491 5 mg (12/13/2022), 468 5 mg (1/3/2023), 488 mg (1/24/2023)  bevacizumab (AVASTIN) IVPB, 1,252 5 mg, Intravenous, Once, 3 of 3 cycles  Dose modification: 10 mg/kg (original dose 15 mg/kg, Cycle 5, Reason: Other (Must fill in a comment))  Administration: 1,200 mg (8/23/2022), 1,200 mg (9/13/2022), 745 mg (1/3/2023)  PACLItaxel (TAXOL) chemo IVPB, 175 mg/m2 = 330 6 mg, Intravenous, Once, 6 of 6 cycles  Dose modification: 135 mg/m2 (original dose 175 mg/m2, Cycle 4, Reason: Other (Must fill in a comment))  Administration: 330 6 mg (8/23/2022), 330 6 mg (9/13/2022), 330 6 mg (10/4/2022), 246 mg (12/13/2022), 244 2 mg (1/3/2023), 244 2 mg (1/24/2023)     10/28/2022 Surgery    diagnostic laparoscopy, exploratory laparotomy, modified radical hysterectomy, bilateral salpingo-oophorectomy with en bloc rectosigmoid resection, gastrocolic omentectomy, splenectomy, small bowel resection with reanastomosis, diaphragm resection, excision ablation of peritoneal implants, ileostomy formation, optimal tumor debulking     11/29/2022 -  Cancer Staged    Staging form: Ovary, Fallopian Tube, Primary Peritoneal, AJCC 8th Edition  - Clinical: FIGO Stage IIIC (cT3c, cN0, cM0) - Signed by Reese Mulligan MD on 11/29/2022  Histologic grade (G): G3  Histologic grading system: 4 grade system       3/27/2023 Surgery    Low anterior resection  Residual tumor identified in lymphovascular spaces    A  Rectum (low anterior resection):  - Colon with transmural defect   - 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      B  Colon (anastomotic donuts):  - Colon with no diagnostic abnormality  - No carcinoma identified      4/17/2023 Genomic Testing    [employer]: FOLR1, positive             History of Present Illness: Patient returns in follow-up  She is feeling well  Her appetite is good  She does have some lower abdominal discomfort  She does also decrease her steroids to 1 mg of prednisone a day  Flexible sigmoidoscopy on June 14, 2023 showed the anastomosis without defect  She is scheduled for a follow-up CT next week  Her  has increased to 168 5  Review of Systems  Complete ROS Surg Onc:   Complete ROS Surg Onc:   Constitutional: The patient denies new or recent history of general fatigue, no recent weight loss, no change in appetite  Eyes: No complaints of visual problems, no scleral icterus  ENT: no complaints of ear pain, no hoarseness, no difficulty swallowing,  no tinnitus and no new masses in head, oral cavity, or neck  Cardiovascular: No complaints of chest pain, no palpitations, no ankle edema  Respiratory: No complaints of shortness of breath, no cough  Gastrointestinal: No complaints of jaundice, no bloody stools, no pale stools  Genitourinary: No complaints of dysuria, no hematuria, no nocturia, no frequent urination, no urethral discharge  Musculoskeletal: No complaints of weakness, paralysis, joint stiffness or arthralgias  Integumentary: No complaints of rash, no new lesions  Neurological: No complaints of convulsions, no seizures, no dizziness  "  Hematologic/Lymphatic: No complaints of easy bruising  Endocrine:  No hot or cold intolerance  No polydipsia, polyphagia, or polyuria  Allergy/immunology:  No environmental allergies  No food allergies  Not immunocompromised  Skin:  No pallor or rash  No wound  Patient Active Problem List   Diagnosis   • Gastroesophageal reflux disease   • Constipation   • Hemorrhoids   • Family history of colonic polyps   • Asthma   • Hyperlipidemia   • Hypertension   • Lower abdominal pain   • Change in bowel habit   • Personal history of colonic polyps   • Long term (current) use of systemic steroids   • 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HCC)   • Giant cell aortic arteritis ([place] [address] )   • Prediabetes   • Malignant neoplasm of left ovary (HCC)   • Anxiety disorder due to medical condition   • S/P total abdominal hysterectomy   • Ileostomy, has currently ([place] [address] )   • Acute blood loss anemia (ABLA)   • S/P splenectomy   • Hypokalemia   • Bandemia   • Wound infection after surgery   • Encounter for venous access device care   • Rectal bleeding     Past Medical History:   Diagnosis Date   • Abdominal pain     off and on   • Allergic sinusitis     \"seasonal allergies\"-has received allergy shots \"   • Anxiety     with current diagnosis   • Arthritis    • Asthma    • Cholelithiasis    • Colon polyp    • Degenerative joint disease    • Dental bridge present     permanent lower left   • Exercise involving walking     1-2x/week   • GERD (gastroesophageal reflux disease)    • Giant cell aortic arteritis (HCC)     \"hereditary Giant Cell Temporal Arteritis\"per pt   • Hiatal hernia    • History of cancer chemotherapy    • History of transfusion    • Hyperlipidemia    • Hypertension    • Nausea    • 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[place] [address] )     \"gynecologic malignacy\"   • PMR (polymyalgia rheumatica) ([facility])    • Prediabetes    • Shortness of breath     per pt \"asthma related --with climbing mult   flights of stairs--or exertion -not new\"   • Tinnitus    • Wears " glasses      Past Surgical History:   Procedure Laterality Date   • COLONOSCOPY      October 2021: Adenomatous polyps and rectum in transverse colon, sigmoid diverticulosis, internal hemorrhoids  October 2016 diverticulosis and internal hemorrhoids, September 2011 diverticulosis and internal hemorrhoids     • COLOPROCTECTOMY N/A 10/28/2022    Procedure: PROCTECTOMY;  Surgeon: Stefan Ngo MD;  [facility];  Service: Surgical Oncology   • DILATION AND CURETTAGE OF UTERUS     • ILEO LOOP DIVERSION N/A 10/28/2022    Procedure: ILEOSTOMY LOOP;  Surgeon: Stefan Ngo MD;  [facility];  Service: Surgical Oncology   • IR PORT PLACEMENT  12/8/2022   • LIVER SURGERY  10/28/2022    Procedure: LIVER CONTROL OF BLEED ;  Surgeon: Stefan Ngo MD;  [facility];  Service: Surgical Oncology   • KY COLECTOMY PARTIAL W/ANASTOMOSIS N/A 10/28/2022    Procedure: RESECTION COLON LEFT;  Surgeon: Merline Holler, MD;  [facility];  Service: Gynecology Oncology   • KY COLECTOMY PARTIAL W/ANASTOMOSIS N/A 3/27/2023    Procedure: LOW ANTERIOR RESECTION;  Surgeon: Stefan Ngo MD;  [facility];  Service: Surgical Oncology   • KY EXPLORATORY LAPAROTOMY CELIOTOMY W/WO BIOPSY SPX N/A 10/28/2022    Procedure: LAPAROTOMY EXPLORATORY;  Surgeon: Merline Holler, MD;  [facility];  Service: Gynecology Oncology   • KY LAPS ABD PRTM&OMENTUM DX W/WO [address] [facility] [address] [facility] N/A 08/04/2022    Procedure: DIAGNOSTIC LAPAROSCOPY, PERITONEAL BIOPSY, SAMPLING OF BLOODY ASCITES ;  Surgeon: Camilla Castro MD;  [facility];  Service: Gynecology Oncology   • KY LAPS ABD PRTM&OMENTUM DX W/WO [address] [facility] [address] [facility] N/A 10/28/2022    Procedure: LAPAROSCOPY DIAGNOSTIC;  Surgeon: Merline Holler, MD;  [facility];  Service: Gynecology Oncology   • [place] TOTAL ABDOMINAL HYSTERECT W/WO RMVL TUBE OVARY N/A 10/28/2022    Procedure: HYSTERECTOMY MODIFIED RADICAL, BILATERAL SALPINGO-OOPHORECTOMY, GASTROCOLIC OMENTECTOMY, DIAPHRAGM RESECTION, EXCISION AND ABLATION OF PERITONEAL TUMORS;  Surgeon: Nishant Gusman MD;  [facility];  Service: Gynecology Oncology   • SMALL INTESTINE SURGERY N/A 10/28/2022    Procedure: RESECTION SMALL BOWEL;  Surgeon: Nishant Gusman MD;  [facility];  Service: Gynecology Oncology   • SPLENECTOMY, TOTAL N/A 10/28/2022    Procedure: SPLENECTOMY;  Surgeon: Nishant Gusman MD;  [facility];  Service: Gynecology Oncology   • TEMPORAL ARTERY BIOPSY / LIGATION     • UPPER GASTROINTESTINAL ENDOSCOPY  11/10/2014    November 2014 irregular Z-line and Schatzki ring, hiatal hernia, gastritis  Biopsies negative for celiac, H pylori, or Phillip's or eosinophilic esophagitis     • WISDOM TOOTH EXTRACTION       Family History   Problem Relation Age of Onset   • Colon polyps Mother    • Alzheimer's disease Mother    • Heart disease Father    • Brain cancer Father    • Colon polyps Sister    • Heart disease Sister    • Diabetes Sister    • MARLEY disease Sister    • Colon cancer Cousin      Social History     Socioeconomic History   • Marital status: /Civil Union     Spouse name: Not on file   • Number of children: Not on file   • Years of education: Not on file   • Highest education level: Not on file   Occupational History   • Not on file   Tobacco Use   • Smoking status: Never   • Smokeless tobacco: Never   Vaping Use   • Vaping Use: Never used   Substance and Sexual Activity   • Alcohol use: Not Currently     Comment: a glass of wine maybe 2 times a month   • Drug use: Never   • Sexual activity: Not Currently     Partners: Male     Birth control/protection: None     Comment: defer   Other Topics Concern   • Not on file   Social History Narrative   • Not on file     Social Determinants of Health     Financial Resource Strain: Not on file   Food Insecurity: No Food Insecurity (3/28/2023)    Hunger Vital Sign    • Worried About [facility] in the Last Year: Never true    • Ran Out of Food in the Last Year: Never true   Transportation Needs: No Transportation Needs (3/28/2023)    PRAPARE - Transportation    • Lack of Transportation (Medical): No    • Lack of Transportation (Non-Medical):  No   Physical Activity: Not on file   Stress: Not on file   Social Connections: Not on file   Intimate Partner Violence: Not on file   Housing Stability: Low Risk  (3/28/2023)    Housing Stability Vital Sign    • Unable to Pay for Housing in the Last Year: No    • Number of Places Lived in the Last Year: 1    • Unstable Housing in the Last Year: No       Current Outpatient Medications:   •  Acetaminophen (TYLENOL ARTHRITIS PAIN PO), Take by mouth as needed, Disp: , Rfl:   •  Ascorbic Acid (VITAMIN C GUMMIE PO), Take by mouth, Disp: , Rfl:   •  aspirin (ECOTRIN LOW STRENGTH) 81 mg EC tablet, Take 81 mg by mouth daily, Disp: , Rfl:   •  B Complex-C (B COMPLEX-VITAMIN C PO), Take by mouth daily, Disp: , Rfl:   •  Calcium Carb-Cholecalciferol (CALCIUM 500+D3 PO), Take 1 tablet by mouth 2 (two) times a day , Disp: , Rfl:   •  cholecalciferol (VITAMIN D3) 1,000 units tablet, Take 1,000 Units by mouth daily, Disp: , Rfl:   •  famotidine (PEPCID) 40 MG tablet, TAKE 1 TABLET BY MOUTH DAILY AT BEDTIME, Disp: 90 tablet, Rfl: 3  •  fexofenadine (ALLEGRA) 180 MG tablet, Take 180 mg by mouth daily, Disp: , Rfl:   •  GLUCOSAMINE-CHONDROITIN PO, Take 1 tablet by mouth 2 (two) times a day , Disp: , Rfl:   •  lidocaine-prilocaine (EMLA) cream, Apply topically as needed for mild pain, Disp: 30 g, Rfl: 0  •  LORazepam (ATIVAN) 1 mg tablet, Take 1 tablet (1 mg total) by mouth every 8 (eight) hours as needed for anxiety (nausea or anxiety), Disp: 30 tablet, Rfl: 0  •  metoprolol succinate (TOPROL-XL) 25 mg 24 hr tablet, Take 25 mg by mouth daily dinner, Disp: , Rfl:   •  montelukast (SINGULAIR) 10 mg tablet, Take 10 mg by mouth daily dinner, Disp: , Rfl: •  multivitamin (THERAGRAN) TABS, Take 1 tablet by mouth daily, Disp: , Rfl:   •  nystatin (MYCOSTATIN) powder, Apply topically 4 (four) times a day Under abdominal pannus and in between thighs, Disp: , Rfl:   •  omeprazole (PriLOSEC) 20 mg delayed release capsule, TAKE 1 CAPSULE BY MOUTH EVERY DAY 30 MINUTES BEFORE MORNING MEAL FOR 90 DAYS, Disp: , Rfl:   •  ondansetron (ZOFRAN) 8 mg tablet, Take 1 tablet (8 mg total) by mouth every 8 (eight) hours as needed for nausea or vomiting, Disp: 20 tablet, Rfl: 1  •  oxyCODONE (ROXICODONE) 5 immediate release tablet, 1/2 tab every 6 hours as needed for pain, Disp: 5 tablet, Rfl: 0  •  Oxymetazoline HCl (NASAL SPRAY NA), into each nostril, Disp: , Rfl:   •  Probiotic Product (PROBIOTIC DAILY PO), Take by mouth daily, Disp: , Rfl:   •  simvastatin (ZOCOR) 10 mg tablet, Take 10 mg by mouth daily at bedtime, Disp: , Rfl:   •  sodium chloride (OCEAN) 0 65 % nasal spray, 1 spray into each nostril as needed for congestion, Disp: , Rfl:   •  SYMBICORT 160-4 5 MCG/ACT inhaler, 2 puffs daily , Disp: , Rfl:   •  Triamcinolone Acetonide (NASACORT ALLERGY 24HR NA), into each nostril, Disp: , Rfl:   •  Docusate Calcium (STOOL SOFTENER PO), Take 1 tablet by mouth 2 (two) times a day (Patient not taking: Reported on 4/13/2023), Disp: , Rfl:   •  metFORMIN (GLUCOPHAGE) 500 mg tablet, Take 500 mg by mouth daily Daily at lunch (Patient not taking: Reported on 4/13/2023), Disp: , Rfl:   No current facility-administered medications for this visit      Facility-Administered Medications Ordered in Other Visits:   •  alteplase (CATHFLO) injection 2 mg, 2 mg, Intracatheter, Q1MIN PRN, Antonette Helms MD  Allergies   Allergen Reactions   • Lactose - Food Allergy GI Intolerance   • Nsaids Other (See Comments)      pt is avoiding per family doctor instructions-  Able to take Tylenol   • Pedi-Pre Tape Spray [Wound Dressing Luís Eckert Other (See Comments)     Please use sensitive skin tape, pt gets bad bruising from strong medical adhesive tape  Vitals:    06/27/23 1103   BP: 124/79   Pulse: 87   Resp: 16   Temp: 98 2 °F (36 8 °C)   SpO2: 98%       Physical Exam  Constitutional: General appearance: The Patient is well-developed and well-nourished who appears the stated age in no acute distress  Patient is pleasant and talkative  HEENT:  Normocephalic  Sclerae are anicteric  Mucous membranes are moist  Neck is supple without adenopathy  No JVD  Chest: The lungs are clear to auscultation  Cardiac: Heart is regular rate  Abdomen: Abdomen is soft, non-tender, non-distended and without masses  There is a functioning ostomy  Extremities: There is no clubbing or cyanosis  There is no edema  Symmetric  Neuro: Grossly nonfocal  Gait is normal      Lymphatic: No evidence of cervical adenopathy bilaterally  No evidence of axillary adenopathy bilaterally  No evidence of inguinal adenopathy bilaterally  Skin: Warm, anicteric  Psych:  Patient is pleasant and talkative  Breasts:        Pathology:  [unfilled]    Labs:      Imaging  Flexible Sigmoidoscopy    Result Date: 6/14/2023  Narrative: Table formatting from the original result was not included  [address] [place] [address] Street: 6/14/23 PHYSICIAN(S): Attending: Cathye Kocher, MD Fellow: No Staff Documented INDICATION: Rectal bleeding POST-OP DIAGNOSIS: See the impression below  HISTORY: Prior colonoscopy: Less than 3 years ago  It is being repeated at an interval of less than 3 years because: This colonoscopy is being performed for a diagnostic indication BOWEL PREPARATION: Enema (no prep needed) PREPROCEDURE: Informed consent was obtained for the procedure, including sedation  Risks including but not limited to bleeding, infection, perforation, adverse drug reaction and aspiration were explained in detail   Also explained about less than 100% sensitivity with the exam and other alternatives  The patient was placed in the left lateral decubitus position  Procedure: Colonoscopy DETAILS OF PROCEDURE: Patient was taken to the procedure room where a time out was performed to confirm correct patient and correct procedure  The patient underwent monitored anesthesia care, which was administered by an anesthesia professional  The patient's blood pressure, heart rate, level of consciousness, oxygen and respirations were monitored throughout the procedure  A digital rectal exam was performed  The scope was introduced through the anus and advanced to the descending colon  The patient experienced no blood loss  The procedure was not difficult  The patient tolerated the procedure well  There were no apparent adverse events  Normal anastomosis with staples at 7 cm from the anal verge  ANESTHESIA INFORMATION: ASA: II Anesthesia Type: IV Sedation with Anesthesia MEDICATIONS: Totals unavailable because the procedure time range is not set FINDINGS: Anastomosis without defect  Normal descending colon above the anastomosis EVENTS: Procedure Events Event Event Time ENDO SCOPE OUT TIME 6/14/2023  8:54 AM SPECIMENS: * No specimens in log * EQUIPMENT: Colonoscope -[id number]     Impression: Anastomosis without defect  Normal descending colon above the anastomosis RECOMMENDATION:  Schedule colonoscopy with Dr Rosa Maria Mae in 3 months  Call him to arrange  Yoseph Wagner MD     I reviewed the above laboratory and imaging data  Discussion/Summary: 22-year-old female status post cytoreduction for ovarian cancer  She does have microscopic residual tumor after her last surgery  She was being observed  Her  has markedly risen  We did discuss ostomy reversal since her anastomosis has healed based on her flexible sigmoidoscopy    I explained the risks including bleeding, infection, recurrence, need for further surgery, wound complications, adjacent organ injury, anastomotic leak, sepsis, MI, DVT, stroke, pulmonary embolism and death  Informed consent was obtained  We will await the results of her CT  If her CT shows no evidence of recurrence I would consider reversing the ostomy now  If there is obvious recurrence based on the CT, and given her elevated , chemotherapy may be the mainstay of her treatment  We are tentatively planning on surgery at the end of next week, but this will be based on her CT  I told her I will discuss the CT results with Dr Matthew Valdez as well once they are available  She is agreeable to this plan  All her questions were answered

## 2023-06-29 ENCOUNTER — HOSPITAL ENCOUNTER (OUTPATIENT)
Dept: INFUSION CENTER | Facility: HOSPITAL | Age: 73
Discharge: HOME | End: 2023-06-29
Payer: MEDICARE

## 2023-06-29 ENCOUNTER — OFFICE VISIT (OUTPATIENT)
Dept: LAB | Facility: HOSPITAL | Age: 73
End: 2023-06-29
Payer: MEDICARE

## 2023-06-29 DIAGNOSIS — C56.2 MALIGNANT NEOPLASM OF LEFT OVARY (HCC): ICD-10-CM

## 2023-06-29 DIAGNOSIS — Z45.2 ENCOUNTER FOR VENOUS ACCESS DEVICE CARE: ICD-10-CM

## 2023-06-29 DIAGNOSIS — R79.1 ABNORMAL COAGULATION PROFILE: ICD-10-CM

## 2023-06-29 DIAGNOSIS — C56.2 MALIGNANT NEOPLASM OF LEFT OVARY (HCC): Primary | ICD-10-CM

## 2023-06-29 DIAGNOSIS — D47.Z9 OTHER SPECIFIED NEOPLASMS OF UNCERTAIN BEHAVIOR OF LYMPHOID, HEMATOPOIETIC AND RELATED TISSUE (HCC): ICD-10-CM

## 2023-06-29 DIAGNOSIS — K91.2 POSTSURGICAL MALABSORPTION, NOT ELSEWHERE CLASSIFIED: ICD-10-CM

## 2023-06-29 LAB
ALBUMIN SERPL BCP-MCNC: 3.8 G/DL (ref 3.5–5)
ALP SERPL-CCNC: 82 U/L (ref 34–104)
ALT SERPL W P-5'-P-CCNC: 11 U/L (ref 7–52)
ANION GAP SERPL CALCULATED.3IONS-SCNC: 6 MMOL/L
APTT PPP: 25 SECONDS (ref 23–37)
AST SERPL W P-5'-P-CCNC: 16 U/L (ref 13–39)
BASOPHILS # BLD AUTO: 0.05 THOUSANDS/ÂΜL (ref 0–0.1)
BASOPHILS NFR BLD AUTO: 1 % (ref 0–1)
BILIRUB SERPL-MCNC: 0.25 MG/DL (ref 0.2–1)
BUN SERPL-MCNC: 16 MG/DL (ref 5–25)
CALCIUM SERPL-MCNC: 9.3 MG/DL (ref 8.4–10.2)
CHLORIDE SERPL-SCNC: 104 MMOL/L (ref 96–108)
CO2 SERPL-SCNC: 27 MMOL/L (ref 21–32)
CREAT SERPL-MCNC: 0.58 MG/DL (ref 0.6–1.3)
EOSINOPHIL # BLD AUTO: 0.13 THOUSAND/ÂΜL (ref 0–0.61)
EOSINOPHIL NFR BLD AUTO: 1 % (ref 0–6)
ERYTHROCYTE [DISTWIDTH] IN BLOOD BY AUTOMATED COUNT: 14.8 % (ref 11.6–15.1)
EST. AVERAGE GLUCOSE BLD GHB EST-MCNC: 128 MG/DL
GFR SERPL CREATININE-BSD FRML MDRD: 91 ML/MIN/1.73SQ M
GLUCOSE SERPL-MCNC: 100 MG/DL (ref 65–140)
HBA1C MFR BLD: 6.1 %
HCT VFR BLD AUTO: 33.6 % (ref 34.8–46.1)
HGB BLD-MCNC: 11.1 G/DL (ref 11.5–15.4)
IMM GRANULOCYTES # BLD AUTO: 0.06 THOUSAND/UL (ref 0–0.2)
IMM GRANULOCYTES NFR BLD AUTO: 1 % (ref 0–2)
INR PPP: 0.96 (ref 0.84–1.19)
LYMPHOCYTES # BLD AUTO: 1.36 THOUSANDS/ÂΜL (ref 0.6–4.47)
LYMPHOCYTES NFR BLD AUTO: 12 % (ref 14–44)
MCH RBC QN AUTO: 30.7 PG (ref 26.8–34.3)
MCHC RBC AUTO-ENTMCNC: 33 G/DL (ref 31.4–37.4)
MCV RBC AUTO: 93 FL (ref 82–98)
MONOCYTES # BLD AUTO: 1.68 THOUSAND/ÂΜL (ref 0.17–1.22)
MONOCYTES NFR BLD AUTO: 15 % (ref 4–12)
NEUTROPHILS # BLD AUTO: 7.79 THOUSANDS/ÂΜL (ref 1.85–7.62)
NEUTS SEG NFR BLD AUTO: 70 % (ref 43–75)
NRBC BLD AUTO-RTO: 0 /100 WBCS
PLATELET # BLD AUTO: 376 THOUSANDS/UL (ref 149–390)
PMV BLD AUTO: 9.5 FL (ref 8.9–12.7)
POTASSIUM SERPL-SCNC: 4 MMOL/L (ref 3.5–5.3)
PROT SERPL-MCNC: 7.2 G/DL (ref 6.4–8.4)
PROTHROMBIN TIME: 13.4 SECONDS (ref 11.6–14.5)
RBC # BLD AUTO: 3.62 MILLION/UL (ref 3.81–5.12)
SODIUM SERPL-SCNC: 137 MMOL/L (ref 135–147)
WBC # BLD AUTO: 11.07 THOUSAND/UL (ref 4.31–10.16)

## 2023-06-29 PROCEDURE — 93005 ELECTROCARDIOGRAM TRACING: CPT

## 2023-06-29 PROCEDURE — 85730 THROMBOPLASTIN TIME PARTIAL: CPT | Performed by: SURGERY

## 2023-06-29 PROCEDURE — 80053 COMPREHEN METABOLIC PANEL: CPT

## 2023-06-29 PROCEDURE — 85025 COMPLETE CBC W/AUTO DIFF WBC: CPT

## 2023-06-29 PROCEDURE — 83036 HEMOGLOBIN GLYCOSYLATED A1C: CPT

## 2023-06-29 PROCEDURE — 85610 PROTHROMBIN TIME: CPT | Performed by: SURGERY

## 2023-06-30 LAB
ATRIAL RATE: 84 BPM
P AXIS: 56 DEGREES
PR INTERVAL: 144 MS
QRS AXIS: 45 DEGREES
QRSD INTERVAL: 86 MS
QT INTERVAL: 400 MS
QTC INTERVAL: 472 MS
T WAVE AXIS: 40 DEGREES
VENTRICULAR RATE: 84 BPM

## 2023-07-02 DIAGNOSIS — C57.9 GYNECOLOGIC MALIGNANCY (HCC): ICD-10-CM

## 2023-07-03 ENCOUNTER — HOSPITAL ENCOUNTER (OUTPATIENT)
Dept: CT IMAGING | Facility: HOSPITAL | Age: 73
Discharge: HOME | End: 2023-07-03
Payer: MEDICARE

## 2023-07-03 DIAGNOSIS — C56.2 MALIGNANT NEOPLASM OF LEFT OVARY (HCC): ICD-10-CM

## 2023-07-03 PROCEDURE — 71260 CT THORAX DX C+: CPT

## 2023-07-03 PROCEDURE — G1004 CDSM NDSC: HCPCS

## 2023-07-03 PROCEDURE — 74177 CT ABD & PELVIS W/CONTRAST: CPT

## 2023-07-03 RX ORDER — LORAZEPAM 1 MG/1
1 TABLET ORAL EVERY 8 HOURS PRN
Qty: 30 TABLET | Refills: 0 | Status: SHIPPED | OUTPATIENT
Start: 2023-07-03

## 2023-07-03 RX ADMIN — IOHEXOL 80 ML: 350 INJECTION, SOLUTION INTRAVENOUS at 13:03

## 2023-07-05 ENCOUNTER — TELEPHONE (OUTPATIENT)
Dept: SURGICAL ONCOLOGY | Facility: CLINIC | Age: 73
End: 2023-07-05

## 2023-07-05 ENCOUNTER — TELEPHONE (OUTPATIENT)
Dept: HEMATOLOGY ONCOLOGY | Facility: CLINIC | Age: 73
End: 2023-07-05

## 2023-07-05 NOTE — TELEPHONE ENCOUNTER
Urgent Message     Who are you speaking with? Physician Office   What is the reason for this call? Significant Findings or Peer to Peer? Significant Findings   Who is the patient's provider?  Reji Chery PA-C   Call back number [phone]   Relevant Information Yes

## 2023-07-05 NOTE — TELEPHONE ENCOUNTER
Discussed her CT results. I also discussed with Dr. Birgit James. I would favor her starting systemic therapy rather than reversal of her ostomy and having potential complications that would delay treatment. She is agreeable to this approach. She will follow-up with Dr. Birgit James.

## 2023-07-06 ENCOUNTER — DOCUMENTATION (OUTPATIENT)
Dept: HEMATOLOGY ONCOLOGY | Facility: CLINIC | Age: 73
End: 2023-07-06

## 2023-07-06 ENCOUNTER — OFFICE VISIT (OUTPATIENT)
Age: 73
End: 2023-07-06
Payer: MEDICARE

## 2023-07-06 VITALS
OXYGEN SATURATION: 98 % | SYSTOLIC BLOOD PRESSURE: 146 MMHG | HEART RATE: 104 BPM | HEIGHT: 64 IN | TEMPERATURE: 98.1 F | BODY MASS INDEX: 26.72 KG/M2 | RESPIRATION RATE: 16 BRPM | WEIGHT: 156.5 LBS | DIASTOLIC BLOOD PRESSURE: 90 MMHG

## 2023-07-06 DIAGNOSIS — C56.2 MALIGNANT NEOPLASM OF LEFT OVARY (HCC): Primary | ICD-10-CM

## 2023-07-06 PROCEDURE — 99215 OFFICE O/P EST HI 40 MIN: CPT | Performed by: OBSTETRICS & GYNECOLOGY

## 2023-07-06 NOTE — PROGRESS NOTES
In-basket message received from Dr. Jose Andrews to add patient to [facility] on 7/17/2023. Chart reviewed and prep completed.

## 2023-07-06 NOTE — ASSESSMENT & PLAN NOTE
80-year-old with recurrent stage IIIc ovarian cancer who had exploratory laparotomy, resection of stricture of existing end-to-end anastomosis on 3/27/2023. There were tumor cells in lymphovascular spaces at the time of that resection which was 2 months after completing adjuvant chemotherapy. She was observed and as of 7/3/2023 has CT evidence of enlarging pelvic lymphadenopathy, increasing right pleural effusion, elevated  of 168.5. She had folate receptor testing done and is positive. Her performance status is 0.  1.  Reviewed her CT imaging with Dr. Navya Rodrigues. Based on evidence of disease progression, plan to hold off on takedown of ileostomy. 2.  I discussed the concept of platinum sensitivity with the patient and her family members. They understand that this is a clinical concept and that having a pathologic finding of tumor cells in lymphovascular spaces 2 months after completion of adjuvant chemotherapy may not impact the clinical diagnosis of platinum sensitivity. 3.  Based on that rationale, she would be considered platinum sensitive. 4.  I discussed the risks and benefits of palliative treatment with carboplatin at AUC 5, liposomal doxorubicin at 30 mg per metered squared to be given every 28 days in addition to Avastin at 10 mg/kg to be given every 14 days. We discussed the risks and benefits of treatment including the risks of bleeding, fistula formation, bowel perforation and she agrees to proceed as outlined. Consent for treatment was obtained by me in the office. She was provided with written information regarding potential side effects of treatment as well. 5.  Plan for 3 cycles of palliative chemotherapy followed by repeat CT imaging to assess disease response. Continue to trend .

## 2023-07-06 NOTE — PROGRESS NOTES
Assessment/Plan:    Problem List Items Addressed This Visit        Endocrine    Malignant neoplasm of left ovary ([address]) - Primary     66-year-old with recurrent stage IIIc ovarian cancer who had exploratory laparotomy, resection of stricture of existing end-to-end anastomosis on 3/27/2023. There were tumor cells in lymphovascular spaces at the time of that resection which was 2 months after completing adjuvant chemotherapy. She was observed and as of 7/3/2023 has CT evidence of enlarging pelvic lymphadenopathy, increasing right pleural effusion, elevated  of 168.5. She had folate receptor testing done and is positive. Her performance status is 0.  1.  Reviewed her CT imaging with Dr. Reddy. Based on evidence of disease progression, plan to hold off on takedown of ileostomy. 2.  I discussed the concept of platinum sensitivity with the patient and her family members. They understand that this is a clinical concept and that having a pathologic finding of tumor cells in lymphovascular spaces 2 months after completion of adjuvant chemotherapy may not impact the clinical diagnosis of platinum sensitivity. 3.  Based on that rationale, she would be considered platinum sensitive. 4.  I discussed the risks and benefits of palliative treatment with carboplatin at AUC 5, liposomal doxorubicin at 30 mg per metered squared to be given every 28 days in addition to Avastin at 10 mg/kg to be given every 14 days. We discussed the risks and benefits of treatment including the risks of bleeding, fistula formation, bowel perforation and she agrees to proceed as outlined. Consent for treatment was obtained by me in the office. She was provided with written information regarding potential side effects of treatment as well. 5.  Plan for 3 cycles of palliative chemotherapy followed by repeat CT imaging to assess disease response. Continue to trend .               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   Results revealed patient has the following mutation(s):  None     Malignant neoplasm of left ovary (HCC)   8/4/2022 Surgery    Diagnostic laparoscopy, peritoneal biopsy     8/9/2022 Initial Diagnosis    Gynecologic malignancy ([address])     8/23/2022 -  Chemotherapy    palonosetron (ALOXI), 0.25 mg, Intravenous, Once, 6 of 6 cycles  Administration: 0.25 mg (8/23/2022), 0.25 mg (9/13/2022), 0.25 mg (10/4/2022), 0.25 mg (12/13/2022), 0.25 mg (1/3/2023), 0.25 mg (1/24/2023)  fosaprepitant (EMEND) IVPB, 150 mg, Intravenous, Once, 6 of 6 cycles  Administration: 150 mg (8/23/2022), 150 mg (9/13/2022), 150 mg (10/4/2022), 150 mg (12/13/2022), 150 mg (1/3/2023), 150 mg (1/24/2023)  CARBOplatin (PARAPLATIN) IVPB (GO AUC DOSING), 605.4 mg, Intravenous, Once, 6 of 6 cycles  Administration: 600 mg (8/23/2022), 564 mg (9/13/2022), 601.8 mg (10/4/2022), 491.5 mg (12/13/2022), 468.5 mg (1/3/2023), 488 mg (1/24/2023)  bevacizumab (AVASTIN) IVPB, 1,252.5 mg, Intravenous, Once, 3 of 3 cycles  Dose modification: 10 mg/kg (original dose 15 mg/kg, Cycle 5, Reason: Other (Must fill in a comment))  Administration: 1,200 mg (8/23/2022), 1,200 mg (9/13/2022), 745 mg (1/3/2023)  PACLItaxel (TAXOL) chemo IVPB, 175 mg/m2 = 330.6 mg, Intravenous, Once, 6 of 6 cycles  Dose modification: 135 mg/m2 (original dose 175 mg/m2, Cycle 4, Reason: Other (Must fill in a comment))  Administration: 330.6 mg (8/23/2022), 330.6 mg (9/13/2022), 330.6 mg (10/4/2022), 246 mg (12/13/2022), 244.2 mg (1/3/2023), 244.2 mg (1/24/2023)     10/28/2022 Surgery    diagnostic laparoscopy, exploratory laparotomy, modified radical hysterectomy, bilateral salpingo-oophorectomy with en bloc rectosigmoid resection, gastrocolic omentectomy, splenectomy, small bowel resection with reanastomosis, diaphragm resection, excision ablation of peritoneal implants, ileostomy formation, optimal tumor debulking     11/29/2022 -  Cancer Staged    Staging form: Ovary, Fallopian Tube, Primary Peritoneal, AJCC 8th Edition  - Clinical: FIGO Stage IIIC (cT3c, cN0, cM0) - Signed by Jayro Birmingham MD on 11/29/2022  Histologic grade (G): G3  Histologic grading system: 4 grade system       3/27/2023 Surgery    Low anterior resection  Residual tumor identified in lymphovascular spaces    A. 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 Colon (anastomotic donuts):  - Colon with no diagnostic abnormality  - No carcinoma identified      4/17/2023 Genomic Testing    [employer]: FOLR1, positive           Patient ID: Virginia Mcnamara is a 68 y.o. female  Who returns for treatment discussion.  is 168.5. She had a CT scan of the chest abdomen pelvis on 7/3/2023 that revealed increasing right inguinal lymphadenopathy now measuring 10 mm, 7 mm right external iliac lymph node, 8 mm left periaortic lymph node and increasing right pleural effusion. She is able to perform her activities of daily living. She notes some itching around the ileostomy site with occasional bag leakage. She is tolerating bag changes overall well. She had folate receptor testing done and was positive. No other interval change in medications or medical history since her last visit the office.   She has recently seen Dr. Cliff Andrade to discuss the possibility of ileostomy reversal.      The following portions of the patient's history were reviewed and updated as appropriate: allergies, current medications, past family history, past medical history, past social history, past surgical history and problem list.    Review of Systems   Constitutional: Negative for activity change and unexpected weight change. HENT: Negative. Eyes: Negative. Respiratory: Negative. Cardiovascular: Negative. Gastrointestinal: Negative for abdominal distention and abdominal pain. Endocrine: Negative. Genitourinary: Negative for pelvic pain and vaginal bleeding. Musculoskeletal: Negative. Skin: Negative. Allergic/Immunologic: Negative. Neurological: Negative. Hematological: Negative. Psychiatric/Behavioral: Negative.         Current Outpatient Medications   Medication Sig Dispense Refill   • Acetaminophen (TYLENOL ARTHRITIS PAIN PO) Take by mouth as needed     • aspirin (ECOTRIN LOW STRENGTH) 81 mg EC tablet Take 81 mg by mouth daily     • B Complex-C (B COMPLEX-VITAMIN C PO) Take by mouth daily     • Calcium Carb-Cholecalciferol (CALCIUM 500+D3 PO) Take 1 tablet by mouth 2 (two) times a day      • cholecalciferol (VITAMIN D3) 1,000 units tablet Take 1,000 Units by mouth daily     • famotidine (PEPCID) 40 MG tablet TAKE 1 TABLET BY MOUTH DAILY AT BEDTIME (Patient taking differently: daily with dinner) 90 tablet 3   • fexofenadine (ALLEGRA) 180 MG tablet Take 180 mg by mouth daily     • GLUCOSAMINE-CHONDROITIN PO Take 1 tablet by mouth 2 (two) times a day      • lidocaine-prilocaine (EMLA) cream Apply topically as needed for mild pain 30 g 0   • LORazepam (ATIVAN) 1 mg tablet Take 1 tablet (1 mg total) by mouth every 8 (eight) hours as needed for anxiety (nausea or anxiety) 30 tablet 0   • metoprolol succinate (TOPROL-XL) 25 mg 24 hr tablet Take 25 mg by mouth daily     • montelukast (SINGULAIR) 10 mg tablet Take 10 mg by mouth daily dinner     • multivitamin [facility]) TABS Take 1 tablet by mouth daily     • nystatin (MYCOSTATIN) powder Apply topically if needed Under abdominal pannus and in between thighs     • omeprazole (PriLOSEC) 20 mg delayed release capsule TAKE 1 CAPSULE BY MOUTH EVERY DAY 30 MINUTES BEFORE MORNING MEAL FOR 90 DAYS     • ondansetron (ZOFRAN) 8 mg tablet Take 1 tablet (8 mg total) by mouth every 8 (eight) hours as needed for nausea or vomiting 20 tablet 1   • predniSONE 1 MG TBEC Take by mouth daily after breakfast     • Probiotic Product (PROBIOTIC DAILY PO) Take by mouth daily     • simvastatin (ZOCOR) 10 mg tablet Take 10 mg by mouth daily at bedtime     • sodium chloride (OCEAN) 0.65 % nasal spray 1 spray into each nostril as needed for congestion     • SYMBICORT 160-4.5 MCG/ACT inhaler 2 puffs daily      • Triamcinolone Acetonide (NASACORT ALLERGY 24HR NA) 1 spray by Each Nare route 2 (two) times a day     • Ascorbic Acid (VITAMIN C GUMMIE PO) Take by mouth (Patient not taking: Reported on 7/6/2023)     • oxyCODONE (ROXICODONE) 5 immediate release tablet 1/2 tab every 6 hours as needed for pain (Patient not taking: Reported on 7/6/2023) 5 tablet 0     No current facility-administered medications for this visit. Objective:    Blood pressure 146/90, pulse 104, temperature 98.1 °F (36.7 °C), temperature source Temporal, resp. rate 16, height 5' 4" (1.626 m), weight 71 kg (156 lb 8 oz), SpO2 98 %. Body mass index is 26.86 kg/m². Body surface area is 1.76 meters squared. Physical Exam  Vitals reviewed. Constitutional:       General: She is not in acute distress. Appearance: Normal appearance. She is not ill-appearing. HENT:      Head: Normocephalic and atraumatic. Mouth/Throat:      Mouth: Mucous membranes are moist.   Eyes:      General: No scleral icterus. Right eye: No discharge. Left eye: No discharge.       Conjunctiva/sclera: Conjunctivae normal.   Pulmonary:      Effort: Pulmonary effort is normal. Musculoskeletal:      Right lower leg: No edema. Left lower leg: No edema. Skin:     General: Skin is warm and dry. Coloration: Skin is not jaundiced. Findings: No rash. Neurological:      General: No focal deficit present. Mental Status: She is alert and oriented to person, place, and time. Cranial Nerves: No cranial nerve deficit. Sensory: No sensory deficit. Motor: No weakness. Gait: Gait normal.   Psychiatric:         Mood and Affect: Mood normal.         Behavior: Behavior normal.         Thought Content:  Thought content normal.         Judgment: Judgment normal.         Lab Results   Component Value Date     168.5 (H) 06/26/2023     Lab Results   Component Value Date    K 4.0 06/29/2023     06/29/2023    CO2 27 06/29/2023    BUN 16 06/29/2023    CREATININE 0.58 (L) 06/29/2023    GLUCOSE 214 (H) 10/28/2022    GLUF 161 (H) 12/08/2022    CALCIUM 9.3 06/29/2023    CORRECTEDCA 9.0 02/09/2023    AST 16 06/29/2023    ALT 11 06/29/2023    ALKPHOS 82 06/29/2023    EGFR 91 06/29/2023     Lab Results   Component Value Date    WBC 11.07 (H) 06/29/2023    HGB 11.1 (L) 06/29/2023    HCT 33.6 (L) 06/29/2023    MCV 93 06/29/2023     06/29/2023     Lab Results   Component Value Date    NEUTROABS 7.79 (H) 06/29/2023        Trend:  Lab Results   Component Value Date     168.5 (H) 06/26/2023     6.2 03/24/2023     5.1 03/02/2023     5.3 02/09/2023     8.7 01/19/2023     15.8 12/30/2022     37.9 (H) 12/08/2022     8.6 10/20/2022     8.4 10/07/2022     12.5 09/29/2022     32.0 (H) 09/08/2022     52.4 (H) 09/02/2022     85.5 (H) 08/26/2022     77.3 (H) 08/19/2022

## 2023-07-10 DIAGNOSIS — C56.2 MALIGNANT NEOPLASM OF LEFT OVARY (HCC): Primary | ICD-10-CM

## 2023-07-11 ENCOUNTER — APPOINTMENT (OUTPATIENT)
Dept: RADIOLOGY | Facility: CLINIC | Age: 73
End: 2023-07-11
Payer: MEDICARE

## 2023-07-11 DIAGNOSIS — C56.2 MALIGNANT NEOPLASM OF LEFT OVARY (HCC): Primary | ICD-10-CM

## 2023-07-11 DIAGNOSIS — J90 PLEURAL EFFUSION: ICD-10-CM

## 2023-07-11 DIAGNOSIS — R53.83 FATIGUE, UNSPECIFIED TYPE: ICD-10-CM

## 2023-07-11 PROCEDURE — 71046 X-RAY EXAM CHEST 2 VIEWS: CPT

## 2023-07-12 DIAGNOSIS — C56.2 MALIGNANT NEOPLASM OF LEFT OVARY (HCC): ICD-10-CM

## 2023-07-12 RX ORDER — LIDOCAINE AND PRILOCAINE 25; 25 MG/G; MG/G
CREAM TOPICAL
Qty: 30 G | Refills: 3 | Status: SHIPPED | OUTPATIENT
Start: 2023-07-12

## 2023-07-19 ENCOUNTER — APPOINTMENT (OUTPATIENT)
Dept: LAB | Facility: HOSPITAL | Age: 73
End: 2023-07-19
Payer: MEDICARE

## 2023-07-19 ENCOUNTER — HOSPITAL ENCOUNTER (OUTPATIENT)
Dept: INFUSION CENTER | Facility: HOSPITAL | Age: 73
Discharge: HOME | End: 2023-07-19
Payer: MEDICARE

## 2023-07-19 DIAGNOSIS — E11.69 DIABETES MELLITUS ASSOCIATED WITH HORMONAL ETIOLOGY (HCC): ICD-10-CM

## 2023-07-19 DIAGNOSIS — E78.2 MIXED HYPERLIPIDEMIA: ICD-10-CM

## 2023-07-19 DIAGNOSIS — Z45.2 ENCOUNTER FOR VENOUS ACCESS DEVICE CARE: ICD-10-CM

## 2023-07-19 DIAGNOSIS — C56.2 MALIGNANT NEOPLASM OF LEFT OVARY (HCC): Primary | ICD-10-CM

## 2023-07-19 DIAGNOSIS — C48.2 MALIGNANT NEOPLASM OF PERITONEUM, UNSPECIFIED (HCC): ICD-10-CM

## 2023-07-19 DIAGNOSIS — D84.9 IMMUNOSUPPRESSION-RELATED INFECTIOUS DISEASE (HCC): ICD-10-CM

## 2023-07-19 DIAGNOSIS — M31.5 POLYMYALGIA ARTERITICA (HCC): ICD-10-CM

## 2023-07-19 DIAGNOSIS — B99.8 IMMUNOSUPPRESSION-RELATED INFECTIOUS DISEASE (HCC): ICD-10-CM

## 2023-07-19 DIAGNOSIS — D64.9 ANEMIA, UNSPECIFIED TYPE: ICD-10-CM

## 2023-07-19 LAB
ALBUMIN SERPL BCP-MCNC: 3.6 G/DL (ref 3.5–5)
ALP SERPL-CCNC: 115 U/L (ref 34–104)
ALT SERPL W P-5'-P-CCNC: 11 U/L (ref 7–52)
ANION GAP SERPL CALCULATED.3IONS-SCNC: 7 MMOL/L
AST SERPL W P-5'-P-CCNC: 16 U/L (ref 13–39)
BASOPHILS # BLD AUTO: 0.03 THOUSANDS/ÂΜL (ref 0–0.1)
BASOPHILS NFR BLD AUTO: 0 % (ref 0–1)
BILIRUB SERPL-MCNC: 0.4 MG/DL (ref 0.2–1)
BUN SERPL-MCNC: 11 MG/DL (ref 5–25)
CALCIUM SERPL-MCNC: 9 MG/DL (ref 8.4–10.2)
CHLORIDE SERPL-SCNC: 101 MMOL/L (ref 96–108)
CHOLEST SERPL-MCNC: 130 MG/DL
CO2 SERPL-SCNC: 25 MMOL/L (ref 21–32)
CREAT SERPL-MCNC: 0.62 MG/DL (ref 0.6–1.3)
CREAT UR-MCNC: 120.5 MG/DL
EOSINOPHIL # BLD AUTO: 0.19 THOUSAND/ÂΜL (ref 0–0.61)
EOSINOPHIL NFR BLD AUTO: 2 % (ref 0–6)
ERYTHROCYTE [DISTWIDTH] IN BLOOD BY AUTOMATED COUNT: 14.3 % (ref 11.6–15.1)
ERYTHROCYTE [SEDIMENTATION RATE] IN BLOOD: 96 MM/HOUR (ref 0–29)
EST. AVERAGE GLUCOSE BLD GHB EST-MCNC: 140 MG/DL
GFR SERPL CREATININE-BSD FRML MDRD: 89 ML/MIN/1.73SQ M
GLUCOSE SERPL-MCNC: 119 MG/DL (ref 65–140)
HBA1C MFR BLD: 6.5 %
HCT VFR BLD AUTO: 34.4 % (ref 34.8–46.1)
HDLC SERPL-MCNC: 43 MG/DL
HGB BLD-MCNC: 11.2 G/DL (ref 11.5–15.4)
IMM GRANULOCYTES # BLD AUTO: 0.15 THOUSAND/UL (ref 0–0.2)
IMM GRANULOCYTES NFR BLD AUTO: 1 % (ref 0–2)
LDLC SERPL CALC-MCNC: 54 MG/DL (ref 0–100)
LYMPHOCYTES # BLD AUTO: 1.07 THOUSANDS/ÂΜL (ref 0.6–4.47)
LYMPHOCYTES NFR BLD AUTO: 9 % (ref 14–44)
MAGNESIUM SERPL-MCNC: 1.7 MG/DL (ref 1.9–2.7)
MCH RBC QN AUTO: 29.4 PG (ref 26.8–34.3)
MCHC RBC AUTO-ENTMCNC: 32.6 G/DL (ref 31.4–37.4)
MCV RBC AUTO: 90 FL (ref 82–98)
MONOCYTES # BLD AUTO: 1.73 THOUSAND/ÂΜL (ref 0.17–1.22)
MONOCYTES NFR BLD AUTO: 14 % (ref 4–12)
NEUTROPHILS # BLD AUTO: 9.35 THOUSANDS/ÂΜL (ref 1.85–7.62)
NEUTS SEG NFR BLD AUTO: 74 % (ref 43–75)
NONHDLC SERPL-MCNC: 87 MG/DL
NRBC BLD AUTO-RTO: 0 /100 WBCS
PLATELET # BLD AUTO: 466 THOUSANDS/UL (ref 149–390)
PMV BLD AUTO: 9.6 FL (ref 8.9–12.7)
POTASSIUM SERPL-SCNC: 3.8 MMOL/L (ref 3.5–5.3)
PROT SERPL-MCNC: 7.5 G/DL (ref 6.4–8.4)
PROT UR-MCNC: 32 MG/DL
PROT/CREAT UR: 0.27 MG/G{CREAT} (ref 0–0.1)
RBC # BLD AUTO: 3.81 MILLION/UL (ref 3.81–5.12)
SODIUM SERPL-SCNC: 133 MMOL/L (ref 135–147)
T3FREE SERPL-MCNC: 3.21 PG/ML (ref 2.5–3.9)
T4 SERPL-MCNC: 12.35 UG/DL (ref 6.09–12.23)
TRIGL SERPL-MCNC: 163 MG/DL
TSH SERPL DL<=0.05 MIU/L-ACNC: 1.87 UIU/ML (ref 0.45–4.5)
WBC # BLD AUTO: 12.52 THOUSAND/UL (ref 4.31–10.16)

## 2023-07-19 PROCEDURE — 84156 ASSAY OF PROTEIN URINE: CPT

## 2023-07-19 PROCEDURE — 84443 ASSAY THYROID STIM HORMONE: CPT

## 2023-07-19 PROCEDURE — 85652 RBC SED RATE AUTOMATED: CPT

## 2023-07-19 PROCEDURE — 85025 COMPLETE CBC W/AUTO DIFF WBC: CPT

## 2023-07-19 PROCEDURE — 83036 HEMOGLOBIN GLYCOSYLATED A1C: CPT

## 2023-07-19 PROCEDURE — 80061 LIPID PANEL: CPT

## 2023-07-19 PROCEDURE — 84481 FREE ASSAY (FT-3): CPT

## 2023-07-19 PROCEDURE — 84436 ASSAY OF TOTAL THYROXINE: CPT

## 2023-07-19 PROCEDURE — 83735 ASSAY OF MAGNESIUM: CPT

## 2023-07-19 PROCEDURE — 80053 COMPREHEN METABOLIC PANEL: CPT

## 2023-07-19 PROCEDURE — 36415 COLL VENOUS BLD VENIPUNCTURE: CPT

## 2023-07-19 PROCEDURE — 82570 ASSAY OF URINE CREATININE: CPT

## 2023-07-19 RX ORDER — PALONOSETRON 0.05 MG/ML
0.25 INJECTION, SOLUTION INTRAVENOUS ONCE
Status: CANCELLED | OUTPATIENT
Start: 2023-07-21

## 2023-07-19 RX ORDER — SODIUM CHLORIDE 9 MG/ML
20 INJECTION, SOLUTION INTRAVENOUS ONCE
Status: CANCELLED | OUTPATIENT
Start: 2023-07-21

## 2023-07-19 RX ORDER — DEXTROSE MONOHYDRATE 50 MG/ML
20 INJECTION, SOLUTION INTRAVENOUS ONCE
Status: CANCELLED | OUTPATIENT
Start: 2023-07-21

## 2023-07-20 ENCOUNTER — DOCUMENTATION (OUTPATIENT)
Dept: GYNECOLOGIC ONCOLOGY | Facility: CLINIC | Age: 73
End: 2023-07-20

## 2023-07-20 NOTE — PROGRESS NOTES
Multidisciplinary Gynecologic Oncology Tumor Case Review       Physician Recommended Plan     Nathaniel Gutiérrez is a 68 y.o. female     Diagnosis: Recurrent stage 3 C ovarian cancer     Patient was discussed at the Multidisciplinary Gynecologic Oncology Case review on 7/17/2023. The group recommended to consider treatment with systemic chemotherapy. Follow-up as per scheduled pre chemo appointments. NCCN guidelines were available for review. The final treatment plan will be left to the discretion of the patient and the treating physician. DISCLAIMERS:    TO THE TREATING PHYSICIAN:  This conference is a meeting of clinicians from various specialty areas who evaluate and discuss patients for whom a multidisciplinary treatment approach is being considered. Please note that the above opinion was a consensus of the conference attendees and is intended only to assist in quality care of the discussed patient. The responsibility for follow up on the input given during the conference, along with any final decisions regarding plan of care, is that of the patient and the patient's provider. Accordingly, appointments have only been recommended based on this information and have NOT been scheduled unless otherwise noted. TO THE PATIENT:  This summary is a brief record of major aspects of your cancer treatment. You may choose to share a copy with any of your doctors or nurses. However, this is not a detailed or comprehensive record of your care.

## 2023-07-21 ENCOUNTER — HOSPITAL ENCOUNTER (OUTPATIENT)
Dept: INFUSION CENTER | Facility: HOSPITAL | Age: 73
End: 2023-07-21
Attending: OBSTETRICS & GYNECOLOGY
Payer: MEDICARE

## 2023-07-21 VITALS
TEMPERATURE: 97.5 F | DIASTOLIC BLOOD PRESSURE: 61 MMHG | OXYGEN SATURATION: 97 % | BODY MASS INDEX: 26.56 KG/M2 | HEART RATE: 82 BPM | HEIGHT: 64 IN | RESPIRATION RATE: 16 BRPM | WEIGHT: 155.6 LBS | SYSTOLIC BLOOD PRESSURE: 130 MMHG

## 2023-07-21 DIAGNOSIS — C56.2 MALIGNANT NEOPLASM OF LEFT OVARY (HCC): Primary | ICD-10-CM

## 2023-07-21 PROCEDURE — 96367 TX/PROPH/DG ADDL SEQ IV INF: CPT

## 2023-07-21 PROCEDURE — 96375 TX/PRO/DX INJ NEW DRUG ADDON: CPT

## 2023-07-21 PROCEDURE — 96417 CHEMO IV INFUS EACH ADDL SEQ: CPT

## 2023-07-21 PROCEDURE — 96413 CHEMO IV INFUSION 1 HR: CPT

## 2023-07-21 RX ORDER — SODIUM CHLORIDE 9 MG/ML
20 INJECTION, SOLUTION INTRAVENOUS ONCE
Status: COMPLETED | OUTPATIENT
Start: 2023-07-21 | End: 2023-07-21

## 2023-07-21 RX ORDER — PALONOSETRON 0.05 MG/ML
0.25 INJECTION, SOLUTION INTRAVENOUS ONCE
Status: COMPLETED | OUTPATIENT
Start: 2023-07-21 | End: 2023-07-21

## 2023-07-21 RX ORDER — DEXTROSE MONOHYDRATE 50 MG/ML
20 INJECTION, SOLUTION INTRAVENOUS ONCE
Status: COMPLETED | OUTPATIENT
Start: 2023-07-21 | End: 2023-07-21

## 2023-07-21 RX ADMIN — DEXAMETHASONE SODIUM PHOSPHATE 20 MG: 10 INJECTION, SOLUTION INTRAMUSCULAR; INTRAVENOUS at 08:25

## 2023-07-21 RX ADMIN — CARBOPLATIN 471 MG: 10 INJECTION, SOLUTION INTRAVENOUS at 11:22

## 2023-07-21 RX ADMIN — SODIUM CHLORIDE 20 ML/HR: 0.9 INJECTION, SOLUTION INTRAVENOUS at 08:27

## 2023-07-21 RX ADMIN — FAMOTIDINE 20 MG: 10 INJECTION INTRAVENOUS at 08:50

## 2023-07-21 RX ADMIN — FOSAPREPITANT 150 MG: 150 INJECTION, POWDER, LYOPHILIZED, FOR SOLUTION INTRAVENOUS at 09:23

## 2023-07-21 RX ADMIN — BEVACIZUMAB 700 MG: 400 INJECTION, SOLUTION INTRAVENOUS at 12:25

## 2023-07-21 RX ADMIN — PALONOSETRON 0.25 MG: 0.05 INJECTION, SOLUTION INTRAVENOUS at 08:37

## 2023-07-21 RX ADMIN — DEXTROSE MONOHYDRATE 20 ML/HR: 50 INJECTION, SOLUTION INTRAVENOUS at 09:59

## 2023-07-21 RX ADMIN — DOXORUBICIN HYDROCHLORIDE 52.8 MG: 2 INJECTABLE, LIPOSOMAL INTRAVENOUS at 10:08

## 2023-07-21 NOTE — PROGRESS NOTES
Patient completed chemotherapy infusion with no adverse reactions. Dressing CD&I. AVS provided, patient left unit ambulatory with steady gait.

## 2023-07-26 ENCOUNTER — HOSPITAL ENCOUNTER (OUTPATIENT)
Dept: INFUSION CENTER | Facility: HOSPITAL | Age: 73
Discharge: HOME | End: 2023-07-26
Payer: MEDICARE

## 2023-07-26 DIAGNOSIS — Z45.2 ENCOUNTER FOR VENOUS ACCESS DEVICE CARE: ICD-10-CM

## 2023-07-26 DIAGNOSIS — C56.2 MALIGNANT NEOPLASM OF LEFT OVARY (HCC): Primary | ICD-10-CM

## 2023-07-26 LAB
ALBUMIN SERPL BCP-MCNC: 3.5 G/DL (ref 3.5–5)
ALP SERPL-CCNC: 86 U/L (ref 34–104)
ALT SERPL W P-5'-P-CCNC: 20 U/L (ref 7–52)
ANION GAP SERPL CALCULATED.3IONS-SCNC: 7 MMOL/L
AST SERPL W P-5'-P-CCNC: 23 U/L (ref 13–39)
BASOPHILS # BLD AUTO: 0.03 THOUSANDS/ÂΜL (ref 0–0.1)
BASOPHILS NFR BLD AUTO: 0 % (ref 0–1)
BILIRUB SERPL-MCNC: 0.29 MG/DL (ref 0.2–1)
BUN SERPL-MCNC: 19 MG/DL (ref 5–25)
CALCIUM SERPL-MCNC: 8.9 MG/DL (ref 8.4–10.2)
CHLORIDE SERPL-SCNC: 101 MMOL/L (ref 96–108)
CO2 SERPL-SCNC: 27 MMOL/L (ref 21–32)
CREAT SERPL-MCNC: 0.69 MG/DL (ref 0.6–1.3)
EOSINOPHIL # BLD AUTO: 0.17 THOUSAND/ÂΜL (ref 0–0.61)
EOSINOPHIL NFR BLD AUTO: 2 % (ref 0–6)
ERYTHROCYTE [DISTWIDTH] IN BLOOD BY AUTOMATED COUNT: 14.4 % (ref 11.6–15.1)
GFR SERPL CREATININE-BSD FRML MDRD: 86 ML/MIN/1.73SQ M
GLUCOSE SERPL-MCNC: 131 MG/DL (ref 65–140)
HCT VFR BLD AUTO: 33.3 % (ref 34.8–46.1)
HGB BLD-MCNC: 10.8 G/DL (ref 11.5–15.4)
IMM GRANULOCYTES # BLD AUTO: 0.04 THOUSAND/UL (ref 0–0.2)
IMM GRANULOCYTES NFR BLD AUTO: 0 % (ref 0–2)
LYMPHOCYTES # BLD AUTO: 1.14 THOUSANDS/ÂΜL (ref 0.6–4.47)
LYMPHOCYTES NFR BLD AUTO: 10 % (ref 14–44)
MAGNESIUM SERPL-MCNC: 1.7 MG/DL (ref 1.9–2.7)
MCH RBC QN AUTO: 29 PG (ref 26.8–34.3)
MCHC RBC AUTO-ENTMCNC: 32.4 G/DL (ref 31.4–37.4)
MCV RBC AUTO: 90 FL (ref 82–98)
MONOCYTES # BLD AUTO: 0.93 THOUSAND/ÂΜL (ref 0.17–1.22)
MONOCYTES NFR BLD AUTO: 8 % (ref 4–12)
NEUTROPHILS # BLD AUTO: 9.11 THOUSANDS/ÂΜL (ref 1.85–7.62)
NEUTS SEG NFR BLD AUTO: 80 % (ref 43–75)
NRBC BLD AUTO-RTO: 0 /100 WBCS
PLATELET # BLD AUTO: 444 THOUSANDS/UL (ref 149–390)
PMV BLD AUTO: 9.6 FL (ref 8.9–12.7)
POTASSIUM SERPL-SCNC: 3.7 MMOL/L (ref 3.5–5.3)
PROT SERPL-MCNC: 6.8 G/DL (ref 6.4–8.4)
RBC # BLD AUTO: 3.72 MILLION/UL (ref 3.81–5.12)
SODIUM SERPL-SCNC: 135 MMOL/L (ref 135–147)
WBC # BLD AUTO: 11.42 THOUSAND/UL (ref 4.31–10.16)

## 2023-07-26 PROCEDURE — 80053 COMPREHEN METABOLIC PANEL: CPT

## 2023-07-26 PROCEDURE — 85025 COMPLETE CBC W/AUTO DIFF WBC: CPT

## 2023-07-26 PROCEDURE — 83735 ASSAY OF MAGNESIUM: CPT

## 2023-07-26 NOTE — PROGRESS NOTES
Pt arrived amb for port labs. Accessed, labs obtained, de-accessed, dsd applied. Disch amb to home, steady gait.

## 2023-08-02 ENCOUNTER — HOSPITAL ENCOUNTER (OUTPATIENT)
Dept: INFUSION CENTER | Facility: HOSPITAL | Age: 73
Discharge: HOME | End: 2023-08-02
Payer: MEDICARE

## 2023-08-02 DIAGNOSIS — Z45.2 ENCOUNTER FOR VENOUS ACCESS DEVICE CARE: Primary | ICD-10-CM

## 2023-08-02 DIAGNOSIS — C56.2 MALIGNANT NEOPLASM OF LEFT OVARY (HCC): ICD-10-CM

## 2023-08-02 LAB
ALBUMIN SERPL BCP-MCNC: 3.8 G/DL (ref 3.5–5)
ALP SERPL-CCNC: 98 U/L (ref 34–104)
ALT SERPL W P-5'-P-CCNC: 19 U/L (ref 7–52)
ANION GAP SERPL CALCULATED.3IONS-SCNC: 7 MMOL/L
AST SERPL W P-5'-P-CCNC: 22 U/L (ref 13–39)
BASOPHILS # BLD AUTO: 0.02 THOUSANDS/ÂΜL (ref 0–0.1)
BASOPHILS NFR BLD AUTO: 0 % (ref 0–1)
BILIRUB SERPL-MCNC: 0.4 MG/DL (ref 0.2–1)
BUN SERPL-MCNC: 14 MG/DL (ref 5–25)
CALCIUM SERPL-MCNC: 9 MG/DL (ref 8.4–10.2)
CANCER AG125 SERPL-ACNC: 340.6 U/ML (ref 0–35)
CHLORIDE SERPL-SCNC: 102 MMOL/L (ref 96–108)
CO2 SERPL-SCNC: 25 MMOL/L (ref 21–32)
CREAT SERPL-MCNC: 0.58 MG/DL (ref 0.6–1.3)
CREAT UR-MCNC: 117 MG/DL
EOSINOPHIL # BLD AUTO: 0.04 THOUSAND/ÂΜL (ref 0–0.61)
EOSINOPHIL NFR BLD AUTO: 1 % (ref 0–6)
ERYTHROCYTE [DISTWIDTH] IN BLOOD BY AUTOMATED COUNT: 14.1 % (ref 11.6–15.1)
GFR SERPL CREATININE-BSD FRML MDRD: 91 ML/MIN/1.73SQ M
GLUCOSE SERPL-MCNC: 110 MG/DL (ref 65–140)
HCT VFR BLD AUTO: 30 % (ref 34.8–46.1)
HGB BLD-MCNC: 10 G/DL (ref 11.5–15.4)
IMM GRANULOCYTES # BLD AUTO: 0.02 THOUSAND/UL (ref 0–0.2)
IMM GRANULOCYTES NFR BLD AUTO: 0 % (ref 0–2)
LYMPHOCYTES # BLD AUTO: 1.01 THOUSANDS/ÂΜL (ref 0.6–4.47)
LYMPHOCYTES NFR BLD AUTO: 16 % (ref 14–44)
MAGNESIUM SERPL-MCNC: 1.8 MG/DL (ref 1.9–2.7)
MCH RBC QN AUTO: 29.7 PG (ref 26.8–34.3)
MCHC RBC AUTO-ENTMCNC: 33.3 G/DL (ref 31.4–37.4)
MCV RBC AUTO: 89 FL (ref 82–98)
MONOCYTES # BLD AUTO: 0.45 THOUSAND/ÂΜL (ref 0.17–1.22)
MONOCYTES NFR BLD AUTO: 7 % (ref 4–12)
NEUTROPHILS # BLD AUTO: 4.97 THOUSANDS/ÂΜL (ref 1.85–7.62)
NEUTS SEG NFR BLD AUTO: 76 % (ref 43–75)
NRBC BLD AUTO-RTO: 0 /100 WBCS
PLATELET # BLD AUTO: 218 THOUSANDS/UL (ref 149–390)
PMV BLD AUTO: 9.4 FL (ref 8.9–12.7)
POTASSIUM SERPL-SCNC: 3.8 MMOL/L (ref 3.5–5.3)
PROT SERPL-MCNC: 7.3 G/DL (ref 6.4–8.4)
PROT UR-MCNC: 22 MG/DL
PROT/CREAT UR: 0.19 MG/G{CREAT} (ref 0–0.1)
RBC # BLD AUTO: 3.37 MILLION/UL (ref 3.81–5.12)
SODIUM SERPL-SCNC: 134 MMOL/L (ref 135–147)
WBC # BLD AUTO: 6.51 THOUSAND/UL (ref 4.31–10.16)

## 2023-08-02 PROCEDURE — 83735 ASSAY OF MAGNESIUM: CPT

## 2023-08-02 PROCEDURE — 82570 ASSAY OF URINE CREATININE: CPT

## 2023-08-02 PROCEDURE — 84156 ASSAY OF PROTEIN URINE: CPT

## 2023-08-02 PROCEDURE — 86304 IMMUNOASSAY TUMOR CA 125: CPT

## 2023-08-02 PROCEDURE — 85025 COMPLETE CBC W/AUTO DIFF WBC: CPT

## 2023-08-02 PROCEDURE — 80053 COMPREHEN METABOLIC PANEL: CPT

## 2023-08-02 NOTE — PROGRESS NOTES
Pt here for port labs. Accessed without difficulty. Labs drawn and sent. Pt tolerated procedure well. Pt left unit ambulatory with steady gait.  Refused AVS.

## 2023-08-02 NOTE — PLAN OF CARE
Problem: Knowledge Deficit  Goal: Patient/family/caregiver demonstrates understanding of disease process, treatment plan, medications, and discharge instructions  Description: Complete learning assessment and assess knowledge base.   Interventions:  - Provide teaching at level of understanding  - Provide teaching via preferred learning methods  8/2/2023 1254 by Blanca Finley RN  Outcome: Progressing  8/2/2023 1253 by Blanca Finley RN  Outcome: Progressing

## 2023-08-03 DIAGNOSIS — C57.9 GYNECOLOGIC MALIGNANCY (HCC): ICD-10-CM

## 2023-08-03 RX ORDER — SODIUM CHLORIDE 9 MG/ML
20 INJECTION, SOLUTION INTRAVENOUS ONCE
Status: CANCELLED | OUTPATIENT
Start: 2023-08-04

## 2023-08-03 RX ORDER — LORAZEPAM 1 MG/1
1 TABLET ORAL EVERY 8 HOURS PRN
Qty: 30 TABLET | Refills: 0 | Status: SHIPPED | OUTPATIENT
Start: 2023-08-03

## 2023-08-04 ENCOUNTER — HOSPITAL ENCOUNTER (OUTPATIENT)
Dept: INFUSION CENTER | Facility: HOSPITAL | Age: 73
End: 2023-08-04
Attending: OBSTETRICS & GYNECOLOGY
Payer: MEDICARE

## 2023-08-04 VITALS
WEIGHT: 154.1 LBS | TEMPERATURE: 97.2 F | OXYGEN SATURATION: 99 % | HEIGHT: 64 IN | DIASTOLIC BLOOD PRESSURE: 57 MMHG | SYSTOLIC BLOOD PRESSURE: 130 MMHG | RESPIRATION RATE: 16 BRPM | BODY MASS INDEX: 26.31 KG/M2 | HEART RATE: 84 BPM

## 2023-08-04 DIAGNOSIS — C56.2 MALIGNANT NEOPLASM OF LEFT OVARY (HCC): Primary | ICD-10-CM

## 2023-08-04 RX ORDER — SODIUM CHLORIDE 9 MG/ML
20 INJECTION, SOLUTION INTRAVENOUS ONCE
Status: COMPLETED | OUTPATIENT
Start: 2023-08-04 | End: 2023-08-04

## 2023-08-04 RX ADMIN — SODIUM CHLORIDE 20 ML/HR: 0.9 INJECTION, SOLUTION INTRAVENOUS at 11:05

## 2023-08-04 RX ADMIN — BEVACIZUMAB 700 MG: 400 INJECTION, SOLUTION INTRAVENOUS at 11:09

## 2023-08-04 NOTE — PROGRESS NOTES
Pt tolerated chemo treatment with no adverse reaction. Pt left unit ambulatory with steady gait.  Refused AVS.

## 2023-08-09 ENCOUNTER — HOSPITAL ENCOUNTER (OUTPATIENT)
Dept: INFUSION CENTER | Facility: HOSPITAL | Age: 73
Discharge: HOME | End: 2023-08-09
Payer: MEDICARE

## 2023-08-09 DIAGNOSIS — C56.2 MALIGNANT NEOPLASM OF LEFT OVARY (HCC): Primary | ICD-10-CM

## 2023-08-09 DIAGNOSIS — Z45.2 ENCOUNTER FOR VENOUS ACCESS DEVICE CARE: ICD-10-CM

## 2023-08-09 LAB
ALBUMIN SERPL BCP-MCNC: 3.7 G/DL (ref 3.5–5)
ALP SERPL-CCNC: 92 U/L (ref 34–104)
ALT SERPL W P-5'-P-CCNC: 11 U/L (ref 7–52)
ANION GAP SERPL CALCULATED.3IONS-SCNC: 5 MMOL/L
AST SERPL W P-5'-P-CCNC: 15 U/L (ref 13–39)
BASOPHILS # BLD AUTO: 0.01 THOUSANDS/ÂΜL (ref 0–0.1)
BASOPHILS NFR BLD AUTO: 0 % (ref 0–1)
BILIRUB SERPL-MCNC: 0.19 MG/DL (ref 0.2–1)
BUN SERPL-MCNC: 15 MG/DL (ref 5–25)
CALCIUM SERPL-MCNC: 8.9 MG/DL (ref 8.4–10.2)
CHLORIDE SERPL-SCNC: 105 MMOL/L (ref 96–108)
CO2 SERPL-SCNC: 26 MMOL/L (ref 21–32)
CREAT SERPL-MCNC: 0.59 MG/DL (ref 0.6–1.3)
EOSINOPHIL # BLD AUTO: 0.01 THOUSAND/ÂΜL (ref 0–0.61)
EOSINOPHIL NFR BLD AUTO: 0 % (ref 0–6)
ERYTHROCYTE [DISTWIDTH] IN BLOOD BY AUTOMATED COUNT: 15.4 % (ref 11.6–15.1)
GFR SERPL CREATININE-BSD FRML MDRD: 91 ML/MIN/1.73SQ M
GLUCOSE SERPL-MCNC: 168 MG/DL (ref 65–140)
HCT VFR BLD AUTO: 29.6 % (ref 34.8–46.1)
HGB BLD-MCNC: 9.6 G/DL (ref 11.5–15.4)
IMM GRANULOCYTES # BLD AUTO: 0.02 THOUSAND/UL (ref 0–0.2)
IMM GRANULOCYTES NFR BLD AUTO: 1 % (ref 0–2)
LYMPHOCYTES # BLD AUTO: 0.97 THOUSANDS/ÂΜL (ref 0.6–4.47)
LYMPHOCYTES NFR BLD AUTO: 24 % (ref 14–44)
MAGNESIUM SERPL-MCNC: 1.8 MG/DL (ref 1.9–2.7)
MCH RBC QN AUTO: 29.3 PG (ref 26.8–34.3)
MCHC RBC AUTO-ENTMCNC: 32.4 G/DL (ref 31.4–37.4)
MCV RBC AUTO: 90 FL (ref 82–98)
MONOCYTES # BLD AUTO: 0.86 THOUSAND/ÂΜL (ref 0.17–1.22)
MONOCYTES NFR BLD AUTO: 21 % (ref 4–12)
NEUTROPHILS # BLD AUTO: 2.21 THOUSANDS/ÂΜL (ref 1.85–7.62)
NEUTS SEG NFR BLD AUTO: 54 % (ref 43–75)
NRBC BLD AUTO-RTO: 0 /100 WBCS
PLATELET # BLD AUTO: 114 THOUSANDS/UL (ref 149–390)
PMV BLD AUTO: 9.8 FL (ref 8.9–12.7)
POTASSIUM SERPL-SCNC: 3.8 MMOL/L (ref 3.5–5.3)
PROT SERPL-MCNC: 7 G/DL (ref 6.4–8.4)
RBC # BLD AUTO: 3.28 MILLION/UL (ref 3.81–5.12)
SODIUM SERPL-SCNC: 136 MMOL/L (ref 135–147)
WBC # BLD AUTO: 4.08 THOUSAND/UL (ref 4.31–10.16)

## 2023-08-09 PROCEDURE — 85025 COMPLETE CBC W/AUTO DIFF WBC: CPT

## 2023-08-09 PROCEDURE — 83735 ASSAY OF MAGNESIUM: CPT

## 2023-08-09 PROCEDURE — 80053 COMPREHEN METABOLIC PANEL: CPT

## 2023-08-09 NOTE — PROGRESS NOTES
Pt here for port labs. Accessed, labs obtained, de-accessed, her bandaid applied. Disch amb to home steady gait.

## 2023-08-16 ENCOUNTER — HOSPITAL ENCOUNTER (OUTPATIENT)
Dept: INFUSION CENTER | Facility: HOSPITAL | Age: 73
Discharge: HOME | End: 2023-08-16
Payer: MEDICARE

## 2023-08-16 DIAGNOSIS — C56.2 MALIGNANT NEOPLASM OF LEFT OVARY (HCC): ICD-10-CM

## 2023-08-16 LAB
ALBUMIN SERPL BCP-MCNC: 4 G/DL (ref 3.5–5)
ALP SERPL-CCNC: 95 U/L (ref 34–104)
ALT SERPL W P-5'-P-CCNC: 13 U/L (ref 7–52)
ANION GAP SERPL CALCULATED.3IONS-SCNC: 7 MMOL/L
AST SERPL W P-5'-P-CCNC: 19 U/L (ref 13–39)
BASOPHILS # BLD AUTO: 0.04 THOUSANDS/ÂΜL (ref 0–0.1)
BASOPHILS NFR BLD AUTO: 1 % (ref 0–1)
BILIRUB SERPL-MCNC: 0.33 MG/DL (ref 0.2–1)
BUN SERPL-MCNC: 12 MG/DL (ref 5–25)
CALCIUM SERPL-MCNC: 9.5 MG/DL (ref 8.4–10.2)
CHLORIDE SERPL-SCNC: 100 MMOL/L (ref 96–108)
CO2 SERPL-SCNC: 25 MMOL/L (ref 21–32)
CREAT SERPL-MCNC: 0.59 MG/DL (ref 0.6–1.3)
CREAT UR-MCNC: 116.5 MG/DL
EOSINOPHIL # BLD AUTO: 0.01 THOUSAND/ÂΜL (ref 0–0.61)
EOSINOPHIL NFR BLD AUTO: 0 % (ref 0–6)
ERYTHROCYTE [DISTWIDTH] IN BLOOD BY AUTOMATED COUNT: 17.7 % (ref 11.6–15.1)
GFR SERPL CREATININE-BSD FRML MDRD: 91 ML/MIN/1.73SQ M
GLUCOSE SERPL-MCNC: 107 MG/DL (ref 65–140)
HCT VFR BLD AUTO: 31.9 % (ref 34.8–46.1)
HGB BLD-MCNC: 10.5 G/DL (ref 11.5–15.4)
IMM GRANULOCYTES # BLD AUTO: 0.06 THOUSAND/UL (ref 0–0.2)
IMM GRANULOCYTES NFR BLD AUTO: 1 % (ref 0–2)
LYMPHOCYTES # BLD AUTO: 1.04 THOUSANDS/ÂΜL (ref 0.6–4.47)
LYMPHOCYTES NFR BLD AUTO: 13 % (ref 14–44)
MAGNESIUM SERPL-MCNC: 1.8 MG/DL (ref 1.9–2.7)
MCH RBC QN AUTO: 30.1 PG (ref 26.8–34.3)
MCHC RBC AUTO-ENTMCNC: 32.9 G/DL (ref 31.4–37.4)
MCV RBC AUTO: 91 FL (ref 82–98)
MONOCYTES # BLD AUTO: 2.03 THOUSAND/ÂΜL (ref 0.17–1.22)
MONOCYTES NFR BLD AUTO: 26 % (ref 4–12)
NEUTROPHILS # BLD AUTO: 4.72 THOUSANDS/ÂΜL (ref 1.85–7.62)
NEUTS SEG NFR BLD AUTO: 59 % (ref 43–75)
NRBC BLD AUTO-RTO: 0 /100 WBCS
PLATELET # BLD AUTO: 334 THOUSANDS/UL (ref 149–390)
PMV BLD AUTO: 9.8 FL (ref 8.9–12.7)
POTASSIUM SERPL-SCNC: 4 MMOL/L (ref 3.5–5.3)
PROT SERPL-MCNC: 7.5 G/DL (ref 6.4–8.4)
PROT UR-MCNC: 18 MG/DL
PROT/CREAT UR: 0.15 MG/G{CREAT} (ref 0–0.1)
RBC # BLD AUTO: 3.49 MILLION/UL (ref 3.81–5.12)
SODIUM SERPL-SCNC: 132 MMOL/L (ref 135–147)
WBC # BLD AUTO: 7.9 THOUSAND/UL (ref 4.31–10.16)

## 2023-08-16 PROCEDURE — 85025 COMPLETE CBC W/AUTO DIFF WBC: CPT

## 2023-08-16 PROCEDURE — 82570 ASSAY OF URINE CREATININE: CPT

## 2023-08-16 PROCEDURE — 83735 ASSAY OF MAGNESIUM: CPT

## 2023-08-16 PROCEDURE — 84156 ASSAY OF PROTEIN URINE: CPT

## 2023-08-16 PROCEDURE — 80053 COMPREHEN METABOLIC PANEL: CPT

## 2023-08-16 NOTE — PROGRESS NOTES
Pt here for port labs. Gave urine sample, Labs obtained via port. Pt jacinda well. Disch amb to home, steady gait.

## 2023-08-17 ENCOUNTER — OFFICE VISIT (OUTPATIENT)
Age: 73
End: 2023-08-17
Payer: MEDICARE

## 2023-08-17 VITALS
TEMPERATURE: 97.7 F | WEIGHT: 154.6 LBS | BODY MASS INDEX: 26.4 KG/M2 | RESPIRATION RATE: 16 BRPM | HEART RATE: 94 BPM | SYSTOLIC BLOOD PRESSURE: 140 MMHG | OXYGEN SATURATION: 99 % | DIASTOLIC BLOOD PRESSURE: 88 MMHG | HEIGHT: 64 IN

## 2023-08-17 DIAGNOSIS — C56.2 MALIGNANT NEOPLASM OF LEFT OVARY (HCC): Primary | ICD-10-CM

## 2023-08-17 DIAGNOSIS — C57.9 GYNECOLOGIC MALIGNANCY (HCC): ICD-10-CM

## 2023-08-17 PROBLEM — E83.42 HYPOMAGNESEMIA: Status: ACTIVE | Noted: 2023-08-17

## 2023-08-17 PROCEDURE — 99215 OFFICE O/P EST HI 40 MIN: CPT | Performed by: PHYSICIAN ASSISTANT

## 2023-08-17 RX ORDER — DEXTROSE MONOHYDRATE 50 MG/ML
20 INJECTION, SOLUTION INTRAVENOUS ONCE
Status: CANCELLED | OUTPATIENT
Start: 2023-08-18

## 2023-08-17 RX ORDER — SODIUM CHLORIDE 9 MG/ML
20 INJECTION, SOLUTION INTRAVENOUS ONCE
Status: CANCELLED | OUTPATIENT
Start: 2023-08-18

## 2023-08-17 RX ORDER — PALONOSETRON 0.05 MG/ML
0.25 INJECTION, SOLUTION INTRAVENOUS ONCE
Status: CANCELLED | OUTPATIENT
Start: 2023-08-18

## 2023-08-17 RX ORDER — LORAZEPAM 1 MG/1
1 TABLET ORAL EVERY 8 HOURS PRN
Qty: 30 TABLET | Refills: 0 | Status: SHIPPED | OUTPATIENT
Start: 2023-08-17

## 2023-08-17 RX ORDER — MAGNESIUM SULFATE HEPTAHYDRATE 40 MG/ML
2 INJECTION, SOLUTION INTRAVENOUS ONCE
Status: CANCELLED
Start: 2023-08-18

## 2023-08-17 NOTE — ASSESSMENT & PLAN NOTE
Recurrent stage IIIC ovarian cancer currently receiving palliative chemotherapy with doxil 30 mg/m2 and carboplatin AUC 5 every 28 days with avastin 10 mg/kg every 14 days. Overall, she is tolerating treatment well. She has grade 1 treatment-related fatigue. Labs from 8/16/23 reviewed. All parameters met. Ok to proceed with cycle 2. Plan for repeat imaging after completion of cycle 3. Trend . Return to the office as per her chemotherapy calendar.

## 2023-08-17 NOTE — PROGRESS NOTES
Assessment/Plan:    Problem List Items Addressed This Visit        Endocrine    Malignant neoplasm of left ovary ([address]) - Primary     Recurrent stage IIIC ovarian cancer currently receiving palliative chemotherapy with doxil 30 mg/m2 and carboplatin AUC 5 every 28 days with avastin 10 mg/kg every 14 days. Overall, she is tolerating treatment well. She has grade 1 treatment-related fatigue. Labs from 8/16/23 reviewed. All parameters met. Ok to proceed with cycle 2. Plan for repeat imaging after completion of cycle 3. Trend . Return to the office as per her chemotherapy calendar. 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   Results revealed patient has the following mutation(s):  None     Malignant neoplasm of left ovary ([address])   8/4/2022 Surgery    Diagnostic laparoscopy, peritoneal biopsy     8/9/2022 Initial Diagnosis    Gynecologic malignancy ([address])     8/23/2022 - 1/24/2023 Chemotherapy    palonosetron (ALOXI), 0.25 mg, Intravenous, Once, 6 of 6 cycles  Administration: 0.25 mg (8/23/2022), 0.25 mg (9/13/2022), 0.25 mg (10/4/2022), 0.25 mg (12/13/2022), 0.25 mg (1/3/2023), 0.25 mg (1/24/2023)  fosaprepitant (EMEND) IVPB, 150 mg, Intravenous, Once, 6 of 6 cycles  Administration: 150 mg (8/23/2022), 150 mg (9/13/2022), 150 mg (10/4/2022), 150 mg (12/13/2022), 150 mg (1/3/2023), 150 mg (1/24/2023)  CARBOplatin (PARAPLATIN) IVPB ([facility] [place] AUC DOSING), 605.4 mg, Intravenous, Once, 6 of 6 cycles  Administration: 600 mg (8/23/2022), 564 mg (9/13/2022), 601.8 mg (10/4/2022), 491.5 mg (12/13/2022), 468.5 mg (1/3/2023), 488 mg (1/24/2023)  bevacizumab (AVASTIN) IVPB, 1,252.5 mg, Intravenous, Once, 3 of 3 cycles  Dose modification: 10 mg/kg (original dose 15 mg/kg, Cycle 5, Reason: Other (Must fill in a comment))  Administration: 1,200 mg (8/23/2022), 1,200 mg (9/13/2022), 745 mg (1/3/2023)  PACLItaxel (TAXOL) chemo IVPB, 175 mg/m2 = 330.6 mg, Intravenous, Once, 6 of 6 cycles  Dose modification: 135 mg/m2 (original dose 175 mg/m2, Cycle 4, Reason: Other (Must fill in a comment))  Administration: 330.6 mg (8/23/2022), 330.6 mg (9/13/2022), 330.6 mg (10/4/2022), 246 mg (12/13/2022), 244.2 mg (1/3/2023), 244.2 mg (1/24/2023)     10/28/2022 Surgery    diagnostic laparoscopy, exploratory laparotomy, modified radical hysterectomy, bilateral salpingo-oophorectomy with en bloc rectosigmoid resection, gastrocolic omentectomy, splenectomy, small bowel resection with reanastomosis, diaphragm resection, excision ablation of peritoneal implants, ileostomy formation, optimal tumor debulking     11/29/2022 -  Cancer Staged    Staging form: Ovary, Fallopian Tube, Primary Peritoneal, AJCC 8th Edition  - Clinical: FIGO Stage IIIC (cT3c, cN0, cM0) - Signed by Daniel Weldon MD on 11/29/2022  Histologic grade (G): G3  Histologic grading system: 4 grade system       3/27/2023 Surgery    Low anterior resection  Residual tumor identified in lymphovascular spaces    A. 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  Colon (anastomotic donuts):  - Colon with no diagnostic abnormality  - No carcinoma identified      4/17/2023 Genomic Testing    [employer]: FOLR1, positive     7/21/2023 -  Chemotherapy    Doxil 30 mg/m2 and carboplatin AUC 5 every 28 days with avastin 10 mg/kg IV every 14 days. She received 1 cycle and is scheduled for cycle 2. Patient ID: Terra Bosch is a 68 y.o. female  who presents to the office for pre-chemotherapy evaluation. Overall, she tolerated cycle 1 well. She has been afebrile. She notes fatigue, but not significantly limit her ADLs. She notes her appetite is appropriate. No n/v/abdominal pain. Normal bowel/bladder function. She denies skin rashes, sores on her hands/feet/mouth. The patient notes occasional headaches, but her rheumatologist is slowly decreasing her steroid dose. CBC/Diff, CMP, Mg, UPC ration from 8/16/23 reviewed. The following portions of the patient's history were reviewed and updated as appropriate: allergies, current medications, past medical history, past surgical history and problem list.    Review of Systems   Constitutional: Positive for fatigue. Negative for fever. HENT: Negative. Eyes: Negative. Respiratory: Negative. Cardiovascular: Negative. Gastrointestinal: Negative. Genitourinary: Negative. Musculoskeletal: Negative. Skin: Negative. Neurological: Negative. Psychiatric/Behavioral: Negative.         Current Outpatient Medications   Medication Sig Dispense Refill   • Acetaminophen (TYLENOL ARTHRITIS PAIN PO) Take by mouth as needed     • aspirin (ECOTRIN LOW STRENGTH) 81 mg EC tablet Take 81 mg by mouth daily     • B Complex-C (B COMPLEX-VITAMIN C PO) Take by mouth daily     • Calcium Carb-Cholecalciferol (CALCIUM 500+D3 PO) Take 1 tablet by mouth 2 (two) times a day      • cholecalciferol (VITAMIN D3) 1,000 units tablet Take 1,000 Units by mouth daily     • famotidine (PEPCID) 40 MG tablet TAKE 1 TABLET BY MOUTH DAILY AT BEDTIME 90 tablet 3   • fexofenadine (ALLEGRA) 180 MG tablet Take 180 mg by mouth daily     • GLUCOSAMINE-CHONDROITIN PO Take 1 tablet by mouth 2 (two) times a day      • lidocaine-prilocaine (EMLA) cream Apply to mediport 30 min prior to labs or chemo 30 g 3   • LORazepam (ATIVAN) 1 mg tablet Take 1 tablet (1 mg total) by mouth every 8 (eight) hours as needed for anxiety (nausea or anxiety) 30 tablet 0   • metoprolol succinate (TOPROL-XL) 25 mg 24 hr tablet Take 25 mg by mouth daily     • montelukast (SINGULAIR) 10 mg tablet Take 10 mg by mouth daily dinner     • multivitamin (THERAGRAN) TABS Take 1 tablet by mouth daily     • nystatin (MYCOSTATIN) powder Apply topically if needed Under abdominal pannus and in between thighs     • omeprazole (PriLOSEC) 20 mg delayed release capsule TAKE 1 CAPSULE BY MOUTH EVERY DAY 30 MINUTES BEFORE MORNING MEAL FOR 90 DAYS     • ondansetron (ZOFRAN) 8 mg tablet Take 1 tablet (8 mg total) by mouth every 8 (eight) hours as needed for nausea or vomiting 20 tablet 1   • predniSONE 1 MG TBEC Take by mouth daily after breakfast     • Probiotic Product (PROBIOTIC DAILY PO) Take by mouth daily     • simvastatin (ZOCOR) 10 mg tablet Take 10 mg by mouth daily at bedtime     • sodium chloride (OCEAN) 0.65 % nasal spray 1 spray into each nostril as needed for congestion     • SYMBICORT 160-4.5 MCG/ACT inhaler 2 puffs daily      • Triamcinolone Acetonide (NASACORT ALLERGY 24HR NA) 1 spray by Each Nare route 2 (two) times a day     • Ascorbic Acid (VITAMIN C GUMMIE PO) Take by mouth (Patient not taking: Reported on 7/6/2023)     • oxyCODONE (ROXICODONE) 5 immediate release tablet 1/2 tab every 6 hours as needed for pain (Patient not taking: Reported on 7/6/2023) 5 tablet 0     No current facility-administered medications for this visit. Objective:    Blood pressure 140/88, pulse 94, temperature 97.7 °F (36.5 °C), temperature source Temporal, resp. rate 16, height 5' 4.02" (1.626 m), weight 70.1 kg (154 lb 9.6 oz), SpO2 99 %. Body mass index is 26.52 kg/m². Body surface area is 1.75 meters squared. Physical Exam  Vitals reviewed. Constitutional:       General: She is not in acute distress. Appearance: Normal appearance.  She is not ill-appearing. HENT:      Head: Normocephalic and atraumatic. Mouth/Throat:      Mouth: Mucous membranes are moist.   Eyes:      General: No scleral icterus. Right eye: No discharge. Left eye: No discharge. Conjunctiva/sclera: Conjunctivae normal.   Pulmonary:      Effort: Pulmonary effort is normal.   Musculoskeletal:      Right lower leg: No edema. Left lower leg: No edema. Skin:     General: Skin is warm and dry. Coloration: Skin is not jaundiced. Findings: No rash. Neurological:      General: No focal deficit present. Mental Status: She is alert and oriented to person, place, and time. Cranial Nerves: No cranial nerve deficit. Sensory: No sensory deficit. Motor: No weakness. Gait: Gait normal.   Psychiatric:         Mood and Affect: Mood normal.         Behavior: Behavior normal.         Thought Content: Thought content normal.         Judgment: Judgment normal.     Performance status is zero.          Lab Results   Component Value Date    K 4.0 08/16/2023     08/16/2023    CO2 25 08/16/2023    BUN 12 08/16/2023    CREATININE 0.59 (L) 08/16/2023    GLUCOSE 214 (H) 10/28/2022    GLUF 161 (H) 12/08/2022    CALCIUM 9.5 08/16/2023    CORRECTEDCA 9.0 02/09/2023    AST 19 08/16/2023    ALT 13 08/16/2023    ALKPHOS 95 08/16/2023    EGFR 91 08/16/2023     Lab Results   Component Value Date    WBC 7.90 08/16/2023    HGB 10.5 (L) 08/16/2023    HCT 31.9 (L) 08/16/2023    MCV 91 08/16/2023     08/16/2023     Lab Results   Component Value Date    NEUTROABS 4.72 08/16/2023        Trend:  Lab Results   Component Value Date     340.6 (H) 08/02/2023     168.5 (H) 06/26/2023     6.2 03/24/2023     5.1 03/02/2023     5.3 02/09/2023     8.7 01/19/2023     15.8 12/30/2022     37.9 (H) 12/08/2022     8.6 10/20/2022     8.4 10/07/2022     12.5 09/29/2022     32.0 (H) 09/08/2022     52.4 (H) 09/02/2022     85.5 (H) 08/26/2022     77.3 (H) 08/19/2022

## 2023-08-18 ENCOUNTER — HOSPITAL ENCOUNTER (OUTPATIENT)
Dept: INFUSION CENTER | Facility: HOSPITAL | Age: 73
End: 2023-08-18
Attending: OBSTETRICS & GYNECOLOGY
Payer: MEDICARE

## 2023-08-18 VITALS
SYSTOLIC BLOOD PRESSURE: 119 MMHG | WEIGHT: 155.87 LBS | HEIGHT: 64 IN | RESPIRATION RATE: 16 BRPM | HEART RATE: 76 BPM | BODY MASS INDEX: 26.61 KG/M2 | TEMPERATURE: 97.4 F | DIASTOLIC BLOOD PRESSURE: 75 MMHG | OXYGEN SATURATION: 98 %

## 2023-08-18 DIAGNOSIS — E83.42 HYPOMAGNESEMIA: Primary | ICD-10-CM

## 2023-08-18 DIAGNOSIS — C56.2 MALIGNANT NEOPLASM OF LEFT OVARY (HCC): ICD-10-CM

## 2023-08-18 PROCEDURE — 96375 TX/PRO/DX INJ NEW DRUG ADDON: CPT

## 2023-08-18 PROCEDURE — 96417 CHEMO IV INFUS EACH ADDL SEQ: CPT

## 2023-08-18 PROCEDURE — 96367 TX/PROPH/DG ADDL SEQ IV INF: CPT

## 2023-08-18 PROCEDURE — 96413 CHEMO IV INFUSION 1 HR: CPT

## 2023-08-18 RX ORDER — SODIUM CHLORIDE 9 MG/ML
20 INJECTION, SOLUTION INTRAVENOUS ONCE
Status: COMPLETED | OUTPATIENT
Start: 2023-08-18 | End: 2023-08-18

## 2023-08-18 RX ORDER — PALONOSETRON 0.05 MG/ML
0.25 INJECTION, SOLUTION INTRAVENOUS ONCE
Status: COMPLETED | OUTPATIENT
Start: 2023-08-18 | End: 2023-08-18

## 2023-08-18 RX ORDER — DEXTROSE MONOHYDRATE 50 MG/ML
20 INJECTION, SOLUTION INTRAVENOUS ONCE
Status: COMPLETED | OUTPATIENT
Start: 2023-08-18 | End: 2023-08-18

## 2023-08-18 RX ORDER — MAGNESIUM SULFATE HEPTAHYDRATE 40 MG/ML
2 INJECTION, SOLUTION INTRAVENOUS ONCE
Status: COMPLETED | OUTPATIENT
Start: 2023-08-18 | End: 2023-08-18

## 2023-08-18 RX ADMIN — FOSAPREPITANT 150 MG: 150 INJECTION, POWDER, LYOPHILIZED, FOR SOLUTION INTRAVENOUS at 10:21

## 2023-08-18 RX ADMIN — DOXORUBICIN HYDROCHLORIDE 52.8 MG: 2 INJECTABLE, LIPOSOMAL INTRAVENOUS at 11:25

## 2023-08-18 RX ADMIN — CARBOPLATIN 471 MG: 10 INJECTION, SOLUTION INTRAVENOUS at 12:30

## 2023-08-18 RX ADMIN — BEVACIZUMAB 700 MG: 400 INJECTION, SOLUTION INTRAVENOUS at 13:45

## 2023-08-18 RX ADMIN — FAMOTIDINE 20 MG: 10 INJECTION INTRAVENOUS at 09:57

## 2023-08-18 RX ADMIN — MAGNESIUM SULFATE HEPTAHYDRATE 2 G: 2 INJECTION, SOLUTION INTRAVENOUS at 08:29

## 2023-08-18 RX ADMIN — DEXAMETHASONE SODIUM PHOSPHATE 20 MG: 10 INJECTION, SOLUTION INTRAMUSCULAR; INTRAVENOUS at 09:33

## 2023-08-18 RX ADMIN — PALONOSETRON 0.25 MG: 0.05 INJECTION, SOLUTION INTRAVENOUS at 09:30

## 2023-08-18 RX ADMIN — SODIUM CHLORIDE 20 ML/HR: 0.9 INJECTION, SOLUTION INTRAVENOUS at 09:30

## 2023-08-18 RX ADMIN — DEXTROSE 20 ML/HR: 5 SOLUTION INTRAVENOUS at 11:23

## 2023-08-18 NOTE — PROGRESS NOTES
pt here for mag and chemo tolerated well no adverse reactions AVS provided and pt left ambulatory with steady gait.

## 2023-08-21 DIAGNOSIS — C57.9 GYNECOLOGIC MALIGNANCY (HCC): ICD-10-CM

## 2023-08-21 RX ORDER — ONDANSETRON HYDROCHLORIDE 8 MG/1
8 TABLET, FILM COATED ORAL EVERY 8 HOURS PRN
Qty: 20 TABLET | Refills: 0 | Status: SHIPPED | OUTPATIENT
Start: 2023-08-21

## 2023-08-23 ENCOUNTER — HOSPITAL ENCOUNTER (OUTPATIENT)
Dept: INFUSION CENTER | Facility: HOSPITAL | Age: 73
Discharge: HOME | End: 2023-08-23
Payer: MEDICARE

## 2023-08-23 DIAGNOSIS — Z45.2 ENCOUNTER FOR VENOUS ACCESS DEVICE CARE: ICD-10-CM

## 2023-08-23 DIAGNOSIS — C56.2 MALIGNANT NEOPLASM OF LEFT OVARY (HCC): Primary | ICD-10-CM

## 2023-08-23 LAB
ALBUMIN SERPL BCP-MCNC: 3.6 G/DL (ref 3.5–5)
ALP SERPL-CCNC: 92 U/L (ref 34–104)
ALT SERPL W P-5'-P-CCNC: 16 U/L (ref 7–52)
ANION GAP SERPL CALCULATED.3IONS-SCNC: 5 MMOL/L
AST SERPL W P-5'-P-CCNC: 20 U/L (ref 13–39)
BASOPHILS # BLD AUTO: 0.01 THOUSANDS/ÂΜL (ref 0–0.1)
BASOPHILS NFR BLD AUTO: 0 % (ref 0–1)
BILIRUB SERPL-MCNC: 0.33 MG/DL (ref 0.2–1)
BUN SERPL-MCNC: 24 MG/DL (ref 5–25)
CALCIUM SERPL-MCNC: 9.5 MG/DL (ref 8.4–10.2)
CHLORIDE SERPL-SCNC: 103 MMOL/L (ref 96–108)
CO2 SERPL-SCNC: 27 MMOL/L (ref 21–32)
CREAT SERPL-MCNC: 0.87 MG/DL (ref 0.6–1.3)
EOSINOPHIL # BLD AUTO: 0.02 THOUSAND/ÂΜL (ref 0–0.61)
EOSINOPHIL NFR BLD AUTO: 0 % (ref 0–6)
ERYTHROCYTE [DISTWIDTH] IN BLOOD BY AUTOMATED COUNT: 18.6 % (ref 11.6–15.1)
GFR SERPL CREATININE-BSD FRML MDRD: 66 ML/MIN/1.73SQ M
GLUCOSE SERPL-MCNC: 162 MG/DL (ref 65–140)
HCT VFR BLD AUTO: 30.1 % (ref 34.8–46.1)
HGB BLD-MCNC: 9.9 G/DL (ref 11.5–15.4)
IMM GRANULOCYTES # BLD AUTO: 0.06 THOUSAND/UL (ref 0–0.2)
IMM GRANULOCYTES NFR BLD AUTO: 1 % (ref 0–2)
LYMPHOCYTES # BLD AUTO: 0.96 THOUSANDS/ÂΜL (ref 0.6–4.47)
LYMPHOCYTES NFR BLD AUTO: 12 % (ref 14–44)
MAGNESIUM SERPL-MCNC: 1.6 MG/DL (ref 1.9–2.7)
MCH RBC QN AUTO: 30.1 PG (ref 26.8–34.3)
MCHC RBC AUTO-ENTMCNC: 32.9 G/DL (ref 31.4–37.4)
MCV RBC AUTO: 92 FL (ref 82–98)
MONOCYTES # BLD AUTO: 1.17 THOUSAND/ÂΜL (ref 0.17–1.22)
MONOCYTES NFR BLD AUTO: 15 % (ref 4–12)
NEUTROPHILS # BLD AUTO: 5.8 THOUSANDS/ÂΜL (ref 1.85–7.62)
NEUTS SEG NFR BLD AUTO: 72 % (ref 43–75)
NRBC BLD AUTO-RTO: 1 /100 WBCS
PLATELET # BLD AUTO: 471 THOUSANDS/UL (ref 149–390)
PMV BLD AUTO: 9.5 FL (ref 8.9–12.7)
POTASSIUM SERPL-SCNC: 4 MMOL/L (ref 3.5–5.3)
PROT SERPL-MCNC: 6.8 G/DL (ref 6.4–8.4)
RBC # BLD AUTO: 3.29 MILLION/UL (ref 3.81–5.12)
SODIUM SERPL-SCNC: 135 MMOL/L (ref 135–147)
WBC # BLD AUTO: 8.02 THOUSAND/UL (ref 4.31–10.16)

## 2023-08-23 PROCEDURE — 85025 COMPLETE CBC W/AUTO DIFF WBC: CPT

## 2023-08-23 PROCEDURE — 83735 ASSAY OF MAGNESIUM: CPT

## 2023-08-23 PROCEDURE — 80053 COMPREHEN METABOLIC PANEL: CPT

## 2023-08-30 ENCOUNTER — HOSPITAL ENCOUNTER (OUTPATIENT)
Dept: INFUSION CENTER | Facility: HOSPITAL | Age: 73
Discharge: HOME | End: 2023-08-30
Payer: MEDICARE

## 2023-08-30 DIAGNOSIS — C56.2 MALIGNANT NEOPLASM OF LEFT OVARY (HCC): Primary | ICD-10-CM

## 2023-08-30 DIAGNOSIS — Z45.2 ENCOUNTER FOR VENOUS ACCESS DEVICE CARE: ICD-10-CM

## 2023-08-30 LAB
ALBUMIN SERPL BCP-MCNC: 3.7 G/DL (ref 3.5–5)
ALP SERPL-CCNC: 92 U/L (ref 34–104)
ALT SERPL W P-5'-P-CCNC: 12 U/L (ref 7–52)
ANION GAP SERPL CALCULATED.3IONS-SCNC: 6 MMOL/L
AST SERPL W P-5'-P-CCNC: 17 U/L (ref 13–39)
BASOPHILS # BLD AUTO: 0.02 THOUSANDS/ÂΜL (ref 0–0.1)
BASOPHILS NFR BLD AUTO: 0 % (ref 0–1)
BILIRUB SERPL-MCNC: 0.3 MG/DL (ref 0.2–1)
BUN SERPL-MCNC: 16 MG/DL (ref 5–25)
CALCIUM SERPL-MCNC: 9 MG/DL (ref 8.4–10.2)
CANCER AG125 SERPL-ACNC: 223.9 U/ML (ref 0–35)
CHLORIDE SERPL-SCNC: 102 MMOL/L (ref 96–108)
CO2 SERPL-SCNC: 25 MMOL/L (ref 21–32)
CREAT SERPL-MCNC: 0.57 MG/DL (ref 0.6–1.3)
CREAT UR-MCNC: 141.4 MG/DL
EOSINOPHIL # BLD AUTO: 0.02 THOUSAND/ÂΜL (ref 0–0.61)
EOSINOPHIL NFR BLD AUTO: 0 % (ref 0–6)
ERYTHROCYTE [DISTWIDTH] IN BLOOD BY AUTOMATED COUNT: 18.8 % (ref 11.6–15.1)
GFR SERPL CREATININE-BSD FRML MDRD: 92 ML/MIN/1.73SQ M
GLUCOSE SERPL-MCNC: 97 MG/DL (ref 65–140)
HCT VFR BLD AUTO: 28 % (ref 34.8–46.1)
HGB BLD-MCNC: 9.3 G/DL (ref 11.5–15.4)
IMM GRANULOCYTES # BLD AUTO: 0.04 THOUSAND/UL (ref 0–0.2)
IMM GRANULOCYTES NFR BLD AUTO: 1 % (ref 0–2)
LYMPHOCYTES # BLD AUTO: 1.33 THOUSANDS/ÂΜL (ref 0.6–4.47)
LYMPHOCYTES NFR BLD AUTO: 20 % (ref 14–44)
MAGNESIUM SERPL-MCNC: 1.6 MG/DL (ref 1.9–2.7)
MCH RBC QN AUTO: 30.4 PG (ref 26.8–34.3)
MCHC RBC AUTO-ENTMCNC: 33.2 G/DL (ref 31.4–37.4)
MCV RBC AUTO: 92 FL (ref 82–98)
MONOCYTES # BLD AUTO: 0.89 THOUSAND/ÂΜL (ref 0.17–1.22)
MONOCYTES NFR BLD AUTO: 13 % (ref 4–12)
NEUTROPHILS # BLD AUTO: 4.35 THOUSANDS/ÂΜL (ref 1.85–7.62)
NEUTS SEG NFR BLD AUTO: 66 % (ref 43–75)
NRBC BLD AUTO-RTO: 0 /100 WBCS
PLATELET # BLD AUTO: 256 THOUSANDS/UL (ref 149–390)
PMV BLD AUTO: 9.4 FL (ref 8.9–12.7)
POTASSIUM SERPL-SCNC: 3.7 MMOL/L (ref 3.5–5.3)
PROT SERPL-MCNC: 6.7 G/DL (ref 6.4–8.4)
PROT UR-MCNC: 43 MG/DL
PROT/CREAT UR: 0.3 MG/G{CREAT} (ref 0–0.1)
RBC # BLD AUTO: 3.06 MILLION/UL (ref 3.81–5.12)
SODIUM SERPL-SCNC: 133 MMOL/L (ref 135–147)
WBC # BLD AUTO: 6.65 THOUSAND/UL (ref 4.31–10.16)

## 2023-08-30 PROCEDURE — 85025 COMPLETE CBC W/AUTO DIFF WBC: CPT

## 2023-08-30 PROCEDURE — 86304 IMMUNOASSAY TUMOR CA 125: CPT

## 2023-08-30 PROCEDURE — 82570 ASSAY OF URINE CREATININE: CPT

## 2023-08-30 PROCEDURE — 84156 ASSAY OF PROTEIN URINE: CPT

## 2023-08-30 PROCEDURE — 83735 ASSAY OF MAGNESIUM: CPT

## 2023-08-30 PROCEDURE — 80053 COMPREHEN METABOLIC PANEL: CPT

## 2023-08-30 NOTE — PROGRESS NOTES
Pt here for labs and UA specimen. Voided, port accessed, labs obtained, de-accessed, dsd applied. Disch amb to home with spouse, steady gait.

## 2023-08-31 RX ORDER — MAGNESIUM SULFATE HEPTAHYDRATE 40 MG/ML
2 INJECTION, SOLUTION INTRAVENOUS ONCE
Status: CANCELLED
Start: 2023-09-01

## 2023-08-31 RX ORDER — SODIUM CHLORIDE 9 MG/ML
20 INJECTION, SOLUTION INTRAVENOUS ONCE
Status: CANCELLED | OUTPATIENT
Start: 2023-09-01

## 2023-09-01 ENCOUNTER — HOSPITAL ENCOUNTER (OUTPATIENT)
Dept: INFUSION CENTER | Facility: HOSPITAL | Age: 73
End: 2023-09-01
Attending: OBSTETRICS & GYNECOLOGY
Payer: MEDICARE

## 2023-09-01 VITALS
TEMPERATURE: 97.9 F | HEIGHT: 64 IN | WEIGHT: 156 LBS | HEART RATE: 72 BPM | DIASTOLIC BLOOD PRESSURE: 70 MMHG | RESPIRATION RATE: 16 BRPM | BODY MASS INDEX: 26.63 KG/M2 | SYSTOLIC BLOOD PRESSURE: 138 MMHG

## 2023-09-01 DIAGNOSIS — C56.2 MALIGNANT NEOPLASM OF LEFT OVARY (HCC): ICD-10-CM

## 2023-09-01 DIAGNOSIS — E83.42 HYPOMAGNESEMIA: Primary | ICD-10-CM

## 2023-09-01 PROCEDURE — 96413 CHEMO IV INFUSION 1 HR: CPT

## 2023-09-01 PROCEDURE — 96367 TX/PROPH/DG ADDL SEQ IV INF: CPT

## 2023-09-01 RX ORDER — SODIUM CHLORIDE 9 MG/ML
20 INJECTION, SOLUTION INTRAVENOUS ONCE
Status: COMPLETED | OUTPATIENT
Start: 2023-09-01 | End: 2023-09-01

## 2023-09-01 RX ORDER — MAGNESIUM SULFATE HEPTAHYDRATE 40 MG/ML
2 INJECTION, SOLUTION INTRAVENOUS ONCE
Status: COMPLETED | OUTPATIENT
Start: 2023-09-01 | End: 2023-09-01

## 2023-09-01 RX ADMIN — SODIUM CHLORIDE 20 ML/HR: 9 INJECTION, SOLUTION INTRAVENOUS at 11:32

## 2023-09-01 RX ADMIN — BEVACIZUMAB 700 MG: 400 INJECTION, SOLUTION INTRAVENOUS at 11:32

## 2023-09-01 RX ADMIN — MAGNESIUM SULFATE HEPTAHYDRATE 2 G: 2 INJECTION, SOLUTION INTRAVENOUS at 10:23

## 2023-09-01 NOTE — PLAN OF CARE
Problem: Potential for Falls  Goal: Patient will remain free of falls  Description: INTERVENTIONS:  - Educate patient/family on patient safety including physical limitations  - Instruct patient to call for assistance with activi  - Keep Call bell within reach  - Keep bed low and locked with side rails adjusted as appropriate  - Keep care items and personal belongings within reach  - Initiate and maintain comfort roun  Outcome: Progressing     Problem: Knowledge Deficit  Goal: Patient/family/caregiver demonstrates understanding of disease process, treatment plan, medications, and discharge instructions  Description: Complete learning assessment and assess knowledge base.   Interventions:  - Provide teaching at level of understanding  - Provide teaching via preferred learning methods  Outcome: Progressing

## 2023-09-01 NOTE — PROGRESS NOTES
Pt here for AVASTIN and MAG.  Tolerated well no adverse reactions declined AVS and left ambulatory with steady gait

## 2023-09-05 DIAGNOSIS — C57.9 GYNECOLOGIC MALIGNANCY (HCC): ICD-10-CM

## 2023-09-05 RX ORDER — LORAZEPAM 1 MG/1
1 TABLET ORAL EVERY 8 HOURS PRN
Qty: 40 TABLET | Refills: 0 | Status: SHIPPED | OUTPATIENT
Start: 2023-09-05

## 2023-09-06 ENCOUNTER — HOSPITAL ENCOUNTER (OUTPATIENT)
Dept: INFUSION CENTER | Facility: HOSPITAL | Age: 73
Discharge: HOME | End: 2023-09-06
Payer: MEDICARE

## 2023-09-06 DIAGNOSIS — Z45.2 ENCOUNTER FOR VENOUS ACCESS DEVICE CARE: ICD-10-CM

## 2023-09-06 DIAGNOSIS — C56.2 MALIGNANT NEOPLASM OF LEFT OVARY (HCC): Primary | ICD-10-CM

## 2023-09-06 LAB
ALBUMIN SERPL BCP-MCNC: 3.8 G/DL (ref 3.5–5)
ALP SERPL-CCNC: 78 U/L (ref 34–104)
ALT SERPL W P-5'-P-CCNC: 12 U/L (ref 7–52)
ANION GAP SERPL CALCULATED.3IONS-SCNC: 5 MMOL/L
AST SERPL W P-5'-P-CCNC: 18 U/L (ref 13–39)
BASOPHILS # BLD AUTO: 0.02 THOUSANDS/ÂΜL (ref 0–0.1)
BASOPHILS NFR BLD AUTO: 0 % (ref 0–1)
BILIRUB SERPL-MCNC: 0.22 MG/DL (ref 0.2–1)
BUN SERPL-MCNC: 14 MG/DL (ref 5–25)
CALCIUM SERPL-MCNC: 8.9 MG/DL (ref 8.4–10.2)
CHLORIDE SERPL-SCNC: 105 MMOL/L (ref 96–108)
CO2 SERPL-SCNC: 26 MMOL/L (ref 21–32)
CREAT SERPL-MCNC: 0.66 MG/DL (ref 0.6–1.3)
EOSINOPHIL # BLD AUTO: 0.01 THOUSAND/ÂΜL (ref 0–0.61)
EOSINOPHIL NFR BLD AUTO: 0 % (ref 0–6)
ERYTHROCYTE [DISTWIDTH] IN BLOOD BY AUTOMATED COUNT: 21.2 % (ref 11.6–15.1)
GFR SERPL CREATININE-BSD FRML MDRD: 87 ML/MIN/1.73SQ M
GLUCOSE SERPL-MCNC: 99 MG/DL (ref 65–140)
HCT VFR BLD AUTO: 28.8 % (ref 34.8–46.1)
HGB BLD-MCNC: 9.6 G/DL (ref 11.5–15.4)
IMM GRANULOCYTES # BLD AUTO: 0.05 THOUSAND/UL (ref 0–0.2)
IMM GRANULOCYTES NFR BLD AUTO: 1 % (ref 0–2)
LYMPHOCYTES # BLD AUTO: 1.01 THOUSANDS/ÂΜL (ref 0.6–4.47)
LYMPHOCYTES NFR BLD AUTO: 15 % (ref 14–44)
MAGNESIUM SERPL-MCNC: 1.8 MG/DL (ref 1.9–2.7)
MCH RBC QN AUTO: 31.3 PG (ref 26.8–34.3)
MCHC RBC AUTO-ENTMCNC: 33.3 G/DL (ref 31.4–37.4)
MCV RBC AUTO: 94 FL (ref 82–98)
MONOCYTES # BLD AUTO: 1.5 THOUSAND/ÂΜL (ref 0.17–1.22)
MONOCYTES NFR BLD AUTO: 22 % (ref 4–12)
NEUTROPHILS # BLD AUTO: 4.24 THOUSANDS/ÂΜL (ref 1.85–7.62)
NEUTS SEG NFR BLD AUTO: 62 % (ref 43–75)
NRBC BLD AUTO-RTO: 0 /100 WBCS
PLATELET # BLD AUTO: 154 THOUSANDS/UL (ref 149–390)
PMV BLD AUTO: 10.1 FL (ref 8.9–12.7)
POTASSIUM SERPL-SCNC: 3.9 MMOL/L (ref 3.5–5.3)
PROT SERPL-MCNC: 6.9 G/DL (ref 6.4–8.4)
RBC # BLD AUTO: 3.07 MILLION/UL (ref 3.81–5.12)
SODIUM SERPL-SCNC: 136 MMOL/L (ref 135–147)
WBC # BLD AUTO: 6.83 THOUSAND/UL (ref 4.31–10.16)

## 2023-09-06 PROCEDURE — 85025 COMPLETE CBC W/AUTO DIFF WBC: CPT

## 2023-09-06 PROCEDURE — 83735 ASSAY OF MAGNESIUM: CPT

## 2023-09-06 PROCEDURE — 80053 COMPREHEN METABOLIC PANEL: CPT

## 2023-09-06 NOTE — PROGRESS NOTES
Pt arrived amb with  for port labs. Accessed, labs obtained, de-accessed, dsd applied. Disch amb to home, steady gait.

## 2023-09-11 ENCOUNTER — TELEPHONE (OUTPATIENT)
Dept: HEMATOLOGY ONCOLOGY | Facility: CLINIC | Age: 73
End: 2023-09-11

## 2023-09-11 NOTE — TELEPHONE ENCOUNTER
Patient Call    Who are you speaking with? Patient    If it is not the patient, are they listed on an active communication consent form? N/A   What is the reason for this call? Jasmynekai is calling from [address] [place] [address] to receive verbal approval for them to expedite ostomy supplies over night for the patient. Ry states a VM can be left if he does not answer the phone with the patient's name and  and who is calling to give the verbal approval on his secure VM. Does this require a call back? Yes   If a call back is required, please list best call back number [phone]   If a call back is required, advise that a message will be forwarded to their care team and someone will return their call as soon as possible. Did you relay this information to the patient?  Yes

## 2023-09-13 ENCOUNTER — HOSPITAL ENCOUNTER (OUTPATIENT)
Dept: INFUSION CENTER | Facility: HOSPITAL | Age: 73
Discharge: HOME | End: 2023-09-13
Payer: MEDICARE

## 2023-09-13 DIAGNOSIS — C56.2 MALIGNANT NEOPLASM OF LEFT OVARY (HCC): Primary | ICD-10-CM

## 2023-09-13 DIAGNOSIS — Z45.2 ENCOUNTER FOR VENOUS ACCESS DEVICE CARE: ICD-10-CM

## 2023-09-13 LAB
ALBUMIN SERPL BCP-MCNC: 3.7 G/DL (ref 3.5–5)
ALP SERPL-CCNC: 81 U/L (ref 34–104)
ALT SERPL W P-5'-P-CCNC: 12 U/L (ref 7–52)
ANION GAP SERPL CALCULATED.3IONS-SCNC: 4 MMOL/L
AST SERPL W P-5'-P-CCNC: 18 U/L (ref 13–39)
BASOPHILS # BLD AUTO: 0.03 THOUSANDS/ÂΜL (ref 0–0.1)
BASOPHILS NFR BLD AUTO: 0 % (ref 0–1)
BILIRUB SERPL-MCNC: 0.24 MG/DL (ref 0.2–1)
BUN SERPL-MCNC: 14 MG/DL (ref 5–25)
CALCIUM SERPL-MCNC: 8.9 MG/DL (ref 8.4–10.2)
CHLORIDE SERPL-SCNC: 102 MMOL/L (ref 96–108)
CO2 SERPL-SCNC: 26 MMOL/L (ref 21–32)
CREAT SERPL-MCNC: 0.64 MG/DL (ref 0.6–1.3)
CREAT UR-MCNC: 49.1 MG/DL
EOSINOPHIL # BLD AUTO: 0.04 THOUSAND/ÂΜL (ref 0–0.61)
EOSINOPHIL NFR BLD AUTO: 1 % (ref 0–6)
ERYTHROCYTE [DISTWIDTH] IN BLOOD BY AUTOMATED COUNT: 23.4 % (ref 11.6–15.1)
GFR SERPL CREATININE-BSD FRML MDRD: 88 ML/MIN/1.73SQ M
GLUCOSE SERPL-MCNC: 113 MG/DL (ref 65–140)
HCT VFR BLD AUTO: 28.9 % (ref 34.8–46.1)
HGB BLD-MCNC: 9.6 G/DL (ref 11.5–15.4)
IMM GRANULOCYTES # BLD AUTO: 0.06 THOUSAND/UL (ref 0–0.2)
IMM GRANULOCYTES NFR BLD AUTO: 1 % (ref 0–2)
LYMPHOCYTES # BLD AUTO: 1.08 THOUSANDS/ÂΜL (ref 0.6–4.47)
LYMPHOCYTES NFR BLD AUTO: 14 % (ref 14–44)
MAGNESIUM SERPL-MCNC: 1.8 MG/DL (ref 1.9–2.7)
MCH RBC QN AUTO: 31.6 PG (ref 26.8–34.3)
MCHC RBC AUTO-ENTMCNC: 33.2 G/DL (ref 31.4–37.4)
MCV RBC AUTO: 95 FL (ref 82–98)
MONOCYTES # BLD AUTO: 2.02 THOUSAND/ÂΜL (ref 0.17–1.22)
MONOCYTES NFR BLD AUTO: 27 % (ref 4–12)
NEUTROPHILS # BLD AUTO: 4.28 THOUSANDS/ÂΜL (ref 1.85–7.62)
NEUTS SEG NFR BLD AUTO: 57 % (ref 43–75)
NRBC BLD AUTO-RTO: 0 /100 WBCS
PLATELET # BLD AUTO: 308 THOUSANDS/UL (ref 149–390)
PMV BLD AUTO: 9.9 FL (ref 8.9–12.7)
POTASSIUM SERPL-SCNC: 4.1 MMOL/L (ref 3.5–5.3)
PROT SERPL-MCNC: 6.5 G/DL (ref 6.4–8.4)
PROT UR-MCNC: 5 MG/DL
PROT/CREAT UR: 0.1 MG/G{CREAT} (ref 0–0.1)
RBC # BLD AUTO: 3.04 MILLION/UL (ref 3.81–5.12)
SODIUM SERPL-SCNC: 132 MMOL/L (ref 135–147)
WBC # BLD AUTO: 7.51 THOUSAND/UL (ref 4.31–10.16)

## 2023-09-13 PROCEDURE — 83735 ASSAY OF MAGNESIUM: CPT

## 2023-09-13 PROCEDURE — 82570 ASSAY OF URINE CREATININE: CPT

## 2023-09-13 PROCEDURE — 80053 COMPREHEN METABOLIC PANEL: CPT

## 2023-09-13 PROCEDURE — 84156 ASSAY OF PROTEIN URINE: CPT

## 2023-09-13 PROCEDURE — 85025 COMPLETE CBC W/AUTO DIFF WBC: CPT

## 2023-09-13 NOTE — PROGRESS NOTES
Pt arrived amb for port labs. C/o bad nose bleed last week that lasted 2 hrs. Also just had a short nose bleed prior to arrival.  C/o headache, dickson when she gets nose bleeds. Has headache now. Currently 1-2, fading now.

## 2023-09-13 NOTE — PROGRESS NOTES
Port accessed, labs obtained, de-accessed. Dsd applied. Pt gave urine sample. Sent to lab. Pt seeing [facility] tomorrow . Will discuss headaches and nose bleeds. Disch amb to home, steady gait.

## 2023-09-14 ENCOUNTER — OFFICE VISIT (OUTPATIENT)
Age: 73
End: 2023-09-14
Payer: MEDICARE

## 2023-09-14 VITALS
WEIGHT: 155.4 LBS | BODY MASS INDEX: 26.53 KG/M2 | DIASTOLIC BLOOD PRESSURE: 90 MMHG | HEART RATE: 90 BPM | TEMPERATURE: 97.7 F | OXYGEN SATURATION: 99 % | HEIGHT: 64 IN | SYSTOLIC BLOOD PRESSURE: 144 MMHG | RESPIRATION RATE: 16 BRPM

## 2023-09-14 DIAGNOSIS — C56.2 MALIGNANT NEOPLASM OF LEFT OVARY (HCC): Primary | ICD-10-CM

## 2023-09-14 DIAGNOSIS — R04.0 EPISTAXIS: ICD-10-CM

## 2023-09-14 PROCEDURE — 99215 OFFICE O/P EST HI 40 MIN: CPT | Performed by: PHYSICIAN ASSISTANT

## 2023-09-14 RX ORDER — SODIUM CHLORIDE 9 MG/ML
20 INJECTION, SOLUTION INTRAVENOUS ONCE
Status: CANCELLED | OUTPATIENT
Start: 2023-09-15

## 2023-09-14 RX ORDER — PALONOSETRON 0.05 MG/ML
0.25 INJECTION, SOLUTION INTRAVENOUS ONCE
Status: CANCELLED | OUTPATIENT
Start: 2023-09-15

## 2023-09-14 RX ORDER — DEXTROSE MONOHYDRATE 50 MG/ML
20 INJECTION, SOLUTION INTRAVENOUS ONCE
Status: CANCELLED | OUTPATIENT
Start: 2023-09-15

## 2023-09-14 RX ORDER — MAGNESIUM SULFATE HEPTAHYDRATE 40 MG/ML
2 INJECTION, SOLUTION INTRAVENOUS ONCE
Status: CANCELLED
Start: 2023-09-15

## 2023-09-14 NOTE — PROGRESS NOTES
Assessment/Plan:    Problem List Items Addressed This Visit        Endocrine    Malignant neoplasm of left ovary ([address]) - Primary     Recurrent stage IIIC ovarian cancer currently receiving palliative chemotherapy with doxil 30 mg/m2 and carboplatin AUC 5 every 28 days with avastin 10 mg/kg every 14 days. Overall, she is tolerating treatment well. She has grade 1 treatment-related fatigue. She has avastin-induced epistaxis.     Labs and urine from 9/13/23 reviewed. All parameters met. Proceed with cycle 6 of treatment as planned. Will dose-reduce avastin to 7.5 mg/kg.      Plan for repeat imaging after completion of cycle 6. Return to the office as per her chemotherapy calendar. Relevant Orders    CT chest abdomen pelvis w contrast       Other    Epistaxis     Avastin-induced. Single episode of prolonged bleeding. Will plan to dose reduced avastin to 7.5 mg/kg with cycle 3. Closely monitor. 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   Results revealed patient has the following mutation(s):  None     Malignant neoplasm of left ovary ([address])   8/4/2022 Surgery    Diagnostic laparoscopy, peritoneal biopsy     8/9/2022 Initial Diagnosis    Gynecologic malignancy ([address])     8/23/2022 - 1/24/2023 Chemotherapy    palonosetron (ALOXI), 0.25 mg, Intravenous, Once, 6 of 6 cycles  Administration: 0.25 mg (8/23/2022), 0.25 mg (9/13/2022), 0.25 mg (10/4/2022), 0.25 mg (12/13/2022), 0.25 mg (1/3/2023), 0.25 mg (1/24/2023)  fosaprepitant (EMEND) IVPB, 150 mg, Intravenous, Once, 6 of 6 cycles  Administration: 150 mg (8/23/2022), 150 mg (9/13/2022), 150 mg (10/4/2022), 150 mg (12/13/2022), 150 mg (1/3/2023), 150 mg (1/24/2023)  CARBOplatin (PARAPLATIN) IVPB ([facility] AUC DOSING), 605.4 mg, Intravenous, Once, 6 of 6 cycles  Administration: 600 mg (8/23/2022), 564 mg (9/13/2022), 601.8 mg (10/4/2022), 491.5 mg (12/13/2022), 468.5 mg (1/3/2023), 488 mg (1/24/2023)  bevacizumab (AVASTIN) IVPB, 1,252.5 mg, Intravenous, Once, 3 of 3 cycles  Dose modification: 10 mg/kg (original dose 15 mg/kg, Cycle 5, Reason: Other (Must fill in a comment))  Administration: 1,200 mg (8/23/2022), 1,200 mg (9/13/2022), 745 mg (1/3/2023)  PACLItaxel (TAXOL) chemo IVPB, 175 mg/m2 = 330.6 mg, Intravenous, Once, 6 of 6 cycles  Dose modification: 135 mg/m2 (original dose 175 mg/m2, Cycle 4, Reason: Other (Must fill in a comment))  Administration: 330.6 mg (8/23/2022), 330.6 mg (9/13/2022), 330.6 mg (10/4/2022), 246 mg (12/13/2022), 244.2 mg (1/3/2023), 244.2 mg (1/24/2023)     10/28/2022 Surgery    diagnostic laparoscopy, exploratory laparotomy, modified radical hysterectomy, bilateral salpingo-oophorectomy with en bloc rectosigmoid resection, gastrocolic omentectomy, splenectomy, small bowel resection with reanastomosis, diaphragm resection, excision ablation of peritoneal implants, ileostomy formation, optimal tumor debulking     11/29/2022 -  Cancer Staged    Staging form: Ovary, Fallopian Tube, Primary Peritoneal, AJCC 8th Edition  - Clinical: FIGO Stage IIIC (cT3c, cN0, cM0) - Signed by Tigist Lewis MD on 11/29/2022  Histologic grade (G): G3  Histologic grading system: 4 grade system       3/27/2023 Surgery    Low anterior resection  Residual tumor identified in lymphovascular spaces    A.  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 Colon (anastomotic donuts):  - Colon with no diagnostic abnormality  - No carcinoma identified      4/17/2023 Genomic Testing    [employer]: FOLR1, positive     7/21/2023 -  Chemotherapy    Doxil 30 mg/m2 and carboplatin AUC 5 every 28 days with avastin 10 mg/kg IV every 14 days. Avastin dose-reduced to 7.5 mg/kg due to epistaxis. Patient ID: Nataliya Agarwal is a 68 y.o. female  who presents to the office for pre-chemotherapy evaluation. Overall, she continues to tolerate treatment well. She has been afebrile. Appetite is appropriate. No nausea or vomiting. She notes abdominal discomfort/being unsettled for several days following treatment. Normal bowel/bladder function. No skin rashes, hand/feet/mouth sores. She notes intermittent nose bleeds. Most resolve withiin 15 minutes. She had a single episode that was on-and-off bleeding for approximately 1 hour. The following portions of the patient's history were reviewed and updated as appropriate: allergies, current medications, past medical history, past surgical history and problem list.    Review of Systems   Constitutional: Negative. HENT: Positive for nosebleeds. Eyes: Negative. Respiratory: Negative. Cardiovascular: Negative. Gastrointestinal: Negative. Genitourinary: Negative. Musculoskeletal: Negative. Skin: Negative. Neurological: Negative. Psychiatric/Behavioral: Negative.         Current Outpatient Medications   Medication Sig Dispense Refill   • Acetaminophen (TYLENOL ARTHRITIS PAIN PO) Take by mouth as needed     • aspirin (ECOTRIN LOW STRENGTH) 81 mg EC tablet Take 81 mg by mouth daily     • B Complex-C (B COMPLEX-VITAMIN C PO) Take by mouth daily     • Calcium Carb-Cholecalciferol (CALCIUM 500+D3 PO) Take 1 tablet by mouth 2 (two) times a day      • cholecalciferol (VITAMIN D3) 1,000 units tablet Take 1,000 Units by mouth daily     • famotidine (PEPCID) 40 MG tablet TAKE 1 TABLET BY MOUTH DAILY AT BEDTIME 90 tablet 3   • fexofenadine (ALLEGRA) 180 MG tablet Take 180 mg by mouth daily     • GLUCOSAMINE-CHONDROITIN PO Take 1 tablet by mouth 2 (two) times a day      • lidocaine-prilocaine (EMLA) cream Apply to mediport 30 min prior to labs or chemo 30 g 3   • LORazepam (ATIVAN) 1 mg tablet Take 1 tablet (1 mg total) by mouth every 8 (eight) hours as needed for anxiety (nausea or anxiety) 40 tablet 0   • metoprolol succinate (TOPROL-XL) 25 mg 24 hr tablet Take 50 mg by mouth daily Taking 25 mg at breakfast and 25 mg at dinner     • montelukast (SINGULAIR) 10 mg tablet Take 10 mg by mouth daily dinner     • multivitamin (THERAGRAN) TABS Take 1 tablet by mouth daily     • nystatin (MYCOSTATIN) powder Apply topically if needed Under abdominal pannus and in between thighs     • omeprazole (PriLOSEC) 20 mg delayed release capsule TAKE 1 CAPSULE BY MOUTH EVERY DAY 30 MINUTES BEFORE MORNING MEAL FOR 90 DAYS     • ondansetron (ZOFRAN) 8 mg tablet Take 1 tablet (8 mg total) by mouth every 8 (eight) hours as needed for nausea or vomiting 20 tablet 0   • predniSONE 1 MG TBEC Take by mouth daily after breakfast     • Probiotic Product (PROBIOTIC DAILY PO) Take by mouth daily     • simvastatin (ZOCOR) 10 mg tablet Take 10 mg by mouth daily at bedtime     • sodium chloride (OCEAN) 0.65 % nasal spray 1 spray into each nostril as needed for congestion     • SYMBICORT 160-4.5 MCG/ACT inhaler 2 puffs daily      • Triamcinolone Acetonide (NASACORT ALLERGY 24HR NA) 1 spray by Each Nare route 2 (two) times a day     • Ascorbic Acid (VITAMIN C GUMMIE PO) Take by mouth (Patient not taking: Reported on 7/6/2023)     • oxyCODONE (ROXICODONE) 5 immediate release tablet 1/2 tab every 6 hours as needed for pain (Patient not taking: Reported on 7/6/2023) 5 tablet 0     No current facility-administered medications for this visit.            Objective:    Blood pressure 144/90, pulse 90, temperature 97.7 °F (36.5 °C), temperature source Temporal, resp. rate 16, height 5' 4.02" (1.626 m), weight 70.5 kg (155 lb 6.4 oz), SpO2 99 %. Body mass index is 26.66 kg/m². Body surface area is 1.76 meters squared. Physical Exam  Vitals reviewed. Constitutional:       General: She is not in acute distress. Appearance: Normal appearance. She is not ill-appearing. HENT:      Head: Normocephalic and atraumatic. Mouth/Throat:      Mouth: Mucous membranes are moist.   Eyes:      General: No scleral icterus. Right eye: No discharge. Left eye: No discharge. Conjunctiva/sclera: Conjunctivae normal.   Pulmonary:      Effort: Pulmonary effort is normal.   Musculoskeletal:      Right lower leg: No edema. Left lower leg: No edema. Skin:     General: Skin is warm and dry. Coloration: Skin is not jaundiced. Findings: No rash. Neurological:      General: No focal deficit present. Mental Status: She is alert and oriented to person, place, and time. Cranial Nerves: No cranial nerve deficit. Sensory: No sensory deficit. Motor: No weakness. Gait: Gait normal.   Psychiatric:         Mood and Affect: Mood normal.         Behavior: Behavior normal.         Thought Content: Thought content normal.         Judgment: Judgment normal.     Performance status is zero.          Lab Results   Component Value Date    K 4.1 09/13/2023     09/13/2023    CO2 26 09/13/2023    BUN 14 09/13/2023    CREATININE 0.64 09/13/2023    GLUCOSE 214 (H) 10/28/2022    GLUF 161 (H) 12/08/2022    CALCIUM 8.9 09/13/2023    CORRECTEDCA 9.0 02/09/2023    AST 18 09/13/2023    ALT 12 09/13/2023    ALKPHOS 81 09/13/2023    EGFR 88 09/13/2023     Lab Results   Component Value Date    WBC 7.51 09/13/2023    HGB 9.6 (L) 09/13/2023    HCT 28.9 (L) 09/13/2023    MCV 95 09/13/2023     09/13/2023     Lab Results   Component Value Date    NEUTROABS 4.28 09/13/2023        Trend:  Lab Results   Component Value Date     223.9 (H) 08/30/2023     340.6 (H) 08/02/2023     168.5 (H) 06/26/2023     6.2 03/24/2023     5.1 03/02/2023     5.3 02/09/2023     8.7 01/19/2023     15.8 12/30/2022     37.9 (H) 12/08/2022     8.6 10/20/2022     8.4 10/07/2022     12.5 09/29/2022     32.0 (H) 09/08/2022     52.4 (H) 09/02/2022     85.5 (H) 08/26/2022     77.3 (H) 08/19/2022

## 2023-09-14 NOTE — ASSESSMENT & PLAN NOTE
Recurrent stage IIIC ovarian cancer currently receiving palliative chemotherapy with doxil 30 mg/m2 and carboplatin AUC 5 every 28 days with avastin 10 mg/kg every 14 days. Overall, she is tolerating treatment well. She has grade 1 treatment-related fatigue. She has avastin-induced epistaxis.     Labs and urine from 9/13/23 reviewed. All parameters met. Proceed with cycle 6 of treatment as planned. Will dose-reduce avastin to 7.5 mg/kg.      Plan for repeat imaging after completion of cycle 6. Return to the office as per her chemotherapy calendar.

## 2023-09-14 NOTE — ASSESSMENT & PLAN NOTE
Avastin-induced. Single episode of prolonged bleeding. Will plan to dose reduced avastin to 7.5 mg/kg with cycle 3. Closely monitor.

## 2023-09-15 ENCOUNTER — HOSPITAL ENCOUNTER (OUTPATIENT)
Dept: INFUSION CENTER | Facility: HOSPITAL | Age: 73
End: 2023-09-15
Attending: OBSTETRICS & GYNECOLOGY
Payer: MEDICARE

## 2023-09-15 VITALS
DIASTOLIC BLOOD PRESSURE: 67 MMHG | BODY MASS INDEX: 26.53 KG/M2 | OXYGEN SATURATION: 98 % | WEIGHT: 155.42 LBS | TEMPERATURE: 97.3 F | HEIGHT: 64 IN | RESPIRATION RATE: 16 BRPM | SYSTOLIC BLOOD PRESSURE: 140 MMHG | HEART RATE: 81 BPM

## 2023-09-15 DIAGNOSIS — C56.2 MALIGNANT NEOPLASM OF LEFT OVARY (HCC): ICD-10-CM

## 2023-09-15 DIAGNOSIS — E83.42 HYPOMAGNESEMIA: Primary | ICD-10-CM

## 2023-09-15 PROCEDURE — 96413 CHEMO IV INFUSION 1 HR: CPT

## 2023-09-15 PROCEDURE — 96375 TX/PRO/DX INJ NEW DRUG ADDON: CPT

## 2023-09-15 PROCEDURE — 96417 CHEMO IV INFUS EACH ADDL SEQ: CPT

## 2023-09-15 PROCEDURE — 96367 TX/PROPH/DG ADDL SEQ IV INF: CPT

## 2023-09-15 RX ORDER — SODIUM CHLORIDE 9 MG/ML
20 INJECTION, SOLUTION INTRAVENOUS ONCE
Status: COMPLETED | OUTPATIENT
Start: 2023-09-15 | End: 2023-09-15

## 2023-09-15 RX ORDER — PALONOSETRON 0.05 MG/ML
0.25 INJECTION, SOLUTION INTRAVENOUS ONCE
Status: COMPLETED | OUTPATIENT
Start: 2023-09-15 | End: 2023-09-15

## 2023-09-15 RX ORDER — MAGNESIUM SULFATE HEPTAHYDRATE 40 MG/ML
2 INJECTION, SOLUTION INTRAVENOUS ONCE
Status: COMPLETED | OUTPATIENT
Start: 2023-09-15 | End: 2023-09-15

## 2023-09-15 RX ORDER — DEXTROSE MONOHYDRATE 50 MG/ML
20 INJECTION, SOLUTION INTRAVENOUS ONCE
Status: COMPLETED | OUTPATIENT
Start: 2023-09-15 | End: 2023-09-15

## 2023-09-15 RX ADMIN — FAMOTIDINE 20 MG: 10 INJECTION INTRAVENOUS at 10:24

## 2023-09-15 RX ADMIN — CARBOPLATIN 469.5 MG: 10 INJECTION, SOLUTION INTRAVENOUS at 12:45

## 2023-09-15 RX ADMIN — MAGNESIUM SULFATE HEPTAHYDRATE 2 G: 2 INJECTION, SOLUTION INTRAVENOUS at 08:41

## 2023-09-15 RX ADMIN — FOSAPREPITANT 150 MG: 150 INJECTION, POWDER, LYOPHILIZED, FOR SOLUTION INTRAVENOUS at 10:45

## 2023-09-15 RX ADMIN — BEVACIZUMAB 530 MG: 400 INJECTION, SOLUTION INTRAVENOUS at 13:57

## 2023-09-15 RX ADMIN — DEXTROSE MONOHYDRATE 20 ML/HR: 50 INJECTION, SOLUTION INTRAVENOUS at 11:16

## 2023-09-15 RX ADMIN — SODIUM CHLORIDE 20 ML/HR: 0.9 INJECTION, SOLUTION INTRAVENOUS at 08:35

## 2023-09-15 RX ADMIN — PALONOSETRON 0.25 MG: 0.25 INJECTION, SOLUTION INTRAVENOUS at 09:59

## 2023-09-15 RX ADMIN — DEXAMETHASONE SODIUM PHOSPHATE 20 MG: 10 INJECTION, SOLUTION INTRAMUSCULAR; INTRAVENOUS at 10:03

## 2023-09-15 RX ADMIN — DOXORUBICIN HYDROCHLORIDE 52.8 MG: 2 INJECTABLE, LIPOSOMAL INTRAVENOUS at 11:31

## 2023-09-15 NOTE — PROGRESS NOTES
Rec'd pt in fair spirits, without new complaints. See Toxicity Assessment Flowsheet. Port easily accessed, dressed with Pt's own Tegaderm per her request as she claims to be allergic to paper tape. Magnesium replacement now infusing.

## 2023-09-15 NOTE — PROGRESS NOTES
Pt tolerated chemo treatment with no adverse reactions. Port d/c, ba applied. Pt aware of next appt and left unit amb with steady gait.

## 2023-09-20 ENCOUNTER — HOSPITAL ENCOUNTER (OUTPATIENT)
Dept: INFUSION CENTER | Facility: HOSPITAL | Age: 73
Discharge: HOME | End: 2023-09-20
Payer: MEDICARE

## 2023-09-20 DIAGNOSIS — C56.2 MALIGNANT NEOPLASM OF LEFT OVARY (HCC): Primary | ICD-10-CM

## 2023-09-20 DIAGNOSIS — Z45.2 ENCOUNTER FOR VENOUS ACCESS DEVICE CARE: ICD-10-CM

## 2023-09-20 LAB
ALBUMIN SERPL BCP-MCNC: 3.4 G/DL (ref 3.5–5)
ALP SERPL-CCNC: 75 U/L (ref 34–104)
ALT SERPL W P-5'-P-CCNC: 12 U/L (ref 7–52)
ANION GAP SERPL CALCULATED.3IONS-SCNC: 5 MMOL/L
AST SERPL W P-5'-P-CCNC: 17 U/L (ref 13–39)
BASOPHILS # BLD AUTO: 0.02 THOUSANDS/ÂΜL (ref 0–0.1)
BASOPHILS NFR BLD AUTO: 0 % (ref 0–1)
BILIRUB SERPL-MCNC: 0.36 MG/DL (ref 0.2–1)
BUN SERPL-MCNC: 17 MG/DL (ref 5–25)
CALCIUM ALBUM COR SERPL-MCNC: 9.4 MG/DL (ref 8.3–10.1)
CALCIUM SERPL-MCNC: 8.9 MG/DL (ref 8.4–10.2)
CHLORIDE SERPL-SCNC: 102 MMOL/L (ref 96–108)
CO2 SERPL-SCNC: 26 MMOL/L (ref 21–32)
CREAT SERPL-MCNC: 0.65 MG/DL (ref 0.6–1.3)
EOSINOPHIL # BLD AUTO: 0.02 THOUSAND/ÂΜL (ref 0–0.61)
EOSINOPHIL NFR BLD AUTO: 0 % (ref 0–6)
ERYTHROCYTE [DISTWIDTH] IN BLOOD BY AUTOMATED COUNT: 22.7 % (ref 11.6–15.1)
GFR SERPL CREATININE-BSD FRML MDRD: 88 ML/MIN/1.73SQ M
GLUCOSE SERPL-MCNC: 138 MG/DL (ref 65–140)
HCT VFR BLD AUTO: 28.4 % (ref 34.8–46.1)
HGB BLD-MCNC: 9.3 G/DL (ref 11.5–15.4)
IMM GRANULOCYTES # BLD AUTO: 0.06 THOUSAND/UL (ref 0–0.2)
IMM GRANULOCYTES NFR BLD AUTO: 1 % (ref 0–2)
LYMPHOCYTES # BLD AUTO: 1.11 THOUSANDS/ÂΜL (ref 0.6–4.47)
LYMPHOCYTES NFR BLD AUTO: 14 % (ref 14–44)
MAGNESIUM SERPL-MCNC: 1.5 MG/DL (ref 1.9–2.7)
MCH RBC QN AUTO: 31.4 PG (ref 26.8–34.3)
MCHC RBC AUTO-ENTMCNC: 32.7 G/DL (ref 31.4–37.4)
MCV RBC AUTO: 96 FL (ref 82–98)
MONOCYTES # BLD AUTO: 1.22 THOUSAND/ÂΜL (ref 0.17–1.22)
MONOCYTES NFR BLD AUTO: 15 % (ref 4–12)
NEUTROPHILS # BLD AUTO: 5.65 THOUSANDS/ÂΜL (ref 1.85–7.62)
NEUTS SEG NFR BLD AUTO: 70 % (ref 43–75)
NRBC BLD AUTO-RTO: 0 /100 WBCS
PLATELET # BLD AUTO: 370 THOUSANDS/UL (ref 149–390)
PMV BLD AUTO: 10 FL (ref 8.9–12.7)
POTASSIUM SERPL-SCNC: 3.8 MMOL/L (ref 3.5–5.3)
PROT SERPL-MCNC: 6.3 G/DL (ref 6.4–8.4)
RBC # BLD AUTO: 2.96 MILLION/UL (ref 3.81–5.12)
SODIUM SERPL-SCNC: 133 MMOL/L (ref 135–147)
WBC # BLD AUTO: 8.08 THOUSAND/UL (ref 4.31–10.16)

## 2023-09-20 PROCEDURE — 85025 COMPLETE CBC W/AUTO DIFF WBC: CPT

## 2023-09-20 PROCEDURE — 80053 COMPREHEN METABOLIC PANEL: CPT

## 2023-09-20 PROCEDURE — 83735 ASSAY OF MAGNESIUM: CPT

## 2023-09-24 DIAGNOSIS — C57.9 GYNECOLOGIC MALIGNANCY (HCC): ICD-10-CM

## 2023-09-25 RX ORDER — LORAZEPAM 1 MG/1
1 TABLET ORAL EVERY 8 HOURS PRN
Qty: 40 TABLET | Refills: 0 | Status: SHIPPED | OUTPATIENT
Start: 2023-09-25

## 2023-09-27 ENCOUNTER — HOSPITAL ENCOUNTER (OUTPATIENT)
Dept: INFUSION CENTER | Facility: HOSPITAL | Age: 73
Discharge: HOME | End: 2023-09-27
Payer: MEDICARE

## 2023-09-27 DIAGNOSIS — C56.2 MALIGNANT NEOPLASM OF LEFT OVARY (HCC): Primary | ICD-10-CM

## 2023-09-27 DIAGNOSIS — Z45.2 ENCOUNTER FOR VENOUS ACCESS DEVICE CARE: ICD-10-CM

## 2023-09-27 LAB
ALBUMIN SERPL BCP-MCNC: 3.8 G/DL (ref 3.5–5)
ALP SERPL-CCNC: 84 U/L (ref 34–104)
ALT SERPL W P-5'-P-CCNC: 13 U/L (ref 7–52)
ANION GAP SERPL CALCULATED.3IONS-SCNC: 6 MMOL/L
AST SERPL W P-5'-P-CCNC: 17 U/L (ref 13–39)
BASOPHILS # BLD AUTO: 0.03 THOUSANDS/ÂΜL (ref 0–0.1)
BASOPHILS NFR BLD AUTO: 0 % (ref 0–1)
BILIRUB SERPL-MCNC: 0.38 MG/DL (ref 0.2–1)
BUN SERPL-MCNC: 17 MG/DL (ref 5–25)
CALCIUM SERPL-MCNC: 9.3 MG/DL (ref 8.4–10.2)
CANCER AG125 SERPL-ACNC: 205.1 U/ML (ref 0–35)
CHLORIDE SERPL-SCNC: 101 MMOL/L (ref 96–108)
CO2 SERPL-SCNC: 25 MMOL/L (ref 21–32)
CREAT SERPL-MCNC: 0.71 MG/DL (ref 0.6–1.3)
CREAT UR-MCNC: 75.9 MG/DL
EOSINOPHIL # BLD AUTO: 0.02 THOUSAND/ÂΜL (ref 0–0.61)
EOSINOPHIL NFR BLD AUTO: 0 % (ref 0–6)
ERYTHROCYTE [DISTWIDTH] IN BLOOD BY AUTOMATED COUNT: 22.4 % (ref 11.6–15.1)
GFR SERPL CREATININE-BSD FRML MDRD: 84 ML/MIN/1.73SQ M
GLUCOSE SERPL-MCNC: 96 MG/DL (ref 65–140)
HCT VFR BLD AUTO: 27.2 % (ref 34.8–46.1)
HGB BLD-MCNC: 9 G/DL (ref 11.5–15.4)
IMM GRANULOCYTES # BLD AUTO: 0.04 THOUSAND/UL (ref 0–0.2)
IMM GRANULOCYTES NFR BLD AUTO: 1 % (ref 0–2)
LYMPHOCYTES # BLD AUTO: 1.5 THOUSANDS/ÂΜL (ref 0.6–4.47)
LYMPHOCYTES NFR BLD AUTO: 19 % (ref 14–44)
MAGNESIUM SERPL-MCNC: 1.7 MG/DL (ref 1.9–2.7)
MCH RBC QN AUTO: 31.9 PG (ref 26.8–34.3)
MCHC RBC AUTO-ENTMCNC: 33.1 G/DL (ref 31.4–37.4)
MCV RBC AUTO: 97 FL (ref 82–98)
MONOCYTES # BLD AUTO: 0.92 THOUSAND/ÂΜL (ref 0.17–1.22)
MONOCYTES NFR BLD AUTO: 12 % (ref 4–12)
NEUTROPHILS # BLD AUTO: 5.49 THOUSANDS/ÂΜL (ref 1.85–7.62)
NEUTS SEG NFR BLD AUTO: 68 % (ref 43–75)
NRBC BLD AUTO-RTO: 0 /100 WBCS
PLATELET # BLD AUTO: 193 THOUSANDS/UL (ref 149–390)
PMV BLD AUTO: 9.3 FL (ref 8.9–12.7)
POTASSIUM SERPL-SCNC: 4.2 MMOL/L (ref 3.5–5.3)
PROT SERPL-MCNC: 6.7 G/DL (ref 6.4–8.4)
PROT UR-MCNC: 8 MG/DL
PROT/CREAT UR: 0.11 MG/G{CREAT} (ref 0–0.1)
RBC # BLD AUTO: 2.82 MILLION/UL (ref 3.81–5.12)
SODIUM SERPL-SCNC: 132 MMOL/L (ref 135–147)
WBC # BLD AUTO: 8 THOUSAND/UL (ref 4.31–10.16)

## 2023-09-27 PROCEDURE — 83735 ASSAY OF MAGNESIUM: CPT

## 2023-09-27 PROCEDURE — 80053 COMPREHEN METABOLIC PANEL: CPT

## 2023-09-27 PROCEDURE — 84156 ASSAY OF PROTEIN URINE: CPT

## 2023-09-27 PROCEDURE — 86304 IMMUNOASSAY TUMOR CA 125: CPT

## 2023-09-27 PROCEDURE — 85025 COMPLETE CBC W/AUTO DIFF WBC: CPT

## 2023-09-27 PROCEDURE — 82570 ASSAY OF URINE CREATININE: CPT

## 2023-09-27 NOTE — PROGRESS NOTES
Pt here for port labs and urine; labs and urine obtained without difficulty; pt aware of next appt; left unit ambulatory with steady gait.

## 2023-09-28 RX ORDER — SODIUM CHLORIDE 9 MG/ML
20 INJECTION, SOLUTION INTRAVENOUS ONCE
OUTPATIENT
Start: 2023-09-29

## 2023-09-29 ENCOUNTER — HOSPITAL ENCOUNTER (OUTPATIENT)
Dept: INFUSION CENTER | Facility: HOSPITAL | Age: 73
Discharge: HOME | End: 2023-09-29
Attending: OBSTETRICS & GYNECOLOGY

## 2023-09-29 VITALS
HEIGHT: 64 IN | OXYGEN SATURATION: 100 % | HEART RATE: 69 BPM | TEMPERATURE: 97.6 F | DIASTOLIC BLOOD PRESSURE: 89 MMHG | WEIGHT: 155.2 LBS | RESPIRATION RATE: 16 BRPM | SYSTOLIC BLOOD PRESSURE: 152 MMHG | BODY MASS INDEX: 26.5 KG/M2

## 2023-09-29 DIAGNOSIS — C56.2 MALIGNANT NEOPLASM OF LEFT OVARY (HCC): ICD-10-CM

## 2023-09-29 DIAGNOSIS — E83.42 HYPOMAGNESEMIA: ICD-10-CM

## 2023-09-29 NOTE — PROGRESS NOTES
Pt arrived for Avastin. Initial /91. After approx 30 min, BP still elevated with manual reading of 152/89. Pt offers no complaints, states she did take her metoprolol at home this AM. Per Lavern Robertson PA-C: hold treatment today. Pt to follow up with PCP. Will proceed with tx as scheduled in 2 weeks. Pt updated & verbalized understanding, states she is going home to call her PCP for appt. AVS declined.

## 2023-10-04 ENCOUNTER — HOSPITAL ENCOUNTER (OUTPATIENT)
Dept: INFUSION CENTER | Facility: HOSPITAL | Age: 73
Discharge: HOME | End: 2023-10-04
Payer: MEDICARE

## 2023-10-04 DIAGNOSIS — C56.2 MALIGNANT NEOPLASM OF LEFT OVARY (HCC): ICD-10-CM

## 2023-10-04 DIAGNOSIS — Z45.2 ENCOUNTER FOR VENOUS ACCESS DEVICE CARE: Primary | ICD-10-CM

## 2023-10-04 LAB
ALBUMIN SERPL BCP-MCNC: 3.7 G/DL (ref 3.5–5)
ALP SERPL-CCNC: 68 U/L (ref 34–104)
ALT SERPL W P-5'-P-CCNC: 11 U/L (ref 7–52)
ANION GAP SERPL CALCULATED.3IONS-SCNC: 7 MMOL/L
AST SERPL W P-5'-P-CCNC: 19 U/L (ref 13–39)
BASOPHILS # BLD AUTO: 0.02 THOUSANDS/ÂΜL (ref 0–0.1)
BASOPHILS NFR BLD AUTO: 0 % (ref 0–1)
BILIRUB SERPL-MCNC: 0.2 MG/DL (ref 0.2–1)
BUN SERPL-MCNC: 18 MG/DL (ref 5–25)
CALCIUM SERPL-MCNC: 8.8 MG/DL (ref 8.4–10.2)
CHLORIDE SERPL-SCNC: 107 MMOL/L (ref 96–108)
CO2 SERPL-SCNC: 24 MMOL/L (ref 21–32)
CREAT SERPL-MCNC: 0.87 MG/DL (ref 0.6–1.3)
EOSINOPHIL # BLD AUTO: 0.01 THOUSAND/ÂΜL (ref 0–0.61)
EOSINOPHIL NFR BLD AUTO: 0 % (ref 0–6)
ERYTHROCYTE [DISTWIDTH] IN BLOOD BY AUTOMATED COUNT: 23.5 % (ref 11.6–15.1)
GFR SERPL CREATININE-BSD FRML MDRD: 66 ML/MIN/1.73SQ M
GLUCOSE SERPL-MCNC: 115 MG/DL (ref 65–140)
HCT VFR BLD AUTO: 25.8 % (ref 34.8–46.1)
HGB BLD-MCNC: 8.5 G/DL (ref 11.5–15.4)
IMM GRANULOCYTES # BLD AUTO: 0.03 THOUSAND/UL (ref 0–0.2)
IMM GRANULOCYTES NFR BLD AUTO: 1 % (ref 0–2)
LYMPHOCYTES # BLD AUTO: 1.11 THOUSANDS/ÂΜL (ref 0.6–4.47)
LYMPHOCYTES NFR BLD AUTO: 22 % (ref 14–44)
MAGNESIUM SERPL-MCNC: 1.7 MG/DL (ref 1.9–2.7)
MCH RBC QN AUTO: 32.2 PG (ref 26.8–34.3)
MCHC RBC AUTO-ENTMCNC: 32.9 G/DL (ref 31.4–37.4)
MCV RBC AUTO: 98 FL (ref 82–98)
MONOCYTES # BLD AUTO: 1.09 THOUSAND/ÂΜL (ref 0.17–1.22)
MONOCYTES NFR BLD AUTO: 21 % (ref 4–12)
NEUTROPHILS # BLD AUTO: 2.87 THOUSANDS/ÂΜL (ref 1.85–7.62)
NEUTS SEG NFR BLD AUTO: 56 % (ref 43–75)
NRBC BLD AUTO-RTO: 0 /100 WBCS
PLATELET # BLD AUTO: 80 THOUSANDS/UL (ref 149–390)
PMV BLD AUTO: 9.6 FL (ref 8.9–12.7)
POTASSIUM SERPL-SCNC: 3.7 MMOL/L (ref 3.5–5.3)
PROT SERPL-MCNC: 6.5 G/DL (ref 6.4–8.4)
RBC # BLD AUTO: 2.64 MILLION/UL (ref 3.81–5.12)
SODIUM SERPL-SCNC: 138 MMOL/L (ref 135–147)
WBC # BLD AUTO: 5.13 THOUSAND/UL (ref 4.31–10.16)

## 2023-10-04 PROCEDURE — 80053 COMPREHEN METABOLIC PANEL: CPT

## 2023-10-04 PROCEDURE — 85025 COMPLETE CBC W/AUTO DIFF WBC: CPT

## 2023-10-04 PROCEDURE — 83735 ASSAY OF MAGNESIUM: CPT

## 2023-10-09 ENCOUNTER — HOSPITAL ENCOUNTER (OUTPATIENT)
Dept: CT IMAGING | Facility: HOSPITAL | Age: 73
Discharge: HOME | End: 2023-10-09
Payer: MEDICARE

## 2023-10-09 DIAGNOSIS — C56.2 MALIGNANT NEOPLASM OF LEFT OVARY (HCC): ICD-10-CM

## 2023-10-09 PROCEDURE — 74177 CT ABD & PELVIS W/CONTRAST: CPT

## 2023-10-09 PROCEDURE — 71260 CT THORAX DX C+: CPT

## 2023-10-09 PROCEDURE — G1004 CDSM NDSC: HCPCS

## 2023-10-09 RX ADMIN — IOHEXOL 80 ML: 350 INJECTION, SOLUTION INTRAVENOUS at 14:15

## 2023-10-09 RX ADMIN — IOHEXOL 50 ML: 240 INJECTION, SOLUTION INTRATHECAL; INTRAVASCULAR; INTRAVENOUS; ORAL at 14:15

## 2023-10-11 ENCOUNTER — HOSPITAL ENCOUNTER (OUTPATIENT)
Dept: INFUSION CENTER | Facility: HOSPITAL | Age: 73
Discharge: HOME | End: 2023-10-11
Payer: MEDICARE

## 2023-10-11 ENCOUNTER — TELEPHONE (OUTPATIENT)
Dept: HEMATOLOGY ONCOLOGY | Facility: CLINIC | Age: 73
End: 2023-10-11

## 2023-10-11 DIAGNOSIS — C56.2 MALIGNANT NEOPLASM OF LEFT OVARY (HCC): ICD-10-CM

## 2023-10-11 DIAGNOSIS — Z45.2 ENCOUNTER FOR VENOUS ACCESS DEVICE CARE: Primary | ICD-10-CM

## 2023-10-11 LAB
ALBUMIN SERPL BCP-MCNC: 3.8 G/DL (ref 3.5–5)
ALP SERPL-CCNC: 83 U/L (ref 34–104)
ALT SERPL W P-5'-P-CCNC: 12 U/L (ref 7–52)
ANION GAP SERPL CALCULATED.3IONS-SCNC: 5 MMOL/L
AST SERPL W P-5'-P-CCNC: 18 U/L (ref 13–39)
BASOPHILS # BLD AUTO: 0.03 THOUSANDS/ÂΜL (ref 0–0.1)
BASOPHILS NFR BLD AUTO: 0 % (ref 0–1)
BILIRUB SERPL-MCNC: 0.24 MG/DL (ref 0.2–1)
BUN SERPL-MCNC: 20 MG/DL (ref 5–25)
CALCIUM SERPL-MCNC: 9.2 MG/DL (ref 8.4–10.2)
CHLORIDE SERPL-SCNC: 103 MMOL/L (ref 96–108)
CO2 SERPL-SCNC: 26 MMOL/L (ref 21–32)
CREAT SERPL-MCNC: 0.79 MG/DL (ref 0.6–1.3)
CREAT UR-MCNC: 153.4 MG/DL
EOSINOPHIL # BLD AUTO: 0.02 THOUSAND/ÂΜL (ref 0–0.61)
EOSINOPHIL NFR BLD AUTO: 0 % (ref 0–6)
ERYTHROCYTE [DISTWIDTH] IN BLOOD BY AUTOMATED COUNT: 24.5 % (ref 11.6–15.1)
GFR SERPL CREATININE-BSD FRML MDRD: 74 ML/MIN/1.73SQ M
GLUCOSE SERPL-MCNC: 107 MG/DL (ref 65–140)
HCT VFR BLD AUTO: 26.6 % (ref 34.8–46.1)
HGB BLD-MCNC: 8.9 G/DL (ref 11.5–15.4)
IMM GRANULOCYTES # BLD AUTO: 0.03 THOUSAND/UL (ref 0–0.2)
IMM GRANULOCYTES NFR BLD AUTO: 0 % (ref 0–2)
LYMPHOCYTES # BLD AUTO: 1.4 THOUSANDS/ÂΜL (ref 0.6–4.47)
LYMPHOCYTES NFR BLD AUTO: 18 % (ref 14–44)
MAGNESIUM SERPL-MCNC: 1.7 MG/DL (ref 1.9–2.7)
MCH RBC QN AUTO: 33.1 PG (ref 26.8–34.3)
MCHC RBC AUTO-ENTMCNC: 33.5 G/DL (ref 31.4–37.4)
MCV RBC AUTO: 99 FL (ref 82–98)
MONOCYTES # BLD AUTO: 2.25 THOUSAND/ÂΜL (ref 0.17–1.22)
MONOCYTES NFR BLD AUTO: 29 % (ref 4–12)
NEUTROPHILS # BLD AUTO: 3.98 THOUSANDS/ÂΜL (ref 1.85–7.62)
NEUTS SEG NFR BLD AUTO: 53 % (ref 43–75)
NRBC BLD AUTO-RTO: 0 /100 WBCS
PLATELET # BLD AUTO: 187 THOUSANDS/UL (ref 149–390)
PMV BLD AUTO: 11.1 FL (ref 8.9–12.7)
POTASSIUM SERPL-SCNC: 3.9 MMOL/L (ref 3.5–5.3)
PROT SERPL-MCNC: 6.6 G/DL (ref 6.4–8.4)
PROT UR-MCNC: 20 MG/DL
PROT/CREAT UR: 0.13 MG/G{CREAT} (ref 0–0.1)
RBC # BLD AUTO: 2.69 MILLION/UL (ref 3.81–5.12)
SODIUM SERPL-SCNC: 134 MMOL/L (ref 135–147)
WBC # BLD AUTO: 7.71 THOUSAND/UL (ref 4.31–10.16)

## 2023-10-11 PROCEDURE — 83735 ASSAY OF MAGNESIUM: CPT

## 2023-10-11 PROCEDURE — 82570 ASSAY OF URINE CREATININE: CPT

## 2023-10-11 PROCEDURE — 84156 ASSAY OF PROTEIN URINE: CPT

## 2023-10-11 PROCEDURE — 85025 COMPLETE CBC W/AUTO DIFF WBC: CPT

## 2023-10-11 PROCEDURE — 80053 COMPREHEN METABOLIC PANEL: CPT

## 2023-10-11 NOTE — PROGRESS NOTES
Pt arrived amb for port labs and UA. Port accessed, labs obtained, de-accessed. UA obtained. Disch ambn to home, steady gait. /

## 2023-10-11 NOTE — TELEPHONE ENCOUNTER
Patient Call    Who are you speaking with? [facility] radiology, Bernardo Sotomayor      If it is not the patient, are they listed on an active communication consent form? N/A   What is the reason for this call? Significant findings on CT completed on 10/09/2023. Does this require a call back? No   If a call back is required, please list best call back number N/A   If a call back is required, advise that a message will be forwarded to their care team and someone will return their call as soon as possible. Did you relay this information to the patient?  No

## 2023-10-12 ENCOUNTER — OFFICE VISIT (OUTPATIENT)
Age: 73
End: 2023-10-12
Payer: MEDICARE

## 2023-10-12 VITALS
BODY MASS INDEX: 26.7 KG/M2 | TEMPERATURE: 97.9 F | WEIGHT: 156.4 LBS | HEART RATE: 84 BPM | OXYGEN SATURATION: 99 % | HEIGHT: 64 IN | RESPIRATION RATE: 16 BRPM | SYSTOLIC BLOOD PRESSURE: 148 MMHG | DIASTOLIC BLOOD PRESSURE: 72 MMHG

## 2023-10-12 DIAGNOSIS — C56.2 MALIGNANT NEOPLASM OF LEFT OVARY (HCC): Primary | ICD-10-CM

## 2023-10-12 DIAGNOSIS — F06.4 ANXIETY DISORDER DUE TO MEDICAL CONDITION: ICD-10-CM

## 2023-10-12 DIAGNOSIS — C57.9 GYNECOLOGIC MALIGNANCY (HCC): ICD-10-CM

## 2023-10-12 DIAGNOSIS — G62.0 NEUROPATHY DUE TO DRUG (HCC): ICD-10-CM

## 2023-10-12 PROCEDURE — 99215 OFFICE O/P EST HI 40 MIN: CPT | Performed by: OBSTETRICS & GYNECOLOGY

## 2023-10-12 RX ORDER — DOXORUBICIN HYDROCHLORIDE 2 MG/ML
INJECTABLE, LIPOSOMAL INTRAVENOUS
COMMUNITY

## 2023-10-12 RX ORDER — LISINOPRIL 5 MG/1
10 TABLET ORAL DAILY
COMMUNITY
Start: 2023-10-03

## 2023-10-12 RX ORDER — LORAZEPAM 1 MG/1
1 TABLET ORAL EVERY 8 HOURS PRN
Qty: 40 TABLET | Refills: 0 | Status: SHIPPED | OUTPATIENT
Start: 2023-10-12

## 2023-10-12 RX ORDER — NALOXONE HYDROCHLORIDE 4 MG/.1ML
SPRAY NASAL
Qty: 1 EACH | Refills: 1 | Status: SHIPPED | OUTPATIENT
Start: 2023-10-12 | End: 2024-10-11

## 2023-10-12 RX ORDER — BEVACIZUMAB 100 MG/4ML
INJECTION, SOLUTION INTRAVENOUS
COMMUNITY

## 2023-10-12 RX ORDER — PREDNISOLONE ACETATE 10 MG/ML
SUSPENSION/ DROPS OPHTHALMIC
Qty: 5 ML | Refills: 0 | Status: SHIPPED | OUTPATIENT
Start: 2023-10-12

## 2023-10-12 RX ORDER — CARBOPLATIN 10 MG/ML
INJECTION INTRAVENOUS
COMMUNITY

## 2023-10-12 NOTE — PROGRESS NOTES
Assessment/Plan:    Problem List Items Addressed This Visit          Endocrine    Malignant neoplasm of left ovary ([address]) - Primary     68year-old with recurrent, platinum resistant ovarian cancer with disease progression after 3 cycles of carboplatin, liposomal doxorubicin, and bevacizumab. She has chemotherapy-induced anemia with a hemoglobin of 8.9 g/dL. I also reviewed CMP, urine protein to creatinine ratio, CT chest abdomen pelvis images. Her performance status is 0. Stop chemotherapy with carboplatin, liposomal doxorubicin, Avastin  2. We discussed treatment options for platinum resistant recurrent ovarian cancer including clinical trial therapy on the [facility] trial, mirvetuximab. 3.  I discussed the details of clinical trial therapy including weekly Abraxane and study drug. She is currently on 1 mg of prednisone every other day which will disqualify her for the trial at this time. She may go off prednisone therapy in the future. 3.  I discussed the risks and benefits of starting palliative mirvetuximab at 6 mg/kg adjusted ideal body weight every 21 days for at least 3 cycles of treatment followed by repeat CT imaging to assess disease response. We discussed the risks and benefits of mirvetuximab including the risks of ocular problems, lung problems, diarrhea, peripheral neuropathy, blood count problems. She understands the risks and benefits of mirvetuximab and agrees to proceed as outlined. Consent for treatment was obtained by me in the office. 4.  She will continue weekly lab draws to follow her chemotherapy-induced anemia. She does not require transfusion currently as she is asymptomatic. 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   We will continue to trend. Other    Anxiety disorder due to medical condition    Relevant Medications    LORazepam (ATIVAN) 1 mg tablet    naloxone (NARCAN) 4 mg/0.1 mL nasal spray     Other Visit Diagnoses       Gynecologic malignancy (HCC)        Relevant Medications    CARBOplatin (PARAPLATIN) 50 mg/5 mL    DOXOrubicin liposome (Doxil) 2 mg/mL    bevacizumab (Avastin) 100 mg/4 mL    LORazepam (ATIVAN) 1 mg tablet              CHIEF COMPLAINT: Prechemotherapy evaluation      Problem:  Cancer Staging   Malignant neoplasm of left ovary (HCC)  Staging form: Ovary, Fallopian Tube, Primary Peritoneal, AJCC 8th Edition  - Clinical: FIGO Stage IIIC (cT3c, cN0, cM0) - Signed by Ming Pickering MD on 11/29/2022        Previous therapy:  Oncology History   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   Results revealed patient has the following mutation(s):  None     Malignant neoplasm of left ovary (HCC)   8/4/2022 Surgery    Diagnostic laparoscopy, peritoneal biopsy     8/9/2022 Initial Diagnosis    Gynecologic malignancy ([address])     8/23/2022 - 1/24/2023 Chemotherapy    palonosetron (ALOXI), 0.25 mg, Intravenous, Once, 6 of 6 cycles  Administration: 0.25 mg (8/23/2022), 0.25 mg (9/13/2022), 0.25 mg (10/4/2022), 0.25 mg (12/13/2022), 0.25 mg (1/3/2023), 0.25 mg (1/24/2023)  fosaprepitant (EMEND) IVPB, 150 mg, Intravenous, Once, 6 of 6 cycles  Administration: 150 mg (8/23/2022), 150 mg (9/13/2022), 150 mg (10/4/2022), 150 mg (12/13/2022), 150 mg (1/3/2023), 150 mg (1/24/2023)  CARBOplatin (PARAPLATIN) IVPB (GOG AUC DOSING), 605.4 mg, Intravenous, Once, 6 of 6 cycles  Administration: 600 mg (8/23/2022), 564 mg (9/13/2022), 601.8 mg (10/4/2022), 491.5 mg (12/13/2022), 468.5 mg (1/3/2023), 488 mg (1/24/2023)  bevacizumab (AVASTIN) IVPB, 1,252.5 mg, Intravenous, Once, 3 of 3 cycles  Dose modification: 10 mg/kg (original dose 15 mg/kg, Cycle 5, Reason: Other (Must fill in a comment))  Administration: 1,200 mg (8/23/2022), 1,200 mg (9/13/2022), 745 mg (1/3/2023)  PACLItaxel (TAXOL) chemo IVPB, 175 mg/m2 = 330.6 mg, Intravenous, Once, 6 of 6 cycles  Dose modification: 135 mg/m2 (original dose 175 mg/m2, Cycle 4, Reason: Other (Must fill in a comment))  Administration: 330.6 mg (8/23/2022), 330.6 mg (9/13/2022), 330.6 mg (10/4/2022), 246 mg (12/13/2022), 244.2 mg (1/3/2023), 244.2 mg (1/24/2023)     10/28/2022 Surgery    diagnostic laparoscopy, exploratory laparotomy, modified radical hysterectomy, bilateral salpingo-oophorectomy with en bloc rectosigmoid resection, gastrocolic omentectomy, splenectomy, small bowel resection with reanastomosis, diaphragm resection, excision ablation of peritoneal implants, ileostomy formation, optimal tumor debulking     11/29/2022 -  Cancer Staged    Staging form: Ovary, Fallopian Tube, Primary Peritoneal, AJCC 8th Edition  - Clinical: FIGO Stage IIIC (cT3c, cN0, cM0) - Signed by Tigist Lewis MD on 11/29/2022  Histologic grade (G): G3  Histologic grading system: 4 grade system       3/27/2023 Surgery    Low anterior resection  Residual tumor identified in lymphovascular spaces    A. 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 Colon (anastomotic donuts):  - Colon with no diagnostic abnormality  - No carcinoma identified      4/17/2023 Genomic Testing    [employer]: FOLR1, positive     7/21/2023 -  Chemotherapy    Doxil 30 mg/m2 and carboplatin AUC 5 every 28 days with avastin 10 mg/kg IV every 14 days. Avastin dose-reduced to 7.5 mg/kg due to epistaxis.       10/27/2023 -  Chemotherapy    alteplase (CATHFLO), 2 mg, Intracatheter, Every 1 Minute as needed, 0 of 4 cycles  mirvetuximab soravtansine-gynx (ELAHERE) IVPB, 6 mg/kg, Intravenous, Once, 0 of 4 cycles           Patient ID: Mago Gannon is a 68 y.o. female  Who returns for treatment discussion after having 3 cycles of palliative carboplatin, liposomal doxorubicin, Avastin. Labs from 10/11/2023 revealed a normal CMP, urine protein to creatinine ratio of 0.13, total white blood cell count 7.71 with a hemoglobin 8.9 g/dL, platelet count 144 K. She is able to perform her activities of daily living. She remains active at home. No other interval change in medications or medical history since her last visit to the office. Her prednisone dose for temporal arteritis is decreasing. She has occasional headaches. She has mild numbness in her fingertips. The following portions of the patient's history were reviewed and updated as appropriate: allergies, current medications, past family history, past medical history, past social history, past surgical history, and problem list.    Review of Systems   Constitutional:  Negative for activity change and unexpected weight change. HENT: Negative. Eyes: Negative. Respiratory: Negative. Cardiovascular: Negative. Gastrointestinal:  Negative for abdominal distention and abdominal pain. Endocrine: Negative. Genitourinary:  Negative for pelvic pain and vaginal bleeding. Musculoskeletal: Negative. Skin: Negative. Allergic/Immunologic: Negative. Neurological:  Positive for headaches. Hematological: Negative. Psychiatric/Behavioral: Negative.          Current Outpatient Medications   Medication Sig Dispense Refill    Acetaminophen (TYLENOL ARTHRITIS PAIN PO) Take by mouth as needed      aspirin (ECOTRIN LOW STRENGTH) 81 mg EC tablet Take 81 mg by mouth daily      B Complex-C (B COMPLEX-VITAMIN C PO) Take by mouth daily      bevacizumab (Avastin) 100 mg/4 mL Avastin      Calcium Carb-Cholecalciferol (CALCIUM 500+D3 PO) Take 1 tablet by mouth 2 (two) times a day       CARBOplatin (PARAPLATIN) 50 mg/5 mL CARBOplatin      cholecalciferol (VITAMIN D3) 1,000 units tablet Take 1,000 Units by mouth daily      DOXOrubicin liposome (Doxil) 2 mg/mL Doxil      famotidine (PEPCID) 40 MG tablet TAKE 1 TABLET BY MOUTH DAILY AT BEDTIME 90 tablet 3    fexofenadine (ALLEGRA) 180 MG tablet Take 180 mg by mouth daily      GLUCOSAMINE-CHONDROITIN PO Take 1 tablet by mouth 2 (two) times a day       lidocaine-prilocaine (EMLA) cream Apply to mediport 30 min prior to labs or chemo 30 g 3    lisinopril (ZESTRIL) 5 mg tablet Take 10 mg by mouth daily      LORazepam (ATIVAN) 1 mg tablet Take 1 tablet (1 mg total) by mouth every 8 (eight) hours as needed for anxiety (nausea or anxiety) 40 tablet 0    metoprolol succinate (TOPROL-XL) 25 mg 24 hr tablet Take 50 mg by mouth daily Taking 25 mg at breakfast and 25 mg at dinner      montelukast (SINGULAIR) 10 mg tablet Take 10 mg by mouth daily dinner      multivitamin (THERAGRAN) TABS Take 1 tablet by mouth daily      naloxone (NARCAN) 4 mg/0.1 mL nasal spray Administer 1 spray into a nostril. If no response after 2-3 minutes, give another dose in the other nostril using a new spray.  1 each 1    nystatin (MYCOSTATIN) powder Apply topically if needed Under abdominal pannus and in between thighs      omeprazole (PriLOSEC) 20 mg delayed release capsule TAKE 1 CAPSULE BY MOUTH EVERY DAY 30 MINUTES BEFORE MORNING MEAL FOR 90 DAYS      ondansetron (ZOFRAN) 8 mg tablet Take 1 tablet (8 mg total) by mouth every 8 (eight) hours as needed for nausea or vomiting 20 tablet 0    predniSONE 1 MG TBEC Take 1 mg by mouth every other day      Probiotic Product (PROBIOTIC DAILY PO) Take by mouth daily      simvastatin (ZOCOR) 10 mg tablet Take 10 mg by mouth daily at bedtime      sodium chloride (OCEAN) 0.65 % nasal spray 1 spray into each nostril as needed for congestion      SYMBICORT 160-4.5 MCG/ACT inhaler 2 puffs daily       Triamcinolone Acetonide (NASACORT ALLERGY 24HR NA) 1 spray by Each Nare route 2 (two) times a day      Ascorbic Acid (VITAMIN C GUMMIE PO) Take by mouth (Patient not taking: Reported on 7/6/2023)      oxyCODONE (ROXICODONE) 5 immediate release tablet 1/2 tab every 6 hours as needed for pain (Patient not taking: Reported on 7/6/2023) 5 tablet 0    prednisoLONE acetate (PRED FORTE) 1 % ophthalmic suspension Apply 1 drop to each eye 6x day the day prior to treatment and then for 4 days following treatment, then 4x day in each eye days 5 thru 8 following treatment. 5 mL 0     No current facility-administered medications for this visit. Facility-Administered Medications Ordered in Other Visits   Medication Dose Route Frequency Provider Last Rate Last Admin    alteplase (CATHFLO) injection 2 mg  2 mg Intracatheter Q1MIN PRN Raghav Lugo MD               Objective:    Blood pressure 148/72, pulse 84, temperature 97.9 °F (36.6 °C), temperature source Temporal, resp. rate 16, height 5' 4.02" (1.626 m), weight 70.9 kg (156 lb 6.4 oz), SpO2 99 %. Body mass index is 26.83 kg/m². Body surface area is 1.76 meters squared. Physical Exam  Vitals reviewed. Constitutional:       General: She is not in acute distress. Appearance: Normal appearance. She is not ill-appearing. HENT:      Head: Normocephalic and atraumatic. Mouth/Throat:      Mouth: Mucous membranes are moist.   Eyes:      General: No scleral icterus. Right eye: No discharge. Left eye: No discharge. Conjunctiva/sclera: Conjunctivae normal.   Pulmonary:      Effort: Pulmonary effort is normal.   Musculoskeletal:      Right lower leg: No edema. Left lower leg: No edema. Skin:     General: Skin is warm and dry. Coloration: Skin is not jaundiced. Findings: No rash. Neurological:      General: No focal deficit present. Mental Status: She is alert and oriented to person, place, and time.       Cranial Nerves: No cranial nerve deficit. Motor: No weakness. Gait: Gait normal.   Psychiatric:         Mood and Affect: Mood normal.         Behavior: Behavior normal.         Thought Content:  Thought content normal.         Judgment: Judgment normal.         Lab Results   Component Value Date     205.1 (H) 09/27/2023     Lab Results   Component Value Date    K 3.9 10/11/2023     10/11/2023    CO2 26 10/11/2023    BUN 20 10/11/2023    CREATININE 0.79 10/11/2023    GLUCOSE 214 (H) 10/28/2022    GLUF 161 (H) 12/08/2022    CALCIUM 9.2 10/11/2023    CORRECTEDCA 9.4 09/20/2023    AST 18 10/11/2023    ALT 12 10/11/2023    ALKPHOS 83 10/11/2023    EGFR 74 10/11/2023     Lab Results   Component Value Date    WBC 7.71 10/11/2023    HGB 8.9 (L) 10/11/2023    HCT 26.6 (L) 10/11/2023    MCV 99 (H) 10/11/2023     10/11/2023     Lab Results   Component Value Date    NEUTROABS 3.98 10/11/2023        Trend:  Lab Results   Component Value Date     205.1 (H) 09/27/2023     223.9 (H) 08/30/2023     340.6 (H) 08/02/2023     168.5 (H) 06/26/2023     6.2 03/24/2023     5.1 03/02/2023     5.3 02/09/2023     8.7 01/19/2023     15.8 12/30/2022     37.9 (H) 12/08/2022     8.6 10/20/2022     8.4 10/07/2022     12.5 09/29/2022     32.0 (H) 09/08/2022     52.4 (H) 09/02/2022     85.5 (H) 08/26/2022     77.3 (H) 08/19/2022

## 2023-10-12 NOTE — ASSESSMENT & PLAN NOTE
68year-old with recurrent, platinum resistant ovarian cancer with disease progression after 3 cycles of carboplatin, liposomal doxorubicin, and bevacizumab. She has chemotherapy-induced anemia with a hemoglobin of 8.9 g/dL. I also reviewed CMP, urine protein to creatinine ratio, CT chest abdomen pelvis images. Her performance status is 0. Stop chemotherapy with carboplatin, liposomal doxorubicin, Avastin  2. We discussed treatment options for platinum resistant recurrent ovarian cancer including clinical trial therapy on the [facility] trial, mirvetuximab. 3.  I discussed the details of clinical trial therapy including weekly Abraxane and study drug. She is currently on 1 mg of prednisone every other day which will disqualify her for the trial at this time. She may go off prednisone therapy in the future. 3.  I discussed the risks and benefits of starting palliative mirvetuximab at 6 mg/kg adjusted ideal body weight every 21 days for at least 3 cycles of treatment followed by repeat CT imaging to assess disease response. We discussed the risks and benefits of mirvetuximab including the risks of ocular problems, lung problems, diarrhea, peripheral neuropathy, blood count problems. She understands the risks and benefits of mirvetuximab and agrees to proceed as outlined. Consent for treatment was obtained by me in the office. 4.  She will continue weekly lab draws to follow her chemotherapy-induced anemia. She does not require transfusion currently as she is asymptomatic.

## 2023-10-13 ENCOUNTER — HOSPITAL ENCOUNTER (OUTPATIENT)
Dept: INFUSION CENTER | Facility: HOSPITAL | Age: 73
Discharge: HOME | End: 2023-10-13
Attending: OBSTETRICS & GYNECOLOGY

## 2023-10-13 DIAGNOSIS — Z45.2 ENCOUNTER FOR VENOUS ACCESS DEVICE CARE: Primary | ICD-10-CM

## 2023-10-13 DIAGNOSIS — C56.2 MALIGNANT NEOPLASM OF LEFT OVARY (HCC): ICD-10-CM

## 2023-10-25 ENCOUNTER — APPOINTMENT (OUTPATIENT)
Dept: LAB | Facility: HOSPITAL | Age: 73
End: 2023-10-25
Payer: MEDICARE

## 2023-10-25 ENCOUNTER — HOSPITAL ENCOUNTER (OUTPATIENT)
Dept: INFUSION CENTER | Facility: HOSPITAL | Age: 73
Discharge: HOME | End: 2023-10-25
Payer: MEDICARE

## 2023-10-25 DIAGNOSIS — Z45.2 ENCOUNTER FOR VENOUS ACCESS DEVICE CARE: Primary | ICD-10-CM

## 2023-10-25 DIAGNOSIS — M31.5 POLYMYALGIA ARTERITICA (HCC): ICD-10-CM

## 2023-10-25 DIAGNOSIS — C56.2 MALIGNANT NEOPLASM OF LEFT OVARY (HCC): ICD-10-CM

## 2023-10-25 LAB
ALBUMIN SERPL BCP-MCNC: 4 G/DL (ref 3.5–5)
ALP SERPL-CCNC: 90 U/L (ref 34–104)
ALT SERPL W P-5'-P-CCNC: 12 U/L (ref 7–52)
ANION GAP SERPL CALCULATED.3IONS-SCNC: 8 MMOL/L
AST SERPL W P-5'-P-CCNC: 20 U/L (ref 13–39)
BASOPHILS # BLD AUTO: 0.06 THOUSANDS/ÂΜL (ref 0–0.1)
BASOPHILS NFR BLD AUTO: 1 % (ref 0–1)
BILIRUB SERPL-MCNC: 0.31 MG/DL (ref 0.2–1)
BUN SERPL-MCNC: 17 MG/DL (ref 5–25)
CALCIUM SERPL-MCNC: 9.3 MG/DL (ref 8.4–10.2)
CANCER AG125 SERPL-ACNC: 234.7 U/ML (ref 0–35)
CHLORIDE SERPL-SCNC: 102 MMOL/L (ref 96–108)
CO2 SERPL-SCNC: 25 MMOL/L (ref 21–32)
CREAT SERPL-MCNC: 0.95 MG/DL (ref 0.6–1.3)
EOSINOPHIL # BLD AUTO: 0.07 THOUSAND/ÂΜL (ref 0–0.61)
EOSINOPHIL NFR BLD AUTO: 1 % (ref 0–6)
ERYTHROCYTE [DISTWIDTH] IN BLOOD BY AUTOMATED COUNT: 22.1 % (ref 11.6–15.1)
GFR SERPL CREATININE-BSD FRML MDRD: 59 ML/MIN/1.73SQ M
GLUCOSE SERPL-MCNC: 103 MG/DL (ref 65–140)
HCT VFR BLD AUTO: 31.3 % (ref 34.8–46.1)
HGB BLD-MCNC: 10.3 G/DL (ref 11.5–15.4)
IMM GRANULOCYTES # BLD AUTO: 0.09 THOUSAND/UL (ref 0–0.2)
IMM GRANULOCYTES NFR BLD AUTO: 1 % (ref 0–2)
LYMPHOCYTES # BLD AUTO: 1.5 THOUSANDS/ÂΜL (ref 0.6–4.47)
LYMPHOCYTES NFR BLD AUTO: 12 % (ref 14–44)
MAGNESIUM SERPL-MCNC: 1.8 MG/DL (ref 1.9–2.7)
MCH RBC QN AUTO: 32.9 PG (ref 26.8–34.3)
MCHC RBC AUTO-ENTMCNC: 32.9 G/DL (ref 31.4–37.4)
MCV RBC AUTO: 100 FL (ref 82–98)
MONOCYTES # BLD AUTO: 2.86 THOUSAND/ÂΜL (ref 0.17–1.22)
MONOCYTES NFR BLD AUTO: 22 % (ref 4–12)
NEUTROPHILS # BLD AUTO: 8.36 THOUSANDS/ÂΜL (ref 1.85–7.62)
NEUTS SEG NFR BLD AUTO: 63 % (ref 43–75)
NRBC BLD AUTO-RTO: 0 /100 WBCS
PLATELET # BLD AUTO: 411 THOUSANDS/UL (ref 149–390)
PMV BLD AUTO: 9.8 FL (ref 8.9–12.7)
POTASSIUM SERPL-SCNC: 4.1 MMOL/L (ref 3.5–5.3)
PROT SERPL-MCNC: 7.3 G/DL (ref 6.4–8.4)
RBC # BLD AUTO: 3.13 MILLION/UL (ref 3.81–5.12)
SODIUM SERPL-SCNC: 135 MMOL/L (ref 135–147)
WBC # BLD AUTO: 12.94 THOUSAND/UL (ref 4.31–10.16)

## 2023-10-25 PROCEDURE — 80053 COMPREHEN METABOLIC PANEL: CPT

## 2023-10-25 PROCEDURE — 83735 ASSAY OF MAGNESIUM: CPT

## 2023-10-25 PROCEDURE — 86304 IMMUNOASSAY TUMOR CA 125: CPT

## 2023-10-25 PROCEDURE — 85652 RBC SED RATE AUTOMATED: CPT

## 2023-10-25 PROCEDURE — 85025 COMPLETE CBC W/AUTO DIFF WBC: CPT

## 2023-10-25 NOTE — PROGRESS NOTES
Pt's port accessed using sterile technique. Labs drawn without difficulty. Port de-accessed. Band-aid applied. Pt ambulated with a steady gait off unit. AVS printed and given to pt.

## 2023-10-26 LAB — ERYTHROCYTE [SEDIMENTATION RATE] IN BLOOD: 60 MM/HOUR (ref 0–29)

## 2023-10-26 RX ORDER — ACETAMINOPHEN 325 MG/1
650 TABLET ORAL ONCE
Status: CANCELLED | OUTPATIENT
Start: 2023-10-27

## 2023-10-26 RX ORDER — SODIUM CHLORIDE 9 MG/ML
20 INJECTION, SOLUTION INTRAVENOUS ONCE
Status: CANCELLED | OUTPATIENT
Start: 2023-10-27

## 2023-10-26 RX ORDER — DEXTROSE MONOHYDRATE 50 MG/ML
20 INJECTION, SOLUTION INTRAVENOUS ONCE
Status: CANCELLED | OUTPATIENT
Start: 2023-10-27

## 2023-10-27 ENCOUNTER — HOSPITAL ENCOUNTER (OUTPATIENT)
Dept: INFUSION CENTER | Facility: HOSPITAL | Age: 73
Discharge: HOME | End: 2023-10-27
Attending: OBSTETRICS & GYNECOLOGY
Payer: MEDICARE

## 2023-10-27 VITALS
TEMPERATURE: 97.7 F | HEIGHT: 64 IN | DIASTOLIC BLOOD PRESSURE: 93 MMHG | SYSTOLIC BLOOD PRESSURE: 152 MMHG | OXYGEN SATURATION: 98 % | WEIGHT: 152.78 LBS | RESPIRATION RATE: 16 BRPM | HEART RATE: 80 BPM | BODY MASS INDEX: 26.08 KG/M2

## 2023-10-27 DIAGNOSIS — C56.2 MALIGNANT NEOPLASM OF LEFT OVARY (HCC): Primary | ICD-10-CM

## 2023-10-27 PROCEDURE — 96415 CHEMO IV INFUSION ADDL HR: CPT

## 2023-10-27 PROCEDURE — 96367 TX/PROPH/DG ADDL SEQ IV INF: CPT

## 2023-10-27 PROCEDURE — 96413 CHEMO IV INFUSION 1 HR: CPT

## 2023-10-27 RX ORDER — ACETAMINOPHEN 325 MG/1
650 TABLET ORAL ONCE
Status: COMPLETED | OUTPATIENT
Start: 2023-10-27 | End: 2023-10-27

## 2023-10-27 RX ORDER — SODIUM CHLORIDE 9 MG/ML
20 INJECTION, SOLUTION INTRAVENOUS ONCE
Status: DISCONTINUED | OUTPATIENT
Start: 2023-10-27 | End: 2023-10-30 | Stop reason: HOSPADM

## 2023-10-27 RX ORDER — FAMOTIDINE 10 MG/ML
INJECTION, SOLUTION INTRAVENOUS
Status: DISPENSED
Start: 2023-10-27 | End: 2023-10-27

## 2023-10-27 RX ORDER — DEXTROSE MONOHYDRATE 50 MG/ML
20 INJECTION, SOLUTION INTRAVENOUS ONCE
Status: COMPLETED | OUTPATIENT
Start: 2023-10-27 | End: 2023-10-27

## 2023-10-27 RX ADMIN — DEXAMETHASONE SODIUM PHOSPHATE: 10 INJECTION, SOLUTION INTRAMUSCULAR; INTRAVENOUS at 10:43

## 2023-10-27 RX ADMIN — ACETAMINOPHEN 650 MG: 325 TABLET, FILM COATED ORAL at 10:46

## 2023-10-27 RX ADMIN — MIRVETUXIMAB SORAVTANSINE 366 MG: 100 INJECTION, SOLUTION INTRAVENOUS at 12:01

## 2023-10-27 RX ADMIN — DIPHENHYDRAMINE HYDROCHLORIDE 25 MG: 50 INJECTION, SOLUTION INTRAMUSCULAR; INTRAVENOUS at 11:07

## 2023-10-27 RX ADMIN — DEXTROSE 20 ML/HR: 5 SOLUTION INTRAVENOUS at 10:43

## 2023-10-28 ENCOUNTER — TELEPHONE (OUTPATIENT)
Dept: OTHER | Facility: OTHER | Age: 73
End: 2023-10-28

## 2023-11-01 ENCOUNTER — HOSPITAL ENCOUNTER (OUTPATIENT)
Dept: INFUSION CENTER | Facility: HOSPITAL | Age: 73
Discharge: HOME | End: 2023-11-01
Payer: MEDICARE

## 2023-11-01 DIAGNOSIS — Z45.2 ENCOUNTER FOR VENOUS ACCESS DEVICE CARE: ICD-10-CM

## 2023-11-01 DIAGNOSIS — C56.2 MALIGNANT NEOPLASM OF LEFT OVARY (HCC): Primary | ICD-10-CM

## 2023-11-01 LAB
ALBUMIN SERPL BCP-MCNC: 3.7 G/DL (ref 3.5–5)
ALP SERPL-CCNC: 97 U/L (ref 34–104)
ALT SERPL W P-5'-P-CCNC: 19 U/L (ref 7–52)
ANION GAP SERPL CALCULATED.3IONS-SCNC: 6 MMOL/L
AST SERPL W P-5'-P-CCNC: 31 U/L (ref 13–39)
BILIRUB SERPL-MCNC: 0.25 MG/DL (ref 0.2–1)
BUN SERPL-MCNC: 18 MG/DL (ref 5–25)
CALCIUM SERPL-MCNC: 9.2 MG/DL (ref 8.4–10.2)
CHLORIDE SERPL-SCNC: 104 MMOL/L (ref 96–108)
CO2 SERPL-SCNC: 26 MMOL/L (ref 21–32)
CREAT SERPL-MCNC: 0.83 MG/DL (ref 0.6–1.3)
GFR SERPL CREATININE-BSD FRML MDRD: 70 ML/MIN/1.73SQ M
GLUCOSE SERPL-MCNC: 135 MG/DL (ref 65–140)
MAGNESIUM SERPL-MCNC: 1.7 MG/DL (ref 1.9–2.7)
POTASSIUM SERPL-SCNC: 3.8 MMOL/L (ref 3.5–5.3)
PROT SERPL-MCNC: 6.8 G/DL (ref 6.4–8.4)
SODIUM SERPL-SCNC: 136 MMOL/L (ref 135–147)

## 2023-11-01 PROCEDURE — 83735 ASSAY OF MAGNESIUM: CPT

## 2023-11-01 PROCEDURE — 85007 BL SMEAR W/DIFF WBC COUNT: CPT

## 2023-11-01 PROCEDURE — 85027 COMPLETE CBC AUTOMATED: CPT

## 2023-11-01 PROCEDURE — 80053 COMPREHEN METABOLIC PANEL: CPT

## 2023-11-01 NOTE — PROGRESS NOTES
Pt arrived amb for port labs. Pt c/o being tired. Port accessed, labs obtained, de-accessed, dsd applied. Disch amb to home, steady gait.

## 2023-11-02 ENCOUNTER — TELEPHONE (OUTPATIENT)
Dept: OTHER | Facility: OTHER | Age: 73
End: 2023-11-02

## 2023-11-02 LAB
ANISOCYTOSIS BLD QL SMEAR: PRESENT
BASOPHILS # BLD MANUAL: 0 THOUSAND/UL (ref 0–0.1)
BASOPHILS NFR MAR MANUAL: 0 % (ref 0–1)
EOSINOPHIL # BLD MANUAL: 0.11 THOUSAND/UL (ref 0–0.4)
EOSINOPHIL NFR BLD MANUAL: 1 % (ref 0–6)
ERYTHROCYTE [DISTWIDTH] IN BLOOD BY AUTOMATED COUNT: 20.6 % (ref 11.6–15.1)
HCT VFR BLD AUTO: 30.7 % (ref 34.8–46.1)
HGB BLD-MCNC: 10.2 G/DL (ref 11.5–15.4)
HOWELL-JOLLY BOD BLD QL SMEAR: PRESENT
HYPERCHROMIA BLD QL SMEAR: PRESENT
LYMPHOCYTES # BLD AUTO: 1.25 THOUSAND/UL (ref 0.6–4.47)
LYMPHOCYTES # BLD AUTO: 9 % (ref 14–44)
MCH RBC QN AUTO: 33.3 PG (ref 26.8–34.3)
MCHC RBC AUTO-ENTMCNC: 33.2 G/DL (ref 31.4–37.4)
MCV RBC AUTO: 100 FL (ref 82–98)
MONOCYTES # BLD AUTO: 2.85 THOUSAND/UL (ref 0–1.22)
MONOCYTES NFR BLD: 25 % (ref 4–12)
MYELOCYTES NFR BLD MANUAL: 1 % (ref 0–1)
NEUTROPHILS # BLD MANUAL: 7.07 THOUSAND/UL (ref 1.85–7.62)
NEUTS BAND NFR BLD MANUAL: 1 % (ref 0–8)
NEUTS SEG NFR BLD AUTO: 61 % (ref 43–75)
PATHOLOGY REVIEW: YES
PLATELET # BLD AUTO: 259 THOUSANDS/UL (ref 149–390)
PLATELET BLD QL SMEAR: ADEQUATE
PMV BLD AUTO: 10 FL (ref 8.9–12.7)
RBC # BLD AUTO: 3.06 MILLION/UL (ref 3.81–5.12)
VARIANT LYMPHS # BLD AUTO: 2 %
WBC # BLD AUTO: 11.4 THOUSAND/UL (ref 4.31–10.16)

## 2023-11-02 NOTE — TELEPHONE ENCOUNTER
Lab Result: Path review - correct result:  Pathologist wanted to add Celestin-Jolly bodies; Wanted to change result: 1 pro-myelo to 1 pyelo     Date/Time Drawn: 11/1 1443   Ordering Provider: Dr. Nakul Graham Name: Alessio Cedillo result on Rosendo Elam, 1950, MRN [id number]    Please reply with "Acknowledged" upon receipt of this update. By acknowledging, you are agreeing to act upon this critical value. If this is not your patient, please advise.     Reported by Jacinta Benitez RN on 11/02/23 at 5:48 PM

## 2023-11-08 ENCOUNTER — HOSPITAL ENCOUNTER (OUTPATIENT)
Dept: INFUSION CENTER | Facility: HOSPITAL | Age: 73
Discharge: HOME | End: 2023-11-08
Payer: MEDICARE

## 2023-11-08 DIAGNOSIS — C56.2 MALIGNANT NEOPLASM OF LEFT OVARY (HCC): ICD-10-CM

## 2023-11-08 LAB
ALBUMIN SERPL BCP-MCNC: 3.7 G/DL (ref 3.5–5)
ALP SERPL-CCNC: 146 U/L (ref 34–104)
ALT SERPL W P-5'-P-CCNC: 27 U/L (ref 7–52)
ANION GAP SERPL CALCULATED.3IONS-SCNC: 7 MMOL/L
ANISOCYTOSIS BLD QL SMEAR: PRESENT
AST SERPL W P-5'-P-CCNC: 34 U/L (ref 13–39)
BASOPHILS # BLD MANUAL: 0 THOUSAND/UL (ref 0–0.1)
BASOPHILS NFR MAR MANUAL: 0 % (ref 0–1)
BILIRUB SERPL-MCNC: 0.35 MG/DL (ref 0.2–1)
BUN SERPL-MCNC: 15 MG/DL (ref 5–25)
CALCIUM SERPL-MCNC: 9.3 MG/DL (ref 8.4–10.2)
CHLORIDE SERPL-SCNC: 104 MMOL/L (ref 96–108)
CO2 SERPL-SCNC: 26 MMOL/L (ref 21–32)
CREAT SERPL-MCNC: 0.82 MG/DL (ref 0.6–1.3)
EOSINOPHIL # BLD MANUAL: 0.4 THOUSAND/UL (ref 0–0.4)
EOSINOPHIL NFR BLD MANUAL: 3 % (ref 0–6)
ERYTHROCYTE [DISTWIDTH] IN BLOOD BY AUTOMATED COUNT: 18.9 % (ref 11.6–15.1)
GFR SERPL CREATININE-BSD FRML MDRD: 71 ML/MIN/1.73SQ M
GLUCOSE SERPL-MCNC: 106 MG/DL (ref 65–140)
HCT VFR BLD AUTO: 31.5 % (ref 34.8–46.1)
HGB BLD-MCNC: 10.3 G/DL (ref 11.5–15.4)
HOWELL-JOLLY BOD BLD QL SMEAR: PRESENT
HYPERCHROMIA BLD QL SMEAR: PRESENT
LYMPHOCYTES # BLD AUTO: 1.73 THOUSAND/UL (ref 0.6–4.47)
LYMPHOCYTES # BLD AUTO: 8 % (ref 14–44)
MCH RBC QN AUTO: 33.6 PG (ref 26.8–34.3)
MCHC RBC AUTO-ENTMCNC: 32.7 G/DL (ref 31.4–37.4)
MCV RBC AUTO: 103 FL (ref 82–98)
MONOCYTES # BLD AUTO: 2.4 THOUSAND/UL (ref 0–1.22)
MONOCYTES NFR BLD: 18 % (ref 4–12)
NEUTROPHILS # BLD MANUAL: 8.8 THOUSAND/UL (ref 1.85–7.62)
NEUTS BAND NFR BLD MANUAL: 3 % (ref 0–8)
NEUTS SEG NFR BLD AUTO: 63 % (ref 43–75)
PLATELET # BLD AUTO: 276 THOUSANDS/UL (ref 149–390)
PLATELET BLD QL SMEAR: ADEQUATE
PMV BLD AUTO: 10.8 FL (ref 8.9–12.7)
POTASSIUM SERPL-SCNC: 3.6 MMOL/L (ref 3.5–5.3)
PROT SERPL-MCNC: 7.1 G/DL (ref 6.4–8.4)
RBC # BLD AUTO: 3.07 MILLION/UL (ref 3.81–5.12)
SODIUM SERPL-SCNC: 137 MMOL/L (ref 135–147)
VARIANT LYMPHS # BLD AUTO: 5 %
WBC # BLD AUTO: 13.34 THOUSAND/UL (ref 4.31–10.16)

## 2023-11-08 PROCEDURE — 85007 BL SMEAR W/DIFF WBC COUNT: CPT

## 2023-11-08 PROCEDURE — 85027 COMPLETE CBC AUTOMATED: CPT

## 2023-11-08 PROCEDURE — 80053 COMPREHEN METABOLIC PANEL: CPT

## 2023-11-08 NOTE — PROGRESS NOTES
Pt here for port labs. Accessed, labs obtained, de-accessed, dsd applied. Disch amb to home, steady gait.

## 2023-11-09 ENCOUNTER — OFFICE VISIT (OUTPATIENT)
Age: 73
End: 2023-11-09
Payer: MEDICARE

## 2023-11-09 VITALS
OXYGEN SATURATION: 95 % | RESPIRATION RATE: 16 BRPM | HEART RATE: 94 BPM | WEIGHT: 150.3 LBS | HEIGHT: 64 IN | SYSTOLIC BLOOD PRESSURE: 132 MMHG | TEMPERATURE: 97.5 F | DIASTOLIC BLOOD PRESSURE: 78 MMHG | BODY MASS INDEX: 25.66 KG/M2

## 2023-11-09 DIAGNOSIS — C56.2 MALIGNANT NEOPLASM OF LEFT OVARY (HCC): Primary | ICD-10-CM

## 2023-11-09 PROCEDURE — 99215 OFFICE O/P EST HI 40 MIN: CPT | Performed by: PHYSICIAN ASSISTANT

## 2023-11-09 RX ORDER — MAGNESIUM OXIDE 400 MG/1
1 TABLET ORAL DAILY PRN
COMMUNITY
Start: 2023-11-02

## 2023-11-09 RX ORDER — LISINOPRIL 20 MG/1
20 TABLET ORAL DAILY
COMMUNITY
Start: 2023-11-02

## 2023-11-09 RX ORDER — PREDNISOLONE ACETATE 10 MG/ML
SUSPENSION/ DROPS OPHTHALMIC
Qty: 5 ML | Refills: 0 | Status: SHIPPED | OUTPATIENT
Start: 2023-11-09

## 2023-11-09 NOTE — ASSESSMENT & PLAN NOTE
Recurrent, platinum resistant ovarian cancer with disease progression on carboplatin/doxil/avastin, currently receiving palliative treatment with mirvetuximab 6 mg/kg AIBW IV every 21 days. She tolerated her first cycle of treatment well and is without treatment related and malignancy related symptoms. Continue with cycle 2 of treatment as planned as long as her metabolic and hematologic parameters are adequate. Return to the office as per her chemotherapy calendar. Plan for eye exam prior to cycle 3 and repeat imaging after completion of cycle 3. Trend .

## 2023-11-09 NOTE — PROGRESS NOTES
Assessment/Plan:    Problem List Items Addressed This Visit          Endocrine    Malignant neoplasm of left ovary (HCC) - Primary     Recurrent, platinum resistant ovarian cancer with disease progression on carboplatin/doxil/avastin, currently receiving palliative treatment with mirvetuximab 6 mg/kg AIBW IV every 21 days. She tolerated her first cycle of treatment well and is without treatment related and malignancy related symptoms. Continue with cycle 2 of treatment as planned as long as her metabolic and hematologic parameters are adequate. Return to the office as per her chemotherapy calendar. Plan for eye exam prior to cycle 3 and repeat imaging after completion of cycle 3. Trend . 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   Results revealed patient has the following mutation(s):  None     Malignant neoplasm of left ovary ([address])   8/4/2022 Surgery    Diagnostic laparoscopy, peritoneal biopsy     8/9/2022 Initial Diagnosis    Gynecologic malignancy ([address])     8/23/2022 - 1/24/2023 Chemotherapy    palonosetron (ALOXI), 0.25 mg, Intravenous, Once, 6 of 6 cycles  Administration: 0.25 mg (8/23/2022), 0.25 mg (9/13/2022), 0.25 mg (10/4/2022), 0.25 mg (12/13/2022), 0.25 mg (1/3/2023), 0.25 mg (1/24/2023)  fosaprepitant (EMEND) IVPB, 150 mg, Intravenous, Once, 6 of 6 cycles  Administration: 150 mg (8/23/2022), 150 mg (9/13/2022), 150 mg (10/4/2022), 150 mg (12/13/2022), 150 mg (1/3/2023), 150 mg (1/24/2023)  CARBOplatin (PARAPLATIN) IVPB ([facility] AUC DOSING), 605.4 mg, Intravenous, Once, 6 of 6 cycles  Administration: 600 mg (8/23/2022), 564 mg (9/13/2022), 601.8 mg (10/4/2022), 491.5 mg (12/13/2022), 468.5 mg (1/3/2023), 488 mg (1/24/2023)  bevacizumab (AVASTIN) IVPB, 1,252.5 mg, Intravenous, Once, 3 of 3 cycles  Dose modification: 10 mg/kg (original dose 15 mg/kg, Cycle 5, Reason: Other (Must fill in a comment))  Administration: 1,200 mg (8/23/2022), 1,200 mg (9/13/2022), 745 mg (1/3/2023)  PACLItaxel (TAXOL) chemo IVPB, 175 mg/m2 = 330.6 mg, Intravenous, Once, 6 of 6 cycles  Dose modification: 135 mg/m2 (original dose 175 mg/m2, Cycle 4, Reason: Other (Must fill in a comment))  Administration: 330.6 mg (8/23/2022), 330.6 mg (9/13/2022), 330.6 mg (10/4/2022), 246 mg (12/13/2022), 244.2 mg (1/3/2023), 244.2 mg (1/24/2023)     10/28/2022 Surgery    diagnostic laparoscopy, exploratory laparotomy, modified radical hysterectomy, bilateral salpingo-oophorectomy with en bloc rectosigmoid resection, gastrocolic omentectomy, splenectomy, small bowel resection with reanastomosis, diaphragm resection, excision ablation of peritoneal implants, ileostomy formation, optimal tumor debulking     11/29/2022 -  Cancer Staged    Staging form: Ovary, Fallopian Tube, Primary Peritoneal, AJCC 8th Edition  - Clinical: FIGO Stage IIIC (cT3c, cN0, cM0) - Signed by Tato Vital MD on 11/29/2022  Histologic grade (G): G3  Histologic grading system: 4 grade system       3/27/2023 Surgery    Low anterior resection  Residual tumor identified in lymphovascular spaces    A. 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  Colon (anastomotic donuts):  - Colon with no diagnostic abnormality  - No carcinoma identified      4/17/2023 Genomic Testing    [employer]: FOLR1, positive     7/21/2023 -  Chemotherapy    Doxil 30 mg/m2 and carboplatin AUC 5 every 28 days with avastin 10 mg/kg IV every 14 days. Avastin dose-reduced to 7.5 mg/kg due to epistaxis. 10/27/2023 -  Chemotherapy    Mirvetuximab 6 mg/kg AIBW IV every 21 days. Patient ID: Rosendo Elam is a 68 y.o. female  who presents to the office for pre-chemotherapy evaluation. Overall, she tolerated her first cycle of treatment well. She has been afebrile. Appetite is appropriate. No n/v/abdominal pain. Patient denies diarrhea following treatment. Normal bladder function. She denies worsening peripheral neuropathy or visual changes. She is using her eye drops as directed. CBC/Diff, CMP from 11/8/23 reviewed. The following portions of the patient's history were reviewed and updated as appropriate: allergies, current medications, past medical history, past surgical history, and problem list.    Review of Systems   Constitutional: Negative. HENT: Negative. Eyes: Negative. Respiratory: Negative. Cardiovascular: Negative. Gastrointestinal: Negative. Genitourinary: Negative. Musculoskeletal: Negative. Skin: Negative. Neurological: Negative. Psychiatric/Behavioral: Negative.          Current Outpatient Medications   Medication Sig Dispense Refill    Acetaminophen (TYLENOL ARTHRITIS PAIN PO) Take by mouth as needed      aspirin (ECOTRIN LOW STRENGTH) 81 mg EC tablet Take 81 mg by mouth daily      B Complex-C (B COMPLEX-VITAMIN C PO) Take by mouth daily      bevacizumab (Avastin) 100 mg/4 mL Avastin      Calcium Carb-Cholecalciferol (CALCIUM 500+D3 PO) Take 1 tablet by mouth 2 (two) times a day       CARBOplatin (PARAPLATIN) 50 mg/5 mL CARBOplatin      cholecalciferol (VITAMIN D3) 1,000 units tablet Take 1,000 Units by mouth daily      DOXOrubicin liposome (Doxil) 2 mg/mL Doxil famotidine (PEPCID) 40 MG tablet TAKE 1 TABLET BY MOUTH DAILY AT BEDTIME 90 tablet 3    fexofenadine (ALLEGRA) 180 MG tablet Take 180 mg by mouth daily      GLUCOSAMINE-CHONDROITIN PO Take 1 tablet by mouth 2 (two) times a day       lidocaine-prilocaine (EMLA) cream Apply to mediport 30 min prior to labs or chemo 30 g 3    lisinopril (ZESTRIL) 20 mg tablet Take 20 mg by mouth daily      LORazepam (ATIVAN) 1 mg tablet Take 1 tablet (1 mg total) by mouth every 8 (eight) hours as needed for anxiety (nausea or anxiety) 40 tablet 0    magnesium oxide (MAG-OX) 400 mg tablet Take 1 tablet by mouth daily as needed      metoprolol succinate (TOPROL-XL) 25 mg 24 hr tablet Take 50 mg by mouth daily Taking 25 mg at breakfast and 25 mg at dinner      montelukast (SINGULAIR) 10 mg tablet Take 10 mg by mouth daily dinner      multivitamin (THERAGRAN) TABS Take 1 tablet by mouth daily      naloxone (NARCAN) 4 mg/0.1 mL nasal spray Administer 1 spray into a nostril. If no response after 2-3 minutes, give another dose in the other nostril using a new spray. 1 each 1    nystatin (MYCOSTATIN) powder Apply topically if needed Under abdominal pannus and in between thighs      omeprazole (PriLOSEC) 20 mg delayed release capsule TAKE 1 CAPSULE BY MOUTH EVERY DAY 30 MINUTES BEFORE MORNING MEAL FOR 90 DAYS      ondansetron (ZOFRAN) 8 mg tablet Take 1 tablet (8 mg total) by mouth every 8 (eight) hours as needed for nausea or vomiting 20 tablet 0    prednisoLONE acetate (PRED FORTE) 1 % ophthalmic suspension Apply 1 drop to each eye 6x day the day prior to treatment and then for 4 days following treatment, then 4x day in each eye days 5 thru 8 following treatment.  5 mL 0    predniSONE 1 MG TBEC Take 1 mg by mouth every other day      Probiotic Product (PROBIOTIC DAILY PO) Take by mouth daily      simvastatin (ZOCOR) 10 mg tablet Take 10 mg by mouth daily at bedtime      sodium chloride (OCEAN) 0.65 % nasal spray 1 spray into each nostril as needed for congestion      SYMBICORT 160-4.5 MCG/ACT inhaler 2 puffs daily       Triamcinolone Acetonide (NASACORT ALLERGY 24HR NA) 1 spray by Each Nare route 2 (two) times a day      Ascorbic Acid (VITAMIN C GUMMIE PO) Take by mouth (Patient not taking: Reported on 7/6/2023)      lisinopril (ZESTRIL) 5 mg tablet Take 10 mg by mouth daily (Patient not taking: Reported on 11/9/2023)      oxyCODONE (ROXICODONE) 5 immediate release tablet 1/2 tab every 6 hours as needed for pain (Patient not taking: Reported on 7/6/2023) 5 tablet 0     No current facility-administered medications for this visit. Objective:    Blood pressure 132/78, pulse 94, temperature 97.5 °F (36.4 °C), temperature source Temporal, resp. rate 16, height 5' 4.02" (1.626 m), weight 68.2 kg (150 lb 4.8 oz), SpO2 95 %. Body mass index is 25.78 kg/m². Body surface area is 1.73 meters squared. Physical Exam  Constitutional:       Appearance: She is well-developed. Pulmonary:      Effort: Pulmonary effort is normal.   Skin:     General: Skin is warm and dry. Findings: No rash. Neurological:      Mental Status: She is alert and oriented to person, place, and time. Psychiatric:         Behavior: Behavior normal.         Thought Content: Thought content normal.         Judgment: Judgment normal.     Performance status is zero.        Lab Results   Component Value Date    K 3.6 11/08/2023     11/08/2023    CO2 26 11/08/2023    BUN 15 11/08/2023    CREATININE 0.82 11/08/2023    GLUCOSE 214 (H) 10/28/2022    GLUF 161 (H) 12/08/2022    CALCIUM 9.3 11/08/2023    CORRECTEDCA 9.4 09/20/2023    AST 34 11/08/2023    ALT 27 11/08/2023    ALKPHOS 146 (H) 11/08/2023    EGFR 71 11/08/2023     Lab Results   Component Value Date    WBC 13.34 (H) 11/08/2023    HGB 10.3 (L) 11/08/2023    HCT 31.5 (L) 11/08/2023     (H) 11/08/2023     11/08/2023     Lab Results   Component Value Date    NEUTROABS 8.36 (H) 10/25/2023        Trend:  Lab Results   Component Value Date     234.7 (H) 10/25/2023     205.1 (H) 09/27/2023     223.9 (H) 08/30/2023     340.6 (H) 08/02/2023     168.5 (H) 06/26/2023     6.2 03/24/2023     5.1 03/02/2023     5.3 02/09/2023     8.7 01/19/2023     15.8 12/30/2022     37.9 (H) 12/08/2022     8.6 10/20/2022     8.4 10/07/2022     12.5 09/29/2022     32.0 (H) 09/08/2022     52.4 (H) 09/02/2022     85.5 (H) 08/26/2022     77.3 (H) 08/19/2022

## 2023-11-15 ENCOUNTER — HOSPITAL ENCOUNTER (OUTPATIENT)
Dept: INFUSION CENTER | Facility: HOSPITAL | Age: 73
Discharge: HOME | End: 2023-11-15
Payer: MEDICARE

## 2023-11-15 ENCOUNTER — APPOINTMENT (OUTPATIENT)
Dept: LAB | Facility: HOSPITAL | Age: 73
End: 2023-11-15
Payer: MEDICARE

## 2023-11-15 DIAGNOSIS — Z79.52 LONG TERM CURRENT USE OF SYSTEMIC STEROIDS: ICD-10-CM

## 2023-11-15 DIAGNOSIS — I10 HYPERTENSION, UNSPECIFIED TYPE: ICD-10-CM

## 2023-11-15 DIAGNOSIS — Z45.2 ENCOUNTER FOR VENOUS ACCESS DEVICE CARE: Primary | ICD-10-CM

## 2023-11-15 DIAGNOSIS — C56.2 MALIGNANT NEOPLASM OF LEFT OVARY (HCC): ICD-10-CM

## 2023-11-15 DIAGNOSIS — E83.42 HYPOMAGNESEMIA: ICD-10-CM

## 2023-11-15 DIAGNOSIS — E11.69 TYPE 2 DIABETES MELLITUS WITH OTHER SPECIFIED COMPLICATION, WITHOUT LONG-TERM CURRENT USE OF INSULIN (HCC): ICD-10-CM

## 2023-11-15 LAB
ALBUMIN SERPL BCP-MCNC: 3.8 G/DL (ref 3.5–5)
ALBUMIN SERPL BCP-MCNC: 3.8 G/DL (ref 3.5–5)
ALP SERPL-CCNC: 154 U/L (ref 34–104)
ALP SERPL-CCNC: 155 U/L (ref 34–104)
ALT SERPL W P-5'-P-CCNC: 19 U/L (ref 7–52)
ALT SERPL W P-5'-P-CCNC: 19 U/L (ref 7–52)
ANION GAP SERPL CALCULATED.3IONS-SCNC: 7 MMOL/L
ANION GAP SERPL CALCULATED.3IONS-SCNC: 7 MMOL/L
ANISOCYTOSIS BLD QL SMEAR: PRESENT
AST SERPL W P-5'-P-CCNC: 25 U/L (ref 13–39)
AST SERPL W P-5'-P-CCNC: 26 U/L (ref 13–39)
BASOPHILS # BLD MANUAL: 0.13 THOUSAND/UL (ref 0–0.1)
BASOPHILS NFR MAR MANUAL: 1 % (ref 0–1)
BILIRUB SERPL-MCNC: 0.3 MG/DL (ref 0.2–1)
BILIRUB SERPL-MCNC: 0.3 MG/DL (ref 0.2–1)
BUN SERPL-MCNC: 17 MG/DL (ref 5–25)
BUN SERPL-MCNC: 17 MG/DL (ref 5–25)
CALCIUM SERPL-MCNC: 9.1 MG/DL (ref 8.4–10.2)
CALCIUM SERPL-MCNC: 9.1 MG/DL (ref 8.4–10.2)
CANCER AG125 SERPL-ACNC: 269.2 U/ML (ref 0–35)
CHLORIDE SERPL-SCNC: 105 MMOL/L (ref 96–108)
CHLORIDE SERPL-SCNC: 105 MMOL/L (ref 96–108)
CO2 SERPL-SCNC: 26 MMOL/L (ref 21–32)
CO2 SERPL-SCNC: 26 MMOL/L (ref 21–32)
CREAT SERPL-MCNC: 0.89 MG/DL (ref 0.6–1.3)
CREAT SERPL-MCNC: 0.9 MG/DL (ref 0.6–1.3)
EOSINOPHIL # BLD MANUAL: 0.27 THOUSAND/UL (ref 0–0.4)
EOSINOPHIL NFR BLD MANUAL: 2 % (ref 0–6)
ERYTHROCYTE [DISTWIDTH] IN BLOOD BY AUTOMATED COUNT: 16.5 % (ref 11.6–15.1)
ERYTHROCYTE [DISTWIDTH] IN BLOOD BY AUTOMATED COUNT: 16.6 % (ref 11.6–15.1)
EST. AVERAGE GLUCOSE BLD GHB EST-MCNC: 117 MG/DL
GFR SERPL CREATININE-BSD FRML MDRD: 63 ML/MIN/1.73SQ M
GFR SERPL CREATININE-BSD FRML MDRD: 64 ML/MIN/1.73SQ M
GLUCOSE SERPL-MCNC: 122 MG/DL (ref 65–140)
GLUCOSE SERPL-MCNC: 124 MG/DL (ref 65–140)
HBA1C MFR BLD: 5.7 %
HCT VFR BLD AUTO: 32.9 % (ref 34.8–46.1)
HCT VFR BLD AUTO: 32.9 % (ref 34.8–46.1)
HGB BLD-MCNC: 10.5 G/DL (ref 11.5–15.4)
HGB BLD-MCNC: 10.6 G/DL (ref 11.5–15.4)
HOWELL-JOLLY BOD BLD QL SMEAR: PRESENT
LG PLATELETS BLD QL SMEAR: PRESENT
LYMPHOCYTES # BLD AUTO: 1.2 THOUSAND/UL (ref 0.6–4.47)
LYMPHOCYTES # BLD AUTO: 8 % (ref 14–44)
MACROCYTES BLD QL AUTO: PRESENT
MAGNESIUM SERPL-MCNC: 1.7 MG/DL (ref 1.9–2.7)
MCH RBC QN AUTO: 33.2 PG (ref 26.8–34.3)
MCH RBC QN AUTO: 33.3 PG (ref 26.8–34.3)
MCHC RBC AUTO-ENTMCNC: 31.9 G/DL (ref 31.4–37.4)
MCHC RBC AUTO-ENTMCNC: 32.2 G/DL (ref 31.4–37.4)
MCV RBC AUTO: 104 FL (ref 82–98)
MCV RBC AUTO: 104 FL (ref 82–98)
MONOCYTES # BLD AUTO: 1.74 THOUSAND/UL (ref 0–1.22)
MONOCYTES NFR BLD: 13 % (ref 4–12)
NEUTROPHILS # BLD MANUAL: 10.02 THOUSAND/UL (ref 1.85–7.62)
NEUTS SEG NFR BLD AUTO: 75 % (ref 43–75)
PLATELET # BLD AUTO: 310 THOUSANDS/UL (ref 149–390)
PLATELET # BLD AUTO: 321 THOUSANDS/UL (ref 149–390)
PLATELET BLD QL SMEAR: ADEQUATE
PMV BLD AUTO: 10.4 FL (ref 8.9–12.7)
PMV BLD AUTO: 10.7 FL (ref 8.9–12.7)
POTASSIUM SERPL-SCNC: 3.8 MMOL/L (ref 3.5–5.3)
POTASSIUM SERPL-SCNC: 3.8 MMOL/L (ref 3.5–5.3)
PROT SERPL-MCNC: 6.9 G/DL (ref 6.4–8.4)
PROT SERPL-MCNC: 6.9 G/DL (ref 6.4–8.4)
RBC # BLD AUTO: 3.16 MILLION/UL (ref 3.81–5.12)
RBC # BLD AUTO: 3.18 MILLION/UL (ref 3.81–5.12)
RBC MORPH BLD: PRESENT
SODIUM SERPL-SCNC: 138 MMOL/L (ref 135–147)
SODIUM SERPL-SCNC: 138 MMOL/L (ref 135–147)
TARGETS BLD QL SMEAR: PRESENT
TSH SERPL DL<=0.05 MIU/L-ACNC: 1.65 UIU/ML (ref 0.45–4.5)
VARIANT LYMPHS # BLD AUTO: 1 %
WBC # BLD AUTO: 13.19 THOUSAND/UL (ref 4.31–10.16)
WBC # BLD AUTO: 13.36 THOUSAND/UL (ref 4.31–10.16)

## 2023-11-15 PROCEDURE — 36415 COLL VENOUS BLD VENIPUNCTURE: CPT

## 2023-11-15 PROCEDURE — 83735 ASSAY OF MAGNESIUM: CPT

## 2023-11-15 PROCEDURE — 85007 BL SMEAR W/DIFF WBC COUNT: CPT

## 2023-11-15 PROCEDURE — 80053 COMPREHEN METABOLIC PANEL: CPT

## 2023-11-15 PROCEDURE — 83036 HEMOGLOBIN GLYCOSYLATED A1C: CPT

## 2023-11-15 PROCEDURE — 86304 IMMUNOASSAY TUMOR CA 125: CPT

## 2023-11-15 PROCEDURE — 84443 ASSAY THYROID STIM HORMONE: CPT

## 2023-11-15 PROCEDURE — 85027 COMPLETE CBC AUTOMATED: CPT

## 2023-11-16 RX ORDER — SODIUM CHLORIDE 9 MG/ML
20 INJECTION, SOLUTION INTRAVENOUS ONCE
Status: CANCELLED | OUTPATIENT
Start: 2023-11-17

## 2023-11-16 RX ORDER — MAGNESIUM SULFATE HEPTAHYDRATE 40 MG/ML
2 INJECTION, SOLUTION INTRAVENOUS ONCE
Status: CANCELLED
Start: 2023-11-17

## 2023-11-16 RX ORDER — ACETAMINOPHEN 325 MG/1
650 TABLET ORAL ONCE
Status: CANCELLED | OUTPATIENT
Start: 2023-11-17

## 2023-11-16 RX ORDER — DEXTROSE MONOHYDRATE 50 MG/ML
20 INJECTION, SOLUTION INTRAVENOUS ONCE
Status: CANCELLED | OUTPATIENT
Start: 2023-11-17

## 2023-11-17 ENCOUNTER — HOSPITAL ENCOUNTER (OUTPATIENT)
Dept: INFUSION CENTER | Facility: HOSPITAL | Age: 73
End: 2023-11-17
Attending: OBSTETRICS & GYNECOLOGY
Payer: MEDICARE

## 2023-11-17 VITALS
HEIGHT: 64 IN | TEMPERATURE: 97.1 F | BODY MASS INDEX: 22.52 KG/M2 | HEART RATE: 79 BPM | WEIGHT: 131.92 LBS | DIASTOLIC BLOOD PRESSURE: 71 MMHG | SYSTOLIC BLOOD PRESSURE: 130 MMHG | RESPIRATION RATE: 16 BRPM | OXYGEN SATURATION: 97 %

## 2023-11-17 DIAGNOSIS — C56.2 MALIGNANT NEOPLASM OF LEFT OVARY (HCC): ICD-10-CM

## 2023-11-17 DIAGNOSIS — E83.42 HYPOMAGNESEMIA: Primary | ICD-10-CM

## 2023-11-17 PROCEDURE — 96367 TX/PROPH/DG ADDL SEQ IV INF: CPT

## 2023-11-17 PROCEDURE — 96413 CHEMO IV INFUSION 1 HR: CPT

## 2023-11-17 RX ORDER — DEXTROSE MONOHYDRATE 50 MG/ML
20 INJECTION, SOLUTION INTRAVENOUS ONCE
Status: COMPLETED | OUTPATIENT
Start: 2023-11-17 | End: 2023-11-17

## 2023-11-17 RX ORDER — SODIUM CHLORIDE 9 MG/ML
20 INJECTION, SOLUTION INTRAVENOUS ONCE
Status: COMPLETED | OUTPATIENT
Start: 2023-11-17 | End: 2023-11-17

## 2023-11-17 RX ORDER — MAGNESIUM SULFATE HEPTAHYDRATE 40 MG/ML
2 INJECTION, SOLUTION INTRAVENOUS ONCE
Status: COMPLETED | OUTPATIENT
Start: 2023-11-17 | End: 2023-11-17

## 2023-11-17 RX ORDER — ACETAMINOPHEN 325 MG/1
650 TABLET ORAL ONCE
Status: COMPLETED | OUTPATIENT
Start: 2023-11-17 | End: 2023-11-17

## 2023-11-17 RX ADMIN — DEXAMETHASONE SODIUM PHOSPHATE: 10 INJECTION, SOLUTION INTRAMUSCULAR; INTRAVENOUS at 13:07

## 2023-11-17 RX ADMIN — ACETAMINOPHEN 650 MG: 325 TABLET ORAL at 12:35

## 2023-11-17 RX ADMIN — MAGNESIUM SULFATE IN WATER 2 G: 40 INJECTION, SOLUTION INTRAVENOUS at 12:27

## 2023-11-17 RX ADMIN — SODIUM CHLORIDE 20 ML/HR: 0.9 INJECTION, SOLUTION INTRAVENOUS at 12:20

## 2023-11-17 RX ADMIN — DEXTROSE 20 ML/HR: 5 SOLUTION INTRAVENOUS at 14:01

## 2023-11-17 RX ADMIN — DIPHENHYDRAMINE HYDROCHLORIDE 25 MG: 50 INJECTION, SOLUTION INTRAMUSCULAR; INTRAVENOUS at 13:33

## 2023-11-17 RX ADMIN — MIRVETUXIMAB SORAVTANSINE 359.4 MG: 100 INJECTION, SOLUTION INTRAVENOUS at 14:36

## 2023-11-17 NOTE — PROGRESS NOTES
Infusions tolerated well. No complaints offered. Port deaccessed. Discharged with steady slow gait accompanied by .

## 2023-11-17 NOTE — PROGRESS NOTES
Rec'd pt in good spirits accompanied by . No new complaints reported. Port easily accessed. Magnesium repleated as ordered. Premeds tolerated well. Elahere presently infusing. Pt appears comfortable intermittently napping.

## 2023-11-21 DIAGNOSIS — C57.9 GYNECOLOGIC MALIGNANCY (HCC): ICD-10-CM

## 2023-11-21 RX ORDER — LORAZEPAM 1 MG/1
1 TABLET ORAL EVERY 8 HOURS PRN
Qty: 40 TABLET | Refills: 0 | Status: SHIPPED | OUTPATIENT
Start: 2023-11-21

## 2023-11-22 ENCOUNTER — HOSPITAL ENCOUNTER (OUTPATIENT)
Dept: INFUSION CENTER | Facility: HOSPITAL | Age: 73
Discharge: HOME | End: 2023-11-22
Payer: MEDICARE

## 2023-11-22 DIAGNOSIS — Z45.2 ENCOUNTER FOR VENOUS ACCESS DEVICE CARE: ICD-10-CM

## 2023-11-22 DIAGNOSIS — C56.2 MALIGNANT NEOPLASM OF LEFT OVARY (HCC): Primary | ICD-10-CM

## 2023-11-22 LAB
ALBUMIN SERPL BCP-MCNC: 3.7 G/DL (ref 3.5–5)
ALP SERPL-CCNC: 141 U/L (ref 34–104)
ALT SERPL W P-5'-P-CCNC: 19 U/L (ref 7–52)
ANION GAP SERPL CALCULATED.3IONS-SCNC: 8 MMOL/L
ANISOCYTOSIS BLD QL SMEAR: PRESENT
AST SERPL W P-5'-P-CCNC: 29 U/L (ref 13–39)
BASOPHILS # BLD MANUAL: 0 THOUSAND/UL (ref 0–0.1)
BASOPHILS NFR MAR MANUAL: 0 % (ref 0–1)
BILIRUB SERPL-MCNC: 0.28 MG/DL (ref 0.2–1)
BUN SERPL-MCNC: 14 MG/DL (ref 5–25)
CALCIUM SERPL-MCNC: 9.5 MG/DL (ref 8.4–10.2)
CHLORIDE SERPL-SCNC: 103 MMOL/L (ref 96–108)
CO2 SERPL-SCNC: 26 MMOL/L (ref 21–32)
CREAT SERPL-MCNC: 0.88 MG/DL (ref 0.6–1.3)
EOSINOPHIL # BLD MANUAL: 0.13 THOUSAND/UL (ref 0–0.4)
EOSINOPHIL NFR BLD MANUAL: 1 % (ref 0–6)
ERYTHROCYTE [DISTWIDTH] IN BLOOD BY AUTOMATED COUNT: 15.2 % (ref 11.6–15.1)
GFR SERPL CREATININE-BSD FRML MDRD: 65 ML/MIN/1.73SQ M
GLUCOSE SERPL-MCNC: 116 MG/DL (ref 65–140)
HCT VFR BLD AUTO: 33.5 % (ref 34.8–46.1)
HGB BLD-MCNC: 11.3 G/DL (ref 11.5–15.4)
HOWELL-JOLLY BOD BLD QL SMEAR: PRESENT
HYPERCHROMIA BLD QL SMEAR: PRESENT
LG PLATELETS BLD QL SMEAR: PRESENT
LYMPHOCYTES # BLD AUTO: 0.93 THOUSAND/UL (ref 0.6–4.47)
LYMPHOCYTES # BLD AUTO: 7 % (ref 14–44)
MACROCYTES BLD QL AUTO: PRESENT
MCH RBC QN AUTO: 34.3 PG (ref 26.8–34.3)
MCHC RBC AUTO-ENTMCNC: 33.7 G/DL (ref 31.4–37.4)
MCV RBC AUTO: 102 FL (ref 82–98)
METAMYELOCYTES NFR BLD MANUAL: 1 % (ref 0–1)
MONOCYTES # BLD AUTO: 2.66 THOUSAND/UL (ref 0–1.22)
MONOCYTES NFR BLD: 20 % (ref 4–12)
NEUTROPHILS # BLD MANUAL: 9.46 THOUSAND/UL (ref 1.85–7.62)
NEUTS BAND NFR BLD MANUAL: 2 % (ref 0–8)
NEUTS SEG NFR BLD AUTO: 69 % (ref 43–75)
NRBC BLD AUTO-RTO: 2 /100 WBC (ref 0–2)
PLATELET # BLD AUTO: 257 THOUSANDS/UL (ref 149–390)
PLATELET BLD QL SMEAR: ADEQUATE
PMV BLD AUTO: 10.2 FL (ref 8.9–12.7)
POTASSIUM SERPL-SCNC: 3.5 MMOL/L (ref 3.5–5.3)
PROT SERPL-MCNC: 7.1 G/DL (ref 6.4–8.4)
RBC # BLD AUTO: 3.29 MILLION/UL (ref 3.81–5.12)
RBC MORPH BLD: PRESENT
SODIUM SERPL-SCNC: 137 MMOL/L (ref 135–147)
WBC # BLD AUTO: 13.32 THOUSAND/UL (ref 4.31–10.16)

## 2023-11-22 PROCEDURE — 80053 COMPREHEN METABOLIC PANEL: CPT

## 2023-11-22 PROCEDURE — 85007 BL SMEAR W/DIFF WBC COUNT: CPT

## 2023-11-22 PROCEDURE — 85027 COMPLETE CBC AUTOMATED: CPT

## 2023-11-29 ENCOUNTER — HOSPITAL ENCOUNTER (OUTPATIENT)
Dept: INFUSION CENTER | Facility: HOSPITAL | Age: 73
Discharge: HOME | End: 2023-11-29
Payer: MEDICARE

## 2023-11-29 DIAGNOSIS — Z45.2 ENCOUNTER FOR VENOUS ACCESS DEVICE CARE: Primary | ICD-10-CM

## 2023-11-29 DIAGNOSIS — C56.2 MALIGNANT NEOPLASM OF LEFT OVARY (HCC): ICD-10-CM

## 2023-11-29 LAB
ALBUMIN SERPL BCP-MCNC: 3.7 G/DL (ref 3.5–5)
ALP SERPL-CCNC: 146 U/L (ref 34–104)
ALT SERPL W P-5'-P-CCNC: 22 U/L (ref 7–52)
ANION GAP SERPL CALCULATED.3IONS-SCNC: 7 MMOL/L
ANISOCYTOSIS BLD QL SMEAR: PRESENT
AST SERPL W P-5'-P-CCNC: 34 U/L (ref 13–39)
BASOPHILS # BLD MANUAL: 0 THOUSAND/UL (ref 0–0.1)
BASOPHILS NFR MAR MANUAL: 0 % (ref 0–1)
BILIRUB SERPL-MCNC: 0.39 MG/DL (ref 0.2–1)
BUN SERPL-MCNC: 14 MG/DL (ref 5–25)
CALCIUM SERPL-MCNC: 9.7 MG/DL (ref 8.4–10.2)
CHLORIDE SERPL-SCNC: 103 MMOL/L (ref 96–108)
CO2 SERPL-SCNC: 27 MMOL/L (ref 21–32)
CREAT SERPL-MCNC: 0.69 MG/DL (ref 0.6–1.3)
EOSINOPHIL # BLD MANUAL: 0.31 THOUSAND/UL (ref 0–0.4)
EOSINOPHIL NFR BLD MANUAL: 2 % (ref 0–6)
ERYTHROCYTE [DISTWIDTH] IN BLOOD BY AUTOMATED COUNT: 14.9 % (ref 11.6–15.1)
GFR SERPL CREATININE-BSD FRML MDRD: 86 ML/MIN/1.73SQ M
GLUCOSE SERPL-MCNC: 102 MG/DL (ref 65–140)
HCT VFR BLD AUTO: 35.2 % (ref 34.8–46.1)
HGB BLD-MCNC: 11.5 G/DL (ref 11.5–15.4)
HOWELL-JOLLY BOD BLD QL SMEAR: PRESENT
LG PLATELETS BLD QL SMEAR: PRESENT
LYMPHOCYTES # BLD AUTO: 14 % (ref 14–44)
LYMPHOCYTES # BLD AUTO: 2.32 THOUSAND/UL (ref 0.6–4.47)
MACROCYTES BLD QL AUTO: PRESENT
MAGNESIUM SERPL-MCNC: 1.7 MG/DL (ref 1.9–2.7)
MCH RBC QN AUTO: 33.2 PG (ref 26.8–34.3)
MCHC RBC AUTO-ENTMCNC: 32.7 G/DL (ref 31.4–37.4)
MCV RBC AUTO: 102 FL (ref 82–98)
MONOCYTES # BLD AUTO: 1.24 THOUSAND/UL (ref 0–1.22)
MONOCYTES NFR BLD: 8 % (ref 4–12)
NEUTROPHILS # BLD MANUAL: 11.59 THOUSAND/UL (ref 1.85–7.62)
NEUTS BAND NFR BLD MANUAL: 3 % (ref 0–8)
NEUTS SEG NFR BLD AUTO: 72 % (ref 43–75)
PLATELET # BLD AUTO: 261 THOUSANDS/UL (ref 149–390)
PLATELET BLD QL SMEAR: ADEQUATE
PMV BLD AUTO: 10.1 FL (ref 8.9–12.7)
POTASSIUM SERPL-SCNC: 3.6 MMOL/L (ref 3.5–5.3)
PROT SERPL-MCNC: 7.4 G/DL (ref 6.4–8.4)
RBC # BLD AUTO: 3.46 MILLION/UL (ref 3.81–5.12)
RBC MORPH BLD: PRESENT
SODIUM SERPL-SCNC: 137 MMOL/L (ref 135–147)
VARIANT LYMPHS # BLD AUTO: 1 %
WBC # BLD AUTO: 15.45 THOUSAND/UL (ref 4.31–10.16)

## 2023-11-29 PROCEDURE — 85027 COMPLETE CBC AUTOMATED: CPT

## 2023-11-29 PROCEDURE — 85007 BL SMEAR W/DIFF WBC COUNT: CPT

## 2023-11-29 PROCEDURE — 80053 COMPREHEN METABOLIC PANEL: CPT

## 2023-11-29 PROCEDURE — 83735 ASSAY OF MAGNESIUM: CPT

## 2023-11-30 ENCOUNTER — OFFICE VISIT (OUTPATIENT)
Age: 73
End: 2023-11-30
Payer: MEDICARE

## 2023-11-30 VITALS
SYSTOLIC BLOOD PRESSURE: 148 MMHG | HEIGHT: 64 IN | WEIGHT: 150 LBS | BODY MASS INDEX: 25.61 KG/M2 | OXYGEN SATURATION: 97 % | RESPIRATION RATE: 16 BRPM | DIASTOLIC BLOOD PRESSURE: 79 MMHG | HEART RATE: 78 BPM | TEMPERATURE: 97.7 F

## 2023-11-30 DIAGNOSIS — G62.0 DRUG-INDUCED PERIPHERAL NEUROPATHY (HCC): ICD-10-CM

## 2023-11-30 DIAGNOSIS — C56.2 MALIGNANT NEOPLASM OF LEFT OVARY (HCC): Primary | ICD-10-CM

## 2023-11-30 PROCEDURE — 99215 OFFICE O/P EST HI 40 MIN: CPT | Performed by: PHYSICIAN ASSISTANT

## 2023-11-30 RX ORDER — PREDNISOLONE ACETATE 10 MG/ML
SUSPENSION/ DROPS OPHTHALMIC
Qty: 5 ML | Refills: 0 | Status: SHIPPED | OUTPATIENT
Start: 2023-11-30

## 2023-11-30 NOTE — PROGRESS NOTES
Assessment/Plan:    Problem List Items Addressed This Visit          Endocrine    Malignant neoplasm of left ovary (HCC) - Primary     Recurrent, platinum resistant ovarian cancer with disease progression on carboplatin/doxil/avastin, currently receiving palliative treatment with mirvetuximab 6 mg/kg AIBW IV every 21 days. She is tolerating treatment well and is without treatment related and malignancy related symptoms. Continue with cycle 3 of treatment as planned as long as her metabolic and hematologic parameters are adequate. Pre-cycle 3 eye exam scheduled Monday. Plan for CT imaging after completion of cycle 3. Return to the office as per her chemotherapy calendar. Relevant Medications    prednisoLONE acetate (PRED FORTE) 1 % ophthalmic suspension    Other Relevant Orders    CT chest abdomen pelvis w contrast       Nervous and Auditory    Drug-induced peripheral neuropathy (HCC)     Grade 1, fingertips. Intermittent. Not affecting the ADLs. Continue to closely monitor. 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   Results revealed patient has the following mutation(s):  None     Malignant neoplasm of left ovary ([address])   8/4/2022 Surgery    Diagnostic laparoscopy, peritoneal biopsy     8/9/2022 Initial Diagnosis    Gynecologic malignancy ([address])     8/23/2022 - 1/24/2023 Chemotherapy    palonosetron (ALOXI), 0.25 mg, Intravenous, Once, 6 of 6 cycles  Administration: 0.25 mg (8/23/2022), 0.25 mg (9/13/2022), 0.25 mg (10/4/2022), 0.25 mg (12/13/2022), 0.25 mg (1/3/2023), 0.25 mg (1/24/2023)  fosaprepitant (EMEND) IVPB, 150 mg, Intravenous, Once, 6 of 6 cycles  Administration: 150 mg (8/23/2022), 150 mg (9/13/2022), 150 mg (10/4/2022), 150 mg (12/13/2022), 150 mg (1/3/2023), 150 mg (1/24/2023)  CARBOplatin (PARAPLATIN) IVPB ([facility] AUC DOSING), 605.4 mg, Intravenous, Once, 6 of 6 cycles  Administration: 600 mg (8/23/2022), 564 mg (9/13/2022), 601.8 mg (10/4/2022), 491.5 mg (12/13/2022), 468.5 mg (1/3/2023), 488 mg (1/24/2023)  bevacizumab (AVASTIN) IVPB, 1,252.5 mg, Intravenous, Once, 3 of 3 cycles  Dose modification: 10 mg/kg (original dose 15 mg/kg, Cycle 5, Reason: Other (Must fill in a comment))  Administration: 1,200 mg (8/23/2022), 1,200 mg (9/13/2022), 745 mg (1/3/2023)  PACLItaxel (TAXOL) chemo IVPB, 175 mg/m2 = 330.6 mg, Intravenous, Once, 6 of 6 cycles  Dose modification: 135 mg/m2 (original dose 175 mg/m2, Cycle 4, Reason: Other (Must fill in a comment))  Administration: 330.6 mg (8/23/2022), 330.6 mg (9/13/2022), 330.6 mg (10/4/2022), 246 mg (12/13/2022), 244.2 mg (1/3/2023), 244.2 mg (1/24/2023)     10/28/2022 Surgery    diagnostic laparoscopy, exploratory laparotomy, modified radical hysterectomy, bilateral salpingo-oophorectomy with en bloc rectosigmoid resection, gastrocolic omentectomy, splenectomy, small bowel resection with reanastomosis, diaphragm resection, excision ablation of peritoneal implants, ileostomy formation, optimal tumor debulking     11/29/2022 -  Cancer Staged    Staging form: Ovary, Fallopian Tube, Primary Peritoneal, AJCC 8th Edition  - Clinical: FIGO Stage IIIC (cT3c, cN0, cM0) - Signed by Felipa Barone MD on 11/29/2022  Histologic grade (G): G3  Histologic grading system: 4 grade system       3/27/2023 Surgery    Low anterior resection  Residual tumor identified in lymphovascular spaces    A.  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 Colon (anastomotic donuts):  - Colon with no diagnostic abnormality  - No carcinoma identified      4/17/2023 Genomic Testing    [employer]: FOLR1, positive     7/21/2023 -  Chemotherapy    Doxil 30 mg/m2 and carboplatin AUC 5 every 28 days with avastin 10 mg/kg IV every 14 days. Avastin dose-reduced to 7.5 mg/kg due to epistaxis. 10/27/2023 -  Chemotherapy    Mirvetuximab 6 mg/kg AIBW IV every 21 days.               Patient ID: Roberto Johnson is a 68 y.o. female  HPI    The following portions of the patient's history were reviewed and updated as appropriate: allergies, current medications, past medical history, past surgical history, and problem list.    Review of Systems    Current Outpatient Medications   Medication Sig Dispense Refill    Acetaminophen (TYLENOL ARTHRITIS PAIN PO) Take by mouth as needed      aspirin (ECOTRIN LOW STRENGTH) 81 mg EC tablet Take 81 mg by mouth daily      B Complex-C (B COMPLEX-VITAMIN C PO) Take by mouth daily      bevacizumab (Avastin) 100 mg/4 mL Avastin      Calcium Carb-Cholecalciferol (CALCIUM 500+D3 PO) Take 1 tablet by mouth 2 (two) times a day       CARBOplatin (PARAPLATIN) 50 mg/5 mL CARBOplatin      cholecalciferol (VITAMIN D3) 1,000 units tablet Take 1,000 Units by mouth daily      DOXOrubicin liposome (Doxil) 2 mg/mL Doxil      fexofenadine (ALLEGRA) 180 MG tablet Take 180 mg by mouth daily      GLUCOSAMINE-CHONDROITIN PO Take 1 tablet by mouth 2 (two) times a day       lisinopril (ZESTRIL) 20 mg tablet Take 20 mg by mouth daily      LORazepam (ATIVAN) 1 mg tablet Take 1 tablet (1 mg total) by mouth every 8 (eight) hours as needed for anxiety (nausea or anxiety) 40 tablet 0    magnesium oxide (MAG-OX) 400 mg tablet Take 1 tablet by mouth daily as needed      metoprolol succinate (TOPROL-XL) 25 mg 24 hr tablet Take 50 mg by mouth daily Taking 25 mg at breakfast and 25 mg at dinner      montelukast (SINGULAIR) 10 mg tablet Take 10 mg by mouth daily dinner      multivitamin (THERAGRAN) TABS Take 1 tablet by mouth daily      nystatin (MYCOSTATIN) powder Apply topically if needed Under abdominal pannus and in between thighs      omeprazole (PriLOSEC) 20 mg delayed release capsule TAKE 1 CAPSULE BY MOUTH EVERY DAY 30 MINUTES BEFORE MORNING MEAL FOR 90 DAYS      ondansetron (ZOFRAN) 8 mg tablet Take 1 tablet (8 mg total) by mouth every 8 (eight) hours as needed for nausea or vomiting 20 tablet 0    prednisoLONE acetate (PRED FORTE) 1 % ophthalmic suspension Apply 1 drop to each eye 6x day the day prior to treatment and then for 4 days following treatment, then 4x day in each eye days 5 thru 8 following treatment. 5 mL 0    Probiotic Product (PROBIOTIC DAILY PO) Take by mouth daily      simvastatin (ZOCOR) 10 mg tablet Take 10 mg by mouth daily at bedtime      sodium chloride (OCEAN) 0.65 % nasal spray 1 spray into each nostril as needed for congestion      SYMBICORT 160-4.5 MCG/ACT inhaler 2 puffs daily       Triamcinolone Acetonide (NASACORT ALLERGY 24HR NA) 1 spray by Each Nare route 2 (two) times a day      Ascorbic Acid (VITAMIN C GUMMIE PO) Take by mouth (Patient not taking: Reported on 7/6/2023)      famotidine (PEPCID) 40 MG tablet TAKE 1 TABLET BY MOUTH DAILY AT BEDTIME (Patient not taking: Reported on 11/30/2023) 90 tablet 3    lidocaine-prilocaine (EMLA) cream Apply to mediport 30 min prior to labs or chemo 30 g 3    lisinopril (ZESTRIL) 5 mg tablet Take 10 mg by mouth daily (Patient not taking: Reported on 11/9/2023)      naloxone (NARCAN) 4 mg/0.1 mL nasal spray Administer 1 spray into a nostril. If no response after 2-3 minutes, give another dose in the other nostril using a new spray.  1 each 1    oxyCODONE (ROXICODONE) 5 immediate release tablet 1/2 tab every 6 hours as needed for pain (Patient not taking: Reported on 7/6/2023) 5 tablet 0    predniSONE 1 MG TBEC Take 1 mg by mouth every other day (Patient not taking: Reported on 11/17/2023)       No current facility-administered medications for this visit. Objective:    Blood pressure 148/79, pulse 78, temperature 97.7 °F (36.5 °C), temperature source Temporal, resp. rate 16, height 5' 4.02" (1.626 m), weight 68 kg (150 lb), SpO2 97 %. Body mass index is 25.73 kg/m². Body surface area is 1.73 meters squared.     Physical Exam    Lab Results   Component Value Date     269.2 (H) 11/15/2023     Lab Results   Component Value Date    K 3.6 11/29/2023     11/29/2023    CO2 27 11/29/2023    BUN 14 11/29/2023    CREATININE 0.69 11/29/2023    GLUCOSE 214 (H) 10/28/2022    GLUF 161 (H) 12/08/2022    CALCIUM 9.7 11/29/2023    CORRECTEDCA 9.4 09/20/2023    AST 34 11/29/2023    ALT 22 11/29/2023    ALKPHOS 146 (H) 11/29/2023    EGFR 86 11/29/2023     Lab Results   Component Value Date    WBC 15.45 (H) 11/29/2023    HGB 11.5 11/29/2023    HCT 35.2 11/29/2023     (H) 11/29/2023     11/29/2023     Lab Results   Component Value Date    NEUTROABS 8.36 (H) 10/25/2023        Trend:  Lab Results   Component Value Date     269.2 (H) 11/15/2023     234.7 (H) 10/25/2023     205.1 (H) 09/27/2023     223.9 (H) 08/30/2023     340.6 (H) 08/02/2023     168.5 (H) 06/26/2023     6.2 03/24/2023     5.1 03/02/2023     5.3 02/09/2023     8.7 01/19/2023     15.8 12/30/2022     37.9 (H) 12/08/2022     8.6 10/20/2022     8.4 10/07/2022     12.5 09/29/2022     32.0 (H) 09/08/2022     52.4 (H) 09/02/2022     85.5 (H) 08/26/2022     77.3 (H) 08/19/2022

## 2023-11-30 NOTE — PROGRESS NOTES
Assessment/Plan:    Problem List Items Addressed This Visit          Endocrine    Malignant neoplasm of left ovary (HCC) - Primary     Recurrent, platinum resistant ovarian cancer with disease progression on carboplatin/doxil/avastin, currently receiving palliative treatment with mirvetuximab 6 mg/kg AIBW IV every 21 days. She is tolerating treatment well and is without treatment related and malignancy related symptoms. Continue with cycle 3 of treatment as planned as long as her metabolic and hematologic parameters are adequate. Pre-cycle 3 eye exam scheduled Monday. Plan for CT imaging after completion of cycle 3. Return to the office as per her chemotherapy calendar. Relevant Medications    prednisoLONE acetate (PRED FORTE) 1 % ophthalmic suspension    Other Relevant Orders    CT chest abdomen pelvis w contrast       Nervous and Auditory    Drug-induced peripheral neuropathy (HCC)     Grade 1, fingertips. Intermittent. Not affecting the ADLs. Continue to closely monitor. 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   Results revealed patient has the following mutation(s):  None     Malignant neoplasm of left ovary ([address])   8/4/2022 Surgery    Diagnostic laparoscopy, peritoneal biopsy     8/9/2022 Initial Diagnosis    Gynecologic malignancy ([address])     8/23/2022 - 1/24/2023 Chemotherapy    palonosetron (ALOXI), 0.25 mg, Intravenous, Once, 6 of 6 cycles  Administration: 0.25 mg (8/23/2022), 0.25 mg (9/13/2022), 0.25 mg (10/4/2022), 0.25 mg (12/13/2022), 0.25 mg (1/3/2023), 0.25 mg (1/24/2023)  fosaprepitant (EMEND) IVPB, 150 mg, Intravenous, Once, 6 of 6 cycles  Administration: 150 mg (8/23/2022), 150 mg (9/13/2022), 150 mg (10/4/2022), 150 mg (12/13/2022), 150 mg (1/3/2023), 150 mg (1/24/2023)  CARBOplatin (PARAPLATIN) IVPB ([facility] AUC DOSING), 605.4 mg, Intravenous, Once, 6 of 6 cycles  Administration: 600 mg (8/23/2022), 564 mg (9/13/2022), 601.8 mg (10/4/2022), 491.5 mg (12/13/2022), 468.5 mg (1/3/2023), 488 mg (1/24/2023)  bevacizumab (AVASTIN) IVPB, 1,252.5 mg, Intravenous, Once, 3 of 3 cycles  Dose modification: 10 mg/kg (original dose 15 mg/kg, Cycle 5, Reason: Other (Must fill in a comment))  Administration: 1,200 mg (8/23/2022), 1,200 mg (9/13/2022), 745 mg (1/3/2023)  PACLItaxel (TAXOL) chemo IVPB, 175 mg/m2 = 330.6 mg, Intravenous, Once, 6 of 6 cycles  Dose modification: 135 mg/m2 (original dose 175 mg/m2, Cycle 4, Reason: Other (Must fill in a comment))  Administration: 330.6 mg (8/23/2022), 330.6 mg (9/13/2022), 330.6 mg (10/4/2022), 246 mg (12/13/2022), 244.2 mg (1/3/2023), 244.2 mg (1/24/2023)     10/28/2022 Surgery    diagnostic laparoscopy, exploratory laparotomy, modified radical hysterectomy, bilateral salpingo-oophorectomy with en bloc rectosigmoid resection, gastrocolic omentectomy, splenectomy, small bowel resection with reanastomosis, diaphragm resection, excision ablation of peritoneal implants, ileostomy formation, optimal tumor debulking     11/29/2022 -  Cancer Staged    Staging form: Ovary, Fallopian Tube, Primary Peritoneal, AJCC 8th Edition  - Clinical: FIGO Stage IIIC (cT3c, cN0, cM0) - Signed by Shyam Saeed MD on 11/29/2022  Histologic grade (G): G3  Histologic grading system: 4 grade system       3/27/2023 Surgery    Low anterior resection  Residual tumor identified in lymphovascular spaces    A.  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 Colon (anastomotic donuts):  - Colon with no diagnostic abnormality  - No carcinoma identified      4/17/2023 Genomic Testing    [employer]: FOLR1, positive     7/21/2023 -  Chemotherapy    Doxil 30 mg/m2 and carboplatin AUC 5 every 28 days with avastin 10 mg/kg IV every 14 days. Avastin dose-reduced to 7.5 mg/kg due to epistaxis. 10/27/2023 -  Chemotherapy    Mirvetuximab 6 mg/kg AIBW IV every 21 days. Patient ID: Clovis Galloway is a 68 y.o. female  who presents to the office for pre-chemotherapy evaluation. Overall, she continues to tolerate treatment well. She has been afebrile. She denies n/v/abdominal pain. Appetite is appropriate. Normal ostomy output and bladder function. She notes continued rectal mucus-like output. She denies changes in her vision. The patient notes intermittent neuropathy of her fingertips. This is not affecting her ADLs. CBC/Diff, CMP, Mg from 11/29/23 reviewed. The following portions of the patient's history were reviewed and updated as appropriate: allergies, current medications, past medical history, past surgical history, and problem list.    Review of Systems   Constitutional: Negative. HENT: Negative. Eyes: Negative. Respiratory: Negative. Cardiovascular: Negative. Gastrointestinal: Negative. Genitourinary: Negative. Musculoskeletal: Negative. Skin: Negative. Neurological:  Positive for numbness (fingertips). Psychiatric/Behavioral: Negative.          Current Outpatient Medications   Medication Sig Dispense Refill    Acetaminophen (TYLENOL ARTHRITIS PAIN PO) Take by mouth as needed      aspirin (ECOTRIN LOW STRENGTH) 81 mg EC tablet Take 81 mg by mouth daily      B Complex-C (B COMPLEX-VITAMIN C PO) Take by mouth daily      bevacizumab (Avastin) 100 mg/4 mL Avastin Calcium Carb-Cholecalciferol (CALCIUM 500+D3 PO) Take 1 tablet by mouth 2 (two) times a day       CARBOplatin (PARAPLATIN) 50 mg/5 mL CARBOplatin      cholecalciferol (VITAMIN D3) 1,000 units tablet Take 1,000 Units by mouth daily      DOXOrubicin liposome (Doxil) 2 mg/mL Doxil      fexofenadine (ALLEGRA) 180 MG tablet Take 180 mg by mouth daily      GLUCOSAMINE-CHONDROITIN PO Take 1 tablet by mouth 2 (two) times a day       lisinopril (ZESTRIL) 20 mg tablet Take 20 mg by mouth daily      LORazepam (ATIVAN) 1 mg tablet Take 1 tablet (1 mg total) by mouth every 8 (eight) hours as needed for anxiety (nausea or anxiety) 40 tablet 0    magnesium oxide (MAG-OX) 400 mg tablet Take 1 tablet by mouth daily as needed      metoprolol succinate (TOPROL-XL) 25 mg 24 hr tablet Take 50 mg by mouth daily Taking 25 mg at breakfast and 25 mg at dinner      montelukast (SINGULAIR) 10 mg tablet Take 10 mg by mouth daily dinner      multivitamin (THERAGRAN) TABS Take 1 tablet by mouth daily      nystatin (MYCOSTATIN) powder Apply topically if needed Under abdominal pannus and in between thighs      omeprazole (PriLOSEC) 20 mg delayed release capsule TAKE 1 CAPSULE BY MOUTH EVERY DAY 30 MINUTES BEFORE MORNING MEAL FOR 90 DAYS      ondansetron (ZOFRAN) 8 mg tablet Take 1 tablet (8 mg total) by mouth every 8 (eight) hours as needed for nausea or vomiting 20 tablet 0    prednisoLONE acetate (PRED FORTE) 1 % ophthalmic suspension Apply 1 drop to each eye 6x day the day prior to treatment and then for 4 days following treatment, then 4x day in each eye days 5 thru 8 following treatment.  5 mL 0    Probiotic Product (PROBIOTIC DAILY PO) Take by mouth daily      simvastatin (ZOCOR) 10 mg tablet Take 10 mg by mouth daily at bedtime      sodium chloride (OCEAN) 0.65 % nasal spray 1 spray into each nostril as needed for congestion      SYMBICORT 160-4.5 MCG/ACT inhaler 2 puffs daily       Triamcinolone Acetonide (NASACORT ALLERGY 24HR NA) 1 spray by Each Nare route 2 (two) times a day      Ascorbic Acid (VITAMIN C GUMMIE PO) Take by mouth (Patient not taking: Reported on 7/6/2023)      famotidine (PEPCID) 40 MG tablet TAKE 1 TABLET BY MOUTH DAILY AT BEDTIME (Patient not taking: Reported on 11/30/2023) 90 tablet 3    lidocaine-prilocaine (EMLA) cream Apply to mediport 30 min prior to labs or chemo 30 g 3    lisinopril (ZESTRIL) 5 mg tablet Take 10 mg by mouth daily (Patient not taking: Reported on 11/9/2023)      naloxone (NARCAN) 4 mg/0.1 mL nasal spray Administer 1 spray into a nostril. If no response after 2-3 minutes, give another dose in the other nostril using a new spray. 1 each 1    oxyCODONE (ROXICODONE) 5 immediate release tablet 1/2 tab every 6 hours as needed for pain (Patient not taking: Reported on 7/6/2023) 5 tablet 0    predniSONE 1 MG TBEC Take 1 mg by mouth every other day (Patient not taking: Reported on 11/17/2023)       No current facility-administered medications for this visit. Objective:    Blood pressure 148/79, pulse 78, temperature 97.7 °F (36.5 °C), temperature source Temporal, resp. rate 16, height 5' 4.02" (1.626 m), weight 68 kg (150 lb), SpO2 97 %. Body mass index is 25.73 kg/m². Body surface area is 1.73 meters squared. Physical Exam  Constitutional:       Appearance: She is well-developed. Pulmonary:      Effort: Pulmonary effort is normal.   Skin:     General: Skin is warm and dry. Findings: No rash. Neurological:      Mental Status: She is alert and oriented to person, place, and time. Psychiatric:         Behavior: Behavior normal.         Thought Content: Thought content normal.         Judgment: Judgment normal.     Performance status zero.       Lab Results   Component Value Date    K 3.6 11/29/2023     11/29/2023    CO2 27 11/29/2023    BUN 14 11/29/2023    CREATININE 0.69 11/29/2023    GLUCOSE 214 (H) 10/28/2022    GLUF 161 (H) 12/08/2022    CALCIUM 9.7 11/29/2023    CORRECTEDCA 9.4 09/20/2023    AST 34 11/29/2023    ALT 22 11/29/2023    ALKPHOS 146 (H) 11/29/2023    EGFR 86 11/29/2023     Lab Results   Component Value Date    WBC 15.45 (H) 11/29/2023    HGB 11.5 11/29/2023    HCT 35.2 11/29/2023     (H) 11/29/2023     11/29/2023     Lab Results   Component Value Date    NEUTROABS 8.36 (H) 10/25/2023        Trend:  Lab Results   Component Value Date     269.2 (H) 11/15/2023     234.7 (H) 10/25/2023     205.1 (H) 09/27/2023     223.9 (H) 08/30/2023     340.6 (H) 08/02/2023     168.5 (H) 06/26/2023     6.2 03/24/2023     5.1 03/02/2023     5.3 02/09/2023     8.7 01/19/2023     15.8 12/30/2022     37.9 (H) 12/08/2022     8.6 10/20/2022     8.4 10/07/2022     12.5 09/29/2022     32.0 (H) 09/08/2022     52.4 (H) 09/02/2022     85.5 (H) 08/26/2022     77.3 (H) 08/19/2022

## 2023-11-30 NOTE — ASSESSMENT & PLAN NOTE
Recurrent, platinum resistant ovarian cancer with disease progression on carboplatin/doxil/avastin, currently receiving palliative treatment with mirvetuximab 6 mg/kg AIBW IV every 21 days. She is tolerating treatment well and is without treatment related and malignancy related symptoms. Continue with cycle 3 of treatment as planned as long as her metabolic and hematologic parameters are adequate. Pre-cycle 3 eye exam scheduled Monday. Plan for CT imaging after completion of cycle 3. Return to the office as per her chemotherapy calendar.

## 2023-12-06 ENCOUNTER — HOSPITAL ENCOUNTER (OUTPATIENT)
Dept: INFUSION CENTER | Facility: HOSPITAL | Age: 73
Discharge: HOME | End: 2023-12-06
Payer: MEDICARE

## 2023-12-06 DIAGNOSIS — C56.2 MALIGNANT NEOPLASM OF LEFT OVARY (HCC): Primary | ICD-10-CM

## 2023-12-06 DIAGNOSIS — Z45.2 ENCOUNTER FOR VENOUS ACCESS DEVICE CARE: ICD-10-CM

## 2023-12-06 LAB
ALBUMIN SERPL BCP-MCNC: 3.7 G/DL (ref 3.5–5)
ALP SERPL-CCNC: 137 U/L (ref 34–104)
ALT SERPL W P-5'-P-CCNC: 19 U/L (ref 7–52)
ANION GAP SERPL CALCULATED.3IONS-SCNC: 3 MMOL/L
AST SERPL W P-5'-P-CCNC: 26 U/L (ref 13–39)
BASOPHILS # BLD MANUAL: 0.23 THOUSAND/UL (ref 0–0.1)
BASOPHILS NFR MAR MANUAL: 2 % (ref 0–1)
BILIRUB SERPL-MCNC: 0.28 MG/DL (ref 0.2–1)
BUN SERPL-MCNC: 17 MG/DL (ref 5–25)
CALCIUM SERPL-MCNC: 9.6 MG/DL (ref 8.4–10.2)
CANCER AG125 SERPL-ACNC: 336.9 U/ML (ref 0–35)
CHLORIDE SERPL-SCNC: 106 MMOL/L (ref 96–108)
CO2 SERPL-SCNC: 28 MMOL/L (ref 21–32)
CREAT SERPL-MCNC: 0.78 MG/DL (ref 0.6–1.3)
EOSINOPHIL # BLD MANUAL: 0.11 THOUSAND/UL (ref 0–0.4)
EOSINOPHIL NFR BLD MANUAL: 1 % (ref 0–6)
ERYTHROCYTE [DISTWIDTH] IN BLOOD BY AUTOMATED COUNT: 14.6 % (ref 11.6–15.1)
GFR SERPL CREATININE-BSD FRML MDRD: 75 ML/MIN/1.73SQ M
GLUCOSE SERPL-MCNC: 136 MG/DL (ref 65–140)
HCT VFR BLD AUTO: 34.5 % (ref 34.8–46.1)
HGB BLD-MCNC: 11.2 G/DL (ref 11.5–15.4)
HOWELL-JOLLY BOD BLD QL SMEAR: PRESENT
LG PLATELETS BLD QL SMEAR: PRESENT
LYMPHOCYTES # BLD AUTO: 1.83 THOUSAND/UL (ref 0.6–4.47)
LYMPHOCYTES # BLD AUTO: 13 % (ref 14–44)
MACROCYTES BLD QL AUTO: PRESENT
MAGNESIUM SERPL-MCNC: 1.7 MG/DL (ref 1.9–2.7)
MCH RBC QN AUTO: 33.5 PG (ref 26.8–34.3)
MCHC RBC AUTO-ENTMCNC: 32.5 G/DL (ref 31.4–37.4)
MCV RBC AUTO: 103 FL (ref 82–98)
MONOCYTES # BLD AUTO: 1.37 THOUSAND/UL (ref 0–1.22)
MONOCYTES NFR BLD: 12 % (ref 4–12)
NEUTROPHILS # BLD MANUAL: 7.89 THOUSAND/UL (ref 1.85–7.62)
NEUTS SEG NFR BLD AUTO: 69 % (ref 43–75)
PLATELET # BLD AUTO: 294 THOUSANDS/UL (ref 149–390)
PLATELET BLD QL SMEAR: ADEQUATE
PMV BLD AUTO: 9.9 FL (ref 8.9–12.7)
POTASSIUM SERPL-SCNC: 3.8 MMOL/L (ref 3.5–5.3)
PROT SERPL-MCNC: 7 G/DL (ref 6.4–8.4)
RBC # BLD AUTO: 3.34 MILLION/UL (ref 3.81–5.12)
RBC MORPH BLD: PRESENT
SODIUM SERPL-SCNC: 137 MMOL/L (ref 135–147)
VARIANT LYMPHS # BLD AUTO: 3 %
WBC # BLD AUTO: 11.43 THOUSAND/UL (ref 4.31–10.16)

## 2023-12-06 PROCEDURE — 85027 COMPLETE CBC AUTOMATED: CPT

## 2023-12-06 PROCEDURE — 83735 ASSAY OF MAGNESIUM: CPT

## 2023-12-06 PROCEDURE — 86304 IMMUNOASSAY TUMOR CA 125: CPT

## 2023-12-06 PROCEDURE — 85007 BL SMEAR W/DIFF WBC COUNT: CPT

## 2023-12-06 PROCEDURE — 80053 COMPREHEN METABOLIC PANEL: CPT

## 2023-12-07 RX ORDER — ACETAMINOPHEN 325 MG/1
650 TABLET ORAL ONCE
Status: CANCELLED | OUTPATIENT
Start: 2023-12-08

## 2023-12-07 RX ORDER — SODIUM CHLORIDE 9 MG/ML
20 INJECTION, SOLUTION INTRAVENOUS ONCE
Status: CANCELLED | OUTPATIENT
Start: 2023-12-08

## 2023-12-07 RX ORDER — MAGNESIUM SULFATE HEPTAHYDRATE 40 MG/ML
2 INJECTION, SOLUTION INTRAVENOUS ONCE
Status: CANCELLED
Start: 2023-12-08

## 2023-12-07 RX ORDER — DEXTROSE MONOHYDRATE 50 MG/ML
20 INJECTION, SOLUTION INTRAVENOUS ONCE
Status: CANCELLED | OUTPATIENT
Start: 2023-12-08

## 2023-12-08 ENCOUNTER — HOSPITAL ENCOUNTER (OUTPATIENT)
Dept: INFUSION CENTER | Facility: HOSPITAL | Age: 73
End: 2023-12-08
Attending: OBSTETRICS & GYNECOLOGY
Payer: MEDICARE

## 2023-12-08 VITALS
DIASTOLIC BLOOD PRESSURE: 75 MMHG | TEMPERATURE: 96.3 F | BODY MASS INDEX: 25.44 KG/M2 | OXYGEN SATURATION: 97 % | HEIGHT: 64 IN | SYSTOLIC BLOOD PRESSURE: 147 MMHG | RESPIRATION RATE: 16 BRPM | HEART RATE: 90 BPM | WEIGHT: 149.03 LBS

## 2023-12-08 DIAGNOSIS — C56.2 MALIGNANT NEOPLASM OF LEFT OVARY (HCC): Primary | ICD-10-CM

## 2023-12-08 DIAGNOSIS — E83.42 HYPOMAGNESEMIA: ICD-10-CM

## 2023-12-08 PROCEDURE — 96367 TX/PROPH/DG ADDL SEQ IV INF: CPT

## 2023-12-08 PROCEDURE — 96413 CHEMO IV INFUSION 1 HR: CPT

## 2023-12-08 RX ORDER — MAGNESIUM SULFATE HEPTAHYDRATE 40 MG/ML
2 INJECTION, SOLUTION INTRAVENOUS ONCE
Status: COMPLETED | OUTPATIENT
Start: 2023-12-08 | End: 2023-12-08

## 2023-12-08 RX ORDER — SODIUM CHLORIDE 9 MG/ML
20 INJECTION, SOLUTION INTRAVENOUS ONCE
Status: COMPLETED | OUTPATIENT
Start: 2023-12-08 | End: 2023-12-08

## 2023-12-08 RX ORDER — ACETAMINOPHEN 325 MG/1
650 TABLET ORAL ONCE
Status: COMPLETED | OUTPATIENT
Start: 2023-12-08 | End: 2023-12-08

## 2023-12-08 RX ORDER — DEXTROSE MONOHYDRATE 50 MG/ML
20 INJECTION, SOLUTION INTRAVENOUS ONCE
Status: COMPLETED | OUTPATIENT
Start: 2023-12-08 | End: 2023-12-08

## 2023-12-08 RX ADMIN — ACETAMINOPHEN 650 MG: 325 TABLET ORAL at 13:34

## 2023-12-08 RX ADMIN — MIRVETUXIMAB SORAVTANSINE 358.8 MG: 100 INJECTION, SOLUTION INTRAVENOUS at 14:36

## 2023-12-08 RX ADMIN — DEXTROSE 20 ML/HR: 5 SOLUTION INTRAVENOUS at 14:04

## 2023-12-08 RX ADMIN — MAGNESIUM SULFATE HEPTAHYDRATE 2 G: 2 INJECTION, SOLUTION INTRAVENOUS at 11:51

## 2023-12-08 RX ADMIN — DEXAMETHASONE SODIUM PHOSPHATE: 10 INJECTION, SOLUTION INTRAMUSCULAR; INTRAVENOUS at 13:07

## 2023-12-08 RX ADMIN — SODIUM CHLORIDE 20 ML/HR: 0.9 INJECTION, SOLUTION INTRAVENOUS at 11:50

## 2023-12-08 RX ADMIN — DIPHENHYDRAMINE HYDROCHLORIDE 25 MG: 50 INJECTION, SOLUTION INTRAMUSCULAR; INTRAVENOUS at 13:31

## 2023-12-13 ENCOUNTER — HOSPITAL ENCOUNTER (OUTPATIENT)
Dept: INFUSION CENTER | Facility: HOSPITAL | Age: 73
Discharge: HOME | End: 2023-12-13
Payer: MEDICARE

## 2023-12-13 DIAGNOSIS — C56.2 MALIGNANT NEOPLASM OF LEFT OVARY (HCC): ICD-10-CM

## 2023-12-13 DIAGNOSIS — Z45.2 ENCOUNTER FOR VENOUS ACCESS DEVICE CARE: Primary | ICD-10-CM

## 2023-12-13 LAB
ALBUMIN SERPL BCP-MCNC: 3.6 G/DL (ref 3.5–5)
ALP SERPL-CCNC: 144 U/L (ref 34–104)
ALT SERPL W P-5'-P-CCNC: 24 U/L (ref 7–52)
ANION GAP SERPL CALCULATED.3IONS-SCNC: 6 MMOL/L
AST SERPL W P-5'-P-CCNC: 39 U/L (ref 13–39)
BASOPHILS # BLD MANUAL: 0 THOUSAND/UL (ref 0–0.1)
BASOPHILS NFR MAR MANUAL: 0 % (ref 0–1)
BILIRUB SERPL-MCNC: 0.28 MG/DL (ref 0.2–1)
BUN SERPL-MCNC: 15 MG/DL (ref 5–25)
CALCIUM SERPL-MCNC: 9.3 MG/DL (ref 8.4–10.2)
CHLORIDE SERPL-SCNC: 105 MMOL/L (ref 96–108)
CO2 SERPL-SCNC: 27 MMOL/L (ref 21–32)
CREAT SERPL-MCNC: 0.79 MG/DL (ref 0.6–1.3)
EOSINOPHIL # BLD MANUAL: 0.12 THOUSAND/UL (ref 0–0.4)
EOSINOPHIL NFR BLD MANUAL: 1 % (ref 0–6)
ERYTHROCYTE [DISTWIDTH] IN BLOOD BY AUTOMATED COUNT: 14.3 % (ref 11.6–15.1)
GFR SERPL CREATININE-BSD FRML MDRD: 74 ML/MIN/1.73SQ M
GLUCOSE SERPL-MCNC: 161 MG/DL (ref 65–140)
HCT VFR BLD AUTO: 34.7 % (ref 34.8–46.1)
HGB BLD-MCNC: 11.2 G/DL (ref 11.5–15.4)
HOWELL-JOLLY BOD BLD QL SMEAR: PRESENT
LYMPHOCYTES # BLD AUTO: 1.81 THOUSAND/UL (ref 0.6–4.47)
LYMPHOCYTES # BLD AUTO: 13 % (ref 14–44)
MACROCYTES BLD QL AUTO: PRESENT
MAGNESIUM SERPL-MCNC: 1.6 MG/DL (ref 1.9–2.7)
MCH RBC QN AUTO: 32.7 PG (ref 26.8–34.3)
MCHC RBC AUTO-ENTMCNC: 32.3 G/DL (ref 31.4–37.4)
MCV RBC AUTO: 102 FL (ref 82–98)
MONOCYTES # BLD AUTO: 1.57 THOUSAND/UL (ref 0–1.22)
MONOCYTES NFR BLD: 13 % (ref 4–12)
NEUTROPHILS # BLD MANUAL: 8.57 THOUSAND/UL (ref 1.85–7.62)
NEUTS BAND NFR BLD MANUAL: 2 % (ref 0–8)
NEUTS SEG NFR BLD AUTO: 69 % (ref 43–75)
PLATELET # BLD AUTO: 253 THOUSANDS/UL (ref 149–390)
PLATELET BLD QL SMEAR: ADEQUATE
PMV BLD AUTO: 10.6 FL (ref 8.9–12.7)
POTASSIUM SERPL-SCNC: 3.5 MMOL/L (ref 3.5–5.3)
PROT SERPL-MCNC: 6.9 G/DL (ref 6.4–8.4)
RBC # BLD AUTO: 3.42 MILLION/UL (ref 3.81–5.12)
RBC MORPH BLD: PRESENT
SODIUM SERPL-SCNC: 138 MMOL/L (ref 135–147)
VARIANT LYMPHS # BLD AUTO: 2 %
WBC # BLD AUTO: 12.07 THOUSAND/UL (ref 4.31–10.16)

## 2023-12-13 PROCEDURE — 85007 BL SMEAR W/DIFF WBC COUNT: CPT

## 2023-12-13 PROCEDURE — 83735 ASSAY OF MAGNESIUM: CPT

## 2023-12-13 PROCEDURE — 85027 COMPLETE CBC AUTOMATED: CPT

## 2023-12-13 PROCEDURE — 80053 COMPREHEN METABOLIC PANEL: CPT

## 2023-12-13 NOTE — PROGRESS NOTES
Pt here for port labs. Accessed, labs obtained, de-accessed, dsd applied. Disch amb to home, steady gait,  Pt has more appts.

## 2023-12-15 ENCOUNTER — HOSPITAL ENCOUNTER (OUTPATIENT)
Dept: CT IMAGING | Facility: HOSPITAL | Age: 73
Discharge: HOME | End: 2023-12-15
Payer: MEDICARE

## 2023-12-15 DIAGNOSIS — C56.2 MALIGNANT NEOPLASM OF LEFT OVARY (HCC): ICD-10-CM

## 2023-12-15 PROCEDURE — 74177 CT ABD & PELVIS W/CONTRAST: CPT

## 2023-12-15 PROCEDURE — G1004 CDSM NDSC: HCPCS

## 2023-12-15 PROCEDURE — 71260 CT THORAX DX C+: CPT

## 2023-12-15 RX ADMIN — IOHEXOL 100 ML: 350 INJECTION, SOLUTION INTRAVENOUS at 13:30

## 2023-12-15 RX ADMIN — IOHEXOL 50 ML: 240 INJECTION, SOLUTION INTRATHECAL; INTRAVASCULAR; INTRAVENOUS; ORAL at 13:30

## 2023-12-20 ENCOUNTER — HOSPITAL ENCOUNTER (OUTPATIENT)
Dept: INFUSION CENTER | Facility: HOSPITAL | Age: 73
Discharge: HOME | End: 2023-12-20
Payer: MEDICARE

## 2023-12-20 DIAGNOSIS — Z45.2 ENCOUNTER FOR VENOUS ACCESS DEVICE CARE: Primary | ICD-10-CM

## 2023-12-20 DIAGNOSIS — C56.2 MALIGNANT NEOPLASM OF LEFT OVARY (HCC): ICD-10-CM

## 2023-12-20 LAB
ALBUMIN SERPL BCP-MCNC: 3.7 G/DL (ref 3.5–5)
ALP SERPL-CCNC: 142 U/L (ref 34–104)
ALT SERPL W P-5'-P-CCNC: 22 U/L (ref 7–52)
ANION GAP SERPL CALCULATED.3IONS-SCNC: 5 MMOL/L
AST SERPL W P-5'-P-CCNC: 33 U/L (ref 13–39)
BASOPHILS # BLD MANUAL: 0 THOUSAND/UL (ref 0–0.1)
BASOPHILS NFR MAR MANUAL: 0 % (ref 0–1)
BILIRUB SERPL-MCNC: 0.29 MG/DL (ref 0.2–1)
BUN SERPL-MCNC: 12 MG/DL (ref 5–25)
CALCIUM SERPL-MCNC: 9.4 MG/DL (ref 8.4–10.2)
CHLORIDE SERPL-SCNC: 102 MMOL/L (ref 96–108)
CO2 SERPL-SCNC: 27 MMOL/L (ref 21–32)
CREAT SERPL-MCNC: 0.84 MG/DL (ref 0.6–1.3)
EOSINOPHIL # BLD MANUAL: 0 THOUSAND/UL (ref 0–0.4)
EOSINOPHIL NFR BLD MANUAL: 0 % (ref 0–6)
ERYTHROCYTE [DISTWIDTH] IN BLOOD BY AUTOMATED COUNT: 14 % (ref 11.6–15.1)
GFR SERPL CREATININE-BSD FRML MDRD: 69 ML/MIN/1.73SQ M
GLUCOSE SERPL-MCNC: 136 MG/DL (ref 65–140)
HCT VFR BLD AUTO: 36.9 % (ref 34.8–46.1)
HGB BLD-MCNC: 12 G/DL (ref 11.5–15.4)
LYMPHOCYTES # BLD AUTO: 0.37 THOUSAND/UL (ref 0.6–4.47)
LYMPHOCYTES # BLD AUTO: 3 % (ref 14–44)
MACROCYTES BLD QL AUTO: PRESENT
MAGNESIUM SERPL-MCNC: 1.6 MG/DL (ref 1.9–2.7)
MCH RBC QN AUTO: 32.7 PG (ref 26.8–34.3)
MCHC RBC AUTO-ENTMCNC: 32.5 G/DL (ref 31.4–37.4)
MCV RBC AUTO: 101 FL (ref 82–98)
MONOCYTES # BLD AUTO: 0.49 THOUSAND/UL (ref 0–1.22)
MONOCYTES NFR BLD: 4 % (ref 4–12)
NEUTROPHILS # BLD MANUAL: 11.38 THOUSAND/UL (ref 1.85–7.62)
NEUTS SEG NFR BLD AUTO: 93 % (ref 43–75)
PLATELET # BLD AUTO: 252 THOUSANDS/UL (ref 149–390)
PLATELET BLD QL SMEAR: ADEQUATE
PMV BLD AUTO: 10 FL (ref 8.9–12.7)
POTASSIUM SERPL-SCNC: 3.8 MMOL/L (ref 3.5–5.3)
PROT SERPL-MCNC: 7.3 G/DL (ref 6.4–8.4)
RBC # BLD AUTO: 3.67 MILLION/UL (ref 3.81–5.12)
RBC MORPH BLD: PRESENT
SODIUM SERPL-SCNC: 134 MMOL/L (ref 135–147)
WBC # BLD AUTO: 12.24 THOUSAND/UL (ref 4.31–10.16)

## 2023-12-20 PROCEDURE — 83735 ASSAY OF MAGNESIUM: CPT

## 2023-12-20 PROCEDURE — 80053 COMPREHEN METABOLIC PANEL: CPT

## 2023-12-20 PROCEDURE — 85007 BL SMEAR W/DIFF WBC COUNT: CPT

## 2023-12-20 PROCEDURE — 85027 COMPLETE CBC AUTOMATED: CPT

## 2023-12-20 NOTE — PROGRESS NOTES
Pt here for port labs; labs obtained without difficulty; pt aware of next appt; left unit ambulatory with steady gait.

## 2023-12-21 ENCOUNTER — OFFICE VISIT (OUTPATIENT)
Age: 73
End: 2023-12-21
Payer: MEDICARE

## 2023-12-21 VITALS
HEIGHT: 64 IN | BODY MASS INDEX: 25.16 KG/M2 | HEART RATE: 91 BPM | TEMPERATURE: 97.3 F | OXYGEN SATURATION: 95 % | WEIGHT: 147.4 LBS | DIASTOLIC BLOOD PRESSURE: 58 MMHG | SYSTOLIC BLOOD PRESSURE: 116 MMHG

## 2023-12-21 DIAGNOSIS — C56.2 MALIGNANT NEOPLASM OF LEFT OVARY (HCC): Primary | ICD-10-CM

## 2023-12-21 PROCEDURE — 99215 OFFICE O/P EST HI 40 MIN: CPT | Performed by: OBSTETRICS & GYNECOLOGY

## 2023-12-21 RX ORDER — CEPHALEXIN 500 MG/1
500 CAPSULE ORAL EVERY 6 HOURS SCHEDULED
COMMUNITY

## 2023-12-21 NOTE — PROGRESS NOTES
Assessment/Plan:    Problem List Items Addressed This Visit          Endocrine    Malignant neoplasm of left ovary (HCC) - Primary     73-year-old with recurrent, platinum resistant ovarian cancer with disease progression after 3 cycles of palliative mirvetuximab.  I reviewed her CBC, CMP, CT chest abdomen pelvis images, .  There is a new retroperitoneal lymph node measuring 1.2 x 0.7 cm.   is rising.  She has a grade 1 peripheral neuropathy.  Her performance status is 0.  1.  I reviewed the CT scan with the patient and family members in detail.  2.  We discussed multiple treatment options including clinical trial participation on the [facility] trial with Abraxane and relacorilant, Abraxane off clinical trial, gemcitabine off clinical trial, pembrolizumab off clinical trial.  3.  I reviewed the risks and benefits of starting Abraxane at 80 mg/m² on days 1, 8, 15 of a 28-day cycle in addition to relacorilant versus treating with Abraxane alone at the same schedule and dose.  She understands the risks and benefits of treatment including the potential risks of worsening neuropathy, pancytopenia, allergic reaction.  She will consider the risks of participating in the clinical trial including the potential need to adjust her simvastatin and Symbicort medications.  4.  I reviewed the risks and benefits of starting palliative Abraxane at 200 mg/m² every 21 days in the event she decides not to participate on clinical trial.  She understands the risks and benefits of Abraxane including the risks of neuropathy, allergic reaction, pancytopenia, alopecia, lung problems.  She will consider clinical trial treatment versus standard cytotoxic therapy and let me know after the new year how she would like to proceed.  I have spent a total time of 60 minutes on 12/21/23 in caring for this patient including Diagnostic results, Prognosis, Risks and benefits of tx options, Instructions for management, Patient and family  education, Impressions, Counseling / Coordination of care, Documenting in the medical record, Reviewing / ordering tests, medicine, procedures  , and Obtaining or reviewing history  .                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  Results revealed patient has the following mutation(s):  None     Malignant neoplasm of left ovary (HCC)   8/4/2022 Surgery    Diagnostic laparoscopy, peritoneal biopsy     8/9/2022 Initial Diagnosis    Gynecologic malignancy (HCC)     8/23/2022 - 1/24/2023 Chemotherapy    palonosetron (ALOXI), 0.25 mg, Intravenous, Once, 6 of 6 cycles  Administration: 0.25 mg (8/23/2022), 0.25 mg (9/13/2022), 0.25 mg (10/4/2022), 0.25 mg (12/13/2022), 0.25 mg (1/3/2023), 0.25 mg (1/24/2023)  fosaprepitant (EMEND) IVPB, 150 mg, Intravenous, Once, 6 of 6 cycles  Administration: 150 mg (8/23/2022), 150 mg (9/13/2022), 150 mg (10/4/2022), 150 mg (12/13/2022), 150 mg (1/3/2023), 150 mg (1/24/2023)  CARBOplatin (PARAPLATIN) IVPB (GO AUC DOSING), 605.4 mg, Intravenous, Once, 6 of 6 cycles  Administration: 600 mg (8/23/2022), 564 mg (9/13/2022), 601.8 mg (10/4/2022), 491.5 mg (12/13/2022), 468.5 mg (1/3/2023), 488 mg (1/24/2023)  bevacizumab (AVASTIN) IVPB, 1,252.5 mg, Intravenous, Once, 3 of 3 cycles  Dose modification: 10 mg/kg (original dose 15 mg/kg, Cycle 5, Reason: Other (Must fill in a comment))  Administration: 1,200 mg  (8/23/2022), 1,200 mg (9/13/2022), 745 mg (1/3/2023)  PACLItaxel (TAXOL) chemo IVPB, 175 mg/m2 = 330.6 mg, Intravenous, Once, 6 of 6 cycles  Dose modification: 135 mg/m2 (original dose 175 mg/m2, Cycle 4, Reason: Other (Must fill in a comment))  Administration: 330.6 mg (8/23/2022), 330.6 mg (9/13/2022), 330.6 mg (10/4/2022), 246 mg (12/13/2022), 244.2 mg (1/3/2023), 244.2 mg (1/24/2023)     10/28/2022 Surgery    diagnostic laparoscopy, exploratory laparotomy, modified radical hysterectomy, bilateral salpingo-oophorectomy with en bloc rectosigmoid resection, gastrocolic omentectomy, splenectomy, small bowel resection with reanastomosis, diaphragm resection, excision ablation of peritoneal implants, ileostomy formation, optimal tumor debulking     11/29/2022 -  Cancer Staged    Staging form: Ovary, Fallopian Tube, Primary Peritoneal, AJCC 8th Edition  - Clinical: FIGO Stage IIIC (cT3c, cN0, cM0) - Signed by Pablo Lockwood MD on 11/29/2022  Histologic grade (G): G3  Histologic grading system: 4 grade system       3/27/2023 Surgery    Low anterior resection  Residual tumor identified in lymphovascular spaces    A. 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 Colon (anastomotic donuts):  - Colon with no diagnostic abnormality  - No carcinoma identified      4/17/2023 Genomic Testing    [employer]: FOLR1, positive     7/21/2023 -  Chemotherapy    Doxil 30 mg/m2 and carboplatin AUC 5 every 28 days with avastin 10 mg/kg IV every 14 days.     Avastin dose-reduced to 7.5 mg/kg due to epistaxis.      10/27/2023 -  Chemotherapy    Mirvetuximab 6 mg/kg AIBW IV every 21 days.              Patient ID: Penny Fernandez is a 73 y.o. female  Who returns for treatment discussion.  She has received 3 cycles of palliative mirvetuximab.  Labs from 12/20/2023 revealed a normal CBC and CMP.  CT chest abdomen  pelvis on 12/19/2023 revealed essentially stable disease with exception of a new retroperitoneal lymph node at the aortic bifurcation measuring 1.2 x 0.7 cm.  She has been tolerating mirvetuximab well with a mild peripheral neuropathy.  She is able to perform her activities of daily without difficulty.  Has been no other interval change in medications or medical history since her last visit the office.  She has been off prednisone therapy for temporal arteritis.        The following portions of the patient's history were reviewed and updated as appropriate: allergies, current medications, past family history, past medical history, past social history, past surgical history, and problem list.    Review of Systems   Constitutional:  Negative for activity change and unexpected weight change.   HENT: Negative.     Eyes: Negative.    Respiratory: Negative.     Cardiovascular: Negative.    Gastrointestinal:  Negative for abdominal distention and abdominal pain.   Endocrine: Negative.    Genitourinary:  Negative for pelvic pain and vaginal bleeding.   Musculoskeletal: Negative.    Skin: Negative.    Allergic/Immunologic: Negative.    Neurological: Negative.         Numbness of her fingertips   Hematological: Negative.    Psychiatric/Behavioral: Negative.         Current Outpatient Medications   Medication Sig Dispense Refill    Acetaminophen (TYLENOL ARTHRITIS PAIN PO) Take by mouth as needed      aspirin (ECOTRIN LOW STRENGTH) 81 mg EC tablet Take 81 mg by mouth daily      B Complex-C (B COMPLEX-VITAMIN C PO) Take by mouth daily      bevacizumab (Avastin) 100 mg/4 mL Avastin      Calcium Carb-Cholecalciferol (CALCIUM 500+D3 PO) Take 1 tablet by mouth 2 (two) times a day       CARBOplatin (PARAPLATIN) 50 mg/5 mL CARBOplatin      cephalexin (KEFLEX) 500 mg capsule Take 500 mg by mouth every 6 (six) hours      cholecalciferol (VITAMIN D3) 1,000 units tablet Take 1,000 Units by mouth daily      DOXOrubicin liposome (Doxil) 2  mg/mL Doxil      fexofenadine (ALLEGRA) 180 MG tablet Take 180 mg by mouth daily      GLUCOSAMINE-CHONDROITIN PO Take 1 tablet by mouth 2 (two) times a day       lidocaine-prilocaine (EMLA) cream Apply to mediport 30 min prior to labs or chemo 30 g 3    lisinopril (ZESTRIL) 20 mg tablet Take 20 mg by mouth daily      LORazepam (ATIVAN) 1 mg tablet Take 1 tablet (1 mg total) by mouth every 8 (eight) hours as needed for anxiety (nausea or anxiety) 40 tablet 0    magnesium oxide (MAG-OX) 400 mg tablet Take 1 tablet by mouth daily as needed      metoprolol succinate (TOPROL-XL) 25 mg 24 hr tablet Take 50 mg by mouth daily Taking 25 mg at breakfast and 25 mg at dinner      montelukast (SINGULAIR) 10 mg tablet Take 10 mg by mouth daily dinner      multivitamin (THERAGRAN) TABS Take 1 tablet by mouth daily      naloxone (NARCAN) 4 mg/0.1 mL nasal spray Administer 1 spray into a nostril. If no response after 2-3 minutes, give another dose in the other nostril using a new spray. 1 each 1    nystatin (MYCOSTATIN) powder Apply topically if needed Under abdominal pannus and in between thighs      omeprazole (PriLOSEC) 20 mg delayed release capsule TAKE 1 CAPSULE BY MOUTH EVERY DAY 30 MINUTES BEFORE MORNING MEAL FOR 90 DAYS      ondansetron (ZOFRAN) 8 mg tablet Take 1 tablet (8 mg total) by mouth every 8 (eight) hours as needed for nausea or vomiting 20 tablet 0    prednisoLONE acetate (PRED FORTE) 1 % ophthalmic suspension Apply 1 drop to each eye 6x day the day prior to treatment and then for 4 days following treatment, then 4x day in each eye days 5 thru 8 following treatment. 5 mL 0    Probiotic Product (PROBIOTIC DAILY PO) Take by mouth daily      simvastatin (ZOCOR) 10 mg tablet Take 10 mg by mouth daily at bedtime      sodium chloride (OCEAN) 0.65 % nasal spray 1 spray into each nostril as needed for congestion      SYMBICORT 160-4.5 MCG/ACT inhaler 2 puffs daily       Triamcinolone Acetonide (NASACORT ALLERGY 24HR NA) 1  "spray by Each Nare route 2 (two) times a day      Ascorbic Acid (VITAMIN C GUMMIE PO) Take by mouth (Patient not taking: Reported on 7/6/2023)      famotidine (PEPCID) 40 MG tablet TAKE 1 TABLET BY MOUTH DAILY AT BEDTIME (Patient not taking: Reported on 11/30/2023) 90 tablet 3    lisinopril (ZESTRIL) 5 mg tablet Take 10 mg by mouth daily (Patient not taking: Reported on 11/9/2023)      oxyCODONE (ROXICODONE) 5 immediate release tablet 1/2 tab every 6 hours as needed for pain (Patient not taking: Reported on 7/6/2023) 5 tablet 0    predniSONE 1 MG TBEC Take 1 mg by mouth every other day (Patient not taking: Reported on 11/17/2023)       No current facility-administered medications for this visit.           Objective:    Blood pressure 116/58, pulse 91, temperature (!) 97.3 °F (36.3 °C), height 5' 4.02\" (1.626 m), weight 66.9 kg (147 lb 6.4 oz), SpO2 95%.  Body mass index is 25.29 kg/m².  Body surface area is 1.72 meters squared.    Physical Exam  Vitals reviewed.   Constitutional:       General: She is not in acute distress.     Appearance: Normal appearance. She is not ill-appearing.   HENT:      Head: Normocephalic and atraumatic.      Mouth/Throat:      Mouth: Mucous membranes are moist.   Eyes:      General: No scleral icterus.        Right eye: No discharge.         Left eye: No discharge.      Conjunctiva/sclera: Conjunctivae normal.   Pulmonary:      Effort: Pulmonary effort is normal.   Musculoskeletal:      Right lower leg: No edema.      Left lower leg: No edema.   Skin:     General: Skin is warm and dry.      Coloration: Skin is not jaundiced.      Findings: No rash.   Neurological:      General: No focal deficit present.      Mental Status: She is alert and oriented to person, place, and time.      Cranial Nerves: No cranial nerve deficit.      Motor: No weakness.      Gait: Gait normal.   Psychiatric:         Mood and Affect: Mood normal.         Behavior: Behavior normal.         Thought Content: " Thought content normal.         Judgment: Judgment normal.         Lab Results   Component Value Date     336.9 (H) 12/06/2023     Lab Results   Component Value Date    K 3.8 12/20/2023     12/20/2023    CO2 27 12/20/2023    BUN 12 12/20/2023    CREATININE 0.84 12/20/2023    GLUCOSE 214 (H) 10/28/2022    GLUF 161 (H) 12/08/2022    CALCIUM 9.4 12/20/2023    CORRECTEDCA 9.4 09/20/2023    AST 33 12/20/2023    ALT 22 12/20/2023    ALKPHOS 142 (H) 12/20/2023    EGFR 69 12/20/2023     Lab Results   Component Value Date    WBC 12.24 (H) 12/20/2023    HGB 12.0 12/20/2023    HCT 36.9 12/20/2023     (H) 12/20/2023     12/20/2023     Lab Results   Component Value Date    NEUTROABS 8.36 (H) 10/25/2023        Trend:  Lab Results   Component Value Date     336.9 (H) 12/06/2023     269.2 (H) 11/15/2023     234.7 (H) 10/25/2023     205.1 (H) 09/27/2023     223.9 (H) 08/30/2023     340.6 (H) 08/02/2023     168.5 (H) 06/26/2023     6.2 03/24/2023     5.1 03/02/2023     5.3 02/09/2023     8.7 01/19/2023     15.8 12/30/2022     37.9 (H) 12/08/2022     8.6 10/20/2022     8.4 10/07/2022     12.5 09/29/2022     32.0 (H) 09/08/2022     52.4 (H) 09/02/2022     85.5 (H) 08/26/2022     77.3 (H) 08/19/2022       CT chest abdomen pelvis w contrast  Narrative: CT CHEST, ABDOMEN AND PELVIS WITH IV CONTRAST    INDICATION:   C56.2: Malignant neoplasm of left ovary. Follow-up examination    COMPARISON: 10/9/2023    TECHNIQUE: CT examination of the chest, abdomen and pelvis was performed. Multiplanar 2D reformatted images were created from the source data.    This examination, like all CT scans performed in the [facility], was performed utilizing techniques to minimize radiation dose exposure, including the use of iterative reconstruction and automated exposure control. Radiation dose length   product  (DLP) for this visit:  612.03 mGy-cm    IV Contrast:  50 mL of iohexol (OMNIPAQUE) 100 mL of iohexol (OMNIPAQUE)  Enteric Contrast: Enteric contrast was administered.    FINDINGS:    CHEST    LUNGS:  -There is partial improvement of previously seen airspace disease within the right middle lobe, suggesting resolving atelectasis or infection.  -The trracheobronchial tree is unremarkable.    PLEURA: Moderate sized right pleural effusion is similar to the previous study. Complexity of the fluid for example in the right costophrenic angle and with areas of subtle pleural enhancement, similar to previous study. A trace left pleural effusion is   unchanged.    HEART/GREAT VESSELS: Central line tip in the upper portion of the right atrium. No pericardial effusion    MEDIASTINUM AND DERREK: Stable size of mediastinal lymph nodes. For example, para-aortic node on image 2/58, short axis diameter 0.6 cm, unchanged.    CHEST WALL AND LOWER NECK: Right axillary node on image 2/39 with short axis diameter of 0.8 cm. This remains within normal limits for size but has increased since earlier.    ABDOMEN    LIVER/BILIARY TREE:  - Perihepatic implant is currently seen on image 2/104, with a thickness of approximately 1.2 cm, unchanged.  - Stable hepatic cysts  - Perihepatic ascites or implantation along the anterior margin of the left hepatic lobe for example 2/114, unchanged.    GALLBLADDER: There are gallstone(s) within the gallbladder, without pericholecystic inflammatory changes.    SPLEEN: Absent. As noted on prior study, fluid is seen in the splenectomy bed. Measured in similar fashion to the prior study, there is no significant interval change    PANCREAS:  Unremarkable.    ADRENAL GLANDS:  Unremarkable.    KIDNEYS/URETERS:  Unremarkable. No hydronephrosis.    STOMACH AND BOWEL: Right lower quadrant ostomy site with parastomal hernia as on previous exam. Air fluid collection posterior to the rectal suture margin on image 2/202  measures approximately 2.3 x 2.0 cm in size, similar to prior study.    APPENDIX:  No findings to suggest appendicitis.    ABDOMINOPELVIC CAVITY: Persistence of retroperitoneal adenopathy. For example on image 2/132, left para-aortic lymph node measures 2.0 x 1.4 cm earlier approximately the same size. Additional examples of stable size lymph nodes are seen adjacent to the   left renal artery on image 2/123, interaortocaval node on image 2/125. There is a new enlarged nodule anterior to the aortic bifurcation on image 2/173, measuring 1.2 x 0.7 cm.    Persistent presacral soft tissue thickening is similar to the previous study    There is progressive thickening of soft tissue disease in the paracolic gutters. For example on image 2/145, right paracolic gutter disease with short axis diameter of 0.9 cm, comparing to a study from 2/13/2023, had measured less than 0.6 cm. Another   example of progression is seen just deep to the left anteroinferior ribs on image 2/130 with short axis diameter of 1.3 cm previously 0.6 cm.    VESSELS:  Unremarkable for patient's age.    PELVIS    REPRODUCTIVE ORGANS: Status post hysterectomy    URINARY BLADDER:  Unremarkable.    ABDOMINAL WALL/INGUINAL REGIONS: Without significant change    OSSEOUS STRUCTURES:  No acute fracture or destructive osseous lesion.  Impression: 1. Persistent moderate complex right pleural effusion. Similar in size to prior study. Trace left effusion noted.  2. Previously seen airspace disease in the right middle lobe partially improved  3. Interval mild enlargement of a right axillary lymph node as well as development of a lymph node anterior to the aortic bifurcation in the lower abdomen.  4. Perihepatic implants appear similar to the prior study but there is progressive thickening of bilateral paracolic gutter disease when comparison is made to serial examinations dating back to 2/13/2023.  5. Stable size of fluid collection in the splenectomy bed  6.  Unchanged size of the air-fluid collection posterior to the rectal suture line    Workstation performed: [id number]

## 2023-12-21 NOTE — ASSESSMENT & PLAN NOTE
73-year-old with recurrent, platinum resistant ovarian cancer with disease progression after 3 cycles of palliative mirvetuximab.  I reviewed her CBC, CMP, CT chest abdomen pelvis images, .  There is a new retroperitoneal lymph node measuring 1.2 x 0.7 cm.   is rising.  She has a grade 1 peripheral neuropathy.  Her performance status is 0.  1.  I reviewed the CT scan with the patient and family members in detail.  2.  We discussed multiple treatment options including clinical trial participation on the [facility] trial with Abraxane and relacorilant, Abraxane off clinical trial, gemcitabine off clinical trial, pembrolizumab off clinical trial.  3.  I reviewed the risks and benefits of starting Abraxane at 80 mg/m² on days 1, 8, 15 of a 28-day cycle in addition to relacorilant versus treating with Abraxane alone at the same schedule and dose.  She understands the risks and benefits of treatment including the potential risks of worsening neuropathy, pancytopenia, allergic reaction.  She will consider the risks of participating in the clinical trial including the potential need to adjust her simvastatin and Symbicort medications.  4.  I reviewed the risks and benefits of starting palliative Abraxane at 200 mg/m² every 21 days in the event she decides not to participate on clinical trial.  She understands the risks and benefits of Abraxane including the risks of neuropathy, allergic reaction, pancytopenia, alopecia, lung problems.  She will consider clinical trial treatment versus standard cytotoxic therapy and let me know after the new year how she would like to proceed.  I have spent a total time of 60 minutes on 12/21/23 in caring for this patient including Diagnostic results, Prognosis, Risks and benefits of tx options, Instructions for management, Patient and family education, Impressions, Counseling / Coordination of care, Documenting in the medical record, Reviewing / ordering tests, medicine, procedures   , and Obtaining or reviewing history  .

## 2023-12-27 ENCOUNTER — HOSPITAL ENCOUNTER (OUTPATIENT)
Dept: INFUSION CENTER | Facility: HOSPITAL | Age: 73
End: 2023-12-27

## 2023-12-29 ENCOUNTER — HOSPITAL ENCOUNTER (OUTPATIENT)
Dept: INFUSION CENTER | Facility: HOSPITAL | Age: 73
End: 2023-12-29
Attending: OBSTETRICS & GYNECOLOGY

## 2024-01-02 DIAGNOSIS — C57.9 GYNECOLOGIC MALIGNANCY (HCC): ICD-10-CM

## 2024-01-02 RX ORDER — LORAZEPAM 1 MG/1
1 TABLET ORAL EVERY 8 HOURS PRN
Qty: 40 TABLET | Refills: 0 | Status: SHIPPED | OUTPATIENT
Start: 2024-01-02

## 2024-01-05 DIAGNOSIS — C56.2 MALIGNANT NEOPLASM OF LEFT OVARY (HCC): Primary | ICD-10-CM

## 2024-01-10 ENCOUNTER — TELEMEDICINE (OUTPATIENT)
Dept: GYNECOLOGIC ONCOLOGY | Facility: CLINIC | Age: 74
End: 2024-01-10
Payer: MEDICARE

## 2024-01-10 DIAGNOSIS — C56.2 MALIGNANT NEOPLASM OF LEFT OVARY (HCC): Primary | ICD-10-CM

## 2024-01-10 PROCEDURE — 99442 PR PHYS/QHP TELEPHONE EVALUATION 11-20 MIN: CPT | Performed by: OBSTETRICS & GYNECOLOGY

## 2024-01-10 NOTE — PROGRESS NOTES
Virtual Brief Visit    This Visit is being completed by telephone. The Patient is located at Home and in the following state in which I hold an active license [place]    The patient was identified by name and date of birth. Penny Fernandez was informed that this is a telemedicine visit and that the visit is being conducted through Telephone.  My office door was closed. No one else was in the room.  She acknowledged consent and understanding of privacy and security of the video platform. The patient has agreed to participate and understands they can discontinue the visit at any time.    Patient is aware this is a billable service.       Assessment/Plan:    Problem List Items Addressed This Visit          Endocrine    Malignant neoplasm of left ovary (HCC) - Primary     73-year-old with recurrent, platinum resistant ovarian cancer with disease progression after 3 cycles of palliative mirvetuximab.  Is not a candidate for clinical trial therapy.  She is concerned about worsening peripheral neuropathy.  Her performance status is 0.  1.  I discussed the risks and benefits of starting palliative gemcitabine at 600 mg/m² on day 1 and 8 of a 21-day cycle.  We discussed the risks of gemcitabine including the risks of liver toxicity, skin rashes, stomatitis, diarrhea, pancytopenia, renal damage.  She understands the risks of treatment and agrees to proceed as outlined.  Consent for treatment was obtained verbally by me.  2.  In the event that she has disease progression on gemcitabine, consideration can be given to switching to Abraxane.            Recent Visits  No visits were found meeting these conditions.  Showing recent visits within past 7 days and meeting all other requirements  Today's Visits  Date Type Provider Dept   01/10/24 Telemedicine Pablo Lockwood MD [facility] [place]   Showing today's visits and meeting all other requirements  Future Appointments  No visits were found meeting these  conditions.  Showing future appointments within next 150 days and meeting all other requirements         Visit Time  Total Visit Duration: 15 mins

## 2024-01-10 NOTE — ASSESSMENT & PLAN NOTE
73-year-old with recurrent, platinum resistant ovarian cancer with disease progression after 3 cycles of palliative mirvetuximab.  Is not a candidate for clinical trial therapy.  She is concerned about worsening peripheral neuropathy.  Her performance status is 0.  1.  I discussed the risks and benefits of starting palliative gemcitabine at 600 mg/m² on day 1 and 8 of a 21-day cycle.  We discussed the risks of gemcitabine including the risks of liver toxicity, skin rashes, stomatitis, diarrhea, pancytopenia, renal damage.  She understands the risks of treatment and agrees to proceed as outlined.  Consent for treatment was obtained verbally by me.  2.  In the event that she has disease progression on gemcitabine, consideration can be given to switching to Abraxane.

## 2024-01-17 ENCOUNTER — HOSPITAL ENCOUNTER (OUTPATIENT)
Dept: INFUSION CENTER | Facility: HOSPITAL | Age: 74
Discharge: HOME | End: 2024-01-17
Payer: MEDICARE

## 2024-01-17 ENCOUNTER — TELEPHONE (OUTPATIENT)
Dept: HEMATOLOGY ONCOLOGY | Facility: CLINIC | Age: 74
End: 2024-01-17

## 2024-01-17 DIAGNOSIS — Z45.2 ENCOUNTER FOR VENOUS ACCESS DEVICE CARE: Primary | ICD-10-CM

## 2024-01-17 DIAGNOSIS — C56.2 MALIGNANT NEOPLASM OF LEFT OVARY (HCC): ICD-10-CM

## 2024-01-17 LAB
ALBUMIN SERPL BCP-MCNC: 3.8 G/DL (ref 3.5–5)
ALP SERPL-CCNC: 94 U/L (ref 34–104)
ALT SERPL W P-5'-P-CCNC: 14 U/L (ref 7–52)
ANION GAP SERPL CALCULATED.3IONS-SCNC: 7 MMOL/L
AST SERPL W P-5'-P-CCNC: 21 U/L (ref 13–39)
BASOPHILS # BLD AUTO: 0.08 THOUSANDS/ÂΜL (ref 0–0.1)
BASOPHILS NFR BLD AUTO: 1 % (ref 0–1)
BILIRUB SERPL-MCNC: 0.25 MG/DL (ref 0.2–1)
BUN SERPL-MCNC: 14 MG/DL (ref 5–25)
CALCIUM SERPL-MCNC: 9.5 MG/DL (ref 8.4–10.2)
CANCER AG125 SERPL-ACNC: 528.9 U/ML (ref 0–35)
CHLORIDE SERPL-SCNC: 103 MMOL/L (ref 96–108)
CO2 SERPL-SCNC: 25 MMOL/L (ref 21–32)
CREAT SERPL-MCNC: 0.72 MG/DL (ref 0.6–1.3)
EOSINOPHIL # BLD AUTO: 0.09 THOUSAND/ÂΜL (ref 0–0.61)
EOSINOPHIL NFR BLD AUTO: 1 % (ref 0–6)
ERYTHROCYTE [DISTWIDTH] IN BLOOD BY AUTOMATED COUNT: 13.3 % (ref 11.6–15.1)
GFR SERPL CREATININE-BSD FRML MDRD: 83 ML/MIN/1.73SQ M
GLUCOSE SERPL-MCNC: 109 MG/DL (ref 65–140)
HCT VFR BLD AUTO: 36.5 % (ref 34.8–46.1)
HGB BLD-MCNC: 12.2 G/DL (ref 11.5–15.4)
IMM GRANULOCYTES # BLD AUTO: 0.06 THOUSAND/UL (ref 0–0.2)
IMM GRANULOCYTES NFR BLD AUTO: 1 % (ref 0–2)
LYMPHOCYTES # BLD AUTO: 0.99 THOUSANDS/ÂΜL (ref 0.6–4.47)
LYMPHOCYTES NFR BLD AUTO: 8 % (ref 14–44)
MAGNESIUM SERPL-MCNC: 1.6 MG/DL (ref 1.9–2.7)
MCH RBC QN AUTO: 32.6 PG (ref 26.8–34.3)
MCHC RBC AUTO-ENTMCNC: 33.4 G/DL (ref 31.4–37.4)
MCV RBC AUTO: 98 FL (ref 82–98)
MONOCYTES # BLD AUTO: 1.75 THOUSAND/ÂΜL (ref 0.17–1.22)
MONOCYTES NFR BLD AUTO: 14 % (ref 4–12)
NEUTROPHILS # BLD AUTO: 9.77 THOUSANDS/ÂΜL (ref 1.85–7.62)
NEUTS SEG NFR BLD AUTO: 75 % (ref 43–75)
NRBC BLD AUTO-RTO: 0 /100 WBCS
PLATELET # BLD AUTO: 341 THOUSANDS/UL (ref 149–390)
PMV BLD AUTO: 9.6 FL (ref 8.9–12.7)
POTASSIUM SERPL-SCNC: 3.9 MMOL/L (ref 3.5–5.3)
PROT SERPL-MCNC: 7.3 G/DL (ref 6.4–8.4)
RBC # BLD AUTO: 3.74 MILLION/UL (ref 3.81–5.12)
SODIUM SERPL-SCNC: 135 MMOL/L (ref 135–147)
WBC # BLD AUTO: 12.74 THOUSAND/UL (ref 4.31–10.16)

## 2024-01-17 PROCEDURE — 80053 COMPREHEN METABOLIC PANEL: CPT

## 2024-01-17 PROCEDURE — 85025 COMPLETE CBC W/AUTO DIFF WBC: CPT

## 2024-01-17 PROCEDURE — 86304 IMMUNOASSAY TUMOR CA 125: CPT

## 2024-01-17 PROCEDURE — 83735 ASSAY OF MAGNESIUM: CPT

## 2024-01-17 NOTE — PROGRESS NOTES
Pt here for port labs; labs obtained without difficulty; pt aware of next appt 1/19 at 0900; pt left unit ambulatory with steady gait.

## 2024-01-17 NOTE — TELEPHONE ENCOUNTER
Patient Call    Who are you speaking with? Patient    If it is not the patient, are they listed on an active communication consent form? N/A   What is the reason for this call? She isn't sure she can make it to treatment appt on 1/19/24 due to weather. She asked if she can reschedule   Does this require a call back? Yes   If a call back is required, please list [facility] call back number [phone]    If a call back is required, advise that a message will be forwarded to their care team and someone will return their call as soon as possible.   Did you relay this information to the patient? Yes

## 2024-01-17 NOTE — TELEPHONE ENCOUNTER
October 3, 2023     Patient: Avis Evans  YOB: 2007  Date of Visit: 10/3/2023      To Whom it May Concern:    Nicoklas Calospatel is under my professional care. Beth Gentile was seen in my office on 10/3/2023. Beth Gentile has a diagnosis of depression and anxiety and is currently under care for these problems. I believe that she would benefit from a 504 plan at school. If you have any questions or concerns, please don't hesitate to call.          Sincerely,          Jose E Millard MD Return call placed. Will discuss with infusion rescheduling treatment to Thursday.

## 2024-01-18 ENCOUNTER — HOSPITAL ENCOUNTER (OUTPATIENT)
Dept: INFUSION CENTER | Facility: HOSPITAL | Age: 74
Discharge: HOME | End: 2024-01-18
Attending: OBSTETRICS & GYNECOLOGY
Payer: MEDICARE

## 2024-01-18 VITALS
OXYGEN SATURATION: 97 % | WEIGHT: 146.83 LBS | BODY MASS INDEX: 25.07 KG/M2 | SYSTOLIC BLOOD PRESSURE: 159 MMHG | HEIGHT: 64 IN | RESPIRATION RATE: 16 BRPM | TEMPERATURE: 97.2 F | HEART RATE: 90 BPM | DIASTOLIC BLOOD PRESSURE: 67 MMHG

## 2024-01-18 DIAGNOSIS — E83.42 HYPOMAGNESEMIA: Primary | ICD-10-CM

## 2024-01-18 DIAGNOSIS — C56.2 MALIGNANT NEOPLASM OF LEFT OVARY (HCC): ICD-10-CM

## 2024-01-18 PROCEDURE — 96413 CHEMO IV INFUSION 1 HR: CPT

## 2024-01-18 PROCEDURE — 96367 TX/PROPH/DG ADDL SEQ IV INF: CPT

## 2024-01-18 RX ORDER — MAGNESIUM SULFATE HEPTAHYDRATE 40 MG/ML
2 INJECTION, SOLUTION INTRAVENOUS ONCE
Status: COMPLETED | OUTPATIENT
Start: 2024-01-18 | End: 2024-01-18

## 2024-01-18 RX ORDER — SODIUM CHLORIDE 9 MG/ML
20 INJECTION, SOLUTION INTRAVENOUS ONCE
Status: CANCELLED | OUTPATIENT
Start: 2024-01-18

## 2024-01-18 RX ORDER — SODIUM CHLORIDE 9 MG/ML
20 INJECTION, SOLUTION INTRAVENOUS ONCE
Status: COMPLETED | OUTPATIENT
Start: 2024-01-18 | End: 2024-01-18

## 2024-01-18 RX ADMIN — GEMCITABINE 1000 MG: 38 INJECTION, SOLUTION INTRAVENOUS at 10:02

## 2024-01-18 RX ADMIN — MAGNESIUM SULFATE IN WATER 2 G: 40 INJECTION, SOLUTION INTRAVENOUS at 08:19

## 2024-01-18 RX ADMIN — SODIUM CHLORIDE 20 ML/HR: 9 INJECTION, SOLUTION INTRAVENOUS at 08:19

## 2024-01-18 RX ADMIN — DEXAMETHASONE SODIUM PHOSPHATE 10 MG: 10 INJECTION, SOLUTION INTRAMUSCULAR; INTRAVENOUS at 09:29

## 2024-01-18 NOTE — PROGRESS NOTES
Infusions tolerated well. Pt understands that she should call the office for any problematic symptoms related to first dose Gemzar. Port deaccessed. AVS provided and next 2 appts confirmed. Discharged with steady gait accompanied by .

## 2024-01-19 ENCOUNTER — HOSPITAL ENCOUNTER (OUTPATIENT)
Dept: INFUSION CENTER | Facility: HOSPITAL | Age: 74
Discharge: HOME | End: 2024-01-19
Attending: OBSTETRICS & GYNECOLOGY

## 2024-01-24 ENCOUNTER — HOSPITAL ENCOUNTER (OUTPATIENT)
Dept: INFUSION CENTER | Facility: HOSPITAL | Age: 74
Discharge: HOME | End: 2024-01-24
Payer: MEDICARE

## 2024-01-24 DIAGNOSIS — Z45.2 ENCOUNTER FOR VENOUS ACCESS DEVICE CARE: Primary | ICD-10-CM

## 2024-01-24 DIAGNOSIS — C56.2 MALIGNANT NEOPLASM OF LEFT OVARY (HCC): ICD-10-CM

## 2024-01-24 LAB
ALBUMIN SERPL BCP-MCNC: 3.8 G/DL (ref 3.5–5)
ALP SERPL-CCNC: 113 U/L (ref 34–104)
ALT SERPL W P-5'-P-CCNC: 15 U/L (ref 7–52)
ANION GAP SERPL CALCULATED.3IONS-SCNC: 7 MMOL/L
AST SERPL W P-5'-P-CCNC: 20 U/L (ref 13–39)
BASOPHILS # BLD AUTO: 0.04 THOUSANDS/ÂΜL (ref 0–0.1)
BASOPHILS NFR BLD AUTO: 1 % (ref 0–1)
BILIRUB SERPL-MCNC: 0.27 MG/DL (ref 0.2–1)
BUN SERPL-MCNC: 15 MG/DL (ref 5–25)
CALCIUM SERPL-MCNC: 9.7 MG/DL (ref 8.4–10.2)
CHLORIDE SERPL-SCNC: 103 MMOL/L (ref 96–108)
CO2 SERPL-SCNC: 25 MMOL/L (ref 21–32)
CREAT SERPL-MCNC: 0.79 MG/DL (ref 0.6–1.3)
EOSINOPHIL # BLD AUTO: 0.04 THOUSAND/ÂΜL (ref 0–0.61)
EOSINOPHIL NFR BLD AUTO: 1 % (ref 0–6)
ERYTHROCYTE [DISTWIDTH] IN BLOOD BY AUTOMATED COUNT: 13.3 % (ref 11.6–15.1)
GFR SERPL CREATININE-BSD FRML MDRD: 74 ML/MIN/1.73SQ M
GLUCOSE SERPL-MCNC: 112 MG/DL (ref 65–140)
HCT VFR BLD AUTO: 34.2 % (ref 34.8–46.1)
HGB BLD-MCNC: 11.4 G/DL (ref 11.5–15.4)
IMM GRANULOCYTES # BLD AUTO: 0.04 THOUSAND/UL (ref 0–0.2)
IMM GRANULOCYTES NFR BLD AUTO: 1 % (ref 0–2)
LYMPHOCYTES # BLD AUTO: 1.15 THOUSANDS/ÂΜL (ref 0.6–4.47)
LYMPHOCYTES NFR BLD AUTO: 14 % (ref 14–44)
MAGNESIUM SERPL-MCNC: 1.5 MG/DL (ref 1.9–2.7)
MCH RBC QN AUTO: 31.9 PG (ref 26.8–34.3)
MCHC RBC AUTO-ENTMCNC: 33.3 G/DL (ref 31.4–37.4)
MCV RBC AUTO: 96 FL (ref 82–98)
MONOCYTES # BLD AUTO: 0.77 THOUSAND/ÂΜL (ref 0.17–1.22)
MONOCYTES NFR BLD AUTO: 10 % (ref 4–12)
NEUTROPHILS # BLD AUTO: 6.01 THOUSANDS/ÂΜL (ref 1.85–7.62)
NEUTS SEG NFR BLD AUTO: 73 % (ref 43–75)
NRBC BLD AUTO-RTO: 0 /100 WBCS
PLATELET # BLD AUTO: 261 THOUSANDS/UL (ref 149–390)
PMV BLD AUTO: 9.5 FL (ref 8.9–12.7)
POTASSIUM SERPL-SCNC: 3.8 MMOL/L (ref 3.5–5.3)
PROT SERPL-MCNC: 7.3 G/DL (ref 6.4–8.4)
RBC # BLD AUTO: 3.57 MILLION/UL (ref 3.81–5.12)
SODIUM SERPL-SCNC: 135 MMOL/L (ref 135–147)
WBC # BLD AUTO: 8.05 THOUSAND/UL (ref 4.31–10.16)

## 2024-01-24 PROCEDURE — 85025 COMPLETE CBC W/AUTO DIFF WBC: CPT

## 2024-01-24 PROCEDURE — 80053 COMPREHEN METABOLIC PANEL: CPT

## 2024-01-24 PROCEDURE — 83735 ASSAY OF MAGNESIUM: CPT

## 2024-01-24 NOTE — PROGRESS NOTES
Pt here for port labs; labs obtained without difficulty; pt aware of next appt this Friday at 0930; left unit ambulatory with steady gait.

## 2024-01-25 RX ORDER — SODIUM CHLORIDE 9 MG/ML
20 INJECTION, SOLUTION INTRAVENOUS ONCE
Status: CANCELLED | OUTPATIENT
Start: 2024-01-26

## 2024-01-26 ENCOUNTER — HOSPITAL ENCOUNTER (OUTPATIENT)
Dept: INFUSION CENTER | Facility: HOSPITAL | Age: 74
End: 2024-01-26
Attending: OBSTETRICS & GYNECOLOGY
Payer: MEDICARE

## 2024-01-26 VITALS
OXYGEN SATURATION: 96 % | SYSTOLIC BLOOD PRESSURE: 133 MMHG | RESPIRATION RATE: 16 BRPM | BODY MASS INDEX: 24.88 KG/M2 | DIASTOLIC BLOOD PRESSURE: 62 MMHG | TEMPERATURE: 97.6 F | HEIGHT: 64 IN | WEIGHT: 145.72 LBS | HEART RATE: 81 BPM

## 2024-01-26 DIAGNOSIS — C56.2 MALIGNANT NEOPLASM OF LEFT OVARY (HCC): Primary | ICD-10-CM

## 2024-01-26 PROCEDURE — 96367 TX/PROPH/DG ADDL SEQ IV INF: CPT

## 2024-01-26 PROCEDURE — 96413 CHEMO IV INFUSION 1 HR: CPT

## 2024-01-26 RX ORDER — SODIUM CHLORIDE 9 MG/ML
20 INJECTION, SOLUTION INTRAVENOUS ONCE
Status: COMPLETED | OUTPATIENT
Start: 2024-01-26 | End: 2024-01-26

## 2024-01-26 RX ORDER — MAGNESIUM SULFATE HEPTAHYDRATE 40 MG/ML
2 INJECTION, SOLUTION INTRAVENOUS ONCE
Status: COMPLETED | OUTPATIENT
Start: 2024-01-26 | End: 2024-01-26

## 2024-01-26 RX ADMIN — SODIUM CHLORIDE 20 ML/HR: 9 INJECTION, SOLUTION INTRAVENOUS at 09:55

## 2024-01-26 RX ADMIN — GEMCITABINE 1000 MG: 38 INJECTION, SOLUTION INTRAVENOUS at 10:32

## 2024-01-26 RX ADMIN — MAGNESIUM SULFATE IN WATER 2 G: 40 INJECTION, SOLUTION INTRAVENOUS at 10:43

## 2024-01-26 RX ADMIN — DEXAMETHASONE SODIUM PHOSPHATE 10 MG: 10 INJECTION, SOLUTION INTRAMUSCULAR; INTRAVENOUS at 09:57

## 2024-01-26 NOTE — PROGRESS NOTES
Penny Fernandez  tolerated Gemzar and magnesium well with no complications.      Penny Fernandez is aware of future appt on 1/31 at 10:30am.     AVS printed and given to Penny Fernandez:  No (Declined by Penny Fernandez)     Left unit in stable condition.

## 2024-01-31 ENCOUNTER — HOSPITAL ENCOUNTER (OUTPATIENT)
Dept: INFUSION CENTER | Facility: HOSPITAL | Age: 74
Discharge: HOME | End: 2024-01-31
Payer: MEDICARE

## 2024-01-31 DIAGNOSIS — C56.2 MALIGNANT NEOPLASM OF LEFT OVARY (HCC): ICD-10-CM

## 2024-01-31 DIAGNOSIS — Z45.2 ENCOUNTER FOR VENOUS ACCESS DEVICE CARE: Primary | ICD-10-CM

## 2024-01-31 LAB
ALBUMIN SERPL BCP-MCNC: 3.5 G/DL (ref 3.5–5)
ALP SERPL-CCNC: 113 U/L (ref 34–104)
ALT SERPL W P-5'-P-CCNC: 17 U/L (ref 7–52)
ANION GAP SERPL CALCULATED.3IONS-SCNC: 7 MMOL/L
AST SERPL W P-5'-P-CCNC: 21 U/L (ref 13–39)
BASOPHILS # BLD AUTO: 0.03 THOUSANDS/ÂΜL (ref 0–0.1)
BASOPHILS NFR BLD AUTO: 0 % (ref 0–1)
BILIRUB SERPL-MCNC: 0.33 MG/DL (ref 0.2–1)
BUN SERPL-MCNC: 16 MG/DL (ref 5–25)
CALCIUM SERPL-MCNC: 8.9 MG/DL (ref 8.4–10.2)
CHLORIDE SERPL-SCNC: 105 MMOL/L (ref 96–108)
CO2 SERPL-SCNC: 26 MMOL/L (ref 21–32)
CREAT SERPL-MCNC: 0.8 MG/DL (ref 0.6–1.3)
EOSINOPHIL # BLD AUTO: 0.07 THOUSAND/ÂΜL (ref 0–0.61)
EOSINOPHIL NFR BLD AUTO: 1 % (ref 0–6)
ERYTHROCYTE [DISTWIDTH] IN BLOOD BY AUTOMATED COUNT: 13.6 % (ref 11.6–15.1)
GFR SERPL CREATININE-BSD FRML MDRD: 73 ML/MIN/1.73SQ M
GLUCOSE SERPL-MCNC: 127 MG/DL (ref 65–140)
HCT VFR BLD AUTO: 32.4 % (ref 34.8–46.1)
HGB BLD-MCNC: 10.9 G/DL (ref 11.5–15.4)
IMM GRANULOCYTES # BLD AUTO: 0.03 THOUSAND/UL (ref 0–0.2)
IMM GRANULOCYTES NFR BLD AUTO: 0 % (ref 0–2)
LYMPHOCYTES # BLD AUTO: 0.98 THOUSANDS/ÂΜL (ref 0.6–4.47)
LYMPHOCYTES NFR BLD AUTO: 11 % (ref 14–44)
MAGNESIUM SERPL-MCNC: 1.6 MG/DL (ref 1.9–2.7)
MCH RBC QN AUTO: 32.3 PG (ref 26.8–34.3)
MCHC RBC AUTO-ENTMCNC: 33.6 G/DL (ref 31.4–37.4)
MCV RBC AUTO: 96 FL (ref 82–98)
MONOCYTES # BLD AUTO: 0.19 THOUSAND/ÂΜL (ref 0.17–1.22)
MONOCYTES NFR BLD AUTO: 2 % (ref 4–12)
NEUTROPHILS # BLD AUTO: 7.65 THOUSANDS/ÂΜL (ref 1.85–7.62)
NEUTS SEG NFR BLD AUTO: 86 % (ref 43–75)
NRBC BLD AUTO-RTO: 0 /100 WBCS
PLATELET # BLD AUTO: 159 THOUSANDS/UL (ref 149–390)
PMV BLD AUTO: 9.7 FL (ref 8.9–12.7)
POTASSIUM SERPL-SCNC: 3.7 MMOL/L (ref 3.5–5.3)
PROT SERPL-MCNC: 6.8 G/DL (ref 6.4–8.4)
RBC # BLD AUTO: 3.37 MILLION/UL (ref 3.81–5.12)
SODIUM SERPL-SCNC: 138 MMOL/L (ref 135–147)
WBC # BLD AUTO: 8.95 THOUSAND/UL (ref 4.31–10.16)

## 2024-01-31 PROCEDURE — 80053 COMPREHEN METABOLIC PANEL: CPT

## 2024-01-31 PROCEDURE — 85025 COMPLETE CBC W/AUTO DIFF WBC: CPT

## 2024-01-31 PROCEDURE — 83735 ASSAY OF MAGNESIUM: CPT

## 2024-01-31 NOTE — PROGRESS NOTES
[address] [facility] [address]  Post Op note  Chief Complaint   Patient presents with    Post-Op Check     post op initial right       Ling Baez is a 29y.o. year old female who is 3 day(s) post op from foot surgery. Type: arthroscopy ankle, tenosynovectomy peroneal tendons, repair ATFL right. Vital signs are stable. Pain level is 5. Patient denies N/V/F/C. Patient has been compliant with postoperative instructions. Review of Systems    Vascular: DP and PT pulses palpable 2/4, Right Foot and 2/4 on the Left Foot. CFT <3 seconds, Right Foot and <3 seconds on the Left Foot. Edema is present,  Right Foot and absenton the Left Foot. Neurological:   Sensation present  to light touch to level of digits, both feet. Musculoskeletal:   Muscle strength is guarded/5 on the Right Foot and 5/5 on the Left Foot. Structural deformities are absent on the Right Foot and absent on the Left Foot. Integument:  Warm, dry, supple both feet. Incisions are healing well. Wound dehiscence is absent. Infection is absent. Assesment : Post operative progress gradually improving. Diagnosis Orders   1. Sprain of anterior talofibular ligament of right ankle, initial encounter  AL APPLY LOWER LEG SPLINT   2. Ankle instability, right  AL APPLY LOWER LEG SPLINT   3. Rupture of ligament of ankle, right, initial encounter  AL APPLY LOWER LEG SPLINT   4. Post-operative state  AL APPLY LOWER LEG SPLINT         Plan: Sutures in place. Dry sterile dressing applied. Keep surgical site dry. Ice and elevation as directed. Large bulky compressive wrap applied with 2 layers of cast padding, ACE wraps, followed by a posterior fiberglass splint secured with Coban. No orders of the defined types were placed in this encounter. Follow up 2week(s). Pt here for port labs.  Accessed, labs obtained, de-accessed, dsd applied.  Disch amb to home, steady gait.      Pt aware of next appt @ 2/7 @ noon

## 2024-02-07 ENCOUNTER — HOSPITAL ENCOUNTER (OUTPATIENT)
Dept: INFUSION CENTER | Facility: HOSPITAL | Age: 74
Discharge: HOME | End: 2024-02-07
Payer: MEDICARE

## 2024-02-07 DIAGNOSIS — Z45.2 ENCOUNTER FOR VENOUS ACCESS DEVICE CARE: Primary | ICD-10-CM

## 2024-02-07 DIAGNOSIS — C56.2 MALIGNANT NEOPLASM OF LEFT OVARY (HCC): ICD-10-CM

## 2024-02-07 LAB
ALBUMIN SERPL BCP-MCNC: 3.8 G/DL (ref 3.5–5)
ALP SERPL-CCNC: 107 U/L (ref 34–104)
ALT SERPL W P-5'-P-CCNC: 13 U/L (ref 7–52)
ANION GAP SERPL CALCULATED.3IONS-SCNC: 7 MMOL/L
AST SERPL W P-5'-P-CCNC: 18 U/L (ref 13–39)
BASOPHILS # BLD AUTO: 0.04 THOUSANDS/ÂΜL (ref 0–0.1)
BASOPHILS NFR BLD AUTO: 0 % (ref 0–1)
BILIRUB SERPL-MCNC: 0.24 MG/DL (ref 0.2–1)
BUN SERPL-MCNC: 12 MG/DL (ref 5–25)
CALCIUM SERPL-MCNC: 9.2 MG/DL (ref 8.4–10.2)
CANCER AG125 SERPL-ACNC: 305.9 U/ML (ref 0–35)
CHLORIDE SERPL-SCNC: 103 MMOL/L (ref 96–108)
CO2 SERPL-SCNC: 27 MMOL/L (ref 21–32)
CREAT SERPL-MCNC: 0.72 MG/DL (ref 0.6–1.3)
EOSINOPHIL # BLD AUTO: 0.07 THOUSAND/ÂΜL (ref 0–0.61)
EOSINOPHIL NFR BLD AUTO: 1 % (ref 0–6)
ERYTHROCYTE [DISTWIDTH] IN BLOOD BY AUTOMATED COUNT: 14.6 % (ref 11.6–15.1)
GFR SERPL CREATININE-BSD FRML MDRD: 83 ML/MIN/1.73SQ M
GLUCOSE SERPL-MCNC: 104 MG/DL (ref 65–140)
HCT VFR BLD AUTO: 33.6 % (ref 34.8–46.1)
HGB BLD-MCNC: 11 G/DL (ref 11.5–15.4)
IMM GRANULOCYTES # BLD AUTO: 0.09 THOUSAND/UL (ref 0–0.2)
IMM GRANULOCYTES NFR BLD AUTO: 1 % (ref 0–2)
LYMPHOCYTES # BLD AUTO: 1.2 THOUSANDS/ÂΜL (ref 0.6–4.47)
LYMPHOCYTES NFR BLD AUTO: 13 % (ref 14–44)
MAGNESIUM SERPL-MCNC: 1.6 MG/DL (ref 1.9–2.7)
MCH RBC QN AUTO: 31.3 PG (ref 26.8–34.3)
MCHC RBC AUTO-ENTMCNC: 32.7 G/DL (ref 31.4–37.4)
MCV RBC AUTO: 96 FL (ref 82–98)
MONOCYTES # BLD AUTO: 1.78 THOUSAND/ÂΜL (ref 0.17–1.22)
MONOCYTES NFR BLD AUTO: 20 % (ref 4–12)
NEUTROPHILS # BLD AUTO: 5.92 THOUSANDS/ÂΜL (ref 1.85–7.62)
NEUTS SEG NFR BLD AUTO: 65 % (ref 43–75)
NRBC BLD AUTO-RTO: 0 /100 WBCS
PLATELET # BLD AUTO: 254 THOUSANDS/UL (ref 149–390)
PMV BLD AUTO: 9.7 FL (ref 8.9–12.7)
POTASSIUM SERPL-SCNC: 3.9 MMOL/L (ref 3.5–5.3)
PROT SERPL-MCNC: 7 G/DL (ref 6.4–8.4)
RBC # BLD AUTO: 3.51 MILLION/UL (ref 3.81–5.12)
SODIUM SERPL-SCNC: 137 MMOL/L (ref 135–147)
WBC # BLD AUTO: 9.1 THOUSAND/UL (ref 4.31–10.16)

## 2024-02-07 PROCEDURE — 86304 IMMUNOASSAY TUMOR CA 125: CPT

## 2024-02-07 PROCEDURE — 83735 ASSAY OF MAGNESIUM: CPT

## 2024-02-07 PROCEDURE — 80053 COMPREHEN METABOLIC PANEL: CPT

## 2024-02-07 PROCEDURE — 85025 COMPLETE CBC W/AUTO DIFF WBC: CPT

## 2024-02-07 NOTE — PROGRESS NOTES
Patient labwork completed via Port. Port accessed and de-accessed without complication.   Patient declined AVS at this time and confirmed next appointment. Patient discharged in stable condition.

## 2024-02-08 ENCOUNTER — OFFICE VISIT (OUTPATIENT)
Age: 74
End: 2024-02-08
Payer: MEDICARE

## 2024-02-08 VITALS
OXYGEN SATURATION: 98 % | BODY MASS INDEX: 25.06 KG/M2 | HEIGHT: 64 IN | TEMPERATURE: 97.7 F | RESPIRATION RATE: 16 BRPM | HEART RATE: 85 BPM | DIASTOLIC BLOOD PRESSURE: 70 MMHG | SYSTOLIC BLOOD PRESSURE: 124 MMHG | WEIGHT: 146.8 LBS

## 2024-02-08 DIAGNOSIS — C56.2 MALIGNANT NEOPLASM OF LEFT OVARY (HCC): Primary | ICD-10-CM

## 2024-02-08 DIAGNOSIS — C57.9 GYNECOLOGIC MALIGNANCY (HCC): ICD-10-CM

## 2024-02-08 DIAGNOSIS — G62.0 DRUG-INDUCED PERIPHERAL NEUROPATHY (HCC): ICD-10-CM

## 2024-02-08 DIAGNOSIS — E83.42 HYPOMAGNESEMIA: ICD-10-CM

## 2024-02-08 PROCEDURE — 99215 OFFICE O/P EST HI 40 MIN: CPT | Performed by: PHYSICIAN ASSISTANT

## 2024-02-08 RX ORDER — SODIUM CHLORIDE 9 MG/ML
20 INJECTION, SOLUTION INTRAVENOUS ONCE
Status: CANCELLED | OUTPATIENT
Start: 2024-02-09

## 2024-02-08 RX ORDER — ONDANSETRON HYDROCHLORIDE 8 MG/1
8 TABLET, FILM COATED ORAL EVERY 8 HOURS PRN
Qty: 20 TABLET | Refills: 0 | Status: SHIPPED | OUTPATIENT
Start: 2024-02-08

## 2024-02-08 RX ORDER — LORAZEPAM 1 MG/1
1 TABLET ORAL EVERY 8 HOURS PRN
Qty: 40 TABLET | Refills: 0 | Status: SHIPPED | OUTPATIENT
Start: 2024-02-08

## 2024-02-08 RX ORDER — MAGNESIUM SULFATE HEPTAHYDRATE 40 MG/ML
2 INJECTION, SOLUTION INTRAVENOUS ONCE
Status: CANCELLED
Start: 2024-02-09

## 2024-02-08 NOTE — PROGRESS NOTES
Assessment/Plan:    Problem List Items Addressed This Visit          Endocrine    Malignant neoplasm of left ovary (HCC) - Primary     Recurrent, platinum resistant ovarian cancer with disease progression on palliative mirvetuximab currently receiving palliative gemcitabine 600 mg/m2 IV days 1 and 8 every 21 days. She tolerated cycle 1 of treatment well. Her quality of life is good. Her  is declining.    Continue with cycle 2 of treatment as planned. Metabolic and hematologic parameters from 2/7/24 reviewed and are adequate for treatment.     Return to the office as per her chemotherapy calendar. Trend . Plan for repeat imaging after completion of cycle 3.          Relevant Medications    LORazepam (ATIVAN) 1 mg tablet    ondansetron (ZOFRAN) 8 mg tablet       Nervous and Auditory    Drug-induced peripheral neuropathy (HCC)     Stable, grade 1, fingertips.             Other    Hypomagnesemia     Plan for IV repletion.           Other Visit Diagnoses       Gynecologic malignancy (HCC)        Relevant Medications    LORazepam (ATIVAN) 1 mg tablet    ondansetron (ZOFRAN) 8 mg tablet              CHIEF COMPLAINT:   Pre-chemotherapy evaluation     Problem:  Cancer Staging   Malignant neoplasm of left ovary (HCC)  Staging form: Ovary, Fallopian Tube, Primary Peritoneal, AJCC 8th Edition  - Clinical: FIGO Stage IIIC (cT3c, cN0, cM0) - Signed by Pablo Lockwood MD on 11/29/2022        Previous therapy:  Oncology History   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HCC)   7/21/2022 Initial Diagnosis    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HCC)     8/4/2022 Surgery    Diagnostic laparoscopy, peritoneal biopsy     8/18/2022 Genetic Testing    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  Results revealed patient has the following mutation(s):  None     Malignant neoplasm of left ovary (HCC)   8/4/2022 Surgery    Diagnostic laparoscopy, peritoneal biopsy     8/9/2022 Initial Diagnosis    Gynecologic malignancy (HCC)      8/23/2022 - 1/24/2023 Chemotherapy    palonosetron (ALOXI), 0.25 mg, Intravenous, Once, 6 of 6 cycles  Administration: 0.25 mg (8/23/2022), 0.25 mg (9/13/2022), 0.25 mg (10/4/2022), 0.25 mg (12/13/2022), 0.25 mg (1/3/2023), 0.25 mg (1/24/2023)  fosaprepitant (EMEND) IVPB, 150 mg, Intravenous, Once, 6 of 6 cycles  Administration: 150 mg (8/23/2022), 150 mg (9/13/2022), 150 mg (10/4/2022), 150 mg (12/13/2022), 150 mg (1/3/2023), 150 mg (1/24/2023)  CARBOplatin (PARAPLATIN) IVPB ([facility] [place] AUC DOSING), 605.4 mg, Intravenous, Once, 6 of 6 cycles  Administration: 600 mg (8/23/2022), 564 mg (9/13/2022), 601.8 mg (10/4/2022), 491.5 mg (12/13/2022), 468.5 mg (1/3/2023), 488 mg (1/24/2023)  bevacizumab (AVASTIN) IVPB, 1,252.5 mg, Intravenous, Once, 3 of 3 cycles  Dose modification: 10 mg/kg (original dose 15 mg/kg, Cycle 5, Reason: Other (Must fill in a comment))  Administration: 1,200 mg (8/23/2022), 1,200 mg (9/13/2022), 745 mg (1/3/2023)  PACLItaxel (TAXOL) chemo IVPB, 175 mg/m2 = 330.6 mg, Intravenous, Once, 6 of 6 cycles  Dose modification: 135 mg/m2 (original dose 175 mg/m2, Cycle 4, Reason: Other (Must fill in a comment))  Administration: 330.6 mg (8/23/2022), 330.6 mg (9/13/2022), 330.6 mg (10/4/2022), 246 mg (12/13/2022), 244.2 mg (1/3/2023), 244.2 mg (1/24/2023)     10/28/2022 Surgery    diagnostic laparoscopy, exploratory laparotomy, modified radical hysterectomy, bilateral salpingo-oophorectomy with en bloc rectosigmoid resection, gastrocolic omentectomy, splenectomy, small bowel resection with reanastomosis, diaphragm resection, excision ablation of peritoneal implants, ileostomy formation, optimal tumor debulking     11/29/2022 -  Cancer Staged    Staging form: Ovary, Fallopian Tube, Primary Peritoneal, AJCC 8th Edition  - Clinical: FIGO Stage IIIC (cT3c, cN0, cM0) - Signed by Pablo Lockwood MD on 11/29/2022  Histologic grade (G): G3  Histologic grading system: 4 grade system       3/27/2023 Surgery     Low anterior resection  Residual tumor identified in lymphovascular spaces    A. 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 Colon (anastomotic donuts):  - Colon with no diagnostic abnormality  - No carcinoma identified      4/17/2023 Genomic Testing    [employer]: FOLR1, positive     7/21/2023 -  Chemotherapy    Doxil 30 mg/m2 and carboplatin AUC 5 every 28 days with avastin 10 mg/kg IV every 14 days.     Avastin dose-reduced to 7.5 mg/kg due to epistaxis.      10/27/2023 -  Chemotherapy    Mirvetuximab 6 mg/kg AIBW IV every 21 days.        1/18/2024 -  Chemotherapy    Gemcitabine 600 mg/m2 IV days 1 and 8 every 21 days.             Patient ID: Penny Fernandez is a 73 y.o. female  who presents to the office for pre-chemotherapy evaluation. Overall, she tolerated her first cycle of treatment well. She has been afebrile. She denies vomiting or abdominal pain. She notes occasional nausea which is well controlled with zofran. Normal bowel/bladder function. She denies mouth sores. The patient notes her peripheral neuropathy in her fingertips is stable and not significantly affecting her ADLs. CBC/Diff, CMP, Mg and  from 2/7/24 reviewed. Hypomagnesemia noted.  is downtrending.        The following portions of the patient's history were reviewed and updated as appropriate: allergies, current medications, past medical history, past surgical history, and problem list.    Review of Systems   Constitutional: Negative.    HENT: Negative.     Eyes: Negative.    Respiratory: Negative.     Cardiovascular: Negative.    Gastrointestinal:  Positive for nausea (occassional, well controlled). Negative for abdominal pain, diarrhea and vomiting.   Genitourinary: Negative.    Musculoskeletal: Negative.    Skin: Negative.    Neurological:  Positive for numbness (stable, fingertips).    Psychiatric/Behavioral: Negative.         Current Outpatient Medications   Medication Sig Dispense Refill    Acetaminophen (TYLENOL ARTHRITIS PAIN PO) Take by mouth as needed      aspirin (ECOTRIN LOW STRENGTH) 81 mg EC tablet Take 81 mg by mouth daily      B Complex-C (B COMPLEX-VITAMIN C PO) Take by mouth daily      bevacizumab (Avastin) 100 mg/4 mL Avastin      Calcium Carb-Cholecalciferol (CALCIUM 500+D3 PO) Take 1 tablet by mouth 2 (two) times a day       CARBOplatin (PARAPLATIN) 50 mg/5 mL CARBOplatin      cephalexin (KEFLEX) 500 mg capsule Take 500 mg by mouth every 6 (six) hours      cholecalciferol (VITAMIN D3) 1,000 units tablet Take 1,000 Units by mouth daily      DOXOrubicin liposome (Doxil) 2 mg/mL Doxil      fexofenadine (ALLEGRA) 180 MG tablet Take 180 mg by mouth daily      GLUCOSAMINE-CHONDROITIN PO Take 1 tablet by mouth 2 (two) times a day       lidocaine-prilocaine (EMLA) cream Apply to mediport 30 min prior to labs or chemo 30 g 3    lisinopril (ZESTRIL) 20 mg tablet Take 20 mg by mouth daily      LORazepam (ATIVAN) 1 mg tablet Take 1 tablet (1 mg total) by mouth every 8 (eight) hours as needed for anxiety (nausea or anxiety) 40 tablet 0    magnesium oxide (MAG-OX) 400 mg tablet Take 1 tablet by mouth daily as needed      metoprolol succinate (TOPROL-XL) 25 mg 24 hr tablet Take 50 mg by mouth daily Taking 25 mg at breakfast and 25 mg at dinner      montelukast (SINGULAIR) 10 mg tablet Take 10 mg by mouth daily dinner      multivitamin (THERAGRAN) TABS Take 1 tablet by mouth daily      naloxone (NARCAN) 4 mg/0.1 mL nasal spray Administer 1 spray into a nostril. If no response after 2-3 minutes, give another dose in the other nostril using a new spray. 1 each 1    nystatin (MYCOSTATIN) powder Apply topically if needed Under abdominal pannus and in between thighs      omeprazole (PriLOSEC) 20 mg delayed release capsule TAKE 1 CAPSULE BY MOUTH EVERY DAY 30 MINUTES BEFORE MORNING MEAL FOR 90 DAYS    "   ondansetron (ZOFRAN) 8 mg tablet Take 1 tablet (8 mg total) by mouth every 8 (eight) hours as needed for nausea or vomiting 20 tablet 0    Probiotic Product (PROBIOTIC DAILY PO) Take by mouth daily      simvastatin (ZOCOR) 10 mg tablet Take 10 mg by mouth daily at bedtime      sodium chloride (OCEAN) 0.65 % nasal spray 1 spray into each nostril as needed for congestion      SYMBICORT 160-4.5 MCG/ACT inhaler 2 puffs daily       Triamcinolone Acetonide (NASACORT ALLERGY 24HR NA) 1 spray by Each Nare route 2 (two) times a day      Ascorbic Acid (VITAMIN C GUMMIE PO) Take by mouth (Patient not taking: Reported on 7/6/2023)      famotidine (PEPCID) 40 MG tablet TAKE 1 TABLET BY MOUTH DAILY AT BEDTIME (Patient not taking: Reported on 11/30/2023) 90 tablet 3    lisinopril (ZESTRIL) 5 mg tablet Take 10 mg by mouth daily (Patient not taking: Reported on 11/9/2023)      oxyCODONE (ROXICODONE) 5 immediate release tablet 1/2 tab every 6 hours as needed for pain (Patient not taking: Reported on 7/6/2023) 5 tablet 0    prednisoLONE acetate (PRED FORTE) 1 % ophthalmic suspension Apply 1 drop to each eye 6x day the day prior to treatment and then for 4 days following treatment, then 4x day in each eye days 5 thru 8 following treatment. 5 mL 0    predniSONE 1 MG TBEC Take 1 mg by mouth every other day (Patient not taking: Reported on 11/17/2023)       No current facility-administered medications for this visit.     Facility-Administered Medications Ordered in Other Visits   Medication Dose Route Frequency Provider Last Rate Last Admin    alteplase (CATHFLO) injection 2 mg  2 mg Intracatheter Q1MIN PRN Pablo Lockwood MD               Objective:    Blood pressure 124/70, pulse 85, temperature 97.7 °F (36.5 °C), temperature source Temporal, resp. rate 16, height 5' 4.02\" (1.626 m), weight 66.6 kg (146 lb 12.8 oz), SpO2 98%.  Body mass index is 25.18 kg/m².  Body surface area is 1.72 meters squared.    Physical " Exam  Constitutional:       Appearance: She is well-developed.   Pulmonary:      Effort: Pulmonary effort is normal.   Skin:     General: Skin is warm and dry.      Findings: No rash.   Neurological:      Mental Status: She is alert and oriented to person, place, and time.   Psychiatric:         Behavior: Behavior normal.         Thought Content: Thought content normal.         Judgment: Judgment normal.     Performance status is zero.      Lab Results   Component Value Date    K 3.9 02/07/2024     02/07/2024    CO2 27 02/07/2024    BUN 12 02/07/2024    CREATININE 0.72 02/07/2024    GLUCOSE 214 (H) 10/28/2022    GLUF 161 (H) 12/08/2022    CALCIUM 9.2 02/07/2024    CORRECTEDCA 9.4 09/20/2023    AST 18 02/07/2024    ALT 13 02/07/2024    ALKPHOS 107 (H) 02/07/2024    EGFR 83 02/07/2024     Lab Results   Component Value Date    WBC 9.10 02/07/2024    HGB 11.0 (L) 02/07/2024    HCT 33.6 (L) 02/07/2024    MCV 96 02/07/2024     02/07/2024     Lab Results   Component Value Date    NEUTROABS 5.92 02/07/2024        Trend:  Lab Results   Component Value Date     305.9 (H) 02/07/2024     528.9 (H) 01/17/2024     336.9 (H) 12/06/2023     269.2 (H) 11/15/2023     234.7 (H) 10/25/2023     205.1 (H) 09/27/2023     223.9 (H) 08/30/2023     340.6 (H) 08/02/2023     168.5 (H) 06/26/2023     6.2 03/24/2023     5.1 03/02/2023     5.3 02/09/2023     8.7 01/19/2023     15.8 12/30/2022     37.9 (H) 12/08/2022     8.6 10/20/2022     8.4 10/07/2022     12.5 09/29/2022     32.0 (H) 09/08/2022     52.4 (H) 09/02/2022     85.5 (H) 08/26/2022     77.3 (H) 08/19/2022

## 2024-02-09 ENCOUNTER — HOSPITAL ENCOUNTER (OUTPATIENT)
Dept: INFUSION CENTER | Facility: HOSPITAL | Age: 74
End: 2024-02-09
Attending: OBSTETRICS & GYNECOLOGY
Payer: MEDICARE

## 2024-02-09 VITALS
OXYGEN SATURATION: 99 % | BODY MASS INDEX: 25.07 KG/M2 | TEMPERATURE: 96.4 F | WEIGHT: 146.83 LBS | DIASTOLIC BLOOD PRESSURE: 74 MMHG | SYSTOLIC BLOOD PRESSURE: 134 MMHG | RESPIRATION RATE: 16 BRPM | HEIGHT: 64 IN | HEART RATE: 73 BPM

## 2024-02-09 DIAGNOSIS — C56.2 MALIGNANT NEOPLASM OF LEFT OVARY (HCC): ICD-10-CM

## 2024-02-09 DIAGNOSIS — E83.42 HYPOMAGNESEMIA: Primary | ICD-10-CM

## 2024-02-09 PROCEDURE — 96367 TX/PROPH/DG ADDL SEQ IV INF: CPT

## 2024-02-09 PROCEDURE — 96413 CHEMO IV INFUSION 1 HR: CPT

## 2024-02-09 RX ORDER — MAGNESIUM SULFATE HEPTAHYDRATE 40 MG/ML
2 INJECTION, SOLUTION INTRAVENOUS ONCE
Status: COMPLETED | OUTPATIENT
Start: 2024-02-09 | End: 2024-02-09

## 2024-02-09 RX ORDER — SODIUM CHLORIDE 9 MG/ML
20 INJECTION, SOLUTION INTRAVENOUS ONCE
Status: COMPLETED | OUTPATIENT
Start: 2024-02-09 | End: 2024-02-09

## 2024-02-09 RX ADMIN — SODIUM CHLORIDE 20 ML/HR: 9 INJECTION, SOLUTION INTRAVENOUS at 11:45

## 2024-02-09 RX ADMIN — GEMCITABINE 1000 MG: 38 INJECTION, SOLUTION INTRAVENOUS at 13:14

## 2024-02-09 RX ADMIN — DEXAMETHASONE SODIUM PHOSPHATE 10 MG: 10 INJECTION, SOLUTION INTRAMUSCULAR; INTRAVENOUS at 12:45

## 2024-02-09 RX ADMIN — MAGNESIUM SULFATE IN WATER 2 G: 40 INJECTION, SOLUTION INTRAVENOUS at 11:29

## 2024-02-09 NOTE — PLAN OF CARE
Problem: Knowledge Deficit  Goal: Patient/family/caregiver demonstrates understanding of disease process, treatment plan, medications, and discharge instructions  Description: Complete learning assessment and assess knowledge base.  Interventions:  - Provide teaching at level of understanding  - Provide teaching via preferred learning methods  2/9/2024 1100 by Flora Casanova RN  Outcome: Progressing  2/9/2024 1100 by Flora Casanova RN  Outcome: Progressing

## 2024-02-09 NOTE — PROGRESS NOTES
..Penny Fernandez  tolerated treatment well with no complications.      Penny Fernandez is aware of future appt on 2/14 at 1030.     AVS printed and given to Penny Fernandez:  No   (Declined by Penny Fernandez)

## 2024-02-14 ENCOUNTER — HOSPITAL ENCOUNTER (OUTPATIENT)
Dept: INFUSION CENTER | Facility: HOSPITAL | Age: 74
Discharge: HOME | End: 2024-02-14
Payer: MEDICARE

## 2024-02-14 DIAGNOSIS — Z45.2 ENCOUNTER FOR VENOUS ACCESS DEVICE CARE: Primary | ICD-10-CM

## 2024-02-14 DIAGNOSIS — C56.2 MALIGNANT NEOPLASM OF LEFT OVARY (HCC): ICD-10-CM

## 2024-02-14 LAB
ALBUMIN SERPL BCP-MCNC: 3.6 G/DL (ref 3.5–5)
ALP SERPL-CCNC: 125 U/L (ref 34–104)
ALT SERPL W P-5'-P-CCNC: 20 U/L (ref 7–52)
ANION GAP SERPL CALCULATED.3IONS-SCNC: 6 MMOL/L
AST SERPL W P-5'-P-CCNC: 23 U/L (ref 13–39)
BASOPHILS # BLD AUTO: 0.04 THOUSANDS/ÂΜL (ref 0–0.1)
BASOPHILS NFR BLD AUTO: 1 % (ref 0–1)
BILIRUB SERPL-MCNC: 0.33 MG/DL (ref 0.2–1)
BUN SERPL-MCNC: 16 MG/DL (ref 5–25)
CALCIUM SERPL-MCNC: 9.2 MG/DL (ref 8.4–10.2)
CHLORIDE SERPL-SCNC: 103 MMOL/L (ref 96–108)
CO2 SERPL-SCNC: 26 MMOL/L (ref 21–32)
CREAT SERPL-MCNC: 0.67 MG/DL (ref 0.6–1.3)
EOSINOPHIL # BLD AUTO: 0.06 THOUSAND/ÂΜL (ref 0–0.61)
EOSINOPHIL NFR BLD AUTO: 1 % (ref 0–6)
ERYTHROCYTE [DISTWIDTH] IN BLOOD BY AUTOMATED COUNT: 14.6 % (ref 11.6–15.1)
GFR SERPL CREATININE-BSD FRML MDRD: 87 ML/MIN/1.73SQ M
GLUCOSE SERPL-MCNC: 112 MG/DL (ref 65–140)
HCT VFR BLD AUTO: 32.5 % (ref 34.8–46.1)
HGB BLD-MCNC: 10.6 G/DL (ref 11.5–15.4)
IMM GRANULOCYTES # BLD AUTO: 0.05 THOUSAND/UL (ref 0–0.2)
IMM GRANULOCYTES NFR BLD AUTO: 1 % (ref 0–2)
LYMPHOCYTES # BLD AUTO: 0.96 THOUSANDS/ÂΜL (ref 0.6–4.47)
LYMPHOCYTES NFR BLD AUTO: 13 % (ref 14–44)
MAGNESIUM SERPL-MCNC: 1.7 MG/DL (ref 1.9–2.7)
MCH RBC QN AUTO: 31.1 PG (ref 26.8–34.3)
MCHC RBC AUTO-ENTMCNC: 32.6 G/DL (ref 31.4–37.4)
MCV RBC AUTO: 95 FL (ref 82–98)
MONOCYTES # BLD AUTO: 0.34 THOUSAND/ÂΜL (ref 0.17–1.22)
MONOCYTES NFR BLD AUTO: 5 % (ref 4–12)
NEUTROPHILS # BLD AUTO: 5.99 THOUSANDS/ÂΜL (ref 1.85–7.62)
NEUTS SEG NFR BLD AUTO: 79 % (ref 43–75)
NRBC BLD AUTO-RTO: 0 /100 WBCS
PLATELET # BLD AUTO: 483 THOUSANDS/UL (ref 149–390)
PMV BLD AUTO: 9.2 FL (ref 8.9–12.7)
POTASSIUM SERPL-SCNC: 4 MMOL/L (ref 3.5–5.3)
PROT SERPL-MCNC: 7 G/DL (ref 6.4–8.4)
RBC # BLD AUTO: 3.41 MILLION/UL (ref 3.81–5.12)
SODIUM SERPL-SCNC: 135 MMOL/L (ref 135–147)
WBC # BLD AUTO: 7.44 THOUSAND/UL (ref 4.31–10.16)

## 2024-02-14 PROCEDURE — 83735 ASSAY OF MAGNESIUM: CPT

## 2024-02-14 PROCEDURE — 80053 COMPREHEN METABOLIC PANEL: CPT

## 2024-02-14 PROCEDURE — 85025 COMPLETE CBC W/AUTO DIFF WBC: CPT

## 2024-02-14 NOTE — PROGRESS NOTES
Pt here for port labs; labs obtained without difficulty; pt aware of next appt 2/16 at 1000; left unit ambulatory with steady gait.

## 2024-02-15 RX ORDER — MAGNESIUM SULFATE HEPTAHYDRATE 40 MG/ML
2 INJECTION, SOLUTION INTRAVENOUS ONCE
Status: CANCELLED
Start: 2024-02-16

## 2024-02-15 RX ORDER — SODIUM CHLORIDE 9 MG/ML
20 INJECTION, SOLUTION INTRAVENOUS ONCE
Status: CANCELLED | OUTPATIENT
Start: 2024-02-16

## 2024-02-16 ENCOUNTER — HOSPITAL ENCOUNTER (OUTPATIENT)
Dept: INFUSION CENTER | Facility: HOSPITAL | Age: 74
End: 2024-02-16
Attending: OBSTETRICS & GYNECOLOGY
Payer: MEDICARE

## 2024-02-16 VITALS
SYSTOLIC BLOOD PRESSURE: 126 MMHG | WEIGHT: 146.16 LBS | OXYGEN SATURATION: 98 % | DIASTOLIC BLOOD PRESSURE: 57 MMHG | TEMPERATURE: 96.3 F | HEIGHT: 64 IN | BODY MASS INDEX: 24.95 KG/M2 | HEART RATE: 77 BPM | RESPIRATION RATE: 16 BRPM

## 2024-02-16 DIAGNOSIS — C56.2 MALIGNANT NEOPLASM OF LEFT OVARY (HCC): ICD-10-CM

## 2024-02-16 DIAGNOSIS — E83.42 HYPOMAGNESEMIA: Primary | ICD-10-CM

## 2024-02-16 PROCEDURE — 96367 TX/PROPH/DG ADDL SEQ IV INF: CPT

## 2024-02-16 PROCEDURE — 96413 CHEMO IV INFUSION 1 HR: CPT

## 2024-02-16 RX ORDER — MAGNESIUM SULFATE HEPTAHYDRATE 40 MG/ML
2 INJECTION, SOLUTION INTRAVENOUS ONCE
Status: COMPLETED | OUTPATIENT
Start: 2024-02-16 | End: 2024-02-16

## 2024-02-16 RX ORDER — SODIUM CHLORIDE 9 MG/ML
20 INJECTION, SOLUTION INTRAVENOUS ONCE
Status: COMPLETED | OUTPATIENT
Start: 2024-02-16 | End: 2024-02-16

## 2024-02-16 RX ADMIN — MAGNESIUM SULFATE IN WATER 2 G: 40 INJECTION, SOLUTION INTRAVENOUS at 10:35

## 2024-02-16 RX ADMIN — SODIUM CHLORIDE 20 ML/HR: 9 INJECTION, SOLUTION INTRAVENOUS at 10:39

## 2024-02-16 RX ADMIN — GEMCITABINE 1000 MG: 38 INJECTION, SOLUTION INTRAVENOUS at 12:29

## 2024-02-16 RX ADMIN — DEXAMETHASONE SODIUM PHOSPHATE 10 MG: 10 INJECTION, SOLUTION INTRAMUSCULAR; INTRAVENOUS at 11:57

## 2024-02-16 NOTE — PROGRESS NOTES
..Penny Fernandez  tolerated treatment well with no complications.      Penny Fernandez is aware of future appt on 2/21 at 1230.     AVS printed and given to Penny Fernandez:  No (Declined by Penny Fernandez)

## 2024-02-16 NOTE — TELEPHONE ENCOUNTER
D/I/A: Manual pressure held for 15 minutes to Right groin.  Sheath, size 4Fr,  pulled by WILLIE Beal supervised by WILLIE Patton.  Site CDI, no hematoma.  Stasis achieved at 1644. Off bedrest @ 1845.  A+O x4 and making needs known.  Denies pain or sob.  VSSA.  Tolerated sheath removal well. Off bedrest at 1845  P: Continue to monitor status.  Discharge to home once meeting criteria.     Yes it was sent 2/5/21

## 2024-02-21 ENCOUNTER — HOSPITAL ENCOUNTER (OUTPATIENT)
Dept: INFUSION CENTER | Facility: HOSPITAL | Age: 74
Discharge: HOME | End: 2024-02-21
Payer: MEDICARE

## 2024-02-21 DIAGNOSIS — Z45.2 ENCOUNTER FOR VENOUS ACCESS DEVICE CARE: Primary | ICD-10-CM

## 2024-02-21 DIAGNOSIS — C56.2 MALIGNANT NEOPLASM OF LEFT OVARY (HCC): ICD-10-CM

## 2024-02-21 LAB
ALBUMIN SERPL BCP-MCNC: 3.7 G/DL (ref 3.5–5)
ALP SERPL-CCNC: 119 U/L (ref 34–104)
ALT SERPL W P-5'-P-CCNC: 16 U/L (ref 7–52)
ANION GAP SERPL CALCULATED.3IONS-SCNC: 5 MMOL/L
AST SERPL W P-5'-P-CCNC: 19 U/L (ref 13–39)
BASOPHILS # BLD AUTO: 0.03 THOUSANDS/ÂΜL (ref 0–0.1)
BASOPHILS NFR BLD AUTO: 0 % (ref 0–1)
BILIRUB SERPL-MCNC: 0.25 MG/DL (ref 0.2–1)
BUN SERPL-MCNC: 16 MG/DL (ref 5–25)
CALCIUM SERPL-MCNC: 9.2 MG/DL (ref 8.4–10.2)
CHLORIDE SERPL-SCNC: 103 MMOL/L (ref 96–108)
CO2 SERPL-SCNC: 27 MMOL/L (ref 21–32)
CREAT SERPL-MCNC: 0.62 MG/DL (ref 0.6–1.3)
EOSINOPHIL # BLD AUTO: 0.03 THOUSAND/ÂΜL (ref 0–0.61)
EOSINOPHIL NFR BLD AUTO: 0 % (ref 0–6)
ERYTHROCYTE [DISTWIDTH] IN BLOOD BY AUTOMATED COUNT: 14.7 % (ref 11.6–15.1)
GFR SERPL CREATININE-BSD FRML MDRD: 89 ML/MIN/1.73SQ M
GLUCOSE SERPL-MCNC: 113 MG/DL (ref 65–140)
HCT VFR BLD AUTO: 31.2 % (ref 34.8–46.1)
HGB BLD-MCNC: 10.4 G/DL (ref 11.5–15.4)
IMM GRANULOCYTES # BLD AUTO: 0.08 THOUSAND/UL (ref 0–0.2)
IMM GRANULOCYTES NFR BLD AUTO: 1 % (ref 0–2)
LYMPHOCYTES # BLD AUTO: 1.24 THOUSANDS/ÂΜL (ref 0.6–4.47)
LYMPHOCYTES NFR BLD AUTO: 13 % (ref 14–44)
MAGNESIUM SERPL-MCNC: 1.7 MG/DL (ref 1.9–2.7)
MCH RBC QN AUTO: 31.9 PG (ref 26.8–34.3)
MCHC RBC AUTO-ENTMCNC: 33.3 G/DL (ref 31.4–37.4)
MCV RBC AUTO: 96 FL (ref 82–98)
MONOCYTES # BLD AUTO: 0.31 THOUSAND/ÂΜL (ref 0.17–1.22)
MONOCYTES NFR BLD AUTO: 3 % (ref 4–12)
NEUTROPHILS # BLD AUTO: 7.54 THOUSANDS/ÂΜL (ref 1.85–7.62)
NEUTS SEG NFR BLD AUTO: 83 % (ref 43–75)
NRBC BLD AUTO-RTO: 0 /100 WBCS
PLATELET # BLD AUTO: 207 THOUSANDS/UL (ref 149–390)
PMV BLD AUTO: 9.5 FL (ref 8.9–12.7)
POTASSIUM SERPL-SCNC: 3.9 MMOL/L (ref 3.5–5.3)
PROT SERPL-MCNC: 7.2 G/DL (ref 6.4–8.4)
RBC # BLD AUTO: 3.26 MILLION/UL (ref 3.81–5.12)
SODIUM SERPL-SCNC: 135 MMOL/L (ref 135–147)
WBC # BLD AUTO: 9.23 THOUSAND/UL (ref 4.31–10.16)

## 2024-02-21 PROCEDURE — 80053 COMPREHEN METABOLIC PANEL: CPT

## 2024-02-21 PROCEDURE — 85025 COMPLETE CBC W/AUTO DIFF WBC: CPT

## 2024-02-21 PROCEDURE — 83735 ASSAY OF MAGNESIUM: CPT

## 2024-02-21 NOTE — PROGRESS NOTES
Pt here for port labs; labs obtained without difficulty; pt aware of next appt 2/28 at 1100; left unit ambulatory with steady gait.

## 2024-02-28 ENCOUNTER — HOSPITAL ENCOUNTER (OUTPATIENT)
Dept: INFUSION CENTER | Facility: HOSPITAL | Age: 74
Discharge: HOME | End: 2024-02-28
Payer: MEDICARE

## 2024-02-28 ENCOUNTER — APPOINTMENT (OUTPATIENT)
Dept: LAB | Facility: HOSPITAL | Age: 74
End: 2024-02-28
Payer: MEDICARE

## 2024-02-28 DIAGNOSIS — B99.8 IMMUNOSUPPRESSION-RELATED INFECTIOUS DISEASE (HCC): ICD-10-CM

## 2024-02-28 DIAGNOSIS — D68.59 PRIMARY HYPERCOAGULABLE STATE (HCC): ICD-10-CM

## 2024-02-28 DIAGNOSIS — C78.2 SECONDARY MALIGNANT NEOPLASM OF PLEURA (HCC): ICD-10-CM

## 2024-02-28 DIAGNOSIS — Z45.2 ENCOUNTER FOR VENOUS ACCESS DEVICE CARE: ICD-10-CM

## 2024-02-28 DIAGNOSIS — I10 ESSENTIAL HYPERTENSION, MALIGNANT: ICD-10-CM

## 2024-02-28 DIAGNOSIS — C56.2 MALIGNANT NEOPLASM OF LEFT OVARY (HCC): Primary | ICD-10-CM

## 2024-02-28 DIAGNOSIS — C79.9 METASTATIC SIGNET RING CELL CARCINOMA (HCC): ICD-10-CM

## 2024-02-28 DIAGNOSIS — D84.9 IMMUNOSUPPRESSION-RELATED INFECTIOUS DISEASE (HCC): ICD-10-CM

## 2024-02-28 DIAGNOSIS — E83.42 HYPOMAGNESEMIA: ICD-10-CM

## 2024-02-28 DIAGNOSIS — Z93.2 ILEOSTOMY STATUS (HCC): ICD-10-CM

## 2024-02-28 LAB
ALBUMIN SERPL BCP-MCNC: 3.6 G/DL (ref 3.5–5)
ALP SERPL-CCNC: 112 U/L (ref 34–104)
ALT SERPL W P-5'-P-CCNC: 14 U/L (ref 7–52)
ANION GAP SERPL CALCULATED.3IONS-SCNC: 5 MMOL/L
ANISOCYTOSIS BLD QL SMEAR: PRESENT
AST SERPL W P-5'-P-CCNC: 17 U/L (ref 13–39)
BASOPHILS # BLD MANUAL: 0 THOUSAND/UL (ref 0–0.1)
BASOPHILS NFR MAR MANUAL: 0 % (ref 0–1)
BILIRUB SERPL-MCNC: 0.26 MG/DL (ref 0.2–1)
BUN SERPL-MCNC: 13 MG/DL (ref 5–25)
CALCIUM SERPL-MCNC: 9 MG/DL (ref 8.4–10.2)
CANCER AG125 SERPL-ACNC: 225.2 U/ML (ref 0–35)
CHLORIDE SERPL-SCNC: 105 MMOL/L (ref 96–108)
CO2 SERPL-SCNC: 26 MMOL/L (ref 21–32)
CREAT SERPL-MCNC: 0.67 MG/DL (ref 0.6–1.3)
EOSINOPHIL # BLD MANUAL: 0.12 THOUSAND/UL (ref 0–0.4)
EOSINOPHIL NFR BLD MANUAL: 1 % (ref 0–6)
ERYTHROCYTE [DISTWIDTH] IN BLOOD BY AUTOMATED COUNT: 16 % (ref 11.6–15.1)
EST. AVERAGE GLUCOSE BLD GHB EST-MCNC: 151 MG/DL
GFR SERPL CREATININE-BSD FRML MDRD: 87 ML/MIN/1.73SQ M
GLUCOSE SERPL-MCNC: 106 MG/DL (ref 65–140)
HBA1C MFR BLD: 6.9 %
HCT VFR BLD AUTO: 29.7 % (ref 34.8–46.1)
HGB BLD-MCNC: 9.9 G/DL (ref 11.5–15.4)
LYMPHOCYTES # BLD AUTO: 0.82 THOUSAND/UL (ref 0.6–4.47)
LYMPHOCYTES # BLD AUTO: 7 % (ref 14–44)
MAGNESIUM SERPL-MCNC: 1.7 MG/DL (ref 1.9–2.7)
MCH RBC QN AUTO: 31.7 PG (ref 26.8–34.3)
MCHC RBC AUTO-ENTMCNC: 33.3 G/DL (ref 31.4–37.4)
MCV RBC AUTO: 95 FL (ref 82–98)
MONOCYTES # BLD AUTO: 1.17 THOUSAND/UL (ref 0–1.22)
MONOCYTES NFR BLD: 10 % (ref 4–12)
MYELOCYTES NFR BLD MANUAL: 1 % (ref 0–1)
NEUTROPHILS # BLD MANUAL: 9.44 THOUSAND/UL (ref 1.85–7.62)
NEUTS SEG NFR BLD AUTO: 81 % (ref 43–75)
PLATELET # BLD AUTO: 246 THOUSANDS/UL (ref 149–390)
PLATELET BLD QL SMEAR: ADEQUATE
PMV BLD AUTO: 9.9 FL (ref 8.9–12.7)
POTASSIUM SERPL-SCNC: 4.1 MMOL/L (ref 3.5–5.3)
PROT SERPL-MCNC: 6.7 G/DL (ref 6.4–8.4)
RBC # BLD AUTO: 3.12 MILLION/UL (ref 3.81–5.12)
RBC MORPH BLD: PRESENT
SODIUM SERPL-SCNC: 136 MMOL/L (ref 135–147)
TARGETS BLD QL SMEAR: PRESENT
TSH SERPL DL<=0.05 MIU/L-ACNC: 1.62 UIU/ML (ref 0.45–4.5)
WBC # BLD AUTO: 11.66 THOUSAND/UL (ref 4.31–10.16)

## 2024-02-28 PROCEDURE — 36415 COLL VENOUS BLD VENIPUNCTURE: CPT

## 2024-02-28 PROCEDURE — 86304 IMMUNOASSAY TUMOR CA 125: CPT

## 2024-02-28 PROCEDURE — 84443 ASSAY THYROID STIM HORMONE: CPT

## 2024-02-28 PROCEDURE — 85027 COMPLETE CBC AUTOMATED: CPT

## 2024-02-28 PROCEDURE — 83735 ASSAY OF MAGNESIUM: CPT

## 2024-02-28 PROCEDURE — 80053 COMPREHEN METABOLIC PANEL: CPT

## 2024-02-28 PROCEDURE — 85007 BL SMEAR W/DIFF WBC COUNT: CPT

## 2024-02-28 PROCEDURE — 83036 HEMOGLOBIN GLYCOSYLATED A1C: CPT

## 2024-02-28 NOTE — PROGRESS NOTES
Penny Fernandez  tolerated treatment well with no complications.      Penny Fernandez is aware of future appt on 3/1 at 1330.     AVS printed and given to Penny Fernandez:  No (Declined by Penny Fernandez)

## 2024-02-29 ENCOUNTER — OFFICE VISIT (OUTPATIENT)
Age: 74
End: 2024-02-29
Payer: MEDICARE

## 2024-02-29 VITALS
SYSTOLIC BLOOD PRESSURE: 116 MMHG | HEART RATE: 84 BPM | DIASTOLIC BLOOD PRESSURE: 78 MMHG | TEMPERATURE: 97.5 F | HEIGHT: 64 IN | WEIGHT: 147 LBS | BODY MASS INDEX: 25.1 KG/M2 | OXYGEN SATURATION: 98 %

## 2024-02-29 DIAGNOSIS — C56.2 MALIGNANT NEOPLASM OF LEFT OVARY (HCC): Primary | ICD-10-CM

## 2024-02-29 PROCEDURE — 99215 OFFICE O/P EST HI 40 MIN: CPT | Performed by: PHYSICIAN ASSISTANT

## 2024-02-29 RX ORDER — SODIUM CHLORIDE 9 MG/ML
20 INJECTION, SOLUTION INTRAVENOUS ONCE
Status: CANCELLED | OUTPATIENT
Start: 2024-03-01

## 2024-02-29 NOTE — PROGRESS NOTES
Assessment/Plan:    Problem List Items Addressed This Visit          Endocrine    Malignant neoplasm of left ovary (HCC) - Primary     Recurrent, platinum resistant ovarian cancer with disease progression on palliative mirvetuximab currently receiving palliative gemcitabine 600 mg/m2 IV days 1 and 8 every 21 days. She is tolerating treatment well. She has grade 1 treatment related fatigue. Her  is declining.     Continue with cycle 3 of treatment as planned. Metabolic and hematologic parameters from 2/28/24 reviewed and are adequate for treatment.      Plan for CT chest/abdomen/pelvis after completion of cycle 3. Return to the office as per her chemotherapy evaluation.           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  Results revealed patient has the following mutation(s):  None     Malignant neoplasm of left ovary (HCC)   8/4/2022 Surgery    Diagnostic laparoscopy, peritoneal biopsy     8/9/2022 Initial Diagnosis    Gynecologic malignancy (HCC)     8/23/2022 - 1/24/2023 Chemotherapy    palonosetron (ALOXI), 0.25 mg, Intravenous, Once, 6 of 6 cycles  Administration: 0.25 mg (8/23/2022), 0.25 mg (9/13/2022), 0.25 mg (10/4/2022), 0.25 mg (12/13/2022), 0.25 mg (1/3/2023), 0.25 mg (1/24/2023)  fosaprepitant (EMEND) IVPB, 150 mg, Intravenous, Once, 6 of 6 cycles  Administration: 150 mg (8/23/2022), 150 mg  (9/13/2022), 150 mg (10/4/2022), 150 mg (12/13/2022), 150 mg (1/3/2023), 150 mg (1/24/2023)  CARBOplatin (PARAPLATIN) IVPB (GOG AUC DOSING), 605.4 mg, Intravenous, Once, 6 of 6 cycles  Administration: 600 mg (8/23/2022), 564 mg (9/13/2022), 601.8 mg (10/4/2022), 491.5 mg (12/13/2022), 468.5 mg (1/3/2023), 488 mg (1/24/2023)  bevacizumab (AVASTIN) IVPB, 1,252.5 mg, Intravenous, Once, 3 of 3 cycles  Dose modification: 10 mg/kg (original dose 15 mg/kg, Cycle 5, Reason: Other (Must fill in a comment))  Administration: 1,200 mg (8/23/2022), 1,200 mg (9/13/2022), 745 mg (1/3/2023)  PACLItaxel (TAXOL) chemo IVPB, 175 mg/m2 = 330.6 mg, Intravenous, Once, 6 of 6 cycles  Dose modification: 135 mg/m2 (original dose 175 mg/m2, Cycle 4, Reason: Other (Must fill in a comment))  Administration: 330.6 mg (8/23/2022), 330.6 mg (9/13/2022), 330.6 mg (10/4/2022), 246 mg (12/13/2022), 244.2 mg (1/3/2023), 244.2 mg (1/24/2023)     10/28/2022 Surgery    diagnostic laparoscopy, exploratory laparotomy, modified radical hysterectomy, bilateral salpingo-oophorectomy with en bloc rectosigmoid resection, gastrocolic omentectomy, splenectomy, small bowel resection with reanastomosis, diaphragm resection, excision ablation of peritoneal implants, ileostomy formation, optimal tumor debulking     11/29/2022 -  Cancer Staged    Staging form: Ovary, Fallopian Tube, Primary Peritoneal, AJCC 8th Edition  - Clinical: FIGO Stage IIIC (cT3c, cN0, cM0) - Signed by Pablo Lockwood MD on 11/29/2022  Histologic grade (G): G3  Histologic grading system: 4 grade system       3/27/2023 Surgery    Low anterior resection  Residual tumor identified in lymphovascular spaces    A. 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 Colon (anastomotic donuts):  - Colon with no diagnostic abnormality  - No carcinoma identified      4/17/2023  Genomic Testing    [employer]: FOLR1, positive     7/21/2023 -  Chemotherapy    Doxil 30 mg/m2 and carboplatin AUC 5 every 28 days with avastin 10 mg/kg IV every 14 days.     Avastin dose-reduced to 7.5 mg/kg due to epistaxis.      10/27/2023 -  Chemotherapy    Mirvetuximab 6 mg/kg AIBW IV every 21 days.        1/18/2024 -  Chemotherapy    Gemcitabine 600 mg/m2 IV days 1 and 8 every 21 days.             Patient ID: Penny Fernandez is a 73 y.o. female  who presents to the office for pre-chemotherapy evaluation. Overall, she is tolerating treatment well. She notes treatment related fatigue. She has been afebrile. She denies n/v/abdominal pain. Her appetite is appropriate. Normal bowel/bladder function. She denies mouth sores or skin rashes. CBC/Diff, CMP, Mg and  from 2/28/24 reviewed. Her  is declining.         The following portions of the patient's history were reviewed and updated as appropriate: allergies, current medications, past medical history, past surgical history, and problem list.    Review of Systems   Constitutional:  Positive for fatigue. Negative for fever.   HENT: Negative.     Eyes: Negative.    Respiratory: Negative.     Cardiovascular: Negative.    Gastrointestinal: Negative.    Genitourinary: Negative.    Musculoskeletal: Negative.    Skin: Negative.    Neurological: Negative.    Psychiatric/Behavioral: Negative.         Current Outpatient Medications   Medication Sig Dispense Refill    Acetaminophen (TYLENOL ARTHRITIS PAIN PO) Take by mouth as needed      aspirin (ECOTRIN LOW STRENGTH) 81 mg EC tablet Take 81 mg by mouth daily      B Complex-C (B COMPLEX-VITAMIN C PO) Take by mouth daily      bevacizumab (Avastin) 100 mg/4 mL Avastin      Calcium Carb-Cholecalciferol (CALCIUM 500+D3 PO) Take 1 tablet by mouth 2 (two) times a day       CARBOplatin (PARAPLATIN) 50 mg/5 mL CARBOplatin      cephalexin (KEFLEX) 500 mg capsule Take 500 mg by mouth every 6 (six) hours      cholecalciferol  (VITAMIN D3) 1,000 units tablet Take 1,000 Units by mouth daily      DOXOrubicin liposome (Doxil) 2 mg/mL Doxil      fexofenadine (ALLEGRA) 180 MG tablet Take 180 mg by mouth daily      GLUCOSAMINE-CHONDROITIN PO Take 1 tablet by mouth 2 (two) times a day       lidocaine-prilocaine (EMLA) cream Apply to mediport 30 min prior to labs or chemo 30 g 3    lisinopril (ZESTRIL) 20 mg tablet Take 20 mg by mouth daily      LORazepam (ATIVAN) 1 mg tablet Take 1 tablet (1 mg total) by mouth every 8 (eight) hours as needed for anxiety (nausea or anxiety) 40 tablet 0    magnesium oxide (MAG-OX) 400 mg tablet Take 1 tablet by mouth daily as needed      metoprolol succinate (TOPROL-XL) 25 mg 24 hr tablet Take 50 mg by mouth daily Taking 25 mg at breakfast and 25 mg at dinner      montelukast (SINGULAIR) 10 mg tablet Take 10 mg by mouth daily dinner      multivitamin (THERAGRAN) TABS Take 1 tablet by mouth daily      naloxone (NARCAN) 4 mg/0.1 mL nasal spray Administer 1 spray into a nostril. If no response after 2-3 minutes, give another dose in the other nostril using a new spray. 1 each 1    nystatin (MYCOSTATIN) powder Apply topically if needed Under abdominal pannus and in between thighs      omeprazole (PriLOSEC) 20 mg delayed release capsule TAKE 1 CAPSULE BY MOUTH EVERY DAY 30 MINUTES BEFORE MORNING MEAL FOR 90 DAYS      ondansetron (ZOFRAN) 8 mg tablet Take 1 tablet (8 mg total) by mouth every 8 (eight) hours as needed for nausea or vomiting 20 tablet 0    Probiotic Product (PROBIOTIC DAILY PO) Take by mouth daily      simvastatin (ZOCOR) 10 mg tablet Take 10 mg by mouth daily at bedtime      sodium chloride (OCEAN) 0.65 % nasal spray 1 spray into each nostril as needed for congestion      SYMBICORT 160-4.5 MCG/ACT inhaler 2 puffs daily       Triamcinolone Acetonide (NASACORT ALLERGY 24HR NA) 1 spray by Each Nare route 2 (two) times a day      Ascorbic Acid (VITAMIN C GUMMIE PO) Take by mouth (Patient not taking: Reported  "on 2/29/2024)      famotidine (PEPCID) 40 MG tablet TAKE 1 TABLET BY MOUTH DAILY AT BEDTIME (Patient not taking: Reported on 2/29/2024) 90 tablet 3    lisinopril (ZESTRIL) 5 mg tablet Take 10 mg by mouth daily (Patient not taking: Reported on 11/9/2023)      oxyCODONE (ROXICODONE) 5 immediate release tablet 1/2 tab every 6 hours as needed for pain (Patient not taking: Reported on 7/6/2023) 5 tablet 0    prednisoLONE acetate (PRED FORTE) 1 % ophthalmic suspension Apply 1 drop to each eye 6x day the day prior to treatment and then for 4 days following treatment, then 4x day in each eye days 5 thru 8 following treatment. 5 mL 0    predniSONE 1 MG TBEC Take 1 mg by mouth every other day (Patient not taking: Reported on 2/29/2024)       No current facility-administered medications for this visit.     Facility-Administered Medications Ordered in Other Visits   Medication Dose Route Frequency Provider Last Rate Last Admin    alteplase (CATHFLO) injection 2 mg  2 mg Intracatheter Q1MIN PRN Pablo Lockwood MD               Objective:    Blood pressure 116/78, pulse 84, temperature 97.5 °F (36.4 °C), height 5' 4\" (1.626 m), weight 66.7 kg (147 lb), SpO2 98%.  Body mass index is 25.23 kg/m².  Body surface area is 1.72 meters squared.    Physical Exam  Constitutional:       Appearance: She is well-developed.   Pulmonary:      Effort: Pulmonary effort is normal.   Skin:     General: Skin is warm and dry.      Findings: No rash.   Neurological:      Mental Status: She is alert and oriented to person, place, and time.   Psychiatric:         Behavior: Behavior normal.         Thought Content: Thought content normal.         Judgment: Judgment normal.     Performance status is zero.      Lab Results   Component Value Date    K 4.1 02/28/2024     02/28/2024    CO2 26 02/28/2024    BUN 13 02/28/2024    CREATININE 0.67 02/28/2024    GLUCOSE 214 (H) 10/28/2022    GLUF 161 (H) 12/08/2022    CALCIUM 9.0 02/28/2024    " CORRECTEDCA 9.4 09/20/2023    AST 17 02/28/2024    ALT 14 02/28/2024    ALKPHOS 112 (H) 02/28/2024    EGFR 87 02/28/2024     Lab Results   Component Value Date    WBC 11.66 (H) 02/28/2024    HGB 9.9 (L) 02/28/2024    HCT 29.7 (L) 02/28/2024    MCV 95 02/28/2024     02/28/2024     Lab Results   Component Value Date    NEUTROABS 7.54 02/21/2024        Trend:  Lab Results   Component Value Date     225.2 (H) 02/28/2024     305.9 (H) 02/07/2024     528.9 (H) 01/17/2024     336.9 (H) 12/06/2023     269.2 (H) 11/15/2023     234.7 (H) 10/25/2023     205.1 (H) 09/27/2023     223.9 (H) 08/30/2023     340.6 (H) 08/02/2023     168.5 (H) 06/26/2023     6.2 03/24/2023     5.1 03/02/2023     5.3 02/09/2023     8.7 01/19/2023     15.8 12/30/2022     37.9 (H) 12/08/2022     8.6 10/20/2022     8.4 10/07/2022     12.5 09/29/2022     32.0 (H) 09/08/2022     52.4 (H) 09/02/2022     85.5 (H) 08/26/2022     77.3 (H) 08/19/2022

## 2024-02-29 NOTE — ASSESSMENT & PLAN NOTE
Recurrent, platinum resistant ovarian cancer with disease progression on palliative mirvetuximab currently receiving palliative gemcitabine 600 mg/m2 IV days 1 and 8 every 21 days. She is tolerating treatment well. She has grade 1 treatment related fatigue. Her  is declining.     Continue with cycle 3 of treatment as planned. Metabolic and hematologic parameters from 2/28/24 reviewed and are adequate for treatment.      Plan for CT chest/abdomen/pelvis after completion of cycle 3. Return to the office as per her chemotherapy evaluation.

## 2024-03-01 ENCOUNTER — HOSPITAL ENCOUNTER (OUTPATIENT)
Dept: INFUSION CENTER | Facility: HOSPITAL | Age: 74
End: 2024-03-01
Attending: OBSTETRICS & GYNECOLOGY
Payer: MEDICARE

## 2024-03-01 VITALS
HEIGHT: 64 IN | DIASTOLIC BLOOD PRESSURE: 58 MMHG | BODY MASS INDEX: 24.95 KG/M2 | WEIGHT: 146.16 LBS | SYSTOLIC BLOOD PRESSURE: 120 MMHG | RESPIRATION RATE: 16 BRPM | TEMPERATURE: 97.7 F | HEART RATE: 78 BPM

## 2024-03-01 DIAGNOSIS — C56.2 MALIGNANT NEOPLASM OF LEFT OVARY (HCC): Primary | ICD-10-CM

## 2024-03-01 PROCEDURE — 96413 CHEMO IV INFUSION 1 HR: CPT

## 2024-03-01 PROCEDURE — 96367 TX/PROPH/DG ADDL SEQ IV INF: CPT

## 2024-03-01 RX ORDER — SODIUM CHLORIDE 9 MG/ML
20 INJECTION, SOLUTION INTRAVENOUS ONCE
Status: COMPLETED | OUTPATIENT
Start: 2024-03-01 | End: 2024-03-01

## 2024-03-01 RX ADMIN — GEMCITABINE 1000 MG: 38 INJECTION, SOLUTION INTRAVENOUS at 14:15

## 2024-03-01 RX ADMIN — DEXAMETHASONE SODIUM PHOSPHATE 10 MG: 100 INJECTION INTRAMUSCULAR; INTRAVENOUS at 13:44

## 2024-03-01 RX ADMIN — SODIUM CHLORIDE 20 ML/HR: 9 INJECTION, SOLUTION INTRAVENOUS at 13:44

## 2024-03-01 NOTE — PROGRESS NOTES
Penny Fernandez  tolerated treatment well with no complications.      Penny Fernandez is aware of future appt on 3/6/24 at 1130.     AVS printed and given to Penny Fernandez:  Yes  (Declined by Penny Fernandez)

## 2024-03-06 ENCOUNTER — HOSPITAL ENCOUNTER (OUTPATIENT)
Dept: INFUSION CENTER | Facility: HOSPITAL | Age: 74
Discharge: HOME | End: 2024-03-06
Payer: MEDICARE

## 2024-03-06 DIAGNOSIS — Z45.2 ENCOUNTER FOR VENOUS ACCESS DEVICE CARE: ICD-10-CM

## 2024-03-06 DIAGNOSIS — C56.2 MALIGNANT NEOPLASM OF LEFT OVARY (HCC): Primary | ICD-10-CM

## 2024-03-06 LAB
ALBUMIN SERPL BCP-MCNC: 3.6 G/DL (ref 3.5–5)
ALP SERPL-CCNC: 139 U/L (ref 34–104)
ALT SERPL W P-5'-P-CCNC: 23 U/L (ref 7–52)
ANION GAP SERPL CALCULATED.3IONS-SCNC: 5 MMOL/L
AST SERPL W P-5'-P-CCNC: 24 U/L (ref 13–39)
BASOPHILS # BLD AUTO: 0.05 THOUSANDS/ÂΜL (ref 0–0.1)
BASOPHILS NFR BLD AUTO: 1 % (ref 0–1)
BILIRUB SERPL-MCNC: 0.39 MG/DL (ref 0.2–1)
BUN SERPL-MCNC: 18 MG/DL (ref 5–25)
CALCIUM SERPL-MCNC: 9.6 MG/DL (ref 8.4–10.2)
CHLORIDE SERPL-SCNC: 105 MMOL/L (ref 96–108)
CO2 SERPL-SCNC: 27 MMOL/L (ref 21–32)
CREAT SERPL-MCNC: 0.72 MG/DL (ref 0.6–1.3)
EOSINOPHIL # BLD AUTO: 0.09 THOUSAND/ÂΜL (ref 0–0.61)
EOSINOPHIL NFR BLD AUTO: 1 % (ref 0–6)
ERYTHROCYTE [DISTWIDTH] IN BLOOD BY AUTOMATED COUNT: 16.1 % (ref 11.6–15.1)
GFR SERPL CREATININE-BSD FRML MDRD: 83 ML/MIN/1.73SQ M
GLUCOSE SERPL-MCNC: 128 MG/DL (ref 65–140)
HCT VFR BLD AUTO: 29.5 % (ref 34.8–46.1)
HGB BLD-MCNC: 9.8 G/DL (ref 11.5–15.4)
IMM GRANULOCYTES # BLD AUTO: 0.06 THOUSAND/UL (ref 0–0.2)
IMM GRANULOCYTES NFR BLD AUTO: 1 % (ref 0–2)
LYMPHOCYTES # BLD AUTO: 0.99 THOUSANDS/ÂΜL (ref 0.6–4.47)
LYMPHOCYTES NFR BLD AUTO: 12 % (ref 14–44)
MAGNESIUM SERPL-MCNC: 1.7 MG/DL (ref 1.9–2.7)
MCH RBC QN AUTO: 31.6 PG (ref 26.8–34.3)
MCHC RBC AUTO-ENTMCNC: 33.2 G/DL (ref 31.4–37.4)
MCV RBC AUTO: 95 FL (ref 82–98)
MONOCYTES # BLD AUTO: 0.58 THOUSAND/ÂΜL (ref 0.17–1.22)
MONOCYTES NFR BLD AUTO: 7 % (ref 4–12)
NEUTROPHILS # BLD AUTO: 6.6 THOUSANDS/ÂΜL (ref 1.85–7.62)
NEUTS SEG NFR BLD AUTO: 78 % (ref 43–75)
NRBC BLD AUTO-RTO: 0 /100 WBCS
PLATELET # BLD AUTO: 525 THOUSANDS/UL (ref 149–390)
PMV BLD AUTO: 9.4 FL (ref 8.9–12.7)
POTASSIUM SERPL-SCNC: 4 MMOL/L (ref 3.5–5.3)
PROT SERPL-MCNC: 7 G/DL (ref 6.4–8.4)
RBC # BLD AUTO: 3.1 MILLION/UL (ref 3.81–5.12)
SODIUM SERPL-SCNC: 137 MMOL/L (ref 135–147)
WBC # BLD AUTO: 8.37 THOUSAND/UL (ref 4.31–10.16)

## 2024-03-06 PROCEDURE — 85025 COMPLETE CBC W/AUTO DIFF WBC: CPT

## 2024-03-06 PROCEDURE — 83735 ASSAY OF MAGNESIUM: CPT

## 2024-03-06 PROCEDURE — 80053 COMPREHEN METABOLIC PANEL: CPT

## 2024-03-06 NOTE — PROGRESS NOTES
Pt here for port labs; labs obtained without difficulty; pt aware of next appt 3/8 at 1130; left unit ambulatory with steady gait.

## 2024-03-07 RX ORDER — SODIUM CHLORIDE 9 MG/ML
20 INJECTION, SOLUTION INTRAVENOUS ONCE
Status: CANCELLED | OUTPATIENT
Start: 2024-03-08

## 2024-03-07 RX ORDER — MAGNESIUM SULFATE HEPTAHYDRATE 40 MG/ML
2 INJECTION, SOLUTION INTRAVENOUS ONCE
Status: CANCELLED
Start: 2024-03-08

## 2024-03-08 ENCOUNTER — HOSPITAL ENCOUNTER (OUTPATIENT)
Dept: INFUSION CENTER | Facility: HOSPITAL | Age: 74
End: 2024-03-08
Attending: OBSTETRICS & GYNECOLOGY
Payer: MEDICARE

## 2024-03-08 VITALS
RESPIRATION RATE: 16 BRPM | HEART RATE: 79 BPM | SYSTOLIC BLOOD PRESSURE: 137 MMHG | DIASTOLIC BLOOD PRESSURE: 64 MMHG | OXYGEN SATURATION: 97 % | BODY MASS INDEX: 24.99 KG/M2 | WEIGHT: 146.39 LBS | HEIGHT: 64 IN | TEMPERATURE: 97.8 F

## 2024-03-08 DIAGNOSIS — C56.2 MALIGNANT NEOPLASM OF LEFT OVARY (HCC): Primary | ICD-10-CM

## 2024-03-08 PROCEDURE — 96367 TX/PROPH/DG ADDL SEQ IV INF: CPT

## 2024-03-08 PROCEDURE — 96413 CHEMO IV INFUSION 1 HR: CPT

## 2024-03-08 RX ORDER — MAGNESIUM SULFATE HEPTAHYDRATE 40 MG/ML
2 INJECTION, SOLUTION INTRAVENOUS ONCE
Status: COMPLETED | OUTPATIENT
Start: 2024-03-08 | End: 2024-03-08

## 2024-03-08 RX ORDER — SODIUM CHLORIDE 9 MG/ML
20 INJECTION, SOLUTION INTRAVENOUS ONCE
Status: COMPLETED | OUTPATIENT
Start: 2024-03-08 | End: 2024-03-08

## 2024-03-08 RX ADMIN — GEMCITABINE 1000 MG: 38 INJECTION, SOLUTION INTRAVENOUS at 14:14

## 2024-03-08 RX ADMIN — MAGNESIUM SULFATE IN WATER 2 G: 40 INJECTION, SOLUTION INTRAVENOUS at 12:21

## 2024-03-08 RX ADMIN — DEXAMETHASONE SODIUM PHOSPHATE 10 MG: 100 INJECTION INTRAMUSCULAR; INTRAVENOUS at 13:44

## 2024-03-08 RX ADMIN — SODIUM CHLORIDE 20 ML/HR: 9 INJECTION, SOLUTION INTRAVENOUS at 12:25

## 2024-03-08 NOTE — PROGRESS NOTES
Pt tolerated chemotherapy infusion without any adverse reactions. Port flushed and de-accessed. Pt ambulated out of unit with a steady gait. Declined AVS     Aware of next appt on 03/13/24 @ 1030 am

## 2024-03-12 ENCOUNTER — OFFICE VISIT (OUTPATIENT)
Dept: GASTROENTEROLOGY | Facility: CLINIC | Age: 74
End: 2024-03-12
Payer: MEDICARE

## 2024-03-12 VITALS
HEIGHT: 64 IN | DIASTOLIC BLOOD PRESSURE: 62 MMHG | WEIGHT: 147 LBS | BODY MASS INDEX: 25.1 KG/M2 | SYSTOLIC BLOOD PRESSURE: 110 MMHG

## 2024-03-12 DIAGNOSIS — C48.2 PERITONEAL CARCINOMA (HCC): ICD-10-CM

## 2024-03-12 DIAGNOSIS — R11.0 NAUSEA: ICD-10-CM

## 2024-03-12 DIAGNOSIS — Z86.010 PERSONAL HISTORY OF COLONIC POLYPS: ICD-10-CM

## 2024-03-12 DIAGNOSIS — E73.9 ADULT LACTASE DEFICIENCY: ICD-10-CM

## 2024-03-12 DIAGNOSIS — K21.9 GASTROESOPHAGEAL REFLUX DISEASE: Primary | ICD-10-CM

## 2024-03-12 PROCEDURE — 99213 OFFICE O/P EST LOW 20 MIN: CPT | Performed by: INTERNAL MEDICINE

## 2024-03-12 NOTE — PATIENT INSTRUCTIONS
Continue with reflux diet and precautions  Observe for and avoid trigger foods including fatty foods and dairy  Continue with omeprazole  Famotidine as needed

## 2024-03-12 NOTE — PROGRESS NOTES
"[facility] - Outpatient Follow-up Note  Penny Fernandez 73 y.o. female MRN: [id number]  Encounter: [id number]    ASSESSMENT AND PLAN:      1. Gastroesophageal reflux disease  2. Adult lactase deficiency  3. Nausea  Upper GI symptoms are actually fairly stable.  She only has intermittent GERD symptoms and no dysphagia.  Lactose ingestion does give her more gassiness and bloating than when she avoids dairy.  Incomplete relief with lactase replacement enzyme.  Agree with patient that her intermittent symptoms of nausea may well be attributable to her chemotherapy medications.  Continue with reflux diet and precautions  Observe for and avoid trigger foods including fatty foods and dairy  Continue with omeprazole  Famotidine as needed    4. Personal history of colonic polyps  Up-to-date with polyp surveillance.      5. 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HCC)  Followed by GYN oncology and surgical oncology.      Followup Appointment: 1 year or sooner if needed  ______________________________________________________________________    Chief Complaint   Patient presents with    Yearly follow up     HPI:  Since last year.  Found to have peritoneal cancer.  Needed chemo pre op the had laparotomy followed by more chemo.  Needed wound vac.  Had wound healing issues.  Ileostomy reversal delayed.  Found to have a recurrence. And undergoing additional chemotx.  Now on Gemzar.  CA-125 dropped.  Feels stomach issues are chemo related.  Has some nausea, relieved by antacids and antianxiety medications.  Now finding limitations eating due to nausea and gassiness by certain foods.  Some GERD, no dysphagia.  More gas issues rather than upper  GI issues.  Has some discharge from from remnant colon, no bleeding.  [place]usset diet and use of acid reducers for symptomatic relief.    Historical Information   Past Medical History:   Diagnosis Date    Abdominal pain     off and on    Allergic sinusitis     \"seasonal " "allergies\"-has received allergy shots \"    Anxiety     with current diagnosis    Arthritis     Asthma     Cholelithiasis     Colon polyp     Degenerative joint disease     Dental bridge present     permanent lower left    Exercise involving walking     1-2x/week    GERD (gastroesophageal reflux disease)     Giant cell aortic arteritis (HCC)     \"hereditary Giant Cell Temporal Arteritis\"per pt    Hiatal hernia     History of cancer chemotherapy     History of transfusion     Hyperlipidemia     Hypertension     Nausea     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HCC)     \"gynecologic malignacy\"    PMR (polymyalgia rheumatica) (HCC)     Prediabetes     Shortness of breath     per pt \"asthma related --with climbing mult. flights of stairs--or exertion -not new\"    Tinnitus     Wears glasses      Past Surgical History:   Procedure Laterality Date    COLON SURGERY      COLONOSCOPY      October 2021: Adenomatous polyps and rectum in transverse colon, sigmoid diverticulosis, internal hemorrhoids.  October 2016 diverticulosis and internal hemorrhoids, September 2011 diverticulosis and internal hemorrhoids.    COLOPROCTECTOMY N/A 10/28/2022    Procedure: PROCTECTOMY;  Surgeon: Wolf Grover MD;  [facility]: [facility];  Service: Surgical Oncology    DILATION AND CURETTAGE OF UTERUS      ILEO LOOP DIVERSION N/A 10/28/2022    Procedure: ILEOSTOMY LOOP;  Surgeon: Wolf Grover MD;  Location: [facility];  Service: Surgical Oncology    IR PORT PLACEMENT  12/08/2022    LIVER SURGERY  10/28/2022    Procedure: LIVER CONTROL OF BLEED ;  Surgeon: Wolf Grover MD;  Location: [facility];  Service: Surgical Oncology    UT COLECTOMY PARTIAL W/ANASTOMOSIS N/A 10/28/2022    Procedure: RESECTION COLON LEFT;  Surgeon: Pablo Lockwood MD;  [facility];  Service: Gynecology Oncology    UT COLECTOMY PARTIAL W/ANASTOMOSIS N/A 03/27/2023    Procedure: LOW ANTERIOR RESECTION;  Surgeon: Wolf Grover MD;  [facility];  Service: Surgical " Oncology    OR EXPLORATORY LAPAROTOMY CELIOTOMY W/WO BIOPSY SPX N/A 10/28/2022    Procedure: LAPAROTOMY EXPLORATORY;  Surgeon: Pablo Lockwood MD;  [facility];  Service: Gynecology Oncology    OR LAPS ABD PRTM&OMENTUM DX W/WO SPEC BR/WA SPX N/A 08/04/2022    Procedure: DIAGNOSTIC LAPAROSCOPY, PERITONEAL BIOPSY, SAMPLING OF BLOODY ASCITES.;  Surgeon: Colt Caceres MD;  [facility];  Service: Gynecology Oncology    OR LAPS ABD PRTM&OMENTUM DX W/WO SPEC BR/WA SPX N/A 10/28/2022    Procedure: LAPAROSCOPY DIAGNOSTIC;  Surgeon: Pablo Lockwood MD;  [facility];  Service: Gynecology Oncology    OR TOTAL ABDOMINAL HYSTERECT W/WO RMVL TUBE OVARY N/A 10/28/2022    Procedure: HYSTERECTOMY MODIFIED RADICAL, BILATERAL SALPINGO-OOPHORECTOMY, GASTROCOLIC OMENTECTOMY, DIAPHRAGM RESECTION, EXCISION AND ABLATION OF PERITONEAL TUMORS;  Surgeon: Pablo Lockwood MD;  [facility];  Service: Gynecology Oncology    SMALL INTESTINE SURGERY N/A 10/28/2022    Procedure: RESECTION SMALL BOWEL;  Surgeon: Pablo Lockwood MD;  [facility];  Service: Gynecology Oncology    SPLENECTOMY, TOTAL N/A 10/28/2022    Procedure: SPLENECTOMY;  Surgeon: Pablo Lockwood MD;  [facility];  Service: Gynecology Oncology    TEMPORAL ARTERY BIOPSY / LIGATION      UPPER GASTROINTESTINAL ENDOSCOPY  11/10/2014    November 2014 irregular Z-line and Schatzki ring, hiatal hernia, gastritis.  Biopsies negative for celiac, H pylori, or Phillip's or eosinophilic esophagitis.    WISDOM TOOTH EXTRACTION       Social History     Substance and Sexual Activity   Alcohol Use Yes    Comment: a glass of wine maybe 2 times a month     Social History     Substance and Sexual Activity   Drug Use Never     Social History     Tobacco Use   Smoking Status Never   Smokeless Tobacco Never     Family History   Problem Relation Age of Onset    Colon polyps Mother     Alzheimer's disease  Mother     Heart disease Father     Brain cancer Father     Colon polyps Sister     Heart disease Sister     Diabetes Sister     MARLEY disease Sister     Colon cancer Cousin          Current Outpatient Medications:     Acetaminophen (TYLENOL ARTHRITIS PAIN PO)    aspirin (ECOTRIN LOW STRENGTH) 81 mg EC tablet    B Complex-C (B COMPLEX-VITAMIN C PO)    Calcium Carb-Cholecalciferol (CALCIUM 500+D3 PO)    cholecalciferol (VITAMIN D3) 1,000 units tablet    famotidine (PEPCID) 40 MG tablet    fexofenadine (ALLEGRA) 180 MG tablet    GLUCOSAMINE-CHONDROITIN PO    lidocaine-prilocaine (EMLA) cream    lisinopril (ZESTRIL) 20 mg tablet    LORazepam (ATIVAN) 1 mg tablet    metoprolol succinate (TOPROL-XL) 25 mg 24 hr tablet    montelukast (SINGULAIR) 10 mg tablet    multivitamin (THERAGRAN) TABS    nystatin (MYCOSTATIN) powder    omeprazole (PriLOSEC) 20 mg delayed release capsule    ondansetron (ZOFRAN) 8 mg tablet    Probiotic Product (PROBIOTIC DAILY PO)    simvastatin (ZOCOR) 10 mg tablet    sodium chloride (OCEAN) 0.65 % nasal spray    SYMBICORT 160-4.5 MCG/ACT inhaler    Triamcinolone Acetonide (NASACORT ALLERGY 24HR NA)    Ascorbic Acid (VITAMIN C GUMMIE PO)    bevacizumab (Avastin) 100 mg/4 mL    CARBOplatin (PARAPLATIN) 50 mg/5 mL    cephalexin (KEFLEX) 500 mg capsule    DOXOrubicin liposome (Doxil) 2 mg/mL    lisinopril (ZESTRIL) 5 mg tablet    magnesium oxide (MAG-OX) 400 mg tablet    naloxone (NARCAN) 4 mg/0.1 mL nasal spray    oxyCODONE (ROXICODONE) 5 immediate release tablet    prednisoLONE acetate (PRED FORTE) 1 % ophthalmic suspension    predniSONE 1 MG TBEC  Allergies   Allergen Reactions    Lactose - Food Allergy GI Intolerance    Nsaids Other (See Comments)      pt is avoiding per family doctor instructions-  Able to take Tylenol    Pedi-Pre Tape Spray [Wound Dressing Adhesive] Other (See Comments)     Please use sensitive skin tape, pt gets bad bruising from strong medical adhesive tape.      Reviewed  "medications and allergies and updated as indicated    PHYSICAL EXAM:    Blood pressure 110/62, height 5' 4\" (1.626 m), weight 66.7 kg (147 lb). Body mass index is 25.23 kg/m².  General Appearance: NAD, cooperative, alert  Eyes: Anicteric, conjunctiva pink  ENT:  Normocephalic, atraumatic, normal mucosa.    Neck:  Supple, symmetrical, trachea midline  Resp:  Clear to auscultation bilaterally; no rales, rhonchi or wheezing; respirations unlabored   CV:  S1 S2, Regular rate and rhythm; no murmur, rub, or gallop.  GI:  Soft, non-tender, non-distended; normal bowel sounds; no masses, no organomegaly   Rectal: Deferred  Musculoskeletal: No cyanosis, clubbing or edema. Normal ROM.  Skin:  No jaundice, rashes, or lesions   Heme/Lymph: No palpable cervical lymphadenopathy  Psych: Normal affect, good eye contact  Neuro: No gross deficits, AAOx3    Lab Results:   Lab Results   Component Value Date    WBC 8.37 03/06/2024    HGB 9.8 (L) 03/06/2024    HCT 29.5 (L) 03/06/2024    MCV 95 03/06/2024     (H) 03/06/2024     Lab Results   Component Value Date    K 4.0 03/06/2024     03/06/2024    CO2 27 03/06/2024    BUN 18 03/06/2024    CREATININE 0.72 03/06/2024    GLUCOSE 214 (H) 10/28/2022    GLUF 161 (H) 12/08/2022    CALCIUM 9.6 03/06/2024    CORRECTEDCA 9.4 09/20/2023    AST 24 03/06/2024    ALT 23 03/06/2024    ALKPHOS 139 (H) 03/06/2024    EGFR 83 03/06/2024       Radiology Results:   No results found.    Answers submitted by the patient for this visit:  Abdominal Pain Questionnaire (Submitted on 3/10/2024)  Chief Complaint: Abdominal pain  Chronicity: chronic  Onset: more than 1 year ago  Onset quality: undetermined  Frequency: 2 to 4 times per day  Episode duration: 2 Hours  Progression since onset: unchanged  Pain location: generalized abdominal region  Pain - numeric: 3/10  Pain quality: aching  Radiates to: does not radiate  anorexia: No  arthralgias: No  belching: Yes  constipation: No  diarrhea: No  dysuria: " No  fever: No  flatus: No  frequency: No  headaches: No  hematochezia: No  hematuria: No  melena: No  myalgias: No  nausea: Yes  weight loss: Yes  vomiting: No  Aggravated by: nothing  Relieved by: being still  Diagnostic workup: CT scan

## 2024-03-12 NOTE — LETTER
March 12, 2024     Adelia Cabral MD  [address] [facility] [address]    Patient: Penny Fernandez   YOB: 1950   Date of Visit: 3/12/2024       Dear Dr. Cabral:    Thank you for referring Penny Fernandez to me for evaluation. Below are my notes for this consultation.    If you have questions, please do not hesitate to call me. I look forward to following your patient along with you.         Sincerely,        Yuliana Leo MD        CC: No Recipients    Yuliana Leo MD  3/12/2024  5:56 PM  Incomplete  [facility] - Outpatient Follow-up Note  Penny Fernandez 73 y.o. female MRN: [id number]  Encounter: [id number]    ASSESSMENT AND PLAN:      1. Gastroesophageal reflux disease  2. Adult lactase deficiency  3. Nausea  Upper GI symptoms are actually fairly stable.  She only has intermittent GERD symptoms and no dysphagia.  Lactose ingestion does give her more gassiness and bloating than when she avoids dairy.  Incomplete relief with lactase replacement enzyme.  Agree with patient that her intermittent symptoms of nausea may well be attributable to her chemotherapy medications.  Continue with reflux diet and precautions  Observe for and avoid trigger foods including fatty foods and dairy  Continue with omeprazole  Famotidine as needed    4. Personal history of colonic polyps  Up-to-date with polyp surveillance.      5. 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HCC)  Followed by GYN oncology and surgical oncology.      Followup Appointment: 1 year or sooner if needed  ______________________________________________________________________    Chief Complaint   Patient presents with   • Yearly follow up     HPI:  Since last year.  Found to have peritoneal cancer.  Needed chemo pre op the had laparotomy followed by more chemo.  Needed wound vac.  Had wound healing issues.  Ileostomy reversal delayed.  Found to have a recurrence. And undergoing additional chemotx.  Now on Gemzar.  CA-125  "dropped.  Feels stomach issues are chemo related.  Has some nausea, relieved by antacids and antianxiety medications.  Now finding limitations eating due to nausea and gassiness by certain foods.  Some GERD, no dysphagia.  More gas issues rather than upper  GI issues.  Has some discharge from from remnant colon, no bleeding.  [place]usset diet and use of acid reducers for symptomatic relief.    Historical Information  Past Medical History:   Diagnosis Date   • Abdominal pain     off and on   • Allergic sinusitis     \"seasonal allergies\"-has received allergy shots \"   • Anxiety     with current diagnosis   • Arthritis    • Asthma    • Cholelithiasis    • Colon polyp    • Degenerative joint disease    • Dental bridge present     permanent lower left   • Exercise involving walking     1-2x/week   • GERD (gastroesophageal reflux disease)    • Giant cell aortic arteritis (HCC)     \"hereditary Giant Cell Temporal Arteritis\"per pt   • Hiatal hernia    • History of cancer chemotherapy    • History of transfusion    • Hyperlipidemia    • Hypertension    • Nausea    • 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HCC)     \"gynecologic malignacy\"   • PMR (polymyalgia rheumatica) (HCC)    • Prediabetes    • Shortness of breath     per pt \"asthma related --with climbing mult. flights of stairs--or exertion -not new\"   • Tinnitus    • Wears glasses      Past Surgical History:   Procedure Laterality Date   • COLON SURGERY     • COLONOSCOPY      October 2021: Adenomatous polyps and rectum in transverse colon, sigmoid diverticulosis, internal hemorrhoids.  October 2016 diverticulosis and internal hemorrhoids, September 2011 diverticulosis and internal hemorrhoids.   • COLOPROCTECTOMY N/A 10/28/2022    Procedure: PROCTECTOMY;  Surgeon: Wolf Grover MD;  [facility];  Service: Surgical Oncology   • DILATION AND CURETTAGE OF UTERUS     • ILEO LOOP DIVERSION N/A 10/28/2022    Procedure: ILEOSTOMY LOOP;  Surgeon: Wolf Grover MD;  [facility];  " Service: Surgical Oncology   • IR PORT PLACEMENT  12/08/2022   • LIVER SURGERY  10/28/2022    Procedure: LIVER CONTROL OF BLEED ;  Surgeon: Wolf Grover MD;  [facility];  Service: Surgical Oncology   • VT COLECTOMY PARTIAL W/ANASTOMOSIS N/A 10/28/2022    Procedure: RESECTION COLON LEFT;  Surgeon: Pablo Lockwood MD;  [facility];  Service: Gynecology Oncology   • VT COLECTOMY PARTIAL W/ANASTOMOSIS N/A 03/27/2023    Procedure: LOW ANTERIOR RESECTION;  Surgeon: Wolf Grover MD;  [facility];  Service: Surgical Oncology   • VT EXPLORATORY LAPAROTOMY CELIOTOMY W/WO BIOPSY SPX N/A 10/28/2022    Procedure: LAPAROTOMY EXPLORATORY;  Surgeon: Pablo Lockwood MD;  [facility];  Service: Gynecology Oncology   • VT LAPS ABD PRTM&OMENTUM DX W/WO SPEC BR/WA SPX N/A 08/04/2022    Procedure: DIAGNOSTIC LAPAROSCOPY, PERITONEAL BIOPSY, SAMPLING OF BLOODY ASCITES.;  Surgeon: Colt Caceres MD;  [facility];  Service: Gynecology Oncology   • VT LAPS ABD PRTM&OMENTUM DX W/WO SPEC BR/WA SPX N/A 10/28/2022    Procedure: LAPAROSCOPY DIAGNOSTIC;  Surgeon: Pablo Lockwood MD;  [facility];  Service: Gynecology Oncology   • VT TOTAL ABDOMINAL HYSTERECT W/WO RMVL TUBE OVARY N/A 10/28/2022    Procedure: HYSTERECTOMY MODIFIED RADICAL, BILATERAL SALPINGO-OOPHORECTOMY, GASTROCOLIC OMENTECTOMY, DIAPHRAGM RESECTION, EXCISION AND ABLATION OF PERITONEAL TUMORS;  Surgeon: Pablo Lockwood MD;  [facility];  Service: Gynecology Oncology   • SMALL INTESTINE SURGERY N/A 10/28/2022    Procedure: RESECTION SMALL BOWEL;  Surgeon: Pablo Lockwood MD;  [facility];  Service: Gynecology Oncology   • SPLENECTOMY, TOTAL N/A 10/28/2022    Procedure: SPLENECTOMY;  Surgeon: Pablo Lockwood MD;  [facility];  Service: Gynecology Oncology   • TEMPORAL ARTERY BIOPSY / LIGATION     • UPPER GASTROINTESTINAL ENDOSCOPY  11/10/2014     November 2014 irregular Z-line and Schatzki ring, hiatal hernia, gastritis.  Biopsies negative for celiac, H pylori, or Phillip's or eosinophilic esophagitis.   • WISDOM TOOTH EXTRACTION       Social History     Substance and Sexual Activity   Alcohol Use Yes    Comment: a glass of wine maybe 2 times a month     Social History     Substance and Sexual Activity   Drug Use Never     Social History     Tobacco Use   Smoking Status Never   Smokeless Tobacco Never     Family History   Problem Relation Age of Onset   • Colon polyps Mother    • Alzheimer's disease Mother    • Heart disease Father    • Brain cancer Father    • Colon polyps Sister    • Heart disease Sister    • Diabetes Sister    • MARLEY disease Sister    • Colon cancer Cousin          Current Outpatient Medications:   •  Acetaminophen (TYLENOL ARTHRITIS PAIN PO)  •  aspirin (ECOTRIN LOW STRENGTH) 81 mg EC tablet  •  B Complex-C (B COMPLEX-VITAMIN C PO)  •  Calcium Carb-Cholecalciferol (CALCIUM 500+D3 PO)  •  cholecalciferol (VITAMIN D3) 1,000 units tablet  •  famotidine (PEPCID) 40 MG tablet  •  fexofenadine (ALLEGRA) 180 MG tablet  •  GLUCOSAMINE-CHONDROITIN PO  •  lidocaine-prilocaine (EMLA) cream  •  lisinopril (ZESTRIL) 20 mg tablet  •  LORazepam (ATIVAN) 1 mg tablet  •  metoprolol succinate (TOPROL-XL) 25 mg 24 hr tablet  •  montelukast (SINGULAIR) 10 mg tablet  •  multivitamin (THERAGRAN) TABS  •  nystatin (MYCOSTATIN) powder  •  omeprazole (PriLOSEC) 20 mg delayed release capsule  •  ondansetron (ZOFRAN) 8 mg tablet  •  Probiotic Product (PROBIOTIC DAILY PO)  •  simvastatin (ZOCOR) 10 mg tablet  •  sodium chloride (OCEAN) 0.65 % nasal spray  •  SYMBICORT 160-4.5 MCG/ACT inhaler  •  Triamcinolone Acetonide (NASACORT ALLERGY 24HR NA)  •  Ascorbic Acid (VITAMIN C GUMMIE PO)  •  bevacizumab (Avastin) 100 mg/4 mL  •  CARBOplatin (PARAPLATIN) 50 mg/5 mL  •  cephalexin (KEFLEX) 500 mg capsule  •  DOXOrubicin liposome (Doxil) 2 mg/mL  •  lisinopril (ZESTRIL) 5  "mg tablet  •  magnesium oxide (MAG-OX) 400 mg tablet  •  naloxone (NARCAN) 4 mg/0.1 mL nasal spray  •  oxyCODONE (ROXICODONE) 5 immediate release tablet  •  prednisoLONE acetate (PRED FORTE) 1 % ophthalmic suspension  •  predniSONE 1 MG TBEC  Allergies   Allergen Reactions   • Lactose - Food Allergy GI Intolerance   • Nsaids Other (See Comments)      pt is avoiding per family doctor instructions-  Able to take Tylenol   • Pedi-Pre Tape Spray [Wound Dressing Adhesive] Other (See Comments)     Please use sensitive skin tape, pt gets bad bruising from strong medical adhesive tape.      Reviewed medications and allergies and updated as indicated    PHYSICAL EXAM:    Blood pressure 110/62, height 5' 4\" (1.626 m), weight 66.7 kg (147 lb). Body mass index is 25.23 kg/m².  General Appearance: NAD, cooperative, alert  Eyes: Anicteric, conjunctiva pink  ENT:  Normocephalic, atraumatic, normal mucosa.    Neck:  Supple, symmetrical, trachea midline  Resp:  Clear to auscultation bilaterally; no rales, rhonchi or wheezing; respirations unlabored   CV:  S1 S2, Regular rate and rhythm; no murmur, rub, or gallop.  GI:  Soft, non-tender, non-distended; normal bowel sounds; no masses, no organomegaly   Rectal: Deferred  Musculoskeletal: No cyanosis, clubbing or edema. Normal ROM.  Skin:  No jaundice, rashes, or lesions   Heme/Lymph: No palpable cervical lymphadenopathy  Psych: Normal affect, good eye contact  Neuro: No gross deficits, AAOx3    Lab Results:   Lab Results   Component Value Date    WBC 8.37 03/06/2024    HGB 9.8 (L) 03/06/2024    HCT 29.5 (L) 03/06/2024    MCV 95 03/06/2024     (H) 03/06/2024     Lab Results   Component Value Date    K 4.0 03/06/2024     03/06/2024    CO2 27 03/06/2024    BUN 18 03/06/2024    CREATININE 0.72 03/06/2024    GLUCOSE 214 (H) 10/28/2022    GLUF 161 (H) 12/08/2022    CALCIUM 9.6 03/06/2024    CORRECTEDCA 9.4 09/20/2023    AST 24 03/06/2024    ALT 23 03/06/2024    JACOB 139 (H) " 03/06/2024    EGFR 83 03/06/2024       Radiology Results:   No results found.      Yuliana Leo MD  3/12/2024  4:55 PM  Sign when Signing Visit  Sin last year.  Found to have peritoneal cancer.  Needed chemo preop the had laparotomy followed by more chemo.  Needed woundvac.  Had wound healing issues.  Ileostomy reveral dealayed.  Found to have a recurrenc. And undergoing additonal chemotx.  Now on Gemzar.  CA-125 dropedFeels stomach issues are chemorelated.  Has some nausea, relieved by antacids and antiaxiety medications.  Now finding limitations eating due to nausea and gasiness by certain foods.  Some GERD, no dysphagia.  More gas issues rather than upper  GI issues.  Has some discharge from fromaining colon, no bleeding.

## 2024-03-13 ENCOUNTER — HOSPITAL ENCOUNTER (OUTPATIENT)
Dept: INFUSION CENTER | Facility: HOSPITAL | Age: 74
Discharge: HOME | End: 2024-03-13
Payer: MEDICARE

## 2024-03-13 DIAGNOSIS — Z45.2 ENCOUNTER FOR VENOUS ACCESS DEVICE CARE: Primary | ICD-10-CM

## 2024-03-13 DIAGNOSIS — C56.2 MALIGNANT NEOPLASM OF LEFT OVARY (HCC): ICD-10-CM

## 2024-03-13 LAB
ALBUMIN SERPL BCP-MCNC: 3.7 G/DL (ref 3.5–5)
ALP SERPL-CCNC: 125 U/L (ref 34–104)
ALT SERPL W P-5'-P-CCNC: 16 U/L (ref 7–52)
ANION GAP SERPL CALCULATED.3IONS-SCNC: 7 MMOL/L (ref 4–13)
AST SERPL W P-5'-P-CCNC: 18 U/L (ref 13–39)
BASOPHILS # BLD AUTO: 0.04 THOUSANDS/ÂΜL (ref 0–0.1)
BASOPHILS NFR BLD AUTO: 0 % (ref 0–1)
BILIRUB SERPL-MCNC: 0.39 MG/DL (ref 0.2–1)
BUN SERPL-MCNC: 17 MG/DL (ref 5–25)
CALCIUM SERPL-MCNC: 9.6 MG/DL (ref 8.4–10.2)
CHLORIDE SERPL-SCNC: 104 MMOL/L (ref 96–108)
CO2 SERPL-SCNC: 26 MMOL/L (ref 21–32)
CREAT SERPL-MCNC: 0.72 MG/DL (ref 0.6–1.3)
EOSINOPHIL # BLD AUTO: 0.09 THOUSAND/ÂΜL (ref 0–0.61)
EOSINOPHIL NFR BLD AUTO: 1 % (ref 0–6)
ERYTHROCYTE [DISTWIDTH] IN BLOOD BY AUTOMATED COUNT: 16.9 % (ref 11.6–15.1)
GFR SERPL CREATININE-BSD FRML MDRD: 83 ML/MIN/1.73SQ M
GLUCOSE SERPL-MCNC: 102 MG/DL (ref 65–140)
HCT VFR BLD AUTO: 29.1 % (ref 34.8–46.1)
HGB BLD-MCNC: 9.7 G/DL (ref 11.5–15.4)
IMM GRANULOCYTES # BLD AUTO: 0.09 THOUSAND/UL (ref 0–0.2)
IMM GRANULOCYTES NFR BLD AUTO: 1 % (ref 0–2)
LYMPHOCYTES # BLD AUTO: 1 THOUSANDS/ÂΜL (ref 0.6–4.47)
LYMPHOCYTES NFR BLD AUTO: 9 % (ref 14–44)
MAGNESIUM SERPL-MCNC: 1.7 MG/DL (ref 1.9–2.7)
MCH RBC QN AUTO: 32 PG (ref 26.8–34.3)
MCHC RBC AUTO-ENTMCNC: 33.3 G/DL (ref 31.4–37.4)
MCV RBC AUTO: 96 FL (ref 82–98)
MONOCYTES # BLD AUTO: 0.3 THOUSAND/ÂΜL (ref 0.17–1.22)
MONOCYTES NFR BLD AUTO: 3 % (ref 4–12)
NEUTROPHILS # BLD AUTO: 9.37 THOUSANDS/ÂΜL (ref 1.85–7.62)
NEUTS SEG NFR BLD AUTO: 86 % (ref 43–75)
NRBC BLD AUTO-RTO: 0 /100 WBCS
PLATELET # BLD AUTO: 273 THOUSANDS/UL (ref 149–390)
PMV BLD AUTO: 9.2 FL (ref 8.9–12.7)
POTASSIUM SERPL-SCNC: 4 MMOL/L (ref 3.5–5.3)
PROT SERPL-MCNC: 7.1 G/DL (ref 6.4–8.4)
RBC # BLD AUTO: 3.03 MILLION/UL (ref 3.81–5.12)
SODIUM SERPL-SCNC: 137 MMOL/L (ref 135–147)
WBC # BLD AUTO: 10.89 THOUSAND/UL (ref 4.31–10.16)

## 2024-03-13 PROCEDURE — 83735 ASSAY OF MAGNESIUM: CPT

## 2024-03-13 PROCEDURE — 80053 COMPREHEN METABOLIC PANEL: CPT

## 2024-03-13 PROCEDURE — 85025 COMPLETE CBC W/AUTO DIFF WBC: CPT

## 2024-03-14 ENCOUNTER — HOSPITAL ENCOUNTER (OUTPATIENT)
Dept: RADIOLOGY | Facility: HOSPITAL | Age: 74
Discharge: HOME | End: 2024-03-14
Payer: MEDICARE

## 2024-03-14 DIAGNOSIS — C56.2 MALIGNANT NEOPLASM OF LEFT OVARY (HCC): ICD-10-CM

## 2024-03-14 PROCEDURE — 71260 CT THORAX DX C+: CPT

## 2024-03-14 PROCEDURE — 74177 CT ABD & PELVIS W/CONTRAST: CPT

## 2024-03-14 RX ADMIN — IOHEXOL 100 ML: 350 INJECTION, SOLUTION INTRAVENOUS at 14:24

## 2024-03-20 ENCOUNTER — OFFICE VISIT (OUTPATIENT)
Dept: GYNECOLOGIC ONCOLOGY | Facility: CLINIC | Age: 74
End: 2024-03-20
Payer: MEDICARE

## 2024-03-20 ENCOUNTER — HOSPITAL ENCOUNTER (OUTPATIENT)
Dept: INFUSION CENTER | Facility: HOSPITAL | Age: 74
Discharge: HOME | End: 2024-03-20
Payer: MEDICARE

## 2024-03-20 VITALS
DIASTOLIC BLOOD PRESSURE: 78 MMHG | HEART RATE: 92 BPM | BODY MASS INDEX: 25.4 KG/M2 | OXYGEN SATURATION: 97 % | WEIGHT: 148 LBS | SYSTOLIC BLOOD PRESSURE: 130 MMHG | TEMPERATURE: 97.5 F

## 2024-03-20 DIAGNOSIS — C56.2 MALIGNANT NEOPLASM OF LEFT OVARY (HCC): Primary | ICD-10-CM

## 2024-03-20 DIAGNOSIS — Z45.2 ENCOUNTER FOR VENOUS ACCESS DEVICE CARE: Primary | ICD-10-CM

## 2024-03-20 DIAGNOSIS — C56.2 MALIGNANT NEOPLASM OF LEFT OVARY (HCC): ICD-10-CM

## 2024-03-20 LAB
ALBUMIN SERPL BCP-MCNC: 3.7 G/DL (ref 3.5–5)
ALP SERPL-CCNC: 118 U/L (ref 34–104)
ALT SERPL W P-5'-P-CCNC: 12 U/L (ref 7–52)
ANION GAP SERPL CALCULATED.3IONS-SCNC: 7 MMOL/L (ref 4–13)
AST SERPL W P-5'-P-CCNC: 17 U/L (ref 13–39)
BASOPHILS # BLD MANUAL: 0 THOUSAND/UL (ref 0–0.1)
BASOPHILS NFR MAR MANUAL: 0 % (ref 0–1)
BILIRUB SERPL-MCNC: 0.26 MG/DL (ref 0.2–1)
BUN SERPL-MCNC: 14 MG/DL (ref 5–25)
CALCIUM SERPL-MCNC: 9.1 MG/DL (ref 8.4–10.2)
CANCER AG125 SERPL-ACNC: 192.1 U/ML (ref 0–35)
CHLORIDE SERPL-SCNC: 104 MMOL/L (ref 96–108)
CO2 SERPL-SCNC: 25 MMOL/L (ref 21–32)
CREAT SERPL-MCNC: 0.86 MG/DL (ref 0.6–1.3)
EOSINOPHIL # BLD MANUAL: 0.14 THOUSAND/UL (ref 0–0.4)
EOSINOPHIL NFR BLD MANUAL: 1 % (ref 0–6)
ERYTHROCYTE [DISTWIDTH] IN BLOOD BY AUTOMATED COUNT: 18.8 % (ref 11.6–15.1)
GFR SERPL CREATININE-BSD FRML MDRD: 67 ML/MIN/1.73SQ M
GLUCOSE SERPL-MCNC: 130 MG/DL (ref 65–140)
HCT VFR BLD AUTO: 30 % (ref 34.8–46.1)
HGB BLD-MCNC: 9.8 G/DL (ref 11.5–15.4)
LYMPHOCYTES # BLD AUTO: 1.55 THOUSAND/UL (ref 0.6–4.47)
LYMPHOCYTES # BLD AUTO: 11 % (ref 14–44)
MAGNESIUM SERPL-MCNC: 1.8 MG/DL (ref 1.9–2.7)
MCH RBC QN AUTO: 31.8 PG (ref 26.8–34.3)
MCHC RBC AUTO-ENTMCNC: 32.7 G/DL (ref 31.4–37.4)
MCV RBC AUTO: 97 FL (ref 82–98)
MONOCYTES # BLD AUTO: 0.7 THOUSAND/UL (ref 0–1.22)
MONOCYTES NFR BLD: 5 % (ref 4–12)
NEUTROPHILS # BLD MANUAL: 11.68 THOUSAND/UL (ref 1.85–7.62)
NEUTS SEG NFR BLD AUTO: 83 % (ref 43–75)
PLATELET # BLD AUTO: 307 THOUSANDS/UL (ref 149–390)
PLATELET BLD QL SMEAR: ADEQUATE
PMV BLD AUTO: 9.9 FL (ref 8.9–12.7)
POTASSIUM SERPL-SCNC: 4 MMOL/L (ref 3.5–5.3)
PROT SERPL-MCNC: 6.9 G/DL (ref 6.4–8.4)
RBC # BLD AUTO: 3.08 MILLION/UL (ref 3.81–5.12)
RBC MORPH BLD: NORMAL
SODIUM SERPL-SCNC: 136 MMOL/L (ref 135–147)
WBC # BLD AUTO: 14.07 THOUSAND/UL (ref 4.31–10.16)

## 2024-03-20 PROCEDURE — 85027 COMPLETE CBC AUTOMATED: CPT

## 2024-03-20 PROCEDURE — 85007 BL SMEAR W/DIFF WBC COUNT: CPT

## 2024-03-20 PROCEDURE — 99215 OFFICE O/P EST HI 40 MIN: CPT | Performed by: OBSTETRICS & GYNECOLOGY

## 2024-03-20 PROCEDURE — 83735 ASSAY OF MAGNESIUM: CPT

## 2024-03-20 PROCEDURE — 86304 IMMUNOASSAY TUMOR CA 125: CPT

## 2024-03-20 PROCEDURE — 80053 COMPREHEN METABOLIC PANEL: CPT

## 2024-03-20 NOTE — PROGRESS NOTES
Penny Fernandez  tolerated treatment well with no complications.      Penny Fernandez is aware of future appt on 3/22 at 12noon.     AVS printed and given to Penny Fernandez:  No (Declined by Penny Fernandez)

## 2024-03-21 ENCOUNTER — TELEPHONE (OUTPATIENT)
Age: 74
End: 2024-03-21

## 2024-03-21 ENCOUNTER — TELEPHONE (OUTPATIENT)
Dept: GYNECOLOGIC ONCOLOGY | Facility: CLINIC | Age: 74
End: 2024-03-21

## 2024-03-21 DIAGNOSIS — C56.2 MALIGNANT NEOPLASM OF LEFT OVARY (HCC): Primary | ICD-10-CM

## 2024-03-21 RX ORDER — DEXAMETHASONE 4 MG/1
TABLET ORAL
Qty: 24 TABLET | Refills: 1 | Status: SHIPPED | OUTPATIENT
Start: 2024-03-21

## 2024-03-21 RX ORDER — FOLIC ACID 1 MG/1
1 TABLET ORAL DAILY
Qty: 30 TABLET | Refills: 4 | Status: SHIPPED | OUTPATIENT
Start: 2024-03-21

## 2024-03-21 RX ORDER — CYANOCOBALAMIN 1000 UG/ML
1000 INJECTION, SOLUTION INTRAMUSCULAR; SUBCUTANEOUS ONCE
Status: CANCELLED | OUTPATIENT
Start: 2024-03-22 | End: 2024-03-22

## 2024-03-21 NOTE — TELEPHONE ENCOUNTER
Called patient about her CT SCAN appointment I scheduled for her. I provided the date, time and location of this test. Instructed patient she had to be NPO 3 hours prior to the test, but can drink clear liquids up to the test time. Patient stated she understood the appointment details and instructions.

## 2024-03-21 NOTE — ASSESSMENT & PLAN NOTE
73-year-old with recurrent, platinum resistant ovarian cancer who has received 3 cycles of palliative gemcitabine at 600 mg/m² on day 1 and 8 of a 21-day cycle.  CT chest abdomen pelvis 3/19/2024 that I personally reviewed revealed increasing right pleural effusion, increasing lymphadenopathy, increasing posterior right subcapsular hepatic lobe lesion with stable axillary lymphadenopathy.   is declining.  I also reviewed CBC, CMP.  She has chemotherapy-induced anemia with a hemoglobin of 9.7 g/dL.  Her performance status is 0.  1.  I reviewed the CT findings in detail with the patient and her family members.  They understand that despite the  demonstrating evidence of response, there is evidence of measurable disease progression by imaging.  I therefore recommended discontinuation of palliative gemcitabine.  2.  I discussed treatment options including ongoing treatment with cytotoxic chemotherapy versus clinical trial.  She understands that overall response rates are lower with platinum resistant disease and all treatments are palliative.  3.  I discussed the risks and benefits of starting palliative Alimta at 600 mg/m² every 21 days.  We discussed the need for folic acid and vitamin B12 supplementation.  We also discussed treatment with oral steroids prior to, the day of, and the day after chemotherapy.  We reviewed the risks of NSAID use with Alimta therapy.  She understands the risks of starting treatment and agrees to proceed as outlined.  Consent for treatment was obtained by me in the office.  We briefly reviewed starting Avastin along with the Alimta, however, she had significant epistaxis with Avastin previously.  4.  I offered therapeutic thoracentesis given the right pleural effusion.  She will consider if symptoms worsen.  I have spent a total time of 45 minutes on 03/21/24 in caring for this patient including Diagnostic results, Prognosis, Risks and benefits of tx options, Instructions for  management, Patient and family education, Impressions, Counseling / Coordination of care, Documenting in the medical record, Reviewing / ordering tests, medicine, procedures  , Obtaining or reviewing history  , and Communicating with other healthcare professionals .

## 2024-03-21 NOTE — TELEPHONE ENCOUNTER
Called pt to obtain the dentist office she would like the clearance letter faxed to.     Called the provided number. Phone rang then hung up.    Will reach out to the pt via my chart.

## 2024-03-21 NOTE — PROGRESS NOTES
Assessment/Plan:    Problem List Items Addressed This Visit          Endocrine    Malignant neoplasm of left ovary (HCC) - Primary     73-year-old with recurrent, platinum resistant ovarian cancer who has received 3 cycles of palliative gemcitabine at 600 mg/m² on day 1 and 8 of a 21-day cycle.  CT chest abdomen pelvis 3/19/2024 that I personally reviewed revealed increasing right pleural effusion, increasing lymphadenopathy, increasing posterior right subcapsular hepatic lobe lesion with stable axillary lymphadenopathy.   is declining.  I also reviewed CBC, CMP.  She has chemotherapy-induced anemia with a hemoglobin of 9.7 g/dL.  Her performance status is 0.  1.  I reviewed the CT findings in detail with the patient and her family members.  They understand that despite the  demonstrating evidence of response, there is evidence of measurable disease progression by imaging.  I therefore recommended discontinuation of palliative gemcitabine.  2.  I discussed treatment options including ongoing treatment with cytotoxic chemotherapy versus clinical trial.  She understands that overall response rates are lower with platinum resistant disease and all treatments are palliative.  3.  I discussed the risks and benefits of starting palliative Alimta at 600 mg/m² every 21 days.  We discussed the need for folic acid and vitamin B12 supplementation.  We also discussed treatment with oral steroids prior to, the day of, and the day after chemotherapy.  We reviewed the risks of NSAID use with Alimta therapy.  She understands the risks of starting treatment and agrees to proceed as outlined.  Consent for treatment was obtained by me in the office.  We briefly reviewed starting Avastin along with the Alimta, however, she had significant epistaxis with Avastin previously.  4.  I offered therapeutic thoracentesis given the right pleural effusion.  She will consider if symptoms worsen.  I have spent a total time of 45 minutes  on 03/21/24 in caring for this patient including Diagnostic results, Prognosis, Risks and benefits of tx options, Instructions for management, Patient and family education, Impressions, Counseling / Coordination of care, Documenting in the medical record, Reviewing / ordering tests, medicine, procedures  , Obtaining or reviewing history  , and Communicating with other healthcare professionals .                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  Results revealed patient has the following mutation(s):  None     Malignant neoplasm of left ovary (HCC)   8/4/2022 Surgery    Diagnostic laparoscopy, peritoneal biopsy     8/9/2022 Initial Diagnosis    Gynecologic malignancy (HCC)     8/23/2022 - 1/24/2023 Chemotherapy    palonosetron (ALOXI), 0.25 mg, Intravenous, Once, 6 of 6 cycles  Administration: 0.25 mg (8/23/2022), 0.25 mg (9/13/2022), 0.25 mg (10/4/2022), 0.25 mg (12/13/2022), 0.25 mg (1/3/2023), 0.25 mg (1/24/2023)  fosaprepitant (EMEND) IVPB, 150 mg, Intravenous, Once, 6 of 6 cycles  Administration: 150 mg (8/23/2022), 150 mg (9/13/2022), 150 mg (10/4/2022), 150 mg (12/13/2022), 150 mg (1/3/2023), 150 mg (1/24/2023)  CARBOplatin (PARAPLATIN) IVPB (GOG AUC DOSING), 605.4 mg, Intravenous, Once, 6 of 6 cycles  Administration: 600 mg (8/23/2022), 564 mg (9/13/2022), 601.8 mg (10/4/2022), 491.5 mg (12/13/2022), 468.5 mg (1/3/2023), 488 mg (1/24/2023)  bevacizumab (AVASTIN) IVPB,  1,252.5 mg, Intravenous, Once, 3 of 3 cycles  Dose modification: 10 mg/kg (original dose 15 mg/kg, Cycle 5, Reason: Other (Must fill in a comment))  Administration: 1,200 mg (8/23/2022), 1,200 mg (9/13/2022), 745 mg (1/3/2023)  PACLItaxel (TAXOL) chemo IVPB, 175 mg/m2 = 330.6 mg, Intravenous, Once, 6 of 6 cycles  Dose modification: 135 mg/m2 (original dose 175 mg/m2, Cycle 4, Reason: Other (Must fill in a comment))  Administration: 330.6 mg (8/23/2022), 330.6 mg (9/13/2022), 330.6 mg (10/4/2022), 246 mg (12/13/2022), 244.2 mg (1/3/2023), 244.2 mg (1/24/2023)     10/28/2022 Surgery    diagnostic laparoscopy, exploratory laparotomy, modified radical hysterectomy, bilateral salpingo-oophorectomy with en bloc rectosigmoid resection, gastrocolic omentectomy, splenectomy, small bowel resection with reanastomosis, diaphragm resection, excision ablation of peritoneal implants, ileostomy formation, optimal tumor debulking     11/29/2022 -  Cancer Staged    Staging form: Ovary, Fallopian Tube, Primary Peritoneal, AJCC 8th Edition  - Clinical: FIGO Stage IIIC (cT3c, cN0, cM0) - Signed by Pablo Lockwood MD on 11/29/2022  Histologic grade (G): G3  Histologic grading system: 4 grade system       3/27/2023 Surgery    Low anterior resection  Residual tumor identified in lymphovascular spaces    A. 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 Colon (anastomotic donuts):  - Colon with no diagnostic abnormality  - No carcinoma identified      4/17/2023 Genomic Testing    [employer]: FOLR1, positive     7/21/2023 -  Chemotherapy    Doxil 30 mg/m2 and carboplatin AUC 5 every 28 days with avastin 10 mg/kg IV every 14 days.     Avastin dose-reduced to 7.5 mg/kg due to epistaxis.      10/27/2023 -  Chemotherapy    Mirvetuximab 6 mg/kg AIBW IV every 21 days.        1/18/2024 -  Chemotherapy    Gemcitabine 600  mg/m2 IV days 1 and 8 every 21 days.       3/29/2024 -  Chemotherapy    cyanocobalamin, 1,000 mcg, Intramuscular, Once, 0 of 2 cycles  alteplase (CATHFLO), 2 mg, Intracatheter, Every 1 Minute as needed, 0 of 4 cycles  pemetrexed (ALIMTA) chemo infusion, 600 mg/m2 = 1,032 mg (85.7 % of original dose 700 mg/m2), Intravenous, Once, 0 of 4 cycles  Dose modification: 600 mg/m2 (original dose 700 mg/m2, Cycle 1, Reason: Other (Must fill in a comment), Comment: prescribed starting dose)           Patient ID: Penny Fenrandez is a 73 y.o. female  Who returns for treatment discussion.  She has received 3 cycles of palliative gemcitabine.  Labs from 3/13/2024 revealed a normal CMP, CBC with hemoglobin 9.7 g/dL, total white blood cell count 10.89, platelet count 273.  CT chest abdomen pelvis from 3/19/2024 revealed a stable right axillary lymph node, increasing right hepatic lobe subcapsular lesion now measuring 1.2 cm from prior 1 cm.  Increasing right iliac lymphadenopathy from 1.2 cm to 1.7 cm and increasing left pleural effusion with a moderate right pleural effusion.  She notes occasional cough and shortness of breath with exertion.  She is able to perform her actives of daily living without difficulty.  She was tolerating the gemcitabine chemotherapy well.  No other interval change in medications or medical history since her last visit to the office.  Her quality of life is reasonable.        The following portions of the patient's history were reviewed and updated as appropriate: allergies, current medications, past family history, past medical history, past social history, past surgical history, and problem list.    Review of Systems   Constitutional:  Negative for activity change and unexpected weight change.   HENT: Negative.     Eyes: Negative.    Respiratory:  Positive for cough and shortness of breath.    Cardiovascular: Negative.    Gastrointestinal:  Negative for abdominal distention and abdominal pain.    Endocrine: Negative.    Genitourinary:  Negative for pelvic pain and vaginal bleeding.   Musculoskeletal:  Positive for arthralgias.   Skin: Negative.    Allergic/Immunologic: Negative.    Neurological: Negative.    Hematological: Negative.    Psychiatric/Behavioral: Negative.         Current Outpatient Medications   Medication Sig Dispense Refill    Acetaminophen (TYLENOL ARTHRITIS PAIN PO) Take by mouth as needed      aspirin (ECOTRIN LOW STRENGTH) 81 mg EC tablet Take 81 mg by mouth daily      B Complex-C (B COMPLEX-VITAMIN C PO) Take by mouth daily      Calcium Carb-Cholecalciferol (CALCIUM 500+D3 PO) Take 1 tablet by mouth 2 (two) times a day       cholecalciferol (VITAMIN D3) 1,000 units tablet Take 1,000 Units by mouth daily      famotidine (PEPCID) 40 MG tablet TAKE 1 TABLET BY MOUTH DAILY AT BEDTIME 90 tablet 3    fexofenadine (ALLEGRA) 180 MG tablet Take 180 mg by mouth daily      GLUCOSAMINE-CHONDROITIN PO Take 1 tablet by mouth 2 (two) times a day       lidocaine-prilocaine (EMLA) cream Apply to mediport 30 min prior to labs or chemo 30 g 3    lisinopril (ZESTRIL) 20 mg tablet Take 20 mg by mouth daily      LORazepam (ATIVAN) 1 mg tablet Take 1 tablet (1 mg total) by mouth every 8 (eight) hours as needed for anxiety (nausea or anxiety) 40 tablet 0    metoprolol succinate (TOPROL-XL) 25 mg 24 hr tablet Take 50 mg by mouth daily Taking 25 mg at breakfast and 25 mg at dinner      montelukast (SINGULAIR) 10 mg tablet Take 10 mg by mouth daily dinner      multivitamin (THERAGRAN) TABS Take 1 tablet by mouth daily      nystatin (MYCOSTATIN) powder Apply topically if needed Under abdominal pannus and in between thighs      omeprazole (PriLOSEC) 20 mg delayed release capsule TAKE 1 CAPSULE BY MOUTH EVERY DAY 30 MINUTES BEFORE MORNING MEAL FOR 90 DAYS      ondansetron (ZOFRAN) 8 mg tablet Take 1 tablet (8 mg total) by mouth every 8 (eight) hours as needed for nausea or vomiting 20 tablet 0    Probiotic Product  (PROBIOTIC DAILY PO) Take by mouth daily      simvastatin (ZOCOR) 10 mg tablet Take 10 mg by mouth daily at bedtime      sodium chloride (OCEAN) 0.65 % nasal spray 1 spray into each nostril as needed for congestion      SYMBICORT 160-4.5 MCG/ACT inhaler 2 puffs daily       Triamcinolone Acetonide (NASACORT ALLERGY 24HR NA) 1 spray by Each Nare route 2 (two) times a day      Ascorbic Acid (VITAMIN C GUMMIE PO) Take by mouth (Patient not taking: Reported on 2/29/2024)      bevacizumab (Avastin) 100 mg/4 mL Avastin (Patient not taking: Reported on 3/12/2024)      CARBOplatin (PARAPLATIN) 50 mg/5 mL CARBOplatin (Patient not taking: Reported on 3/12/2024)      cephalexin (KEFLEX) 500 mg capsule Take 500 mg by mouth every 6 (six) hours (Patient not taking: Reported on 3/12/2024)      dexamethasone (DECADRON) 4 mg tablet Take 1 tablet PO BID the day before, day of and day after chemo. 24 tablet 1    DOXOrubicin liposome (Doxil) 2 mg/mL Doxil (Patient not taking: Reported on 3/12/2024)      folic acid (KP Folic Acid) 1 mg tablet Take 1 tablet (1 mg total) by mouth daily 30 tablet 4    lisinopril (ZESTRIL) 5 mg tablet Take 10 mg by mouth daily (Patient not taking: Reported on 11/9/2023)      magnesium oxide (MAG-OX) 400 mg tablet Take 1 tablet by mouth daily as needed (Patient not taking: Reported on 3/12/2024)      naloxone (NARCAN) 4 mg/0.1 mL nasal spray Administer 1 spray into a nostril. If no response after 2-3 minutes, give another dose in the other nostril using a new spray. (Patient not taking: Reported on 3/12/2024) 1 each 1    oxyCODONE (ROXICODONE) 5 immediate release tablet 1/2 tab every 6 hours as needed for pain (Patient not taking: Reported on 7/6/2023) 5 tablet 0    prednisoLONE acetate (PRED FORTE) 1 % ophthalmic suspension Apply 1 drop to each eye 6x day the day prior to treatment and then for 4 days following treatment, then 4x day in each eye days 5 thru 8 following treatment. (Patient not taking:  Reported on 3/12/2024) 5 mL 0     No current facility-administered medications for this visit.     Facility-Administered Medications Ordered in Other Visits   Medication Dose Route Frequency Provider Last Rate Last Admin    alteplase (CATHFLO) injection 2 mg  2 mg Intracatheter Q1MIN PRN Pablo Lockwood MD               Objective:    Blood pressure 130/78, pulse 92, temperature 97.5 °F (36.4 °C), temperature source Temporal, weight 67.1 kg (148 lb), SpO2 97%.  Body mass index is 25.4 kg/m².  Body surface area is 1.72 meters squared.    Physical Exam  Vitals reviewed.   Constitutional:       General: She is not in acute distress.     Appearance: Normal appearance. She is not ill-appearing.   HENT:      Head: Normocephalic and atraumatic.      Mouth/Throat:      Mouth: Mucous membranes are moist.   Eyes:      General: No scleral icterus.        Right eye: No discharge.         Left eye: No discharge.      Conjunctiva/sclera: Conjunctivae normal.   Pulmonary:      Effort: Pulmonary effort is normal.   Musculoskeletal:      Right lower leg: No edema.      Left lower leg: No edema.   Skin:     General: Skin is warm and dry.      Coloration: Skin is not jaundiced.      Findings: No rash.   Neurological:      General: No focal deficit present.      Mental Status: She is alert and oriented to person, place, and time.      Cranial Nerves: No cranial nerve deficit.      Motor: No weakness.      Gait: Gait normal.   Psychiatric:         Mood and Affect: Mood normal.         Behavior: Behavior normal.         Thought Content: Thought content normal.         Judgment: Judgment normal.         Lab Results   Component Value Date     192.1 (H) 03/20/2024     Lab Results   Component Value Date    K 4.0 03/20/2024     03/20/2024    CO2 25 03/20/2024    BUN 14 03/20/2024    CREATININE 0.86 03/20/2024    GLUCOSE 214 (H) 10/28/2022    GLUF 161 (H) 12/08/2022    CALCIUM 9.1 03/20/2024    CORRECTEDCA 9.4 09/20/2023     AST 17 03/20/2024    ALT 12 03/20/2024    ALKPHOS 118 (H) 03/20/2024    EGFR 67 03/20/2024     Lab Results   Component Value Date    WBC 14.07 (H) 03/20/2024    HGB 9.8 (L) 03/20/2024    HCT 30.0 (L) 03/20/2024    MCV 97 03/20/2024     03/20/2024     Lab Results   Component Value Date    NEUTROABS 9.37 (H) 03/13/2024        Trend:  Lab Results   Component Value Date     192.1 (H) 03/20/2024     225.2 (H) 02/28/2024     305.9 (H) 02/07/2024     528.9 (H) 01/17/2024     336.9 (H) 12/06/2023     269.2 (H) 11/15/2023     234.7 (H) 10/25/2023     205.1 (H) 09/27/2023     223.9 (H) 08/30/2023     340.6 (H) 08/02/2023     168.5 (H) 06/26/2023     6.2 03/24/2023     5.1 03/02/2023     5.3 02/09/2023     8.7 01/19/2023     15.8 12/30/2022     37.9 (H) 12/08/2022     8.6 10/20/2022     8.4 10/07/2022     12.5 09/29/2022     32.0 (H) 09/08/2022     52.4 (H) 09/02/2022     85.5 (H) 08/26/2022     77.3 (H) 08/19/2022           CT chest abdomen pelvis w contrast  Narrative: CT CHEST, ABDOMEN AND PELVIS WITH IV CONTRAST    INDICATION: C56.2: Malignant neoplasm of left ovary.    COMPARISON: CT of the chest abdomen pelvis with contrast on 12/15/2023    TECHNIQUE: CT examination of the chest, abdomen and pelvis was performed. Multiplanar 2D reformatted images were created from the source data.    This examination, like all CT scans performed in the [facility], was performed utilizing techniques to minimize radiation dose exposure, including the use of iterative reconstruction and automated exposure control. Radiation dose length   product (DLP) for this visit: 529.07 mGy-cm    IV Contrast: 100 mL of iohexol (OMNIPAQUE)  Enteric Contrast: Administered.    FINDINGS:    CHEST    LUNGS: Lungs are clear, residual scarring in the right middle lobe from prior infectious process.. No tracheal or  endobronchial lesion.  Right lower lobe atelectasis. Trace left basilar atelectasis.    PLEURA: Large right pleural effusion. Small left pleural effusion.    HEART/GREAT VESSELS: Heart is unremarkable for patient's age. No thoracic aortic aneurysm.    MEDIASTINUM AND DERREK: Unremarkable.    CHEST WALL AND LOWER NECK:    Stable enlarged right axillary lymph node measuring up to 1.1 x 0.7 cm, 301:33.  Right chest port with tip in the superior right atrium.    ABDOMEN    LIVER/BILIARY TREE:    Perihepatic implant is seen coursing around the right anterior lateral aspect of the liver, overall unchanged, thickness again measures up to 1 cm.  Left anterior hepatic cyst.  Interval increase in size of a posterior right hepatic lobe subcapsular lesion currently measuring 1.2 cm previously 1 cm. Indeterminate internal density that is most consistent with a metastatic lesion.    GALLBLADDER: Numerous gallstones, the majority of which are noncalcified, with the exception of a small calcified stone in the region of the gallbladder neck. No evidence of cholecystitis.    SPLEEN: Splenectomy. Stable loculated fluid within the splenectomy bed, similar appearance to prior.    PANCREAS: Unremarkable.    ADRENAL GLANDS: Unremarkable.    KIDNEYS/URETERS: Unremarkable. No hydronephrosis.    STOMACH AND BOWEL:  Right lower quadrant ostomy with unchanged parastomal hernia containing nonobstructed loops of small bowel. Unchanged from prior.  Unremarkable appearance of the rectal anastomosis.  Stable presacral postsurgical inflammatory change.    APPENDIX: Unremarkable    ABDOMINOPELVIC CAVITY: No ascites. No pneumoperitoneum.    Interval enlargement of a right iliac chain node measuring 1.7 x 1.4 cm, previously 1.2 x 0.9 cm, 301:191    Stable enlarged right inguinal node, measuring 1.5 x 1.2 cm.  A lower retroperitoneal node is stable measuring 1.2 x 0.8 cm, 301:75.  A left para-aortic node measures up to 1.6 cm, unchanged, 301:34.  Left  periaortic nodes posterior to the renal vessels appear grossly unchanged in size.  A relatively indistinct node situated medially to the proximal right iliac bifurcation measures 9 mm, unchanged.    Previously described paracolic gutter abnormality is not conclusively identified currently.    VESSELS: Circumaortic left renal vein. Expected thrombosis of the gonadal veins in the setting of oophorectomy.    PELVIS    REPRODUCTIVE ORGANS: Post hysterectomy.    URINARY BLADDER: Unremarkable.    ABDOMINAL WALL/INGUINAL REGIONS: Left pelvic diastases.    BONES: No acute fracture or suspicious osseous lesion.  Impression: Interval increase in size of right pleural effusion associated increased right lower lobe atelectasis. Slight interval increase in a left pleural effusion as well.    The lung fields are now clear, with residual right middle lobe scarring related to a prior infectious process in that region.    Interval enlargement of a right iliac chain node, other dimitris sites appear grossly unchanged.    Stable perihepatic thickening. Indeterminate right posterior subcapsular lesion that is concerning for metastatic disease. Given this and the above findings, PET/CT evaluation is recommended.    Stable splenectomy bed fluid collection.    Large parastomal hernia containing nondilated loop of small bowel is unchanged.    The study was marked in EPIC for significant notification.    Study initially reviewed and reported by Dr. Antoine.    Workstation performed: [id number]

## 2024-03-22 ENCOUNTER — HOSPITAL ENCOUNTER (OUTPATIENT)
Dept: INFUSION CENTER | Facility: HOSPITAL | Age: 74
End: 2024-03-22
Attending: OBSTETRICS & GYNECOLOGY
Payer: MEDICARE

## 2024-03-22 VITALS
DIASTOLIC BLOOD PRESSURE: 60 MMHG | OXYGEN SATURATION: 95 % | SYSTOLIC BLOOD PRESSURE: 126 MMHG | HEART RATE: 79 BPM | RESPIRATION RATE: 16 BRPM | TEMPERATURE: 97.8 F

## 2024-03-22 DIAGNOSIS — C56.2 MALIGNANT NEOPLASM OF LEFT OVARY (HCC): Primary | ICD-10-CM

## 2024-03-22 PROCEDURE — 96372 THER/PROPH/DIAG INJ SC/IM: CPT

## 2024-03-22 RX ORDER — CYANOCOBALAMIN 1000 UG/ML
1000 INJECTION, SOLUTION INTRAMUSCULAR; SUBCUTANEOUS ONCE
Status: COMPLETED | OUTPATIENT
Start: 2024-03-22 | End: 2024-03-22

## 2024-03-22 RX ADMIN — CYANOCOBALAMIN 1000 MCG: 1000 INJECTION, SOLUTION INTRAMUSCULAR at 10:07

## 2024-03-22 NOTE — PROGRESS NOTES
Penny Fernandez  tolerated treatment well with no complications.      Penny Fernandez is aware of future appt on 3/2924 at 1200.     AVS printed and given to Penny Fernandez:  No (Declined by Penny Fernandez)

## 2024-03-25 DIAGNOSIS — C57.9 GYNECOLOGIC MALIGNANCY (HCC): ICD-10-CM

## 2024-03-25 RX ORDER — LORAZEPAM 1 MG/1
1 TABLET ORAL EVERY 8 HOURS PRN
Qty: 40 TABLET | Refills: 0 | Status: SHIPPED | OUTPATIENT
Start: 2024-03-25

## 2024-03-27 ENCOUNTER — HOSPITAL ENCOUNTER (OUTPATIENT)
Dept: INFUSION CENTER | Facility: HOSPITAL | Age: 74
Discharge: HOME | End: 2024-03-27
Payer: MEDICARE

## 2024-03-27 DIAGNOSIS — C56.2 MALIGNANT NEOPLASM OF LEFT OVARY (HCC): ICD-10-CM

## 2024-03-27 DIAGNOSIS — Z45.2 ENCOUNTER FOR VENOUS ACCESS DEVICE CARE: Primary | ICD-10-CM

## 2024-03-27 LAB
ALBUMIN SERPL BCP-MCNC: 3.7 G/DL (ref 3.5–5)
ALP SERPL-CCNC: 103 U/L (ref 34–104)
ALT SERPL W P-5'-P-CCNC: 12 U/L (ref 7–52)
ANION GAP SERPL CALCULATED.3IONS-SCNC: 5 MMOL/L (ref 4–13)
AST SERPL W P-5'-P-CCNC: 16 U/L (ref 13–39)
BASOPHILS # BLD MANUAL: 0 THOUSAND/UL (ref 0–0.1)
BASOPHILS NFR MAR MANUAL: 0 % (ref 0–1)
BILIRUB SERPL-MCNC: 0.35 MG/DL (ref 0.2–1)
BUN SERPL-MCNC: 17 MG/DL (ref 5–25)
CALCIUM SERPL-MCNC: 9.3 MG/DL (ref 8.4–10.2)
CHLORIDE SERPL-SCNC: 104 MMOL/L (ref 96–108)
CO2 SERPL-SCNC: 26 MMOL/L (ref 21–32)
CREAT SERPL-MCNC: 0.67 MG/DL (ref 0.6–1.3)
EOSINOPHIL # BLD MANUAL: 0.14 THOUSAND/UL (ref 0–0.4)
EOSINOPHIL NFR BLD MANUAL: 1 % (ref 0–6)
ERYTHROCYTE [DISTWIDTH] IN BLOOD BY AUTOMATED COUNT: 19.2 % (ref 11.6–15.1)
GFR SERPL CREATININE-BSD FRML MDRD: 87 ML/MIN/1.73SQ M
GLUCOSE SERPL-MCNC: 99 MG/DL (ref 65–140)
HCT VFR BLD AUTO: 30.4 % (ref 34.8–46.1)
HGB BLD-MCNC: 10.1 G/DL (ref 11.5–15.4)
LYMPHOCYTES # BLD AUTO: 1.09 THOUSAND/UL (ref 0.6–4.47)
LYMPHOCYTES # BLD AUTO: 8 % (ref 14–44)
MAGNESIUM SERPL-MCNC: 1.8 MG/DL (ref 1.9–2.7)
MCH RBC QN AUTO: 32.5 PG (ref 26.8–34.3)
MCHC RBC AUTO-ENTMCNC: 33.2 G/DL (ref 31.4–37.4)
MCV RBC AUTO: 98 FL (ref 82–98)
MONOCYTES # BLD AUTO: 1.09 THOUSAND/UL (ref 0–1.22)
MONOCYTES NFR BLD: 8 % (ref 4–12)
NEUTROPHILS # BLD MANUAL: 11.27 THOUSAND/UL (ref 1.85–7.62)
NEUTS BAND NFR BLD MANUAL: 2 % (ref 0–8)
NEUTS SEG NFR BLD AUTO: 81 % (ref 43–75)
PLATELET # BLD AUTO: 559 THOUSANDS/UL (ref 149–390)
PLATELET BLD QL SMEAR: ABNORMAL
PMV BLD AUTO: 9.2 FL (ref 8.9–12.7)
POTASSIUM SERPL-SCNC: 4 MMOL/L (ref 3.5–5.3)
PROT SERPL-MCNC: 7 G/DL (ref 6.4–8.4)
RBC # BLD AUTO: 3.11 MILLION/UL (ref 3.81–5.12)
RBC MORPH BLD: NORMAL
SODIUM SERPL-SCNC: 135 MMOL/L (ref 135–147)
WBC # BLD AUTO: 13.58 THOUSAND/UL (ref 4.31–10.16)

## 2024-03-27 PROCEDURE — 85027 COMPLETE CBC AUTOMATED: CPT

## 2024-03-27 PROCEDURE — 80053 COMPREHEN METABOLIC PANEL: CPT

## 2024-03-27 PROCEDURE — 83735 ASSAY OF MAGNESIUM: CPT

## 2024-03-27 PROCEDURE — 85007 BL SMEAR W/DIFF WBC COUNT: CPT

## 2024-03-27 NOTE — PROGRESS NOTES
Penny Fernandez  tolerated treatment well with no complications.      Penny Fernandez is aware of future appt on 3/29 at 1200.     AVS printed and given to Penny Fernandez:    No (Declined by Penny Fernandez)

## 2024-03-28 RX ORDER — MAGNESIUM SULFATE HEPTAHYDRATE 40 MG/ML
2 INJECTION, SOLUTION INTRAVENOUS ONCE
Status: CANCELLED | OUTPATIENT
Start: 2024-03-29

## 2024-03-28 RX ORDER — SODIUM CHLORIDE 9 MG/ML
20 INJECTION, SOLUTION INTRAVENOUS ONCE
Qty: 250 ML | Refills: 0 | Status: CANCELLED | OUTPATIENT
Start: 2024-03-29

## 2024-03-29 ENCOUNTER — HOSPITAL ENCOUNTER (OUTPATIENT)
Dept: INFUSION CENTER | Facility: HOSPITAL | Age: 74
End: 2024-03-29
Attending: OBSTETRICS & GYNECOLOGY
Payer: MEDICARE

## 2024-03-29 VITALS
BODY MASS INDEX: 25.03 KG/M2 | DIASTOLIC BLOOD PRESSURE: 63 MMHG | HEART RATE: 77 BPM | OXYGEN SATURATION: 93 % | HEIGHT: 64 IN | TEMPERATURE: 96.4 F | SYSTOLIC BLOOD PRESSURE: 134 MMHG | WEIGHT: 146.61 LBS | RESPIRATION RATE: 16 BRPM

## 2024-03-29 DIAGNOSIS — C56.2 MALIGNANT NEOPLASM OF LEFT OVARY (HCC): ICD-10-CM

## 2024-03-29 DIAGNOSIS — E83.42 HYPOMAGNESEMIA: Primary | ICD-10-CM

## 2024-03-29 PROCEDURE — 96413 CHEMO IV INFUSION 1 HR: CPT

## 2024-03-29 PROCEDURE — 96367 TX/PROPH/DG ADDL SEQ IV INF: CPT

## 2024-03-29 RX ORDER — SODIUM CHLORIDE 9 MG/ML
20 INJECTION, SOLUTION INTRAVENOUS ONCE
Status: COMPLETED | OUTPATIENT
Start: 2024-03-29 | End: 2024-03-29

## 2024-03-29 RX ORDER — MAGNESIUM SULFATE HEPTAHYDRATE 40 MG/ML
2 INJECTION, SOLUTION INTRAVENOUS ONCE
Status: COMPLETED | OUTPATIENT
Start: 2024-03-29 | End: 2024-03-29

## 2024-03-29 RX ADMIN — DEXAMETHASONE SODIUM PHOSPHATE 10 MG: 100 INJECTION INTRAMUSCULAR; INTRAVENOUS at 13:32

## 2024-03-29 RX ADMIN — MAGNESIUM SULFATE IN WATER 2 G: 40 INJECTION, SOLUTION INTRAVENOUS at 12:13

## 2024-03-29 RX ADMIN — SODIUM CHLORIDE 20 ML/HR: 0.9 INJECTION, SOLUTION INTRAVENOUS at 12:16

## 2024-03-29 RX ADMIN — PEMETREXED DISODIUM 1000 MG: 500 INJECTION, POWDER, LYOPHILIZED, FOR SOLUTION INTRAVENOUS at 14:02

## 2024-03-29 NOTE — PROGRESS NOTES
Penny Fernandez  tolerated treatment well with no complications.      Penny Fernandez is aware of future appt on 4/3 at 1100.     AVS printed and given to Penny Fernandez:   No (Declined by Penny Fernandez)

## 2024-03-29 NOTE — PLAN OF CARE
Problem: Knowledge Deficit  Goal: Patient/family/caregiver demonstrates understanding of disease process, treatment plan, medications, and discharge instructions  Description: Complete learning assessment and assess knowledge base.  Interventions:  - Provide teaching at level of understanding  - Provide teaching via preferred learning methods  Outcome: Progressing     Problem: Potential for Falls  Goal: Patient will remain free of falls  Description: INTERVENTIONS:  - Educate patient/family on patient safety including physical limitations  - Instruct patient to call for assistance with activity   - Consult OT/PT to assist with strengthening/mobility   - Keep Call bell within reach  - Keep bed low and locked with side rails adjusted as appropriate  - Keep care items and personal belongings within reach  - Initiate and maintain comfort rounds  - Make Fall Risk Sign visible to staff  - Offer Toileting every x Hours, in advance of need  - Initiate/Maintain xalarm  - Obtain necessary fall risk management equipment: x  - Apply yellow socks and bracelet for high fall risk patients  - Consider moving patient to room near nurses station  Outcome: Progressing

## 2024-04-03 ENCOUNTER — HOSPITAL ENCOUNTER (OUTPATIENT)
Dept: INFUSION CENTER | Facility: HOSPITAL | Age: 74
Discharge: HOME | End: 2024-04-03
Payer: MEDICARE

## 2024-04-03 DIAGNOSIS — C56.2 MALIGNANT NEOPLASM OF LEFT OVARY (HCC): ICD-10-CM

## 2024-04-03 DIAGNOSIS — Z45.2 ENCOUNTER FOR VENOUS ACCESS DEVICE CARE: Primary | ICD-10-CM

## 2024-04-03 LAB
ALBUMIN SERPL BCP-MCNC: 3.4 G/DL (ref 3.5–5)
ALP SERPL-CCNC: 90 U/L (ref 34–104)
ALT SERPL W P-5'-P-CCNC: 14 U/L (ref 7–52)
ANION GAP SERPL CALCULATED.3IONS-SCNC: 5 MMOL/L (ref 4–13)
AST SERPL W P-5'-P-CCNC: 17 U/L (ref 13–39)
BASOPHILS # BLD AUTO: 0.02 THOUSANDS/ÂΜL (ref 0–0.1)
BASOPHILS NFR BLD AUTO: 0 % (ref 0–1)
BILIRUB SERPL-MCNC: 0.66 MG/DL (ref 0.2–1)
BUN SERPL-MCNC: 17 MG/DL (ref 5–25)
CALCIUM ALBUM COR SERPL-MCNC: 9.2 MG/DL (ref 8.3–10.1)
CALCIUM SERPL-MCNC: 8.7 MG/DL (ref 8.4–10.2)
CHLORIDE SERPL-SCNC: 104 MMOL/L (ref 96–108)
CO2 SERPL-SCNC: 27 MMOL/L (ref 21–32)
CREAT SERPL-MCNC: 0.78 MG/DL (ref 0.6–1.3)
EOSINOPHIL # BLD AUTO: 0.1 THOUSAND/ÂΜL (ref 0–0.61)
EOSINOPHIL NFR BLD AUTO: 1 % (ref 0–6)
ERYTHROCYTE [DISTWIDTH] IN BLOOD BY AUTOMATED COUNT: 18.5 % (ref 11.6–15.1)
GFR SERPL CREATININE-BSD FRML MDRD: 75 ML/MIN/1.73SQ M
GLUCOSE SERPL-MCNC: 133 MG/DL (ref 65–140)
HCT VFR BLD AUTO: 30.5 % (ref 34.8–46.1)
HGB BLD-MCNC: 10.1 G/DL (ref 11.5–15.4)
IMM GRANULOCYTES # BLD AUTO: 0.08 THOUSAND/UL (ref 0–0.2)
IMM GRANULOCYTES NFR BLD AUTO: 1 % (ref 0–2)
LYMPHOCYTES # BLD AUTO: 1.24 THOUSANDS/ÂΜL (ref 0.6–4.47)
LYMPHOCYTES NFR BLD AUTO: 8 % (ref 14–44)
MAGNESIUM SERPL-MCNC: 1.7 MG/DL (ref 1.9–2.7)
MCH RBC QN AUTO: 32.4 PG (ref 26.8–34.3)
MCHC RBC AUTO-ENTMCNC: 33.1 G/DL (ref 31.4–37.4)
MCV RBC AUTO: 98 FL (ref 82–98)
MONOCYTES # BLD AUTO: 0.02 THOUSAND/ÂΜL (ref 0.17–1.22)
MONOCYTES NFR BLD AUTO: 0 % (ref 4–12)
NEUTROPHILS # BLD AUTO: 14.17 THOUSANDS/ÂΜL (ref 1.85–7.62)
NEUTS SEG NFR BLD AUTO: 90 % (ref 43–75)
NRBC BLD AUTO-RTO: 0 /100 WBCS
PLATELET # BLD AUTO: 407 THOUSANDS/UL (ref 149–390)
PMV BLD AUTO: 9.6 FL (ref 8.9–12.7)
POTASSIUM SERPL-SCNC: 3.7 MMOL/L (ref 3.5–5.3)
PROT SERPL-MCNC: 6.6 G/DL (ref 6.4–8.4)
RBC # BLD AUTO: 3.12 MILLION/UL (ref 3.81–5.12)
SODIUM SERPL-SCNC: 136 MMOL/L (ref 135–147)
WBC # BLD AUTO: 15.63 THOUSAND/UL (ref 4.31–10.16)

## 2024-04-03 PROCEDURE — 80053 COMPREHEN METABOLIC PANEL: CPT

## 2024-04-03 PROCEDURE — 83735 ASSAY OF MAGNESIUM: CPT

## 2024-04-03 PROCEDURE — 85025 COMPLETE CBC W/AUTO DIFF WBC: CPT

## 2024-04-03 NOTE — PROGRESS NOTES
Penny Fernandez  tolerated treatment well with no complications.      Penny Fernandez is aware of future appt on 4/10 at 12noon.     AVS declined.

## 2024-04-04 ENCOUNTER — TELEPHONE (OUTPATIENT)
Dept: GYNECOLOGIC ONCOLOGY | Facility: CLINIC | Age: 74
End: 2024-04-04

## 2024-04-04 ENCOUNTER — PREP FOR PROCEDURE (OUTPATIENT)
Age: 74
End: 2024-04-04

## 2024-04-04 DIAGNOSIS — J90 PLEURAL EFFUSION ON RIGHT: Primary | ICD-10-CM

## 2024-04-04 NOTE — TELEPHONE ENCOUNTER
Spoke with Patient about the Stat thoracentesis.  Called to schedule it. Per IR they can not do [place], but they will call [place] and then call the Patient.    Informed Patient that IR will call her back with the time, date, and location.

## 2024-04-05 ENCOUNTER — HOSPITAL ENCOUNTER (OUTPATIENT)
Dept: RADIOLOGY | Facility: HOSPITAL | Age: 74
Discharge: HOME | End: 2024-04-05
Admitting: RADIOLOGY
Payer: MEDICARE

## 2024-04-05 VITALS — OXYGEN SATURATION: 97 % | HEART RATE: 66 BPM | DIASTOLIC BLOOD PRESSURE: 58 MMHG | SYSTOLIC BLOOD PRESSURE: 103 MMHG

## 2024-04-05 DIAGNOSIS — J90 PLEURAL EFFUSION ON RIGHT: ICD-10-CM

## 2024-04-05 LAB
APPEARANCE FLD: CLEAR
COLOR FLD: NORMAL
GLUCOSE FLD-MCNC: 121 MG/DL
LDH FLD L TO P-CCNC: 153 U/L
PH BODY FLUID: 7.6
PROT FLD-MCNC: 4.1 G/DL
SITE: NORMAL
WBC # FLD MANUAL: 717 /UL

## 2024-04-05 PROCEDURE — 88342 IMHCHEM/IMCYTCHM 1ST ANTB: CPT | Performed by: PATHOLOGY

## 2024-04-05 PROCEDURE — 83986 ASSAY PH BODY FLUID NOS: CPT

## 2024-04-05 PROCEDURE — 87205 SMEAR GRAM STAIN: CPT

## 2024-04-05 PROCEDURE — 32555 ASPIRATE PLEURA W/ IMAGING: CPT

## 2024-04-05 PROCEDURE — 88112 CYTOPATH CELL ENHANCE TECH: CPT | Performed by: PATHOLOGY

## 2024-04-05 PROCEDURE — 88305 TISSUE EXAM BY PATHOLOGIST: CPT | Performed by: PATHOLOGY

## 2024-04-05 PROCEDURE — 87070 CULTURE OTHR SPECIMN AEROBIC: CPT

## 2024-04-05 PROCEDURE — 84157 ASSAY OF PROTEIN OTHER: CPT

## 2024-04-05 PROCEDURE — 82945 GLUCOSE OTHER FLUID: CPT

## 2024-04-05 PROCEDURE — 88341 IMHCHEM/IMCYTCHM EA ADD ANTB: CPT | Performed by: PATHOLOGY

## 2024-04-05 PROCEDURE — 83615 LACTATE (LD) (LDH) ENZYME: CPT

## 2024-04-05 PROCEDURE — 89051 BODY FLUID CELL COUNT: CPT

## 2024-04-05 RX ORDER — LIDOCAINE HYDROCHLORIDE 10 MG/ML
INJECTION, SOLUTION EPIDURAL; INFILTRATION; INTRACAUDAL; PERINEURAL AS NEEDED
Status: COMPLETED | OUTPATIENT
Start: 2024-04-05 | End: 2024-04-05

## 2024-04-05 RX ADMIN — LIDOCAINE HYDROCHLORIDE 10 ML: 10 INJECTION, SOLUTION EPIDURAL; INFILTRATION; INTRACAUDAL; PERINEURAL at 12:16

## 2024-04-05 NOTE — BRIEF OP NOTE (RAD/CATH)
IR THORACENTESIS Procedure Note    PATIENT NAME: Penny Fernandez  : 1950  MRN: [id number]    Pre-op Diagnosis:   1. Pleural effusion on right      Post-op Diagnosis:   1. Pleural effusion on right        Surgeon:   ANTONELLA Gee  Assistants:     No qualified resident was available, Resident is only observing    Estimated Blood Loss: minimal  Findings: 840 ml clear yellow right pleural fluid.    Specimens: multiple including culture and cytology    Complications:  none immediate    Anesthesia: local    ANTONELLA Gee     Date: 2024  Time: 12:33 PM

## 2024-04-05 NOTE — DISCHARGE INSTRUCTIONS
Thoracentesis   WHAT YOU NEED TO KNOW:   A thoracentesis is a procedure to remove extra fluid or air from between your lungs and your inner chest wall. Air or fluid buildup may make it hard for you to breathe. A thoracentesis allows your lungs to expand fully so you can breathe more easily.    DISCHARGE INSTRUCTIONS:     Small amount of shoulder pain and bloody sputum is normal after a Thoracentesis.     Rest:  Rest when you feel it is needed. Slowly start to do more each day. Return to your daily activities as directed.     Resume your normal diet. Small sips of flat soda will help mild nausea.    Do not smoke:  If you smoke, it is never too late to quit. Ask for information about how to stop smoking if you need help.    Contact Interventional Radiology at [phone] ([facility] PATIENTS: Contact Interventional Radiology at [phone]) ([facility] PATIENTS: Contact Interventional Radiology at [phone]) if:   You have a fever.    Your puncture site is red, warm, swollen, or draining pus.    You have questions or concerns about your procedure, medicine, or care.    Seek care immediately or call 911 if:   Severe chest pain with inspiration and shortness of breath    Large amounts of blood in your sputum    Follow up with your healthcare provider as directed.

## 2024-04-05 NOTE — SEDATION DOCUMENTATION
Right side thoracentesis performed by Delores BERMAN. 840 mL of clear yellow fluid obtained. Patient tolerated well.

## 2024-04-06 DIAGNOSIS — K21.9 GASTROESOPHAGEAL REFLUX DISEASE: ICD-10-CM

## 2024-04-07 LAB
BACTERIA SPEC BFLD CULT: NO GROWTH
GRAM STN SPEC: NORMAL
GRAM STN SPEC: NORMAL

## 2024-04-08 LAB
BACTERIA SPEC BFLD CULT: NO GROWTH
GRAM STN SPEC: NORMAL
GRAM STN SPEC: NORMAL

## 2024-04-08 PROCEDURE — 85060 BLOOD SMEAR INTERPRETATION: CPT | Performed by: STUDENT IN AN ORGANIZED HEALTH CARE EDUCATION/TRAINING PROGRAM

## 2024-04-08 RX ORDER — FAMOTIDINE 40 MG/1
40 TABLET, FILM COATED ORAL
Qty: 90 TABLET | Refills: 1 | Status: SHIPPED | OUTPATIENT
Start: 2024-04-08

## 2024-04-09 LAB
HISTIOCYTES NFR FLD: 17 %
LYMPHOCYTES NFR BLD AUTO: 40 %
MONO+MESO NFR FLD MANUAL: 4 %
NEUTS SEG NFR BLD AUTO: 39 %
PATHOLOGY REVIEW: YES
TOTAL CELLS COUNTED SPEC: 100

## 2024-04-10 ENCOUNTER — HOSPITAL ENCOUNTER (OUTPATIENT)
Dept: INFUSION CENTER | Facility: HOSPITAL | Age: 74
Discharge: HOME | End: 2024-04-10
Payer: MEDICARE

## 2024-04-10 DIAGNOSIS — C56.2 MALIGNANT NEOPLASM OF LEFT OVARY (HCC): ICD-10-CM

## 2024-04-10 DIAGNOSIS — Z45.2 ENCOUNTER FOR VENOUS ACCESS DEVICE CARE: Primary | ICD-10-CM

## 2024-04-10 LAB
ALBUMIN SERPL BCP-MCNC: 3.6 G/DL (ref 3.5–5)
ALP SERPL-CCNC: 153 U/L (ref 34–104)
ALT SERPL W P-5'-P-CCNC: 18 U/L (ref 7–52)
ANION GAP SERPL CALCULATED.3IONS-SCNC: 8 MMOL/L (ref 4–13)
AST SERPL W P-5'-P-CCNC: 19 U/L (ref 13–39)
BASOPHILS # BLD MANUAL: 0 THOUSAND/UL (ref 0–0.1)
BASOPHILS NFR MAR MANUAL: 0 % (ref 0–1)
BILIRUB SERPL-MCNC: 0.26 MG/DL (ref 0.2–1)
BUN SERPL-MCNC: 13 MG/DL (ref 5–25)
CALCIUM SERPL-MCNC: 9.3 MG/DL (ref 8.4–10.2)
CANCER AG125 SERPL-ACNC: 283.9 U/ML (ref 0–35)
CHLORIDE SERPL-SCNC: 103 MMOL/L (ref 96–108)
CO2 SERPL-SCNC: 25 MMOL/L (ref 21–32)
CREAT SERPL-MCNC: 0.72 MG/DL (ref 0.6–1.3)
EOSINOPHIL # BLD MANUAL: 0 THOUSAND/UL (ref 0–0.4)
EOSINOPHIL NFR BLD MANUAL: 0 % (ref 0–6)
ERYTHROCYTE [DISTWIDTH] IN BLOOD BY AUTOMATED COUNT: 18.4 % (ref 11.6–15.1)
GFR SERPL CREATININE-BSD FRML MDRD: 83 ML/MIN/1.73SQ M
GLUCOSE SERPL-MCNC: 116 MG/DL (ref 65–140)
HCT VFR BLD AUTO: 27 % (ref 34.8–46.1)
HGB BLD-MCNC: 9 G/DL (ref 11.5–15.4)
HOWELL-JOLLY BOD BLD QL SMEAR: PRESENT
LYMPHOCYTES # BLD AUTO: 1.54 THOUSAND/UL (ref 0.6–4.47)
LYMPHOCYTES # BLD AUTO: 13 % (ref 14–44)
MAGNESIUM SERPL-MCNC: 1.7 MG/DL (ref 1.9–2.7)
MCH RBC QN AUTO: 32.8 PG (ref 26.8–34.3)
MCHC RBC AUTO-ENTMCNC: 33.3 G/DL (ref 31.4–37.4)
MCV RBC AUTO: 99 FL (ref 82–98)
MONOCYTES # BLD AUTO: 0.36 THOUSAND/UL (ref 0–1.22)
MONOCYTES NFR BLD: 3 % (ref 4–12)
NEUTROPHILS # BLD MANUAL: 9.95 THOUSAND/UL (ref 1.85–7.62)
NEUTS BAND NFR BLD MANUAL: 1 % (ref 0–8)
NEUTS SEG NFR BLD AUTO: 83 % (ref 43–75)
PLATELET # BLD AUTO: 290 THOUSANDS/UL (ref 149–390)
PLATELET BLD QL SMEAR: ABNORMAL
PMV BLD AUTO: 9.7 FL (ref 8.9–12.7)
POTASSIUM SERPL-SCNC: 3.9 MMOL/L (ref 3.5–5.3)
PROT SERPL-MCNC: 6.9 G/DL (ref 6.4–8.4)
RBC # BLD AUTO: 2.74 MILLION/UL (ref 3.81–5.12)
RBC MORPH BLD: PRESENT
SODIUM SERPL-SCNC: 136 MMOL/L (ref 135–147)
WBC # BLD AUTO: 11.85 THOUSAND/UL (ref 4.31–10.16)

## 2024-04-10 PROCEDURE — 85007 BL SMEAR W/DIFF WBC COUNT: CPT

## 2024-04-10 PROCEDURE — 85027 COMPLETE CBC AUTOMATED: CPT

## 2024-04-10 PROCEDURE — 80053 COMPREHEN METABOLIC PANEL: CPT

## 2024-04-10 PROCEDURE — 83735 ASSAY OF MAGNESIUM: CPT

## 2024-04-10 PROCEDURE — 86304 IMMUNOASSAY TUMOR CA 125: CPT

## 2024-04-10 NOTE — PROGRESS NOTES
Pt here for port labs; labs obtained without difficulty; pt aware of next appt 4/17 at 1400; left unit ambulatory with steady gait.

## 2024-04-12 DIAGNOSIS — C56.2 MALIGNANT NEOPLASM OF LEFT OVARY (HCC): ICD-10-CM

## 2024-04-12 RX ORDER — FOLIC ACID 1 MG/1
1000 TABLET ORAL DAILY
Qty: 90 TABLET | Refills: 2 | Status: SHIPPED | OUTPATIENT
Start: 2024-04-12

## 2024-04-17 ENCOUNTER — HOSPITAL ENCOUNTER (OUTPATIENT)
Dept: INFUSION CENTER | Facility: HOSPITAL | Age: 74
Discharge: HOME | End: 2024-04-17
Payer: MEDICARE

## 2024-04-17 DIAGNOSIS — Z45.2 ENCOUNTER FOR VENOUS ACCESS DEVICE CARE: Primary | ICD-10-CM

## 2024-04-17 DIAGNOSIS — C56.2 MALIGNANT NEOPLASM OF LEFT OVARY (HCC): ICD-10-CM

## 2024-04-17 LAB
ALBUMIN SERPL BCP-MCNC: 3.7 G/DL (ref 3.5–5)
ALP SERPL-CCNC: 131 U/L (ref 34–104)
ALT SERPL W P-5'-P-CCNC: 14 U/L (ref 7–52)
ANION GAP SERPL CALCULATED.3IONS-SCNC: 5 MMOL/L (ref 4–13)
AST SERPL W P-5'-P-CCNC: 21 U/L (ref 13–39)
BASOPHILS # BLD AUTO: 0.04 THOUSANDS/ÂΜL (ref 0–0.1)
BASOPHILS NFR BLD AUTO: 0 % (ref 0–1)
BILIRUB SERPL-MCNC: 0.21 MG/DL (ref 0.2–1)
BUN SERPL-MCNC: 14 MG/DL (ref 5–25)
CALCIUM SERPL-MCNC: 9.3 MG/DL (ref 8.4–10.2)
CHLORIDE SERPL-SCNC: 104 MMOL/L (ref 96–108)
CO2 SERPL-SCNC: 27 MMOL/L (ref 21–32)
CREAT SERPL-MCNC: 0.86 MG/DL (ref 0.6–1.3)
EOSINOPHIL # BLD AUTO: 0.17 THOUSAND/ÂΜL (ref 0–0.61)
EOSINOPHIL NFR BLD AUTO: 1 % (ref 0–6)
ERYTHROCYTE [DISTWIDTH] IN BLOOD BY AUTOMATED COUNT: 18.7 % (ref 11.6–15.1)
GFR SERPL CREATININE-BSD FRML MDRD: 67 ML/MIN/1.73SQ M
GLUCOSE SERPL-MCNC: 128 MG/DL (ref 65–140)
HCT VFR BLD AUTO: 29.6 % (ref 34.8–46.1)
HGB BLD-MCNC: 9.5 G/DL (ref 11.5–15.4)
IMM GRANULOCYTES # BLD AUTO: 0.21 THOUSAND/UL (ref 0–0.2)
IMM GRANULOCYTES NFR BLD AUTO: 2 % (ref 0–2)
LYMPHOCYTES # BLD AUTO: 1.02 THOUSANDS/ÂΜL (ref 0.6–4.47)
LYMPHOCYTES NFR BLD AUTO: 7 % (ref 14–44)
MAGNESIUM SERPL-MCNC: 1.8 MG/DL (ref 1.9–2.7)
MCH RBC QN AUTO: 32.4 PG (ref 26.8–34.3)
MCHC RBC AUTO-ENTMCNC: 32.1 G/DL (ref 31.4–37.4)
MCV RBC AUTO: 101 FL (ref 82–98)
MONOCYTES # BLD AUTO: 2.01 THOUSAND/ÂΜL (ref 0.17–1.22)
MONOCYTES NFR BLD AUTO: 14 % (ref 4–12)
NEUTROPHILS # BLD AUTO: 10.64 THOUSANDS/ÂΜL (ref 1.85–7.62)
NEUTS SEG NFR BLD AUTO: 76 % (ref 43–75)
NRBC BLD AUTO-RTO: 0 /100 WBCS
PLATELET # BLD AUTO: 497 THOUSANDS/UL (ref 149–390)
PMV BLD AUTO: 9.3 FL (ref 8.9–12.7)
POTASSIUM SERPL-SCNC: 4.1 MMOL/L (ref 3.5–5.3)
PROT SERPL-MCNC: 7.1 G/DL (ref 6.4–8.4)
RBC # BLD AUTO: 2.93 MILLION/UL (ref 3.81–5.12)
SODIUM SERPL-SCNC: 136 MMOL/L (ref 135–147)
WBC # BLD AUTO: 14.09 THOUSAND/UL (ref 4.31–10.16)

## 2024-04-17 PROCEDURE — 83735 ASSAY OF MAGNESIUM: CPT

## 2024-04-17 PROCEDURE — 80053 COMPREHEN METABOLIC PANEL: CPT

## 2024-04-17 PROCEDURE — 85025 COMPLETE CBC W/AUTO DIFF WBC: CPT

## 2024-04-18 ENCOUNTER — OFFICE VISIT (OUTPATIENT)
Age: 74
End: 2024-04-18
Payer: MEDICARE

## 2024-04-18 VITALS
SYSTOLIC BLOOD PRESSURE: 118 MMHG | WEIGHT: 145.8 LBS | BODY MASS INDEX: 24.89 KG/M2 | OXYGEN SATURATION: 99 % | DIASTOLIC BLOOD PRESSURE: 74 MMHG | TEMPERATURE: 97.8 F | HEART RATE: 73 BPM | HEIGHT: 64 IN

## 2024-04-18 DIAGNOSIS — J91.0 MALIGNANT PLEURAL EFFUSION: ICD-10-CM

## 2024-04-18 DIAGNOSIS — C56.2 MALIGNANT NEOPLASM OF LEFT OVARY (HCC): Primary | ICD-10-CM

## 2024-04-18 PROCEDURE — G2211 COMPLEX E/M VISIT ADD ON: HCPCS | Performed by: PHYSICIAN ASSISTANT

## 2024-04-18 PROCEDURE — 99215 OFFICE O/P EST HI 40 MIN: CPT | Performed by: PHYSICIAN ASSISTANT

## 2024-04-18 RX ORDER — SODIUM CHLORIDE 9 MG/ML
20 INJECTION, SOLUTION INTRAVENOUS ONCE
Status: CANCELLED | OUTPATIENT
Start: 2024-04-19

## 2024-04-18 RX ORDER — MAGNESIUM SULFATE HEPTAHYDRATE 40 MG/ML
2 INJECTION, SOLUTION INTRAVENOUS ONCE
Status: CANCELLED | OUTPATIENT
Start: 2024-04-19

## 2024-04-18 NOTE — ASSESSMENT & PLAN NOTE
Recurrent, platinum resistant stage IIIC ovarian cancer with disease progression on gemcitabine who is currently receiving palliative chemotherapy with single-agent alimta 600 mg/m2 IV every 21 days. Overall, she tolerated her first cycle well. She has intermittent lower abdominal pain and grade 1 treatment related fatigue.     Hematologic and hematologic values from 4/17/24 reviewed and are adequate for treatment. Continue with next cycle of treatment as planned without dose modification.     Return to the office as per her chemotherapy calendar. Repeat imaging to be performed after completion of cycle 3. Trend .

## 2024-04-18 NOTE — ASSESSMENT & PLAN NOTE
Right s/p thoracentesis on 4/5/24.   SOB improved.   Continue to closely monitor.   Patient understands she may need repeat thoracentesis per symptoms.

## 2024-04-18 NOTE — PROGRESS NOTES
Assessment/Plan:    Problem List Items Addressed This Visit          Respiratory    Malignant pleural effusion     Right s/p thoracentesis on 4/5/24.   SOB improved.   Continue to closely monitor.   Patient understands she may need repeat thoracentesis per symptoms.             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 Overall, she tolerated her first cycle well. She has intermittent lower abdominal pain and grade 1 treatment related fatigue.     Hematologic and hematologic values from 4/17/24 reviewed and are adequate for treatment. Continue with next cycle of treatment as planned without dose modification.     Return to the office as per her chemotherapy calendar. Repeat imaging to be performed after completion of cycle 3. Trend .              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  Results revealed patient has the following mutation(s):  None     Malignant neoplasm of left ovary (HCC)   8/4/2022 Surgery    Diagnostic laparoscopy, peritoneal biopsy     8/9/2022 Initial Diagnosis    Gynecologic malignancy (HCC)     8/23/2022 - 1/24/2023 Chemotherapy    palonosetron (ALOXI), 0.25 mg, Intravenous, Once, 6 of 6 cycles  Administration: 0.25 mg  (8/23/2022), 0.25 mg (9/13/2022), 0.25 mg (10/4/2022), 0.25 mg (12/13/2022), 0.25 mg (1/3/2023), 0.25 mg (1/24/2023)  fosaprepitant (EMEND) IVPB, 150 mg, Intravenous, Once, 6 of 6 cycles  Administration: 150 mg (8/23/2022), 150 mg (9/13/2022), 150 mg (10/4/2022), 150 mg (12/13/2022), 150 mg (1/3/2023), 150 mg (1/24/2023)  CARBOplatin (PARAPLATIN) IVPB ([facility] AUC DOSING), 605.4 mg, Intravenous, Once, 6 of 6 cycles  Administration: 600 mg (8/23/2022), 564 mg (9/13/2022), 601.8 mg (10/4/2022), 491.5 mg (12/13/2022), 468.5 mg (1/3/2023), 488 mg (1/24/2023)  bevacizumab (AVASTIN) IVPB, 1,252.5 mg, Intravenous, Once, 3 of 3 cycles  Dose modification: 10 mg/kg (original dose 15 mg/kg, Cycle 5, Reason: Other (Must fill in a comment))  Administration: 1,200 mg (8/23/2022), 1,200 mg (9/13/2022), 745 mg (1/3/2023)  PACLItaxel (TAXOL) chemo IVPB, 175 mg/m2 = 330.6 mg, Intravenous, Once, 6 of 6 cycles  Dose modification: 135 mg/m2 (original dose 175 mg/m2, Cycle 4, Reason: Other (Must fill in a comment))  Administration: 330.6 mg (8/23/2022), 330.6 mg (9/13/2022), 330.6 mg (10/4/2022), 246 mg (12/13/2022), 244.2 mg (1/3/2023), 244.2 mg (1/24/2023)     10/28/2022 Surgery    diagnostic laparoscopy, exploratory laparotomy, modified radical hysterectomy, bilateral salpingo-oophorectomy with en bloc rectosigmoid resection, gastrocolic omentectomy, splenectomy, small bowel resection with reanastomosis, diaphragm resection, excision ablation of peritoneal implants, ileostomy formation, optimal tumor debulking     11/29/2022 -  Cancer Staged    Staging form: Ovary, Fallopian Tube, Primary Peritoneal, AJCC 8th Edition  - Clinical: FIGO Stage IIIC (cT3c, cN0, cM0) - Signed by Pablo Lockwood MD on 11/29/2022  Histologic grade (G): G3  Histologic grading system: 4 grade system       3/27/2023 Surgery    Low anterior resection  Residual tumor identified in lymphovascular spaces    A. 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 Colon (anastomotic donuts):  - Colon with no diagnostic abnormality  - No carcinoma identified      4/17/2023 Genomic Testing    [employer]: FOLR1, positive     7/21/2023 -  Chemotherapy    Doxil 30 mg/m2 and carboplatin AUC 5 every 28 days with avastin 10 mg/kg IV every 14 days.     Avastin dose-reduced to 7.5 mg/kg due to epistaxis.      10/27/2023 -  Chemotherapy    Mirvetuximab 6 mg/kg AIBW IV every 21 days.        1/18/2024 -  Chemotherapy    Gemcitabine 600 mg/m2 IV days 1 and 8 every 21 days.       3/29/2024 -  Chemotherapy    Alimta 600 mg/m2 IV every 21 days.             Patient ID: Penny Fernandez is a 73 y.o. female  who presents to the office for pre-chemotherapy evaluation. She has been afebrile. The patient overall is tolerating treatment well. She denies vomiting. The patient notes intermittent lower abdominal/pelvic pain. This is not affecting her ADLs. She receives minimal relief with tylenol. She denies change in her stool output. The patient continues to not occasional rectal discharge. She is without skin rashes. The patient underwent right thoracentesis on 4/5/24 due to progressive SOB. She notes her breathing has improve and is stable. CBC/Diff, CMP, Mg from 4/17/24 and  from 4/10/24 reviewed.        The following portions of the patient's history were reviewed and updated as appropriate: allergies, current medications, past medical history, past surgical history, and problem list.    Review of Systems   Constitutional:  Positive for fatigue. Negative for fever.   HENT: Negative.     Eyes: Negative.    Respiratory: Negative.     Cardiovascular: Negative.    Gastrointestinal:  Positive for abdominal pain. Negative for diarrhea.        As per HPI.   Genitourinary: Negative.    Musculoskeletal: Negative.    Skin: Negative.    Neurological: Negative.     Psychiatric/Behavioral: Negative.         Current Outpatient Medications   Medication Sig Dispense Refill    Acetaminophen (TYLENOL ARTHRITIS PAIN PO) Take by mouth as needed      aspirin (ECOTRIN LOW STRENGTH) 81 mg EC tablet Take 81 mg by mouth daily      B Complex-C (B COMPLEX-VITAMIN C PO) Take by mouth daily      Calcium Carb-Cholecalciferol (CALCIUM 500+D3 PO) Take 1 tablet by mouth 2 (two) times a day       cholecalciferol (VITAMIN D3) 1,000 units tablet Take 1,000 Units by mouth daily      dexamethasone (DECADRON) 4 mg tablet Take 1 tablet PO BID the day before, day of and day after chemo. 24 tablet 1    famotidine (PEPCID) 40 MG tablet TAKE 1 TABLET BY MOUTH EVERYDAY AT BEDTIME 90 tablet 1    fexofenadine (ALLEGRA) 180 MG tablet Take 180 mg by mouth daily      folic acid (FOLVITE) 1 mg tablet TAKE 1 TABLET BY MOUTH EVERY DAY 90 tablet 2    GLUCOSAMINE-CHONDROITIN PO Take 1 tablet by mouth 2 (two) times a day       lidocaine-prilocaine (EMLA) cream Apply to mediport 30 min prior to labs or chemo 30 g 3    lisinopril (ZESTRIL) 20 mg tablet Take 20 mg by mouth daily      LORazepam (ATIVAN) 1 mg tablet Take 1 tablet (1 mg total) by mouth every 8 (eight) hours as needed for anxiety (nausea or anxiety) 40 tablet 0    metoprolol succinate (TOPROL-XL) 25 mg 24 hr tablet Take 50 mg by mouth daily Taking 25 mg at breakfast and 25 mg at dinner      montelukast (SINGULAIR) 10 mg tablet Take 10 mg by mouth daily dinner      multivitamin (THERAGRAN) TABS Take 1 tablet by mouth daily      nystatin (MYCOSTATIN) powder Apply topically if needed Under abdominal pannus and in between thighs      omeprazole (PriLOSEC) 20 mg delayed release capsule TAKE 1 CAPSULE BY MOUTH EVERY DAY 30 MINUTES BEFORE MORNING MEAL FOR 90 DAYS      ondansetron (ZOFRAN) 8 mg tablet Take 1 tablet (8 mg total) by mouth every 8 (eight) hours as needed for nausea or vomiting 20 tablet 0    Probiotic Product (PROBIOTIC DAILY PO) Take by mouth daily       simvastatin (ZOCOR) 10 mg tablet Take 10 mg by mouth daily at bedtime      sodium chloride (OCEAN) 0.65 % nasal spray 1 spray into each nostril as needed for congestion      SYMBICORT 160-4.5 MCG/ACT inhaler 2 puffs daily       Triamcinolone Acetonide (NASACORT ALLERGY 24HR NA) 1 spray by Each Nare route 2 (two) times a day      Ascorbic Acid (VITAMIN C GUMMIE PO) Take by mouth (Patient not taking: Reported on 2/29/2024)      bevacizumab (Avastin) 100 mg/4 mL Avastin (Patient not taking: Reported on 3/12/2024)      CARBOplatin (PARAPLATIN) 50 mg/5 mL CARBOplatin (Patient not taking: Reported on 3/12/2024)      cephalexin (KEFLEX) 500 mg capsule Take 500 mg by mouth every 6 (six) hours (Patient not taking: Reported on 3/12/2024)      DOXOrubicin liposome (Doxil) 2 mg/mL Doxil (Patient not taking: Reported on 3/12/2024)      lisinopril (ZESTRIL) 5 mg tablet Take 10 mg by mouth daily (Patient not taking: Reported on 11/9/2023)      magnesium oxide (MAG-OX) 400 mg tablet Take 1 tablet by mouth daily as needed (Patient not taking: Reported on 3/12/2024)      naloxone (NARCAN) 4 mg/0.1 mL nasal spray Administer 1 spray into a nostril. If no response after 2-3 minutes, give another dose in the other nostril using a new spray. (Patient not taking: Reported on 3/12/2024) 1 each 1    oxyCODONE (ROXICODONE) 5 immediate release tablet 1/2 tab every 6 hours as needed for pain (Patient not taking: Reported on 7/6/2023) 5 tablet 0    prednisoLONE acetate (PRED FORTE) 1 % ophthalmic suspension Apply 1 drop to each eye 6x day the day prior to treatment and then for 4 days following treatment, then 4x day in each eye days 5 thru 8 following treatment. (Patient not taking: Reported on 3/12/2024) 5 mL 0     No current facility-administered medications for this visit.     Facility-Administered Medications Ordered in Other Visits   Medication Dose Route Frequency Provider Last Rate Last Admin    alteplase (CATHFLO) injection 2 mg  2  "mg Intracatheter Q1MIN PRN Pablo Lockwood MD               Objective:    Blood pressure 118/74, pulse 73, temperature 97.8 °F (36.6 °C), height 5' 4.02\" (1.626 m), weight 66.1 kg (145 lb 12.8 oz), SpO2 99%.  Body mass index is 25.01 kg/m².  Body surface area is 1.71 meters squared.    Physical Exam  Constitutional:       Appearance: She is well-developed.   Pulmonary:      Effort: Pulmonary effort is normal.   Skin:     General: Skin is warm and dry.      Findings: No rash.   Neurological:      Mental Status: She is alert and oriented to person, place, and time.   Psychiatric:         Behavior: Behavior normal.         Thought Content: Thought content normal.         Judgment: Judgment normal.   Performance status is zero.         Lab Results   Component Value Date    K 4.1 04/17/2024     04/17/2024    CO2 27 04/17/2024    BUN 14 04/17/2024    CREATININE 0.86 04/17/2024    GLUCOSE 214 (H) 10/28/2022    GLUF 161 (H) 12/08/2022    CALCIUM 9.3 04/17/2024    CORRECTEDCA 9.2 04/03/2024    AST 21 04/17/2024    ALT 14 04/17/2024    ALKPHOS 131 (H) 04/17/2024    EGFR 67 04/17/2024     Lab Results   Component Value Date    WBC 14.09 (H) 04/17/2024    HGB 9.5 (L) 04/17/2024    HCT 29.6 (L) 04/17/2024     (H) 04/17/2024     (H) 04/17/2024     Lab Results   Component Value Date    NEUTROABS 10.64 (H) 04/17/2024        Trend:  Lab Results   Component Value Date     283.9 (H) 04/10/2024     192.1 (H) 03/20/2024     225.2 (H) 02/28/2024     305.9 (H) 02/07/2024     528.9 (H) 01/17/2024     336.9 (H) 12/06/2023     269.2 (H) 11/15/2023     234.7 (H) 10/25/2023     205.1 (H) 09/27/2023     223.9 (H) 08/30/2023     340.6 (H) 08/02/2023     168.5 (H) 06/26/2023     6.2 03/24/2023     5.1 03/02/2023     5.3 02/09/2023     8.7 01/19/2023     15.8 12/30/2022     37.9 (H) 12/08/2022     8.6 10/20/2022     " 8.4 10/07/2022     12.5 09/29/2022     32.0 (H) 09/08/2022     52.4 (H) 09/02/2022     85.5 (H) 08/26/2022     77.3 (H) 08/19/2022

## 2024-04-19 ENCOUNTER — HOSPITAL ENCOUNTER (OUTPATIENT)
Dept: INFUSION CENTER | Facility: HOSPITAL | Age: 74
Discharge: HOME | End: 2024-04-19
Attending: OBSTETRICS & GYNECOLOGY
Payer: MEDICARE

## 2024-04-19 VITALS
BODY MASS INDEX: 25.1 KG/M2 | HEART RATE: 79 BPM | SYSTOLIC BLOOD PRESSURE: 147 MMHG | WEIGHT: 147.05 LBS | OXYGEN SATURATION: 99 % | HEIGHT: 64 IN | TEMPERATURE: 96.6 F | DIASTOLIC BLOOD PRESSURE: 72 MMHG

## 2024-04-19 DIAGNOSIS — C56.2 MALIGNANT NEOPLASM OF LEFT OVARY (HCC): Primary | ICD-10-CM

## 2024-04-19 PROCEDURE — 96367 TX/PROPH/DG ADDL SEQ IV INF: CPT

## 2024-04-19 PROCEDURE — 96413 CHEMO IV INFUSION 1 HR: CPT

## 2024-04-19 RX ORDER — MAGNESIUM SULFATE HEPTAHYDRATE 40 MG/ML
2 INJECTION, SOLUTION INTRAVENOUS ONCE
Status: COMPLETED | OUTPATIENT
Start: 2024-04-19 | End: 2024-04-19

## 2024-04-19 RX ORDER — SODIUM CHLORIDE 9 MG/ML
20 INJECTION, SOLUTION INTRAVENOUS ONCE
Status: COMPLETED | OUTPATIENT
Start: 2024-04-19 | End: 2024-04-19

## 2024-04-19 RX ADMIN — MAGNESIUM SULFATE HEPTAHYDRATE 2 G: 40 INJECTION, SOLUTION INTRAVENOUS at 10:05

## 2024-04-19 RX ADMIN — PEMETREXED DISODIUM 1000 MG: 500 INJECTION, POWDER, LYOPHILIZED, FOR SOLUTION INTRAVENOUS at 12:01

## 2024-04-19 RX ADMIN — SODIUM CHLORIDE 20 ML/HR: 0.9 INJECTION, SOLUTION INTRAVENOUS at 10:05

## 2024-04-19 RX ADMIN — DEXAMETHASONE SODIUM PHOSPHATE 10 MG: 100 INJECTION INTRAMUSCULAR; INTRAVENOUS at 11:29

## 2024-04-19 NOTE — PROGRESS NOTES
Penny Fernandez  tolerated treatment well with no complications.      Penny Fernandez is aware of future appt on 4/24/24 at 1330.     AVS printed and given to Penny Fernandez:  No (Declined by Penny Fernandez)

## 2024-04-24 ENCOUNTER — HOSPITAL ENCOUNTER (OUTPATIENT)
Dept: INFUSION CENTER | Facility: HOSPITAL | Age: 74
Discharge: HOME | End: 2024-04-24
Payer: MEDICARE

## 2024-04-24 DIAGNOSIS — Z45.2 ENCOUNTER FOR VENOUS ACCESS DEVICE CARE: Primary | ICD-10-CM

## 2024-04-24 DIAGNOSIS — C56.2 MALIGNANT NEOPLASM OF LEFT OVARY (HCC): ICD-10-CM

## 2024-04-24 LAB
ALBUMIN SERPL BCP-MCNC: 3.4 G/DL (ref 3.5–5)
ALP SERPL-CCNC: 110 U/L (ref 34–104)
ALT SERPL W P-5'-P-CCNC: 18 U/L (ref 7–52)
ANION GAP SERPL CALCULATED.3IONS-SCNC: 6 MMOL/L (ref 4–13)
ANISOCYTOSIS BLD QL SMEAR: PRESENT
AST SERPL W P-5'-P-CCNC: 20 U/L (ref 13–39)
BASOPHILS # BLD AUTO: 0.02 THOUSANDS/ÂΜL (ref 0–0.1)
BASOPHILS NFR BLD AUTO: 0 % (ref 0–1)
BILIRUB SERPL-MCNC: 0.4 MG/DL (ref 0.2–1)
BUN SERPL-MCNC: 20 MG/DL (ref 5–25)
CALCIUM ALBUM COR SERPL-MCNC: 8.8 MG/DL (ref 8.3–10.1)
CALCIUM SERPL-MCNC: 8.3 MG/DL (ref 8.4–10.2)
CHLORIDE SERPL-SCNC: 103 MMOL/L (ref 96–108)
CO2 SERPL-SCNC: 25 MMOL/L (ref 21–32)
CREAT SERPL-MCNC: 0.79 MG/DL (ref 0.6–1.3)
EOSINOPHIL # BLD AUTO: 0.13 THOUSAND/ÂΜL (ref 0–0.61)
EOSINOPHIL NFR BLD AUTO: 1 % (ref 0–6)
ERYTHROCYTE [DISTWIDTH] IN BLOOD BY AUTOMATED COUNT: 17.2 % (ref 11.6–15.1)
GFR SERPL CREATININE-BSD FRML MDRD: 74 ML/MIN/1.73SQ M
GLUCOSE SERPL-MCNC: 175 MG/DL (ref 65–140)
HCT VFR BLD AUTO: 27.4 % (ref 34.8–46.1)
HGB BLD-MCNC: 8.8 G/DL (ref 11.5–15.4)
IMM GRANULOCYTES # BLD AUTO: 0.1 THOUSAND/UL (ref 0–0.2)
IMM GRANULOCYTES NFR BLD AUTO: 1 % (ref 0–2)
LYMPHOCYTES # BLD AUTO: 0.79 THOUSANDS/ÂΜL (ref 0.6–4.47)
LYMPHOCYTES NFR BLD AUTO: 6 % (ref 14–44)
MACROCYTES BLD QL AUTO: PRESENT
MAGNESIUM SERPL-MCNC: 1.7 MG/DL (ref 1.9–2.7)
MCH RBC QN AUTO: 32.5 PG (ref 26.8–34.3)
MCHC RBC AUTO-ENTMCNC: 32.1 G/DL (ref 31.4–37.4)
MCV RBC AUTO: 101 FL (ref 82–98)
MONOCYTES # BLD AUTO: 0.22 THOUSAND/ÂΜL (ref 0.17–1.22)
MONOCYTES NFR BLD AUTO: 2 % (ref 4–12)
NEUTROPHILS # BLD AUTO: 11.44 THOUSANDS/ÂΜL (ref 1.85–7.62)
NEUTS SEG NFR BLD AUTO: 90 % (ref 43–75)
NRBC BLD AUTO-RTO: 0 /100 WBCS
PLATELET # BLD AUTO: 401 THOUSANDS/UL (ref 149–390)
PLATELET BLD QL SMEAR: ABNORMAL
PMV BLD AUTO: 9.3 FL (ref 8.9–12.7)
POTASSIUM SERPL-SCNC: 3.8 MMOL/L (ref 3.5–5.3)
PROT SERPL-MCNC: 6.5 G/DL (ref 6.4–8.4)
RBC # BLD AUTO: 2.71 MILLION/UL (ref 3.81–5.12)
RBC MORPH BLD: PRESENT
SODIUM SERPL-SCNC: 134 MMOL/L (ref 135–147)
WBC # BLD AUTO: 12.7 THOUSAND/UL (ref 4.31–10.16)

## 2024-04-24 PROCEDURE — 85025 COMPLETE CBC W/AUTO DIFF WBC: CPT

## 2024-04-24 PROCEDURE — 80053 COMPREHEN METABOLIC PANEL: CPT

## 2024-04-24 PROCEDURE — 83735 ASSAY OF MAGNESIUM: CPT

## 2024-04-24 NOTE — PROGRESS NOTES
Pt's port accessed using sterile technique. Labs drawn without difficulty. Port de-accessed. Band-aid applied. Pt ambulated with a steady gait off unit. Declined AVS     Aware of next appt 05/01/24 @ 0230 pm

## 2024-04-26 DIAGNOSIS — C57.9 GYNECOLOGIC MALIGNANCY (HCC): ICD-10-CM

## 2024-04-26 RX ORDER — LORAZEPAM 1 MG/1
1 TABLET ORAL EVERY 8 HOURS PRN
Qty: 40 TABLET | Refills: 0 | Status: SHIPPED | OUTPATIENT
Start: 2024-04-26

## 2024-05-01 ENCOUNTER — HOSPITAL ENCOUNTER (OUTPATIENT)
Dept: INFUSION CENTER | Facility: HOSPITAL | Age: 74
Discharge: HOME | End: 2024-05-01
Payer: MEDICARE

## 2024-05-01 DIAGNOSIS — Z45.2 ENCOUNTER FOR VENOUS ACCESS DEVICE CARE: Primary | ICD-10-CM

## 2024-05-01 DIAGNOSIS — C56.2 MALIGNANT NEOPLASM OF LEFT OVARY (HCC): ICD-10-CM

## 2024-05-01 LAB
ANISOCYTOSIS BLD QL SMEAR: PRESENT
BASOPHILS # BLD MANUAL: 0 THOUSAND/UL (ref 0–0.1)
BASOPHILS NFR MAR MANUAL: 0 % (ref 0–1)
EOSINOPHIL # BLD MANUAL: 0 THOUSAND/UL (ref 0–0.4)
EOSINOPHIL NFR BLD MANUAL: 0 % (ref 0–6)
ERYTHROCYTE [DISTWIDTH] IN BLOOD BY AUTOMATED COUNT: 16.6 % (ref 11.6–15.1)
HCT VFR BLD AUTO: 27.6 % (ref 34.8–46.1)
HGB BLD-MCNC: 8.8 G/DL (ref 11.5–15.4)
HOWELL-JOLLY BOD BLD QL SMEAR: PRESENT
LYMPHOCYTES # BLD AUTO: 1.75 THOUSAND/UL (ref 0.6–4.47)
LYMPHOCYTES # BLD AUTO: 16 % (ref 14–44)
MACROCYTES BLD QL AUTO: PRESENT
MAGNESIUM SERPL-MCNC: 1.7 MG/DL (ref 1.9–2.7)
MCH RBC QN AUTO: 32.4 PG (ref 26.8–34.3)
MCHC RBC AUTO-ENTMCNC: 31.9 G/DL (ref 31.4–37.4)
MCV RBC AUTO: 102 FL (ref 82–98)
MONOCYTES # BLD AUTO: 1.65 THOUSAND/UL (ref 0–1.22)
MONOCYTES NFR BLD: 17 % (ref 4–12)
NEUTROPHILS # BLD MANUAL: 6.31 THOUSAND/UL (ref 1.85–7.62)
NEUTS SEG NFR BLD AUTO: 65 % (ref 43–75)
PLATELET # BLD AUTO: 344 THOUSANDS/UL (ref 149–390)
PLATELET BLD QL SMEAR: ADEQUATE
PMV BLD AUTO: 9.8 FL (ref 8.9–12.7)
POLYCHROMASIA BLD QL SMEAR: PRESENT
RBC # BLD AUTO: 2.72 MILLION/UL (ref 3.81–5.12)
RBC MORPH BLD: PRESENT
VARIANT LYMPHS # BLD AUTO: 2 %
WBC # BLD AUTO: 9.7 THOUSAND/UL (ref 4.31–10.16)

## 2024-05-01 PROCEDURE — 80053 COMPREHEN METABOLIC PANEL: CPT

## 2024-05-01 PROCEDURE — 85027 COMPLETE CBC AUTOMATED: CPT

## 2024-05-01 PROCEDURE — 85007 BL SMEAR W/DIFF WBC COUNT: CPT

## 2024-05-01 PROCEDURE — 83735 ASSAY OF MAGNESIUM: CPT

## 2024-05-01 PROCEDURE — 86304 IMMUNOASSAY TUMOR CA 125: CPT

## 2024-05-01 NOTE — PROGRESS NOTES
Penny Fernandez  tolerated treatment well with no complications.      Penny Fernandez is aware of future appt on 5/8 at 1400.     AVS printed and given to Penny Fernandez:    No (Declined by Penny Fernandez)

## 2024-05-02 LAB — CANCER AG125 SERPL-ACNC: 383.3 U/ML (ref 0–35)

## 2024-05-08 ENCOUNTER — HOSPITAL ENCOUNTER (OUTPATIENT)
Dept: INFUSION CENTER | Facility: HOSPITAL | Age: 74
Discharge: HOME | End: 2024-05-08
Payer: MEDICARE

## 2024-05-08 DIAGNOSIS — C56.2 MALIGNANT NEOPLASM OF LEFT OVARY (HCC): ICD-10-CM

## 2024-05-08 DIAGNOSIS — Z45.2 ENCOUNTER FOR VENOUS ACCESS DEVICE CARE: Primary | ICD-10-CM

## 2024-05-08 LAB
ALBUMIN SERPL BCP-MCNC: 3.4 G/DL (ref 3.5–5)
ALBUMIN SERPL BCP-MCNC: 3.4 G/DL (ref 3.5–5)
ALP SERPL-CCNC: 148 U/L (ref 34–104)
ALP SERPL-CCNC: 213 U/L (ref 34–104)
ALT SERPL W P-5'-P-CCNC: 12 U/L (ref 7–52)
ALT SERPL W P-5'-P-CCNC: 17 U/L (ref 7–52)
ANION GAP SERPL CALCULATED.3IONS-SCNC: 6 MMOL/L (ref 4–13)
ANION GAP SERPL CALCULATED.3IONS-SCNC: 9 MMOL/L (ref 4–13)
ANISOCYTOSIS BLD QL SMEAR: PRESENT
AST SERPL W P-5'-P-CCNC: 18 U/L (ref 13–39)
AST SERPL W P-5'-P-CCNC: 19 U/L (ref 13–39)
BASOPHILS # BLD MANUAL: 0 THOUSAND/UL (ref 0–0.1)
BASOPHILS NFR MAR MANUAL: 0 % (ref 0–1)
BILIRUB SERPL-MCNC: 0.18 MG/DL (ref 0.2–1)
BILIRUB SERPL-MCNC: 0.25 MG/DL (ref 0.2–1)
BUN SERPL-MCNC: 13 MG/DL (ref 5–25)
BUN SERPL-MCNC: 13 MG/DL (ref 5–25)
CALCIUM ALBUM COR SERPL-MCNC: 9.5 MG/DL (ref 8.3–10.1)
CALCIUM ALBUM COR SERPL-MCNC: 9.7 MG/DL (ref 8.3–10.1)
CALCIUM SERPL-MCNC: 9 MG/DL (ref 8.4–10.2)
CALCIUM SERPL-MCNC: 9.2 MG/DL (ref 8.4–10.2)
CHLORIDE SERPL-SCNC: 103 MMOL/L (ref 96–108)
CHLORIDE SERPL-SCNC: 104 MMOL/L (ref 96–108)
CO2 SERPL-SCNC: 27 MMOL/L (ref 21–32)
CO2 SERPL-SCNC: 27 MMOL/L (ref 21–32)
CREAT SERPL-MCNC: 0.68 MG/DL (ref 0.6–1.3)
CREAT SERPL-MCNC: 0.76 MG/DL (ref 0.6–1.3)
EOSINOPHIL # BLD MANUAL: 0.13 THOUSAND/UL (ref 0–0.4)
EOSINOPHIL NFR BLD MANUAL: 1 % (ref 0–6)
ERYTHROCYTE [DISTWIDTH] IN BLOOD BY AUTOMATED COUNT: 16.2 % (ref 11.6–15.1)
GFR SERPL CREATININE-BSD FRML MDRD: 78 ML/MIN/1.73SQ M
GFR SERPL CREATININE-BSD FRML MDRD: 87 ML/MIN/1.73SQ M
GLUCOSE SERPL-MCNC: 141 MG/DL (ref 65–140)
GLUCOSE SERPL-MCNC: 142 MG/DL (ref 65–140)
HCT VFR BLD AUTO: 28 % (ref 34.8–46.1)
HGB BLD-MCNC: 8.9 G/DL (ref 11.5–15.4)
LYMPHOCYTES # BLD AUTO: 17 % (ref 14–44)
LYMPHOCYTES # BLD AUTO: 2.18 THOUSAND/UL (ref 0.6–4.47)
MACROCYTES BLD QL AUTO: PRESENT
MAGNESIUM SERPL-MCNC: 1.7 MG/DL (ref 1.9–2.7)
MCH RBC QN AUTO: 32.5 PG (ref 26.8–34.3)
MCHC RBC AUTO-ENTMCNC: 31.8 G/DL (ref 31.4–37.4)
MCV RBC AUTO: 102 FL (ref 82–98)
MONOCYTES # BLD AUTO: 0.9 THOUSAND/UL (ref 0–1.22)
MONOCYTES NFR BLD: 7 % (ref 4–12)
NEUTROPHILS # BLD MANUAL: 9.62 THOUSAND/UL (ref 1.85–7.62)
NEUTS SEG NFR BLD AUTO: 75 % (ref 43–75)
PLATELET # BLD AUTO: 536 THOUSANDS/UL (ref 149–390)
PLATELET BLD QL SMEAR: ADEQUATE
PMV BLD AUTO: 9.4 FL (ref 8.9–12.7)
POTASSIUM SERPL-SCNC: 3.8 MMOL/L (ref 3.5–5.3)
POTASSIUM SERPL-SCNC: 4 MMOL/L (ref 3.5–5.3)
PROT SERPL-MCNC: 6.9 G/DL (ref 6.4–8.4)
PROT SERPL-MCNC: 7 G/DL (ref 6.4–8.4)
RBC # BLD AUTO: 2.74 MILLION/UL (ref 3.81–5.12)
RBC MORPH BLD: PRESENT
SODIUM SERPL-SCNC: 136 MMOL/L (ref 135–147)
SODIUM SERPL-SCNC: 140 MMOL/L (ref 135–147)
WBC # BLD AUTO: 12.82 THOUSAND/UL (ref 4.31–10.16)

## 2024-05-08 PROCEDURE — 80053 COMPREHEN METABOLIC PANEL: CPT

## 2024-05-08 PROCEDURE — 85007 BL SMEAR W/DIFF WBC COUNT: CPT

## 2024-05-08 PROCEDURE — 83735 ASSAY OF MAGNESIUM: CPT

## 2024-05-08 PROCEDURE — 85027 COMPLETE CBC AUTOMATED: CPT

## 2024-05-08 NOTE — PROGRESS NOTES
Pt here for port labs; labs obtained without difficulty; pt aware of next appt 5/10 at 1000; left unit ambulatory with steady gait.

## 2024-05-09 ENCOUNTER — OFFICE VISIT (OUTPATIENT)
Age: 74
End: 2024-05-09
Payer: MEDICARE

## 2024-05-09 ENCOUNTER — TELEPHONE (OUTPATIENT)
Age: 74
End: 2024-05-09

## 2024-05-09 ENCOUNTER — PREP FOR PROCEDURE (OUTPATIENT)
Age: 74
End: 2024-05-09

## 2024-05-09 VITALS
DIASTOLIC BLOOD PRESSURE: 78 MMHG | HEIGHT: 64 IN | WEIGHT: 145.2 LBS | RESPIRATION RATE: 16 BRPM | HEART RATE: 90 BPM | TEMPERATURE: 98 F | BODY MASS INDEX: 24.79 KG/M2 | OXYGEN SATURATION: 98 % | SYSTOLIC BLOOD PRESSURE: 128 MMHG

## 2024-05-09 DIAGNOSIS — T45.1X5A ANTINEOPLASTIC CHEMOTHERAPY INDUCED ANEMIA: ICD-10-CM

## 2024-05-09 DIAGNOSIS — T45.1X5A CHEMOTHERAPY-INDUCED NAUSEA: ICD-10-CM

## 2024-05-09 DIAGNOSIS — D64.81 ANTINEOPLASTIC CHEMOTHERAPY INDUCED ANEMIA: ICD-10-CM

## 2024-05-09 DIAGNOSIS — F06.4 ANXIETY DISORDER DUE TO MEDICAL CONDITION: ICD-10-CM

## 2024-05-09 DIAGNOSIS — C56.2 MALIGNANT NEOPLASM OF LEFT OVARY (HCC): Primary | ICD-10-CM

## 2024-05-09 DIAGNOSIS — J91.0 MALIGNANT PLEURAL EFFUSION: ICD-10-CM

## 2024-05-09 DIAGNOSIS — R11.0 CHEMOTHERAPY-INDUCED NAUSEA: ICD-10-CM

## 2024-05-09 DIAGNOSIS — E83.42 HYPOMAGNESEMIA: ICD-10-CM

## 2024-05-09 DIAGNOSIS — J91.0 MALIGNANT PLEURAL EFFUSION: Primary | ICD-10-CM

## 2024-05-09 PROCEDURE — G2211 COMPLEX E/M VISIT ADD ON: HCPCS | Performed by: PHYSICIAN ASSISTANT

## 2024-05-09 PROCEDURE — 99215 OFFICE O/P EST HI 40 MIN: CPT | Performed by: PHYSICIAN ASSISTANT

## 2024-05-09 RX ORDER — PROMETHAZINE HYDROCHLORIDE 25 MG/1
25 TABLET ORAL EVERY 6 HOURS PRN
Qty: 30 TABLET | Refills: 0 | Status: SHIPPED | OUTPATIENT
Start: 2024-05-09 | End: 2024-05-10 | Stop reason: ALTCHOICE

## 2024-05-09 RX ORDER — DEXAMETHASONE 4 MG/1
TABLET ORAL
Qty: 24 TABLET | Refills: 0 | Status: SHIPPED | OUTPATIENT
Start: 2024-05-09

## 2024-05-09 NOTE — PROGRESS NOTES
Assessment/Plan:    Problem List Items Addressed This Visit          Respiratory    Malignant pleural effusion     Worsening SOB. Referral to IR for consideration of repeat right thoracentesis.         Relevant Medications    budesonide (PULMICORT) 180 MCG/ACT inhaler       Endocrine    Malignant neoplasm of left ovary (HCC) - Primary     Recurrent, platinum resistant stage IIIC ovarian cancer with disease progression on gemcitabine who is currently receiving palliative chemotherapy with single-agent alimta 600 mg/m2 IV every 21 days. She has chemotherapy-induced nausea, fatigue and anemia. Her quality of life is reduced due to the nausea and fatigue. Patient desires to proceed with one additional cycle with repeat imaging prior to considering discontinuation.     Hematologic and hematologic values from 5/8/24 reviewed and are adequate for treatment. Continue with next cycle of treatment as planned without dose modification.      Repeat imaging to be performed after completion of cycle 3. Return to the office as per her chemotherapy calendar.          Relevant Medications    dexamethasone (DECADRON) 4 mg tablet       Behavioral Health    Anxiety disorder due to medical condition       Blood    Antineoplastic chemotherapy induced anemia     Hemoglobin stable.   Fairly asymptomatic.   Continue to trend CBC.            Other    Hypomagnesemia     Replete 2gm Mg Sulfate IV with treatment.           Other Visit Diagnoses       Chemotherapy-induced nausea                  CHIEF COMPLAINT:   Pre-chemotherapy evaluation.    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  Results revealed patient has the following mutation(s):  None     Malignant neoplasm of left ovary (HCC)   8/4/2022 Surgery    Diagnostic laparoscopy, peritoneal biopsy     8/9/2022 Initial Diagnosis    Gynecologic malignancy (HCC)     8/23/2022 - 1/24/2023 Chemotherapy    palonosetron (ALOXI), 0.25 mg, Intravenous, Once, 6 of 6 cycles  Administration: 0.25 mg (8/23/2022), 0.25 mg (9/13/2022), 0.25 mg (10/4/2022), 0.25 mg (12/13/2022), 0.25 mg (1/3/2023), 0.25 mg (1/24/2023)  fosaprepitant (EMEND) IVPB, 150 mg, Intravenous, Once, 6 of 6 cycles  Administration: 150 mg (8/23/2022), 150 mg (9/13/2022), 150 mg (10/4/2022), 150 mg (12/13/2022), 150 mg (1/3/2023), 150 mg (1/24/2023)  CARBOplatin (PARAPLATIN) IVPB (GO AUC DOSING), 605.4 mg, Intravenous, Once, 6 of 6 cycles  Administration: 600 mg (8/23/2022), 564 mg (9/13/2022), 601.8 mg (10/4/2022), 491.5 mg (12/13/2022), 468.5 mg (1/3/2023), 488 mg (1/24/2023)  bevacizumab (AVASTIN) IVPB, 1,252.5 mg, Intravenous, Once, 3 of 3 cycles  Dose modification: 10 mg/kg (original dose 15 mg/kg, Cycle 5, Reason: Other (Must fill in a comment))  Administration: 1,200 mg (8/23/2022), 1,200 mg (9/13/2022), 745 mg (1/3/2023)  PACLItaxel (TAXOL) chemo IVPB, 175 mg/m2 = 330.6 mg, Intravenous, Once, 6 of 6 cycles  Dose modification: 135 mg/m2 (original dose 175 mg/m2, Cycle 4, Reason: Other (Must fill in a comment))  Administration: 330.6 mg (8/23/2022), 330.6 mg (9/13/2022), 330.6 mg (10/4/2022), 246 mg (12/13/2022), 244.2 mg (1/3/2023), 244.2 mg (1/24/2023)     10/28/2022 Surgery    diagnostic laparoscopy, exploratory laparotomy, modified radical hysterectomy, bilateral salpingo-oophorectomy with en bloc rectosigmoid resection, gastrocolic omentectomy, splenectomy, small bowel resection with reanastomosis, diaphragm resection, excision ablation of  peritoneal implants, ileostomy formation, optimal tumor debulking     11/29/2022 -  Cancer Staged    Staging form: Ovary, Fallopian Tube, Primary Peritoneal, AJCC 8th Edition  - Clinical: FIGO Stage IIIC (cT3c, cN0, cM0) - Signed by Pablo Lockwood MD on 11/29/2022  Histologic grade (G): G3  Histologic grading system: 4 grade system       3/27/2023 Surgery    Low anterior resection  Residual tumor identified in lymphovascular spaces    A. 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 Colon (anastomotic donuts):  - Colon with no diagnostic abnormality  - No carcinoma identified      4/17/2023 Genomic Testing    [employer]: FOLR1, positive     7/21/2023 -  Chemotherapy    Doxil 30 mg/m2 and carboplatin AUC 5 every 28 days with avastin 10 mg/kg IV every 14 days.     Avastin dose-reduced to 7.5 mg/kg due to epistaxis.      10/27/2023 -  Chemotherapy    Mirvetuximab 6 mg/kg AIBW IV every 21 days.        1/18/2024 -  Chemotherapy    Gemcitabine 600 mg/m2 IV days 1 and 8 every 21 days.       3/29/2024 -  Chemotherapy    Alimta 600 mg/m2 IV every 21 days.             Patient ID: Penny Fernandez is a 73 y.o. female  who presents to the office for pre-chemotherapy evaluation. She has been afebrile. The patient is experiencing fatigue from treatment. She takes naps during the day. She notes nausea with some relief from zofran. She also notes intermittent abdominal pain/burning that is associated with the nausea. She denies vomiting. Appetite is appropriate. Normal bowel/bladder function. No skin rashes. CBC/Diff, CMP, Mg from 5/8/24 reviewed.        The following portions of the patient's history were reviewed and updated as appropriate: allergies, current medications, past medical history, past surgical history, and problem list.    Review of Systems   Constitutional:  Positive for fatigue.    HENT: Negative.     Eyes: Negative.    Respiratory:  Positive for shortness of breath.    Cardiovascular: Negative.    Gastrointestinal:  Positive for abdominal pain and nausea. Negative for vomiting.   Genitourinary: Negative.    Musculoskeletal: Negative.    Skin: Negative.    Neurological: Negative.    Psychiatric/Behavioral:  The patient is nervous/anxious.        Current Outpatient Medications   Medication Sig Dispense Refill    Acetaminophen (TYLENOL ARTHRITIS PAIN PO) Take by mouth as needed      aspirin (ECOTRIN LOW STRENGTH) 81 mg EC tablet Take 81 mg by mouth daily      B Complex-C (B COMPLEX-VITAMIN C PO) Take by mouth daily      Calcium Carb-Cholecalciferol (CALCIUM 500+D3 PO) Take 1 tablet by mouth 2 (two) times a day       cholecalciferol (VITAMIN D3) 1,000 units tablet Take 1,000 Units by mouth daily      dexamethasone (DECADRON) 4 mg tablet Take 1 tablet PO BID the day before, day of and day after chemo. 24 tablet 0    famotidine (PEPCID) 40 MG tablet TAKE 1 TABLET BY MOUTH EVERYDAY AT BEDTIME 90 tablet 1    fexofenadine (ALLEGRA) 180 MG tablet Take 180 mg by mouth daily      folic acid (FOLVITE) 1 mg tablet TAKE 1 TABLET BY MOUTH EVERY DAY 90 tablet 2    GLUCOSAMINE-CHONDROITIN PO Take 1 tablet by mouth 2 (two) times a day       lidocaine-prilocaine (EMLA) cream Apply to mediport 30 min prior to labs or chemo 30 g 3    lisinopril (ZESTRIL) 20 mg tablet Take 20 mg by mouth daily      LORazepam (ATIVAN) 1 mg tablet Take 1 tablet (1 mg total) by mouth every 8 (eight) hours as needed for anxiety (nausea or anxiety) 40 tablet 0    metoprolol succinate (TOPROL-XL) 25 mg 24 hr tablet Take 50 mg by mouth daily Taking 25 mg at breakfast and 25 mg at dinner      montelukast (SINGULAIR) 10 mg tablet Take 10 mg by mouth daily dinner      multivitamin (THERAGRAN) TABS Take 1 tablet by mouth daily      nystatin (MYCOSTATIN) powder Apply topically if needed Under abdominal pannus and in between thighs       omeprazole (PriLOSEC) 20 mg delayed release capsule TAKE 1 CAPSULE BY MOUTH EVERY DAY 30 MINUTES BEFORE MORNING MEAL FOR 90 DAYS      ondansetron (ZOFRAN) 8 mg tablet Take 1 tablet (8 mg total) by mouth every 8 (eight) hours as needed for nausea or vomiting 20 tablet 0    Probiotic Product (PROBIOTIC DAILY PO) Take by mouth daily      simvastatin (ZOCOR) 10 mg tablet Take 10 mg by mouth daily at bedtime      sodium chloride (OCEAN) 0.65 % nasal spray 1 spray into each nostril as needed for congestion      SYMBICORT 160-4.5 MCG/ACT inhaler 2 puffs daily       Triamcinolone Acetonide (NASACORT ALLERGY 24HR NA) 1 spray by Each Nare route 2 (two) times a day      Ascorbic Acid (VITAMIN C GUMMIE PO) Take by mouth (Patient not taking: Reported on 2/29/2024)      bevacizumab (Avastin) 100 mg/4 mL Avastin (Patient not taking: Reported on 3/12/2024)      budesonide (PULMICORT) 180 MCG/ACT inhaler Inhale 1 puff 2 (two) times a day Rinse mouth after use. (Patient not taking: Reported on 5/9/2024)      CARBOplatin (PARAPLATIN) 50 mg/5 mL CARBOplatin (Patient not taking: Reported on 3/12/2024)      cephalexin (KEFLEX) 500 mg capsule Take 500 mg by mouth every 6 (six) hours (Patient not taking: Reported on 3/12/2024)      DOXOrubicin liposome (Doxil) 2 mg/mL Doxil (Patient not taking: Reported on 3/12/2024)      lisinopril (ZESTRIL) 5 mg tablet Take 10 mg by mouth daily (Patient not taking: Reported on 11/9/2023)      magnesium oxide (MAG-OX) 400 mg tablet Take 1 tablet by mouth daily as needed (Patient not taking: Reported on 3/12/2024)      naloxone (NARCAN) 4 mg/0.1 mL nasal spray Administer 1 spray into a nostril. If no response after 2-3 minutes, give another dose in the other nostril using a new spray. (Patient not taking: Reported on 3/12/2024) 1 each 1    oxyCODONE (ROXICODONE) 5 immediate release tablet 1/2 tab every 6 hours as needed for pain (Patient not taking: Reported on 7/6/2023) 5 tablet 0    prednisoLONE acetate  "(PRED FORTE) 1 % ophthalmic suspension Apply 1 drop to each eye 6x day the day prior to treatment and then for 4 days following treatment, then 4x day in each eye days 5 thru 8 following treatment. (Patient not taking: Reported on 3/12/2024) 5 mL 0    prochlorperazine (COMPAZINE) 5 mg tablet Take 1 tablet (5 mg total) by mouth every 6 (six) hours as needed for nausea or vomiting 30 tablet 0     No current facility-administered medications for this visit.     Facility-Administered Medications Ordered in Other Visits   Medication Dose Route Frequency Provider Last Rate Last Admin    alteplase (CATHFLO) injection 2 mg  2 mg Intracatheter Q1MIN PRN Pablo Lockwood MD        dexamethasone (DECADRON) 10 mg in sodium chloride 0.9 % 50 mL IVPB  10 mg Intravenous Once Pablo Lockwood MD        magnesium sulfate 2 g/50 mL IVPB (premix) 2 g  2 g Intravenous Once Pablo Lockwood MD 50 mL/hr at 05/10/24 1041 2 g at 05/10/24 1041    PEMEtrexed (ALIMTA) 1,000 mg in sodium chloride 0.9 % 100 mL chemo infusion  1,000 mg Intravenous Once Pablo Lockwood MD               Objective:    Blood pressure 128/78, pulse 90, temperature 98 °F (36.7 °C), temperature source Temporal, resp. rate 16, height 5' 4.02\" (1.626 m), weight 65.9 kg (145 lb 3.2 oz), SpO2 98%.  Body mass index is 24.91 kg/m².  Body surface area is 1.71 meters squared.    Physical Exam  Constitutional:       Appearance: She is well-developed.   Pulmonary:      Effort: Pulmonary effort is normal.   Skin:     General: Skin is warm and dry.      Findings: No rash.   Neurological:      Mental Status: She is alert and oriented to person, place, and time.   Psychiatric:         Behavior: Behavior normal.         Thought Content: Thought content normal.         Judgment: Judgment normal.     Performance status is zero.         Lab Results   Component Value Date    K 3.8 05/08/2024     05/08/2024    CO2 27 05/08/2024    BUN 13 05/08/2024 "    CREATININE 0.76 05/08/2024    GLUCOSE 214 (H) 10/28/2022    GLUF 161 (H) 12/08/2022    CALCIUM 9.2 05/08/2024    CORRECTEDCA 9.7 05/08/2024    AST 18 05/08/2024    ALT 12 05/08/2024    ALKPHOS 148 (H) 05/08/2024    EGFR 78 05/08/2024     Lab Results   Component Value Date    WBC 12.82 (H) 05/08/2024    HGB 8.9 (L) 05/08/2024    HCT 28.0 (L) 05/08/2024     (H) 05/08/2024     (H) 05/08/2024     Lab Results   Component Value Date    NEUTROABS 11.44 (H) 04/24/2024        Trend:  Lab Results   Component Value Date     383.3 (H) 05/01/2024     283.9 (H) 04/10/2024     192.1 (H) 03/20/2024     225.2 (H) 02/28/2024     305.9 (H) 02/07/2024     528.9 (H) 01/17/2024     336.9 (H) 12/06/2023     269.2 (H) 11/15/2023     234.7 (H) 10/25/2023     205.1 (H) 09/27/2023     223.9 (H) 08/30/2023     340.6 (H) 08/02/2023     168.5 (H) 06/26/2023     6.2 03/24/2023     5.1 03/02/2023     5.3 02/09/2023     8.7 01/19/2023     15.8 12/30/2022     37.9 (H) 12/08/2022     8.6 10/20/2022     8.4 10/07/2022     12.5 09/29/2022     32.0 (H) 09/08/2022     52.4 (H) 09/02/2022     85.5 (H) 08/26/2022     77.3 (H) 08/19/2022

## 2024-05-10 ENCOUNTER — HOSPITAL ENCOUNTER (OUTPATIENT)
Dept: INFUSION CENTER | Facility: HOSPITAL | Age: 74
End: 2024-05-10
Attending: OBSTETRICS & GYNECOLOGY
Payer: MEDICARE

## 2024-05-10 VITALS
DIASTOLIC BLOOD PRESSURE: 68 MMHG | WEIGHT: 146.39 LBS | HEIGHT: 64 IN | HEART RATE: 80 BPM | OXYGEN SATURATION: 96 % | RESPIRATION RATE: 16 BRPM | SYSTOLIC BLOOD PRESSURE: 127 MMHG | TEMPERATURE: 97.4 F | BODY MASS INDEX: 24.99 KG/M2

## 2024-05-10 DIAGNOSIS — C56.2 MALIGNANT NEOPLASM OF LEFT OVARY (HCC): Primary | ICD-10-CM

## 2024-05-10 PROBLEM — D64.81 ANTINEOPLASTIC CHEMOTHERAPY INDUCED ANEMIA: Status: ACTIVE | Noted: 2024-05-10

## 2024-05-10 PROBLEM — T45.1X5A ANTINEOPLASTIC CHEMOTHERAPY INDUCED ANEMIA: Status: ACTIVE | Noted: 2024-05-10

## 2024-05-10 PROCEDURE — 96413 CHEMO IV INFUSION 1 HR: CPT

## 2024-05-10 PROCEDURE — 96367 TX/PROPH/DG ADDL SEQ IV INF: CPT

## 2024-05-10 PROCEDURE — 96372 THER/PROPH/DIAG INJ SC/IM: CPT

## 2024-05-10 RX ORDER — MAGNESIUM SULFATE HEPTAHYDRATE 40 MG/ML
2 INJECTION, SOLUTION INTRAVENOUS ONCE
Status: COMPLETED | OUTPATIENT
Start: 2024-05-10 | End: 2024-05-10

## 2024-05-10 RX ORDER — SODIUM CHLORIDE 9 MG/ML
20 INJECTION, SOLUTION INTRAVENOUS ONCE
Status: CANCELLED | OUTPATIENT
Start: 2024-05-10

## 2024-05-10 RX ORDER — MAGNESIUM SULFATE HEPTAHYDRATE 40 MG/ML
2 INJECTION, SOLUTION INTRAVENOUS ONCE
Status: CANCELLED | OUTPATIENT
Start: 2024-05-10

## 2024-05-10 RX ORDER — PROCHLORPERAZINE MALEATE 5 MG/1
5 TABLET ORAL EVERY 6 HOURS PRN
Qty: 30 TABLET | Refills: 0 | Status: SHIPPED | OUTPATIENT
Start: 2024-05-10

## 2024-05-10 RX ORDER — CYANOCOBALAMIN 1000 UG/ML
1000 INJECTION, SOLUTION INTRAMUSCULAR; SUBCUTANEOUS ONCE
Status: COMPLETED | OUTPATIENT
Start: 2024-05-10 | End: 2024-05-10

## 2024-05-10 RX ORDER — CYANOCOBALAMIN 1000 UG/ML
1000 INJECTION, SOLUTION INTRAMUSCULAR; SUBCUTANEOUS ONCE
Status: CANCELLED | OUTPATIENT
Start: 2024-05-10 | End: 2024-05-10

## 2024-05-10 RX ORDER — SODIUM CHLORIDE 9 MG/ML
20 INJECTION, SOLUTION INTRAVENOUS ONCE
Status: COMPLETED | OUTPATIENT
Start: 2024-05-10 | End: 2024-05-10

## 2024-05-10 RX ADMIN — MAGNESIUM SULFATE IN WATER 2 G: 40 INJECTION, SOLUTION INTRAVENOUS at 10:41

## 2024-05-10 RX ADMIN — CYANOCOBALAMIN 1000 MCG: 1000 INJECTION, SOLUTION INTRAMUSCULAR at 10:43

## 2024-05-10 RX ADMIN — DEXAMETHASONE SODIUM PHOSPHATE 10 MG: 100 INJECTION INTRAMUSCULAR; INTRAVENOUS at 11:41

## 2024-05-10 RX ADMIN — PEMETREXED DISODIUM 1000 MG: 500 INJECTION, POWDER, LYOPHILIZED, FOR SOLUTION INTRAVENOUS at 12:15

## 2024-05-10 RX ADMIN — SODIUM CHLORIDE 20 ML/HR: 9 INJECTION, SOLUTION INTRAVENOUS at 10:41

## 2024-05-10 NOTE — PROGRESS NOTES
Pt tolerated chemotherapy infusion without any adverse reactions. Port flushed and de-accessed. Pt ambulated out of unit with a steady gait. Declined AVS         Aware of next appt 05/15/24 @ 0200 pm

## 2024-05-10 NOTE — ASSESSMENT & PLAN NOTE
Recurrent, platinum resistant stage IIIC ovarian cancer with disease progression on gemcitabine who is currently receiving palliative chemotherapy with single-agent alimta 600 mg/m2 IV every 21 days. She has chemotherapy-induced nausea, fatigue and anemia. Her quality of life is reduced due to the nausea and fatigue. Patient desires to proceed with one additional cycle with repeat imaging prior to considering discontinuation.     Hematologic and hematologic values from 5/8/24 reviewed and are adequate for treatment. Continue with next cycle of treatment as planned without dose modification.      Repeat imaging to be performed after completion of cycle 3. Return to the office as per her chemotherapy calendar.

## 2024-05-14 ENCOUNTER — HOSPITAL ENCOUNTER (OUTPATIENT)
Dept: INTERVENTIONAL RADIOLOGY/VASCULAR | Facility: HOSPITAL | Age: 74
Discharge: HOME | End: 2024-05-14
Admitting: RADIOLOGY
Payer: MEDICARE

## 2024-05-14 VITALS
SYSTOLIC BLOOD PRESSURE: 112 MMHG | DIASTOLIC BLOOD PRESSURE: 51 MMHG | RESPIRATION RATE: 18 BRPM | HEART RATE: 96 BPM | OXYGEN SATURATION: 97 %

## 2024-05-14 DIAGNOSIS — J91.0 MALIGNANT PLEURAL EFFUSION: ICD-10-CM

## 2024-05-14 PROCEDURE — 32555 ASPIRATE PLEURA W/ IMAGING: CPT

## 2024-05-14 RX ORDER — LIDOCAINE HYDROCHLORIDE 10 MG/ML
INJECTION, SOLUTION EPIDURAL; INFILTRATION; INTRACAUDAL; PERINEURAL AS NEEDED
Status: COMPLETED | OUTPATIENT
Start: 2024-05-14 | End: 2024-05-14

## 2024-05-14 RX ADMIN — LIDOCAINE HYDROCHLORIDE 5 ML: 10 INJECTION, SOLUTION EPIDURAL; INFILTRATION; INTRACAUDAL; PERINEURAL at 08:39

## 2024-05-14 NOTE — SEDATION DOCUMENTATION
US guided right thoracentesis completed for 250ml clear david fluid in IR by Dr Riddle.  Left side also scanned but insufficient fluid to drain.  Bandaid to site CDI.  AVS reviewed and given to patient.  Denies questions or c/o.  Discharged ambulatory with  in NAD.

## 2024-05-14 NOTE — DISCHARGE INSTRUCTIONS
Thoracentesis   WHAT YOU NEED TO KNOW:   A thoracentesis is a procedure to remove extra fluid or air from between your lungs and your inner chest wall. Air or fluid buildup may make it hard for you to breathe. A thoracentesis allows your lungs to expand fully so you can breathe more easily.    DISCHARGE INSTRUCTIONS:     Small amount of shoulder pain and bloody sputum is normal after a Thoracentesis.     Rest:  Rest when you feel it is needed. Slowly start to do more each day. Return to your daily activities as directed.     Resume your normal diet. Small sips of flat soda will help mild nausea.    Do not smoke:  If you smoke, it is never too late to quit. Ask for information about how to stop smoking if you need help.    Contact Interventional Radiology at [phone] ([facility] PATIENTS: Contact Interventional Radiology at [phone]) ([facility] PATIENTS: Contact Interventional Radiology at [phone]) if:   You have a fever.    Your puncture site is red, warm, swollen, or draining pus.    You have questions or concerns about your procedure, medicine, or care.    Seek care immediately or call 911 if:   Severe chest pain with inspiration and shortness of breath    Large amounts of blood in your sputum    Follow up with your healthcare provider as directed.

## 2024-05-15 ENCOUNTER — HOSPITAL ENCOUNTER (OUTPATIENT)
Dept: INFUSION CENTER | Facility: HOSPITAL | Age: 74
Discharge: HOME | End: 2024-05-15
Payer: MEDICARE

## 2024-05-15 DIAGNOSIS — C56.2 MALIGNANT NEOPLASM OF LEFT OVARY (HCC): ICD-10-CM

## 2024-05-15 DIAGNOSIS — Z45.2 ENCOUNTER FOR VENOUS ACCESS DEVICE CARE: Primary | ICD-10-CM

## 2024-05-15 LAB
ALBUMIN SERPL BCP-MCNC: 3.3 G/DL (ref 3.5–5)
ALP SERPL-CCNC: 109 U/L (ref 34–104)
ALT SERPL W P-5'-P-CCNC: 13 U/L (ref 7–52)
ANION GAP SERPL CALCULATED.3IONS-SCNC: 6 MMOL/L (ref 4–13)
AST SERPL W P-5'-P-CCNC: 20 U/L (ref 13–39)
BASOPHILS # BLD AUTO: 0.01 THOUSANDS/ÂΜL (ref 0–0.1)
BASOPHILS NFR BLD AUTO: 0 % (ref 0–1)
BILIRUB SERPL-MCNC: 0.29 MG/DL (ref 0.2–1)
BUN SERPL-MCNC: 15 MG/DL (ref 5–25)
CALCIUM ALBUM COR SERPL-MCNC: 9.2 MG/DL (ref 8.3–10.1)
CALCIUM SERPL-MCNC: 8.6 MG/DL (ref 8.4–10.2)
CHLORIDE SERPL-SCNC: 103 MMOL/L (ref 96–108)
CO2 SERPL-SCNC: 26 MMOL/L (ref 21–32)
CREAT SERPL-MCNC: 0.71 MG/DL (ref 0.6–1.3)
EOSINOPHIL # BLD AUTO: 0.06 THOUSAND/ÂΜL (ref 0–0.61)
EOSINOPHIL NFR BLD AUTO: 1 % (ref 0–6)
ERYTHROCYTE [DISTWIDTH] IN BLOOD BY AUTOMATED COUNT: 15.1 % (ref 11.6–15.1)
GFR SERPL CREATININE-BSD FRML MDRD: 84 ML/MIN/1.73SQ M
GLUCOSE SERPL-MCNC: 151 MG/DL (ref 65–140)
HCT VFR BLD AUTO: 27.3 % (ref 34.8–46.1)
HGB BLD-MCNC: 8.6 G/DL (ref 11.5–15.4)
IMM GRANULOCYTES # BLD AUTO: 0.1 THOUSAND/UL (ref 0–0.2)
IMM GRANULOCYTES NFR BLD AUTO: 1 % (ref 0–2)
LYMPHOCYTES # BLD AUTO: 0.98 THOUSANDS/ÂΜL (ref 0.6–4.47)
LYMPHOCYTES NFR BLD AUTO: 9 % (ref 14–44)
MAGNESIUM SERPL-MCNC: 1.7 MG/DL (ref 1.9–2.7)
MCH RBC QN AUTO: 32 PG (ref 26.8–34.3)
MCHC RBC AUTO-ENTMCNC: 31.5 G/DL (ref 31.4–37.4)
MCV RBC AUTO: 102 FL (ref 82–98)
MONOCYTES # BLD AUTO: 0.47 THOUSAND/ÂΜL (ref 0.17–1.22)
MONOCYTES NFR BLD AUTO: 4 % (ref 4–12)
NEUTROPHILS # BLD AUTO: 9.65 THOUSANDS/ÂΜL (ref 1.85–7.62)
NEUTS SEG NFR BLD AUTO: 85 % (ref 43–75)
NRBC BLD AUTO-RTO: 0 /100 WBCS
PLATELET # BLD AUTO: 420 THOUSANDS/UL (ref 149–390)
PMV BLD AUTO: 9.5 FL (ref 8.9–12.7)
POTASSIUM SERPL-SCNC: 3.8 MMOL/L (ref 3.5–5.3)
PROT SERPL-MCNC: 6.4 G/DL (ref 6.4–8.4)
RBC # BLD AUTO: 2.69 MILLION/UL (ref 3.81–5.12)
SODIUM SERPL-SCNC: 135 MMOL/L (ref 135–147)
WBC # BLD AUTO: 11.27 THOUSAND/UL (ref 4.31–10.16)

## 2024-05-15 PROCEDURE — 83735 ASSAY OF MAGNESIUM: CPT

## 2024-05-15 PROCEDURE — 85025 COMPLETE CBC W/AUTO DIFF WBC: CPT

## 2024-05-15 PROCEDURE — 80053 COMPREHEN METABOLIC PANEL: CPT

## 2024-05-15 NOTE — PROGRESS NOTES
Penny Fernandez  tolerated treatment well with no complications.      Penny Fernandez is aware of future appt on 05/22/2024 at 02:00 PM.     AVS printed and given to Penny Fernandez:  No (Declined by Penny Fernandez)

## 2024-05-20 ENCOUNTER — HOSPITAL ENCOUNTER (OUTPATIENT)
Dept: CT IMAGING | Facility: HOSPITAL | Age: 74
Discharge: HOME | End: 2024-05-20
Payer: MEDICARE

## 2024-05-20 DIAGNOSIS — C56.2 MALIGNANT NEOPLASM OF LEFT OVARY (HCC): ICD-10-CM

## 2024-05-20 PROCEDURE — 74177 CT ABD & PELVIS W/CONTRAST: CPT

## 2024-05-20 PROCEDURE — 71260 CT THORAX DX C+: CPT

## 2024-05-20 RX ADMIN — IOHEXOL 80 ML: 350 INJECTION, SOLUTION INTRAVENOUS at 13:29

## 2024-05-20 RX ADMIN — IOHEXOL 50 ML: 240 INJECTION, SOLUTION INTRATHECAL; INTRAVASCULAR; INTRAVENOUS; ORAL at 13:26

## 2024-05-22 ENCOUNTER — HOSPITAL ENCOUNTER (OUTPATIENT)
Dept: INFUSION CENTER | Facility: HOSPITAL | Age: 74
Discharge: HOME | End: 2024-05-22
Payer: MEDICARE

## 2024-05-22 DIAGNOSIS — Z45.2 ENCOUNTER FOR VENOUS ACCESS DEVICE CARE: Primary | ICD-10-CM

## 2024-05-22 DIAGNOSIS — C56.2 MALIGNANT NEOPLASM OF LEFT OVARY (HCC): ICD-10-CM

## 2024-05-22 LAB
ALBUMIN SERPL BCP-MCNC: 3.2 G/DL (ref 3.5–5)
ALP SERPL-CCNC: 166 U/L (ref 34–104)
ALT SERPL W P-5'-P-CCNC: 12 U/L (ref 7–52)
ANION GAP SERPL CALCULATED.3IONS-SCNC: 7 MMOL/L (ref 4–13)
ANISOCYTOSIS BLD QL SMEAR: PRESENT
AST SERPL W P-5'-P-CCNC: 19 U/L (ref 13–39)
BASOPHILS # BLD MANUAL: 0 THOUSAND/UL (ref 0–0.1)
BASOPHILS NFR MAR MANUAL: 0 % (ref 0–1)
BILIRUB SERPL-MCNC: 0.18 MG/DL (ref 0.2–1)
BUN SERPL-MCNC: 12 MG/DL (ref 5–25)
CALCIUM ALBUM COR SERPL-MCNC: 9.2 MG/DL (ref 8.3–10.1)
CALCIUM SERPL-MCNC: 8.6 MG/DL (ref 8.4–10.2)
CANCER AG125 SERPL-ACNC: 454.6 U/ML (ref 0–35)
CHLORIDE SERPL-SCNC: 103 MMOL/L (ref 96–108)
CO2 SERPL-SCNC: 25 MMOL/L (ref 21–32)
CREAT SERPL-MCNC: 0.62 MG/DL (ref 0.6–1.3)
EOSINOPHIL # BLD MANUAL: 0.09 THOUSAND/UL (ref 0–0.4)
EOSINOPHIL NFR BLD MANUAL: 1 % (ref 0–6)
ERYTHROCYTE [DISTWIDTH] IN BLOOD BY AUTOMATED COUNT: 14.7 % (ref 11.6–15.1)
GFR SERPL CREATININE-BSD FRML MDRD: 89 ML/MIN/1.73SQ M
GLUCOSE SERPL-MCNC: 140 MG/DL (ref 65–140)
HCT VFR BLD AUTO: 25.6 % (ref 34.8–46.1)
HGB BLD-MCNC: 8.1 G/DL (ref 11.5–15.4)
LYMPHOCYTES # BLD AUTO: 1.59 THOUSAND/UL (ref 0.6–4.47)
LYMPHOCYTES # BLD AUTO: 17 % (ref 14–44)
MAGNESIUM SERPL-MCNC: 1.7 MG/DL (ref 1.9–2.7)
MCH RBC QN AUTO: 31.9 PG (ref 26.8–34.3)
MCHC RBC AUTO-ENTMCNC: 31.6 G/DL (ref 31.4–37.4)
MCV RBC AUTO: 101 FL (ref 82–98)
MONOCYTES # BLD AUTO: 1.59 THOUSAND/UL (ref 0–1.22)
MONOCYTES NFR BLD: 17 % (ref 4–12)
MYELOCYTE ABSOLUTE CT: 0.19 THOUSAND/UL (ref 0–0.1)
MYELOCYTES NFR BLD MANUAL: 2 % (ref 0–1)
NEUTROPHILS # BLD MANUAL: 5.88 THOUSAND/UL (ref 1.85–7.62)
NEUTS SEG NFR BLD AUTO: 63 % (ref 43–75)
PLATELET # BLD AUTO: 313 THOUSANDS/UL (ref 149–390)
PLATELET BLD QL SMEAR: ADEQUATE
PMV BLD AUTO: 9.5 FL (ref 8.9–12.7)
POTASSIUM SERPL-SCNC: 3.7 MMOL/L (ref 3.5–5.3)
PROT SERPL-MCNC: 6.7 G/DL (ref 6.4–8.4)
RBC # BLD AUTO: 2.54 MILLION/UL (ref 3.81–5.12)
RBC MORPH BLD: PRESENT
SODIUM SERPL-SCNC: 135 MMOL/L (ref 135–147)
WBC # BLD AUTO: 9.34 THOUSAND/UL (ref 4.31–10.16)

## 2024-05-22 PROCEDURE — 83735 ASSAY OF MAGNESIUM: CPT

## 2024-05-22 PROCEDURE — 85027 COMPLETE CBC AUTOMATED: CPT

## 2024-05-22 PROCEDURE — 86304 IMMUNOASSAY TUMOR CA 125: CPT

## 2024-05-22 PROCEDURE — 80053 COMPREHEN METABOLIC PANEL: CPT

## 2024-05-22 PROCEDURE — 85007 BL SMEAR W/DIFF WBC COUNT: CPT

## 2024-05-22 NOTE — PROGRESS NOTES
Penny Fernandez  tolerated treatment well with no complications.      Penny Fernandez is aware of future appt on 05/29/2024 at 02:00 PM.     AVS printed and given to Penny Fernandez:  No (Declined by Penny Fernandez)

## 2024-05-28 DIAGNOSIS — C57.9 GYNECOLOGIC MALIGNANCY (HCC): ICD-10-CM

## 2024-05-28 RX ORDER — LORAZEPAM 1 MG/1
1 TABLET ORAL EVERY 8 HOURS PRN
Qty: 40 TABLET | Refills: 0 | Status: SHIPPED | OUTPATIENT
Start: 2024-05-28

## 2024-05-29 ENCOUNTER — HOSPITAL ENCOUNTER (OUTPATIENT)
Dept: INFUSION CENTER | Facility: HOSPITAL | Age: 74
Discharge: HOME | End: 2024-05-29
Payer: MEDICARE

## 2024-05-29 DIAGNOSIS — Z45.2 ENCOUNTER FOR VENOUS ACCESS DEVICE CARE: Primary | ICD-10-CM

## 2024-05-29 DIAGNOSIS — C56.2 MALIGNANT NEOPLASM OF LEFT OVARY (HCC): ICD-10-CM

## 2024-05-29 LAB
ALBUMIN SERPL BCP-MCNC: 3.4 G/DL (ref 3.5–5)
ALP SERPL-CCNC: 145 U/L (ref 34–104)
ALT SERPL W P-5'-P-CCNC: 10 U/L (ref 7–52)
ANION GAP SERPL CALCULATED.3IONS-SCNC: 8 MMOL/L (ref 4–13)
ANISOCYTOSIS BLD QL SMEAR: PRESENT
AST SERPL W P-5'-P-CCNC: 19 U/L (ref 13–39)
BASOPHILS # BLD MANUAL: 0.12 THOUSAND/UL (ref 0–0.1)
BASOPHILS NFR MAR MANUAL: 1 % (ref 0–1)
BILIRUB SERPL-MCNC: 0.21 MG/DL (ref 0.2–1)
BUN SERPL-MCNC: 12 MG/DL (ref 5–25)
CALCIUM ALBUM COR SERPL-MCNC: 9.4 MG/DL (ref 8.3–10.1)
CALCIUM SERPL-MCNC: 8.9 MG/DL (ref 8.4–10.2)
CHLORIDE SERPL-SCNC: 102 MMOL/L (ref 96–108)
CO2 SERPL-SCNC: 25 MMOL/L (ref 21–32)
CREAT SERPL-MCNC: 0.63 MG/DL (ref 0.6–1.3)
EOSINOPHIL # BLD MANUAL: 0.83 THOUSAND/UL (ref 0–0.4)
EOSINOPHIL NFR BLD MANUAL: 7 % (ref 0–6)
ERYTHROCYTE [DISTWIDTH] IN BLOOD BY AUTOMATED COUNT: 14.7 % (ref 11.6–15.1)
GFR SERPL CREATININE-BSD FRML MDRD: 89 ML/MIN/1.73SQ M
GLUCOSE SERPL-MCNC: 128 MG/DL (ref 65–140)
HCT VFR BLD AUTO: 26.3 % (ref 34.8–46.1)
HGB BLD-MCNC: 8.6 G/DL (ref 11.5–15.4)
HOWELL-JOLLY BOD BLD QL SMEAR: PRESENT
LYMPHOCYTES # BLD AUTO: 0 % (ref 14–44)
LYMPHOCYTES # BLD AUTO: 0.12 THOUSAND/UL (ref 0.6–4.47)
MACROCYTES BLD QL AUTO: PRESENT
MAGNESIUM SERPL-MCNC: 1.6 MG/DL (ref 1.9–2.7)
MCH RBC QN AUTO: 32.6 PG (ref 26.8–34.3)
MCHC RBC AUTO-ENTMCNC: 32.7 G/DL (ref 31.4–37.4)
MCV RBC AUTO: 100 FL (ref 82–98)
MONOCYTES # BLD AUTO: 0 THOUSAND/UL (ref 0–1.22)
MONOCYTES NFR BLD: 0 % (ref 4–12)
NEUTROPHILS # BLD MANUAL: 10.81 THOUSAND/UL (ref 1.85–7.62)
NEUTS BAND NFR BLD MANUAL: 91 % (ref 0–8)
NEUTS SEG NFR BLD AUTO: 0 % (ref 43–75)
PLATELET # BLD AUTO: 570 THOUSANDS/UL (ref 149–390)
PLATELET BLD QL SMEAR: ABNORMAL
PMV BLD AUTO: 9.8 FL (ref 8.9–12.7)
POTASSIUM SERPL-SCNC: 3.7 MMOL/L (ref 3.5–5.3)
PROT SERPL-MCNC: 6.8 G/DL (ref 6.4–8.4)
RBC # BLD AUTO: 2.64 MILLION/UL (ref 3.81–5.12)
RBC MORPH BLD: PRESENT
SODIUM SERPL-SCNC: 135 MMOL/L (ref 135–147)
VARIANT LYMPHS # BLD AUTO: 1 %
WBC # BLD AUTO: 11.88 THOUSAND/UL (ref 4.31–10.16)

## 2024-05-29 PROCEDURE — 85007 BL SMEAR W/DIFF WBC COUNT: CPT

## 2024-05-29 PROCEDURE — 80053 COMPREHEN METABOLIC PANEL: CPT

## 2024-05-29 PROCEDURE — 83735 ASSAY OF MAGNESIUM: CPT

## 2024-05-29 PROCEDURE — 85027 COMPLETE CBC AUTOMATED: CPT

## 2024-05-29 NOTE — PROGRESS NOTES
Penny Fernandez  tolerated treatment well with no complications.      Penny Fernandez is aware of future appt on 05/31/2024 at 1000.     AVS printed and given to Penny Fernandez:   No (Declined by Penny Fernandez)

## 2024-05-30 ENCOUNTER — OFFICE VISIT (OUTPATIENT)
Age: 74
End: 2024-05-30
Payer: MEDICARE

## 2024-05-30 VITALS
BODY MASS INDEX: 24.92 KG/M2 | RESPIRATION RATE: 18 BRPM | WEIGHT: 146 LBS | SYSTOLIC BLOOD PRESSURE: 120 MMHG | OXYGEN SATURATION: 97 % | HEART RATE: 88 BPM | HEIGHT: 64 IN | TEMPERATURE: 98 F | DIASTOLIC BLOOD PRESSURE: 70 MMHG

## 2024-05-30 DIAGNOSIS — C56.2 MALIGNANT NEOPLASM OF LEFT OVARY (HCC): Primary | ICD-10-CM

## 2024-05-30 PROCEDURE — G2211 COMPLEX E/M VISIT ADD ON: HCPCS | Performed by: OBSTETRICS & GYNECOLOGY

## 2024-05-30 PROCEDURE — 99215 OFFICE O/P EST HI 40 MIN: CPT | Performed by: OBSTETRICS & GYNECOLOGY

## 2024-05-31 ENCOUNTER — HOSPITAL ENCOUNTER (OUTPATIENT)
Dept: INFUSION CENTER | Facility: HOSPITAL | Age: 74
Discharge: HOME | End: 2024-05-31
Attending: OBSTETRICS & GYNECOLOGY

## 2024-05-31 ENCOUNTER — TELEPHONE (OUTPATIENT)
Dept: GYNECOLOGIC ONCOLOGY | Facility: CLINIC | Age: 74
End: 2024-05-31

## 2024-05-31 ENCOUNTER — TELEPHONE (OUTPATIENT)
Dept: HEMATOLOGY ONCOLOGY | Facility: CLINIC | Age: 74
End: 2024-05-31

## 2024-05-31 DIAGNOSIS — C48.2 PERITONEAL CARCINOMA (HCC): Primary | ICD-10-CM

## 2024-05-31 DIAGNOSIS — J91.0 MALIGNANT PLEURAL EFFUSION: ICD-10-CM

## 2024-05-31 NOTE — TELEPHONE ENCOUNTER
Patient Call    Who are you speaking with? Loli [facility]     If it is not the patient, are they listed on an active communication consent form? N/A   What is the reason for this call? Loli is calling from [facility] in regards to Penny.  Loli states that Penny has decided to use [facility] has her hospice.      Loli is requesting Penny's H&P.  Also Loli was wondering if Dr. Lockwood would be ok staying on as Penny's doctor on record for hospice.    Loli was also inquiring about a pleruax catheter for Penny   Does this require a call back? Yes       If a call back is required, please list best call back number [phone]    Fax   [phone]   If a call back is required, advise that a message will be forwarded to their care team and someone will return their call as soon as possible.   Did you relay this information to the patient? N/A

## 2024-05-31 NOTE — TELEPHONE ENCOUNTER
Called and spoke to Diane in IR to schedule patient's Pleurx catheter placement and was informed that the order was sent to the IR doctor for review and as soon as its approved they will reach out to the patient to get this scheduled.

## 2024-05-31 NOTE — H&P (VIEW-ONLY)
Assessment/Plan:    Problem List Items Addressed This Visit          Endocrine    Malignant neoplasm of left ovary (HCC) - Primary     73-year-old with recurrent, platinum resistant stage IIIc ovarian cancer with disease progression after 3 cycles of Alimta therapy.  There is new disease in the liver and progressive mesenteric lymphadenopathy with stable retroperitoneal lymphadenopathy.  She has bilateral pleural effusions and is status postthoracentesis 5/14/2024.  I reviewed CT chest abdomen pelvis images, CBC, CMP, .  The tumor is HER2 1+.  Her performance status is 1.  1.  I reviewed additional palliative treatment options including palliative Taxotere with inherent risk of worsening peripheral neuropathy, palliative Enhertu therapy also with inherent risk of peripheral neuropathy.  She understands that response rates to additional cytotoxic chemotherapy after multiple prior treatments are in the 10% range.  She also understands that response rates to Enhertu will be diminished given lower expression of HER2.  2.  We discussed nontreatment options including best supportive care, hospice care.  3.  After considering the risks and benefits of additional treatment versus focusing entirely on quality of life, she is decided to focus on quality of life and will engage with hospice care.  She understands that while on hospice, she can continue to have palliative thoracentesis for symptom management.  4.  Return in 3 months for evaluation.  I have spent a total time of 50 minutes on 05/30/24 in caring for this patient including Diagnostic results, Prognosis, Risks and benefits of tx options, Instructions for management, Patient and family education, Risk factor reductions, Impressions, Counseling / Coordination of care, Documenting in the medical record, Reviewing / ordering tests, medicine, procedures  , Obtaining or reviewing history  , and Communicating with other healthcare professionals .                CHIEF  COMPLAINT: Treatment discussion after 3 cycles of Alimta therapy.  Fatigue, poor quality of life.      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  Results revealed patient has the following mutation(s):  None     Malignant neoplasm of left ovary (HCC)   8/4/2022 Surgery    Diagnostic laparoscopy, peritoneal biopsy     8/9/2022 Initial Diagnosis    Gynecologic malignancy (HCC)     8/23/2022 - 1/24/2023 Chemotherapy    palonosetron (ALOXI), 0.25 mg, Intravenous, Once, 6 of 6 cycles  Administration: 0.25 mg (8/23/2022), 0.25 mg (9/13/2022), 0.25 mg (10/4/2022), 0.25 mg (12/13/2022), 0.25 mg (1/3/2023), 0.25 mg (1/24/2023)  fosaprepitant (EMEND) IVPB, 150 mg, Intravenous, Once, 6 of 6 cycles  Administration: 150 mg (8/23/2022), 150 mg (9/13/2022), 150 mg (10/4/2022), 150 mg (12/13/2022), 150 mg (1/3/2023), 150 mg (1/24/2023)  CARBOplatin (PARAPLATIN) IVPB (GOG AUC DOSING), 605.4 mg, Intravenous, Once, 6 of 6 cycles  Administration: 600 mg (8/23/2022), 564 mg (9/13/2022), 601.8 mg (10/4/2022), 491.5 mg (12/13/2022), 468.5 mg (1/3/2023), 488 mg (1/24/2023)  bevacizumab (AVASTIN) IVPB, 1,252.5 mg, Intravenous, Once, 3 of 3 cycles  Dose modification: 10 mg/kg (original dose 15 mg/kg, Cycle 5, Reason: Other (Must fill in a comment))  Administration: 1,200 mg (8/23/2022), 1,200 mg (9/13/2022), 745 mg (1/3/2023)  PACLItaxel (TAXOL) chemo IVPB, 175 mg/m2 = 330.6 mg, Intravenous, Once, 6 of 6 cycles  Dose modification: 135 mg/m2 (original  dose 175 mg/m2, Cycle 4, Reason: Other (Must fill in a comment))  Administration: 330.6 mg (8/23/2022), 330.6 mg (9/13/2022), 330.6 mg (10/4/2022), 246 mg (12/13/2022), 244.2 mg (1/3/2023), 244.2 mg (1/24/2023)     10/28/2022 Surgery    diagnostic laparoscopy, exploratory laparotomy, modified radical hysterectomy, bilateral salpingo-oophorectomy with en bloc rectosigmoid resection, gastrocolic omentectomy, splenectomy, small bowel resection with reanastomosis, diaphragm resection, excision ablation of peritoneal implants, ileostomy formation, optimal tumor debulking     11/29/2022 -  Cancer Staged    Staging form: Ovary, Fallopian Tube, Primary Peritoneal, AJCC 8th Edition  - Clinical: FIGO Stage IIIC (cT3c, cN0, cM0) - Signed by Pablo Lockwood MD on 11/29/2022  Histologic grade (G): G3  Histologic grading system: 4 grade system       3/27/2023 Surgery    Low anterior resection  Residual tumor identified in lymphovascular spaces    A. 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 Colon (anastomotic donuts):  - Colon with no diagnostic abnormality  - No carcinoma identified      4/17/2023 Genomic Testing    [employer]: FOLR1, positive     7/21/2023 -  Chemotherapy    Doxil 30 mg/m2 and carboplatin AUC 5 every 28 days with avastin 10 mg/kg IV every 14 days.     Avastin dose-reduced to 7.5 mg/kg due to epistaxis.      10/27/2023 -  Chemotherapy    Mirvetuximab 6 mg/kg AIBW IV every 21 days.        1/18/2024 -  Chemotherapy    Gemcitabine 600 mg/m2 IV days 1 and 8 every 21 days.       3/29/2024 -  Chemotherapy    Alimta 600 mg/m2 IV every 21 days.             Patient ID: Penny Fernandez is a 73 y.o. female  Who returns for treatment discussion.  She has received 3 cycles of palliative Alimta therapy.  Alimta has decreased her quality of life.  She has been markedly fatigued and has had  limited ability to perform her activities of daily living.  Labs from 5/29/2024 revealed hemoglobin of 8.6 g/dL, total white blood cell count of 11, platelet count 570 K.  CMP was normal.  Albumin 3.4 g/dL.   is increased to 454.6 from prior of 383.3 units/mL.  CT of chest abdomen pelvis 5/20/2024 revealed increasing liver metastatic disease, increasing mesenteric lymphadenopathy with stable retroperitoneal lymphadenopathy.  She had a thoracentesis for dyspnea on 5/14/2024 for 250 mL of fluid.  She has been taking dexamethasone for palliation, however, had a headache while taking it.  Her appetite has been poor.  She has no taste.        The following portions of the patient's history were reviewed and updated as appropriate: allergies, current medications, past family history, past medical history, past social history, past surgical history, and problem list.    Review of Systems   Constitutional:  Positive for activity change and fatigue. Negative for unexpected weight change.   HENT: Negative.     Eyes: Negative.    Respiratory:  Positive for shortness of breath.    Cardiovascular: Negative.    Gastrointestinal:  Negative for abdominal distention and abdominal pain.   Endocrine: Negative.    Genitourinary:  Negative for pelvic pain and vaginal bleeding.   Musculoskeletal:  Positive for arthralgias.   Skin: Negative.    Allergic/Immunologic: Negative.    Neurological:  Positive for headaches.   Hematological: Negative.    Psychiatric/Behavioral: Negative.         Current Outpatient Medications   Medication Sig Dispense Refill    Acetaminophen (TYLENOL ARTHRITIS PAIN PO) Take by mouth as needed      aspirin (ECOTRIN LOW STRENGTH) 81 mg EC tablet Take 81 mg by mouth daily      B Complex-C (B COMPLEX-VITAMIN C PO) Take by mouth daily      Calcium Carb-Cholecalciferol (CALCIUM 500+D3 PO) Take 1 tablet by mouth 2 (two) times a day       cholecalciferol (VITAMIN D3) 1,000 units tablet Take 1,000 Units by mouth  daily      dexamethasone (DECADRON) 4 mg tablet Take 1 tablet PO BID the day before, day of and day after chemo. 24 tablet 0    famotidine (PEPCID) 40 MG tablet TAKE 1 TABLET BY MOUTH EVERYDAY AT BEDTIME 90 tablet 1    fexofenadine (ALLEGRA) 180 MG tablet Take 180 mg by mouth daily      folic acid (FOLVITE) 1 mg tablet TAKE 1 TABLET BY MOUTH EVERY DAY 90 tablet 2    GLUCOSAMINE-CHONDROITIN PO Take 1 tablet by mouth 2 (two) times a day       lidocaine-prilocaine (EMLA) cream Apply to mediport 30 min prior to labs or chemo 30 g 3    lisinopril (ZESTRIL) 20 mg tablet Take 20 mg by mouth daily      metoprolol succinate (TOPROL-XL) 25 mg 24 hr tablet Take 50 mg by mouth daily Taking 25 mg at breakfast and 25 mg at dinner      montelukast (SINGULAIR) 10 mg tablet Take 10 mg by mouth daily dinner      multivitamin (THERAGRAN) TABS Take 1 tablet by mouth daily      nystatin (MYCOSTATIN) powder Apply topically if needed Under abdominal pannus and in between thighs      omeprazole (PriLOSEC) 20 mg delayed release capsule TAKE 1 CAPSULE BY MOUTH EVERY DAY 30 MINUTES BEFORE MORNING MEAL FOR 90 DAYS      ondansetron (ZOFRAN) 8 mg tablet Take 1 tablet (8 mg total) by mouth every 8 (eight) hours as needed for nausea or vomiting 20 tablet 0    Probiotic Product (PROBIOTIC DAILY PO) Take by mouth daily      prochlorperazine (COMPAZINE) 5 mg tablet Take 1 tablet (5 mg total) by mouth every 6 (six) hours as needed for nausea or vomiting 30 tablet 0    simvastatin (ZOCOR) 10 mg tablet Take 10 mg by mouth daily at bedtime      sodium chloride (OCEAN) 0.65 % nasal spray 1 spray into each nostril as needed for congestion      SYMBICORT 160-4.5 MCG/ACT inhaler 2 puffs daily       Triamcinolone Acetonide (NASACORT ALLERGY 24HR NA) 1 spray by Each Nare route 2 (two) times a day      budesonide (PULMICORT) 180 MCG/ACT inhaler Inhale 1 puff 2 (two) times a day Rinse mouth after use. (Patient not taking: Reported on 5/9/2024)      LORazepam  "(ATIVAN) 1 mg tablet Take 1 tablet (1 mg total) by mouth every 8 (eight) hours as needed for anxiety (nausea or anxiety) 40 tablet 0     No current facility-administered medications for this visit.     Facility-Administered Medications Ordered in Other Visits   Medication Dose Route Frequency Provider Last Rate Last Admin    alteplase (CATHFLO) injection 2 mg  2 mg Intracatheter Q1MIN PRN Pablo Lockwood MD               Objective:    Blood pressure 120/70, pulse 88, temperature 98 °F (36.7 °C), resp. rate 18, height 5' 4.02\" (1.626 m), weight 66.2 kg (146 lb), SpO2 97%.  Body mass index is 25.05 kg/m².  Body surface area is 1.71 meters squared.    Physical Exam  Vitals reviewed.   Constitutional:       General: She is not in acute distress.     Appearance: Normal appearance. She is not ill-appearing.   HENT:      Head: Normocephalic and atraumatic.      Mouth/Throat:      Mouth: Mucous membranes are moist.   Eyes:      General: No scleral icterus.        Right eye: No discharge.         Left eye: No discharge.      Conjunctiva/sclera: Conjunctivae normal.   Pulmonary:      Effort: Pulmonary effort is normal.   Musculoskeletal:      Right lower leg: No edema.      Left lower leg: No edema.   Skin:     General: Skin is warm and dry.      Coloration: Skin is not jaundiced.      Findings: No rash.   Neurological:      General: No focal deficit present.      Mental Status: She is alert and oriented to person, place, and time.      Cranial Nerves: No cranial nerve deficit.      Motor: No weakness.      Gait: Gait normal.   Psychiatric:         Mood and Affect: Mood normal.         Behavior: Behavior normal.         Thought Content: Thought content normal.         Judgment: Judgment normal.         Lab Results   Component Value Date     454.6 (H) 05/22/2024     Lab Results   Component Value Date    K 3.7 05/29/2024     05/29/2024    CO2 25 05/29/2024    BUN 12 05/29/2024    CREATININE 0.63 05/29/2024 "    GLUCOSE 214 (H) 10/28/2022    GLUF 161 (H) 12/08/2022    CALCIUM 8.9 05/29/2024    CORRECTEDCA 9.4 05/29/2024    AST 19 05/29/2024    ALT 10 05/29/2024    ALKPHOS 145 (H) 05/29/2024    EGFR 89 05/29/2024     Lab Results   Component Value Date    WBC 11.88 (H) 05/29/2024    HGB 8.6 (L) 05/29/2024    HCT 26.3 (L) 05/29/2024     (H) 05/29/2024     (H) 05/29/2024     Lab Results   Component Value Date    NEUTROABS 9.65 (H) 05/15/2024        Trend:  Lab Results   Component Value Date     454.6 (H) 05/22/2024     383.3 (H) 05/01/2024     283.9 (H) 04/10/2024     192.1 (H) 03/20/2024     225.2 (H) 02/28/2024     305.9 (H) 02/07/2024     528.9 (H) 01/17/2024     336.9 (H) 12/06/2023     269.2 (H) 11/15/2023     234.7 (H) 10/25/2023     205.1 (H) 09/27/2023     223.9 (H) 08/30/2023     340.6 (H) 08/02/2023     168.5 (H) 06/26/2023     6.2 03/24/2023     5.1 03/02/2023     5.3 02/09/2023     8.7 01/19/2023     15.8 12/30/2022     37.9 (H) 12/08/2022     8.6 10/20/2022     8.4 10/07/2022     12.5 09/29/2022     32.0 (H) 09/08/2022     52.4 (H) 09/02/2022     85.5 (H) 08/26/2022     77.3 (H) 08/19/2022           CT chest abdomen pelvis w contrast  Narrative: CT CHEST, ABDOMEN AND PELVIS WITH IV CONTRAST    INDICATION: C56.2: Malignant neoplasm of left ovary.  levels are 454 on 5/22/2024 from 383 on 5/1/2024 and 283 on 4/10/2024    COMPARISON: Abdomen pelvis 3/14/2024    TECHNIQUE: CT examination of the chest, abdomen and pelvis was performed. Dual energy CT scan technique (DECT) was employed. Multiplanar 2D reformatted images were created from the source data.    This examination, like all CT scans performed in the [facility], was performed utilizing techniques to minimize radiation dose exposure, including the use of iterative reconstruction and  automated exposure control. Radiation dose length   product (DLP) for this visit: 843.76 mGy-cm    IV Contrast: 50 mL of iohexol (OMNIPAQUE) 80 mL of iohexol (OMNIPAQUE)  Enteric Contrast: Not administered.    FINDINGS:    CHEST    LUNGS: Right greater than left bibasilar subsegmental atelectasis. No focal consolidation.. No tracheal or endobronchial lesion.    PLEURA: Small right pleural effusion containing multiple foci of air, which can be iatrogenic secondary to recent thoracentesis. A small left pleural effusion.    HEART/GREAT VESSELS: Heart is unremarkable for patient's age. No thoracic aortic aneurysm.    MEDIASTINUM AND DERREK: Unremarkable.    CHEST WALL AND LOWER NECK: Unremarkable.    ABDOMEN    LIVER/BILIARY TREE: Interval progression of hepatic metastatic disease, now with at least 6 metastatic lesions. Representative lesions are seen on series 301 and described as below.  *  A new 0.8 cm hepatic segment 2 lesion (image 108)  *   enlargement of medial right hepatic lobe lesion measuring 2.2 x 2.1 cm (image 124), previously 0.6 cm  *  Enlargement of 1.9 cm posterior right hepatic lobe lesion (image 131), previously 1.1 cm)  *  A new hepatic segment three 1.9 x 1.8 cm lesion (image 122)    GALLBLADDER: Cholelithiasis without findings of acute cholecystitis.    SPLEEN: Unremarkable.    PANCREAS: Unremarkable.    ADRENAL GLANDS: Unremarkable.    KIDNEYS/URETERS: Unremarkable. No hydronephrosis.    STOMACH AND BOWEL: Postsurgical changes involving the rectum..    APPENDIX: No findings to suggest appendicitis.    ABDOMINOPELVIC CAVITY: No ascites. No pneumoperitoneum. Multiple enlarged lymph nodes are again seen on series 301 and compared with prior CT 3/14/2024 as described below.  *  Grossly stable right inguinal enlarged lymph node measuring 1.4 x 1.1 centimeter (image 212)  *  Grossly stable right external iliac enlarged lymph node measuring 1.7 x 1.4 cm (image 192)  *  Grossly stable left para-aortic  lymph node measuring 1.5 cm (image 137)  *  Interval enlargement of multiple central mesenteric lymph nodes, for example central pelvic lymph node measuring 1.6 x 1.0 cm (image 173), previously 1.2 x 0.8 cm  *  More anterior central mesenteric lymph node measuring 0.8 cm in short axis (image 167), previously 0.4 cm    VESSELS: Unremarkable for patient's age.    PELVIS    REPRODUCTIVE ORGANS: Post hysterectomy.    URINARY BLADDER: Unremarkable.    ABDOMINAL WALL/INGUINAL REGIONS: Right lower quadrant ileostomy. Left anterior pelvic wall diastasis    BONES: No acute fracture or suspicious osseous lesion.  Impression: Disease progression as demonstrated by increase in size and number of hepatic metastatic lesions and new enlargement of central mesenteric lymph nodes. Grossly stable size of known retroperitoneal, right inguinal and right external iliac lymph nodes    Small bilateral pleural effusions with multiple foci of air in the right pleural cavity, likely iatrogenic. Clinical correlation is recommended to exclude possibility of superimposed infection.    The study was marked in EPIC for immediate notification.    Workstation performed: [id number]

## 2024-05-31 NOTE — PROGRESS NOTES
Assessment/Plan:    Problem List Items Addressed This Visit          Endocrine    Malignant neoplasm of left ovary (HCC) - Primary     73-year-old with recurrent, platinum resistant stage IIIc ovarian cancer with disease progression after 3 cycles of Alimta therapy.  There is new disease in the liver and progressive mesenteric lymphadenopathy with stable retroperitoneal lymphadenopathy.  She has bilateral pleural effusions and is status postthoracentesis 5/14/2024.  I reviewed CT chest abdomen pelvis images, CBC, CMP, .  The tumor is HER2 1+.  Her performance status is 1.  1.  I reviewed additional palliative treatment options including palliative Taxotere with inherent risk of worsening peripheral neuropathy, palliative Enhertu therapy also with inherent risk of peripheral neuropathy.  She understands that response rates to additional cytotoxic chemotherapy after multiple prior treatments are in the 10% range.  She also understands that response rates to Enhertu will be diminished given lower expression of HER2.  2.  We discussed nontreatment options including best supportive care, hospice care.  3.  After considering the risks and benefits of additional treatment versus focusing entirely on quality of life, she is decided to focus on quality of life and will engage with hospice care.  She understands that while on hospice, she can continue to have palliative thoracentesis for symptom management.  4.  Return in 3 months for evaluation.  I have spent a total time of 50 minutes on 05/30/24 in caring for this patient including Diagnostic results, Prognosis, Risks and benefits of tx options, Instructions for management, Patient and family education, Risk factor reductions, Impressions, Counseling / Coordination of care, Documenting in the medical record, Reviewing / ordering tests, medicine, procedures  , Obtaining or reviewing history  , and Communicating with other healthcare professionals .                CHIEF  COMPLAINT: Treatment discussion after 3 cycles of Alimta therapy.  Fatigue, poor quality of life.      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  Results revealed patient has the following mutation(s):  None     Malignant neoplasm of left ovary (HCC)   8/4/2022 Surgery    Diagnostic laparoscopy, peritoneal biopsy     8/9/2022 Initial Diagnosis    Gynecologic malignancy (HCC)     8/23/2022 - 1/24/2023 Chemotherapy    palonosetron (ALOXI), 0.25 mg, Intravenous, Once, 6 of 6 cycles  Administration: 0.25 mg (8/23/2022), 0.25 mg (9/13/2022), 0.25 mg (10/4/2022), 0.25 mg (12/13/2022), 0.25 mg (1/3/2023), 0.25 mg (1/24/2023)  fosaprepitant (EMEND) IVPB, 150 mg, Intravenous, Once, 6 of 6 cycles  Administration: 150 mg (8/23/2022), 150 mg (9/13/2022), 150 mg (10/4/2022), 150 mg (12/13/2022), 150 mg (1/3/2023), 150 mg (1/24/2023)  CARBOplatin (PARAPLATIN) IVPB (GOG AUC DOSING), 605.4 mg, Intravenous, Once, 6 of 6 cycles  Administration: 600 mg (8/23/2022), 564 mg (9/13/2022), 601.8 mg (10/4/2022), 491.5 mg (12/13/2022), 468.5 mg (1/3/2023), 488 mg (1/24/2023)  bevacizumab (AVASTIN) IVPB, 1,252.5 mg, Intravenous, Once, 3 of 3 cycles  Dose modification: 10 mg/kg (original dose 15 mg/kg, Cycle 5, Reason: Other (Must fill in a comment))  Administration: 1,200 mg (8/23/2022), 1,200 mg (9/13/2022), 745 mg (1/3/2023)  PACLItaxel (TAXOL) chemo IVPB, 175 mg/m2 = 330.6 mg, Intravenous, Once, 6 of 6 cycles  Dose modification: 135 mg/m2 (original  dose 175 mg/m2, Cycle 4, Reason: Other (Must fill in a comment))  Administration: 330.6 mg (8/23/2022), 330.6 mg (9/13/2022), 330.6 mg (10/4/2022), 246 mg (12/13/2022), 244.2 mg (1/3/2023), 244.2 mg (1/24/2023)     10/28/2022 Surgery    diagnostic laparoscopy, exploratory laparotomy, modified radical hysterectomy, bilateral salpingo-oophorectomy with en bloc rectosigmoid resection, gastrocolic omentectomy, splenectomy, small bowel resection with reanastomosis, diaphragm resection, excision ablation of peritoneal implants, ileostomy formation, optimal tumor debulking     11/29/2022 -  Cancer Staged    Staging form: Ovary, Fallopian Tube, Primary Peritoneal, AJCC 8th Edition  - Clinical: FIGO Stage IIIC (cT3c, cN0, cM0) - Signed by Pablo Lockwood MD on 11/29/2022  Histologic grade (G): G3  Histologic grading system: 4 grade system       3/27/2023 Surgery    Low anterior resection  Residual tumor identified in lymphovascular spaces    A. 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 Colon (anastomotic donuts):  - Colon with no diagnostic abnormality  - No carcinoma identified      4/17/2023 Genomic Testing    [employer]: FOLR1, positive     7/21/2023 -  Chemotherapy    Doxil 30 mg/m2 and carboplatin AUC 5 every 28 days with avastin 10 mg/kg IV every 14 days.     Avastin dose-reduced to 7.5 mg/kg due to epistaxis.      10/27/2023 -  Chemotherapy    Mirvetuximab 6 mg/kg AIBW IV every 21 days.        1/18/2024 -  Chemotherapy    Gemcitabine 600 mg/m2 IV days 1 and 8 every 21 days.       3/29/2024 -  Chemotherapy    Alimta 600 mg/m2 IV every 21 days.             Patient ID: Penny Fernandez is a 73 y.o. female  Who returns for treatment discussion.  She has received 3 cycles of palliative Alimta therapy.  Alimta has decreased her quality of life.  She has been markedly fatigued and has had  limited ability to perform her activities of daily living.  Labs from 5/29/2024 revealed hemoglobin of 8.6 g/dL, total white blood cell count of 11, platelet count 570 K.  CMP was normal.  Albumin 3.4 g/dL.   is increased to 454.6 from prior of 383.3 units/mL.  CT of chest abdomen pelvis 5/20/2024 revealed increasing liver metastatic disease, increasing mesenteric lymphadenopathy with stable retroperitoneal lymphadenopathy.  She had a thoracentesis for dyspnea on 5/14/2024 for 250 mL of fluid.  She has been taking dexamethasone for palliation, however, had a headache while taking it.  Her appetite has been poor.  She has no taste.        The following portions of the patient's history were reviewed and updated as appropriate: allergies, current medications, past family history, past medical history, past social history, past surgical history, and problem list.    Review of Systems   Constitutional:  Positive for activity change and fatigue. Negative for unexpected weight change.   HENT: Negative.     Eyes: Negative.    Respiratory:  Positive for shortness of breath.    Cardiovascular: Negative.    Gastrointestinal:  Negative for abdominal distention and abdominal pain.   Endocrine: Negative.    Genitourinary:  Negative for pelvic pain and vaginal bleeding.   Musculoskeletal:  Positive for arthralgias.   Skin: Negative.    Allergic/Immunologic: Negative.    Neurological:  Positive for headaches.   Hematological: Negative.    Psychiatric/Behavioral: Negative.         Current Outpatient Medications   Medication Sig Dispense Refill    Acetaminophen (TYLENOL ARTHRITIS PAIN PO) Take by mouth as needed      aspirin (ECOTRIN LOW STRENGTH) 81 mg EC tablet Take 81 mg by mouth daily      B Complex-C (B COMPLEX-VITAMIN C PO) Take by mouth daily      Calcium Carb-Cholecalciferol (CALCIUM 500+D3 PO) Take 1 tablet by mouth 2 (two) times a day       cholecalciferol (VITAMIN D3) 1,000 units tablet Take 1,000 Units by mouth  daily      dexamethasone (DECADRON) 4 mg tablet Take 1 tablet PO BID the day before, day of and day after chemo. 24 tablet 0    famotidine (PEPCID) 40 MG tablet TAKE 1 TABLET BY MOUTH EVERYDAY AT BEDTIME 90 tablet 1    fexofenadine (ALLEGRA) 180 MG tablet Take 180 mg by mouth daily      folic acid (FOLVITE) 1 mg tablet TAKE 1 TABLET BY MOUTH EVERY DAY 90 tablet 2    GLUCOSAMINE-CHONDROITIN PO Take 1 tablet by mouth 2 (two) times a day       lidocaine-prilocaine (EMLA) cream Apply to mediport 30 min prior to labs or chemo 30 g 3    lisinopril (ZESTRIL) 20 mg tablet Take 20 mg by mouth daily      metoprolol succinate (TOPROL-XL) 25 mg 24 hr tablet Take 50 mg by mouth daily Taking 25 mg at breakfast and 25 mg at dinner      montelukast (SINGULAIR) 10 mg tablet Take 10 mg by mouth daily dinner      multivitamin (THERAGRAN) TABS Take 1 tablet by mouth daily      nystatin (MYCOSTATIN) powder Apply topically if needed Under abdominal pannus and in between thighs      omeprazole (PriLOSEC) 20 mg delayed release capsule TAKE 1 CAPSULE BY MOUTH EVERY DAY 30 MINUTES BEFORE MORNING MEAL FOR 90 DAYS      ondansetron (ZOFRAN) 8 mg tablet Take 1 tablet (8 mg total) by mouth every 8 (eight) hours as needed for nausea or vomiting 20 tablet 0    Probiotic Product (PROBIOTIC DAILY PO) Take by mouth daily      prochlorperazine (COMPAZINE) 5 mg tablet Take 1 tablet (5 mg total) by mouth every 6 (six) hours as needed for nausea or vomiting 30 tablet 0    simvastatin (ZOCOR) 10 mg tablet Take 10 mg by mouth daily at bedtime      sodium chloride (OCEAN) 0.65 % nasal spray 1 spray into each nostril as needed for congestion      SYMBICORT 160-4.5 MCG/ACT inhaler 2 puffs daily       Triamcinolone Acetonide (NASACORT ALLERGY 24HR NA) 1 spray by Each Nare route 2 (two) times a day      budesonide (PULMICORT) 180 MCG/ACT inhaler Inhale 1 puff 2 (two) times a day Rinse mouth after use. (Patient not taking: Reported on 5/9/2024)      LORazepam  "(ATIVAN) 1 mg tablet Take 1 tablet (1 mg total) by mouth every 8 (eight) hours as needed for anxiety (nausea or anxiety) 40 tablet 0     No current facility-administered medications for this visit.     Facility-Administered Medications Ordered in Other Visits   Medication Dose Route Frequency Provider Last Rate Last Admin    alteplase (CATHFLO) injection 2 mg  2 mg Intracatheter Q1MIN PRN Pablo Lockwood MD               Objective:    Blood pressure 120/70, pulse 88, temperature 98 °F (36.7 °C), resp. rate 18, height 5' 4.02\" (1.626 m), weight 66.2 kg (146 lb), SpO2 97%.  Body mass index is 25.05 kg/m².  Body surface area is 1.71 meters squared.    Physical Exam  Vitals reviewed.   Constitutional:       General: She is not in acute distress.     Appearance: Normal appearance. She is not ill-appearing.   HENT:      Head: Normocephalic and atraumatic.      Mouth/Throat:      Mouth: Mucous membranes are moist.   Eyes:      General: No scleral icterus.        Right eye: No discharge.         Left eye: No discharge.      Conjunctiva/sclera: Conjunctivae normal.   Pulmonary:      Effort: Pulmonary effort is normal.   Musculoskeletal:      Right lower leg: No edema.      Left lower leg: No edema.   Skin:     General: Skin is warm and dry.      Coloration: Skin is not jaundiced.      Findings: No rash.   Neurological:      General: No focal deficit present.      Mental Status: She is alert and oriented to person, place, and time.      Cranial Nerves: No cranial nerve deficit.      Motor: No weakness.      Gait: Gait normal.   Psychiatric:         Mood and Affect: Mood normal.         Behavior: Behavior normal.         Thought Content: Thought content normal.         Judgment: Judgment normal.         Lab Results   Component Value Date     454.6 (H) 05/22/2024     Lab Results   Component Value Date    K 3.7 05/29/2024     05/29/2024    CO2 25 05/29/2024    BUN 12 05/29/2024    CREATININE 0.63 05/29/2024 "    GLUCOSE 214 (H) 10/28/2022    GLUF 161 (H) 12/08/2022    CALCIUM 8.9 05/29/2024    CORRECTEDCA 9.4 05/29/2024    AST 19 05/29/2024    ALT 10 05/29/2024    ALKPHOS 145 (H) 05/29/2024    EGFR 89 05/29/2024     Lab Results   Component Value Date    WBC 11.88 (H) 05/29/2024    HGB 8.6 (L) 05/29/2024    HCT 26.3 (L) 05/29/2024     (H) 05/29/2024     (H) 05/29/2024     Lab Results   Component Value Date    NEUTROABS 9.65 (H) 05/15/2024        Trend:  Lab Results   Component Value Date     454.6 (H) 05/22/2024     383.3 (H) 05/01/2024     283.9 (H) 04/10/2024     192.1 (H) 03/20/2024     225.2 (H) 02/28/2024     305.9 (H) 02/07/2024     528.9 (H) 01/17/2024     336.9 (H) 12/06/2023     269.2 (H) 11/15/2023     234.7 (H) 10/25/2023     205.1 (H) 09/27/2023     223.9 (H) 08/30/2023     340.6 (H) 08/02/2023     168.5 (H) 06/26/2023     6.2 03/24/2023     5.1 03/02/2023     5.3 02/09/2023     8.7 01/19/2023     15.8 12/30/2022     37.9 (H) 12/08/2022     8.6 10/20/2022     8.4 10/07/2022     12.5 09/29/2022     32.0 (H) 09/08/2022     52.4 (H) 09/02/2022     85.5 (H) 08/26/2022     77.3 (H) 08/19/2022           CT chest abdomen pelvis w contrast  Narrative: CT CHEST, ABDOMEN AND PELVIS WITH IV CONTRAST    INDICATION: C56.2: Malignant neoplasm of left ovary.  levels are 454 on 5/22/2024 from 383 on 5/1/2024 and 283 on 4/10/2024    COMPARISON: Abdomen pelvis 3/14/2024    TECHNIQUE: CT examination of the chest, abdomen and pelvis was performed. Dual energy CT scan technique (DECT) was employed. Multiplanar 2D reformatted images were created from the source data.    This examination, like all CT scans performed in the [facility], was performed utilizing techniques to minimize radiation dose exposure, including the use of iterative reconstruction and  automated exposure control. Radiation dose length   product (DLP) for this visit: 843.76 mGy-cm    IV Contrast: 50 mL of iohexol (OMNIPAQUE) 80 mL of iohexol (OMNIPAQUE)  Enteric Contrast: Not administered.    FINDINGS:    CHEST    LUNGS: Right greater than left bibasilar subsegmental atelectasis. No focal consolidation.. No tracheal or endobronchial lesion.    PLEURA: Small right pleural effusion containing multiple foci of air, which can be iatrogenic secondary to recent thoracentesis. A small left pleural effusion.    HEART/GREAT VESSELS: Heart is unremarkable for patient's age. No thoracic aortic aneurysm.    MEDIASTINUM AND DERREK: Unremarkable.    CHEST WALL AND LOWER NECK: Unremarkable.    ABDOMEN    LIVER/BILIARY TREE: Interval progression of hepatic metastatic disease, now with at least 6 metastatic lesions. Representative lesions are seen on series 301 and described as below.  *  A new 0.8 cm hepatic segment 2 lesion (image 108)  *   enlargement of medial right hepatic lobe lesion measuring 2.2 x 2.1 cm (image 124), previously 0.6 cm  *  Enlargement of 1.9 cm posterior right hepatic lobe lesion (image 131), previously 1.1 cm)  *  A new hepatic segment three 1.9 x 1.8 cm lesion (image 122)    GALLBLADDER: Cholelithiasis without findings of acute cholecystitis.    SPLEEN: Unremarkable.    PANCREAS: Unremarkable.    ADRENAL GLANDS: Unremarkable.    KIDNEYS/URETERS: Unremarkable. No hydronephrosis.    STOMACH AND BOWEL: Postsurgical changes involving the rectum..    APPENDIX: No findings to suggest appendicitis.    ABDOMINOPELVIC CAVITY: No ascites. No pneumoperitoneum. Multiple enlarged lymph nodes are again seen on series 301 and compared with prior CT 3/14/2024 as described below.  *  Grossly stable right inguinal enlarged lymph node measuring 1.4 x 1.1 centimeter (image 212)  *  Grossly stable right external iliac enlarged lymph node measuring 1.7 x 1.4 cm (image 192)  *  Grossly stable left para-aortic  lymph node measuring 1.5 cm (image 137)  *  Interval enlargement of multiple central mesenteric lymph nodes, for example central pelvic lymph node measuring 1.6 x 1.0 cm (image 173), previously 1.2 x 0.8 cm  *  More anterior central mesenteric lymph node measuring 0.8 cm in short axis (image 167), previously 0.4 cm    VESSELS: Unremarkable for patient's age.    PELVIS    REPRODUCTIVE ORGANS: Post hysterectomy.    URINARY BLADDER: Unremarkable.    ABDOMINAL WALL/INGUINAL REGIONS: Right lower quadrant ileostomy. Left anterior pelvic wall diastasis    BONES: No acute fracture or suspicious osseous lesion.  Impression: Disease progression as demonstrated by increase in size and number of hepatic metastatic lesions and new enlargement of central mesenteric lymph nodes. Grossly stable size of known retroperitoneal, right inguinal and right external iliac lymph nodes    Small bilateral pleural effusions with multiple foci of air in the right pleural cavity, likely iatrogenic. Clinical correlation is recommended to exclude possibility of superimposed infection.    The study was marked in EPIC for immediate notification.    Workstation performed: [id number]

## 2024-05-31 NOTE — TELEPHONE ENCOUNTER
Return call placed. Can you please fax records? Additionally, can you please schedule pleurx catheter with IR after June 10th? Thanks!

## 2024-05-31 NOTE — TELEPHONE ENCOUNTER
Called to speak to patient about her reaching out to the hospice team at the facility the patient resides at. Patient stated she is going to call them after she finishes her breakfast this morning and will send a [employer] message to JS after she speaks to them. Patient did state she wants her  port access to be available if needed due to her being a hard stick. I informed JS of this and informed patient I will send her a message on how often she would need to go to get the port flushed. Patient was thankful of my call.

## 2024-06-04 ENCOUNTER — TELEPHONE (OUTPATIENT)
Dept: INTERVENTIONAL RADIOLOGY/VASCULAR | Facility: CLINIC | Age: 74
End: 2024-06-04

## 2024-06-04 ENCOUNTER — PREP FOR PROCEDURE (OUTPATIENT)
Dept: INTERVENTIONAL RADIOLOGY/VASCULAR | Facility: CLINIC | Age: 74
End: 2024-06-04

## 2024-06-04 DIAGNOSIS — C56.2 MALIGNANT NEOPLASM OF LEFT OVARY (HCC): Primary | ICD-10-CM

## 2024-06-04 RX ORDER — SODIUM CHLORIDE 9 MG/ML
75 INJECTION, SOLUTION INTRAVENOUS CONTINUOUS
OUTPATIENT
Start: 2024-06-04

## 2024-06-04 NOTE — TELEPHONE ENCOUNTER
Received call from Loli at [facility].  Informed her of discussion with patient.  She stated that if patient chooses, they can sign patient on to their hospice agency for hospice care day after procedure.  She also stated that if patient chooses to wait to sign on to hospice, they can provide patient with home care day after procedure.  She stated that she will need to discuss this with patient.  Informed her that patient mentioned she would contact them after she has her visit with Dr. Apple. She stated if patient signs on to hospice, they will order the supplies.  Provided her with contact information for [facility] pharmacy for Asept drainage kits.  All questions were answered.  Loli stated that she will be out of office the week of June 10th and if needed to reach out to her clinical director Clyde

## 2024-06-04 NOTE — TELEPHONE ENCOUNTER
Received Referral message from [facility] scheduling, who stated that patient is being referred to IR for Asept catheter placement. In Message she stated that patient mentioned to her that once catheter is placed, she would be signing on to Hospice.       Contacted patient to inquire further about Hospice plan.  She stated that the plan will be to go on Hospice post procedure, but she is unsure if it would be immediate or will take some time to be enrolled in Hospice.  She stated that when she spoke with Loli at [facility], she was informed that they will meet with her to discuss Hospice once the procedure is completed.  Informed her that she would need Home Health Care arranged post procedure if she will not be on hospice.  She stated that [facility] also provides Home Health Care.  Inquired if I can reach out to Loli at [facility] to discuss patient's care post Asept catheter placement and she was agreeable to same. Also informed her that I can send her names of the educational videos via [employer] for her to review and she was agreeable to same.  All questions answered.      Contacted [facility] ([phone]) and left message for Loli to return call.      Message sent to patient via [employer] with information on educational videos.

## 2024-06-07 ENCOUNTER — TELEMEDICINE (OUTPATIENT)
Dept: INTERVENTIONAL RADIOLOGY/VASCULAR | Facility: CLINIC | Age: 74
End: 2024-06-07
Payer: MEDICARE

## 2024-06-07 DIAGNOSIS — C48.2 PERITONEAL CARCINOMA (HCC): ICD-10-CM

## 2024-06-07 DIAGNOSIS — J91.0 MALIGNANT PLEURAL EFFUSION: Primary | ICD-10-CM

## 2024-06-07 PROCEDURE — 99215 OFFICE O/P EST HI 40 MIN: CPT | Performed by: RADIOLOGY

## 2024-06-07 NOTE — PROGRESS NOTES
Virtual Regular Visit    Verification of patient location:    Patient is located at Home in the following state in which I hold an active license [place]      Assessment/Plan:    Problem List Items Addressed This Visit          Respiratory    Malignant pleural effusion       Oncology    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HCC)            Reason for visit is   Chief Complaint   Patient presents with    Virtual Regular Visit          Encounter provider Kole Apple MD      Recent Visits  No visits were found meeting these conditions.  Showing recent visits within past 7 days and meeting all other requirements  Today's Visits  Date Type Provider Dept   06/07/24 Telemedicine Kole Apple MD [facility]   Showing today's visits and meeting all other requirements  Future Appointments  No visits were found meeting these conditions.  Showing future appointments within next 150 days and meeting all other requirements       The patient was identified by name and date of birth. Penny Fernandez was informed that this is a telemedicine visit and that the visit is being conducted through the Epic Embedded platform. She agrees to proceed..  My office door was closed. No one else was in the room.  She acknowledged consent and understanding of privacy and security of the video platform. The patient has agreed to participate and understands they can discontinue the visit at any time.    Patient is aware this is a billable service.     Subjective  Penny Fernandez is a 74 y.o. female with ovarian cancer recurrent right pleural effusion and peritoneal carcinomatosis.  I explained the procedure along with the risks, benefits and alternatives and answered the patient's questions.  The procedure is planned for 6/17 at [facility].  I will see the patient for a virtual visit on 6/18 to follow-up after the procedure.  She will get home health nurses until her hospice kicks in. This has been set up by the IR clinic team.    The patient has  "shortness of breath secondary to accumulation of pleural fluid.  The patient will have an Asept catheter placed and requires Home Health for teaching and catheter management.     HPI     Past Medical History:   Diagnosis Date    Abdominal pain     off and on    Allergic sinusitis     \"seasonal allergies\"-has received allergy shots \"    Anxiety     with current diagnosis    Arthritis     Asthma     Cholelithiasis     Colon polyp     Degenerative joint disease     Dental bridge present     permanent lower left    Exercise involving walking     1-2x/week    GERD (gastroesophageal reflux disease)     Giant cell aortic arteritis (HCC)     \"hereditary Giant Cell Temporal Arteritis\"per pt    Hiatal hernia     History of cancer chemotherapy     History of transfusion     Hyperlipidemia     Hypertension     Nausea     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HCC)     \"gynecologic malignacy\"    PMR (polymyalgia rheumatica) (HCC)     Prediabetes     Shortness of breath     per pt \"asthma related --with climbing mult. flights of stairs--or exertion -not new\"    Tinnitus     Wears glasses        Past Surgical History:   Procedure Laterality Date    COLON SURGERY      COLONOSCOPY      October 2021: Adenomatous polyps and rectum in transverse colon, sigmoid diverticulosis, internal hemorrhoids.  October 2016 diverticulosis and internal hemorrhoids, September 2011 diverticulosis and internal hemorrhoids.    COLOPROCTECTOMY N/A 10/28/2022    Procedure: PROCTECTOMY;  Surgeon: Wolf Grover MD;  [facility];  Service: Surgical Oncology    DILATION AND CURETTAGE OF UTERUS      ILEO LOOP DIVERSION N/A 10/28/2022    Procedure: ILEOSTOMY LOOP;  Surgeon: Wolf Grover MD;  [facility];  Service: Surgical Oncology    IR PORT PLACEMENT  12/08/2022    IR THORACENTESIS  4/5/2024    IR THORACENTESIS  5/14/2024    LIVER SURGERY  10/28/2022    Procedure: LIVER CONTROL OF BLEED ;  Surgeon: Wolf Grover MD;  [facility];  Service: Surgical " Oncology    IL COLECTOMY PARTIAL W/ANASTOMOSIS N/A 10/28/2022    Procedure: RESECTION COLON LEFT;  Surgeon: Pablo Lockwood MD;  [facility];  Service: Gynecology Oncology    IL COLECTOMY PARTIAL W/ANASTOMOSIS N/A 03/27/2023    Procedure: LOW ANTERIOR RESECTION;  Surgeon: Wolf Grover MD;  [facility];  Service: Surgical Oncology    IL EXPLORATORY LAPAROTOMY CELIOTOMY W/WO BIOPSY SPX N/A 10/28/2022    Procedure: LAPAROTOMY EXPLORATORY;  Surgeon: Pablo Lockwood MD;  [facility];  Service: Gynecology Oncology    IL LAPS ABD PRTM&OMENTUM DX W/WO SPEC BR/WA SPX N/A 08/04/2022    Procedure: DIAGNOSTIC LAPAROSCOPY, PERITONEAL BIOPSY, SAMPLING OF BLOODY ASCITES.;  Surgeon: Colt Caceres MD;  [facility];  Service: Gynecology Oncology    IL LAPS ABD PRTM&OMENTUM DX W/WO SPEC BR/WA SPX N/A 10/28/2022    Procedure: LAPAROSCOPY DIAGNOSTIC;  Surgeon: Pablo Lockwood MD;  [facility];  Service: Gynecology Oncology    IL TOTAL ABDOMINAL HYSTERECT W/WO RMVL TUBE OVARY N/A 10/28/2022    Procedure: HYSTERECTOMY MODIFIED RADICAL, BILATERAL SALPINGO-OOPHORECTOMY, GASTROCOLIC OMENTECTOMY, DIAPHRAGM RESECTION, EXCISION AND ABLATION OF PERITONEAL TUMORS;  Surgeon: Pablo Lockwood MD;  [facility];  Service: Gynecology Oncology    SMALL INTESTINE SURGERY N/A 10/28/2022    Procedure: RESECTION SMALL BOWEL;  Surgeon: Pablo Lockwood MD;  [facility];  Service: Gynecology Oncology    SPLENECTOMY, TOTAL N/A 10/28/2022    Procedure: SPLENECTOMY;  Surgeon: Pablo Lockwood MD;  [facility];  Service: Gynecology Oncology    TEMPORAL ARTERY BIOPSY / LIGATION      UPPER GASTROINTESTINAL ENDOSCOPY  11/10/2014    November 2014 irregular Z-line and Schatzki ring, hiatal hernia, gastritis.  Biopsies negative for celiac, H pylori, or Phillip's or eosinophilic esophagitis.    WISDOM TOOTH EXTRACTION         Current  Outpatient Medications   Medication Sig Dispense Refill    Acetaminophen (TYLENOL ARTHRITIS PAIN PO) Take by mouth as needed      aspirin (ECOTRIN LOW STRENGTH) 81 mg EC tablet Take 81 mg by mouth daily      B Complex-C (B COMPLEX-VITAMIN C PO) Take by mouth daily      budesonide (PULMICORT) 180 MCG/ACT inhaler Inhale 1 puff 2 (two) times a day Rinse mouth after use. (Patient not taking: Reported on 5/9/2024)      Calcium Carb-Cholecalciferol (CALCIUM 500+D3 PO) Take 1 tablet by mouth 2 (two) times a day       cholecalciferol (VITAMIN D3) 1,000 units tablet Take 1,000 Units by mouth daily      dexamethasone (DECADRON) 4 mg tablet Take 1 tablet PO BID the day before, day of and day after chemo. 24 tablet 0    famotidine (PEPCID) 40 MG tablet TAKE 1 TABLET BY MOUTH EVERYDAY AT BEDTIME 90 tablet 1    fexofenadine (ALLEGRA) 180 MG tablet Take 180 mg by mouth daily      folic acid (FOLVITE) 1 mg tablet TAKE 1 TABLET BY MOUTH EVERY DAY 90 tablet 2    GLUCOSAMINE-CHONDROITIN PO Take 1 tablet by mouth 2 (two) times a day       lidocaine-prilocaine (EMLA) cream Apply to mediport 30 min prior to labs or chemo 30 g 3    lisinopril (ZESTRIL) 20 mg tablet Take 20 mg by mouth daily      LORazepam (ATIVAN) 1 mg tablet Take 1 tablet (1 mg total) by mouth every 8 (eight) hours as needed for anxiety (nausea or anxiety) 40 tablet 0    metoprolol succinate (TOPROL-XL) 25 mg 24 hr tablet Take 50 mg by mouth daily Taking 25 mg at breakfast and 25 mg at dinner      montelukast (SINGULAIR) 10 mg tablet Take 10 mg by mouth daily dinner      multivitamin (THERAGRAN) TABS Take 1 tablet by mouth daily      nystatin (MYCOSTATIN) powder Apply topically if needed Under abdominal pannus and in between thighs      omeprazole (PriLOSEC) 20 mg delayed release capsule TAKE 1 CAPSULE BY MOUTH EVERY DAY 30 MINUTES BEFORE MORNING MEAL FOR 90 DAYS      ondansetron (ZOFRAN) 8 mg tablet Take 1 tablet (8 mg total) by mouth every 8 (eight) hours as needed for  nausea or vomiting 20 tablet 0    Probiotic Product (PROBIOTIC DAILY PO) Take by mouth daily      prochlorperazine (COMPAZINE) 5 mg tablet Take 1 tablet (5 mg total) by mouth every 6 (six) hours as needed for nausea or vomiting 30 tablet 0    simvastatin (ZOCOR) 10 mg tablet Take 10 mg by mouth daily at bedtime      sodium chloride (OCEAN) 0.65 % nasal spray 1 spray into each nostril as needed for congestion      SYMBICORT 160-4.5 MCG/ACT inhaler 2 puffs daily       Triamcinolone Acetonide (NASACORT ALLERGY 24HR NA) 1 spray by Each Nare route 2 (two) times a day       No current facility-administered medications for this visit.        Allergies   Allergen Reactions    Lactose - Food Allergy GI Intolerance    Nsaids Other (See Comments)      pt is avoiding per family doctor instructions-  Able to take Tylenol    Pedi-Pre Tape Spray [Wound Dressing Adhesive] Other (See Comments)     Please use sensitive skin tape, pt gets bad bruising from strong medical adhesive tape.        Review of Systems    Video Exam    There were no vitals filed for this visit.    Physical Exam     Visit Time  Total Visit Duration: 60 minutes

## 2024-06-10 ENCOUNTER — TELEPHONE (OUTPATIENT)
Dept: INTERVENTIONAL RADIOLOGY/VASCULAR | Facility: HOSPITAL | Age: 74
End: 2024-06-10

## 2024-06-10 DIAGNOSIS — C57.9 GYNECOLOGIC MALIGNANCY (HCC): ICD-10-CM

## 2024-06-10 DIAGNOSIS — C56.2 MALIGNANT NEOPLASM OF LEFT OVARY (HCC): ICD-10-CM

## 2024-06-10 RX ORDER — ONDANSETRON HYDROCHLORIDE 8 MG/1
8 TABLET, FILM COATED ORAL EVERY 8 HOURS PRN
Qty: 20 TABLET | Refills: 0 | Status: SHIPPED | OUTPATIENT
Start: 2024-06-10

## 2024-06-10 RX ORDER — PROCHLORPERAZINE MALEATE 5 MG/1
5 TABLET ORAL EVERY 6 HOURS PRN
Qty: 30 TABLET | Refills: 0 | Status: SHIPPED | OUTPATIENT
Start: 2024-06-10

## 2024-06-17 ENCOUNTER — HOSPITAL ENCOUNTER (OUTPATIENT)
Dept: INTERVENTIONAL RADIOLOGY/VASCULAR | Facility: HOSPITAL | Age: 74
Discharge: HOME | End: 2024-06-17
Attending: RADIOLOGY
Payer: MEDICARE

## 2024-06-17 VITALS
WEIGHT: 146 LBS | HEART RATE: 78 BPM | BODY MASS INDEX: 24.92 KG/M2 | DIASTOLIC BLOOD PRESSURE: 56 MMHG | SYSTOLIC BLOOD PRESSURE: 113 MMHG | TEMPERATURE: 97.8 F | OXYGEN SATURATION: 98 % | HEIGHT: 64 IN | RESPIRATION RATE: 16 BRPM

## 2024-06-17 DIAGNOSIS — C56.2 MALIGNANT NEOPLASM OF LEFT OVARY (HCC): ICD-10-CM

## 2024-06-17 LAB
INR PPP: 1.2 (ref 0.84–1.19)
PROTHROMBIN TIME: 15.6 SECONDS (ref 11.6–14.5)

## 2024-06-17 PROCEDURE — 75989 ABSCESS DRAINAGE UNDER X-RAY: CPT

## 2024-06-17 PROCEDURE — C1729 CATH, DRAINAGE: HCPCS

## 2024-06-17 PROCEDURE — 99153 MOD SED SAME PHYS/QHP EA: CPT

## 2024-06-17 PROCEDURE — 85610 PROTHROMBIN TIME: CPT | Performed by: RADIOLOGY

## 2024-06-17 PROCEDURE — C1769 GUIDE WIRE: HCPCS

## 2024-06-17 PROCEDURE — 32550 INSERT PLEURAL CATH: CPT

## 2024-06-17 PROCEDURE — 99152 MOD SED SAME PHYS/QHP 5/>YRS: CPT

## 2024-06-17 RX ORDER — MIDAZOLAM HYDROCHLORIDE 2 MG/2ML
INJECTION, SOLUTION INTRAMUSCULAR; INTRAVENOUS AS NEEDED
Status: COMPLETED | OUTPATIENT
Start: 2024-06-17 | End: 2024-06-17

## 2024-06-17 RX ORDER — FENTANYL CITRATE 50 UG/ML
INJECTION, SOLUTION INTRAMUSCULAR; INTRAVENOUS AS NEEDED
Status: COMPLETED | OUTPATIENT
Start: 2024-06-17 | End: 2024-06-17

## 2024-06-17 RX ORDER — SODIUM CHLORIDE 9 MG/ML
75 INJECTION, SOLUTION INTRAVENOUS CONTINUOUS
Status: DISCONTINUED | OUTPATIENT
Start: 2024-06-17 | End: 2024-06-18 | Stop reason: HOSPADM

## 2024-06-17 RX ADMIN — MIDAZOLAM 0.5 MG: 1 INJECTION INTRAMUSCULAR; INTRAVENOUS at 10:01

## 2024-06-17 RX ADMIN — FENTANYL CITRATE 25 MCG: 50 INJECTION, SOLUTION INTRAMUSCULAR; INTRAVENOUS at 10:03

## 2024-06-17 RX ADMIN — FENTANYL CITRATE 50 MCG: 50 INJECTION, SOLUTION INTRAMUSCULAR; INTRAVENOUS at 09:43

## 2024-06-17 RX ADMIN — FENTANYL CITRATE 25 MCG: 50 INJECTION, SOLUTION INTRAMUSCULAR; INTRAVENOUS at 10:01

## 2024-06-17 RX ADMIN — MIDAZOLAM 1 MG: 1 INJECTION INTRAMUSCULAR; INTRAVENOUS at 09:43

## 2024-06-17 RX ADMIN — MIDAZOLAM 0.5 MG: 1 INJECTION INTRAMUSCULAR; INTRAVENOUS at 10:05

## 2024-06-17 RX ADMIN — FENTANYL CITRATE 25 MCG: 50 INJECTION, SOLUTION INTRAMUSCULAR; INTRAVENOUS at 09:49

## 2024-06-17 RX ADMIN — MIDAZOLAM 0.5 MG: 1 INJECTION INTRAMUSCULAR; INTRAVENOUS at 09:49

## 2024-06-17 NOTE — BRIEF OP NOTE (RAD/CATH)
INTERVENTIONAL RADIOLOGY PROCEDURE NOTE    Date: 6/17/2024    Procedure: Right pleurx catheter  Procedure Summary       Date: 06/17/24 Room / Location: [facility] Interventional Radiology    Anesthesia Start:  Anesthesia Stop:     Procedure: IR PLEURAL EFFUSION LONG-TERM CATHETER PLACEMENT Diagnosis:       Malignant neoplasm of left ovary (HCC)      (Ovarian cancer with recurrent malignant right pleural effusions)    Scheduled Providers:  Responsible Provider:     Anesthesia Type: Not recorded ASA Status: Not recorded            Preoperative diagnosis:   1. Malignant neoplasm of left ovary (HCC)         Postoperative diagnosis: Same.    Surgeon: Jabier Riddle MD     Assistant: None. No qualified resident was available.    Blood loss: Minimal    Specimens: None     Findings: Right pleural US shows small amount of effusion with air correlating with recent CT 05/20/24. This is mostly seen in the base and not tracking superiorly. Thickened pleura was seen on CT.  Given the decreasing output from prior thoracentesis a Pleurx catheter was still placed as patient was symptomatic. 100 ml of initially serous followed by bloody fluid aspirated. Catheter was flushed with saline.    The patient has shortness of breath secondary to accumulation of pleural fluid.  The patient has a Pleural Asept catheter placed and requires Home Health for teaching and catheter management.     Complications: None immediate.    Anesthesia: conscious sedation

## 2024-06-17 NOTE — INTERVAL H&P NOTE
Update: (This section must be completed if the H&P was completed greater than 24 hrs to procedure or admission)    H&P reviewed. After examining the patient, I find no changed to the H&P since it had been written.    Patient re-evaluated. Accept as history and physical.    Jabier Riddle MD/June 17, 2024/10:17 AM

## 2024-06-20 ENCOUNTER — TELEPHONE (OUTPATIENT)
Dept: HEMATOLOGY ONCOLOGY | Facility: CLINIC | Age: 74
End: 2024-06-20

## 2024-06-20 NOTE — TELEPHONE ENCOUNTER
Patient Call    Who are you speaking with? [facility]     If it is not the patient, are they listed on an active communication consent form? N/A   What is the reason for this call? Lisa is calling to see if Dr Lockwood received the fax for the comfort pack for Penny.  If so Lisa is requesting it to be faxed back so that Penny can get her medication.     Fax number  [phone]   Does this require a call back? No   If a call back is required, please list best call back number na   If a call back is required, advise that a message will be forwarded to their care team and someone will return their call as soon as possible.   Did you relay this information to the patient? N/A

## 2024-06-20 NOTE — TELEPHONE ENCOUNTER
Patient Call    Who are you speaking with? hospice     If it is not the patient, are they listed on an active communication consent form? N/A   What is the reason for this call? Return call. He said another provider can sign form   Does this require a call back? N/A   If a call back is required, please list best call back number N/a   If a call back is required, advise that a message will be forwarded to their care team and someone will return their call as soon as possible.   Did you relay this information to the patient? N/A

## 2024-06-20 NOTE — TELEPHONE ENCOUNTER
Return call placed to on-call . Advised that one form was completed but the other form, an rx for morphine, requires Dr. Lockwood's signature and he is OOO until Monday. Asked if another provider from the practice could sign and we would fax the form back.

## 2024-08-02 ENCOUNTER — TELEPHONE (OUTPATIENT)
Age: 74
End: 2024-08-02

## 2024-08-02 NOTE — TELEPHONE ENCOUNTER
Call from Lisa/nurse with [facility] will be sending via Right Fax from [facility] a form requesting RX Tramadol 50mg po q6 PRN,pt is reluctant to use Roxinal at this time for pain mgt.Nurse reports pt with decline in condition/weakness and decreased appetite.

## 2024-08-02 NOTE — TELEPHONE ENCOUNTER
Call again from [facility] nurse and she is requesting verbal order from provider for Tramadol. Sherrie HERNANDEZ was able to connect and provide verbal order for Tramadol medication needed.

## 2025-07-30 NOTE — PROGRESS NOTES
"Progress Note - Surgical Oncology   Eris Magana 67 y o  female MRN: [id number]  Unit/Bed#: [facility] 810-01 Encounter: [id number]    Assessment:  67 y o  female with PMH of ovarian carcinoma s/p cytroreductive surgery including an en bloc rectosigmoid resection with diverting ileostomy c/b anastomotic leak with presacral cavity, now s/p low anterior resection with revision of anastomosis on 3/27  Ureteral stents were placed prior to surgery and removed at the completion   cc  UOP: 650  Ostomy: 225cc  JOSÉ ANTONIO 70 serosang    Plan:  Advance to CLD  Discontinue NGT  Strict I/Os    Maintain JOSÉ ANTONIO drain, monitor output  PCEA in place, SQH held 4/1 for removal  Appreciate nephrology recommendations  Appreciate endocrine recommendations - holding steriods in setting of prior leak  DVT PPX  OOB/ambulate  PT/OT: No needs    Subjective/Objective     Subjective:   No acute events overnight  Afebrile, hemodynamically stable  No nausea, or vomiting, fevers or chills  Objective:     Blood pressure 121/67, pulse 82, temperature (!) 97 2 °F (36 2 °C), resp  rate 16, height 5' 4\" (1 626 m), weight 71 2 kg (157 lb), SpO2 98 %  ,Body mass index is 26 95 kg/m²  Intake/Output Summary (Last 24 hours) at 3/31/2023 7810  Last data filed at 3/31/2023 0019  Gross per 24 hour   Intake 1781 07 ml   Output 1645 ml   Net 136 07 ml       Invasive Devices     Central Venous Catheter Line  Duration           Port A Cath 12/08/22 Right Subclavian 113 days          Epidural Line  Duration           Epidural Catheter 03/27/23 3 days          Drain  Duration           Ileostomy Standard ([place], end)  days    Closed/Suction Drain Anterior; Left LLQ Bulb 19 Fr  3 days    NG/OG/Enteral Tube Nasogastric Left nare 1 day                Physical Exam:  General: No acute distress, alert and oriented  CV: Well perfused, regular rate and rhythm  Lungs: Normal work of breathing, no increased respiratory effort  Abdomen: Soft, appropriately " Department of Obstetrics and Gynecology  Labor and Delivery   Labor Progress Note      SUBJECTIVE:  Patient resting in bed. Denies feeling any pressure at this time.     OBJECTIVE:      Vitals:    /67   Pulse 92   Temp 98.6 °F (37 °C) (Oral)   Resp 18   Ht 5' 4\" (1.626 m)   Wt 163 lb (73.9 kg)   SpO2 100%   BMI 27.98 kg/m²         Fetal heart rate:       Baseline Heart Rate:  145        Accelerations:  present       Long Term Variability:  moderate       Decelerations:  variable         Contraction frequency: 2-5    Fetal Presentation:  Cephalic      Membranes:  Ruptured clear fluid    Cervix: SVE unchanged           ASSESSMENT & PLAN:    Continuous EFM  Reassuring fetal status  SVE unchanged  Temperature 100.4    Discussed current patient status with patient. SVE unchanged, patient not currently on pitocin, and fever of 100.4 at this time. Reviewed benefits and recommendations associated with starting pitocin for augmentation. Reviewed antibiotic therapy for Chorio. Answered all questions to patient satisfaction. Patient willing to start pitocin and antibiotics at this time. Reviewed RLTCS if indicated and agreed on goal of keeping infant and patient safe. Patient verbalized understanding.       DEQUAN Morrison - SHADE     tender, non-distended  Incision(s) clean, dry and intact   JOSÉ ANTONIO in place  Extremities: No edema, clubbing or cyanosis  Skin: Warm, dry

## (undated) DEVICE — SUT STRATAFIX SPIRAL PLUS 3-0 PS-2 30 X 30 CM SXMP2B408

## (undated) DEVICE — INSUFFLATION TUBING PRIMFLO

## (undated) DEVICE — CATH FOLEY 18FR 5ML 2 WAY SILICONE ELASTIMER

## (undated) DEVICE — CHLORAPREP HI-LITE 26ML ORANGE

## (undated) DEVICE — ECHELON CIRCULAR POWERED STAPLER: Brand: ECHELON CIRCULAR

## (undated) DEVICE — PROXIMATE RELOADABLE LINEAR CUTTER WITH SAFETY LOCK-OUT, 75MM: Brand: PROXIMATE

## (undated) DEVICE — SUT PROLENE 2-0 KS 30 IN 8623H

## (undated) DEVICE — GLOVE SRG BIOGEL ECLIPSE 8

## (undated) DEVICE — ADHESIVE SKIN HIGH VISCOSITY EXOFIN 1ML

## (undated) DEVICE — ANTIBACTERIAL UNDYED BRAIDED (POLYGLACTIN 910), SYNTHETIC ABSORBABLE SUTURE: Brand: COATED VICRYL

## (undated) DEVICE — SPONGE 4 X 4 XRAY 16 PLY STRL LF RFD

## (undated) DEVICE — CATH SECURE FOLEY

## (undated) DEVICE — PLUMEPEN PRO 10FT

## (undated) DEVICE — INTENDED FOR TISSUE SEPARATION, AND OTHER PROCEDURES THAT REQUIRE A SHARP SURGICAL BLADE TO PUNCTURE OR CUT.: Brand: BARD-PARKER SAFETY BLADES SIZE 15, STERILE

## (undated) DEVICE — PENCIL ELECTROSURG E-Z CLEAN -0035H

## (undated) DEVICE — TOWEL SET X-RAY

## (undated) DEVICE — PACK PBDS STERILE LAP LITHOTOMY RF

## (undated) DEVICE — TROCAR: Brand: KII FIOS FIRST ENTRY

## (undated) DEVICE — SUT VICRYL 3-0 SH C/R 18 IN J864D

## (undated) DEVICE — GAUZE SPONGES,16 PLY: Brand: CURITY

## (undated) DEVICE — JACKSON-PRATT 100CC BULB RESERVOIR: Brand: CARDINAL HEALTH

## (undated) DEVICE — SUT SILK 2-0 SH CR/8 18 IN C012D

## (undated) DEVICE — 3M™ IOBAN™ 2 ANTIMICROBIAL INCISE DRAPE 6650EZ: Brand: IOBAN™ 2

## (undated) DEVICE — HEMOSTATIC MATRIX SURGIFLO 8ML W/THROMBIN

## (undated) DEVICE — STERILE EMESIS BASIN                 070: Brand: CARDINAL HEALTH

## (undated) DEVICE — ECHELON CONTOUR GST RELOAD GREEN: Brand: ECHELON

## (undated) DEVICE — GLOVE INDICATOR PI UNDERGLOVE SZ 8.5 BLUE

## (undated) DEVICE — PREMIUM DRY TRAY LF: Brand: MEDLINE INDUSTRIES, INC.

## (undated) DEVICE — JP CHANNEL DRAIN, 19FR HUBLESS: Brand: CARDINAL HEALTH

## (undated) DEVICE — LIGACLIP MCA MULTIPLE CLIP APPLIERS, 20 MEDIUM CLIPS: Brand: LIGACLIP

## (undated) DEVICE — SPONGE STICK WITH PVP-I: Brand: KENDALL

## (undated) DEVICE — SUT SILK 2-0 18 IN A185H

## (undated) DEVICE — SUT PDS II 1 XLH 96 IN LOOPED Z881G

## (undated) DEVICE — CHLORHEXIDINE 4PCT 4 OZ

## (undated) DEVICE — CATH URETERAL 5FR X 70 CM FLEX TIP POLYUR BARD

## (undated) DEVICE — SUT SILK 0 30 IN A306H

## (undated) DEVICE — SUT PROLENE 2-0 SH 36 IN 8523H

## (undated) DEVICE — 3000CC GUARDIAN II: Brand: GUARDIAN

## (undated) DEVICE — INTENDED FOR TISSUE SEPARATION, AND OTHER PROCEDURES THAT REQUIRE A SHARP SURGICAL BLADE TO PUNCTURE OR CUT.: Brand: BARD-PARKER SAFETY BLADES SIZE 10, STERILE

## (undated) DEVICE — SUT STRATAFIX SPIRAL 4-0 PGA/PCL 30 X 30 CM SXMD2B409

## (undated) DEVICE — ELECTRODE BLADE MOD  E-Z CLEAN 6.5IN -0014M

## (undated) DEVICE — PROXIMATE RELOADABLE LINEAR STAPLER, 60MM: Brand: PROXIMATE

## (undated) DEVICE — STERILE CYSTO PACK: Brand: CARDINAL HEALTH

## (undated) DEVICE — SEALANT PATCH 2 X 4 IN FIBRIN TOPICAL EVARREST

## (undated) DEVICE — GLOVE PI ULTRA TOUCH SZ.7.5

## (undated) DEVICE — SUT SILK 3-0 SH CR/8 18 IN C013D

## (undated) DEVICE — CATH URETHERAL CONNECTOR

## (undated) DEVICE — 3M™ TEGADERM™ TRANSPARENT FILM DRESSING FRAME STYLE, 1628, 6 IN X 8 IN (15 CM X 20 CM), 10/CT 8CT/CASE: Brand: 3M™ TEGADERM™

## (undated) DEVICE — GLOVE PI ULTRA TOUCH SZ.8.5

## (undated) DEVICE — UNDER BUTTOCKS DRAPE W/FLUID CONTROL POUCH: Brand: CONVERTORS

## (undated) DEVICE — SYRINGE 10ML LL

## (undated) DEVICE — SPY ELITE SINGLE VIAL KIT

## (undated) DEVICE — TELFA NON-ADHERENT ABSORBENT DRESSING: Brand: TELFA

## (undated) DEVICE — SUT VICRYL 2-0 D-SPECIAL 27 IN D8114

## (undated) DEVICE — INTENDED FOR TISSUE SEPARATION, AND OTHER PROCEDURES THAT REQUIRE A SHARP SURGICAL BLADE TO PUNCTURE OR CUT.: Brand: BARD-PARKER SAFETY BLADES SIZE 11, STERILE

## (undated) DEVICE — TROCAR: Brand: KII® SLEEVE

## (undated) DEVICE — DRAIN SPONGES,6 PLY: Brand: EXCILON

## (undated) DEVICE — SURGICEL 4 X 8

## (undated) DEVICE — SPECIMEN CONTAINER STERILE PEEL PACK

## (undated) DEVICE — GLOVE SRG BIOGEL ECLIPSE 7.5

## (undated) DEVICE — TRAY FOLEY 16FR URIMETER SILICONE SURESTEP

## (undated) DEVICE — ECHELON CONTOUR GST RELOAD BLUE: Brand: ECHELON

## (undated) DEVICE — SUT VICRYL 0 CT-1 CR/8 27 IN JJ41G

## (undated) DEVICE — JP CHAN DRN SIL HUBLESS 15FR W/TRO: Brand: CARDINAL HEALTH

## (undated) DEVICE — SUT VICRYL 0 REEL 54 IN J287G

## (undated) DEVICE — MEDI-VAC YANK SUCT HNDL W/TPRD BULBOUS TIP: Brand: CARDINAL HEALTH

## (undated) DEVICE — TRAY FOLEY 16FR URIMETER SURESTEP

## (undated) DEVICE — SUT PLAIN 2-0 CTX 27 IN 872H

## (undated) DEVICE — SUT MONOCRYL 4-0 PS-2 27 IN Y426H

## (undated) DEVICE — ENSEAL 20 CM SHAFT, LARGE JAW: Brand: ENSEAL X1

## (undated) DEVICE — IRRIG ENDO FLO TUBING

## (undated) DEVICE — LAPAROSCOPIC TROCAR SLEEVE/SINGLE USE: Brand: KII® OPTICAL ACCESS SYSTEM

## (undated) DEVICE — TUBING SMOKE EVAC W/FILTRATION DEVICE PLUMEPORT ACTIV

## (undated) DEVICE — SUT VICRYL 3-0 SH 27 IN J416H

## (undated) DEVICE — URIMETER 2500ML

## (undated) DEVICE — ECHELON CONTOUR W/ BLUE RELOAD: Brand: ECHELON

## (undated) DEVICE — PROXIMATE SKIN STAPLERS (35 WIDE) CONTAINS 35 STAINLESS STEEL STAPLES (FIXED HEAD): Brand: PROXIMATE

## (undated) DEVICE — BETHLEHEM MAJOR GENERAL PACK: Brand: CARDINAL HEALTH

## (undated) DEVICE — ENDOPATH 5MM CURVED SCISSORS WITH MONOPOLAR CAUTERY: Brand: ENDOPATH

## (undated) DEVICE — NEEDLE 25G X 1 1/2

## (undated) DEVICE — PROXIMATE LINEAR CUTTER RELOAD, BLUE, 75MM: Brand: PROXIMATE

## (undated) DEVICE — SPECIMEN TRAP: Brand: ARGYLE

## (undated) DEVICE — GUIDEWIRE ANGLE TIP 0.038 IN SOLO PLUS

## (undated) DEVICE — GLOVE INDICATOR PI UNDERGLOVE SZ 8 BLUE

## (undated) DEVICE — ECHELON FLEX 60 ARTICULATING ENDOSCOPIC LINEAR CUTTER (NO CARTRIDGE): Brand: ECHELON FLEX ENDOPATH

## (undated) DEVICE — 3M™ IOBAN™ 2 ANTIMICROBIAL INCISE DRAPE 6640EZ: Brand: IOBAN™ 2

## (undated) DEVICE — ABDOMINAL PAD: Brand: DERMACEA